# Patient Record
Sex: MALE | Race: WHITE | NOT HISPANIC OR LATINO | Employment: OTHER | URBAN - METROPOLITAN AREA
[De-identification: names, ages, dates, MRNs, and addresses within clinical notes are randomized per-mention and may not be internally consistent; named-entity substitution may affect disease eponyms.]

---

## 2017-04-25 ENCOUNTER — ALLSCRIPTS OFFICE VISIT (OUTPATIENT)
Dept: OTHER | Facility: OTHER | Age: 72
End: 2017-04-25

## 2017-04-25 ENCOUNTER — HOSPITAL ENCOUNTER (OUTPATIENT)
Dept: RADIOLOGY | Facility: CLINIC | Age: 72
Discharge: HOME | End: 2017-04-25
Payer: COMMERCIAL

## 2017-04-25 DIAGNOSIS — Z13.6 ENCOUNTER FOR SCREENING FOR CARDIOVASCULAR DISORDERS: ICD-10-CM

## 2017-04-25 DIAGNOSIS — Z87.891 PERSONAL HISTORY OF NICOTINE DEPENDENCE: ICD-10-CM

## 2017-04-25 DIAGNOSIS — Z13.820 ENCOUNTER FOR SCREENING FOR OSTEOPOROSIS: ICD-10-CM

## 2017-04-25 DIAGNOSIS — M25.519 PAIN IN SHOULDER: ICD-10-CM

## 2017-04-25 PROCEDURE — 73000 X-RAY EXAM OF COLLAR BONE: CPT

## 2017-05-04 ENCOUNTER — ALLSCRIPTS OFFICE VISIT (OUTPATIENT)
Dept: OTHER | Facility: OTHER | Age: 72
End: 2017-05-04

## 2017-05-04 ENCOUNTER — GENERIC CONVERSION - ENCOUNTER (OUTPATIENT)
Dept: OTHER | Facility: OTHER | Age: 72
End: 2017-05-04

## 2017-05-04 LAB
BASOPHILS # BLD AUTO: 0 %
BASOPHILS # BLD AUTO: 0 X10E3/UL (ref 0–0.2)
DEPRECATED RDW RBC AUTO: 14.3 % (ref 12.3–15.4)
EOSINOPHIL # BLD AUTO: 0.1 X10E3/UL (ref 0–0.4)
EOSINOPHIL # BLD AUTO: 2 %
HCT VFR BLD AUTO: 39.1 % (ref 37.5–51)
HGB BLD-MCNC: 13.4 G/DL (ref 12.6–17.7)
IMM.GRANULOCYTES (CD4/8) (HISTORICAL): 0 %
IMM.GRANULOCYTES (CD4/8) (HISTORICAL): 0 X10E3/UL (ref 0–0.1)
LYMPHOCYTES # BLD AUTO: 1.4 X10E3/UL (ref 0.7–3.1)
LYMPHOCYTES # BLD AUTO: 19 %
MCH RBC QN AUTO: 31.4 PG (ref 26.6–33)
MCHC RBC AUTO-ENTMCNC: 34.3 G/DL (ref 31.5–35.7)
MCV RBC AUTO: 92 FL (ref 79–97)
MONOCYTES # BLD AUTO: 0.7 X10E3/UL (ref 0.1–0.9)
MONOCYTES (HISTORICAL): 10 %
NEUTROPHILS # BLD AUTO: 5 X10E3/UL (ref 1.4–7)
NEUTROPHILS # BLD AUTO: 69 %
OCCULT BLD, FECAL IMMUNOLOGICAL (HISTORICAL): NEGATIVE
PLATELET # BLD AUTO: 411 X10E3/UL (ref 150–379)
RBC (HISTORICAL): 4.27 X10E6/UL (ref 4.14–5.8)
WBC # BLD AUTO: 7.2 X10E3/UL (ref 3.4–10.8)

## 2017-05-05 ENCOUNTER — GENERIC CONVERSION - ENCOUNTER (OUTPATIENT)
Dept: OTHER | Facility: OTHER | Age: 72
End: 2017-05-05

## 2017-05-05 LAB
A/G RATIO (HISTORICAL): 1.6 (ref 1.2–2.2)
ALBUMIN SERPL BCP-MCNC: 4.1 G/DL (ref 3.5–4.8)
ALP SERPL-CCNC: 60 IU/L (ref 39–117)
ALT SERPL W P-5'-P-CCNC: 19 IU/L (ref 0–44)
AST SERPL W P-5'-P-CCNC: 19 IU/L (ref 0–40)
BILIRUB SERPL-MCNC: 0.2 MG/DL (ref 0–1.2)
BUN SERPL-MCNC: 12 MG/DL (ref 8–27)
BUN/CREA RATIO (HISTORICAL): 12 (ref 10–24)
CALCIUM SERPL-MCNC: 9.6 MG/DL (ref 8.6–10.2)
CHLORIDE SERPL-SCNC: 95 MMOL/L (ref 96–106)
CHOLEST SERPL-MCNC: 173 MG/DL (ref 100–199)
CHOLEST/HDLC SERPL: 2.6 RATIO UNITS (ref 0–5)
CO2 SERPL-SCNC: 24 MMOL/L (ref 18–29)
CREAT SERPL-MCNC: 1.01 MG/DL (ref 0.76–1.27)
EGFR AFRICAN AMERICAN (HISTORICAL): 86 ML/MIN/1.73
EGFR-AMERICAN CALC (HISTORICAL): 74 ML/MIN/1.73
GLUCOSE SERPL-MCNC: 104 MG/DL (ref 65–99)
HDLC SERPL-MCNC: 67 MG/DL
INTERPRETATION (HISTORICAL): NORMAL
LDLC SERPL CALC-MCNC: 91 MG/DL (ref 0–99)
POTASSIUM SERPL-SCNC: 4.7 MMOL/L (ref 3.5–5.2)
PROSTATE SPECIFIC ANTIGEN (HISTORICAL): 3.7 NG/ML (ref 0–4)
SODIUM SERPL-SCNC: 136 MMOL/L (ref 134–144)
TOT. GLOBULIN, SERUM (HISTORICAL): 2.5 G/DL (ref 1.5–4.5)
TOTAL PROTEIN (HISTORICAL): 6.6 G/DL (ref 6–8.5)
TRIGL SERPL-MCNC: 74 MG/DL (ref 0–149)
TSH SERPL DL<=0.05 MIU/L-ACNC: 2.08 UIU/ML (ref 0.45–4.5)
VLDLC SERPL CALC-MCNC: 15 MG/DL (ref 5–40)

## 2017-05-11 ENCOUNTER — HOSPITAL ENCOUNTER (OUTPATIENT)
Dept: RADIOLOGY | Facility: HOSPITAL | Age: 72
Discharge: HOME | End: 2017-05-11
Attending: FAMILY MEDICINE
Payer: COMMERCIAL

## 2017-05-11 ENCOUNTER — GENERIC CONVERSION - ENCOUNTER (OUTPATIENT)
Dept: OTHER | Facility: OTHER | Age: 72
End: 2017-05-11

## 2017-05-11 DIAGNOSIS — Z87.891 PERSONAL HISTORY OF NICOTINE DEPENDENCE: ICD-10-CM

## 2017-05-11 DIAGNOSIS — Z13.6 ENCOUNTER FOR SCREENING FOR CARDIOVASCULAR DISORDERS: ICD-10-CM

## 2017-05-11 DIAGNOSIS — Z13.820 ENCOUNTER FOR SCREENING FOR OSTEOPOROSIS: ICD-10-CM

## 2017-05-11 PROCEDURE — 76706 US ABDL AORTA SCREEN AAA: CPT

## 2017-05-11 PROCEDURE — 77080 DXA BONE DENSITY AXIAL: CPT

## 2017-05-12 ENCOUNTER — GENERIC CONVERSION - ENCOUNTER (OUTPATIENT)
Dept: OTHER | Facility: OTHER | Age: 72
End: 2017-05-12

## 2017-07-11 ENCOUNTER — GENERIC CONVERSION - ENCOUNTER (OUTPATIENT)
Dept: OTHER | Facility: OTHER | Age: 72
End: 2017-07-11

## 2018-01-09 NOTE — RESULT NOTES
Discussion/Summary   bone density study was acceptable     Verified Results  * DXA BONE DENSITY SPINE HIP AND PELVIS [id number] 01:20PM Isidra Falk Order Number: [id number]    - Patient Instructions: To schedule this appointment, please contact Central Scheduling at [phone]  Test Name Result Flag Reference   DXA BONE DENSITY SPINE HIP AND PELVIS (Report)     CENTRAL DXA SCAN     CLINICAL HISTORY:  71year old  male with no significant past medical history  Previous smoking  Osteoporosis screening  TECHNIQUE: Bone densitometry was performed using a Pulmonx bone densitometer  Regions of interest appear properly placed  There are no obvious fractures or other confounding variables which could limit the study  Degenerative changes of the lumbar    spine and hip  This will falsely elevate the bone mineral densities in these regions  COMPARISON: None  RESULTS:    LUMBAR SPINE: L1 and L4:   BMD 1 396 gm/cm2   T-score 1 4   Z-score 2 0     LEFT TOTAL HIP:   BMD 1 162 gm/cm2   T-score 0 4   Z-score 1 2     LEFT FEMORAL NECK:   BMD 1 126 gm/cm2   T-score 0 4   Z-score 1 7     TOTAL RIGHT HIP:        BMD 1 0 93 gm/sq-cm,      T-score is -0 1      Z-score is 0 7                FEMORAL NECK RIGHT HIP :     BMD 1 0 33 gm/sq-cm,     T-score is -0 3     Z-score is 1 0             IMPRESSION:   1  Based on the [facility] classification, this study is normal and the patient is considered at low risk for fracture  2  A daily intake of calcium of at least 1200 mg and vitamin D, 800-1000 IU, as well as weight bearing and muscle strengthening exercise, fall prevention and avoidance of tobacco and excessive alcohol intake as basic preventive measures are recommended  3  Repeat DXA scan on the same equipment in 18-24 months as clinically indicated         The 10 year risk of hip fracture is 1%, with the 10 year risk of major osteoporotic fracture being 5%, as calculated by the [facility] fracture risk assessment tool (FRAX)  The current NOF guidelines recommend treating patients with FRAX 10 year risk score of    >3% for hip fracture and >20% for major osteoporotic fracture        WHO CLASSIFICATION:   Normal (a T-score of -1 0 or higher)   Low bone mineral density (a T-score of less than -1 0 but higher than -2 5)   Osteoporosis (a T-score of -2 5 or less)   Severe osteoporosis (a T-score of -2 5 or less with a fragility fracture)             Workstation performed: [id number]     Signed by:   Marta Chavez DO   5/11/17

## 2018-01-11 NOTE — RESULT NOTES
Discussion/Summary   negative study no evidence of aneurysm     Verified Results  [address] AAA [id number] 01:20PM Nils Monet Order Number: [id number]     Test Name Result Flag Reference   US ABDOMINAL AORTA SCREENING AAA (Report)     ABDOMINAL AORTIC ULTRASOUND     INDICATION: Evaluate for aortic aneurysm  COMPARISON: None  FINDINGS:      Ultrasound of the abdominal aorta was performed in longitudinal and transverse planes with a curvilinear transducer  Proximal aorta: Obscured by overlying bowel gas  Mid aorta:  2 0 by 2 4 cm   Distal aorta:  2 1 x 1 9 cm    Right common iliac origin: 1 3 cm   Left common iliac origin: 1 3 cm     No periaortic collections or adenopathy detected  IMPRESSION:     No evidence for abdominal aortic aneurysm         Workstation performed: [id number]     Signed by:   Estelita Landon MD   5/12/17

## 2018-01-14 VITALS
TEMPERATURE: 96.8 F | RESPIRATION RATE: 18 BRPM | SYSTOLIC BLOOD PRESSURE: 142 MMHG | WEIGHT: 157 LBS | HEIGHT: 67 IN | HEART RATE: 88 BPM | DIASTOLIC BLOOD PRESSURE: 62 MMHG | BODY MASS INDEX: 24.64 KG/M2

## 2018-01-14 VITALS
HEART RATE: 76 BPM | HEIGHT: 69 IN | WEIGHT: 158 LBS | DIASTOLIC BLOOD PRESSURE: 71 MMHG | SYSTOLIC BLOOD PRESSURE: 145 MMHG | BODY MASS INDEX: 23.4 KG/M2

## 2018-01-16 NOTE — MISCELLANEOUS
Provider Comments  Provider Comments:   Dear Erick Hannon,    This is a reminder you missed your scheduled appointment with Dr Gussie Gottron on Tuesday, July 11  Please call our office to reschedule this appointment      Sincerely,  Dr Ernesto Nagy   Electronically signed by : Delano Jeter, ; Jul 11 2017 11:27AM EST                       (Author)

## 2018-01-17 NOTE — RESULT NOTES
Discussion/Summary   called pt to discuss results i will speak with him when he calls back     Verified Results  (1) COMPREHENSIVE METABOLIC PANEL [id number] 64:00DL Yamila Peña     Test Name Result Flag Reference   Glucose, Serum 104 mg/dL H 65-99   BUN 12 mg/dL  8-27   Creatinine, Serum 1 01 mg/dL  0 76-1 27   BUN/Creatinine Ratio 12  10-24   Sodium, Serum 136 mmol/L  134-144   Potassium, Serum 4 7 mmol/L  3 5-5 2   Chloride, Serum 95 mmol/L L    Carbon Dioxide, Total 24 mmol/L  18-29   Calcium, Serum 9 6 mg/dL  8 6-10 2   Protein, Total, Serum 6 6 g/dL  6 0-8 5   Albumin, Serum 4 1 g/dL  3 5-4 8   Globulin, Total 2 5 g/dL  1 5-4 5   A/G Ratio 1 6  1 2-2 2   Bilirubin, Total 0 2 mg/dL  0 0-1 2   Alkaline Phosphatase, S 60 IU/L     AST (SGOT) 19 IU/L  0-40   ALT (SGPT) 19 IU/L  0-44   eGFR If NonAfricn Am 74 mL/min/1 73  >59   eGFR If Africn Am 86 mL/min/1 73  >59     (1) LIPID PANEL, FASTING [id number] 11:45AM [facility]     Test Name Result Flag Reference   Cholesterol, Total 173 mg/dL  100-199   Triglycerides 74 mg/dL  0-149   HDL Cholesterol 67 mg/dL  >39   VLDL Cholesterol Waqar 15 mg/dL  5-40   LDL Cholesterol Calc 91 mg/dL  0-99   T  Chol/HDL Ratio 2 6 ratio units  0 0-5 0   T  Chol/HDL Ratio                                                             Men  Women                                               1/2 Avg  Risk  3 4    3 3                                                   Avg Risk  5 0    4 4                                                2X Avg  Risk  9 6    7 1                                                3X Avg  Risk 23 4   11 0     (1) PSA (SCREEN) (Dx V76 44 Screen for Prostate Cancer) [id number] 11:45AM [facility] [employer]     Test Name Result Flag Reference   Prostate Specific Ag, Serum 3 7 ng/mL  0 0-4 0   Roche ECLIA methodology  According to the American Urological Association, Serum PSA should  decrease and remain at undetectable levels after radical  prostatectomy   The [facility] defines biochemical recurrence as an initial  PSA value 0 2 ng/mL or greater followed by a subsequent confirmatory  PSA value 0 2 ng/mL or greater  Values obtained with different assay methods or kits cannot be used  interchangeably  Results cannot be interpreted as absolute evidence  of the presence or absence of malignant disease  (1) TSH [id number] 11:45AM Chioma Huerta     Test Name Result Flag Reference   TSH 2 080 uIU/mL  0 450-4 500     (1) CBC/PLT/DIFF [id number] 11:45AM [facility]     Test Name Result Flag Reference   WBC 7 2 x10E3/uL  3 4-10 8   RBC 4 27 x10E6/uL  4 14-5 80   Hemoglobin 13 4 g/dL  12 6-17 7   Hematocrit 39 1 %  37 5-51 0   MCV 92 fL  79-97   MCH 31 4 pg  26 6-33 0   MCHC 34 3 g/dL  31 5-35 7   RDW 14 3 %  12 3-15 4   Platelets 439 O15A6/OX H 150-379   Neutrophils 69 %     Lymphs 19 %     Monocytes 10 %     Eos 2 %     Basos 0 %     Neutrophils (Absolute) 5 0 x10E3/uL  1 4-7 0   Lymphs (Absolute) 1 4 x10E3/uL  0 7-3 1   Monocytes(Absolute) 0 7 x10E3/uL  0 1-0 9   Eos (Absolute) 0 1 x10E3/uL  0 0-0 4   Baso (Absolute) 0 0 x10E3/uL  0 0-0 2   Immature Granulocytes 0 %     Immature Grans (Abs) 0 0 x10E3/uL  0 0-0 1     (LC) Cardiovascular Risk Assessment [id number] 11:45AM [facility] Huerta     Test Name Result Flag Reference   Interpretation Note     Supplement report is available  PDF Image

## 2019-01-15 ENCOUNTER — OFFICE VISIT (OUTPATIENT)
Dept: FAMILY MEDICINE CLINIC | Facility: CLINIC | Age: 74
End: 2019-01-15
Payer: COMMERCIAL

## 2019-01-15 VITALS
HEART RATE: 78 BPM | SYSTOLIC BLOOD PRESSURE: 120 MMHG | HEIGHT: 69 IN | DIASTOLIC BLOOD PRESSURE: 60 MMHG | BODY MASS INDEX: 21.77 KG/M2 | WEIGHT: 147 LBS | TEMPERATURE: 97.2 F | RESPIRATION RATE: 18 BRPM

## 2019-01-15 DIAGNOSIS — R59.0 LYMPHADENOPATHY, AXILLARY: Primary | ICD-10-CM

## 2019-01-15 DIAGNOSIS — Z12.5 SCREENING FOR PROSTATE CANCER: ICD-10-CM

## 2019-01-15 DIAGNOSIS — Z13.6 SCREENING FOR CARDIOVASCULAR CONDITION: ICD-10-CM

## 2019-01-15 PROBLEM — M19.041 PRIMARY OSTEOARTHRITIS OF RIGHT HAND: Status: ACTIVE | Noted: 2017-04-25

## 2019-01-15 PROCEDURE — 3008F BODY MASS INDEX DOCD: CPT | Performed by: FAMILY MEDICINE

## 2019-01-15 PROCEDURE — 99213 OFFICE O/P EST LOW 20 MIN: CPT | Performed by: FAMILY MEDICINE

## 2019-01-15 PROCEDURE — 1160F RVW MEDS BY RX/DR IN RCRD: CPT | Performed by: FAMILY MEDICINE

## 2019-01-15 PROCEDURE — 1101F PT FALLS ASSESS-DOCD LE1/YR: CPT | Performed by: FAMILY MEDICINE

## 2019-01-15 PROCEDURE — 3725F SCREEN DEPRESSION PERFORMED: CPT | Performed by: FAMILY MEDICINE

## 2019-01-15 RX ORDER — DOXYCYCLINE HYCLATE 100 MG/1
100 CAPSULE ORAL EVERY 12 HOURS SCHEDULED
Qty: 20 CAPSULE | Refills: 0 | Status: SHIPPED | OUTPATIENT
Start: 2019-01-15 | End: 2019-01-25

## 2019-01-15 NOTE — PROGRESS NOTES
Assessment/Plan:    Problem List Items Addressed This Visit     None      Visit Diagnoses     Lymphadenopathy, axillary    -  Primary    Relevant Medications    doxycycline hyclate (VIBRAMYCIN) 100 mg capsule    Other Relevant Orders    CT chest wo contrast    CBC    Comprehensive metabolic panel    Screening for cardiovascular condition        Relevant Orders    Lipid Panel with Direct LDL reflex    Screening for prostate cancer        Relevant Orders    PSA, Total Screen          There are no Patient Instructions on file for this visit  No Follow-up on file  Subjective:      Patient ID: Kelly Carranza is a 69 y o  male  Chief Complaint   Patient presents with    Swollen Glands     started 5 days ago under L arm  rmklpn       Pt states 4 days ago he noticed swollen glands in his left axcilla  States jhe felt a bit of a sting or some heat maybe rash  Pt does have scratches from cats on the left side  Pt would like to have this imaged          The following portions of the patient's history were reviewed and updated as appropriate: allergies, current medications, past family history, past medical history, past social history, past surgical history and problem list     Review of Systems   Constitutional: Negative for activity change, appetite change, chills, diaphoresis, fatigue, fever and unexpected weight change  HENT: Negative for congestion, dental problem, ear pain, mouth sores, sinus pain, sinus pressure, sore throat and trouble swallowing  Eyes: Negative for photophobia, discharge and itching  Respiratory: Negative for apnea, chest tightness and shortness of breath  Cardiovascular: Negative for chest pain, palpitations and leg swelling  Gastrointestinal: Negative for abdominal distention, abdominal pain, blood in stool, nausea and vomiting  Endocrine: Negative for cold intolerance, heat intolerance, polydipsia, polyphagia and polyuria  Genitourinary: Negative for difficulty urinating  Musculoskeletal: Negative for arthralgias  Skin: Negative for color change and wound  Neurological: Negative for dizziness, syncope, speech difficulty and headaches  Hematological: Negative for adenopathy  Psychiatric/Behavioral: Negative for agitation and behavioral problems  Current Outpatient Prescriptions   Medication Sig Dispense Refill    doxycycline hyclate (VIBRAMYCIN) 100 mg capsule Take 1 capsule (100 mg total) by mouth every 12 (twelve) hours for 10 days 20 capsule 0     No current facility-administered medications for this visit  Objective:    /60   Pulse 78   Temp (!) 97 2 °F (36 2 °C)   Resp 18   Ht 5' 9" (1 753 m)   Wt 66 7 kg (147 lb)   BMI 21 71 kg/m²        Physical Exam   Constitutional: He appears well-developed and well-nourished  No distress  HENT:   Head: Normocephalic and atraumatic  Right Ear: External ear normal    Left Ear: External ear normal    Nose: Nose normal    Mouth/Throat: Oropharynx is clear and moist  No oropharyngeal exudate  Eyes: Pupils are equal, round, and reactive to light  EOM are normal  Right eye exhibits no discharge  Left eye exhibits no discharge  No scleral icterus  Neck: No thyromegaly present  Cardiovascular: Normal rate and normal heart sounds  No murmur heard  Pulmonary/Chest: Effort normal and breath sounds normal  No respiratory distress  He has no wheezes  Abdominal: Soft  Bowel sounds are normal  He exhibits no distension and no mass  There is no tenderness  There is no rebound and no guarding  Musculoskeletal: Normal range of motion  Neurological: He is alert  He displays normal reflexes  Coordination normal    Skin: Skin is warm and dry  No rash noted  He is not diaphoretic  No erythema  Psychiatric: He has a normal mood and affect  His behavior is normal    Nursing note and vitals reviewed               Hudson Ponce DO

## 2019-01-17 LAB
ALBUMIN SERPL-MCNC: 4 G/DL (ref 3.5–4.8)
ALBUMIN/GLOB SERPL: 1.7 {RATIO} (ref 1.2–2.2)
ALP SERPL-CCNC: 62 IU/L (ref 39–117)
ALT SERPL-CCNC: 18 IU/L (ref 0–44)
AST SERPL-CCNC: 24 IU/L (ref 0–40)
BASOPHILS # BLD AUTO: 0.1 X10E3/UL (ref 0–0.2)
BASOPHILS NFR BLD AUTO: 1 %
BILIRUB SERPL-MCNC: 0.2 MG/DL (ref 0–1.2)
BUN SERPL-MCNC: 12 MG/DL (ref 8–27)
BUN/CREAT SERPL: 12 (ref 10–24)
CALCIUM SERPL-MCNC: 9.1 MG/DL (ref 8.6–10.2)
CHLORIDE SERPL-SCNC: 93 MMOL/L (ref 96–106)
CHOLEST SERPL-MCNC: 134 MG/DL (ref 100–199)
CO2 SERPL-SCNC: 25 MMOL/L (ref 20–29)
CREAT SERPL-MCNC: 1.04 MG/DL (ref 0.76–1.27)
EOSINOPHIL # BLD AUTO: 0.1 X10E3/UL (ref 0–0.4)
EOSINOPHIL NFR BLD AUTO: 2 %
ERYTHROCYTE [DISTWIDTH] IN BLOOD BY AUTOMATED COUNT: 13.1 % (ref 12.3–15.4)
GLOBULIN SER-MCNC: 2.3 G/DL (ref 1.5–4.5)
GLUCOSE SERPL-MCNC: 108 MG/DL (ref 65–99)
HCT VFR BLD AUTO: 38.1 % (ref 37.5–51)
HDLC SERPL-MCNC: 46 MG/DL
HGB BLD-MCNC: 13.2 G/DL (ref 13–17.7)
IMM GRANULOCYTES # BLD: 0 X10E3/UL (ref 0–0.1)
IMM GRANULOCYTES NFR BLD: 0 %
LABCORP COMMENT: NORMAL
LDLC SERPL CALC-MCNC: 76 MG/DL (ref 0–99)
LYMPHOCYTES # BLD AUTO: 1.7 X10E3/UL (ref 0.7–3.1)
LYMPHOCYTES NFR BLD AUTO: 30 %
MCH RBC QN AUTO: 30.7 PG (ref 26.6–33)
MCHC RBC AUTO-ENTMCNC: 34.6 G/DL (ref 31.5–35.7)
MCV RBC AUTO: 89 FL (ref 79–97)
MICRODELETION SYND BLD/T FISH: NORMAL
MONOCYTES # BLD AUTO: 0.9 X10E3/UL (ref 0.1–0.9)
MONOCYTES NFR BLD AUTO: 16 %
NEUTROPHILS # BLD AUTO: 2.9 X10E3/UL (ref 1.4–7)
NEUTROPHILS NFR BLD AUTO: 51 %
PLATELET # BLD AUTO: 260 X10E3/UL (ref 150–379)
POTASSIUM SERPL-SCNC: 4.4 MMOL/L (ref 3.5–5.2)
PROT SERPL-MCNC: 6.3 G/DL (ref 6–8.5)
PSA SERPL-MCNC: 1.1 NG/ML (ref 0–4)
RBC # BLD AUTO: 4.3 X10E6/UL (ref 4.14–5.8)
SL AMB EGFR AFRICAN AMERICAN: 82 ML/MIN/1.73
SL AMB EGFR NON AFRICAN AMERICAN: 71 ML/MIN/1.73
SODIUM SERPL-SCNC: 133 MMOL/L (ref 134–144)
TRIGL SERPL-MCNC: 59 MG/DL (ref 0–149)
WBC # BLD AUTO: 5.7 X10E3/UL (ref 3.4–10.8)

## 2019-01-21 ENCOUNTER — TELEPHONE (OUTPATIENT)
Dept: FAMILY MEDICINE CLINIC | Facility: CLINIC | Age: 74
End: 2019-01-21

## 2019-01-21 DIAGNOSIS — R59.0 AXILLARY LYMPHADENOPATHY: Primary | ICD-10-CM

## 2019-01-21 NOTE — TELEPHONE ENCOUNTER
Spoke with Pt, aware of Dr Benjamin Awad , pt also stated that he is going to call insurance to find out why CT is not covered and will call us back tomorrow   Violetta Khan MA

## 2019-01-21 NOTE — TELEPHONE ENCOUNTER
[place] call pt and let him know his ins did NT approve the ct of his chest   I ordered an US of nazanin instead    Order in chart

## 2019-01-21 NOTE — TELEPHONE ENCOUNTER
----- Message from Alena Ellis sent at 1/21/2019  3:26 PM EST -----  Regarding: Berneice Handler Dr Mark Simmons,    Andrés's CT chest is pending denial through his insurance  They are offering Peer to Peer at [phone]; Case #[id number]  If you'd like, you may view the correspondence attached below  Andrés's CT appointment is on Wed 1/23/2019  Please let me know if you complete the Peer to Peer OR contact Krystina Cabrera to review plan of care and instruct him to cancel his CT appointment  Thank you,  Angeles Barajas  ______________________________________________________    Upon review, the Medical Director is unable to approve the service requested above for your member  If we do not receive additional information to support an approval by the date specified, the following denial reason will be sent: Based on Medicare National Coverage Determinations (NCD) and [employer] [facility] Guidelines, we cannot approve this request  Your records show that you  have swollen lymph nodes in your armpit(s)  Lymph nodes are small glands located in many areas of your body  They act as filters and support your immune system  Your records do not show: 1) results of an exam done by your doctor, 2) that your swelling persisted after being observed by your doctor for 3 to 4 weeks, and 3) results of a lymph node biopsy (tissue sample taken for lab testing)  Advanced imaging, detailed picture study, is not supported prior to all of these being done

## 2019-01-21 NOTE — TELEPHONE ENCOUNTER
Please call pt let him know his insurance did not approve the ct of his chest   I ordered an US of the nazanin instead

## 2019-01-23 ENCOUNTER — TELEPHONE (OUTPATIENT)
Dept: FAMILY MEDICINE CLINIC | Facility: CLINIC | Age: 74
End: 2019-01-23

## 2019-01-23 ENCOUNTER — HOSPITAL ENCOUNTER (OUTPATIENT)
Dept: RADIOLOGY | Facility: HOSPITAL | Age: 74
Discharge: HOME | End: 2019-01-23
Attending: FAMILY MEDICINE
Payer: COMMERCIAL

## 2019-01-23 DIAGNOSIS — R59.0 AXILLARY LYMPHADENOPATHY: ICD-10-CM

## 2019-01-23 DIAGNOSIS — R59.0 LYMPHADENOPATHY, AXILLARY: Primary | ICD-10-CM

## 2019-01-23 PROCEDURE — 76642 ULTRASOUND BREAST LIMITED: CPT

## 2019-01-23 NOTE — TELEPHONE ENCOUNTER
Called pt to discuss the US of the left breast   Looks like he has reactive lymph nodes  He was treated with abx when I saw him,  Radiology suggested that we re US the area in 4-6 weeks looking for resolution  I will put order for this in chart    Left message for pt to call back

## 2019-01-23 NOTE — TELEPHONE ENCOUNTER
Called pt discussed results, advised I ordered the study for 6 week    If they are clear we are happy if not the next step would be to biopsy

## 2019-04-16 ENCOUNTER — HOSPITAL ENCOUNTER (OUTPATIENT)
Dept: RADIOLOGY | Facility: HOSPITAL | Age: 74
Discharge: HOME | End: 2019-04-16
Attending: FAMILY MEDICINE
Payer: COMMERCIAL

## 2019-04-16 DIAGNOSIS — R59.0 LYMPHADENOPATHY, AXILLARY: ICD-10-CM

## 2019-04-16 PROCEDURE — 76642 ULTRASOUND BREAST LIMITED: CPT

## 2020-02-26 ENCOUNTER — TELEPHONE (OUTPATIENT)
Dept: FAMILY MEDICINE CLINIC | Facility: CLINIC | Age: 75
End: 2020-02-26

## 2020-02-28 NOTE — TELEPHONE ENCOUNTER
2nd attempt, lmom for pt
APPT MADE    NO FURTHER ACTION NEEDED
Pt abdulaziz like he is due for an AWV has not had a follow up since 2017    Can we call to sched
lmom for pt to call back
constant

## 2020-03-11 ENCOUNTER — OFFICE VISIT (OUTPATIENT)
Dept: FAMILY MEDICINE CLINIC | Facility: CLINIC | Age: 75
End: 2020-03-11
Payer: COMMERCIAL

## 2020-03-11 VITALS
TEMPERATURE: 98 F | HEART RATE: 77 BPM | DIASTOLIC BLOOD PRESSURE: 70 MMHG | WEIGHT: 154 LBS | RESPIRATION RATE: 20 BRPM | HEIGHT: 69 IN | SYSTOLIC BLOOD PRESSURE: 130 MMHG | BODY MASS INDEX: 22.81 KG/M2 | OXYGEN SATURATION: 98 %

## 2020-03-11 DIAGNOSIS — Z23 NEED FOR VACCINATION: ICD-10-CM

## 2020-03-11 DIAGNOSIS — Z12.5 SCREENING FOR PROSTATE CANCER: ICD-10-CM

## 2020-03-11 DIAGNOSIS — Z00.00 MEDICARE ANNUAL WELLNESS VISIT, SUBSEQUENT: Primary | ICD-10-CM

## 2020-03-11 DIAGNOSIS — Z11.59 NEED FOR HEPATITIS C SCREENING TEST: ICD-10-CM

## 2020-03-11 DIAGNOSIS — Z13.6 SCREENING FOR CARDIOVASCULAR CONDITION: ICD-10-CM

## 2020-03-11 DIAGNOSIS — Z12.11 SCREEN FOR COLON CANCER: ICD-10-CM

## 2020-03-11 PROCEDURE — 1160F RVW MEDS BY RX/DR IN RCRD: CPT | Performed by: FAMILY MEDICINE

## 2020-03-11 PROCEDURE — 3008F BODY MASS INDEX DOCD: CPT | Performed by: FAMILY MEDICINE

## 2020-03-11 PROCEDURE — 1125F AMNT PAIN NOTED PAIN PRSNT: CPT | Performed by: FAMILY MEDICINE

## 2020-03-11 PROCEDURE — 4040F PNEUMOC VAC/ADMIN/RCVD: CPT | Performed by: FAMILY MEDICINE

## 2020-03-11 PROCEDURE — G0439 PPPS, SUBSEQ VISIT: HCPCS | Performed by: FAMILY MEDICINE

## 2020-03-11 PROCEDURE — G0009 ADMIN PNEUMOCOCCAL VACCINE: HCPCS | Performed by: FAMILY MEDICINE

## 2020-03-11 PROCEDURE — 1036F TOBACCO NON-USER: CPT | Performed by: FAMILY MEDICINE

## 2020-03-11 PROCEDURE — 90732 PPSV23 VACC 2 YRS+ SUBQ/IM: CPT | Performed by: FAMILY MEDICINE

## 2020-03-11 PROCEDURE — 1170F FXNL STATUS ASSESSED: CPT | Performed by: FAMILY MEDICINE

## 2020-03-11 RX ORDER — ERYTHROMYCIN 5 MG/G
OINTMENT OPHTHALMIC
COMMUNITY
Start: 2020-03-04

## 2020-03-11 NOTE — PROGRESS NOTES
Assessment and Plan:     Problem List Items Addressed This Visit     None      Visit Diagnoses     Medicare annual wellness visit, subsequent    -  Primary    Screening for cardiovascular condition        Relevant Orders    Comprehensive metabolic panel    Lipid Panel with Direct LDL reflex    Screening for prostate cancer        Relevant Orders    PSA, Total Screen    Need for hepatitis C screening test        Relevant Orders    Hepatitis C antibody    Need for vaccination        Relevant Orders    PNEUMOCOCCAL POLYSACCHARIDE VACCINE 23-VALENT =>3YO SQ IM    Screen for colon cancer        Relevant Orders    Ambulatory referral to Gastroenterology           Preventive health issues were discussed with patient, and age appropriate screening tests were ordered as noted in patient's After Visit Summary  Personalized health advice and appropriate referrals for health education or preventive services given if needed, as noted in patient's After Visit Summary  History of Present Illness:     Patient presents for Medicare Annual Wellness visit    Patient Care Team:  Kelley Hernandez DO as PCP - General (Family Medicine)  Dhara Cristobal MD as Consulting Physician (Gastroenterology)  Dl Chung MD as Consulting Physician (Ophthalmology)  Rahel Tai MD as Consulting Physician (Orthopedic Surgery)  Thai Ragsdale MD as Consulting Physician (Dermatology)     Problem List:     Patient Active Problem List   Diagnosis    Primary osteoarthritis of right hand      Past Medical and Surgical History:     History reviewed  No pertinent past medical history    Past Surgical History:   Procedure Laterality Date    SKIN BIOPSY        Family History:     Family History   Problem Relation Age of Onset    Colon cancer Mother     Lung cancer Father     Mental illness Neg Hx       Social History:        Social History     Socioeconomic History    Marital status: /Civil Union     Spouse name: None    Number of children: None    Years of education: None    Highest education level: None   Occupational History    None   Social Needs    Financial resource strain: None    Food insecurity:     Worry: None     Inability: None    Transportation needs:     Medical: None     Non-medical: None   Tobacco Use    Smoking status: Former Smoker     Last attempt to quit: 3/11/2005     Years since quitting: 15 0    Smokeless tobacco: Never Used   Substance and Sexual Activity    Alcohol use: No    Drug use: No    Sexual activity: None   Lifestyle    Physical activity:     Days per week: None     Minutes per session: None    Stress: None   Relationships    Social connections:     Talks on phone: None     Gets together: None     Attends Synagogue service: None     Active member of club or organization: None     Attends meetings of clubs or organizations: None     Relationship status: None    Intimate partner violence:     Fear of current or ex partner: None     Emotionally abused: None     Physically abused: None     Forced sexual activity: None   Other Topics Concern    None   Social History Narrative    None      Medications and Allergies:     Current Outpatient Medications   Medication Sig Dispense Refill    erythromycin (ILOTYCIN) ophthalmic ointment APPLY A SMALL AMOUNT IN BOTH EYES AT BEDTIME GENTLY RUB INTO EYELASHES       No current facility-administered medications for this visit        No Known Allergies   Immunizations:     Immunization History   Administered Date(s) Administered    Pneumococcal Conjugate 13-Valent 05/04/2017      Health Maintenance:         Topic Date Due    Hepatitis C Screening  1945    CRC Screening: Colonoscopy  02/05/2015         Topic Date Due    DTaP,Tdap,and Td Vaccines (1 - Tdap) 01/12/1956    Pneumococcal Vaccine: 65+ Years (2 of 2 - PPSV23) 05/04/2018      Medicare Health Risk Assessment:     /70   Pulse 77   Temp 98 °F (36 7 °C)   Resp 20   Ht 5' 8 5" (1 74 m) Wt 69 9 kg (154 lb)   SpO2 98%   BMI 23 08 kg/m²      Leeanne Brittle is here for his Subsequent Wellness visit  Last Medicare Wellness visit information reviewed, patient interviewed, no change since last AWV  Health Risk Assessment:   Patient rates overall health as very good  Patient feels that their physical health rating is same  Eyesight was rated as slightly worse  Hearing was rated as same  Patient feels that their emotional and mental health rating is same  Pain experienced in the last 7 days has been none  Patient states that he has experienced no weight loss or gain in last 6 months  Depression Screening:   PHQ-2 Score: 0      Fall Risk Screening: In the past year, patient has experienced: no history of falling in past year      Home Safety:  Patient does not have trouble with stairs inside or outside of their home  Patient has working smoke alarms and has no working carbon monoxide detector  Home safety hazards include: none  Nutrition:   Current diet is Regular, Low Carb and Low Cholesterol  Medications:   Patient is currently taking over-the-counter supplements  OTC medications include: see medication list  Patient is able to manage medications  Activities of Daily Living (ADLs)/Instrumental Activities of Daily Living (IADLs):   Walk and transfer into and out of bed and chair?: Yes  Dress and groom yourself?: Yes    Bathe or shower yourself?: Yes    Feed yourself?  Yes  Do your laundry/housekeeping?: Yes  Manage your money, pay your bills and track your expenses?: Yes  Make your own meals?: Yes    Do your own shopping?: Yes    Previous Hospitalizations:   Any hospitalizations or ED visits within the last 12 months?: No      Advance Care Planning:   Living will: No    Durable POA for healthcare: No    Advanced directive: No      Cognitive Screening:   Provider or family/friend/caregiver concerned regarding cognition?: No    PREVENTIVE SCREENINGS      Cardiovascular Screening:    General: Screening Current and Risks and Benefits Discussed    Due for: Lipid Panel      Diabetes Screening:     General: Risks and Benefits Discussed and Screening Current    Due for: Blood Glucose      Prostate Cancer Screening:    General: Risks and Benefits Discussed and Screening Current    Due for: PSA      Osteoporosis Screening:    General: Risks and Benefits Discussed and Patient Declines      Abdominal Aortic Aneurysm (AAA) Screening:    Risk factors include: age between 73-69 yo and tobacco use        General: Screening Current and Risks and Benefits Discussed      Lung Cancer Screening:     General: Screening Not Indicated      Hepatitis C Screening:    General: Risks and Benefits Discussed    Hep C Screening Accepted: Yes      Physical Exam   Prostate = acceptable size, not tender    Barbara Hernandez, DO

## 2020-03-11 NOTE — PATIENT INSTRUCTIONS
Medicare Preventive Visit Patient Instructions  Thank you for completing your Welcome to Medicare Visit or Medicare Annual Wellness Visit today  Your next wellness visit will be due in one year (3/11/2021)  The screening/preventive services that you may require over the next 5-10 years are detailed below  Some tests may not apply to you based off risk factors and/or age  Screening tests ordered at today's visit but not completed yet may show as past due  Also, please note that scanned in results may not display below  Preventive Screenings:  Service Recommendations Previous Testing/Comments   Colorectal Cancer Screening  · Colonoscopy    · Fecal Occult Blood Test (FOBT)/Fecal Immunochemical Test (FIT)  · Fecal DNA/Cologuard Test  · Flexible Sigmoidoscopy Age: 54-65 years old   Colonoscopy: every 10 years (May be performed more frequently if at higher risk)  OR  FOBT/FIT: every 1 year  OR  Cologuard: every 3 years  OR  Sigmoidoscopy: every 5 years  Screening may be recommended earlier than age 48 if at higher risk for colorectal cancer  Also, an individualized decision between you and your healthcare provider will decide whether screening between the ages of 74-80 would be appropriate   Colonoscopy: 02/05/2010  FOBT/FIT: Not on file  Cologuard: Not on file  Sigmoidoscopy: Not on file         Prostate Cancer Screening Individualized decision between patient and health care provider in men between ages of 53-78   Medicare will cover every 12 months beginning on the day after your 50th birthday PSA: 1 1 ng/mL          Hepatitis C Screening Once for adults born between [facility]  More frequently in patients at high risk for Hepatitis C Hep C Antibody: Not on file       Diabetes Screening 1-2 times per year if you're at risk for diabetes or have pre-diabetes Fasting glucose: No results in last 5 years   A1C: No results in last 5 years       Cholesterol Screening Once every 5 years if you don't have a lipid disorder  May order more often based on risk factors  Lipid panel: 01/16/2019    Screening Current      Other Preventive Screenings Covered by Medicare:  1  Abdominal Aortic Aneurysm (AAA) Screening: covered once if your at risk  You're considered to be at risk if you have a family history of AAA or a male between the age of 73-68 who smoking at least 100 cigarettes in your lifetime  2  Lung Cancer Screening: covers low dose CT scan once per year if you meet all of the following conditions: (1) Age 50-69; (2) No signs or symptoms of lung cancer; (3) Current smoker or have quit smoking within the last 15 years; (4) You have a tobacco smoking history of at least 30 pack years (packs per day x number of years you smoked); (5) You get a written order from a healthcare provider  3  Glaucoma Screening: covered annually if you're considered high risk: (1) You have diabetes OR (2) Family history of glaucoma OR (3)  aged 48 and older OR (3)  American aged 72 and older  3  Osteoporosis Screening: covered every 2 years if you meet one of the following conditions: (1) Have a vertebral abnormality; (2) On glucocorticoid therapy for more than 3 months; (3) Have primary hyperparathyroidism; (4) On osteoporosis medications and need to assess response to drug therapy  5  HIV Screening: covered annually if you're between the age of 12-76  Also covered annually if you are younger than 13 and older than 72 with risk factors for HIV infection  For pregnant patients, it is covered up to 3 times per pregnancy      Immunizations:  Immunization Recommendations   Influenza Vaccine Annual influenza vaccination during flu season is recommended for all persons aged >= 6 months who do not have contraindications   Pneumococcal Vaccine (Prevnar and Pneumovax)  * Prevnar = PCV13  * Pneumovax = PPSV23 Adults 25-60 years old: 1-3 doses may be recommended based on certain risk factors  Adults 72 years old: Prevnar (PCV13) vaccine recommended followed by Pneumovax (PPSV23) vaccine  If already received PPSV23 since turning 65, then PCV13 recommended at least one year after PPSV23 dose  Hepatitis B Vaccine 3 dose series if at intermediate or high risk (ex: diabetes, end stage renal disease, liver disease)   Tetanus (Td) Vaccine - COST NOT COVERED BY MEDICARE PART B Following completion of primary series, a booster dose should be given every 10 years to maintain immunity against tetanus  Td may also be given as tetanus wound prophylaxis  Tdap Vaccine - COST NOT COVERED BY MEDICARE PART B Recommended at least once for all adults  For pregnant patients, recommended with each pregnancy  Shingles Vaccine (Shingrix) - COST NOT COVERED BY MEDICARE PART B  2 shot series recommended in those aged 48 and above     Health Maintenance Due:      Topic Date Due    Hepatitis C Screening  1945    CRC Screening: Colonoscopy  02/05/2015     Immunizations Due:      Topic Date Due    DTaP,Tdap,and Td Vaccines (1 - Tdap) 01/12/1956    Pneumococcal Vaccine: 65+ Years (2 of 2 - PPSV23) 05/04/2018    Influenza Vaccine  07/01/2019     Advance Directives   What are advance directives? Advance directives are legal documents that state your wishes and plans for medical care  These plans are made ahead of time in case you lose your ability to make decisions for yourself  Advance directives can apply to any medical decision, such as the treatments you want, and if you want to donate organs  What are the types of advance directives? There are many types of advance directives, and each state has rules about how to use them  You may choose a combination of any of the following:  · Living will: This is a written record of the treatment you want  You can also choose which treatments you do not want, which to limit, and which to stop at a certain time  This includes surgery, medicine, IV fluid, and tube feedings     · Durable power of  for [facility]): This is a written record that states who you want to make healthcare choices for you when you are unable to make them for yourself  This person, called a proxy, is usually a family member or a friend  You may choose more than 1 proxy  · Do not resuscitate (DNR) order:  A DNR order is used in case your heart stops beating or you stop breathing  It is a request not to have certain forms of treatment, such as CPR  A DNR order may be included in other types of advance directives  · Medical directive: This covers the care that you want if you are in a coma, near death, or unable to make decisions for yourself  You can list the treatments you want for each condition  Treatment may include pain medicine, surgery, blood transfusions, dialysis, IV or tube feedings, and a ventilator (breathing machine)  · Values history: This document has questions about your views, beliefs, and how you feel and think about life  This information can help others choose the care that you would choose  Why are advance directives important? An advance directive helps you control your care  Although spoken wishes may be used, it is better to have your wishes written down  Spoken wishes can be misunderstood, or not followed  Treatments may be given even if you do not want them  An advance directive may make it easier for your family to make difficult choices about your care  © Copyright [employer] 2018 Information is for End User's use only and may not be sold, redistributed or otherwise used for commercial purposes   All illustrations and images included in CareNotes® are the copyrighted property of [employer]  or [address] [place] [address] [facility]

## 2020-12-01 ENCOUNTER — VBI (OUTPATIENT)
Dept: ADMINISTRATIVE | Facility: OTHER | Age: 75
End: 2020-12-01

## 2021-01-20 ENCOUNTER — IMMUNIZATIONS (OUTPATIENT)
Dept: FAMILY MEDICINE CLINIC | Facility: HOSPITAL | Age: 76
End: 2021-01-20

## 2021-01-20 DIAGNOSIS — Z23 ENCOUNTER FOR IMMUNIZATION: Primary | ICD-10-CM

## 2021-01-20 PROCEDURE — 91300 SARS-COV-2 / COVID-19 MRNA VACCINE (PFIZER-BIONTECH) 30 MCG: CPT

## 2021-01-20 PROCEDURE — 0001A SARS-COV-2 / COVID-19 MRNA VACCINE (PFIZER-BIONTECH) 30 MCG: CPT

## 2021-02-09 ENCOUNTER — IMMUNIZATIONS (OUTPATIENT)
Dept: FAMILY MEDICINE CLINIC | Facility: HOSPITAL | Age: 76
End: 2021-02-09

## 2021-02-09 DIAGNOSIS — Z23 ENCOUNTER FOR IMMUNIZATION: Primary | ICD-10-CM

## 2021-02-09 PROCEDURE — 91300 SARS-COV-2 / COVID-19 MRNA VACCINE (PFIZER-BIONTECH) 30 MCG: CPT

## 2021-02-09 PROCEDURE — 0002A SARS-COV-2 / COVID-19 MRNA VACCINE (PFIZER-BIONTECH) 30 MCG: CPT

## 2021-05-12 ENCOUNTER — TELEPHONE (OUTPATIENT)
Dept: FAMILY MEDICINE CLINIC | Facility: CLINIC | Age: 76
End: 2021-05-12

## 2021-05-19 ENCOUNTER — TELEPHONE (OUTPATIENT)
Dept: FAMILY MEDICINE CLINIC | Facility: CLINIC | Age: 76
End: 2021-05-19

## 2021-05-19 NOTE — TELEPHONE ENCOUNTER
Left message on machine for patient to call office  Patient is due for Annual Wellness Visit    Noreen Tran

## 2021-06-24 ENCOUNTER — TELEPHONE (OUTPATIENT)
Dept: FAMILY MEDICINE CLINIC | Facility: CLINIC | Age: 76
End: 2021-06-24

## 2021-07-12 ENCOUNTER — TELEPHONE (OUTPATIENT)
Dept: FAMILY MEDICINE CLINIC | Facility: CLINIC | Age: 76
End: 2021-07-12

## 2021-07-12 DIAGNOSIS — Z13.6 SCREENING FOR CARDIOVASCULAR CONDITION: Primary | ICD-10-CM

## 2021-07-12 DIAGNOSIS — Z12.5 SCREENING FOR PROSTATE CANCER: ICD-10-CM

## 2021-07-12 NOTE — TELEPHONE ENCOUNTER
Please order blood work needed for Eduardo Ahuja, he has an appt on 08/09/2021  He wants the orders mailed to him    Noreen Tran

## 2021-07-20 LAB
ALBUMIN SERPL-MCNC: 4.3 G/DL (ref 3.7–4.7)
ALBUMIN/GLOB SERPL: 2 {RATIO} (ref 1.2–2.2)
ALP SERPL-CCNC: 77 IU/L (ref 48–121)
ALT SERPL-CCNC: 15 IU/L (ref 0–44)
AST SERPL-CCNC: 20 IU/L (ref 0–40)
BILIRUB SERPL-MCNC: <0.2 MG/DL (ref 0–1.2)
BUN SERPL-MCNC: 14 MG/DL (ref 8–27)
BUN/CREAT SERPL: 14 (ref 10–24)
CALCIUM SERPL-MCNC: 9.5 MG/DL (ref 8.6–10.2)
CHLORIDE SERPL-SCNC: 96 MMOL/L (ref 96–106)
CHOLEST SERPL-MCNC: 200 MG/DL (ref 100–199)
CO2 SERPL-SCNC: 26 MMOL/L (ref 20–29)
CREAT SERPL-MCNC: 0.98 MG/DL (ref 0.76–1.27)
GLOBULIN SER-MCNC: 2.2 G/DL (ref 1.5–4.5)
GLUCOSE SERPL-MCNC: 118 MG/DL (ref 65–99)
HDLC SERPL-MCNC: 70 MG/DL
LDLC SERPL CALC-MCNC: 116 MG/DL (ref 0–99)
MICRODELETION SYND BLD/T FISH: NORMAL
POTASSIUM SERPL-SCNC: 5 MMOL/L (ref 3.5–5.2)
PROT SERPL-MCNC: 6.5 G/DL (ref 6–8.5)
PSA SERPL-MCNC: 2.6 NG/ML (ref 0–4)
SL AMB EGFR AFRICAN AMERICAN: 87 ML/MIN/1.73
SL AMB EGFR NON AFRICAN AMERICAN: 75 ML/MIN/1.73
SODIUM SERPL-SCNC: 135 MMOL/L (ref 134–144)
TRIGL SERPL-MCNC: 78 MG/DL (ref 0–149)

## 2021-08-09 ENCOUNTER — OFFICE VISIT (OUTPATIENT)
Dept: FAMILY MEDICINE CLINIC | Facility: CLINIC | Age: 76
End: 2021-08-09
Payer: COMMERCIAL

## 2021-08-09 VITALS
HEIGHT: 69 IN | HEART RATE: 67 BPM | OXYGEN SATURATION: 98 % | DIASTOLIC BLOOD PRESSURE: 70 MMHG | SYSTOLIC BLOOD PRESSURE: 126 MMHG | BODY MASS INDEX: 21.48 KG/M2 | RESPIRATION RATE: 18 BRPM | TEMPERATURE: 97.2 F | WEIGHT: 145 LBS

## 2021-08-09 DIAGNOSIS — E78.2 MIXED HYPERLIPIDEMIA: ICD-10-CM

## 2021-08-09 DIAGNOSIS — Z11.59 NEED FOR HEPATITIS C SCREENING TEST: ICD-10-CM

## 2021-08-09 DIAGNOSIS — N13.8 BPH WITH OBSTRUCTION/LOWER URINARY TRACT SYMPTOMS: ICD-10-CM

## 2021-08-09 DIAGNOSIS — Z12.11 SCREEN FOR COLON CANCER: ICD-10-CM

## 2021-08-09 DIAGNOSIS — Z12.5 SCREENING FOR PROSTATE CANCER: ICD-10-CM

## 2021-08-09 DIAGNOSIS — Z00.00 MEDICARE ANNUAL WELLNESS VISIT, SUBSEQUENT: Primary | ICD-10-CM

## 2021-08-09 DIAGNOSIS — N40.1 BPH WITH OBSTRUCTION/LOWER URINARY TRACT SYMPTOMS: ICD-10-CM

## 2021-08-09 DIAGNOSIS — R73.09 ABNORMAL GLUCOSE: ICD-10-CM

## 2021-08-09 PROCEDURE — 3725F SCREEN DEPRESSION PERFORMED: CPT | Performed by: FAMILY MEDICINE

## 2021-08-09 PROCEDURE — 3288F FALL RISK ASSESSMENT DOCD: CPT | Performed by: FAMILY MEDICINE

## 2021-08-09 PROCEDURE — 3008F BODY MASS INDEX DOCD: CPT | Performed by: FAMILY MEDICINE

## 2021-08-09 PROCEDURE — 1036F TOBACCO NON-USER: CPT | Performed by: FAMILY MEDICINE

## 2021-08-09 PROCEDURE — 1160F RVW MEDS BY RX/DR IN RCRD: CPT | Performed by: FAMILY MEDICINE

## 2021-08-09 PROCEDURE — 1170F FXNL STATUS ASSESSED: CPT | Performed by: FAMILY MEDICINE

## 2021-08-09 PROCEDURE — 1101F PT FALLS ASSESS-DOCD LE1/YR: CPT | Performed by: FAMILY MEDICINE

## 2021-08-09 PROCEDURE — 1125F AMNT PAIN NOTED PAIN PRSNT: CPT | Performed by: FAMILY MEDICINE

## 2021-08-09 PROCEDURE — G0439 PPPS, SUBSEQ VISIT: HCPCS | Performed by: FAMILY MEDICINE

## 2021-08-09 RX ORDER — TAMSULOSIN HYDROCHLORIDE 0.4 MG/1
0.4 CAPSULE ORAL
Qty: 30 CAPSULE | Refills: 5 | Status: SHIPPED | OUTPATIENT
Start: 2021-08-09

## 2021-08-09 RX ORDER — TRIAMCINOLONE ACETONIDE 1 MG/G
CREAM TOPICAL AS NEEDED
COMMUNITY
Start: 2021-07-22

## 2021-08-09 NOTE — PROGRESS NOTES
Assessment and Plan:     Problem List Items Addressed This Visit        Genitourinary    BPH with obstruction/lower urinary tract symptoms    Relevant Medications    tamsulosin (FLOMAX) 0 4 mg    Other Relevant Orders    PSA, Total Screen       Other    Mixed hyperlipidemia    Relevant Orders    Lipid Panel with Direct LDL reflex    Abnormal glucose    Relevant Orders    Comprehensive metabolic panel    Hemoglobin A1C      Other Visit Diagnoses     Medicare annual wellness visit, subsequent    -  Primary    Screen for colon cancer        Relevant Orders    Ambulatory referral to Gastroenterology    Need for hepatitis C screening test        Relevant Orders    Hepatitis C antibody    Screening for prostate cancer            BMI Counseling: Body mass index is 21 73 kg/m²  The BMI is above normal  Nutrition recommendations include decreasing portion sizes  Exercise recommendations include moderate physical activity 150 minutes/week  No pharmacotherapy was ordered  Preventive health issues were discussed with patient, and age appropriate screening tests were ordered as noted in patient's After Visit Summary  Personalized health advice and appropriate referrals for health education or preventive services given if needed, as noted in patient's After Visit Summary       History of Present Illness:     Patient presents for Medicare Annual Wellness visit    Patient Care Team:  Waqar Schreiber DO as PCP - General (Family Medicine)  González Tim MD as Consulting Physician (Gastroenterology)  Alexey Friend MD as Consulting Physician (Ophthalmology)  Mariana Solis MD as Consulting Physician (Orthopedic Surgery)  Padmini Meier MD as Consulting Physician (Dermatology)     Problem List:     Patient Active Problem List   Diagnosis    Primary osteoarthritis of right hand    Mixed hyperlipidemia    Abnormal glucose    BPH with obstruction/lower urinary tract symptoms      Past Medical and Surgical History:     History reviewed  No pertinent past medical history  Past Surgical History:   Procedure Laterality Date    SKIN BIOPSY        Family History:     Family History   Problem Relation Age of Onset    Colon cancer Mother     Lung cancer Father     Mental illness Neg Hx       Social History:     Social History     Socioeconomic History    Marital status: /Civil Union     Spouse name: None    Number of children: None    Years of education: None    Highest education level: None   Occupational History    None   Tobacco Use    Smoking status: Former Smoker     Quit date: 3/11/2005     Years since quittin 4    Smokeless tobacco: Never Used   Vaping Use    Vaping Use: Never used   Substance and Sexual Activity    Alcohol use: No    Drug use: No    Sexual activity: None   Other Topics Concern    None   Social History Narrative    None     Social Determinants of Health     Financial Resource Strain:     Difficulty of Paying Living Expenses:    Food Insecurity:     Worried About Running Out of Food in the Last Year:     Ran Out of Food in the Last Year:    Transportation Needs:     Lack of Transportation (Medical):      Lack of Transportation (Non-Medical):    Physical Activity:     Days of Exercise per Week:     Minutes of Exercise per Session:    Stress:     Feeling of Stress :    Social Connections:     Frequency of Communication with Friends and Family:     Frequency of Social Gatherings with Friends and Family:     Attends [employer] Services:     Active Member of Clubs or Organizations:     Attends Club or Organization Meetings:     Marital Status:    Intimate Partner Violence:     Fear of Current or Ex-Partner:     Emotionally Abused:     Physically Abused:     Sexually Abused:       Medications and Allergies:     Current Outpatient Medications   Medication Sig Dispense Refill    erythromycin (ILOTYCIN) ophthalmic ointment APPLY A SMALL AMOUNT IN BOTH EYES AT BEDTIME GENTLY RUB INTO EYELASHES      halobetasol (ULTRAVATE) 0 05 % cream as needed      triamcinolone (KENALOG) 0 1 % cream as needed      tamsulosin (FLOMAX) 0 4 mg Take 1 capsule (0 4 mg total) by mouth daily with dinner 30 capsule 5     No current facility-administered medications for this visit  No Known Allergies   Immunizations:     Immunization History   Administered Date(s) Administered    Pneumococcal Conjugate 13-Valent 05/04/2017    Pneumococcal Polysaccharide PPV23 03/11/2020    SARS-CoV-2 / COVID-19 mRNA IM (Pfizer-BioNTech) 01/20/2021, 02/09/2021      Health Maintenance:         Topic Date Due    Hepatitis C Screening  Never done    Colorectal Cancer Screening  02/05/2015         Topic Date Due    DTaP,Tdap,and Td Vaccines (1 - Tdap) Never done    Influenza Vaccine (1) 09/01/2021      Medicare Health Risk Assessment:     /70   Pulse 67   Temp (!) 97 2 °F (36 2 °C)   Resp 18   Ht 5' 8 5" (1 74 m)   Wt 65 8 kg (145 lb)   SpO2 98%   BMI 21 73 kg/m²      Cuca Coello is here for his Subsequent Wellness visit  Last Medicare Wellness visit information reviewed, patient interviewed, no change since last AWV  Health Risk Assessment:   Patient rates overall health as very good  Patient feels that their physical health rating is same  Patient is satisfied with their life  Eyesight was rated as same  Hearing was rated as same  Patient feels that their emotional and mental health rating is same  Patients states they are never, rarely angry  Patient states they are never, rarely unusually tired/fatigued  Pain experienced in the last 7 days has been none  Patient states that he has experienced no weight loss or gain in last 6 months  Depression Screening:   PHQ-2 Score: 0      Fall Risk Screening: In the past year, patient has experienced: no history of falling in past year      Home Safety:  Patient does not have trouble with stairs inside or outside of their home   Patient has working smoke alarms and has no working carbon monoxide detector  Home safety hazards include: none  Nutrition:   Current diet is Regular  Medications:   Patient is not currently taking any over-the-counter supplements  Patient is able to manage medications  Activities of Daily Living (ADLs)/Instrumental Activities of Daily Living (IADLs):   Walk and transfer into and out of bed and chair?: Yes  Dress and groom yourself?: Yes    Bathe or shower yourself?: Yes    Feed yourself?  Yes  Do your laundry/housekeeping?: Yes  Manage your money, pay your bills and track your expenses?: Yes  Make your own meals?: Yes    Do your own shopping?: Yes    Previous Hospitalizations:   Any hospitalizations or ED visits within the last 12 months?: No      Advance Care Planning:   Living will: No    Durable POA for healthcare: No    Advanced directive: No      Cognitive Screening:   Provider or family/friend/caregiver concerned regarding cognition?: No    PREVENTIVE SCREENINGS      Cardiovascular Screening:    General: Screening Current and Risks and Benefits Discussed    Due for: Lipid Panel      Diabetes Screening:     General: Screening Current and Risks and Benefits Discussed    Due for: Blood Glucose      Colorectal Cancer Screening:     General: Risks and Benefits Discussed    Due for: Colonoscopy - High Risk      Prostate Cancer Screening:    General: Screening Not Indicated and Risks and Benefits Discussed      Osteoporosis Screening:    General: Screening Not Indicated      Abdominal Aortic Aneurysm (AAA) Screening:    Risk factors include: age between 73-67 yo and tobacco use        General: Risks and Benefits Discussed and Screening Current      Lung Cancer Screening:     General: Screening Not Indicated      Hepatitis C Screening:    General: Risks and Benefits Discussed    Hep C Screening Accepted: Yes      Screening, Brief Intervention, and Referral to Treatment (SBIRT)    Screening  Typical number of drinks in a day: 0  Typical number of drinks in a week: 0  Interpretation: Low risk drinking behavior  Single Item Drug Screening:  How often have you used an illegal drug (including marijuana) or a prescription medication for non-medical reasons in the past year? never    Single Item Drug Screen Score: 0  Interpretation: Negative screen for possible drug use disorder    Brief Intervention  Alcohol & drug use screenings were reviewed  No concerns regarding substance use disorder identified  Recent Results (from the past 672 hour(s))   Comprehensive metabolic panel    Collection Time: 07/20/21  7:09 AM   Result Value Ref Range    Glucose, Random 118 (H) 65 - 99 mg/dL    BUN 14 8 - 27 mg/dL    Creatinine 0 98 0 76 - 1 27 mg/dL    eGFR Non African American 75 >59 mL/min/1 73    eGFR  87 >59 mL/min/1 73    SL AMB BUN/CREATININE RATIO 14 10 - 24    Sodium 135 134 - 144 mmol/L    Potassium 5 0 3 5 - 5 2 mmol/L    Chloride 96 96 - 106 mmol/L    CO2 26 20 - 29 mmol/L    CALCIUM 9 5 8 6 - 10 2 mg/dL    Protein, Total 6 5 6 0 - 8 5 g/dL    Albumin 4 3 3 7 - 4 7 g/dL    Globulin, Total 2 2 1 5 - 4 5 g/dL    Albumin/Globulin Ratio 2 0 1 2 - 2 2    TOTAL BILIRUBIN <0 2 0 0 - 1 2 mg/dL    Alk Phos Isoenzymes 77 48 - 121 IU/L    AST 20 0 - 40 IU/L    ALT 15 0 - 44 IU/L   Lipid panel    Collection Time: 07/20/21  7:09 AM   Result Value Ref Range    Cholesterol, Total 200 (H) 100 - 199 mg/dL    Triglycerides 78 0 - 149 mg/dL    HDL 70 >39 mg/dL    LDL Calculated 116 (H) 0 - 99 mg/dL   PSA Total, Diagnostic    Collection Time: 07/20/21  7:09 AM   Result Value Ref Range    Prostate Specific Antigen Total 2 6 0 0 - 4 0 ng/mL   Cardiovascular Report    Collection Time: 07/20/21  7:09 AM   Result Value Ref Range    Interpretation Note      Physical Exam  Constitutional:       Appearance: He is well-developed  HENT:      Head: Normocephalic and atraumatic        Right Ear: External ear normal       Left Ear: External ear normal       Nose: Nose normal    Eyes:      Conjunctiva/sclera: Conjunctivae normal       Pupils: Pupils are equal, round, and reactive to light  Neck:      Thyroid: No thyromegaly  Cardiovascular:      Rate and Rhythm: Normal rate and regular rhythm  Heart sounds: Normal heart sounds  Pulmonary:      Effort: Pulmonary effort is normal       Breath sounds: Normal breath sounds  No wheezing  Abdominal:      General: Bowel sounds are normal  There is no distension  Palpations: Abdomen is soft  There is no mass  Tenderness: There is no abdominal tenderness  There is no guarding  Genitourinary:     Comments: 1 + prostate  No nodules  Musculoskeletal:         General: No tenderness  Normal range of motion  Cervical back: Normal range of motion and neck supple  Skin:     General: Skin is warm and dry  Capillary Refill: Capillary refill takes less than 2 seconds  Findings: No erythema  Neurological:      Mental Status: He is alert and oriented to person, place, and time  Psychiatric:         Behavior: Behavior normal          Thought Content:  Thought content normal          Judgment: Judgment normal          Yessenia Beebe, DO

## 2021-08-09 NOTE — PATIENT INSTRUCTIONS
Medicare Preventive Visit Patient Instructions  Thank you for completing your Welcome to Medicare Visit or Medicare Annual Wellness Visit today  Your next wellness visit will be due in one year (8/10/2022)  The screening/preventive services that you may require over the next 5-10 years are detailed below  Some tests may not apply to you based off risk factors and/or age  Screening tests ordered at today's visit but not completed yet may show as past due  Also, please note that scanned in results may not display below  Preventive Screenings:  Service Recommendations Previous Testing/Comments   Colorectal Cancer Screening  · Colonoscopy    · Fecal Occult Blood Test (FOBT)/Fecal Immunochemical Test (FIT)  · Fecal DNA/Cologuard Test  · Flexible Sigmoidoscopy Age: 54-65 years old   Colonoscopy: every 10 years (May be performed more frequently if at higher risk)  OR  FOBT/FIT: every 1 year  OR  Cologuard: every 3 years  OR  Sigmoidoscopy: every 5 years  Screening may be recommended earlier than age 48 if at higher risk for colorectal cancer  Also, an individualized decision between you and your healthcare provider will decide whether screening between the ages of 74-80 would be appropriate   Colonoscopy: 02/05/2010  FOBT/FIT: Not on file  Cologuard: Not on file  Sigmoidoscopy: Not on file          Prostate Cancer Screening Individualized decision between patient and health care provider in men between ages of 53-78   Medicare will cover every 12 months beginning on the day after your 50th birthday PSA: 2 6 ng/mL     Screening Not Indicated     Hepatitis C Screening Once for adults born between 1945 and 1965  More frequently in patients at high risk for Hepatitis C Hep C Antibody: Not on file        Diabetes Screening 1-2 times per year if you're at risk for diabetes or have pre-diabetes Fasting glucose: No results in last 5 years   A1C: No results in last 5 years    Screening Current   Cholesterol Screening Once every 5 years if you don't have a lipid disorder  May order more often based on risk factors  Lipid panel: 07/20/2021    Screening Current      Other Preventive Screenings Covered by Medicare:  1  Abdominal Aortic Aneurysm (AAA) Screening: covered once if your at risk  You're considered to be at risk if you have a family history of AAA or a male between the age of 73-68 who smoking at least 100 cigarettes in your lifetime  2  Lung Cancer Screening: covers low dose CT scan once per year if you meet all of the following conditions: (1) Age 50-69; (2) No signs or symptoms of lung cancer; (3) Current smoker or have quit smoking within the last 15 years; (4) You have a tobacco smoking history of at least 30 pack years (packs per day x number of years you smoked); (5) You get a written order from a healthcare provider  3  Glaucoma Screening: covered annually if you're considered high risk: (1) You have diabetes OR (2) Family history of glaucoma OR (3)  aged 48 and older OR (3)  American aged 72 and older  3  Osteoporosis Screening: covered every 2 years if you meet one of the following conditions: (1) Have a vertebral abnormality; (2) On glucocorticoid therapy for more than 3 months; (3) Have primary hyperparathyroidism; (4) On osteoporosis medications and need to assess response to drug therapy  5  HIV Screening: covered annually if you're between the age of 12-76  Also covered annually if you are younger than 13 and older than 72 with risk factors for HIV infection  For pregnant patients, it is covered up to 3 times per pregnancy      Immunizations:  Immunization Recommendations   Influenza Vaccine Annual influenza vaccination during flu season is recommended for all persons aged >= 6 months who do not have contraindications   Pneumococcal Vaccine (Prevnar and Pneumovax)  * Prevnar = PCV13  * Pneumovax = PPSV23 Adults 25-60 years old: 1-3 doses may be recommended based on certain risk factors  Adults 72 years old: Prevnar (PCV13) vaccine recommended followed by Pneumovax (PPSV23) vaccine  If already received PPSV23 since turning 65, then PCV13 recommended at least one year after PPSV23 dose  Hepatitis B Vaccine 3 dose series if at intermediate or high risk (ex: diabetes, end stage renal disease, liver disease)   Tetanus (Td) Vaccine - COST NOT COVERED BY MEDICARE PART B Following completion of primary series, a booster dose should be given every 10 years to maintain immunity against tetanus  Td may also be given as tetanus wound prophylaxis  Tdap Vaccine - COST NOT COVERED BY MEDICARE PART B Recommended at least once for all adults  For pregnant patients, recommended with each pregnancy  Shingles Vaccine (Shingrix) - COST NOT COVERED BY MEDICARE PART B  2 shot series recommended in those aged 48 and above     Health Maintenance Due:      Topic Date Due    Hepatitis C Screening  Never done    Colorectal Cancer Screening  02/05/2015     Immunizations Due:      Topic Date Due    DTaP,Tdap,and Td Vaccines (1 - Tdap) Never done    Influenza Vaccine (1) 09/01/2021     Advance Directives   What are advance directives? Advance directives are legal documents that state your wishes and plans for medical care  These plans are made ahead of time in case you lose your ability to make decisions for yourself  Advance directives can apply to any medical decision, such as the treatments you want, and if you want to donate organs  What are the types of advance directives? There are many types of advance directives, and each state has rules about how to use them  You may choose a combination of any of the following:  · Living will: This is a written record of the treatment you want  You can also choose which treatments you do not want, which to limit, and which to stop at a certain time  This includes surgery, medicine, IV fluid, and tube feedings  · Durable power of  for healthcare [facility]):   This is a written record that states who you want to make healthcare choices for you when you are unable to make them for yourself  This person, called a proxy, is usually a family member or a friend  You may choose more than 1 proxy  · Do not resuscitate (DNR) order:  A DNR order is used in case your heart stops beating or you stop breathing  It is a request not to have certain forms of treatment, such as CPR  A DNR order may be included in other types of advance directives  · Medical directive: This covers the care that you want if you are in a coma, near death, or unable to make decisions for yourself  You can list the treatments you want for each condition  Treatment may include pain medicine, surgery, blood transfusions, dialysis, IV or tube feedings, and a ventilator (breathing machine)  · Values history: This document has questions about your views, beliefs, and how you feel and think about life  This information can help others choose the care that you would choose  Why are advance directives important? An advance directive helps you control your care  Although spoken wishes may be used, it is better to have your wishes written down  Spoken wishes can be misunderstood, or not followed  Treatments may be given even if you do not want them  An advance directive may make it easier for your family to make difficult choices about your care  © Copyright [employer] 2018 Information is for End User's use only and may not be sold, redistributed or otherwise used for commercial purposes   All illustrations and images included in CareNotes® are the copyrighted property of [employer]  or [address] [place] [address] [employer]

## 2021-09-14 ENCOUNTER — TELEPHONE (OUTPATIENT)
Dept: FAMILY MEDICINE CLINIC | Facility: CLINIC | Age: 76
End: 2021-09-14

## 2021-09-14 NOTE — TELEPHONE ENCOUNTER
Gio Mariscal was seen at Dr Horacio Alan office today and spoke with DR Arlyn Damon  Patient calling asking if he needs to make an apt with Dr Arlyn Damon  He wasn't sure if Dr Arlyn Damon was going to order tests for him or if he should come in to speak with his doctor?     Please call patient back     Thank you

## 2021-09-14 NOTE — TELEPHONE ENCOUNTER
[facility] requesting a call back   please schedule for a 30 min visit    Carie Marks MA

## 2021-09-15 ENCOUNTER — OFFICE VISIT (OUTPATIENT)
Dept: FAMILY MEDICINE CLINIC | Facility: CLINIC | Age: 76
End: 2021-09-15
Payer: COMMERCIAL

## 2021-09-15 VITALS
RESPIRATION RATE: 14 BRPM | OXYGEN SATURATION: 97 % | HEIGHT: 69 IN | SYSTOLIC BLOOD PRESSURE: 112 MMHG | HEART RATE: 77 BPM | TEMPERATURE: 98.3 F | BODY MASS INDEX: 21.33 KG/M2 | WEIGHT: 144 LBS | DIASTOLIC BLOOD PRESSURE: 58 MMHG

## 2021-09-15 DIAGNOSIS — R09.89 BILATERAL CAROTID BRUITS: ICD-10-CM

## 2021-09-15 DIAGNOSIS — E78.2 MIXED HYPERLIPIDEMIA: ICD-10-CM

## 2021-09-15 DIAGNOSIS — R01.1 HEART MURMUR, SYSTOLIC: Primary | ICD-10-CM

## 2021-09-15 PROCEDURE — 99213 OFFICE O/P EST LOW 20 MIN: CPT | Performed by: FAMILY MEDICINE

## 2021-09-15 PROCEDURE — 3008F BODY MASS INDEX DOCD: CPT | Performed by: FAMILY MEDICINE

## 2021-09-15 PROCEDURE — 1036F TOBACCO NON-USER: CPT | Performed by: FAMILY MEDICINE

## 2021-09-15 PROCEDURE — 1160F RVW MEDS BY RX/DR IN RCRD: CPT | Performed by: FAMILY MEDICINE

## 2021-09-15 NOTE — PROGRESS NOTES
Assessment/Plan:    1  Heart murmur, systolic  -     Echo complete with contrast if indicated; Future; Expected date: 09/15/2021  -     VAS carotid complete study; Future; Expected date: 09/15/2021  -     Ambulatory referral to Cardiology; Future    2  Bilateral carotid bruits  -     Echo complete with contrast if indicated; Future; Expected date: 09/15/2021  -     VAS carotid complete study; Future; Expected date: 09/15/2021  -     Ambulatory referral to Cardiology; Future    3  Mixed hyperlipidemia  -     Echo complete with contrast if indicated; Future; Expected date: 09/15/2021  -     VAS carotid complete study; Future; Expected date: 09/15/2021  -     Ambulatory referral to Cardiology; Future            There are no Patient Instructions on file for this visit  Return for Next scheduled follow up  Subjective:      Patient ID: Doug Salas is a 77 y o  male  Chief Complaint   Patient presents with    Referral US     per Dr Nanda Hassan       Pt is being seen for an evaluation, Spoke with Dr Bautista(optho) yesterday  As per her eval pt has carotid bruits and a murmer  Positive eye findings - pt states in the rt eye  Optho feels pt needs echo, Carotid studies and follow up cardio    PT denies CP, no SOB  No ocular symptoms  No HA  NO nausea no vomiting  No diaphoresis      The following portions of the patient's history were reviewed and updated as appropriate: allergies, current medications, past family history, past medical history, past social history, past surgical history and problem list     Review of Systems   Constitutional: Negative for activity change, appetite change, chills, diaphoresis, fatigue, fever and unexpected weight change  HENT: Negative for congestion, dental problem, ear pain, mouth sores, sinus pressure, sinus pain, sore throat and trouble swallowing  Eyes: Negative for photophobia, discharge and itching  Respiratory: Negative for apnea, chest tightness and shortness of breath  Cardiovascular: Negative for chest pain, palpitations and leg swelling  Gastrointestinal: Negative for abdominal distention, abdominal pain, blood in stool, nausea and vomiting  Endocrine: Negative for cold intolerance, heat intolerance, polydipsia, polyphagia and polyuria  Genitourinary: Negative for difficulty urinating  Musculoskeletal: Negative for arthralgias  Skin: Negative for color change and wound  Neurological: Negative for dizziness, syncope, speech difficulty and headaches  Hematological: Negative for adenopathy  Psychiatric/Behavioral: Negative for agitation and behavioral problems  Current Outpatient Medications   Medication Sig Dispense Refill    erythromycin (ILOTYCIN) ophthalmic ointment APPLY A SMALL AMOUNT IN BOTH EYES AT BEDTIME GENTLY RUB INTO EYELASHES      halobetasol (ULTRAVATE) 0 05 % cream as needed      tamsulosin (FLOMAX) 0 4 mg Take 1 capsule (0 4 mg total) by mouth daily with dinner 30 capsule 5    triamcinolone (KENALOG) 0 1 % cream as needed       No current facility-administered medications for this visit  Objective:    /58   Pulse 77   Temp 98 3 °F (36 8 °C)   Resp 14   Ht 5' 8 5" (1 74 m)   Wt 65 3 kg (144 lb)   SpO2 97%   BMI 21 58 kg/m²        Physical Exam  Vitals and nursing note reviewed  Constitutional:       General: He is not in acute distress  Appearance: He is well-developed  He is not diaphoretic  HENT:      Head: Normocephalic and atraumatic  Right Ear: External ear normal       Left Ear: External ear normal       Nose: Nose normal       Mouth/Throat:      Pharynx: No oropharyngeal exudate  Eyes:      General: No scleral icterus  Right eye: No discharge  Left eye: No discharge  Pupils: Pupils are equal, round, and reactive to light  Neck:      Thyroid: No thyromegaly  Cardiovascular:      Rate and Rhythm: Normal rate  Heart sounds: Murmur heard          Comments: B/L bruits carotids    Pulmonary:      Effort: Pulmonary effort is normal  No respiratory distress  Breath sounds: Normal breath sounds  No wheezing  Abdominal:      General: Bowel sounds are normal  There is no distension  Palpations: Abdomen is soft  There is no mass  Tenderness: There is no abdominal tenderness  There is no guarding or rebound  Musculoskeletal:         General: Normal range of motion  Right lower leg: No edema  Left lower leg: No edema  Skin:     General: Skin is warm and dry  Findings: No erythema or rash  Neurological:      Mental Status: He is alert        Coordination: Coordination normal       Deep Tendon Reflexes: Reflexes normal    Psychiatric:         Behavior: Behavior normal                 Tavia Browning DO

## 2021-09-27 ENCOUNTER — HOSPITAL ENCOUNTER (OUTPATIENT)
Dept: RADIOLOGY | Facility: HOSPITAL | Age: 76
Discharge: HOME | End: 2021-09-27
Attending: FAMILY MEDICINE
Payer: COMMERCIAL

## 2021-09-27 DIAGNOSIS — R09.89 BILATERAL CAROTID BRUITS: ICD-10-CM

## 2021-09-27 DIAGNOSIS — R01.1 HEART MURMUR, SYSTOLIC: ICD-10-CM

## 2021-09-27 DIAGNOSIS — E78.2 MIXED HYPERLIPIDEMIA: ICD-10-CM

## 2021-09-27 PROCEDURE — 93880 EXTRACRANIAL BILAT STUDY: CPT | Performed by: SURGERY

## 2021-09-27 PROCEDURE — 93880 EXTRACRANIAL BILAT STUDY: CPT

## 2021-09-28 ENCOUNTER — TELEPHONE (OUTPATIENT)
Dept: FAMILY MEDICINE CLINIC | Facility: CLINIC | Age: 76
End: 2021-09-28

## 2021-09-28 NOTE — TELEPHONE ENCOUNTER
Called pt to discuss us carotids - pt has less than 50% blockage    I discussed his lipids in conjunction with this we will start him on 5 mg of crestor,  Labs in three months

## 2021-10-08 ENCOUNTER — HOSPITAL ENCOUNTER (OUTPATIENT)
Dept: NON INVASIVE DIAGNOSTICS | Facility: HOSPITAL | Age: 76
Discharge: HOME | End: 2021-10-08
Payer: COMMERCIAL

## 2021-10-08 ENCOUNTER — TELEPHONE (OUTPATIENT)
Dept: FAMILY MEDICINE CLINIC | Facility: CLINIC | Age: 76
End: 2021-10-08

## 2021-10-08 DIAGNOSIS — R01.1 HEART MURMUR, SYSTOLIC: ICD-10-CM

## 2021-10-08 DIAGNOSIS — R09.89 BILATERAL CAROTID BRUITS: ICD-10-CM

## 2021-10-08 DIAGNOSIS — E78.2 MIXED HYPERLIPIDEMIA: ICD-10-CM

## 2021-10-08 PROCEDURE — 93306 TTE W/DOPPLER COMPLETE: CPT | Performed by: INTERNAL MEDICINE

## 2021-10-08 PROCEDURE — 93306 TTE W/DOPPLER COMPLETE: CPT

## 2021-10-13 ENCOUNTER — CONSULT (OUTPATIENT)
Dept: CARDIOLOGY CLINIC | Facility: CLINIC | Age: 76
End: 2021-10-13
Payer: COMMERCIAL

## 2021-10-13 VITALS
HEIGHT: 69 IN | WEIGHT: 141 LBS | DIASTOLIC BLOOD PRESSURE: 84 MMHG | OXYGEN SATURATION: 98 % | SYSTOLIC BLOOD PRESSURE: 150 MMHG | BODY MASS INDEX: 20.88 KG/M2 | HEART RATE: 64 BPM | TEMPERATURE: 98.4 F

## 2021-10-13 DIAGNOSIS — R01.1 HEART MURMUR, SYSTOLIC: Primary | ICD-10-CM

## 2021-10-13 DIAGNOSIS — I10 PRIMARY HYPERTENSION: ICD-10-CM

## 2021-10-13 DIAGNOSIS — I35.0 AORTIC STENOSIS, MODERATE: ICD-10-CM

## 2021-10-13 DIAGNOSIS — E78.2 MIXED HYPERLIPIDEMIA: ICD-10-CM

## 2021-10-13 DIAGNOSIS — R09.89 BILATERAL CAROTID BRUITS: ICD-10-CM

## 2021-10-13 PROCEDURE — 3008F BODY MASS INDEX DOCD: CPT | Performed by: INTERNAL MEDICINE

## 2021-10-13 PROCEDURE — 99203 OFFICE O/P NEW LOW 30 MIN: CPT | Performed by: INTERNAL MEDICINE

## 2021-10-13 PROCEDURE — 1036F TOBACCO NON-USER: CPT | Performed by: INTERNAL MEDICINE

## 2021-10-13 PROCEDURE — 93000 ELECTROCARDIOGRAM COMPLETE: CPT | Performed by: INTERNAL MEDICINE

## 2021-10-13 RX ORDER — AMLODIPINE BESYLATE 2.5 MG/1
2.5 TABLET ORAL DAILY
Qty: 90 TABLET | Refills: 2 | Status: SHIPPED | OUTPATIENT
Start: 2021-10-13

## 2021-12-21 ENCOUNTER — IMMUNIZATIONS (OUTPATIENT)
Dept: FAMILY MEDICINE CLINIC | Facility: HOSPITAL | Age: 76
End: 2021-12-21

## 2021-12-21 DIAGNOSIS — Z23 ENCOUNTER FOR IMMUNIZATION: Primary | ICD-10-CM

## 2021-12-21 PROCEDURE — 91300 COVID-19 PFIZER VACC 0.3 ML: CPT

## 2021-12-21 PROCEDURE — 0001A COVID-19 PFIZER VACC 0.3 ML: CPT

## 2022-09-16 ENCOUNTER — TELEPHONE (OUTPATIENT)
Dept: FAMILY MEDICINE CLINIC | Facility: CLINIC | Age: 77
End: 2022-09-16

## 2023-02-14 ENCOUNTER — TELEPHONE (OUTPATIENT)
Dept: FAMILY MEDICINE CLINIC | Facility: CLINIC | Age: 78
End: 2023-02-14

## 2023-02-14 NOTE — LETTER
March 10, 2023     Dot Conte   [address] [place] [id number]      Dear Mr Stephens: In addition to helping you feel better when you are sick, we are interested in preventing illness and injury in the first place  In the spirit of maintaining your good health, our system indicates that you are due for the following:    Health Maintenance Due   Topic Date Due   • Hepatitis C Screening  Never done   • BMI: Adult  Never done   • Colorectal Cancer Screening  02/05/2015   • COVID-19 Vaccine (4 - Booster for Pfizer series) 02/15/2022   • Fall Risk  08/09/2022   • Depression Screening  08/09/2022   • Medicare Annual Wellness Visit (AWV)  08/09/2022   • Influenza Vaccine (1) Never done       Please call us to make an appointment at your earliest convenience  I look forward to seeing you soon          Sincerely,         Jarred García MA    CC: No Recipients

## 2023-05-02 ENCOUNTER — HOSPITAL ENCOUNTER (OUTPATIENT)
Facility: HOSPITAL | Age: 78
Setting detail: OBSERVATION
Discharge: HOME | End: 2023-05-03
Attending: EMERGENCY MEDICINE | Admitting: FAMILY MEDICINE

## 2023-05-02 ENCOUNTER — APPOINTMENT (EMERGENCY)
Dept: RADIOLOGY | Facility: HOSPITAL | Age: 78
End: 2023-05-02

## 2023-05-02 DIAGNOSIS — E87.1 HYPONATREMIA: ICD-10-CM

## 2023-05-02 DIAGNOSIS — K59.09 OTHER CONSTIPATION: ICD-10-CM

## 2023-05-02 DIAGNOSIS — I48.91 ATRIAL FIBRILLATION (HCC): ICD-10-CM

## 2023-05-02 DIAGNOSIS — M51.36 DEGENERATIVE DISC DISEASE, LUMBAR: ICD-10-CM

## 2023-05-02 DIAGNOSIS — M54.50 LOW BACK PAIN: Primary | ICD-10-CM

## 2023-05-02 LAB
ALBUMIN SERPL BCP-MCNC: 3.6 G/DL (ref 3.5–5)
ALP SERPL-CCNC: 59 U/L (ref 34–104)
ALT SERPL W P-5'-P-CCNC: 10 U/L (ref 7–52)
ANION GAP SERPL CALCULATED.3IONS-SCNC: 6 MMOL/L (ref 4–13)
ANION GAP SERPL CALCULATED.3IONS-SCNC: 8 MMOL/L (ref 4–13)
AST SERPL W P-5'-P-CCNC: 10 U/L (ref 13–39)
BACTERIA UR QL AUTO: NORMAL /HPF
BASOPHILS # BLD AUTO: 0.04 THOUSANDS/ÂΜL (ref 0–0.1)
BASOPHILS NFR BLD AUTO: 0 % (ref 0–1)
BILIRUB SERPL-MCNC: 0.41 MG/DL (ref 0.2–1)
BILIRUB UR QL STRIP: NEGATIVE
BUN SERPL-MCNC: 11 MG/DL (ref 5–25)
BUN SERPL-MCNC: 12 MG/DL (ref 5–25)
CALCIUM SERPL-MCNC: 8.5 MG/DL (ref 8.4–10.2)
CALCIUM SERPL-MCNC: 8.8 MG/DL (ref 8.4–10.2)
CHLORIDE SERPL-SCNC: 97 MMOL/L (ref 96–108)
CHLORIDE SERPL-SCNC: 98 MMOL/L (ref 96–108)
CLARITY UR: CLEAR
CO2 SERPL-SCNC: 24 MMOL/L (ref 21–32)
CO2 SERPL-SCNC: 24 MMOL/L (ref 21–32)
COLOR UR: YELLOW
CREAT SERPL-MCNC: 0.71 MG/DL (ref 0.6–1.3)
CREAT SERPL-MCNC: 0.76 MG/DL (ref 0.6–1.3)
EOSINOPHIL # BLD AUTO: 0.04 THOUSAND/ÂΜL (ref 0–0.61)
EOSINOPHIL NFR BLD AUTO: 0 % (ref 0–6)
ERYTHROCYTE [DISTWIDTH] IN BLOOD BY AUTOMATED COUNT: 13.5 % (ref 11.6–15.1)
GFR SERPL CREATININE-BSD FRML MDRD: 88 ML/MIN/1.73SQ M
GFR SERPL CREATININE-BSD FRML MDRD: 90 ML/MIN/1.73SQ M
GLUCOSE SERPL-MCNC: 127 MG/DL (ref 65–140)
GLUCOSE SERPL-MCNC: 133 MG/DL (ref 65–140)
GLUCOSE UR STRIP-MCNC: NEGATIVE MG/DL
HCT VFR BLD AUTO: 34.2 % (ref 36.5–49.3)
HGB BLD-MCNC: 11.2 G/DL (ref 12–17)
HGB UR QL STRIP.AUTO: ABNORMAL
IMM GRANULOCYTES # BLD AUTO: 0.06 THOUSAND/UL (ref 0–0.2)
IMM GRANULOCYTES NFR BLD AUTO: 1 % (ref 0–2)
KETONES UR STRIP-MCNC: NEGATIVE MG/DL
LEUKOCYTE ESTERASE UR QL STRIP: NEGATIVE
LYMPHOCYTES # BLD AUTO: 0.93 THOUSANDS/ÂΜL (ref 0.6–4.47)
LYMPHOCYTES NFR BLD AUTO: 9 % (ref 14–44)
MAGNESIUM SERPL-MCNC: 2.1 MG/DL (ref 1.9–2.7)
MCH RBC QN AUTO: 29.4 PG (ref 26.8–34.3)
MCHC RBC AUTO-ENTMCNC: 32.7 G/DL (ref 31.4–37.4)
MCV RBC AUTO: 90 FL (ref 82–98)
MONOCYTES # BLD AUTO: 0.87 THOUSAND/ÂΜL (ref 0.17–1.22)
MONOCYTES NFR BLD AUTO: 9 % (ref 4–12)
NEUTROPHILS # BLD AUTO: 8.35 THOUSANDS/ÂΜL (ref 1.85–7.62)
NEUTS SEG NFR BLD AUTO: 81 % (ref 43–75)
NITRITE UR QL STRIP: NEGATIVE
NON-SQ EPI CELLS URNS QL MICRO: NORMAL /HPF
NRBC BLD AUTO-RTO: 0 /100 WBCS
PH UR STRIP.AUTO: 6 [PH]
PLATELET # BLD AUTO: 305 THOUSANDS/UL (ref 149–390)
PMV BLD AUTO: 8.6 FL (ref 8.9–12.7)
POTASSIUM SERPL-SCNC: 4 MMOL/L (ref 3.5–5.3)
POTASSIUM SERPL-SCNC: 4 MMOL/L (ref 3.5–5.3)
PROT SERPL-MCNC: 6.6 G/DL (ref 6.4–8.4)
PROT UR STRIP-MCNC: NEGATIVE MG/DL
RBC # BLD AUTO: 3.81 MILLION/UL (ref 3.88–5.62)
RBC #/AREA URNS AUTO: NORMAL /HPF
SODIUM SERPL-SCNC: 128 MMOL/L (ref 135–147)
SODIUM SERPL-SCNC: 129 MMOL/L (ref 135–147)
SP GR UR STRIP.AUTO: >=1.03 (ref 1–1.03)
UROBILINOGEN UR QL STRIP.AUTO: 0.2 E.U./DL
WBC # BLD AUTO: 10.29 THOUSAND/UL (ref 4.31–10.16)
WBC #/AREA URNS AUTO: NORMAL /HPF

## 2023-05-02 RX ORDER — NAPROXEN 500 MG/1
500 TABLET ORAL 2 TIMES DAILY WITH MEALS
Qty: 30 TABLET | Refills: 0 | Status: SHIPPED | OUTPATIENT
Start: 2023-05-02 | End: 2023-05-03

## 2023-05-02 RX ORDER — ACETAMINOPHEN 325 MG/1
650 TABLET ORAL EVERY 6 HOURS PRN
Status: DISCONTINUED | OUTPATIENT
Start: 2023-05-02 | End: 2023-05-03 | Stop reason: HOSPADM

## 2023-05-02 RX ORDER — LIDOCAINE 50 MG/G
2 PATCH TOPICAL DAILY
Qty: 30 PATCH | Refills: 0 | Status: ON HOLD | OUTPATIENT
Start: 2023-05-02

## 2023-05-02 RX ORDER — OXYCODONE HYDROCHLORIDE 5 MG/1
5 TABLET ORAL EVERY 4 HOURS PRN
Status: DISCONTINUED | OUTPATIENT
Start: 2023-05-02 | End: 2023-05-03 | Stop reason: HOSPADM

## 2023-05-02 RX ORDER — METHYLPREDNISOLONE SODIUM SUCCINATE 125 MG/2ML
60 INJECTION, POWDER, LYOPHILIZED, FOR SOLUTION INTRAMUSCULAR; INTRAVENOUS ONCE
Status: COMPLETED | OUTPATIENT
Start: 2023-05-02 | End: 2023-05-02

## 2023-05-02 RX ORDER — PREDNISONE 50 MG/1
50 TABLET ORAL DAILY
Qty: 5 TABLET | Refills: 0 | Status: SHIPPED | OUTPATIENT
Start: 2023-05-02 | End: 2023-05-07

## 2023-05-02 RX ORDER — MORPHINE SULFATE 4 MG/ML
4 INJECTION, SOLUTION INTRAMUSCULAR; INTRAVENOUS ONCE
Status: COMPLETED | OUTPATIENT
Start: 2023-05-02 | End: 2023-05-02

## 2023-05-02 RX ORDER — FAMOTIDINE 10 MG/ML
20 INJECTION, SOLUTION INTRAVENOUS ONCE
Status: COMPLETED | OUTPATIENT
Start: 2023-05-02 | End: 2023-05-02

## 2023-05-02 RX ORDER — OXYCODONE HYDROCHLORIDE 5 MG/1
2.5 TABLET ORAL EVERY 4 HOURS PRN
Status: DISCONTINUED | OUTPATIENT
Start: 2023-05-02 | End: 2023-05-03 | Stop reason: HOSPADM

## 2023-05-02 RX ORDER — ENOXAPARIN SODIUM 100 MG/ML
40 INJECTION SUBCUTANEOUS DAILY
Status: DISCONTINUED | OUTPATIENT
Start: 2023-05-03 | End: 2023-05-03 | Stop reason: HOSPADM

## 2023-05-02 RX ORDER — MAGNESIUM SULFATE HEPTAHYDRATE 40 MG/ML
2 INJECTION, SOLUTION INTRAVENOUS ONCE
Status: COMPLETED | OUTPATIENT
Start: 2023-05-02 | End: 2023-05-02

## 2023-05-02 RX ORDER — ACETAMINOPHEN 325 MG/1
650 TABLET ORAL EVERY 6 HOURS SCHEDULED
Status: DISCONTINUED | OUTPATIENT
Start: 2023-05-02 | End: 2023-05-03 | Stop reason: HOSPADM

## 2023-05-02 RX ORDER — CYCLOBENZAPRINE HCL 10 MG
10 TABLET ORAL 2 TIMES DAILY PRN
Qty: 30 TABLET | Refills: 0 | Status: SHIPPED | OUTPATIENT
Start: 2023-05-02 | End: 2023-05-03

## 2023-05-02 RX ORDER — LIDOCAINE 50 MG/G
2 PATCH TOPICAL ONCE
Status: COMPLETED | OUTPATIENT
Start: 2023-05-02 | End: 2023-05-02

## 2023-05-02 RX ORDER — LIDOCAINE 50 MG/G
1 PATCH TOPICAL DAILY
Status: DISCONTINUED | OUTPATIENT
Start: 2023-05-03 | End: 2023-05-03 | Stop reason: HOSPADM

## 2023-05-02 RX ORDER — ONDANSETRON 2 MG/ML
4 INJECTION INTRAMUSCULAR; INTRAVENOUS EVERY 4 HOURS PRN
Status: DISCONTINUED | OUTPATIENT
Start: 2023-05-02 | End: 2023-05-03 | Stop reason: HOSPADM

## 2023-05-02 RX ORDER — KETOROLAC TROMETHAMINE 30 MG/ML
15 INJECTION, SOLUTION INTRAMUSCULAR; INTRAVENOUS ONCE
Status: COMPLETED | OUTPATIENT
Start: 2023-05-02 | End: 2023-05-02

## 2023-05-02 RX ADMIN — KETOROLAC TROMETHAMINE 15 MG: 30 INJECTION, SOLUTION INTRAMUSCULAR; INTRAVENOUS at 11:23

## 2023-05-02 RX ADMIN — IOHEXOL 100 ML: 350 INJECTION, SOLUTION INTRAVENOUS at 12:23

## 2023-05-02 RX ADMIN — METHYLPREDNISOLONE SODIUM SUCCINATE 60 MG: 125 INJECTION, POWDER, FOR SOLUTION INTRAMUSCULAR; INTRAVENOUS at 11:23

## 2023-05-02 RX ADMIN — OXYCODONE HYDROCHLORIDE 2.5 MG: 5 TABLET ORAL at 22:11

## 2023-05-02 RX ADMIN — MORPHINE SULFATE 4 MG: 4 INJECTION INTRAVENOUS at 11:17

## 2023-05-02 RX ADMIN — SODIUM CHLORIDE 1000 ML: 0.9 INJECTION, SOLUTION INTRAVENOUS at 11:20

## 2023-05-02 RX ADMIN — LIDOCAINE 2 PATCH: 50 PATCH TOPICAL at 11:23

## 2023-05-02 RX ADMIN — MAGNESIUM SULFATE HEPTAHYDRATE 2 G: 40 INJECTION, SOLUTION INTRAVENOUS at 11:28

## 2023-05-02 RX ADMIN — ACETAMINOPHEN 650 MG: 325 TABLET, FILM COATED ORAL at 19:38

## 2023-05-02 RX ADMIN — FAMOTIDINE 20 MG: 10 INJECTION, SOLUTION INTRAVENOUS at 11:22

## 2023-05-02 NOTE — PLAN OF CARE
Problem: Potential for Falls  Goal: Patient will remain free of falls  Description: INTERVENTIONS:  - Educate patient/family on patient safety including physical limitations  - Instruct patient to call for assistance with activity   - Consult OT/PT to assist with strengthening/mobility   - Keep Call bell within reach  - Keep bed low and locked with side rails adjusted as appropriate  - Keep care items and personal belongings within reach  - Initiate and maintain comfort rounds  - Make Fall Risk Sign visible to staff  - Offer Toileting every 2 Hours, in advance of need  - Initiate/Maintain bedalarm  - Obtain necessary fall risk management equipment: fall risk sign  - Apply yellow socks and bracelet for high fall risk patients  - Consider moving patient to room near nurses station  Outcome: Progressing     Problem: PAIN - ADULT  Goal: Verbalizes/displays adequate comfort level or baseline comfort level  Description: Interventions:  - Encourage patient to monitor pain and request assistance  - Assess pain using appropriate pain scale  - Administer analgesics based on type and severity of pain and evaluate response  - Implement non-pharmacological measures as appropriate and evaluate response  - Consider cultural and social influences on pain and pain management  - Notify physician/advanced practitioner if interventions unsuccessful or patient reports new pain  Outcome: Progressing     Problem: SAFETY ADULT  Goal: Maintain or return to baseline ADL function  Description: INTERVENTIONS:  -  Assess patient's ability to carry out ADLs; assess patient's baseline for ADL function and identify physical deficits which impact ability to perform ADLs (bathing, care of mouth/teeth, toileting, grooming, dressing, etc )  - Assess/evaluate cause of self-care deficits   - Assess range of motion  - Assess patient's mobility; develop plan if impaired  - Assess patient's need for assistive devices and provide as appropriate  - Encourage maximum independence but intervene and supervise when necessary  - Involve family in performance of ADLs  - Assess for home care needs following discharge   - Consider OT consult to assist with ADL evaluation and planning for discharge  - Provide patient education as appropriate  Outcome: Progressing     Problem: CARDIOVASCULAR - ADULT  Goal: Maintains optimal cardiac output and hemodynamic stability  Description: INTERVENTIONS:  - Monitor I/O, vital signs and rhythm  - Monitor for S/S and trends of decreased cardiac output  - Administer and titrate ordered vasoactive medications to optimize hemodynamic stability  - Assess quality of pulses, skin color and temperature  - Assess for signs of decreased coronary artery perfusion  - Instruct patient to report change in severity of symptoms  Outcome: Progressing     Problem: METABOLIC, FLUID AND ELECTROLYTES - ADULT  Goal: Electrolytes maintained within normal limits  Description: INTERVENTIONS:  - Monitor labs and assess patient for signs and symptoms of electrolyte imbalances  - Administer electrolyte replacement as ordered  - Monitor response to electrolyte replacements, including repeat lab results as appropriate  - Instruct patient on fluid and nutrition as appropriate  Outcome: Progressing  Goal: Fluid balance maintained  Description: INTERVENTIONS:  - Monitor labs   - Monitor I/O and WT  - Instruct patient on fluid and nutrition as appropriate  - Assess for signs & symptoms of volume excess or deficit  Outcome: Progressing

## 2023-05-02 NOTE — H&P
History and Physical - [facility] Internal Medicine    Patient Information: Sancho Holden 69 y o  male MRN: [id number]  Unit/Bed#: ED 13 Encounter: [id number]  Admitting Physician: Rajinder Ornelas MD  PCP: No primary care provider on file  Date of Admission:  05/02/23        Hospital Problem List:     Principal Problem:    Hyponatremia  Active Problems:    Acute low back pain without sciatica    Elevated blood pressure reading    Mixed hyperlipidemia      Assessment/Plan:    Acute low back pain without sciatica  Assessment & Plan  Presented with acute onset of lower back pain after physical activity 3 days ago, denies any fall/trauma, injury, heavy lifting etc   Pain gradually worsened over the last 3 days with difficulty in ambulation  Denies any radiation, tingling, numbness, weakness, bladder or bowel dysfunction  Reports prior history of mild intermittent back discomfort but did not have similar symptoms in the past   CT chest abdomen lumbar spine with evidence of degenerative changes without any acute abnormality  Reports significant improvement in pain after receiving meds in ED  · Continue pain control  · PT/OT  · Activity as tolerated  · Monitor signs and symptoms  · Will refer to spine/pain management as outpatient if persistent/recurrent      * Hyponatremia  Assessment & Plan  Noted to have mild hyponatremia of 129 on presentation  No prior known history but prior blood work from 2017-/2021 revealed sodium level ranging from 1 33-1 36  Suspect secondary to uncontrolled pain, decreased p o  intake coupled with water intake  Patient received IV fluid in ED with repeat sodium level 128  · Fluid restriction  · Encourage p o  intake  · Continue pain control  · Repeat BMP in a m    · Further work-up if persistent    Elevated blood pressure reading  Assessment & Plan  Presented with elevated blood pressure in setting of uncontrolled pain  Blood pressure trend is improving with improvement in pain  Previously on Norvasc 2 5 mg daily but did not continue  · Monitor blood pressure trend    Mixed hyperlipidemia  Assessment & Plan  Reports that he was previously prescribed medication but he did not continue as he wanted to management lifestyle modification  Continue lifestyle modification  Follow-up with PCP for repeat lipid profile          VTE Prophylaxis: Enoxaparin (Lovenox)   Code Status: Level 1 - Full Code    Anticipated Length of Stay:  Patient will be admitted on an Observation basis with an anticipated length of stay of  < 2 midnights  Justification for Hospital Stay: Hyponatremia, back pain    Total Time for Visit, including Counseling / Coordination of Care: 45 minutes  Greater than 50% of this total time spent on direct patient counseling and coordination of care  Chief Complaint:     Back Pain (Lower back pain since Sunday, states he couldn't stand up to get out of bed  States no injury to the area)    History of Present Illness:    Abhay Kruger is a 69 y o  male with history of aortic stenosis , dyslipidemia who presents with lower back pain ongoing for 3 days  Patient reported that he sometimes gets mild back discomfort but last Saturday he experienced increasing lower back pain after doing some physical activity at home  He reported that he was pulling and sliding few things but denies any heavy lifting, trauma or injury  Next day he reported developing increasing lower back discomfort and stiffness which gradually progressed over the next 2 days to the extent that he was having significant difficulty in getting up and moving around due to pain  He denies any radiating pain, tingling, numbness, weakness, bladder or bowel dysfunction  Patient also denied any fever OR chills  He reported that he used to significantly and he was not eating normally but he was drinking fluids  He denies any other  or GI symptoms  In ED, patient's blood pressure was elevated on presentation in setting of pain  Patient was noted to have mild hyponatremia of 129  Patient received IV fluid and sodium level remained at 128  Lab revealed minimal leukocytosis and normocytic anemia  CT of the abdomen and lumbar spine was unremarkable except spinal degenerative changes  Patient received IV fluid, IV Toradol, IV Pepcid, lidocaine patch, IV Solu-Medrol, IV morphine and was subsequently admitted  Patient had significant improvement in pain after symptomatic treatment in the ED and currently denies any pain  Review of Systems:    Review of Systems   Constitutional: Negative for chills and fever  Respiratory: Negative for cough and shortness of breath  Cardiovascular: Negative for chest pain  Gastrointestinal: Negative for abdominal distention, abdominal pain, constipation, diarrhea and nausea  Genitourinary: Negative for difficulty urinating and dysuria  Musculoskeletal: Positive for back pain  Neurological: Negative for dizziness, weakness, numbness and headaches  Psychiatric/Behavioral: Negative for behavioral problems  Past Medical and Surgical History:     History reviewed  No pertinent past medical history  Past Surgical History:   Procedure Laterality Date    SKIN BIOPSY         Meds/Allergies:    PTA meds:   Prior to Admission Medications   Prescriptions Last Dose Informant Patient Reported? Taking?    amLODIPine (NORVASC) 2 5 mg tablet   No No   Sig: Take 1 tablet (2 5 mg total) by mouth daily   erythromycin (ILOTYCIN) ophthalmic ointment   Yes No   Sig: APPLY A SMALL AMOUNT IN BOTH EYES AT BEDTIME GENTLY RUB INTO EYELASHES   halobetasol (ULTRAVATE) 0 05 % cream   Yes No   Sig: as needed   rosuvastatin (CRESTOR) 5 mg tablet   No No   Sig: Take 1 tablet (5 mg total) by mouth daily   Patient not taking: Reported on 10/13/2021   tamsulosin (FLOMAX) 0 4 mg   No No   Sig: Take 1 capsule (0 4 mg total) by mouth daily with dinner   Patient not taking: Reported on 10/13/2021   triamcinolone (KENALOG) "0 1 % cream   Yes No   Sig: as needed      Facility-Administered Medications: None       Allergies: No Known Allergies  History:     Marital Status: /Civil Union     Substance Use History:   Social History     Substance and Sexual Activity   Alcohol Use No     Social History     Tobacco Use   Smoking Status Former    Types: Cigarettes    Quit date: 3/11/2005    Years since quittin 1   Smokeless Tobacco Never     Social History     Substance and Sexual Activity   Drug Use No       Family History:    Family History   Problem Relation Age of Onset    Colon cancer Mother     Lung cancer Father     Mental illness Neg Hx        Physical Exam:     Vitals:   Blood Pressure: 138/62 (23 1615)  Pulse: 68 (23 1615)  Temperature: 97 8 °F (36 6 °C) (23 1345)  Temp Source: Oral (23 1345)  Respirations: 16 (23 161)  Height: 5' 8 5\" (174 cm) (23 0959)  Weight - Scale: 63 5 kg (140 lb) (23 0959)  SpO2: 97 % (23 161)    Physical Exam  Constitutional:       General: He is not in acute distress  HENT:      Head: Normocephalic and atraumatic  Cardiovascular:      Rate and Rhythm: Normal rate  Heart sounds: Murmur heard  Pulmonary:      Effort: Pulmonary effort is normal  No respiratory distress  Breath sounds: No wheezing, rhonchi or rales  Chest:      Chest wall: No tenderness  Abdominal:      General: Bowel sounds are normal  There is no distension  Palpations: Abdomen is soft  Tenderness: There is no abdominal tenderness  There is no guarding or rebound  Genitourinary:     Comments: Normal perineal sensation  Musculoskeletal:      Cervical back: Neck supple  Right lower leg: No edema  Left lower leg: No edema  Comments: Nontender back   Skin:     General: Skin is warm and dry  Findings: No rash  Neurological:      General: No focal deficit present        Mental Status: He is alert and oriented to person, place, and " time  Mental status is at baseline  Cranial Nerves: No cranial nerve deficit  Sensory: No sensory deficit  Motor: No weakness  Deep Tendon Reflexes: Reflexes normal    Psychiatric:         Mood and Affect: Mood normal                  Lab Results: I have personally reviewed pertinent reports  Results from last 7 days   Lab Units 05/02/23  1104   WBC Thousand/uL 10 29*   HEMOGLOBIN g/dL 11 2*   HEMATOCRIT % 34 2*   PLATELETS Thousands/uL 305   NEUTROS PCT % 81*   LYMPHS PCT % 9*   MONOS PCT % 9   EOS PCT % 0     Results from last 7 days   Lab Units 05/02/23  1434 05/02/23  1104   POTASSIUM mmol/L 4 0 4 0   CHLORIDE mmol/L 98 97   CO2 mmol/L 24 24   BUN mg/dL 11 12   CREATININE mg/dL 0 71 0 76   CALCIUM mg/dL 8 5 8 8   ALK PHOS U/L  --  59   ALT U/L  --  10   AST U/L  --  10*           Imaging: I have personally reviewed pertinent reports  CT recon only lumbar spine    Result Date: 5/2/2023  Narrative: CT RECON ONLY LUMBAR SPINE (NO CHARGE) INDICATION:   low back pain  COMPARISON:  None TECHNIQUE: Axial CT examination of the lumbar spine was obtained utilizing reconstructed images from CT of the chest, abdomen and pelvis performed the same day  Images were reformatted in the sagittal and coronal planes  This examination, like all CT scans performed in the [facility], was performed utilizing techniques to minimize radiation dose exposure, including the use of iterative reconstruction and automated exposure control  FINDINGS: ALIGNMENT: There is retrolisthesis of L1-L2  There is mild anterolisthesis of L4-L5  Mild levoscoliosis  VERTEBRAE: Vertebral body heights are maintained  No acute fractures are identified  DEGENERATIVE CHANGES: L1-L2: There is a bulging annulus with dorsal osteophytosis  There is facet arthrosis  There is mild foraminal narrowing  L2-L3: There is disc base narrowing  There is a bulging annulus  There is dorsal marginal osteophytosis   There is facet "arthrosis  There is mild canal stenosis  There is mild to moderate right and mild left foraminal narrowing  L3-L4: There is a bulging annulus  There is facet arthrosis  There is mild canal stenosis  There is mild to moderate right and mild left foraminal narrowing  L4-L5: There is uncovering the disc space  There is mild to moderate canal stenosis  There is facet arthrosis  There is moderate left and mild to moderate right foraminal narrowing  L5-S1: There is facet arthrosis  There is no significant canal stenosis  There is mild foraminal narrowing  PREVERTEBRAL AND PARASPINAL SOFT TISSUES: Normal      Impression: No acute fractures  Degenerative changes of the lumbar spine, as described above  Correlate with separate CT of the abdomen and pelvis for additional findings  Workstation performed: [id number]     CT abdomen pelvis with contrast    Result Date: 5/2/2023  Narrative: CT ABDOMEN AND PELVIS WITH IV CONTRAST INDICATION:   back pain  \"Lower back pain since Sunday, states he couldn't stand up to get out of bed  States no injury to the area\" COMPARISON:  None  TECHNIQUE:  CT examination of the abdomen and pelvis was performed  Multiplanar 2D reformatted images were created from the source data  Radiation dose length product (DLP) for this visit:  407 mGy-cm   This examination, like all CT scans performed in the [facility], was performed utilizing techniques to minimize radiation dose exposure, including the use of iterative reconstruction and automated exposure control  IV Contrast:  100 mL of iohexol (OMNIPAQUE) Enteric Contrast:  Enteric contrast was not administered  FINDINGS: ABDOMEN LOWER CHEST:  No clinically significant abnormality identified in the visualized lower chest  LIVER/BILIARY TREE:  Unremarkable  GALLBLADDER: There are gallstone(s) within the gallbladder, without pericholecystic inflammatory changes  SPLEEN:  Unremarkable  PANCREAS:  Unremarkable   ADRENAL GLANDS:  " "Unremarkable  KIDNEYS/URETERS:  Unremarkable  No hydronephrosis  STOMACH AND BOWEL:  Unremarkable  APPENDIX:  No findings to suggest appendicitis  ABDOMINOPELVIC CAVITY:  No ascites  No pneumoperitoneum  No lymphadenopathy  VESSELS:  Unremarkable for patient's age  PELVIS REPRODUCTIVE ORGANS:  Unremarkable for patient's age  URINARY BLADDER:  Unremarkable  ABDOMINAL WALL/INGUINAL REGIONS:  Unremarkable  OSSEOUS STRUCTURES:  No acute fracture or destructive osseous lesion  Spinal degenerative changes are noted  Impression: No acute findings in the abdomen or the pelvis  Spinal degenerative changes likely account for this patient's back pain  Workstation performed: [id number]       CT abdomen pelvis with contrast   Final Result      No acute findings in the abdomen or the pelvis  Spinal degenerative changes likely account for this patient's back pain  Workstation performed: [id number]         CT recon only lumbar spine   Final Result      No acute fractures  Degenerative changes of the lumbar spine, as described above  Correlate with separate CT of the abdomen and pelvis for additional findings  Workstation performed: [id number]             EKG, Pathology, and Other Studies Reviewed on Admission:   · EKG-none    Allscripts/EPIC Records Reviewed: Yes     ** Please Note: \"This note has been constructed using a voice recognition system  Therefore there may be syntax, spelling, and/or grammatical errors  Please call if you have any questions   \"**    "

## 2023-05-02 NOTE — ED PROVIDER NOTES
History  Chief Complaint   Patient presents with    Back Pain     Lower back pain since Sunday, states he couldn't stand up to get out of bed  States no injury to the area     78-year-old male with past history of hypertension, hyperlipidemia, BPH, presents to the ED for evaluation of acute onset low back pain over the past 3 days  Patient denies any trauma, falls, injuries  Patient is having difficulty getting up from a laying position  Patient denies any lower extremity weakness  Patient denies any saddle anesthesia  Patient denies any bowel/bladder retention or incontinence  Patient states that once he sits up he is able to move but has some difficulty  No dysuria, increased urinary frequency, abdominal pain, fevers, chills, headaches, weakness, or any other dizziness noted  Patient denies any history of kidney stone  No hematuria noted  Patient states that he has frequent urination secondary to BPH that has been ongoing for many years  History provided by:  Patient  Back Pain  Associated symptoms: no abdominal pain, no chest pain, no dysuria and no fever        Prior to Admission Medications   Prescriptions Last Dose Informant Patient Reported? Taking? amLODIPine (NORVASC) 2 5 mg tablet   No No   Sig: Take 1 tablet (2 5 mg total) by mouth daily   erythromycin (ILOTYCIN) ophthalmic ointment   Yes No   Sig: APPLY A SMALL AMOUNT IN BOTH EYES AT BEDTIME GENTLY RUB INTO EYELASHES   halobetasol (ULTRAVATE) 0 05 % cream   Yes No   Sig: as needed   rosuvastatin (CRESTOR) 5 mg tablet   No No   Sig: Take 1 tablet (5 mg total) by mouth daily   Patient not taking: Reported on 10/13/2021   tamsulosin (FLOMAX) 0 4 mg   No No   Sig: Take 1 capsule (0 4 mg total) by mouth daily with dinner   Patient not taking: Reported on 10/13/2021   triamcinolone (KENALOG) 0 1 % cream   Yes No   Sig: as needed      Facility-Administered Medications: None       History reviewed  No pertinent past medical history      Past Surgical History:   Procedure Laterality Date    SKIN BIOPSY         Family History   Problem Relation Age of Onset    Colon cancer Mother     Lung cancer Father     Mental illness Neg Hx      I have reviewed and agree with the history as documented  E-Cigarette/Vaping    E-Cigarette Use Never User      E-Cigarette/Vaping Substances    Nicotine No     THC No     CBD No     Flavoring No     Other No     Unknown No      Social History     Tobacco Use    Smoking status: Former     Types: Cigarettes     Quit date: 3/11/2005     Years since quittin 1    Smokeless tobacco: Never   Vaping Use    Vaping Use: Never used   Substance Use Topics    Alcohol use: No    Drug use: No       Review of Systems   Constitutional: Negative for chills and fever  HENT: Negative for ear pain and sore throat  Eyes: Negative for pain and visual disturbance  Respiratory: Negative for cough and shortness of breath  Cardiovascular: Negative for chest pain and palpitations  Gastrointestinal: Negative for abdominal pain and vomiting  Genitourinary: Negative for dysuria and hematuria  Musculoskeletal: Positive for back pain  Negative for arthralgias  Skin: Negative for color change and rash  Neurological: Negative for seizures and syncope  All other systems reviewed and are negative  Physical Exam  Physical Exam  Vitals and nursing note reviewed  Constitutional:       General: He is not in acute distress  Appearance: He is well-developed  HENT:      Head: Normocephalic and atraumatic  Eyes:      Conjunctiva/sclera: Conjunctivae normal    Cardiovascular:      Rate and Rhythm: Normal rate and regular rhythm  Heart sounds: No murmur heard  Pulmonary:      Effort: Pulmonary effort is normal  No respiratory distress  Breath sounds: Normal breath sounds  Abdominal:      Palpations: Abdomen is soft  Tenderness: There is no abdominal tenderness        Comments: Abdomen is soft, nondistended, with bowel sound present to all 4 quadrants  No tenderness noted to palpation of abdomen  Musculoskeletal:         General: No swelling  Cervical back: Neck supple  Comments: Mild mid spinous and bilateral paraspinous tenderness to palpation noted to lower lumbar spine  Skin:     General: Skin is warm and dry  Capillary Refill: Capillary refill takes less than 2 seconds  Neurological:      Mental Status: He is alert     Psychiatric:         Mood and Affect: Mood normal          Vital Signs  ED Triage Vitals   Temperature Pulse Respirations Blood Pressure SpO2   05/02/23 0959 05/02/23 0959 05/02/23 0959 05/02/23 0959 05/02/23 0959   (!) 97 3 °F (36 3 °C) 74 18 (!) 185/78 98 %      Temp Source Heart Rate Source Patient Position - Orthostatic VS BP Location FiO2 (%)   05/02/23 1345 05/02/23 1327 05/02/23 1327 05/02/23 1327 --   Oral Monitor Lying Right arm       Pain Score       05/02/23 0959       5           Vitals:    05/02/23 0959 05/02/23 1327 05/02/23 1345   BP: (!) 185/78 (!) 163/109 140/65   Pulse: 74 76 78   Patient Position - Orthostatic VS:  Lying          Visual Acuity      ED Medications  Medications   lidocaine (LIDODERM) 5 % patch 2 patch (2 patches Topical Medication Applied 5/2/23 1123)   sodium chloride 0 9 % bolus 1,000 mL (0 mL Intravenous Stopped 5/2/23 1220)   magnesium sulfate 2 g/50 mL IVPB (premix) 2 g (0 g Intravenous Stopped 5/2/23 1158)   methylPREDNISolone sodium succinate (Solu-MEDROL) injection 60 mg (60 mg Intravenous Given 5/2/23 1123)   ketorolac (TORADOL) injection 15 mg (15 mg Intravenous Given 5/2/23 1123)   Famotidine (PF) (PEPCID) injection 20 mg (20 mg Intravenous Given 5/2/23 1122)   morphine injection 4 mg (4 mg Intravenous Given 5/2/23 1117)   iohexol (OMNIPAQUE) 350 MG/ML injection (SINGLE-DOSE) 100 mL (100 mL Intravenous Given 5/2/23 1223)       Diagnostic Studies  Results Reviewed     Procedure Component Value Units Date/Time    Basic metabolic panel [[id number]]  (Abnormal) Collected: 05/02/23 1434    Lab Status: Final result Specimen: Blood from Arm, Right Updated: 05/02/23 1502     Sodium 128 mmol/L      Potassium 4 0 mmol/L      Chloride 98 mmol/L      CO2 24 mmol/L      ANION GAP 6 mmol/L      BUN 11 mg/dL      Creatinine 0 71 mg/dL      Glucose 133 mg/dL      Calcium 8 5 mg/dL      eGFR 90 ml/min/1 73sq m     Narrative:      [place] guidelines for Chronic Kidney Disease (CKD):     Stage 1 with normal or high GFR (GFR > 90 mL/min/1 73 square meters)    Stage 2 Mild CKD (GFR = 60-89 mL/min/1 73 square meters)    Stage 3A Moderate CKD (GFR = 45-59 mL/min/1 73 square meters)    Stage 3B Moderate CKD (GFR = 30-44 mL/min/1 73 square meters)    Stage 4 Severe CKD (GFR = 15-29 mL/min/1 73 square meters)    Stage 5 End Stage CKD (GFR <15 mL/min/1 73 square meters)  Note: GFR calculation is accurate only with a steady state creatinine    Comprehensive metabolic panel [[id number]]  (Abnormal) Collected: 05/02/23 1104    Lab Status: Final result Specimen: Blood from Arm, Right Updated: 05/02/23 1145     Sodium 129 mmol/L      Potassium 4 0 mmol/L      Chloride 97 mmol/L      CO2 24 mmol/L      ANION GAP 8 mmol/L      BUN 12 mg/dL      Creatinine 0 76 mg/dL      Glucose 127 mg/dL      Calcium 8 8 mg/dL      AST 10 U/L      ALT 10 U/L      Alkaline Phosphatase 59 U/L      Total Protein 6 6 g/dL      Albumin 3 6 g/dL      Total Bilirubin 0 41 mg/dL      eGFR 88 ml/min/1 73sq m     Narrative:      [place] guidelines for Chronic Kidney Disease (CKD):     Stage 1 with normal or high GFR (GFR > 90 mL/min/1 73 square meters)    Stage 2 Mild CKD (GFR = 60-89 mL/min/1 73 square meters)    Stage 3A Moderate CKD (GFR = 45-59 mL/min/1 73 square meters)    Stage 3B Moderate CKD (GFR = 30-44 mL/min/1 73 square meters)    Stage 4 Severe CKD (GFR = 15-29 mL/min/1 73 square meters)    Stage 5 End Stage CKD (GFR <15 mL/min/1 73 square meters)  Note: GFR calculation is accurate only with a steady state creatinine    Magnesium [[id number]]  (Normal) Collected: 05/02/23 1104    Lab Status: Final result Specimen: Blood from Arm, Right Updated: 05/02/23 1145     Magnesium 2 1 mg/dL     Urine Microscopic [[id number]]  (Normal) Collected: 05/02/23 1107    Lab Status: Final result Specimen: Urine, Clean Catch Updated: 05/02/23 1122     RBC, UA None Seen /hpf      WBC, UA 0-1 /hpf      Epithelial Cells None Seen /hpf      Bacteria, UA Occasional /hpf     UA w Reflex to Microscopic w Reflex to Culture [[id number]]  (Abnormal) Collected: 05/02/23 1107    Lab Status: Final result Specimen: Urine, Clean Catch Updated: 05/02/23 1115     Color, UA Yellow     Clarity, UA Clear     Specific Gravity, UA >=1 030     pH, UA 6 0     Leukocytes, UA Negative     Nitrite, UA Negative     Protein, UA Negative mg/dl      Glucose, UA Negative mg/dl      Ketones, UA Negative mg/dl      Urobilinogen, UA 0 2 E U /dl      Bilirubin, UA Negative     Occult Blood, UA Trace-Intact    CBC and differential [[id number]]  (Abnormal) Collected: 05/02/23 1104    Lab Status: Final result Specimen: Blood from Arm, Right Updated: 05/02/23 1110     WBC 10 29 Thousand/uL      RBC 3 81 Million/uL      Hemoglobin 11 2 g/dL      Hematocrit 34 2 %      MCV 90 fL      MCH 29 4 pg      MCHC 32 7 g/dL      RDW 13 5 %      MPV 8 6 fL      Platelets 877 Thousands/uL      nRBC 0 /100 WBCs      Neutrophils Relative 81 %      Immat GRANS % 1 %      Lymphocytes Relative 9 %      Monocytes Relative 9 %      Eosinophils Relative 0 %      Basophils Relative 0 %      Neutrophils Absolute 8 35 Thousands/µL      Immature Grans Absolute 0 06 Thousand/uL      Lymphocytes Absolute 0 93 Thousands/µL      Monocytes Absolute 0 87 Thousand/µL      Eosinophils Absolute 0 04 Thousand/µL      Basophils Absolute 0 04 Thousands/µL                  CT abdomen pelvis with contrast   Final Result by Crystal Burciaga MD (05/02 4837)      No acute findings in the abdomen or the pelvis  Spinal degenerative changes likely account for this patient's back pain  Workstation performed: [id number]         CT recon only lumbar spine   Final Result by Vanda Anderson MD (05/02 1341)      No acute fractures  Degenerative changes of the lumbar spine, as described above  Correlate with separate CT of the abdomen and pelvis for additional findings  Workstation performed: [id number]                    Procedures  Procedures         ED Course  ED Course as of 05/02/23 1608   Tue May 02, 2023   1319 Patient reexamined at bedside  Pain is improving  Patient ambulated to the bathroom with much less difficulty  SBIRT 20yo+    Flowsheet Row Most Recent Value   Initial Alcohol Screen: US AUDIT-C     1  How often do you have a drink containing alcohol? 0 Filed at: 05/02/2023 1003   2  How many drinks containing alcohol do you have on a typical day you are drinking? 0 Filed at: 05/02/2023 1003   3a  Male UNDER 65: How often do you have five or more drinks on one occasion? 0 Filed at: 05/02/2023 1003   3b  FEMALE Any Age, or MALE 65+: How often do you have 4 or more drinks on one occassion? 0 Filed at: 05/02/2023 1003   Audit-C Score 0 Filed at: 05/02/2023 1003   WADE: How many times in the past year have you    Used an illegal drug or used a prescription medication for non-medical reasons? Never Filed at: 05/02/2023 1003                    Medical Decision Making  Obtain blood work, EKG, UA, CT abdomen/pelvis  Give IV fluids, steroids, pain medication, Lidoderm patch, and continue to monitor patient for any worsening symptoms  Patient's lab work shows low sodium of 129   1 L IV fluid bolus given in ED  Patient felt better with IV fluids and pain medication  Patient was then able to ambulate with less difficulty  Repeat BMP showed sodium of 128    At this time patient will be admitted for further evaluation of his hyponatremia  Patient agrees with admission plans  Amount and/or Complexity of Data Reviewed  Labs: ordered  Decision-making details documented in ED Course  Radiology: ordered  Decision-making details documented in ED Course  Risk  Prescription drug management  Decision regarding hospitalization            Disposition  Final diagnoses:   Low back pain   Degenerative disc disease, lumbar   Hyponatremia     Time reflects when diagnosis was documented in both MDM as applicable and the Disposition within this note     Time User Action Codes Description Comment    5/2/2023  2:26 PM Nisa Radish Add [M54 50] Low back pain     5/2/2023  2:26 PM Nisa Radish Add [M51 36] Degenerative disc disease, lumbar     5/2/2023  3:44 PM Nisa Radish Add [E87 1] Hyponatremia       ED Disposition     ED Disposition   Admit    Condition   Stable    Date/Time   Tue May 2, 2023  3:44 PM    Comment   Case was discussed with Dr Deirdre Fernandez and the patient's admission status was agreed to be Admission Status: observation status to the service of Dr Deya Love    None         Patient's Medications   Discharge Prescriptions    CYCLOBENZAPRINE (FLEXERIL) 10 MG TABLET    Take 1 tablet (10 mg total) by mouth 2 (two) times a day as needed for muscle spasms       Start Date: 5/2/2023  End Date: --       Order Dose: 10 mg       Quantity: 30 tablet    Refills: 0    LIDOCAINE (LIDODERM) 5 %    Apply 2 patches topically over 12 hours daily Remove & Discard patch within 12 hours or as directed by MD       Start Date: 5/2/2023  End Date: --       Order Dose: 2 patches       Quantity: 30 patch    Refills: 0    NAPROXEN (NAPROSYN) 500 MG TABLET    Take 1 tablet (500 mg total) by mouth 2 (two) times a day with meals       Start Date: 5/2/2023  End Date: --       Order Dose: 500 mg       Quantity: 30 tablet    Refills: 0    PREDNISONE 50 MG TABLET Take 1 tablet (50 mg total) by mouth daily for 5 days       Start Date: 5/2/2023  End Date: 5/7/2023       Order Dose: 50 mg       Quantity: 5 tablet    Refills: 0       No discharge procedures on file      PDMP Review     None          ED Provider  Electronically Signed by           Nicole Chavez DO  05/02/23 1603

## 2023-05-03 ENCOUNTER — APPOINTMENT (OUTPATIENT)
Dept: NON INVASIVE DIAGNOSTICS | Facility: HOSPITAL | Age: 78
End: 2023-05-03

## 2023-05-03 VITALS
HEIGHT: 68 IN | HEART RATE: 76 BPM | RESPIRATION RATE: 18 BRPM | DIASTOLIC BLOOD PRESSURE: 57 MMHG | WEIGHT: 141 LBS | TEMPERATURE: 98.1 F | OXYGEN SATURATION: 97 % | BODY MASS INDEX: 21.37 KG/M2 | SYSTOLIC BLOOD PRESSURE: 158 MMHG

## 2023-05-03 PROBLEM — R79.89 POSITIVE D DIMER: Status: ACTIVE | Noted: 2023-05-03

## 2023-05-03 PROBLEM — I48.91 ATRIAL FIBRILLATION (HCC): Status: ACTIVE | Noted: 2023-05-03

## 2023-05-03 PROBLEM — R03.0 ELEVATED BLOOD PRESSURE READING: Status: ACTIVE | Noted: 2023-05-03

## 2023-05-03 PROBLEM — I35.0 AORTIC STENOSIS: Status: ACTIVE | Noted: 2023-05-03

## 2023-05-03 PROBLEM — D72.829 LEUKOCYTOSIS: Status: ACTIVE | Noted: 2023-05-03

## 2023-05-03 PROBLEM — M54.50 ACUTE LOW BACK PAIN WITHOUT SCIATICA: Status: ACTIVE | Noted: 2023-05-03

## 2023-05-03 LAB
ALBUMIN SERPL BCP-MCNC: 3.4 G/DL (ref 3.5–5)
ALBUMIN SERPL BCP-MCNC: 3.6 G/DL (ref 3.5–5)
ALP SERPL-CCNC: 57 U/L (ref 34–104)
ALP SERPL-CCNC: 58 U/L (ref 34–104)
ALT SERPL W P-5'-P-CCNC: 10 U/L (ref 7–52)
ALT SERPL W P-5'-P-CCNC: 9 U/L (ref 7–52)
ANION GAP SERPL CALCULATED.3IONS-SCNC: 6 MMOL/L (ref 4–13)
ANION GAP SERPL CALCULATED.3IONS-SCNC: 8 MMOL/L (ref 4–13)
AORTIC ROOT: 3.2 CM
AORTIC VALVE MEAN VELOCITY: 27.1 M/S
APICAL FOUR CHAMBER EJECTION FRACTION: 73 %
AST SERPL W P-5'-P-CCNC: 10 U/L (ref 13–39)
AST SERPL W P-5'-P-CCNC: 8 U/L (ref 13–39)
ATRIAL RATE: 111 BPM
ATRIAL RATE: 72 BPM
ATRIAL RATE: 79 BPM
AV AREA BY CONTINUOUS VTI: 1.1 CM2
AV AREA PEAK VELOCITY: 0.9 CM2
AV LVOT MEAN GRADIENT: 4 MMHG
AV LVOT PEAK GRADIENT: 7 MMHG
AV MEAN GRADIENT: 35 MMHG
AV PEAK GRADIENT: 63 MMHG
AV REGURGITATION PRESSURE HALF TIME: 231 MS
AV VALVE AREA: 1.06 CM2
AV VELOCITY RATIO: 0.32
BILIRUB SERPL-MCNC: 0.3 MG/DL (ref 0.2–1)
BILIRUB SERPL-MCNC: 0.38 MG/DL (ref 0.2–1)
BUN SERPL-MCNC: 14 MG/DL (ref 5–25)
BUN SERPL-MCNC: 16 MG/DL (ref 5–25)
CALCIUM ALBUM COR SERPL-MCNC: 9.3 MG/DL (ref 8.3–10.1)
CALCIUM SERPL-MCNC: 8.8 MG/DL (ref 8.4–10.2)
CALCIUM SERPL-MCNC: 8.9 MG/DL (ref 8.4–10.2)
CARDIAC TROPONIN I PNL SERPL HS: 42 NG/L
CHLORIDE SERPL-SCNC: 97 MMOL/L (ref 96–108)
CHLORIDE SERPL-SCNC: 98 MMOL/L (ref 96–108)
CO2 SERPL-SCNC: 23 MMOL/L (ref 21–32)
CO2 SERPL-SCNC: 28 MMOL/L (ref 21–32)
CREAT SERPL-MCNC: 0.67 MG/DL (ref 0.6–1.3)
CREAT SERPL-MCNC: 0.82 MG/DL (ref 0.6–1.3)
D DIMER PPP FEU-MCNC: 0.71 UG/ML FEU
DOP CALC AO PEAK VEL: 4.1 M/S
DOP CALC AO VTI: 84.82 CM
DOP CALC LVOT AREA: 2.83 CM2
DOP CALC LVOT DIAMETER: 1.9 CM
DOP CALC LVOT PEAK VEL VTI: 31.72 CM
DOP CALC LVOT PEAK VEL: 1.31 M/S
DOP CALC LVOT STROKE INDEX: 48 ML/M2
DOP CALC LVOT STROKE VOLUME: 89.89
E WAVE DECELERATION TIME: 210 MS
ERYTHROCYTE [DISTWIDTH] IN BLOOD BY AUTOMATED COUNT: 13.4 % (ref 11.6–15.1)
ERYTHROCYTE [DISTWIDTH] IN BLOOD BY AUTOMATED COUNT: 13.6 % (ref 11.6–15.1)
FRACTIONAL SHORTENING: 33 (ref 28–44)
GFR SERPL CREATININE-BSD FRML MDRD: 85 ML/MIN/1.73SQ M
GFR SERPL CREATININE-BSD FRML MDRD: 92 ML/MIN/1.73SQ M
GLUCOSE P FAST SERPL-MCNC: 116 MG/DL (ref 65–99)
GLUCOSE SERPL-MCNC: 116 MG/DL (ref 65–140)
GLUCOSE SERPL-MCNC: 136 MG/DL (ref 65–140)
HCT VFR BLD AUTO: 33.2 % (ref 36.5–49.3)
HCT VFR BLD AUTO: 33.4 % (ref 36.5–49.3)
HGB BLD-MCNC: 11 G/DL (ref 12–17)
HGB BLD-MCNC: 11.4 G/DL (ref 12–17)
INTERVENTRICULAR SEPTUM IN DIASTOLE (PARASTERNAL SHORT AXIS VIEW): 1.4 CM
INTERVENTRICULAR SEPTUM: 1.4 CM (ref 0.6–1.1)
LAAS-AP2: 33.1 CM2
LAAS-AP4: 28.7 CM2
LEFT ATRIUM AREA SYSTOLE SINGLE PLANE A4C: 26.8 CM2
LEFT ATRIUM SIZE: 4.7 CM
LEFT INTERNAL DIMENSION IN SYSTOLE: 2.9 CM (ref 2.1–4)
LEFT VENTRICULAR INTERNAL DIMENSION IN DIASTOLE: 4.3 CM (ref 3.5–6)
LEFT VENTRICULAR POSTERIOR WALL IN END DIASTOLE: 1.4 CM
LEFT VENTRICULAR STROKE VOLUME: 49 ML
LVSV (TEICH): 49 ML
MAGNESIUM SERPL-MCNC: 2.3 MG/DL (ref 1.9–2.7)
MCH RBC QN AUTO: 29.6 PG (ref 26.8–34.3)
MCH RBC QN AUTO: 30.5 PG (ref 26.8–34.3)
MCHC RBC AUTO-ENTMCNC: 32.9 G/DL (ref 31.4–37.4)
MCHC RBC AUTO-ENTMCNC: 34.3 G/DL (ref 31.4–37.4)
MCV RBC AUTO: 89 FL (ref 82–98)
MCV RBC AUTO: 90 FL (ref 82–98)
MITRAL REGURGITATION PEAK VELOCITY: 5.16 M/S
MITRAL VALVE MEAN INFLOW VELOCITY: 4.28 M/S
MITRAL VALVE REGURGITANT PEAK GRADIENT: 106 MMHG
MV E'TISSUE VEL-SEP: 10 CM/S
MV PEAK A VEL: 0.88 M/S
MV PEAK E VEL: 130 CM/S
MV STENOSIS PRESSURE HALF TIME: 61 MS
MV VALVE AREA P 1/2 METHOD: 3.61
OSMOLALITY UR/SERPL-RTO: 283 MMOL/KG (ref 282–298)
P AXIS: 70 DEGREES
PHOSPHATE SERPL-MCNC: 2.9 MG/DL (ref 2.3–4.1)
PLATELET # BLD AUTO: 329 THOUSANDS/UL (ref 149–390)
PLATELET # BLD AUTO: 331 THOUSANDS/UL (ref 149–390)
PMV BLD AUTO: 8.6 FL (ref 8.9–12.7)
PMV BLD AUTO: 9 FL (ref 8.9–12.7)
POTASSIUM SERPL-SCNC: 4 MMOL/L (ref 3.5–5.3)
POTASSIUM SERPL-SCNC: 4 MMOL/L (ref 3.5–5.3)
PR INTERVAL: 162 MS
PROT SERPL-MCNC: 6.3 G/DL (ref 6.4–8.4)
PROT SERPL-MCNC: 6.3 G/DL (ref 6.4–8.4)
QRS AXIS: 39 DEGREES
QRS AXIS: 61 DEGREES
QRS AXIS: 62 DEGREES
QRSD INTERVAL: 94 MS
QRSD INTERVAL: 94 MS
QRSD INTERVAL: 98 MS
QT INTERVAL: 286 MS
QT INTERVAL: 328 MS
QT INTERVAL: 392 MS
QTC INTERVAL: 406 MS
QTC INTERVAL: 449 MS
QTC INTERVAL: 484 MS
RBC # BLD AUTO: 3.71 MILLION/UL (ref 3.88–5.62)
RBC # BLD AUTO: 3.74 MILLION/UL (ref 3.88–5.62)
RIGHT ATRIUM AREA SYSTOLE A4C: 16.9 CM2
RIGHT VENTRICLE ID DIMENSION: 3.4 CM
SL CV AV DECELERATION TIME RETROGRADE: 798 MS
SL CV AV PEAK GRADIENT RETROGRADE: 82 MMHG
SL CV DOP CALC MV VTI RETROGRADE: 164.4 CM
SL CV LEFT ATRIUM LENGTH A2C: 6.4 CM
SL CV LV EF: 59
SL CV MV MEAN GRADIENT RETROGRADE: 78 MMHG
SL CV PED ECHO LEFT VENTRICLE DIASTOLIC VOLUME (MOD BIPLANE) 2D: 82 ML
SL CV PED ECHO LEFT VENTRICLE SYSTOLIC VOLUME (MOD BIPLANE) 2D: 33 ML
SODIUM SERPL-SCNC: 129 MMOL/L (ref 135–147)
SODIUM SERPL-SCNC: 131 MMOL/L (ref 135–147)
T WAVE AXIS: 46 DEGREES
T WAVE AXIS: 61 DEGREES
T WAVE AXIS: 64 DEGREES
TR MAX PG: 52 MMHG
TR PEAK VELOCITY: 3.6 M/S
TRICUSPID ANNULAR PLANE SYSTOLIC EXCURSION: 3.5 CM
TRICUSPID VALVE PEAK REGURGITATION VELOCITY: 3.61 M/S
TSH SERPL DL<=0.05 MIU/L-ACNC: 1.23 UIU/ML (ref 0.45–4.5)
VENTRICULAR RATE: 121 BPM
VENTRICULAR RATE: 131 BPM
VENTRICULAR RATE: 79 BPM
WBC # BLD AUTO: 11.47 THOUSAND/UL (ref 4.31–10.16)
WBC # BLD AUTO: 16.38 THOUSAND/UL (ref 4.31–10.16)

## 2023-05-03 RX ORDER — POLYETHYLENE GLYCOL 3350 17 G/17G
17 POWDER, FOR SOLUTION ORAL DAILY PRN
Refills: 0 | Status: ON HOLD
Start: 2023-05-03

## 2023-05-03 RX ORDER — POLYETHYLENE GLYCOL 3350 17 G/17G
17 POWDER, FOR SOLUTION ORAL DAILY PRN
Status: DISCONTINUED | OUTPATIENT
Start: 2023-05-03 | End: 2023-05-03 | Stop reason: HOSPADM

## 2023-05-03 RX ORDER — METOPROLOL TARTRATE 5 MG/5ML
5 INJECTION INTRAVENOUS ONCE
Status: DISCONTINUED | OUTPATIENT
Start: 2023-05-03 | End: 2023-05-03 | Stop reason: HOSPADM

## 2023-05-03 RX ORDER — DOCUSATE SODIUM 100 MG/1
100 CAPSULE, LIQUID FILLED ORAL 2 TIMES DAILY
Status: DISCONTINUED | OUTPATIENT
Start: 2023-05-03 | End: 2023-05-03 | Stop reason: HOSPADM

## 2023-05-03 RX ORDER — ACETAMINOPHEN 325 MG/1
650 TABLET ORAL EVERY 6 HOURS PRN
Refills: 0 | Status: ON HOLD
Start: 2023-05-03

## 2023-05-03 RX ORDER — OXYCODONE HYDROCHLORIDE 5 MG/1
2.5 TABLET ORAL EVERY 6 HOURS PRN
Qty: 3 TABLET | Refills: 0 | Status: SHIPPED | OUTPATIENT
Start: 2023-05-03 | End: 2023-05-11 | Stop reason: ALTCHOICE

## 2023-05-03 RX ADMIN — ACETAMINOPHEN 650 MG: 325 TABLET, FILM COATED ORAL at 17:48

## 2023-05-03 RX ADMIN — DOCUSATE SODIUM 100 MG: 100 CAPSULE, LIQUID FILLED ORAL at 17:48

## 2023-05-03 RX ADMIN — ENOXAPARIN SODIUM 40 MG: 40 INJECTION SUBCUTANEOUS at 08:09

## 2023-05-03 RX ADMIN — LIDOCAINE 1 PATCH: 50 PATCH TOPICAL at 08:09

## 2023-05-03 RX ADMIN — DOCUSATE SODIUM 100 MG: 100 CAPSULE, LIQUID FILLED ORAL at 10:43

## 2023-05-03 NOTE — DISCHARGE SUMMARY
Discharge Summary - [facility]    Patient Information: Bishop Ortega 69 y o  male MRN: [id number]  Unit/Bed#: [address] [address] Encounter: [id number]    Discharging Physician / Practitioner: Lan Veliz MD  PCP: No primary care provider on file  Admission Date: 5/2/2023  Discharge Date: 05/03/23    Reason for Admission: Back Pain (Lower back pain since Sunday, states he couldn't stand up to get out of bed  States no injury to the area)      Discharge Diagnoses:     Principal Problem:    Hyponatremia  Active Problems:    Acute low back pain without sciatica    Atrial fibrillation (HCC)    Elevated blood pressure reading    Aortic stenosis    Mixed hyperlipidemia    Elevated D-dimer    Leukocytosis  Resolved Problems:    * No resolved hospital problems  *        Acute low back pain without sciatica  Assessment & Plan  Presented with acute onset of lower back pain after physical activity 3 days ago, denies any fall/trauma, injury, heavy lifting etc   Pain gradually worsened over the last 3 days with difficulty in ambulation  Denies any radiation, tingling, numbness, weakness, bladder or bowel dysfunction  Reports prior history of mild intermittent back discomfort but did not have similar symptoms in the past   CT chest abdomen lumbar spine with evidence of degenerative changes without any acute abnormality    Reports significant improvement in pain after receiving meds in ED  · Continue pain control  · PT/OT  · Activity as tolerated  · Monitor signs and symptoms  · Will refer to spine/pain management as outpatient if persistent/recurrent      * Hyponatremia  Assessment & Plan  Noted to have mild hyponatremia of 129 on presentation  No prior known history but prior blood work from 2017-/2021 revealed sodium level ranging from 1 33-1 36  Suspect secondary to uncontrolled pain, decreased p o  intake coupled with water intake  Patient received IV fluid in ED with repeat sodium level 128  · Fluid restriction  · Encourage p o  intake  · Continue pain control  · Repeat BMP in a m  · Further work-up if persistent    Elevated blood pressure reading  Assessment & Plan  Presented with elevated blood pressure in setting of uncontrolled pain  Blood pressure trend is improving with improvement in pain  Previously on Norvasc 2 5 mg daily but did not continue  · Monitor blood pressure trend    Mixed hyperlipidemia  Assessment & Plan  Reports that he was previously prescribed medication but he did not continue as he wanted to management lifestyle modification  Continue lifestyle modification  Follow-up with PCP for repeat lipid profile      Consultations During Hospital Stay:  IP CONSULT TO CARDIOLOGY    Procedures Performed:     · Echocardiogram    Significant Findings:     · Refer to hospital course and above listed diagnosis related plan for details    Imaging while in hospital:    CT recon only lumbar spine    Result Date: 5/2/2023  Narrative: CT RECON ONLY LUMBAR SPINE (NO CHARGE) INDICATION:   low back pain  COMPARISON:  None TECHNIQUE: Axial CT examination of the lumbar spine was obtained utilizing reconstructed images from CT of the chest, abdomen and pelvis performed the same day  Images were reformatted in the sagittal and coronal planes  This examination, like all CT scans performed in the [facility], was performed utilizing techniques to minimize radiation dose exposure, including the use of iterative reconstruction and automated exposure control  FINDINGS: ALIGNMENT: There is retrolisthesis of L1-L2  There is mild anterolisthesis of L4-L5  Mild levoscoliosis  VERTEBRAE: Vertebral body heights are maintained  No acute fractures are identified  DEGENERATIVE CHANGES: L1-L2: There is a bulging annulus with dorsal osteophytosis  There is facet arthrosis  There is mild foraminal narrowing  L2-L3: There is disc base narrowing  There is a bulging annulus  There is dorsal marginal osteophytosis  "There is facet arthrosis  There is mild canal stenosis  There is mild to moderate right and mild left foraminal narrowing  L3-L4: There is a bulging annulus  There is facet arthrosis  There is mild canal stenosis  There is mild to moderate right and mild left foraminal narrowing  L4-L5: There is uncovering the disc space  There is mild to moderate canal stenosis  There is facet arthrosis  There is moderate left and mild to moderate right foraminal narrowing  L5-S1: There is facet arthrosis  There is no significant canal stenosis  There is mild foraminal narrowing  PREVERTEBRAL AND PARASPINAL SOFT TISSUES: Normal      Impression: No acute fractures  Degenerative changes of the lumbar spine, as described above  Correlate with separate CT of the abdomen and pelvis for additional findings  Workstation performed: [id number]     CT abdomen pelvis with contrast    Result Date: 5/2/2023  Narrative: CT ABDOMEN AND PELVIS WITH IV CONTRAST INDICATION:   back pain  \"Lower back pain since Sunday, states he couldn't stand up to get out of bed  States no injury to the area\" COMPARISON:  None  TECHNIQUE:  CT examination of the abdomen and pelvis was performed  Multiplanar 2D reformatted images were created from the source data  Radiation dose length product (DLP) for this visit:  407 mGy-cm   This examination, like all CT scans performed in the [facility], was performed utilizing techniques to minimize radiation dose exposure, including the use of iterative reconstruction and automated exposure control  IV Contrast:  100 mL of iohexol (OMNIPAQUE) Enteric Contrast:  Enteric contrast was not administered  FINDINGS: ABDOMEN LOWER CHEST:  No clinically significant abnormality identified in the visualized lower chest  LIVER/BILIARY TREE:  Unremarkable  GALLBLADDER: There are gallstone(s) within the gallbladder, without pericholecystic inflammatory changes  SPLEEN:  Unremarkable  PANCREAS:  Unremarkable   ADRENAL " GLANDS:  Unremarkable  KIDNEYS/URETERS:  Unremarkable  No hydronephrosis  STOMACH AND BOWEL:  Unremarkable  APPENDIX:  No findings to suggest appendicitis  ABDOMINOPELVIC CAVITY:  No ascites  No pneumoperitoneum  No lymphadenopathy  VESSELS:  Unremarkable for patient's age  PELVIS REPRODUCTIVE ORGANS:  Unremarkable for patient's age  URINARY BLADDER:  Unremarkable  ABDOMINAL WALL/INGUINAL REGIONS:  Unremarkable  OSSEOUS STRUCTURES:  No acute fracture or destructive osseous lesion  Spinal degenerative changes are noted  Impression: No acute findings in the abdomen or the pelvis  Spinal degenerative changes likely account for this patient's back pain  Workstation performed: [id number]     Echo complete w/ contrast if indicated    Result Date: 5/3/2023  Narrative: [facility]  Left Ventricle: Left ventricular cavity size is normal  Wall thickness is moderately increased  There is mild to moderate concentric hypertrophy  The left ventricular ejection fraction is 59% by visual estimation  Systolic function is normal  Wall motion is normal  Diastolic function is moderately abnormal, consistent with grade II (pseudonormal) relaxation    Right Ventricle: Systolic function is normal  Normal tricuspid annular plane systolic excursion (TAPSE) > 1 7 cm  Wall thickness is increased    Left Atrium: The atrium is moderately dilated (>48 mL/m2)    Right Atrium: The atrium is mildly dilated    Aortic Valve: The leaflets are moderately thickened  The leaflets are severely calcified  There is severely reduced mobility  There is mild regurgitation  There is moderate to severe stenosis  The aortic valve peak velocity is 4 1 m/s  The aortic valve peak gradient is 63 mmHg  The aortic valve mean gradient is 35 0 mmHg    Mitral Valve: There is mild annular calcification    Tricuspid Valve: There is mild regurgitation  The  Denies any chest pain shortness of breath or dizziness  Ventricular systolic pressure is moderately elevated  "Compared to prior echo, aortic valve stenosis is now borderline severe  Wall thickness has increased  Increased left atrial dilation  Incidental Findings:   · ***     Test Results Pending at Discharge (will require follow up):   · As per After Visit Summary     Outpatient Tests Requested:  · Recommended to follow-up with PCP for repeat BMP to monitor sodium level in 1 week  · Follow-up with cardiology for echocardiogram    Complications:  Refer to hospital course and above listed diagnosis related plan, if any    Hospital Course:     Mateo Sigala is a 69 y o  male patient who originally presented to the hospital on 5/2/2023 due to ***        Please see above list of diagnoses and related plan for additional information  Condition at Discharge: stable     Discharge Day Visit / Exam:     Subjective:    Back pain has improved, able to ambulate  Reports mild pressure  Denies any tingling numbness or weakness  Noted to have A-fib  Has any chest pain shortness of breath dizziness or palpitation        Vitals: Blood Pressure: 158/57 (05/03/23 1509)  Pulse: 76 (05/03/23 1509)  Temperature: 98 1 °F (36 7 °C) (05/03/23 1509)  Temp Source: Temporal (05/03/23 0749)  Respirations: 18 (05/03/23 1509)  Height: 5' 8\" (172 7 cm) (05/03/23 1341)  Weight - Scale: 64 kg (141 lb) (05/03/23 1341)  SpO2: 97 % (05/03/23 1509)  Exam:   Physical Exam  Constitutional:       General: He is not in acute distress  HENT:      Head: Normocephalic and atraumatic  Cardiovascular:      Rate and Rhythm: Normal rate  Heart sounds: Murmur heard  Pulmonary:      Effort: Pulmonary effort is normal  No respiratory distress  Breath sounds: No wheezing, rhonchi or rales  Chest:      Chest wall: No tenderness  Abdominal:      General: Bowel sounds are normal  There is no distension  Palpations: Abdomen is soft  Tenderness: There is no abdominal tenderness  There is no guarding or rebound     Musculoskeletal:      " "Cervical back: Neck supple  Right lower leg: No edema  Left lower leg: No edema  Skin:     General: Skin is warm and dry  Findings: No rash  Neurological:      Mental Status: He is alert  Mental status is at baseline  Cranial Nerves: No cranial nerve deficit  Discharge instructions/Information to patient and family:(Discharge Medications and Follow up):   See after visit summary for information provided to patient and family  Provisions for Follow-Up Care:  See after visit summary for information related to follow-up care and any pertinent home health orders  Disposition: Home    Planned Readmission:  No     Discharge Statement:  I spent 50 minutes discharging the patient  This time was spent on the day of discharge  I had direct contact with the patient on the day of discharge  Greater than 50% of the total time was spent examining patient, answering all patient questions, arranging and discussing plan of care with patient as well as directly providing post-discharge instructions  Additional time then spent on discharge activities  Coordinated with cardiology  Discharge Medications:  See after visit summary for reconciled discharge medications provided to patient and family  ** Please Note: \"This note has been constructed using a voice recognition system  Therefore there may be syntax, spelling, and/or grammatical errors  Please call if you have any questions   \"**      " "numbness or weakness  Noted to have A-fib  Has any chest pain shortness of breath dizziness or palpitation        Vitals: Blood Pressure: 158/57 (05/03/23 1509)  Pulse: 76 (05/03/23 1509)  Temperature: 98 1 °F (36 7 °C) (05/03/23 1509)  Temp Source: Temporal (05/03/23 0749)  Respirations: 18 (05/03/23 1509)  Height: 5' 8\" (172 7 cm) (05/03/23 1341)  Weight - Scale: 64 kg (141 lb) (05/03/23 1341)  SpO2: 97 % (05/03/23 1509)  Exam:   Physical Exam  Constitutional:       General: He is not in acute distress  HENT:      Head: Normocephalic and atraumatic  Cardiovascular:      Rate and Rhythm: Normal rate  Heart sounds: Murmur heard  Pulmonary:      Effort: Pulmonary effort is normal  No respiratory distress  Breath sounds: No wheezing, rhonchi or rales  Chest:      Chest wall: No tenderness  Abdominal:      General: Bowel sounds are normal  There is no distension  Palpations: Abdomen is soft  Tenderness: There is no abdominal tenderness  There is no guarding or rebound  Musculoskeletal:      Cervical back: Neck supple  Right lower leg: No edema  Left lower leg: No edema  Skin:     General: Skin is warm and dry  Findings: No rash  Neurological:      Mental Status: He is alert  Mental status is at baseline  Cranial Nerves: No cranial nerve deficit  Discharge instructions/Information to patient and family:(Discharge Medications and Follow up):   See after visit summary for information provided to patient and family  Provisions for Follow-Up Care:  See after visit summary for information related to follow-up care and any pertinent home health orders  Disposition: Home    Planned Readmission:  No     Discharge Statement:  I spent 50 minutes discharging the patient  This time was spent on the day of discharge  I had direct contact with the patient on the day of discharge   Greater than 50% of the total time was spent examining patient, answering all " "patient questions, arranging and discussing plan of care with patient as well as directly providing post-discharge instructions  Additional time then spent on discharge activities  Coordinated with cardiology  Discharge Medications:  See after visit summary for reconciled discharge medications provided to patient and family  ** Please Note: \"This note has been constructed using a voice recognition system  Therefore there may be syntax, spelling, and/or grammatical errors  Please call if you have any questions   \"**      "

## 2023-05-03 NOTE — ASSESSMENT & PLAN NOTE
Reports that he was previously prescribed medication but he did not continue as he wanted to management lifestyle modification  Continue lifestyle modification  Follow-up with PCP for repeat lipid profile

## 2023-05-03 NOTE — ASSESSMENT & PLAN NOTE
Presented with elevated blood pressure in setting of uncontrolled pain  Blood pressure trend is improving with improvement in pain  Previously on Norvasc 2 5 mg daily but did not continue  · Monitor blood pressure trend

## 2023-05-03 NOTE — PHYSICAL THERAPY NOTE
"       PHYSICAL THERAPY EVALUATION/TREATMENT     05/03/23 0940   Note Type   Note type Evaluation   Pain Assessment   Pain Assessment Tool 0-10   Pain Score No Pain   Restrictions/Precautions   Other Precautions Pain   Home Living   Type of [address] One level; Two level   Prior Function   Level of [place] Independent with ADLs; Independent with functional mobility   Lives With Spouse; Son   Sudarshan [facility] in the last 6 months 0   Vocational Retired   General   Additional Pertinent History Pt admitted with acute LBP after moving some heavy boxes  Pt reports his back pain has now fully resolved  Pt also reporsts afib and hyponatremia since hospital admission but reports he feels fine  Cognition   Overall Cognitive Status WFL   Subjective   Subjective \"I feel great today, no pain\"   RUE Assessment   RUE Assessment WNL   LUE Assessment   LUE Assessment WNL   RLE Assessment   RLE Assessment WNL   LLE Assessment   LLE Assessment WNL   Transfers   Sit to Stand 7  Independent   Stand to Sit 7  Independent   Stand pivot 7  Independent   Ambulation/Elevation   Gait pattern   (significant kyphotic posture)   Gait Assistance 5  Supervision   Assistive Device   (none)   Distance 250 feet   Balance   Static Sitting Good   Dynamic Sitting Good   Static Standing Good   Dynamic Standing Fair +   Ambulatory Fair +   Activity Tolerance   Activity Tolerance Patient tolerated treatment well   Assessment   Prognosis Good   Problem List Decreased range of motion  (impaired posture)   Assessment Patient is an 69y o  year old male seen for Physical Therapy evaluation  Patient admitted with Hyponatremia  Comorbidities affecting patient's physical performance include: acute LBP  Personal factors affecting patient at time of initial evaluation include: lives in 2 story house  Prior to admission, patient was independent with functional mobility without assistive device and independent with ADLS    " "Please find objective findings from Physical Therapy assessment regarding body systems outlined above with impairments and limitations including pain  The Barthel Index was used as a functional outcome tool presenting with a score of 90 today indicating minimal limitations of functional mobility and ADLS  Patient's clinical presentation is currently stable  as seen in patient's presentation of changing level of pain  Pt would benefit from continued Physical Therapy treatment to address deficits as defined above and maximize level of functional mobility and to prevent future back pain  As demonstrated by objective findings, the assigned level of complexity for this evaluation is moderate  The patient's AM-PAC Basic Mobility Inpatient Short Form Raw Score is 24  A Raw score of greater than 16 suggests the patient may benefit from discharge to home  Goals   Patient Goals \"go home today\"   STG Expiration Date 05/10/23   Short Term Goal #1 Pt will demonstrate good  when doing work around the house     LTG Expiration Date 05/17/23   Long Term Goal #1 no further episodes of LBP   Plan   Treatment/Interventions Patient/family training   PT Frequency Other (Comment)  (1x/visit only)   Recommendation   PT Discharge Recommendation No rehabilitation needs  (recommend OP PT if back pain returns)   [address] [place] [address] Mobility Inpatient   Turning in Flat Bed Without Bedrails 4   Lying on Back to Sitting on Edge of Flat Bed Without Bedrails 4   Moving Bed to Chair 4   Standing Up From Chair Using Arms 4   Walk in Room 4   Climb 3-5 Stairs With Railing 4   Basic Mobility Inpatient Raw Score 24   Basic Mobility Standardized Score 57 68   Highest Level Of Mobility   JH-HLM Goal 8: Walk 250 feet or more   JH-HLM Achieved 8: Walk 250 feet ot more   Barthel Index   Feeding 10   Bathing 0   Grooming Score 0   Dressing Score 0   Bladder Score 0   Bowels Score 0   Toilet Use Score 0   Transfers (Bed/Chair) Score 0   Mobility (Level " Surface) Score 10   Stairs Score 5   Barthel Index Score 25   Additional Treatment Session   Start Time 0930   End Time 0940   Treatment Assessment S:  I'm ready to go home O:  Pt educated in safe body mechanics and proper posture when lifting  Pt reports he is aware of the mistakes he made a few days a go lifitng boxes  A:  Pt is back to his baseline, no additional skilled PT needs     Licensure   [place] License Number  [place]  [id number]

## 2023-05-03 NOTE — ASSESSMENT & PLAN NOTE
Noted to have mild hyponatremia of 129 on presentation  No prior known history but prior blood work from 2017-/2021 revealed sodium level ranging from 1 33-1 36  Suspect secondary to uncontrolled pain, decreased p o  intake coupled with water intake  Patient received IV fluid in ED with repeat sodium level 128  · Fluid restriction  · Encourage p o  intake  · Continue pain control  · Repeat BMP in a m    · Further work-up if persistent

## 2023-05-03 NOTE — PLAN OF CARE
Problem: Potential for Falls  Goal: Patient will remain free of falls  Description: INTERVENTIONS:  - Educate patient/family on patient safety including physical limitations  - Instruct patient to call for assistance with activity   - Consult OT/PT to assist with strengthening/mobility   - Keep Call bell within reach  - Keep bed low and locked with side rails adjusted as appropriate  - Keep care items and personal belongings within reach  - Initiate and maintain comfort rounds  - Make Fall Risk Sign visible to staff  - Offer Toileting every 2 Hours, in advance of need  - Initiate/Maintain bed alarm  - Obtain necessary fall risk management equipment: yellow socks  - Apply yellow socks and bracelet for high fall risk patients  - Consider moving patient to room near nurses station  Outcome: Progressing     Problem: PAIN - ADULT  Goal: Verbalizes/displays adequate comfort level or baseline comfort level  Description: Interventions:  - Encourage patient to monitor pain and request assistance  - Assess pain using appropriate pain scale  - Administer analgesics based on type and severity of pain and evaluate response  - Implement non-pharmacological measures as appropriate and evaluate response  - Consider cultural and social influences on pain and pain management  - Notify physician/advanced practitioner if interventions unsuccessful or patient reports new pain  Outcome: Progressing     Problem: SAFETY ADULT  Goal: Maintain or return to baseline ADL function  Description: INTERVENTIONS:  -  Assess patient's ability to carry out ADLs; assess patient's baseline for ADL function and identify physical deficits which impact ability to perform ADLs (bathing, care of mouth/teeth, toileting, grooming, dressing, etc )  - Assess/evaluate cause of self-care deficits   - Assess range of motion  - Assess patient's mobility; develop plan if impaired  - Assess patient's need for assistive devices and provide as appropriate  - Encourage maximum independence but intervene and supervise when necessary  - Involve family in performance of ADLs  - Assess for home care needs following discharge   - Consider OT consult to assist with ADL evaluation and planning for discharge  - Provide patient education as appropriate  Outcome: Progressing     Problem: CARDIOVASCULAR - ADULT  Goal: Maintains optimal cardiac output and hemodynamic stability  Description: INTERVENTIONS:  - Monitor I/O, vital signs and rhythm  - Monitor for S/S and trends of decreased cardiac output  - Administer and titrate ordered vasoactive medications to optimize hemodynamic stability  - Assess quality of pulses, skin color and temperature  - Assess for signs of decreased coronary artery perfusion  - Instruct patient to report change in severity of symptoms  Outcome: Progressing     Problem: METABOLIC, FLUID AND ELECTROLYTES - ADULT  Goal: Electrolytes maintained within normal limits  Description: INTERVENTIONS:  - Monitor labs and assess patient for signs and symptoms of electrolyte imbalances  - Administer electrolyte replacement as ordered  - Monitor response to electrolyte replacements, including repeat lab results as appropriate  - Instruct patient on fluid and nutrition as appropriate  Outcome: Progressing  Goal: Fluid balance maintained  Description: INTERVENTIONS:  - Monitor labs   - Monitor I/O and WT  - Instruct patient on fluid and nutrition as appropriate  - Assess for signs & symptoms of volume excess or deficit  Outcome: Progressing     Problem: MOBILITY - ADULT  Goal: Maintain or return to baseline ADL function  Description: INTERVENTIONS:  -  Assess patient's ability to carry out ADLs; assess patient's baseline for ADL function and identify physical deficits which impact ability to perform ADLs (bathing, care of mouth/teeth, toileting, grooming, dressing, etc )  - Assess/evaluate cause of self-care deficits   - Assess range of motion  - Assess patient's mobility; develop plan if impaired  - Assess patient's need for assistive devices and provide as appropriate  - Encourage maximum independence but intervene and supervise when necessary  - Involve family in performance of ADLs  - Assess for home care needs following discharge   - Consider OT consult to assist with ADL evaluation and planning for discharge  - Provide patient education as appropriate  Outcome: Progressing  Goal: Maintains/Returns to pre admission functional level  Description: INTERVENTIONS:  - Perform BMAT or MOVE assessment daily    - Set and communicate daily mobility goal to care team and patient/family/caregiver  - Collaborate with rehabilitation services on mobility goals if consulted  - Perform Range of Motion 4 times a day  - Reposition patient every 2 hours    - Dangle patient 3 times a day  - Stand patient 3 times a day  - Ambulate patient 3 times a day  - Out of bed to chair 3 times a day   - Out of bed for meals 3 times a day  - Out of bed for toileting  - Record patient progress and toleration of activity level   Outcome: Progressing

## 2023-05-03 NOTE — CONSULTS
Consultation - Cardiology   [facility]     Abhay Kruger 69 y o  male MRN: [id number]  : 1945  Unit/Bed#: [address] [place] [address] Encounter: [id number]      Assessment & Plan   1  Paroxysmal atrial fibrillation      - Telemetry reviewed: currently sinus rhythm, rate 86 bpm  Converted from sinus rhythm to atrial fibrillation at 0124hrs on 23, converted to sinus rhythm from atrial fibrillation at 0234 hrs on 23    - 23 0209 hrs EKG noted atrial fibrillation with RVR, ventricular rate 120s  - 23 0259 hrs EKG notes sinus rhythm with PACs  - Patient converted from atrial fibrillation to sinus rhythm without medication administration    - [id number] stroke risk score: 2 points, moderate-high risk    - Recommend start Eliquis 5 mg twice daily   - Patient would benefit from outpatient extended heart monitoring    -  23 TTE: LVEF 59%  Systolic function is normal  Wall motion is normal  Diastolic function is moderately abnormal, consistent with grade II (pseudonormal) relaxation  There is moderate to severe aortic stenosis  - Recommend start lopressor 12 5 mg twice daily   - Recommend patient follow up within one month of discharge with Cardiology outpatient  2  Hyponatremia      - 23 sodium 128  - Care per primary team      3  Acute lower back pain  - Presented with three days of lower back pain  - 23 CT abd/pelvis w/o contrast: No acute findings in the abdomen or the pelvis  Spinal degenerative changes likely account for this patient's back pain  - 23 CT recon only lumbar spine: No acute fractures  Degenerative changes of the lumbar spine  4  Moderate to severe aortic stenosis  - 23 TTE: Aortic Valve- The leaflets are moderately thickened  The leaflets are severely calcified  There is severely reduced mobility  There is mild regurgitation  There is moderate to severe stenosis  The aortic valve peak velocity is 4 1 m/s   The aortic valve peak gradient is 63 mmHg  The aortic valve mean gradient is 35 0 mmHg  - Compared to prior echo from 10/08/21 , aortic valve stenosis is now borderline severe  - 10/08/21 TTE: Aortic valve- The valve was trileaflet  Leaflets exhibited markedly increased thickness and marked calcification  Transaortic velocity was increased due to valvular stenosis  There was moderate stenosis  Valve mean gradient was 30 mmHg  Aortic valve area was 1 2 cmï¾² by the continuity equation   - Patient will require follow up TTE within 6 months for aortic stenosis monitoring  5  Hypertension      - Noted hypertensive urgency on admission  BP has since approved  - Previously on amlodipine 2 5 daily, reportedly self stopped  - Recommend start lopressor 12 5 mg twice daily  6  Hyperlipidemia  - 7/20/21 lipid panel: cholesterol 200, triglycerides 78, HDL 70,     - Not currently on statin therapy  Had previously been prescribed Crestor 5 mg daily    - Patient reportedly wants to pursue lifestyle modifications instead of statin therapy  Summary of Recommendations: Thank you for your consultation  Physician Requesting Consult: Vlad Byers MD    Reason for Consult / Principal Problem: paroxysmal atrial fibrillation  Consults    HPI: Mariely Mitchell is a 69y o  year old male with PMHx of HTN, moderate aortic stenosis, HLD, who presented on 5/02/23 for lower back pain for three days  Admitted for incidental finding of hyponatremia  Cardiology consulted for evaluation for paroxysmal atrial fibrillation  Telemetry reviewed: currently sinus rhythm, rate 86 bpm  Converted from sinus rhythm to atrial fibrillation at 0124hrs on 5/03/23, converted to sinus rhythm from atrial fibrillation at 0234 hrs on 5/03/23 05/03/23 0209 hrs EKG noted atrial fibrillation with RVR, ventricular rate 120s  05/03/23 0259 hrs EKG notes sinus rhythm with PACs   Patient converted from atrial fibrillation to sinus rhythm without medication administration  Patient reports he was awake most of the night but denies feeling palpitations, chest pain, shortness of breath, lightheaded, dizziness, nausea, or vomiting  He is concerned that he hasn't been sleeping  He states he has been awake for 72 hours  Review of Systems   Constitutional: Positive for fatigue  Negative for activity change, chills, diaphoresis and unexpected weight change  Respiratory: Negative for cough, chest tightness, shortness of breath and wheezing  Cardiovascular: Negative for chest pain, palpitations and leg swelling  Gastrointestinal: Negative for abdominal distention, abdominal pain, constipation, diarrhea, nausea and vomiting  Musculoskeletal: Positive for back pain  Neurological: Negative for dizziness, syncope, weakness, light-headedness, numbness and headaches  Psychiatric/Behavioral: Positive for sleep disturbance  Historical Information   History reviewed  No pertinent past medical history    Past Surgical History:   Procedure Laterality Date    SKIN BIOPSY       Social History     Substance and Sexual Activity   Alcohol Use Not Currently     Social History     Substance and Sexual Activity   Drug Use Not Currently     Social History     Tobacco Use   Smoking Status Former    Types: Cigarettes    Quit date: 3/11/2005    Years since quittin 1   Smokeless Tobacco Never     Family History:   Family History   Problem Relation Age of Onset    Colon cancer Mother     Lung cancer Father     Mental illness Neg Hx        Meds/Allergies    PTA meds:    Medications Prior to Admission   Medication    erythromycin (ILOTYCIN) ophthalmic ointment    halobetasol (ULTRAVATE) 0 05 % cream    triamcinolone (KENALOG) 0 1 % cream    amLODIPine (NORVASC) 2 5 mg tablet    rosuvastatin (CRESTOR) 5 mg tablet    tamsulosin (FLOMAX) 0 4 mg      No Known Allergies    Current Facility-Administered Medications:     acetaminophen (TYLENOL) "tablet 650 mg, 650 mg, Oral, Q6H PRN, Rajinder Ornelas MD    acetaminophen (TYLENOL) tablet 650 mg, 650 mg, Oral, Q6H [address], Rajinder Ornelas MD, 650 mg at 05/02/23 1938    docusate sodium (COLACE) capsule 100 mg, 100 mg, Oral, BID, Rajinder Ornelas MD, 100 mg at 05/03/23 1043    enoxaparin (LOVENOX) subcutaneous injection 40 mg, 40 mg, Subcutaneous, Daily, Rajinder Ornelas MD, 40 mg at 05/03/23 0809    lidocaine (LIDODERM) 5 % patch 1 patch, 1 patch, Topical, Daily, Rajinder Ornelas MD, 1 patch at 05/03/23 0809    metoprolol (LOPRESSOR) injection 5 mg, 5 mg, Intravenous, Once, Elda Knott MD    naloxone (NARCAN) 0 04 mg/mL syringe 0 04 mg, 0 04 mg, Intravenous, Q1MIN PRN, Rajinder Ornelas MD    ondansetron (ZOFRAN) injection 4 mg, 4 mg, Intravenous, Q4H PRN, Rajinder Ornelas MD    oxyCODONE (ROXICODONE) IR tablet 2 5 mg, 2 5 mg, Oral, Q4H PRN, Rajinder Ornelas MD, 2 5 mg at 05/02/23 2211    oxyCODONE (ROXICODONE) IR tablet 5 mg, 5 mg, Oral, Q4H PRN, Rajinder Ornelas MD    polyethylene glycol (MIRALAX) packet 17 g, 17 g, Oral, Daily PRN, Rajinder Ornelas MD    VTE Pharmacologic Prophylaxis:   Enoxaparin (Lovenox)    Objective:   Vitals: Blood pressure 145/60, pulse 64, temperature 98 1 °F (36 7 °C), temperature source Temporal, resp  rate 19, height 5' 8\" (1 727 m), weight 64 kg (141 lb), SpO2 100 %  Body mass index is 21 44 kg/m²    Wt Readings from Last 3 Encounters:   05/03/23 64 kg (141 lb)   10/13/21 64 kg (141 lb)   09/15/21 65 3 kg (144 lb)     BP Readings from Last 3 Encounters:   05/03/23 145/60   10/13/21 150/84   09/15/21 112/58     Orthostatic Blood Pressures    Flowsheet Row Most Recent Value   Blood Pressure 145/60 filed at 05/03/2023 1341   Patient Position - Orthostatic VS Sitting filed at 05/03/2023 0749          Intake/Output Summary (Last 24 hours) at 5/3/2023 1346  Last data filed at 5/3/2023 0342  Gross per 24 hour   Intake 240 ml   Output 700 ml   Net -460 ml       Invasive Devices     " Peripheral Intravenous Line  Duration           Peripheral IV 05/02/23 Distal;Right;Upper;Ventral (anterior) Arm 1 day                  Physical Exam:   Physical Exam  Vitals reviewed  Constitutional:       General: He is not in acute distress  Cardiovascular:      Rate and Rhythm: Normal rate and regular rhythm  Pulses: Normal pulses  Heart sounds: Murmur heard  Pulmonary:      Effort: Pulmonary effort is normal  No respiratory distress  Breath sounds: Decreased breath sounds present  Abdominal:      General: Abdomen is flat  There is no distension  Palpations: Abdomen is soft  Tenderness: There is no abdominal tenderness  Musculoskeletal:      Right lower leg: No edema  Left lower leg: No edema  Skin:     General: Skin is warm and dry  Neurological:      Mental Status: He is alert and oriented to person, place, and time         Labs:   Troponins:      CBC with diff:   Results from last 7 days   Lab Units 05/03/23  1040 05/03/23  0306 05/02/23  1104   WBC Thousand/uL 16 38* 11 47* 10 29*   HEMOGLOBIN g/dL 11 4* 11 0* 11 2*   HEMATOCRIT % 33 2* 33 4* 34 2*   MCV fL 89 90 90   PLATELETS Thousands/uL 331 329 305   MCH pg 30 5 29 6 29 4   MCHC g/dL 34 3 32 9 32 7   RDW % 13 6 13 4 13 5   MPV fL 8 6* 9 0 8 6*   NRBC AUTO /100 WBCs  --   --  0       CMP:   Results from last 7 days   Lab Units 05/03/23  1040 05/03/23  0259 05/02/23  1434 05/02/23  1104   SODIUM mmol/L 131* 129* 128* 129*   POTASSIUM mmol/L 4 0 4 0 4 0 4 0   CHLORIDE mmol/L 97 98 98 97   CO2 mmol/L 28 23 24 24   ANION GAP mmol/L 6 8 6 8   BUN mg/dL 16 14 11 12   CREATININE mg/dL 0 82 0 67 0 71 0 76   GLUCOSE FASTING mg/dL  --  116*  --   --    CALCIUM mg/dL 8 9 8 8 8 5 8 8   AST U/L 10* 8*  --  10*   ALT U/L 10 9  --  10   ALK PHOS U/L 58 57  --  59   TOTAL PROTEIN g/dL 6 3* 6 3*  --  6 6   ALBUMIN g/dL 3 6 3 4*  --  3 6   TOTAL BILIRUBIN mg/dL 0 30 0 38  --  0 41   EGFR ml/min/1 73sq m 85 92 90 88   GLUCOSE RANDOM mg/dL 136 116 133 127       Magnesium:   Results from last 7 days   Lab Units 23  0259 23  1104   MAGNESIUM mg/dL 2 3 2 1     Coags:     TSH:    Results from last 7 days   Lab Units 23  025   TSH 3RD GENERATON uIU/mL 1 233     No components found for: TSH3  Lipid Profile:     Lipid Profile:   Lab Results   Component Value Date    CHOLESTEROL 200 (H) 2021    HDL 70 2021    LDLCALC 116 (H) 2021    TRIG 78 2021     Hgb A1c:     NT-proBNP: No results for input(s): NTBNP in the last 72 hours  Imaging & Testing     Cardiac testing:   Results for orders placed during the hospital encounter of 10/08/21    Echo complete with contrast if indicated    Narrative  [facility] [address]    Transthoracic Echocardiogram  2D, M-mode, Doppler, and Color Doppler    Study date:  08-Oct-2021    Patient: Jeri Flores  MR number: [id number]  Account number: [de-identified]  : 1945  Age: 77 years  Gender: Male  Status: Outpatient  Location: [facility]  Height: 68 in  Weight: 144 lb  BP: 112/ 58 mmHg    Indications: Murmur    Diagnoses: R01 1 - Cardiac murmur, unspecified    Sonographer:  AUBREE Mulligan  Primary Physician:  Benoit Cole [place]  Referring Physician:  Benoit Cole, DO  Group:  [facility]  Interpreting Physician:  Sae Victoria MD    IMPRESSIONS:  The presence of any assoicated symptoms should prompt further w/u and/or referral to cardiology  SUMMARY    LEFT VENTRICLE:  Systolic function was normal  Ejection fraction was estimated to be 60 %  There were no regional wall motion abnormalities  Doppler parameters were consistent with abnormal left ventricular relaxation (grade 1 diastolic dysfunction)  LEFT ATRIUM:  The atrium was mildly dilated  MITRAL VALVE:  There was trace regurgitation  AORTIC VALVE:  The valve was trileaflet   Leaflets exhibited markedly increased thickness and marked calcification  Transaortic velocity was increased due to valvular stenosis  There was moderate stenosis  Valve mean gradient was 30 mmHg  Aortic valve area was 1 2 cmï¾² by the continuity equation  HISTORY: PRIOR HISTORY: HLD, Former smoker    PROCEDURE: The study was performed in the [facility]  This was a routine study  The transthoracic approach was used  The study included complete 2D imaging, M-mode, complete spectral Doppler, and color Doppler  The heart rate was 66  bpm, at the start of the study  Images were obtained from the parasternal, apical, subcostal, and suprasternal notch acoustic windows  Image quality was adequate  LEFT VENTRICLE: Size was normal  Systolic function was normal  Ejection fraction was estimated to be 60 %  There were no regional wall motion abnormalities  Wall thickness was normal  DOPPLER: Doppler parameters were consistent with  abnormal left ventricular relaxation (grade 1 diastolic dysfunction)  RIGHT VENTRICLE: The size was normal  Systolic function was normal  Wall thickness was normal     LEFT ATRIUM: The atrium was mildly dilated  RIGHT ATRIUM: Size was normal     MITRAL VALVE: Valve structure was normal  There was normal leaflet separation  DOPPLER: The transmitral velocity was within the normal range  There was no evidence for stenosis  There was trace regurgitation  AORTIC VALVE: The valve was trileaflet  Leaflets exhibited markedly increased thickness and marked calcification  DOPPLER: Transaortic velocity was increased due to valvular stenosis  There was moderate stenosis  There was no significant  regurgitation  TRICUSPID VALVE: The valve structure was normal  There was normal leaflet separation  DOPPLER: The transtricuspid velocity was within the normal range  There was no evidence for stenosis  There was no significant regurgitation      PULMONIC VALVE: Leaflets exhibited normal thickness, no calcification, and normal cuspal separation  DOPPLER: The transpulmonic velocity was within the normal range  There was no significant regurgitation  PERICARDIUM: There was no pericardial effusion  The pericardium was normal in appearance  AORTA: The root exhibited normal size  SYSTEMIC VEINS: IVC: The inferior vena cava was normal in size  MEASUREMENT TABLES    DOPPLER MEASUREMENTS  Aortic valve   (Reference normals)  Peak gradient   52 mmHg   (--)  Mean gradient   30 mmHg   (--)  Valve area, cont   1 2 cmï¾²   (--)    SYSTEM MEASUREMENT TABLES    2D  %FS: 44 35 %  Ao Diam: 3 45 cm  EDV(Teich): 109 24 ml  EF(Teich): 75 51 %  ESV(Teich): 26 75 ml  IVSd: 0 95 cm  LA Diam: 3 29 cm  LAAs A4C: 24 62 cm2  LAESV A-L A4C: 87 65 ml  LAESV MOD A4C: 82 2 ml  LALs A4C: 5 87 cm  LVEDV MOD A4C: 90 82 ml  LVEF MOD A4C: 61 3 %  LVESV MOD A4C: 35 15 ml  LVIDd: 4 83 cm  LVIDs: 2 69 cm  LVLd A4C: 8 05 cm  LVLs A4C: 7 15 cm  LVOT Diam: 2 24 cm  LVPWd: 0 98 cm  RAAs A4C: 19 81 cm2  RAESV A-L: 54 93 ml  RAESV MOD: 55 35 ml  RALs: 6 07 cm  RVIDd: 3 96 cm  RWT: 0 41  SV MOD A4C: 55 67 ml  SV(Teich): 82 48 ml    CW  AV Env  Ti: 351 41 ms  AV VTI: 89 64 cm  AV Vmax: 3 46 m/s  AV Vmean: 2 49 m/s  AV maxP mmHg  AV meanP 9 mmHg    MM  TAPSE: 2 14 cm    PW  MARVIN (VTI): 1 1 cm2  MARVIN Vmax: 1 15 cm2  E': 0 06 m/s  E/E': 10 15  LVOT Env  Ti: 367 06 ms  LVOT VTI: 25 04 cm  LVOT Vmax: 1 01 m/s  LVOT Vmean: 0 69 m/s  LVOT maxP 15 mmHg  LVOT meanP 21 mmHg  LVSV Dopp: 99 ml  MV A Denis: 0 91 m/s  MV Dec Burnett: 1 93 m/s2  MV DecT: 343 04 ms  MV E Denis: 0 65 m/s  MV E/A Ratio: 0 72    [employer] Accredited Echocardiography Laboratory    Prepared and electronically signed by    Humza Bryson MD  Signed 08-Oct-2021 09:24:29    Imaging: I have personally reviewed pertinent reports  CT recon only lumbar spine    Result Date: 2023  Narrative: CT RECON ONLY LUMBAR SPINE (NO CHARGE) INDICATION:   low back pain   COMPARISON:  None TECHNIQUE: "Axial CT examination of the lumbar spine was obtained utilizing reconstructed images from CT of the chest, abdomen and pelvis performed the same day  Images were reformatted in the sagittal and coronal planes  This examination, like all CT scans performed in the [facility], was performed utilizing techniques to minimize radiation dose exposure, including the use of iterative reconstruction and automated exposure control  FINDINGS: ALIGNMENT: There is retrolisthesis of L1-L2  There is mild anterolisthesis of L4-L5  Mild levoscoliosis  VERTEBRAE: Vertebral body heights are maintained  No acute fractures are identified  DEGENERATIVE CHANGES: L1-L2: There is a bulging annulus with dorsal osteophytosis  There is facet arthrosis  There is mild foraminal narrowing  L2-L3: There is disc base narrowing  There is a bulging annulus  There is dorsal marginal osteophytosis  There is facet arthrosis  There is mild canal stenosis  There is mild to moderate right and mild left foraminal narrowing  L3-L4: There is a bulging annulus  There is facet arthrosis  There is mild canal stenosis  There is mild to moderate right and mild left foraminal narrowing  L4-L5: There is uncovering the disc space  There is mild to moderate canal stenosis  There is facet arthrosis  There is moderate left and mild to moderate right foraminal narrowing  L5-S1: There is facet arthrosis  There is no significant canal stenosis  There is mild foraminal narrowing  PREVERTEBRAL AND PARASPINAL SOFT TISSUES: Normal      Impression: No acute fractures  Degenerative changes of the lumbar spine, as described above  Correlate with separate CT of the abdomen and pelvis for additional findings  Workstation performed: [id number]     CT abdomen pelvis with contrast    Result Date: 5/2/2023  Narrative: CT ABDOMEN AND PELVIS WITH IV CONTRAST INDICATION:   back pain  \"Lower back pain since Sunday, states he couldn't stand up to get out of bed   States " "no injury to the area\" COMPARISON:  None  TECHNIQUE:  CT examination of the abdomen and pelvis was performed  Multiplanar 2D reformatted images were created from the source data  Radiation dose length product (DLP) for this visit:  407 mGy-cm   This examination, like all CT scans performed in the [facility], was performed utilizing techniques to minimize radiation dose exposure, including the use of iterative reconstruction and automated exposure control  IV Contrast:  100 mL of iohexol (OMNIPAQUE) Enteric Contrast:  Enteric contrast was not administered  FINDINGS: ABDOMEN LOWER CHEST:  No clinically significant abnormality identified in the visualized lower chest  LIVER/BILIARY TREE:  Unremarkable  GALLBLADDER: There are gallstone(s) within the gallbladder, without pericholecystic inflammatory changes  SPLEEN:  Unremarkable  PANCREAS:  Unremarkable  ADRENAL GLANDS:  Unremarkable  KIDNEYS/URETERS:  Unremarkable  No hydronephrosis  STOMACH AND BOWEL:  Unremarkable  APPENDIX:  No findings to suggest appendicitis  ABDOMINOPELVIC CAVITY:  No ascites  No pneumoperitoneum  No lymphadenopathy  VESSELS:  Unremarkable for patient's age  PELVIS REPRODUCTIVE ORGANS:  Unremarkable for patient's age  URINARY BLADDER:  Unremarkable  ABDOMINAL WALL/INGUINAL REGIONS:  Unremarkable  OSSEOUS STRUCTURES:  No acute fracture or destructive osseous lesion  Spinal degenerative changes are noted  Impression: No acute findings in the abdomen or the pelvis  Spinal degenerative changes likely account for this patient's back pain  Workstation performed: [id number]       EKG/ Monitor: Personally reviewed  Telemetry reviewed: currently sinus rhythm, rate 86 bpm  Converted from sinus rhythm to atrial fibrillation at 0124hrs on 5/03/23, converted to sinus rhythm from atrial fibrillation at 0234 hrs on 5/03/23        Code Status: Level 1 - Full Code  Advance Directive and Living Will:      POLST:        Zach Bunch PA-C "

## 2023-05-03 NOTE — NURSING NOTE
Patient discharged today  RN called [facility] for [employer] p/u  Discharge instructions given  Masimo and IV removed  Pt left the unit in good health walking and left via [employer]

## 2023-05-03 NOTE — UTILIZATION REVIEW
Initial Clinical Review    Admission: Date/Time/Statement:   Admission Orders (From admission, onward)     Ordered        05/02/23 1545  Place in Observation  Once                      Orders Placed This Encounter   Procedures    Place in Observation     Standing Status:   Standing     Number of Occurrences:   1     Order Specific Question:   Level of Care     Answer:   Med Surg [16]     ED Arrival Information     Expected   -    Arrival   5/2/2023 09:29    Acuity   Urgent            Means of arrival   Walk-In    Escorted by   Family Member    Service   Hospitalist    Admission type   Emergency            Arrival complaint   back pain,           Chief Complaint   Patient presents with    Back Pain     Lower back pain since Sunday, states he couldn't stand up to get out of bed  States no injury to the area       Initial Presentation: 69 y o  male to the ED from home with complaints of back pain for 3 days  Admitted under observation for hyponatremia  H/O aortic stenosis , dyslipidemia   Denies heavy lifting or trauma, had been pulling and sliding things  On arrival, bp elevated  Sodium 129  Given IV fluids in the ED, repeat sodium 128  CT c/a/l spine shows degenerative changes  Given IV morphine, IV solumedrol, toradol, pepcid in the ED with improvement  Fluid restriction  REpeat BMP  Abdomen soft, nondistended    BS + x 4    Date: 5/3   Day 2:       ED Triage Vitals   Temperature Pulse Respirations Blood Pressure SpO2   05/02/23 0959 05/02/23 0959 05/02/23 0959 05/02/23 0959 05/02/23 0959   (!) 97 3 °F (36 3 °C) 74 18 (!) 185/78 98 %      Temp Source Heart Rate Source Patient Position - Orthostatic VS BP Location FiO2 (%)   05/02/23 1345 05/02/23 1327 05/02/23 1327 05/02/23 1327 --   Oral Monitor Lying Right arm       Pain Score       05/02/23 0959       5          Wt Readings from Last 1 Encounters:   05/03/23 64 4 kg (141 lb 14 4 oz)     Additional Vital Signs:   /Time Temp Pulse Resp BP MAP (mmHg) SpO2 O2 Device Patient Position - Orthostatic VS   05/03/23 07:49:08 98 1 °F (36 7 °C) 67 19 145/64 91 100 % None (Room air) Sitting   05/03/23 03:09:32 -- 74  -- 97/59 72 96 % -- --   Pulse: pt converted - sinus rhythm w/ pacs at 05/03/23 0309   05/03/23 02:01:58 97 6 °F (36 4 °C) 115 Abnormal  -- 157/84 108 97 % -- --   05/03/23 0200 97 6 °F (36 4 °C) 129 Abnormal   -- 157/84 108 99 % -- --   Pulse: pt in new onset afib rvr - hospitalist made aware at 05/03/23 0200   05/02/23 22:09:20 98 1 °F (36 7 °C) 74 20 145/56 86 98 % -- --   05/02/23 22:08:37 98 1 °F (36 7 °C) 73 20 145/56 86 98 % -- --   05/02/23 19:19:10 -- 76 18 160/58 92 97 % -- --   05/02/23 1812 -- -- -- -- -- -- None (Room air) --   05/02/23 17:53:35 97 6 °F (36 4 °C) 77 19 166/70 102 98 % None (Room air) Lying   05/02/23 1630 -- 70 18 168/70 104 97 % -- --   05/02/23 1615 -- 68 16 138/62 -- 97 % -- --   05/02/23 1345 97 8 °F (36 6 °C) 78 18 140/65 93 96 % None (Room air) --   05/02/23 1329 -- -- -- -- -- -- None (Room air) --   05/02/23 1327 -- 76 19 163/109 Abnormal  132 98 % None (Room air) Lying   05/02/23 0959 97 3 °F (36 3 °C) Abnormal  74 18 185/78 Abnormal  -- 98 % -- --       Pertinent Labs/Diagnostic Test Results:   CT abdomen pelvis with contrast   Final Result by Jillian Alvarado MD (05/02 1405)      No acute findings in the abdomen or the pelvis  Spinal degenerative changes likely account for this patient's back pain  Workstation performed: [id number]         CT recon only lumbar spine   Final Result by Cici Cooley MD (05/02 1341)      No acute fractures  Degenerative changes of the lumbar spine, as described above  Correlate with separate CT of the abdomen and pelvis for additional findings              Workstation performed: [id number]               Results from last 7 days   Lab Units 05/03/23  0306 05/02/23  1104   WBC Thousand/uL 11 47* 10 29*   HEMOGLOBIN g/dL 11 0* 11 2*   HEMATOCRIT % 33 4* 34 2*   PLATELETS Thousands/uL 329 305   NEUTROS ABS Thousands/µL  --  8 35*         Results from last 7 days   Lab Units 05/03/23  0259 05/02/23  1434 05/02/23  1104   SODIUM mmol/L 129* 128* 129*   POTASSIUM mmol/L 4 0 4 0 4 0   CHLORIDE mmol/L 98 98 97   CO2 mmol/L 23 24 24   ANION GAP mmol/L 8 6 8   BUN mg/dL 14 11 12   CREATININE mg/dL 0 67 0 71 0 76   EGFR ml/min/1 73sq m 92 90 88   CALCIUM mg/dL 8 8 8 5 8 8   MAGNESIUM mg/dL 2 3  --  2 1   PHOSPHORUS mg/dL 2 9  --   --      Results from last 7 days   Lab Units 05/03/23  0259 05/02/23  1104   AST U/L 8* 10*   ALT U/L 9 10   ALK PHOS U/L 57 59   TOTAL PROTEIN g/dL 6 3* 6 6   ALBUMIN g/dL 3 4* 3 6   TOTAL BILIRUBIN mg/dL 0 38 0 41         Results from last 7 days   Lab Units 05/03/23  0259 05/02/23  1434 05/02/23  1104   GLUCOSE RANDOM mg/dL 116 133 127         Results from last 7 days   Lab Units 05/03/23  0306   D-DIMER QUANTITATIVE ug/ml FEU 0 71*         Results from last 7 days   Lab Units 05/03/23  0259   TSH 3RD GENERATON uIU/mL 1 233       Results from last 7 days   Lab Units 05/02/23  1107   CLARITY UA  Clear   COLOR UA  Yellow   SPEC GRAV UA  >=1 030   PH UA  6 0   GLUCOSE UA mg/dl Negative   KETONES UA mg/dl Negative   BLOOD UA  Trace-Intact*   PROTEIN UA mg/dl Negative   NITRITE UA  Negative   BILIRUBIN UA  Negative   UROBILINOGEN UA E U /dl 0 2   LEUKOCYTES UA  Negative   WBC UA /hpf 0-1   RBC UA /hpf None Seen   BACTERIA UA /hpf Occasional   EPITHELIAL CELLS WET PREP /hpf None Seen       ED Treatment:   Medication Administration from 05/02/2023 0929 to 05/02/2023 1744       Date/Time Order Dose Route Action     05/02/2023 1120 EDT sodium chloride 0 9 % bolus 1,000 mL 1,000 mL Intravenous New Bag     05/02/2023 1128 EDT magnesium sulfate 2 g/50 mL IVPB (premix) 2 g 2 g Intravenous New Bag     05/02/2023 1123 EDT methylPREDNISolone sodium succinate (Solu-MEDROL) injection 60 mg 60 mg Intravenous Given     05/02/2023 1123 EDT ketorolac (TORADOL) injection 15 mg 15 mg Intravenous Given     05/02/2023 1122 EDT Famotidine (PF) (PEPCID) injection 20 mg 20 mg Intravenous Given     05/02/2023 1123 EDT lidocaine (LIDODERM) 5 % patch 2 patch 2 patch Topical Medication Applied     05/02/2023 1117 EDT morphine injection 4 mg 4 mg Intravenous Given          Admitting Diagnosis: Low back pain [M54 50]  Back pain [M54 9]  Hyponatremia [E87 1]  Degenerative disc disease, lumbar [M51 36]  Age/Sex: 69 y o  male  Admission Orders:  Scheduled Medications:  acetaminophen, 650 mg, Oral, Q6H JARRETT  enoxaparin, 40 mg, Subcutaneous, Daily  lidocaine, 1 patch, Topical, Daily  metoprolol, 5 mg, Intravenous, Once      Continuous IV Infusions:     PRN Meds:  acetaminophen, 650 mg, Oral, Q6H PRN  naloxone, 0 04 mg, Intravenous, Q1MIN PRN  ondansetron, 4 mg, Intravenous, Q4H PRN  oxyCODONE, 2 5 mg, Oral, Q4H PRN  oxyCODONE, 5 mg, Oral, Q4H PRN        None    Network Utilization Review Department  ATTENTION: Please call with any questions or concerns to [phone] and carefully listen to the prompts so that you are directed to the right person  All voicemails are confidential   Rogelio  all requests for admission clinical reviews, approved or denied determinations and any other requests to dedicated fax number below belonging to the campus where the patient is receiving treatment   List of dedicated fax numbers for the Facilities:  [address] DENIALS (Administrative/Medical Necessity) [phone]   [address] (Maternity/NICU/Pediatrics) [phone]   [address]  [address] Medical [place] [address] [place] [address] [phone] Jill Haywood 37 [address] [place] [address] [phone]

## 2023-05-03 NOTE — CASE MANAGEMENT
Case Management Assessment & Discharge Planning Note    Patient name Brittany Gillespie  Location [address] [place] 420-* MRN [id number]  : 1945 Date 5/3/2023       Current Admission Date: 2023  Current Admission Diagnosis:Hyponatremia   Patient Active Problem List    Diagnosis Date Noted    Acute low back pain without sciatica 2023    Elevated blood pressure reading 2023    Hyponatremia 2023    Mixed hyperlipidemia 2021    Abnormal glucose 2021    BPH with obstruction/lower urinary tract symptoms 2021    Primary osteoarthritis of right hand 2017      LOS (days): 0  Geometric Mean LOS (GMLOS) (days):   Days to GMLOS:     OBJECTIVE:        Current admission status: Observation       [facility] Pharmacy[facility]   [facility] [address] [facility] [facility] [address]  Phone: [phone] Fax: [phone]    Primary Care Provider: No primary care provider on file  Primary Insurance: [address] [place] [address],[address] [facility]  Secondary Insurance:     ASSESSMENT:  [address] [facility], [address] [place] - Spouse   Primary Phone: [phone] (Mobile)               Advance Directives  Does patient have a [address]?: Yes  Does patient have Advance Directives?: Yes  Advance Directives: Power of  for health care  Primary Contact: Patient's spouse Iesha Rae Notice Signed: 23    Readmission Root Cause  30 Day Readmission: No    Patient Information  Admitted from[de-identified] Home  Mental Status: Alert  During Assessment patient was accompanied by: Not accompanied during assessment  Assessment information provided by[de-identified] Patient  Primary Caregiver: Self  Support Systems: Self, Spouse/significant other, Family members  [place] of Residence: [address] [place] [address] do you live in?: [address] entry access options   Select all that apply : Stairs  Number of steps to enter home : 2  Do the steps have railings?: No  Type of Current Residence: 2 story home  In the last 12 months, was there a time when you were not able to pay the mortgage or rent on time?: No  In the last 12 months, was there a time when you did not have a steady place to sleep or slept in a shelter (including now)?: No  Homeless/housing insecurity resource given?: No  Living Arrangements: Lives w/ Spouse/significant other    Activities of Daily Living Prior to Admission  Functional Status: Independent  Completes ADLs independently?: Yes  Ambulates independently?: Yes  Does patient use assisted devices?: No  Does patient currently own DME?: No  Does patient have a history of Outpatient Therapy (PT/OT)?: No  Does the patient have a history of Short-Term Rehab?: No  Does patient have a history of HHC?: No  Does patient currently have [employer] ?: No    Patient Information Continued  Income Source: Pension/alf  Does patient have prescription coverage?: Yes  Food insecurity resource given?: No  Does patient receive dialysis treatments?: No  Does patient have a history of substance abuse?: No  Does patient have a history of Mental Health Diagnosis?: No    PHQ 2/9 Screening   Reviewed PHQ 2/9 Depression Screening Score?: No    Means of Transportation  Means of Transport to Appts[de-identified] Drives Self  In the past 12 months, has lack of transportation kept you from medical appointments or from getting medications?: No  In the past 12 months, has lack of transportation kept you from meetings, work, or from getting things needed for daily living?: No  Was application for public transport provided?: No    DISCHARGE DETAILS:    Discharge planning discussed with[de-identified] patient  Freedom of Choice: Yes        Were Treatment Team discharge recommendations reviewed with patient/caregiver?: Yes  Did patient/caregiver verbalize understanding of patient care needs?: Yes  Were patient/caregiver advised of the risks associated with not following Treatment Team discharge recommendations?: Yes    Requested [address]         Is the patient interested in [facility] at discharge?: No    DME Referral Provided  Referral made for DME?: No    Other Referral/Resources/Interventions Provided:  Referral Comments: No referrals at this time, PT/OT evaluation pending    Treatment Team Recommendation: Home  Discharge Destination Plan[de-identified] Home  Transport at Discharge : Family     CM spoke with patient at the bedside  CM introduced self and role  Patient lives with spouse in a two level home in [place]  Patient independent at baseline with ADLS  No DME needs noted  Patient retired and currently drives  No fall history  Patient's spouse is his Merly Aparicio  Patient uses [facility] for prescriptions and has prescription insurance  PT/OT evaluation pending  CM will f/u with PT/OT recommendations     CM reviewed discharge planning process including the following: identifying caregivers at home, preference for d/c planning needs,   availability of [facility] to [facility] program, availability of treatment team to discuss questions or concerns patient and/or family may have regarding diagnosis, plan of care, old or new medications and discharge planning   CM will continue to follow for care coordination and update assessment as appropriate

## 2023-05-03 NOTE — CASE MANAGEMENT
Case Management Progress Note    Patient name Jyotsna Grider  Location [address] [place] 300-* MRN [id number]  : 1945 Date 5/3/2023       LOS (days): 0  Geometric Mean LOS (GMLOS) (days):   Days to GMLOS:        OBJECTIVE:      Current admission status: Observation  Preferred Pharmacy:   [address]  [address] [place] [address]  Phone: [phone] Fax: [phone]    Primary Care Provider: No primary care provider on file  Primary Insurance: [address] [place] [address],[address] [facility]  Secondary Insurance:     PROGRESS NOTE:    SW was asked by attending to check on cost of Eliquis prescription for patient  TYREL spoke with the pharmacist at [facility] pharmacy and was told that the cost for patient will be $25 00   30-day free trial coupon was provided to patient and attending notified

## 2023-05-09 ENCOUNTER — RA CDI HCC (OUTPATIENT)
Dept: OTHER | Facility: HOSPITAL | Age: 78
End: 2023-05-09

## 2023-05-09 NOTE — PROGRESS NOTES
[facility]  coding opportunities       Chart reviewed, no opportunity found:   [address]        Patients Insurance     Medicare Insurance: [employer]

## 2023-05-11 ENCOUNTER — OFFICE VISIT (OUTPATIENT)
Dept: FAMILY MEDICINE CLINIC | Facility: CLINIC | Age: 78
End: 2023-05-11

## 2023-05-11 VITALS
BODY MASS INDEX: 21.52 KG/M2 | SYSTOLIC BLOOD PRESSURE: 150 MMHG | OXYGEN SATURATION: 98 % | HEIGHT: 68 IN | DIASTOLIC BLOOD PRESSURE: 48 MMHG | TEMPERATURE: 97.6 F | RESPIRATION RATE: 16 BRPM | WEIGHT: 142 LBS | HEART RATE: 80 BPM

## 2023-05-11 DIAGNOSIS — M54.50 ACUTE MIDLINE LOW BACK PAIN WITHOUT SCIATICA: ICD-10-CM

## 2023-05-11 DIAGNOSIS — Z76.89 ESTABLISHING CARE WITH NEW DOCTOR, ENCOUNTER FOR: Primary | ICD-10-CM

## 2023-05-11 DIAGNOSIS — R31.9 HEMATURIA, UNSPECIFIED TYPE: ICD-10-CM

## 2023-05-11 DIAGNOSIS — D72.829 LEUKOCYTOSIS, UNSPECIFIED TYPE: ICD-10-CM

## 2023-05-11 DIAGNOSIS — R73.09 ABNORMAL GLUCOSE: ICD-10-CM

## 2023-05-11 DIAGNOSIS — E78.2 MIXED HYPERLIPIDEMIA: ICD-10-CM

## 2023-05-11 DIAGNOSIS — Z12.5 ENCOUNTER FOR PROSTATE CANCER SCREENING: ICD-10-CM

## 2023-05-11 DIAGNOSIS — I10 PRIMARY HYPERTENSION: ICD-10-CM

## 2023-05-11 DIAGNOSIS — I48.91 ATRIAL FIBRILLATION, UNSPECIFIED TYPE (HCC): ICD-10-CM

## 2023-05-11 DIAGNOSIS — E87.1 HYPONATREMIA: ICD-10-CM

## 2023-05-11 LAB
SL AMB  POCT GLUCOSE, UA: NORMAL
SL AMB LEUKOCYTE ESTERASE,UA: 25
SL AMB POCT BILIRUBIN,UA: ABNORMAL
SL AMB POCT BLOOD,UA: 50
SL AMB POCT CLARITY,UA: CLEAR
SL AMB POCT COLOR,UA: YELLOW
SL AMB POCT KETONES,UA: ABNORMAL
SL AMB POCT NITRITE,UA: ABNORMAL
SL AMB POCT PH,UA: 7
SL AMB POCT SPECIFIC GRAVITY,UA: 1.01
SL AMB POCT URINE PROTEIN: ABNORMAL
SL AMB POCT UROBILINOGEN: NORMAL

## 2023-05-11 RX ORDER — KETOROLAC TROMETHAMINE 30 MG/ML
30 INJECTION, SOLUTION INTRAMUSCULAR; INTRAVENOUS ONCE
Status: COMPLETED | OUTPATIENT
Start: 2023-05-11 | End: 2023-05-11

## 2023-05-11 RX ORDER — DEXAMETHASONE SODIUM PHOSPHATE 4 MG/ML
4 INJECTION, SOLUTION INTRA-ARTICULAR; INTRALESIONAL; INTRAMUSCULAR; INTRAVENOUS; SOFT TISSUE ONCE
Status: COMPLETED | OUTPATIENT
Start: 2023-05-11 | End: 2023-05-11

## 2023-05-11 RX ORDER — PREDNISONE 20 MG/1
TABLET ORAL
Qty: 14 TABLET | Refills: 0 | Status: SHIPPED | OUTPATIENT
Start: 2023-05-11 | End: 2023-05-23

## 2023-05-11 RX ADMIN — KETOROLAC TROMETHAMINE 30 MG: 30 INJECTION, SOLUTION INTRAMUSCULAR; INTRAVENOUS at 12:47

## 2023-05-11 RX ADMIN — DEXAMETHASONE SODIUM PHOSPHATE 4 MG: 4 INJECTION, SOLUTION INTRA-ARTICULAR; INTRALESIONAL; INTRAMUSCULAR; INTRAVENOUS; SOFT TISSUE at 12:46

## 2023-05-11 NOTE — PROGRESS NOTES
Name: Adan Radford      : 1945      MRN: [id number]  Encounter Provider: Joceline Tran MD  Encounter Date: 2023   Encounter department: [address]     1  Establishing care with new doctor, encounter for    2  Atrial fibrillation, unspecified type ([facility] )  Assessment & Plan:  STABLE  ON ELIQUIS          Orders:  -     CBC and differential  -     Comprehensive metabolic panel    3  Mixed hyperlipidemia  Assessment & Plan:  WATCHING CHOLESTEROL INTAKE  COMPLIANT WITH MEDICATION  NO CONCERNS    - CONTINUE TO MONITOR DIETARY CHOL INTAKE  - CONTINUE CURRENT MEDICATION  - ENCOURAGED PHYSICAL ACTIVITY        Orders:  -     Lipid panel    4  Primary hypertension  Assessment & Plan:  STABLE  DENIES ANY CP, SOB, PALPITATIONS, OR HEADACHE  NOTES NO WATER RETENTION  COMPLIANT WITH MEDICATION  NO CONCERNS    - CONTINUE CURRENT TREATMENT PLAN  - MONITOR DIETARY SODIUM INTAKE  - ENCOURAGE PHYSICAL ACTIVITY  - RV 3 MONTHS        5  Hyponatremia    6  Leukocytosis, unspecified type  -     CBC and differential  -     Sedimentation rate, automated  -     C-reactive protein    7  Acute midline low back pain without sciatica  -     dexamethasone (DECADRON) injection 4 mg  -     ketorolac (TORADOL) injection 30 mg  -     predniSONE 20 mg tablet; 2 PO QD X 4 DAYS, THEN 1 PO QD X 4 DAYS, THEN 1/2 PO QD X 4 DAYS  -     POCT urine dip auto non-scope    8  Abnormal glucose  -     Hemoglobin A1c (w/out EAG)    9  Encounter for prostate cancer screening  -     PSA, Total Screen    10  Hematuria, unspecified type  -     Urine culture        Depression Screening and Follow-up Plan: Patient was screened for depression during today's encounter  They screened negative with a PHQ-2 score of 0          Subjective      PATIENT IS S/P [facility] ADMISSION    REVIEWED HOSPITAL COURSE    REVIEWED CURRENT ACTIVE MEDICAL ISSUES      CONTINUES TO COMPLAIN OF LOW BACK PAIN  NO RADIATION  SEEING PAIN MANAGEMENT ON MONDAY    Review of Systems   Constitutional: Negative for chills, fatigue and fever  HENT: Negative for congestion, ear discharge, ear pain, mouth sores, postnasal drip, sore throat and trouble swallowing  Eyes: Negative for pain, discharge and visual disturbance  Respiratory: Negative for cough, shortness of breath and wheezing  Cardiovascular: Negative for chest pain, palpitations and leg swelling  Gastrointestinal: Negative for abdominal distention, abdominal pain, blood in stool, diarrhea and nausea  Endocrine: Negative for polydipsia, polyphagia and polyuria  Genitourinary: Negative for dysuria, frequency, hematuria and urgency  Musculoskeletal: Positive for arthralgias and back pain  Negative for gait problem and joint swelling  Skin: Negative for pallor and rash  Neurological: Negative for dizziness, syncope, speech difficulty, weakness, light-headedness, numbness and headaches  Hematological: Negative for adenopathy  Psychiatric/Behavioral: Negative for behavioral problems, confusion and sleep disturbance  The patient is not nervous/anxious          Current Outpatient Medications on File Prior to Visit   Medication Sig   • acetaminophen (TYLENOL) 325 mg tablet Take 2 tablets (650 mg total) by mouth every 6 (six) hours as needed for mild pain   • lidocaine (Lidoderm) 5 % Apply 2 patches topically over 12 hours daily Remove & Discard patch within 12 hours or as directed by MD (Patient taking differently: Apply 2 patches topically daily as needed Remove & Discard patch within 12 hours or as directed by MD)   • metoprolol tartrate (LOPRESSOR) 25 mg tablet Take 0 5 tablets (12 5 mg total) by mouth every 12 (twelve) hours   • Multiple Vitamins-Minerals (PRESERVISION AREDS PO) Take by mouth in the morning   • polyethylene glycol (MIRALAX) 17 g packet Take 17 g by mouth daily as needed (Constipation)   • apixaban (Eliquis) 5 mg Take 1 tablet (5 mg total) "by mouth 2 (two) times a day (Patient not taking: Reported on 5/11/2023)       Objective     BP (!) 150/48 (BP Location: Left arm, Patient Position: Sitting, Cuff Size: Large)   Pulse 80   Temp 97 6 °F (36 4 °C) (Temporal)   Resp 16   Ht 5' 8\" (1 727 m)   Wt 64 4 kg (142 lb)   SpO2 98%   BMI 21 59 kg/m²     Physical Exam  Constitutional:       General: He is not in acute distress  Appearance: Normal appearance  He is well-developed and normal weight  He is not ill-appearing  HENT:      Head: Normocephalic and atraumatic  Nose: Nose normal    Eyes:      General:         Right eye: No discharge  Left eye: No discharge  Conjunctiva/sclera: Conjunctivae normal       Pupils: Pupils are equal, round, and reactive to light  Neck:      Thyroid: No thyromegaly  Vascular: No JVD  Cardiovascular:      Rate and Rhythm: Normal rate and regular rhythm  Heart sounds: Murmur heard  Pulmonary:      Effort: Pulmonary effort is normal       Breath sounds: Normal breath sounds  No wheezing or rales  Comments: MINIMAL BIBASILAR CRACKLES  Abdominal:      General: Bowel sounds are normal       Palpations: Abdomen is soft  There is no mass  Tenderness: There is no abdominal tenderness  There is no guarding or rebound  Musculoskeletal:         General: Tenderness present  No deformity  Cervical back: Neck supple  Comments: MODERATE DJD CHANGES  MILD LOW BACK DISCOMFORT ON PALPATION   Lymphadenopathy:      Cervical: No cervical adenopathy  Skin:     General: Skin is warm and dry  Findings: No erythema or rash  Neurological:      General: No focal deficit present  Mental Status: He is alert and oriented to person, place, and time  Psychiatric:         Mood and Affect: Mood normal          Behavior: Behavior normal          Thought Content:  Thought content normal          Judgment: Judgment normal        Erika Godwin MD  "

## 2023-05-11 NOTE — PATIENT INSTRUCTIONS
CONTINUE CURRENT TREATMENT PLAN  MONITOR DIETARY SODIUM, CHOL INTAKE  ENCOURAGE PHYSICAL ACTIVITY    RV 2 WEEKS FOR RE CHECK    BW TODAY  Pt is due for an AWV

## 2023-05-13 PROBLEM — M54.50 ACUTE LOW BACK PAIN WITHOUT SCIATICA: Status: RESOLVED | Noted: 2023-05-03 | Resolved: 2023-05-13

## 2023-05-13 LAB
ALBUMIN SERPL-MCNC: 3.7 G/DL (ref 3.6–5.1)
ALBUMIN/GLOB SERPL: 1.5 (CALC) (ref 1–2.5)
ALP SERPL-CCNC: 58 U/L (ref 35–144)
ALT SERPL-CCNC: 11 U/L (ref 9–46)
AST SERPL-CCNC: 12 U/L (ref 10–35)
BASOPHILS # BLD AUTO: 47 CELLS/UL (ref 0–200)
BASOPHILS NFR BLD AUTO: 0.3 %
BILIRUB SERPL-MCNC: 0.4 MG/DL (ref 0.2–1.2)
BUN SERPL-MCNC: 13 MG/DL (ref 7–25)
BUN/CREAT SERPL: ABNORMAL (CALC) (ref 6–22)
CALCIUM SERPL-MCNC: 9.2 MG/DL (ref 8.6–10.3)
CHLORIDE SERPL-SCNC: 96 MMOL/L (ref 98–110)
CHOLEST SERPL-MCNC: 149 MG/DL
CHOLEST/HDLC SERPL: 2.1 (CALC)
CO2 SERPL-SCNC: 28 MMOL/L (ref 20–32)
CREAT SERPL-MCNC: 0.71 MG/DL (ref 0.7–1.28)
CRP SERPL-MCNC: 58.4 MG/L
EOSINOPHIL # BLD AUTO: 31 CELLS/UL (ref 15–500)
EOSINOPHIL NFR BLD AUTO: 0.2 %
ERYTHROCYTE [DISTWIDTH] IN BLOOD BY AUTOMATED COUNT: 12.9 % (ref 11–15)
ERYTHROCYTE [SEDIMENTATION RATE] IN BLOOD BY WESTERGREN METHOD: 6 MM/H
GFR/BSA.PRED SERPLBLD CYS-BASED-ARV: 94 ML/MIN/1.73M2
GLOBULIN SER CALC-MCNC: 2.4 G/DL (CALC) (ref 1.9–3.7)
GLUCOSE SERPL-MCNC: 108 MG/DL (ref 65–99)
HBA1C MFR BLD: 5.8 % OF TOTAL HGB
HCT VFR BLD AUTO: 33.5 % (ref 38.5–50)
HDLC SERPL-MCNC: 72 MG/DL
HGB BLD-MCNC: 11.2 G/DL (ref 13.2–17.1)
LDLC SERPL CALC-MCNC: 63 MG/DL (CALC)
LYMPHOCYTES # BLD AUTO: 577 CELLS/UL (ref 850–3900)
LYMPHOCYTES NFR BLD AUTO: 3.7 %
MCH RBC QN AUTO: 29.6 PG (ref 27–33)
MCHC RBC AUTO-ENTMCNC: 33.4 G/DL (ref 32–36)
MCV RBC AUTO: 88.6 FL (ref 80–100)
MONOCYTES # BLD AUTO: 733 CELLS/UL (ref 200–950)
MONOCYTES NFR BLD AUTO: 4.7 %
NEUTROPHILS # BLD AUTO: ABNORMAL CELLS/UL (ref 1500–7800)
NEUTROPHILS NFR BLD AUTO: 91.1 %
NONHDLC SERPL-MCNC: 77 MG/DL (CALC)
PLATELET # BLD AUTO: 383 THOUSAND/UL (ref 140–400)
PMV BLD REES-ECKER: 9.4 FL (ref 7.5–12.5)
POTASSIUM SERPL-SCNC: 4.1 MMOL/L (ref 3.5–5.3)
PROT SERPL-MCNC: 6.1 G/DL (ref 6.1–8.1)
RBC # BLD AUTO: 3.78 MILLION/UL (ref 4.2–5.8)
SODIUM SERPL-SCNC: 132 MMOL/L (ref 135–146)
TRIGL SERPL-MCNC: 68 MG/DL
WBC # BLD AUTO: 15.6 THOUSAND/UL (ref 3.8–10.8)

## 2023-05-13 NOTE — ASSESSMENT & PLAN NOTE
Noted episode of A-fib during hospitalization, self converted  Seen by cardiology  Started on metoprolol and Eliquis  Follow-up with cardiology after discharge

## 2023-05-13 NOTE — ASSESSMENT & PLAN NOTE
Echocardiogram revealed evidence of moderate to severe aortic stenosis  Seen by cardiology, input appreciated  Outpatient follow-up

## 2023-05-15 ENCOUNTER — CONSULT (OUTPATIENT)
Dept: PAIN MEDICINE | Facility: CLINIC | Age: 78
End: 2023-05-15

## 2023-05-15 ENCOUNTER — TELEPHONE (OUTPATIENT)
Dept: PAIN MEDICINE | Facility: CLINIC | Age: 78
End: 2023-05-15

## 2023-05-15 VITALS
HEART RATE: 70 BPM | HEIGHT: 68 IN | DIASTOLIC BLOOD PRESSURE: 48 MMHG | SYSTOLIC BLOOD PRESSURE: 161 MMHG | BODY MASS INDEX: 22.91 KG/M2 | WEIGHT: 151.2 LBS

## 2023-05-15 DIAGNOSIS — G89.29 CHRONIC BILATERAL LOW BACK PAIN WITHOUT SCIATICA: ICD-10-CM

## 2023-05-15 DIAGNOSIS — M54.50 CHRONIC BILATERAL LOW BACK PAIN WITHOUT SCIATICA: ICD-10-CM

## 2023-05-15 DIAGNOSIS — G89.4 CHRONIC PAIN SYNDROME: Primary | ICD-10-CM

## 2023-05-15 DIAGNOSIS — M47.816 LUMBAR SPONDYLOSIS: ICD-10-CM

## 2023-05-15 NOTE — TELEPHONE ENCOUNTER
Schedule patient for MBB on 6/2/23 & 6/16/23  No as needed pain meds for 6 hrs before and 6/8 hrs after procedure  Pain level must be 5 or greater  Need to arrange transportation  Proper clothing for procedure  If ill or placed on antibiotics please call to reschedule

## 2023-05-15 NOTE — PATIENT INSTRUCTIONS
"Medial Branch Blocks     What are the facet joints? The facet joints are the articulations or connections between the vertebrae in the spine  They are like any other joint in the body like the knee or elbow that enable the bending or twisting movements of the spine  The facet joints help support the weight and control movement between individual vertebrae  The facet joints can get inflamed secondary to injury or arthritis and cause pain and stiffness  To help determine whether the facet joints are a source of pain, it is necessary to perform a nerve block along the nerves that supply sensation to your joints  Facet joints are innervated or \"supplied\" by nerves called medial branches  These nerves carried the pain signals to the spinal cord and the signals eventually reach the brain, where the pain is noticed  If the nerves were \"blocked\" or \"numbed\", they will not be able to carry pain sensation to the spinal cord for the short term  Therefore, if the pain is due to facet joint arthritis, you should have relief from pain and stiffness  This is a diagnostic procedure  You will be given a pain diary to take home for the next 24 hours  If this does provide short-term relief, this procedure may be repeated to confirm diagnosis  Once it is determined that the pain is indeed due to the facet joint disease, we can use a procedure called radiofrequency ablation and prevent the conduction of pain information for an extended period of time as discussed by your physician  What happens during the procedure? Lying on your stomach or side, the skin over year neck, midback or low back will be well cleaned  The physician will numb a small area of skin with numbing medicine which may sting for a few seconds  The physician will use x-ray guidance to direct a special needle along side the targeted medial branch nerves  Then a small amount of contrast dye may be injected confirming proper placement    Then a " nerve small amount of local anesthetic will be injected over the nerve  What happens after the procedure? Your arm or chest wall or leg may temporarily feel numb or weak from the anesthetic  A dressing may be applied to the injection site  Your will remain for about 15 to 20 minutes and the nurse will monitor blood pressure and pulse  The nurse will review your discharge instructions and will be able to go home  A pain diary will be provided for you to keep a record over the next 24 hours  Over that time, you should perform activities that normally cause you pain (within reason)  This will help us determine the success of the injection  After you have finished with the diary, please mail, fax or drop off the completed diary and we will be in contact with you

## 2023-05-15 NOTE — PROGRESS NOTES
Assessment  1  Chronic pain syndrome    2  Chronic bilateral low back pain without sciatica    3  Lumbar spondylosis        Plan  Given that the patient has signs and symptoms of low back pain secondary to lumbar spondylosis, I discussed the rationale of undergoing bilateral L4, L5, and S1 diagnostic and confirmatory medial branch blocks  The procedures, its risks, and benefits were explained in detail to the patient  Risks include but are not limited to bleeding, infection, hematoma formation, abscess formation, weakness, headache, failure the pain to improve, nerve irritation or damage, and potential worsening of the pain  The patient verbalized understanding and wished to proceed with the procedure  If the patient responds favorably to the medial branch blocks, I will then have him proceed with lumbar facet radiofrequency ablations  My impressions and treatment recommendations were discussed in detail with the patient who verbalized understanding and had no further questions  Discharge instructions were provided  I personally saw and examined the patient and I agree with the above discussed plan of care  History of Present Illness    Binu Garcia is a 69 y o  male who presents to [place] [address] [facility] for evaluation of his current pain complaints  He has a past medical and chronic pain history as outlined in the impression section  He was referred by Dr Romulo Garcia  At today's office visit, the patient's pain score is severe and 5-10 out of 10 on the verbal numerical pain rating scale  His pain is nearly constant in nature and worse in the evening  He describes his pain as cramping and sharp  He reports weakness in his lower extremities  He does not ambulate with any assistive devices  He states that his symptoms are primarily in his low back and nonradiating in nature  Lying down increases pain  Coughing increases pain  There are no pain relieving factors    Heat treatment provides moderate pain relief  Smokes marijuana once in a while  I have personally reviewed and/or updated the patient's past medical history, past surgical history, family history, social history, current medications, allergies, and vital signs today  Review of Systems   Constitutional: Positive for fever  Negative for unexpected weight change  HENT: Negative for trouble swallowing  Eyes: Negative for visual disturbance  Respiratory: Positive for cough  Negative for shortness of breath and wheezing  Cardiovascular: Negative for chest pain and palpitations  Gastrointestinal: Negative for constipation, diarrhea, nausea and vomiting  Endocrine: Negative for cold intolerance, heat intolerance and polydipsia  Genitourinary: Negative for difficulty urinating and frequency  Musculoskeletal: Negative for arthralgias, gait problem, joint swelling and myalgias  Skin: Negative for rash  Neurological: Negative for dizziness, seizures, syncope, weakness and headaches  Hematological: Does not bruise/bleed easily  Psychiatric/Behavioral: Negative for dysphoric mood  All other systems reviewed and are negative  Patient Active Problem List   Diagnosis   • Primary osteoarthritis of right hand   • Mixed hyperlipidemia   • Abnormal glucose   • BPH with obstruction/lower urinary tract symptoms   • Hyponatremia   • Chronic bilateral low back pain without sciatica   • Primary hypertension   • Atrial fibrillation (HCC)   • Aortic stenosis   • Leukocytosis   • Elevated D-dimer   • Chronic pain syndrome   • Lumbar spondylosis       History reviewed  No pertinent past medical history      Past Surgical History:   Procedure Laterality Date   • SKIN BIOPSY         Family History   Problem Relation Age of Onset   • Colon cancer Mother    • Lung cancer Father    • Mental illness Neg Hx        Social History     Occupational History   • Not on file   Tobacco Use   • Smoking status: Former "Types: Cigarettes     Quit date: 3/11/2005     Years since quittin 1   • Smokeless tobacco: Never   Vaping Use   • Vaping Use: Never used   Substance and Sexual Activity   • Alcohol use: Not Currently   • Drug use: Not Currently   • Sexual activity: Not on file       Current Outpatient Medications on File Prior to Visit   Medication Sig   • acetaminophen (TYLENOL) 325 mg tablet Take 2 tablets (650 mg total) by mouth every 6 (six) hours as needed for mild pain   • lidocaine (Lidoderm) 5 % Apply 2 patches topically over 12 hours daily Remove & Discard patch within 12 hours or as directed by MD (Patient taking differently: Apply 2 patches topically daily as needed Remove & Discard patch within 12 hours or as directed by MD)   • metoprolol tartrate (LOPRESSOR) 25 mg tablet Take 0 5 tablets (12 5 mg total) by mouth every 12 (twelve) hours   • Multiple Vitamins-Minerals (PRESERVISION AREDS PO) Take by mouth in the morning   • polyethylene glycol (MIRALAX) 17 g packet Take 17 g by mouth daily as needed (Constipation)   • predniSONE 20 mg tablet 2 PO QD X 4 DAYS, THEN 1 PO QD X 4 DAYS, THEN 1/2 PO QD X 4 DAYS   • apixaban (Eliquis) 5 mg Take 1 tablet (5 mg total) by mouth 2 (two) times a day (Patient not taking: Reported on 2023)     No current facility-administered medications on file prior to visit  No Known Allergies    Physical Exam    BP (!) 161/48   Pulse 70   Ht 5' 8\" (1 727 m)   Wt 68 6 kg (151 lb 3 2 oz)   BMI 22 99 kg/m²     Constitutional: normal, well developed, well nourished, alert, in no distress and non-toxic and no overt pain behavior    Eyes: anicteric  HEENT: grossly intact  Neck: supple, symmetric, trachea midline and no masses   Pulmonary:even and unlabored  Cardiovascular:No edema or pitting edema present  Skin:Normal without rashes or lesions and well hydrated  Psychiatric:Mood and affect appropriate  Neurologic:Cranial Nerves II-XII grossly intact  Musculoskeletal:normal     Lumbar " Spine Exam    Appearance:  Normal lordosis  Palpation/Tenderness:  no tenderness or spasm  Sensory:  no sensory deficits noted  Range of Motion:  Flexion:  No limitation  without pain  Extension:  Moderately limited  with pain  Lateral Flexion - Left:  Moderately limited  with pain  Lateral Flexion - Right:  Moderately limited  with pain  Rotation - Left:  Moderately limited  with pain  Rotation - Right:  Moderately limited  with pain   Lumbar facet loading is positive bilaterally  Motor Strength:  Left hip flexion:  5/5  Left hip extension:  5/5  Right hip flexion:  5/5  Right hip extension:  5/5  Left knee flexion:  5/5  Left knee extension:  5/5  Right knee flexion:  5/5  Right knee extension:  5/5  Left foot dorsiflexion:  5/5  Left foot plantar flexion:  5/5  Right foot dorsiflexion:  5/5  Right foot plantar flexion:  5/5  Reflexes:  Left Patellar:  2+   Right Patellar:  2+   Left Achilles:  2+   Right Achilles:  2+   Special Tests:  Left Straight Leg Test:  negative  Right Straight Leg Test:  negative  Left Gurmeet's Maneuver:  negative  Right Gurmeet's Maneuver:  negative    Imaging      PACS Images     Show images for CT recon only lumbar spine  Study Result    Narrative & Impression   CT RECON ONLY LUMBAR SPINE (NO CHARGE)     INDICATION:   low back pain      COMPARISON:  None     TECHNIQUE: Axial CT examination of the lumbar spine was obtained utilizing reconstructed images from CT of the chest, abdomen and pelvis performed the same day  Images were reformatted in the sagittal and coronal planes      This examination, like all CT scans performed in the [facility], was performed utilizing techniques to minimize radiation dose exposure, including the use of iterative reconstruction and automated exposure control      FINDINGS:     ALIGNMENT: There is retrolisthesis of L1-L2  There is mild anterolisthesis of L4-L5  Mild levoscoliosis      VERTEBRAE: Vertebral body heights are maintained   No "acute fractures are identified      DEGENERATIVE CHANGES:     L1-L2: There is a bulging annulus with dorsal osteophytosis  There is facet arthrosis  There is mild foraminal narrowing      L2-L3: There is disc base narrowing  There is a bulging annulus  There is dorsal marginal osteophytosis  There is facet arthrosis  There is mild canal stenosis  There is mild to moderate right and mild left foraminal narrowing      L3-L4: There is a bulging annulus  There is facet arthrosis  There is mild canal stenosis  There is mild to moderate right and mild left foraminal narrowing      L4-L5: There is uncovering the disc space  There is mild to moderate canal stenosis  There is facet arthrosis  There is moderate left and mild to moderate right foraminal narrowing      L5-S1: There is facet arthrosis  There is no significant canal stenosis  There is mild foraminal narrowing      PREVERTEBRAL AND PARASPINAL SOFT TISSUES: Normal      IMPRESSION:     No acute fractures  Degenerative changes of the lumbar spine, as described above      Correlate with separate CT of the abdomen and pelvis for additional findings            Workstation performed: [id number]        PACS Images     Show images for CT abdomen pelvis with contrast  Study Result    Narrative & Impression   CT ABDOMEN AND PELVIS WITH IV CONTRAST     INDICATION:   back pain  \"Lower back pain since Sunday, states he couldn't stand up to get out of bed  States no injury to the area\"     COMPARISON:  None      TECHNIQUE:  CT examination of the abdomen and pelvis was performed  Multiplanar 2D reformatted images were created from the source data      Radiation dose length product (DLP) for this visit:  407 mGy-cm     This examination, like all CT scans performed in the [facility], was performed utilizing techniques to minimize radiation dose exposure, including the use of iterative   reconstruction and automated exposure control      IV Contrast:  100 mL of " iohexol (OMNIPAQUE)  Enteric Contrast:  Enteric contrast was not administered      FINDINGS:     ABDOMEN     LOWER CHEST:  No clinically significant abnormality identified in the visualized lower chest      LIVER/BILIARY TREE:  Unremarkable      GALLBLADDER: There are gallstone(s) within the gallbladder, without pericholecystic inflammatory changes      SPLEEN:  Unremarkable      PANCREAS:  Unremarkable      ADRENAL GLANDS:  Unremarkable      KIDNEYS/URETERS:  Unremarkable  No hydronephrosis      STOMACH AND BOWEL:  Unremarkable      APPENDIX:  No findings to suggest appendicitis      ABDOMINOPELVIC CAVITY:  No ascites  No pneumoperitoneum  No lymphadenopathy      VESSELS:  Unremarkable for patient's age      PELVIS     REPRODUCTIVE ORGANS:  Unremarkable for patient's age      URINARY BLADDER:  Unremarkable      ABDOMINAL WALL/INGUINAL REGIONS:  Unremarkable      OSSEOUS STRUCTURES:  No acute fracture or destructive osseous lesion  Spinal degenerative changes are noted      IMPRESSION:     No acute findings in the abdomen or the pelvis      Spinal degenerative changes likely account for this patient's back pain         Workstation performed: [id number]     PACS Images     Show images for DXA bone density spine hip and pelvis  Study Result    Narrative & Impression   CENTRAL  DXA SCAN     CLINICAL HISTORY:   71year old  male with no significant past medical history  Previous smoking  Osteoporosis screening      TECHNIQUE: Bone densitometry was performed using a C-sam bone densitometer  Regions of interest appear properly placed  There are no obvious fractures or other confounding variables which could limit the study  Degenerative changes of the lumbar   spine and hip   This will falsely elevate the bone mineral densities in these regions       COMPARISON:  None      RESULTS:   LUMBAR SPINE:  L1 and L4:  BMD 1 396 gm/cm2  T-score 1 4  Z-score 2 0     LEFT TOTAL HIP:  BMD 1 162 gm/cm2  T-score 0 4  Z-score 1 2     LEFT FEMORAL NECK:  BMD 1 126 gm/cm2  T-score 0 4  Z-score 1 7     TOTAL RIGHT HIP:             BMD 1 0 93 gm/sq-cm,        T-score is -0 1        Z-score is 0 7                        FEMORAL NECK RIGHT HIP :       BMD 1 0 33 gm/sq-cm,      T-score is -0 3      Z-score is 1 0              IMPRESSION:  1  Based on the [facility] classification, this study is normal and the patient is considered at low risk for fracture  2  A daily intake of calcium of at least 1200 mg and vitamin D, 800-1000 IU, as well as weight bearing and muscle strengthening exercise, fall prevention and avoidance of tobacco and excessive alcohol intake as basic preventive measures are recommended  3  Repeat DXA scan on the same equipment in 18-24 months as clinically indicated         The 10 year risk of hip fracture is 1%, with the 10 year risk of major osteoporotic fracture being 5%, as calculated by the WHO fracture risk assessment tool (FRAX)    The current NOF guidelines recommend treating patients with FRAX 10 year risk score of   >3% for hip fracture and >20% for major osteoporotic fracture       WHO CLASSIFICATION:  Normal (a T-score of -1 0 or higher)  Low bone mineral density (a T-score of less than -1 0 but higher than -2 5)  Osteoporosis (a T-score of -2 5 or less)  Severe osteoporosis (a T-score of -2 5 or less with a fragility fracture)               Workstation performed: [id number]

## 2023-05-16 ENCOUNTER — APPOINTMENT (INPATIENT)
Dept: VASCULAR ULTRASOUND | Facility: HOSPITAL | Age: 78
End: 2023-05-16

## 2023-05-16 ENCOUNTER — APPOINTMENT (INPATIENT)
Dept: CT IMAGING | Facility: HOSPITAL | Age: 78
End: 2023-05-16

## 2023-05-16 ENCOUNTER — HOSPITAL ENCOUNTER (INPATIENT)
Facility: HOSPITAL | Age: 78
LOS: 7 days | Discharge: HOME | End: 2023-05-23
Attending: EMERGENCY MEDICINE | Admitting: INTERNAL MEDICINE

## 2023-05-16 ENCOUNTER — APPOINTMENT (EMERGENCY)
Dept: RADIOLOGY | Facility: HOSPITAL | Age: 78
End: 2023-05-16

## 2023-05-16 DIAGNOSIS — B95.5 BACTEREMIA DUE TO STREPTOCOCCUS: Primary | ICD-10-CM

## 2023-05-16 DIAGNOSIS — M54.50 CHRONIC BILATERAL LOW BACK PAIN WITHOUT SCIATICA: ICD-10-CM

## 2023-05-16 DIAGNOSIS — I33.0 ACUTE BACTERIAL ENDOCARDITIS: ICD-10-CM

## 2023-05-16 DIAGNOSIS — R78.81 BACTEREMIA: ICD-10-CM

## 2023-05-16 DIAGNOSIS — I50.9 CONGESTIVE HEART FAILURE, UNSPECIFIED HF CHRONICITY, UNSPECIFIED HEART FAILURE TYPE (HCC): ICD-10-CM

## 2023-05-16 DIAGNOSIS — K31.89 GASTRIC DISTENTION: ICD-10-CM

## 2023-05-16 DIAGNOSIS — M54.9 BACK PAIN: ICD-10-CM

## 2023-05-16 DIAGNOSIS — G89.29 CHRONIC BILATERAL LOW BACK PAIN WITHOUT SCIATICA: ICD-10-CM

## 2023-05-16 DIAGNOSIS — R78.81 BACTEREMIA DUE TO STREPTOCOCCUS: Primary | ICD-10-CM

## 2023-05-16 DIAGNOSIS — M46.46 DISCITIS OF LUMBAR REGION: ICD-10-CM

## 2023-05-16 DIAGNOSIS — M46.40 DISCITIS: ICD-10-CM

## 2023-05-16 DIAGNOSIS — I35.0 AORTIC STENOSIS: ICD-10-CM

## 2023-05-16 PROBLEM — R10.9 ABDOMINAL PAIN: Status: ACTIVE | Noted: 2023-05-16

## 2023-05-16 PROBLEM — R06.02 SHORTNESS OF BREATH: Status: ACTIVE | Noted: 2023-05-16

## 2023-05-16 PROBLEM — M79.89 LEG SWELLING: Status: ACTIVE | Noted: 2023-05-16

## 2023-05-16 PROBLEM — R65.10 SIRS (SYSTEMIC INFLAMMATORY RESPONSE SYNDROME) (HCC): Status: ACTIVE | Noted: 2023-05-16

## 2023-05-16 LAB
2HR DELTA HS TROPONIN: 17 NG/L
4HR DELTA HS TROPONIN: -31 NG/L
ALBUMIN SERPL BCP-MCNC: 3.4 G/DL (ref 3.5–5)
ALP SERPL-CCNC: 49 U/L (ref 34–104)
ALT SERPL W P-5'-P-CCNC: 13 U/L (ref 7–52)
ANION GAP SERPL CALCULATED.3IONS-SCNC: 7 MMOL/L (ref 4–13)
AST SERPL W P-5'-P-CCNC: 13 U/L (ref 13–39)
BASOPHILS # BLD AUTO: 0.03 THOUSANDS/ÂΜL (ref 0–0.1)
BASOPHILS NFR BLD AUTO: 0 % (ref 0–1)
BILIRUB SERPL-MCNC: 0.39 MG/DL (ref 0.2–1)
BILIRUB UR QL STRIP: NEGATIVE
BNP SERPL-MCNC: 838 PG/ML (ref 0–100)
BUN SERPL-MCNC: 19 MG/DL (ref 5–25)
CALCIUM ALBUM COR SERPL-MCNC: 9.3 MG/DL (ref 8.3–10.1)
CALCIUM SERPL-MCNC: 8.8 MG/DL (ref 8.4–10.2)
CARDIAC TROPONIN I PNL SERPL HS: 251 NG/L
CARDIAC TROPONIN I PNL SERPL HS: 299 NG/L
CARDIAC TROPONIN I PNL SERPL HS: 330 NG/L
CARDIAC TROPONIN I PNL SERPL HS: 347 NG/L
CHLORIDE SERPL-SCNC: 95 MMOL/L (ref 96–108)
CLARITY UR: CLEAR
CO2 SERPL-SCNC: 26 MMOL/L (ref 21–32)
COLOR UR: COLORLESS
CREAT SERPL-MCNC: 0.72 MG/DL (ref 0.6–1.3)
EOSINOPHIL # BLD AUTO: 0.1 THOUSAND/ÂΜL (ref 0–0.61)
EOSINOPHIL NFR BLD AUTO: 1 % (ref 0–6)
ERYTHROCYTE [DISTWIDTH] IN BLOOD BY AUTOMATED COUNT: 13.8 % (ref 11.6–15.1)
GFR SERPL CREATININE-BSD FRML MDRD: 89 ML/MIN/1.73SQ M
GLUCOSE SERPL-MCNC: 112 MG/DL (ref 65–140)
GLUCOSE UR STRIP-MCNC: NEGATIVE MG/DL
HCT VFR BLD AUTO: 32.1 % (ref 36.5–49.3)
HGB BLD-MCNC: 10.7 G/DL (ref 12–17)
HGB UR QL STRIP.AUTO: NEGATIVE
IMM GRANULOCYTES # BLD AUTO: 0.1 THOUSAND/UL (ref 0–0.2)
IMM GRANULOCYTES NFR BLD AUTO: 1 % (ref 0–2)
KETONES UR STRIP-MCNC: NEGATIVE MG/DL
LEUKOCYTE ESTERASE UR QL STRIP: NEGATIVE
LYMPHOCYTES # BLD AUTO: 1.08 THOUSANDS/ÂΜL (ref 0.6–4.47)
LYMPHOCYTES NFR BLD AUTO: 7 % (ref 14–44)
MCH RBC QN AUTO: 29.6 PG (ref 26.8–34.3)
MCHC RBC AUTO-ENTMCNC: 33.3 G/DL (ref 31.4–37.4)
MCV RBC AUTO: 89 FL (ref 82–98)
MONOCYTES # BLD AUTO: 0.98 THOUSAND/ÂΜL (ref 0.17–1.22)
MONOCYTES NFR BLD AUTO: 6 % (ref 4–12)
NEUTROPHILS # BLD AUTO: 14.34 THOUSANDS/ÂΜL (ref 1.85–7.62)
NEUTS SEG NFR BLD AUTO: 85 % (ref 43–75)
NITRITE UR QL STRIP: NEGATIVE
NRBC BLD AUTO-RTO: 0 /100 WBCS
PH UR STRIP.AUTO: 7 [PH]
PLATELET # BLD AUTO: 361 THOUSANDS/UL (ref 149–390)
PMV BLD AUTO: 8.7 FL (ref 8.9–12.7)
POTASSIUM SERPL-SCNC: 3.9 MMOL/L (ref 3.5–5.3)
PROT SERPL-MCNC: 6 G/DL (ref 6.4–8.4)
PROT UR STRIP-MCNC: NEGATIVE MG/DL
RBC # BLD AUTO: 3.61 MILLION/UL (ref 3.88–5.62)
SODIUM SERPL-SCNC: 128 MMOL/L (ref 135–147)
SP GR UR STRIP.AUTO: 1 (ref 1–1.03)
UROBILINOGEN UR STRIP-ACNC: <2 MG/DL
WBC # BLD AUTO: 16.63 THOUSAND/UL (ref 4.31–10.16)

## 2023-05-16 RX ORDER — ACETAMINOPHEN 325 MG/1
650 TABLET ORAL EVERY 6 HOURS PRN
Status: DISCONTINUED | OUTPATIENT
Start: 2023-05-16 | End: 2023-05-23 | Stop reason: HOSPADM

## 2023-05-16 RX ORDER — FUROSEMIDE 10 MG/ML
20 INJECTION INTRAMUSCULAR; INTRAVENOUS
Status: DISCONTINUED | OUTPATIENT
Start: 2023-05-16 | End: 2023-05-17

## 2023-05-16 RX ORDER — PREDNISONE 20 MG/1
20 TABLET ORAL DAILY
Status: DISCONTINUED | OUTPATIENT
Start: 2023-05-17 | End: 2023-05-19

## 2023-05-16 RX ORDER — FUROSEMIDE 10 MG/ML
20 INJECTION INTRAMUSCULAR; INTRAVENOUS ONCE
Status: COMPLETED | OUTPATIENT
Start: 2023-05-16 | End: 2023-05-16

## 2023-05-16 RX ORDER — HEPARIN SODIUM 5000 [USP'U]/ML
5000 INJECTION, SOLUTION INTRAVENOUS; SUBCUTANEOUS EVERY 8 HOURS SCHEDULED
Status: DISCONTINUED | OUTPATIENT
Start: 2023-05-16 | End: 2023-05-23 | Stop reason: HOSPADM

## 2023-05-16 RX ORDER — POLYETHYLENE GLYCOL 3350 17 G/17G
17 POWDER, FOR SOLUTION ORAL DAILY PRN
Status: DISCONTINUED | OUTPATIENT
Start: 2023-05-16 | End: 2023-05-23 | Stop reason: HOSPADM

## 2023-05-16 RX ADMIN — Medication 12.5 MG: at 21:20

## 2023-05-16 RX ADMIN — HEPARIN SODIUM 5000 UNITS: 5000 INJECTION INTRAVENOUS; SUBCUTANEOUS at 21:23

## 2023-05-16 RX ADMIN — IOHEXOL 100 ML: 350 INJECTION, SOLUTION INTRAVENOUS at 20:33

## 2023-05-16 RX ADMIN — FUROSEMIDE 20 MG: 10 INJECTION, SOLUTION INTRAMUSCULAR; INTRAVENOUS at 12:32

## 2023-05-16 RX ADMIN — FUROSEMIDE 20 MG: 10 INJECTION, SOLUTION INTRAMUSCULAR; INTRAVENOUS at 16:27

## 2023-05-16 RX ADMIN — ACETAMINOPHEN 650 MG: 325 TABLET ORAL at 23:59

## 2023-05-16 NOTE — ASSESSMENT & PLAN NOTE
· Sodium 128 upon admission, repeat this morning 127  · Appears to have chronic hyponatremia  · Continue to trend BMP with diuresis

## 2023-05-16 NOTE — ASSESSMENT & PLAN NOTE
· Present on admission with leukocytosis and tachypnea  Leukocytosis likely from recent steroid use  · No clear evidence of active infection although discitis is noted on CT scan  He has had no fevers  · Initially complaint of abdominal pain, chronic back pain as well as shortness of breath  Abdominal pain and shortness of breath now resolved  · In the setting of possible discitis hold on antibiotics  · Likely atelectasis noted on CT chest   No infectious abnormalities noted on abdomen and pelvis scan    · UA negative

## 2023-05-16 NOTE — ASSESSMENT & PLAN NOTE
· He is on a steroid taper and due to start prednisone 20 mg once a day today  · Some new lucencies are noted along the endplates at A5-A6 and suggest the possibility of inflammatory discitis superimposed on degenerative change  No obvious paraspinal soft tissue mass can be appreciated  · We will have neurosurgery evaluate  Hold on antibiotics for now  · Pain control ordered  · MRI L-spine ordered, unclear if he will be able to tolerate lying flat for extended amount of time    · Analgesia ordered

## 2023-05-16 NOTE — PROGRESS NOTES
"[facility]  Progress Note  Name: Lavern Query  MRN: [id number]  Unit/Bed#: ED-35 I Date of Admission: 5/16/2023   Date of Service: 5/16/2023 I Hospital Day: 0    Assessment/Plan   * Shortness of breath  Assessment & Plan  Likely 2/2 fluid retention/volume overload   He is on steroids which could be contributing   I/O Daily weights   Lasix 20 mg BID      Back pain  Assessment & Plan  Patient has CT scan showing -   \"   L1-L2: There is a bulging annulus with dorsal osteophytosis  There is facet arthrosis  There is mild foraminal narrowing      L2-L3: There is disc base narrowing  There is a bulging annulus  There is dorsal marginal osteophytosis  There is facet arthrosis  There is mild canal stenosis  There is mild to moderate right and mild left foraminal narrowing      L3-L4: There is a bulging annulus  There is facet arthrosis  There is mild canal stenosis  There is mild to moderate right and mild left foraminal narrowing      L4-L5: There is uncovering the disc space  There is mild to moderate canal stenosis  There is facet arthrosis  There is moderate left and mild to moderate right foraminal narrowing      L5-S1: There is facet arthrosis  There is no significant canal stenosis  There is mild foraminal narrowing      PREVERTEBRAL AND PARASPINAL SOFT TISSUES: Normal \"    He is on a steroid taper and due to start prednisone 20 mg once a day tomorrow  Larry Barnett taper down to zero       Leg swelling  Assessment & Plan  L>R   Will get venous doppler   Give lasix      Abdominal pain  Assessment & Plan  ? 2/2 congestion   Patient reports regular BM   Will get bladder scan    SIRS (systemic inflammatory response syndrome) (HCC)  Assessment & Plan  Unclear if sepsis  Patient has no clear complaints  Complaining of abdominal pain and back pain as well as shortness of breath   CXR - ?congestion   No PNA   UA negative   Will get CT chest abdomen pelvis         Aortic stenosis  Assessment & Plan  Last " Echo beginning of this month shows severe stenosis  May need cardiology if persistent sob     Primary hypertension  Assessment & Plan  BP is 148/70   Continue with lasix    Hyponatremia  Assessment & Plan  Na is 128   Will recheck now and in 8 hours  VTE Pharmacologic Prophylaxis: VTE Score: 4 Moderate Risk (Score 3-4) - Pharmacological DVT Prophylaxis Ordered: heparin  Code Status: Prior   Discussion with family: called Vandana Ordoñez-   updated her   Anticipated Length of Stay: Patient will be admitted on an inpatient basis with an anticipated length of stay of greater than 2 midnights secondary to volume overload   Total Time Spent on Date of Encounter in care of patient: 45 min This time was spent on one or more of the following: performing physical exam; counseling and coordination of care; obtaining or reviewing history; documenting in the medical record; reviewing/ordering tests, medications or procedures; communicating with other healthcare professionals and discussing with patient's family/caregivers  Chief Complaint: abdominal pain  History of Present Illness:  Anita Calderon is a 69 y o  male with a PMH of aortic stenosis, CAD, chronic back pain who presents with worsening back pain  He had [facility] ED with acute on chronic back pain earlier this month  Since then his back pain has been getting worse  He has been on a steroid taper, and over the last 3 days complaining of cough and abdominal pain and nausea as well as one episode of nausea and vomiting  As per his wife he has been constipated as well  The main reason he is in hospital now is worsening abdominal distension/pain and shortness of breath and cough  Patient received one dose of lasix in the ED after which he feels better         Review of Systems:  Review of Systems   Constitutional: Positive for activity change  HENT: Negative  Eyes: Negative      Respiratory: Positive for cough, chest tightness and shortness of breath  Cardiovascular: Positive for chest pain  Gastrointestinal: Positive for abdominal distention, abdominal pain, constipation, nausea and vomiting  Endocrine: Negative  Genitourinary: Negative  Skin: Negative  Allergic/Immunologic: Negative  Neurological: Negative  Psychiatric/Behavioral: Negative  Past Medical and Surgical History:   History reviewed  No pertinent past medical history  Past Surgical History:   Procedure Laterality Date   • SKIN BIOPSY         Meds/Allergies:  Prior to Admission medications    Medication Sig Start Date End Date Taking? Authorizing Provider   acetaminophen (TYLENOL) 325 mg tablet Take 2 tablets (650 mg total) by mouth every 6 (six) hours as needed for mild pain 5/3/23   Lila Morales MD   apixaban (Eliquis) 5 mg Take 1 tablet (5 mg total) by mouth 2 (two) times a day  Patient not taking: Reported on 5/11/2023 5/3/23   Lila Morales MD   lidocaine (Lidoderm) 5 % Apply 2 patches topically over 12 hours daily Remove & Discard patch within 12 hours or as directed by MD  Patient taking differently: Apply 2 patches topically daily as needed Remove & Discard patch within 12 hours or as directed by MD 5/2/23   Meg Ramos DO   metoprolol tartrate (LOPRESSOR) 25 mg tablet Take 0 5 tablets (12 5 mg total) by mouth every 12 (twelve) hours 5/3/23   Lila Morales MD   Multiple Vitamins-Minerals (PRESERVISION AREDS PO) Take by mouth in the morning    Historical Provider, MD   polyethylene glycol (MIRALAX) 17 g packet Take 17 g by mouth daily as needed (Constipation) 5/3/23   Lila Morales MD   predniSONE 20 mg tablet 2 PO QD X 4 DAYS, THEN 1 PO QD X 4 DAYS, THEN 1/2 PO QD X 4 DAYS 5/11/23   Bailey Boyle MD     I have reviewed home medications with patient personally  Allergies: No Known Allergies    Social History:  Marital Status: /Civil Union   Occupation: retired     Patient Pre-hospital Living Situation: "Home  Patient Pre-hospital Level of Mobility: walks  Patient Pre-hospital Diet Restrictions: none  Substance Use History:   Social History     Substance and Sexual Activity   Alcohol Use Not Currently     Social History     Tobacco Use   Smoking Status Former   • Types: Cigarettes   • Quit date: 3/11/2005   • Years since quittin 1   Smokeless Tobacco Never     Social History     Substance and Sexual Activity   Drug Use Not Currently       Family History:  Family History   Problem Relation Age of Onset   • Colon cancer Mother    • Lung cancer Father    • Mental illness Neg Hx        Physical Exam:     Vitals:   Blood Pressure: 148/63 (23 1430)  Pulse: 69 (23 1430)  Temperature: 97 5 °F (36 4 °C) (23 1046)  Temp Source: Oral (23 1046)  Respirations: 22 (23 1430)  Height: 5' 8\" (172 7 cm) (23 1046)  SpO2: 97 % (23 1430)    Physical Exam  Constitutional:       General: He is not in acute distress  Appearance: He is not ill-appearing, toxic-appearing or diaphoretic  HENT:      Head: Normocephalic  Mouth/Throat:      Mouth: Mucous membranes are moist    Eyes:      General:         Right eye: No discharge  Left eye: No discharge  Pupils: Pupils are equal, round, and reactive to light  Cardiovascular:      Rate and Rhythm: Normal rate  Heart sounds: Murmur heard  No friction rub  No gallop  Pulmonary:      Effort: Pulmonary effort is normal  No respiratory distress  Breath sounds: No wheezing, rhonchi or rales  Chest:      Chest wall: No tenderness  Abdominal:      General: There is distension  Palpations: There is no mass  Tenderness: There is no abdominal tenderness  There is no right CVA tenderness, left CVA tenderness, guarding or rebound  Hernia: No hernia is present  Musculoskeletal:         General: No swelling, tenderness, deformity or signs of injury  Right lower leg: Edema present        Left lower " leg: Edema present  Skin:     Capillary Refill: Capillary refill takes less than 2 seconds  Coloration: Skin is not jaundiced  Findings: No bruising, erythema, lesion or rash  Neurological:      General: No focal deficit present  Mental Status: He is alert  Cranial Nerves: No cranial nerve deficit  Sensory: No sensory deficit  Motor: No weakness  Coordination: Coordination normal       Gait: Gait normal       Deep Tendon Reflexes: Reflexes normal    Psychiatric:         Mood and Affect: Mood normal           Additional Data:     Lab Results:  Results from last 7 days   Lab Units 05/16/23  1101   WBC Thousand/uL 16 63*   HEMOGLOBIN g/dL 10 7*   HEMATOCRIT % 32 1*   PLATELETS Thousands/uL 361   NEUTROS PCT % 85*   LYMPHS PCT % 7*   MONOS PCT % 6   EOS PCT % 1     Results from last 7 days   Lab Units 05/16/23  1101   SODIUM mmol/L 128*   POTASSIUM mmol/L 3 9   CHLORIDE mmol/L 95*   CO2 mmol/L 26   BUN mg/dL 19   CREATININE mg/dL 0 72   ANION GAP mmol/L 7   CALCIUM mg/dL 8 8   ALBUMIN g/dL 3 4*   TOTAL BILIRUBIN mg/dL 0 39   ALK PHOS U/L 49   ALT U/L 13   AST U/L 13   GLUCOSE RANDOM mg/dL 112             Results from last 7 days   Lab Units 05/11/23  1245   HEMOGLOBIN A1C % of total Hgb 5 8*           Lines/Drains:  Invasive Devices     Peripheral Intravenous Line  Duration           Peripheral IV 05/16/23 Right Antecubital <1 day                    Imaging: No pertinent imaging reviewed  XR chest 1 view portable    (Results Pending)   VAS lower limb venous duplex study, complete bilateral    (Results Pending)       EKG and Other Studies Reviewed on Admission:   · EKG: NSR  HR HE in 80s   ** Please Note: This note has been constructed using a voice recognition system   **

## 2023-05-16 NOTE — ED PROCEDURE NOTE
Procedure  POC Cardiac US    Date/Time: 5/16/2023 1:15 PM  Performed by: Abner Peoples MD  Authorized by: Abner Peoples MD     Patient location:  ED  Other Assisting Provider: Yes (comment) (Dr Loc Vigil)    Procedure details:     Exam Type:  Educational and diagnostic    Indications: suspected volume depletion, chest pain and dyspnea      Assessment / Evaluation for: cardiac function and intravascular volume status      Exam Type: initial exam      Image quality: diagnostic      Image availability:  Images available in PACS, video obtained and still images obtained  Patient Details:     Cardiac Rhythm:  Regular    Mechanical ventilation: No    Cardiac findings:     Echo technique: limited 2D and M-mode      Views obtained: parasternal long axis, parasternal short axis and apical      Pericardial effusion: absent      Tamponade physiology: absent      Wall motion: hyperdynamic      LV systolic function: normal      RV dilation: none    Pulmonary findings:     Left Lung Findings: left lung sliding      Right lung findings: right lung sliding      B-lines: more than 3    IVC findings:     IVC Size: indeterminate    POC Lung US    Date/Time: 5/16/2023 1:16 PM  Performed by: Abner Peoples MD  Authorized by: Abner Peoples MD     Patient location:  ED  Other Assisting Provider: Yes (comment) (Dr Loc Vigil)    Procedure details:     Exam Type:  Diagnostic    Indications: chest pain, dyspnea and respiratory abnormality      Assessment / Evaluation for:  Pneumothorax and pleural effusion    Structures Visualized: pleural line, rib, diaphragm, left hemithorax and right hemithorax      Exam Type: initial exam      Image quality: diagnostic      Image availability:  Images available in PACS and video obtained  Left Hemithorax Findings:     Left pleura visualized:  Visualized    Left Hemithorax Findings: B-line pattern      Left lung findings: normal interstitium    Right Lung Findings:     Right pleural visualized:  Visualized    Right hemithorax findings: B-line pattern    Interpretation:     Findings: abnormal thoracic ultrasound                       Rena Rudd MD  05/16/23 1884

## 2023-05-16 NOTE — H&P
"                                                                                                                                                                                                                                                                                                                                                                                                                                                                                                                                                                                                                    [facility]  Progress Note  Name: Oriana Ballard  MRN: [id number]  Unit/Bed#: ED-35 I Date of Admission: 5/16/2023   Date of Service: 5/16/2023 I Hospital Day: 0     Assessment/Plan   * Shortness of breath  Assessment & Plan  Likely 2/2 fluid retention/volume overload   He is on steroids which could be contributing   I/O Daily weights   Lasix 20 mg BID        Back pain  Assessment & Plan  Patient has CT scan showing -   \"   L1-L2: There is a bulging annulus with dorsal osteophytosis  There is facet arthrosis  There is mild foraminal narrowing      L2-L3: There is disc base narrowing  There is a bulging annulus  There is dorsal marginal osteophytosis  There is facet arthrosis  There is mild canal stenosis  There is mild to moderate right and mild left foraminal narrowing      L3-L4: There is a bulging annulus  There is facet arthrosis  There is mild canal stenosis  There is mild to moderate right and mild left foraminal narrowing      L4-L5: There is uncovering the disc space  There is mild to moderate canal stenosis  There is facet arthrosis  There is moderate left and mild to moderate right foraminal narrowing      L5-S1: There is facet arthrosis  There is no significant canal stenosis   There is mild foraminal narrowing      PREVERTEBRAL AND PARASPINAL SOFT TISSUES: Normal \"     He is on a steroid taper " and due to start prednisone 20 mg once a day tomorrow  Henrine Neema taper down to zero       Leg swelling  Assessment & Plan  L>R   Will get venous doppler   Give lasix        Abdominal pain  Assessment & Plan  ? 2/2 congestion   Patient reports regular BM   Will get bladder scan     SIRS (systemic inflammatory response syndrome) (HCC)  Assessment & Plan  Unclear if sepsis  Patient has no clear complaints  Complaining of abdominal pain and back pain as well as shortness of breath   CXR - ?congestion   No PNA   UA negative   Will get CT chest abdomen pelvis            Aortic stenosis  Assessment & Plan  Last Echo beginning of this month shows severe stenosis  May need cardiology if persistent sob      Primary hypertension  Assessment & Plan  BP is 148/70   Continue with lasix     Hyponatremia  Assessment & Plan  Na is 128   Will recheck now and in 8 hours               VTE Pharmacologic Prophylaxis: VTE Score: 4 Moderate Risk (Score 3-4) - Pharmacological DVT Prophylaxis Ordered: heparin  Code Status: Prior   Discussion with family: called Elio Levi-   updated her       Anticipated Length of Stay: Patient will be admitted on an inpatient basis with an anticipated length of stay of greater than 2 midnights secondary to volume overload       Total Time Spent on Date of Encounter in care of patient: 45 min This time was spent on one or more of the following: performing physical exam; counseling and coordination of care; obtaining or reviewing history; documenting in the medical record; reviewing/ordering tests, medications or procedures; communicating with other healthcare professionals and discussing with patient's family/caregivers      Chief Complaint: abdominal pain       History of Present Illness:  Rosalinda Morales is a 69 y o  male with a PMH of aortic stenosis, CAD, chronic back pain who presents with worsening back pain  He had presented to [facility] ED with acute on chronic back pain earlier this month   Since then his back pain has been getting worse  He has been on a steroid taper, and over the last 3 days complaining of cough and abdominal pain and nausea as well as one episode of nausea and vomiting       As per his wife he has been constipated as well  The main reason he is in hospital now is worsening abdominal distension/pain and shortness of breath and cough       Patient received one dose of lasix in the ED after which he feels better            Review of Systems:  Review of Systems   Constitutional: Positive for activity change  HENT: Negative  Eyes: Negative  Respiratory: Positive for cough, chest tightness and shortness of breath  Cardiovascular: Positive for chest pain  Gastrointestinal: Positive for abdominal distention, abdominal pain, constipation, nausea and vomiting  Endocrine: Negative  Genitourinary: Negative  Skin: Negative  Allergic/Immunologic: Negative  Neurological: Negative  Psychiatric/Behavioral: Negative           Past Medical and Surgical History:   Medical History   History reviewed  No pertinent past medical history         Surgical History         Past Surgical History:   Procedure Laterality Date   • SKIN BIOPSY                Meds/Allergies:          Prior to Admission medications    Medication Sig Start Date End Date Taking?  Authorizing Provider   acetaminophen (TYLENOL) 325 mg tablet Take 2 tablets (650 mg total) by mouth every 6 (six) hours as needed for mild pain 5/3/23     Tiki Meade MD   apixaban (Eliquis) 5 mg Take 1 tablet (5 mg total) by mouth 2 (two) times a day  Patient not taking: Reported on 5/11/2023 5/3/23     Tiki Meade MD   lidocaine (Lidoderm) 5 % Apply 2 patches topically over 12 hours daily Remove & Discard patch within 12 hours or as directed by MD  Patient taking differently: Apply 2 patches topically daily as needed Remove & Discard patch within 12 hours or as directed by MD 5/2/23     Alireza Ramos,    metoprolol tartrate "(LOPRESSOR) 25 mg tablet Take 0 5 tablets (12 5 mg total) by mouth every 12 (twelve) hours 5/3/23     Zeus Herron MD   Multiple Vitamins-Minerals (PRESERVISION AREDS PO) Take by mouth in the morning       Historical Provider, MD   polyethylene glycol (MIRALAX) 17 g packet Take 17 g by mouth daily as needed (Constipation) 5/3/23     Zeus Herorn MD   predniSONE 20 mg tablet 2 PO QD X 4 DAYS, THEN 1 PO QD X 4 DAYS, THEN 1/2 PO QD X 4 DAYS 23     Joshua Us MD      I have reviewed home medications with patient personally      Allergies: No Known Allergies     Social History:  Marital Status: /Civil Union   Occupation: retired  Patient Pre-hospital Living Situation: Home  Patient Pre-hospital Level of Mobility: walks  Patient Pre-hospital Diet Restrictions: none  Substance Use History:   Social History          Substance and Sexual Activity   Alcohol Use Not Currently      Social History           Tobacco Use   Smoking Status Former   • Types: Cigarettes   • Quit date: 3/11/2005   • Years since quittin 1   Smokeless Tobacco Never      Social History          Substance and Sexual Activity   Drug Use Not Currently         Family History:  Family History         Family History   Problem Relation Age of Onset   • Colon cancer Mother     • Lung cancer Father     • Mental illness Neg Hx              Physical Exam:      Vitals:   Blood Pressure: 148/63 (23 1430)  Pulse: 69 (23 1430)  Temperature: 97 5 °F (36 4 °C) (23 1046)  Temp Source: Oral (23 1046)  Respirations: 22 (23 1430)  Height: 5' 8\" (172 7 cm) (23 1046)  SpO2: 97 % (23 1430)     Physical Exam  Constitutional:       General: He is not in acute distress  Appearance: He is not ill-appearing, toxic-appearing or diaphoretic  HENT:      Head: Normocephalic  Mouth/Throat:      Mouth: Mucous membranes are moist    Eyes:      General:         Right eye: No discharge           Left " eye: No discharge  Pupils: Pupils are equal, round, and reactive to light  Cardiovascular:      Rate and Rhythm: Normal rate  Heart sounds: Murmur heard  No friction rub  No gallop  Pulmonary:      Effort: Pulmonary effort is normal  No respiratory distress  Breath sounds: No wheezing, rhonchi or rales  Chest:      Chest wall: No tenderness  Abdominal:      General: There is distension  Palpations: There is no mass  Tenderness: There is no abdominal tenderness  There is no right CVA tenderness, left CVA tenderness, guarding or rebound  Hernia: No hernia is present  Musculoskeletal:         General: No swelling, tenderness, deformity or signs of injury  Right lower leg: Edema present  Left lower leg: Edema present  Skin:     Capillary Refill: Capillary refill takes less than 2 seconds  Coloration: Skin is not jaundiced  Findings: No bruising, erythema, lesion or rash  Neurological:      General: No focal deficit present  Mental Status: He is alert  Cranial Nerves: No cranial nerve deficit  Sensory: No sensory deficit  Motor: No weakness        Coordination: Coordination normal       Gait: Gait normal       Deep Tendon Reflexes: Reflexes normal    Psychiatric:         Mood and Affect: Mood normal             Additional Data:      Lab Results:       Results from last 7 days   Lab Units 05/16/23  1101   WBC Thousand/uL 16 63*   HEMOGLOBIN g/dL 10 7*   HEMATOCRIT % 32 1*   PLATELETS Thousands/uL 361   NEUTROS PCT % 85*   LYMPHS PCT % 7*   MONOS PCT % 6   EOS PCT % 1           Results from last 7 days   Lab Units 05/16/23  1101   SODIUM mmol/L 128*   POTASSIUM mmol/L 3 9   CHLORIDE mmol/L 95*   CO2 mmol/L 26   BUN mg/dL 19   CREATININE mg/dL 0 72   ANION GAP mmol/L 7   CALCIUM mg/dL 8 8   ALBUMIN g/dL 3 4*   TOTAL BILIRUBIN mg/dL 0 39   ALK PHOS U/L 49   ALT U/L 13   AST U/L 13   GLUCOSE RANDOM mg/dL 112                   Results from last 7 days   Lab Units 05/11/23  1245   HEMOGLOBIN A1C % of total Hgb 5 8*             Lines/Drains:      Invasive Devices            Peripheral Intravenous Line  Duration             Peripheral IV 05/16/23 Right Antecubital <1 day                          Imaging: No pertinent imaging reviewed  XR chest 1 view portable    (Results Pending)   VAS lower limb venous duplex study, complete bilateral    (Results Pending)         EKG and Other Studies Reviewed on Admission:   • EKG: NSR  HR HE in 80s       ** Please Note: This note has been constructed using a voice recognition system   **

## 2023-05-16 NOTE — ED NOTES
RN contacted admitting provider regarding Tele order as no order was placed prior to admit     Danielle Yan RN  05/16/23 [id number]

## 2023-05-16 NOTE — ASSESSMENT & PLAN NOTE
· Likely 2/2 fluid retention/volume overload   · CT chest with pleural effusions and atelectasis  · Unfortunately no output has been documented but patient does report that he has been urinating with the Lasix  · Continue I/O Daily weights   · Continue IV Lasix 20 mg twice daily

## 2023-05-16 NOTE — ED PROVIDER NOTES
History  Chief Complaint   Patient presents with   • Shortness of Breath     Pt reports SOB over the last few days, slight chest pressure when coughing, constant lower back pain since last week, seen at [facility] for the same  Denies fevers      Sharon Sparks is a 69year old male with a PMHx aortic stenosis and atrial fibrillation presenting to the ED for shortness of breath, chest pressure x 3 nights  The symptoms are worse at night, especially when laying down, and is ok during the day  There is associated vomiting during this time and describes the abdomen as really tight and firm at night  Feels he was choking due to the pressure in his abdomen  Is on the fourth day of his steroid taper for back pain  No hx CHF  No fevers, lightheadedness, syncope  Is complaining of bilateral leg swelling that has recently begun  No pain in his calf  Denies chest pain  Prior to Admission Medications   Prescriptions Last Dose Informant Patient Reported? Taking?    Multiple Vitamins-Minerals (PRESERVISION AREDS PO) Not Taking  Yes No   Sig: Take by mouth in the morning   Patient not taking: Reported on 5/16/2023   acetaminophen (TYLENOL) 325 mg tablet Past Week  No Yes   Sig: Take 2 tablets (650 mg total) by mouth every 6 (six) hours as needed for mild pain   apixaban (Eliquis) 5 mg Not Taking  No No   Sig: Take 1 tablet (5 mg total) by mouth 2 (two) times a day   Patient not taking: Reported on 5/11/2023   lidocaine (Lidoderm) 5 % Not Taking  No No   Sig: Apply 2 patches topically over 12 hours daily Remove & Discard patch within 12 hours or as directed by MD   Patient not taking: Reported on 5/16/2023   metoprolol tartrate (LOPRESSOR) 25 mg tablet Past Week  No Yes   Sig: Take 0 5 tablets (12 5 mg total) by mouth every 12 (twelve) hours   polyethylene glycol (MIRALAX) 17 g packet Not Taking  No No   Sig: Take 17 g by mouth daily as needed (Constipation)   Patient not taking: Reported on 5/16/2023   predniSONE 20 mg tablet 5/16/2023 No Yes   Si PO QD X 4 DAYS, THEN 1 PO QD X 4 DAYS, THEN 1/2 PO QD X 4 DAYS      Facility-Administered Medications: None       History reviewed  No pertinent past medical history  Past Surgical History:   Procedure Laterality Date   • SKIN BIOPSY         Family History   Problem Relation Age of Onset   • Colon cancer Mother    • Lung cancer Father    • Mental illness Neg Hx      I have reviewed and agree with the history as documented  E-Cigarette/Vaping   • E-Cigarette Use Never User      E-Cigarette/Vaping Substances   • Nicotine No    • THC No    • CBD No    • Flavoring No    • Other No    • Unknown No      Social History     Tobacco Use   • Smoking status: Former     Types: Cigarettes     Quit date: 3/11/2005     Years since quittin 1   • Smokeless tobacco: Never   Vaping Use   • Vaping Use: Never used   Substance Use Topics   • Alcohol use: Not Currently   • Drug use: Not Currently       Review of Systems   Constitutional: Positive for chills  Negative for diaphoresis and fever  HENT: Positive for rhinorrhea  Negative for congestion  Eyes: Negative for visual disturbance  Respiratory: Positive for cough, chest tightness (pressure), shortness of breath and wheezing  Cardiovascular: Positive for leg swelling  Negative for chest pain and palpitations  Gastrointestinal: Positive for abdominal distention, abdominal pain, constipation (last BM was today but very little), nausea and vomiting  Negative for blood in stool and diarrhea  Genitourinary: Positive for difficulty urinating and dysuria  Musculoskeletal: Positive for back pain and gait problem  Skin: Negative for rash  Neurological: Positive for numbness (toes)  Negative for light-headedness and headaches  Psychiatric/Behavioral: Positive for sleep disturbance (cannot lay flat since May 1st due to back pain)  Physical Exam  Physical Exam  Vitals reviewed  Constitutional:       General: He is not in acute distress  Appearance: Normal appearance  He is ill-appearing  He is not toxic-appearing or diaphoretic  HENT:      Head: Normocephalic and atraumatic  Right Ear: External ear normal       Left Ear: External ear normal       Nose: Nose normal    Eyes:      General: No scleral icterus  Right eye: No discharge  Left eye: No discharge  Extraocular Movements: Extraocular movements intact  Conjunctiva/sclera: Conjunctivae normal    Neck:      Vascular: No hepatojugular reflux or JVD  Trachea: No tracheal deviation  Cardiovascular:      Rate and Rhythm: Normal rate and regular rhythm  Pulses: Normal pulses  Dorsalis pedis pulses are 2+ on the right side and 2+ on the left side  Posterior tibial pulses are 2+ on the right side and 2+ on the left side  Comments: Non pitting bilateral leg edema  Pulmonary:      Effort: Pulmonary effort is normal  No tachypnea, accessory muscle usage, respiratory distress or retractions  Breath sounds: Normal breath sounds  No decreased breath sounds, wheezing, rhonchi or rales  Comments: No respiratory distress  Chest:      Chest wall: No tenderness  Abdominal:      General: There is distension  Palpations: Abdomen is soft  Tenderness: There is no abdominal tenderness  There is no right CVA tenderness, left CVA tenderness or guarding  Musculoskeletal:         General: Normal range of motion  Cervical back: Normal range of motion and neck supple  No rigidity or tenderness  Right lower leg: Swelling present  No tenderness  Edema present  Left lower leg: Swelling present  No tenderness  Edema present  Lymphadenopathy:      Cervical: No cervical adenopathy  Skin:     General: Skin is warm and dry  Capillary Refill: Capillary refill takes less than 2 seconds  Neurological:      General: No focal deficit present  Mental Status: He is alert     Psychiatric:         Mood and Affect: Mood normal          Behavior: Behavior normal          Vital Signs  ED Triage Vitals [05/16/23 1046]   Temperature Pulse Respirations Blood Pressure SpO2   97 5 °F (36 4 °C) 84 22 168/74 96 %      Temp Source Heart Rate Source Patient Position - Orthostatic VS BP Location FiO2 (%)   Oral Monitor Sitting Right arm --      Pain Score       3           Vitals:    05/16/23 1430 05/16/23 1630 05/16/23 1719 05/16/23 2008   BP: 148/63 168/67 (!) 130/43 (!) 128/44   Pulse: 69 70 73 78   Patient Position - Orthostatic VS: Sitting Sitting  Lying         Visual Acuity      ED Medications  Medications   acetaminophen (TYLENOL) tablet 650 mg (has no administration in time range)   metoprolol tartrate (LOPRESSOR) partial tablet 12 5 mg (12 5 mg Oral Given 5/16/23 2120)   polyethylene glycol (MIRALAX) packet 17 g (has no administration in time range)   predniSONE tablet 20 mg (has no administration in time range)   heparin (porcine) subcutaneous injection 5,000 Units (5,000 Units Subcutaneous Given 5/16/23 2123)   furosemide (LASIX) injection 20 mg (20 mg Intravenous Given 5/16/23 1627)   furosemide (LASIX) injection 20 mg (20 mg Intravenous Given 5/16/23 1232)   iohexol (OMNIPAQUE) 350 MG/ML injection (SINGLE-DOSE) 100 mL (100 mL Intravenous Given 5/16/23 2033)       Diagnostic Studies  Results Reviewed     Procedure Component Value Units Date/Time    HS Troponin I 4hr [[id number]]  (Abnormal) Collected: 05/16/23 1626    Lab Status: Final result Specimen: Blood from Arm, Right Updated: 05/16/23 1700     hs TnI 4hr 299 ng/L      Delta 4hr hsTnI -31 ng/L     HS Troponin 0hr (reflex protocol) [[id number]]     Lab Status: No result Specimen: Blood     UA w Reflex to Microscopic w Reflex to Culture [[id number]] Collected: 05/16/23 1323    Lab Status: Final result Specimen: Urine, Clean Catch Updated: 05/16/23 1332     Color, UA Colorless     Clarity, UA Clear     Specific [place], UA 1 005     pH, UA 7 0     Leukocytes, UA Negative Nitrite, UA Negative     Protein, UA Negative mg/dl      Glucose, UA Negative mg/dl      Ketones, UA Negative mg/dl      Urobilinogen, UA <2 0 mg/dl      Bilirubin, UA Negative     Occult Blood, UA Negative    HS Troponin I 2hr [[id number]]  (Abnormal) Collected: 05/16/23 1259    Lab Status: Final result Specimen: Blood from Arm, Right Updated: 05/16/23 1332     hs TnI 2hr 347 ng/L      Delta 2hr hsTnI 17 ng/L     B-Type Natriuretic Peptide(BNP) [[id number]]  (Abnormal) Collected: 05/16/23 1101    Lab Status: Final result Specimen: Blood from Arm, Right Updated: 05/16/23 1248      pg/mL     HS Troponin 0hr (reflex protocol) [[id number]]  (Abnormal) Collected: 05/16/23 1101    Lab Status: Final result Specimen: Blood from Arm, Right Updated: 05/16/23 1136     hs TnI 0hr 330 ng/L     Comprehensive metabolic panel [[id number]]  (Abnormal) Collected: 05/16/23 1101    Lab Status: Final result Specimen: Blood from Arm, Right Updated: 05/16/23 1129     Sodium 128 mmol/L      Potassium 3 9 mmol/L      Chloride 95 mmol/L      CO2 26 mmol/L      ANION GAP 7 mmol/L      BUN 19 mg/dL      Creatinine 0 72 mg/dL      Glucose 112 mg/dL      Calcium 8 8 mg/dL      Corrected Calcium 9 3 mg/dL      AST 13 U/L      ALT 13 U/L      Alkaline Phosphatase 49 U/L      Total Protein 6 0 g/dL      Albumin 3 4 g/dL      Total Bilirubin 0 39 mg/dL      eGFR 89 ml/min/1 73sq m     Narrative:      [place] guidelines for Chronic Kidney Disease (CKD):   •  Stage 1 with normal or high GFR (GFR > 90 mL/min/1 73 square meters)  •  Stage 2 Mild CKD (GFR = 60-89 mL/min/1 73 square meters)  •  Stage 3A Moderate CKD (GFR = 45-59 mL/min/1 73 square meters)  •  Stage 3B Moderate CKD (GFR = 30-44 mL/min/1 73 square meters)  •  Stage 4 Severe CKD (GFR = 15-29 mL/min/1 73 square meters)  •  Stage 5 End Stage CKD (GFR <15 mL/min/1 73 square meters)  Note: GFR calculation is accurate only with a steady state creatinine    CBC and differential [[id number]]  (Abnormal) Collected: 05/16/23 1101    Lab Status: Final result Specimen: Blood from Arm, Right Updated: 05/16/23 1112     WBC 16 63 Thousand/uL      RBC 3 61 Million/uL      Hemoglobin 10 7 g/dL      Hematocrit 32 1 %      MCV 89 fL      MCH 29 6 pg      MCHC 33 3 g/dL      RDW 13 8 %      MPV 8 7 fL      Platelets 343 Thousands/uL      nRBC 0 /100 WBCs      Neutrophils Relative 85 %      Immat GRANS % 1 %      Lymphocytes Relative 7 %      Monocytes Relative 6 %      Eosinophils Relative 1 %      Basophils Relative 0 %      Neutrophils Absolute 14 34 Thousands/µL      Immature Grans Absolute 0 10 Thousand/uL      Lymphocytes Absolute 1 08 Thousands/µL      Monocytes Absolute 0 98 Thousand/µL      Eosinophils Absolute 0 10 Thousand/µL      Basophils Absolute 0 03 Thousands/µL                  VAS lower limb venous duplex study, complete bilateral   Final Result by Johnathan Hammond MD (05/16 2102)      XR chest 1 view portable    (Results Pending)   CT chest abdomen pelvis w contrast    (Results Pending)              Procedures  ECG 12 Lead Documentation Only    Date/Time: 5/16/2023 11:20 AM  Performed by: Lucrecia Galloway PA-C  Authorized by: Lucrecia Galloway PA-C     Indications / Diagnosis:  Sob  ECG reviewed by me, the ED Provider: yes    Patient location:  ED  Previous ECG:     Previous ECG:  Compared to current    Comparison ECG info:  PACs gone    Similarity:  Changes noted    Comparison to cardiac monitor: Yes    Rate:     ECG rate:  69    ECG rate assessment: normal    Rhythm:     Rhythm: sinus rhythm    Ectopy:     Ectopy: none    QRS:     QRS axis:  Normal  Conduction:     Conduction: normal    ST segments:     ST segments:  Normal  T waves:     T waves: normal    Other findings:     Other findings: LAE    Comments:      No STEMI  ECG 12 Lead Documentation Only    Date/Time: 5/16/2023 2:19 PM  Performed by: Lucrecia Galloway PA-C  Authorized by:  Samm Santiago Reena Rodriguez PA-C     Indications / Diagnosis:  SOB  ECG reviewed by me, the ED Provider: yes    Patient location:  ED  Previous ECG:     Previous ECG:  Compared to current    Similarity:  No change    Comparison to cardiac monitor: Yes    Rate:     ECG rate:  77    ECG rate assessment: normal    Rhythm:     Rhythm: sinus rhythm    Ectopy:     Ectopy: none    QRS:     QRS axis:  Normal  Conduction:     Conduction: normal    ST segments:     ST segments:  Normal  T waves:     T waves: normal    Other findings:     Other findings: LAE    Comments:      No STEMI             ED Course  ED Course as of 05/16/23 2124   Tue May 16, 2023   1130 ECG NSR no STEMI   1240 ECG without change from earlier  Pt still without chest pain   1248 BNP(!): 838   1248 hs TnI 0hr(!): 330   1248 WBC(!): 16 63   1349 hs TnI 2hr(!): 347   1349 Delta 2hr hsTnI: 17             HEART Risk Score    Flowsheet Row Most Recent Value   Heart Score Risk Calculator    History 1 Filed at: 05/16/2023 1520   ECG 0 Filed at: 05/16/2023 1520   Age 2 Filed at: 05/16/2023 1520   Risk Factors 2 Filed at: 05/16/2023 1520   Troponin 2 Filed at: 05/16/2023 1520   HEART Score 7 Filed at: 05/16/2023 1520                     Initial Sepsis Screening     Row Name 05/16/23 1352                Is the patient's history suggestive of a new or worsening infection? No  -EP              User Key  (r) = Recorded By, (t) = Taken By, (c) = Cosigned By    [address] Name Provider Thao Lara MD Physician                              Medical Decision Making  69year old male PMHx atrial fibrillation, aortic stenosis presenting to the ED for shortness of breath and bilateral leg swelling x 3 days with associated vomiting with food  Physical exam is with bilateral leg edema non pitting, otherwise is benign  Suspect possible heart failure due to PMHx and history  Patient is resting comfortably on the bed, no concerns for current respiratory distress      CBC: elevated WBX 16 63 (pt has been vomiting), decreased hemoglobin 10 7, decreased hematocrit 32 1  CMP: hyponatremia at 128, suspect due to fluid overload dilution  Troponin 0hr: 330  Troponin 2hr: 347  Delta: 17  BNP: 838  UA: unremarkable  Bedside echo preformed, stated he appeared fluid overloaded  CXR completed, appears fluid over loaded per my interpretation  UA: unremarkable  Heart score is 7  Given lasix 20 mg IV  Patient was never with respiratory distress, discomfort, or chest pain throughout the entire ED visit  Serial ECGs were not concerning  Patient stable at time of admission, vital signs reviewed  Updated patient, discussed the diagnosis, and questions were answered  Amount and/or Complexity of Data Reviewed  External Data Reviewed: labs, radiology, ECG and notes  Details: Recent ED visit with ECG, labs (normal troponin prior), echo  Labs: ordered  Decision-making details documented in ED Course  Radiology: ordered and independent interpretation performed  Details: Per my interpretation, fluid congestion  ECG/medicine tests: ordered and independent interpretation performed  Decision-making details documented in ED Course  Details: ECG #1: Per my interpretation, NSR with no STEMI  No a fib noted  Some left atrial enlargement  ECG #2: Per my interpretation, NSR with no STEMI  No a fib noted  Some left atrial enlargement  No change from the earlier ECG  Discussion of management or test interpretation with external provider(s): Discussed case with internal medicine admitting physician, Dr Modesta Rosado who agrees to admit patient on inpatient basis  Risk  Prescription drug management  Decision regarding hospitalization            Disposition  Final diagnoses:   Congestive heart failure, unspecified HF chronicity, unspecified heart failure type [facility])   Aortic stenosis     Time reflects when diagnosis was documented in both MDM as applicable and the Disposition within this note     Time User Action Codes Description Comment    5/16/2023  2:09 PM Lawyer Tovar Add [I50 9] Congestive heart failure, unspecified HF chronicity, unspecified heart failure type ([facility] [address] )     5/16/2023  2:09 PM Lawyer Tovar Add [I35 0] Aortic stenosis       ED Disposition     ED Disposition   Admit    Condition   Stable    Date/Time   Tue May 16, 2023  2:09 PM    Comment   Case was discussed with Amanda Christian and the patient's admission status was agreed to be Admission Status: inpatient status to the service of Dr Amanda Christian   Follow-up Information    None         Current Discharge Medication List      CONTINUE these medications which have NOT CHANGED    Details   acetaminophen (TYLENOL) 325 mg tablet Take 2 tablets (650 mg total) by mouth every 6 (six) hours as needed for mild pain  Refills: 0    Associated Diagnoses: Low back pain      metoprolol tartrate (LOPRESSOR) 25 mg tablet Take 0 5 tablets (12 5 mg total) by mouth every 12 (twelve) hours  Qty: 60 tablet, Refills: 0    Associated Diagnoses: Atrial fibrillation (HCC)      predniSONE 20 mg tablet 2 PO QD X 4 DAYS, THEN 1 PO QD X 4 DAYS, THEN 1/2 PO QD X 4 DAYS  Qty: 14 tablet, Refills: 0    Associated Diagnoses: Acute midline low back pain without sciatica      apixaban (Eliquis) 5 mg Take 1 tablet (5 mg total) by mouth 2 (two) times a day  Qty: 60 tablet, Refills: 0    Associated Diagnoses: Atrial fibrillation (HCC)      lidocaine (Lidoderm) 5 % Apply 2 patches topically over 12 hours daily Remove & Discard patch within 12 hours or as directed by MD  Qty: 30 patch, Refills: 0    Associated Diagnoses: Low back pain      Multiple Vitamins-Minerals (PRESERVISION AREDS PO) Take by mouth in the morning      polyethylene glycol (MIRALAX) 17 g packet Take 17 g by mouth daily as needed (Constipation)  Refills: 0    Associated Diagnoses: Other constipation             No discharge procedures on file      PDMP Review     None          ED Provider  Electronically Signed by           Kathryn Wisdom PA-C  05/16/23 2124       Kathryn Wisdom PA-C  05/16/23 2124

## 2023-05-17 ENCOUNTER — APPOINTMENT (INPATIENT)
Dept: MRI IMAGING | Facility: HOSPITAL | Age: 78
End: 2023-05-17

## 2023-05-17 PROBLEM — M79.89 LEG SWELLING: Status: RESOLVED | Noted: 2023-05-16 | Resolved: 2023-05-17

## 2023-05-17 PROBLEM — R10.9 ABDOMINAL PAIN: Status: RESOLVED | Noted: 2023-05-16 | Resolved: 2023-05-17

## 2023-05-17 PROBLEM — R77.8 ELEVATED TROPONIN: Status: ACTIVE | Noted: 2023-05-17

## 2023-05-17 PROBLEM — R79.89 ELEVATED TROPONIN: Status: ACTIVE | Noted: 2023-05-17

## 2023-05-17 LAB
2HR DELTA HS TROPONIN: -11 NG/L
4HR DELTA HS TROPONIN: -22 NG/L
ANION GAP SERPL CALCULATED.3IONS-SCNC: 8 MMOL/L (ref 4–13)
ANION GAP SERPL CALCULATED.3IONS-SCNC: 8 MMOL/L (ref 4–13)
ATRIAL RATE: 69 BPM
ATRIAL RATE: 77 BPM
BUN SERPL-MCNC: 21 MG/DL (ref 5–25)
BUN SERPL-MCNC: 21 MG/DL (ref 5–25)
CALCIUM SERPL-MCNC: 8.5 MG/DL (ref 8.4–10.2)
CALCIUM SERPL-MCNC: 9.1 MG/DL (ref 8.4–10.2)
CARDIAC TROPONIN I PNL SERPL HS: 229 NG/L
CARDIAC TROPONIN I PNL SERPL HS: 240 NG/L
CHLORIDE SERPL-SCNC: 91 MMOL/L (ref 96–108)
CHLORIDE SERPL-SCNC: 94 MMOL/L (ref 96–108)
CO2 SERPL-SCNC: 25 MMOL/L (ref 21–32)
CO2 SERPL-SCNC: 30 MMOL/L (ref 21–32)
CREAT SERPL-MCNC: 0.72 MG/DL (ref 0.6–1.3)
CREAT SERPL-MCNC: 0.78 MG/DL (ref 0.6–1.3)
CRP SERPL QL: 31.7 MG/L
ERYTHROCYTE [SEDIMENTATION RATE] IN BLOOD: 44 MM/HOUR (ref 0–19)
GFR SERPL CREATININE-BSD FRML MDRD: 87 ML/MIN/1.73SQ M
GFR SERPL CREATININE-BSD FRML MDRD: 89 ML/MIN/1.73SQ M
GLUCOSE SERPL-MCNC: 103 MG/DL (ref 65–140)
GLUCOSE SERPL-MCNC: 147 MG/DL (ref 65–140)
P AXIS: 74 DEGREES
P AXIS: 79 DEGREES
POTASSIUM SERPL-SCNC: 4 MMOL/L (ref 3.5–5.3)
POTASSIUM SERPL-SCNC: 4 MMOL/L (ref 3.5–5.3)
PR INTERVAL: 154 MS
PR INTERVAL: 158 MS
QRS AXIS: 76 DEGREES
QRS AXIS: 77 DEGREES
QRSD INTERVAL: 90 MS
QRSD INTERVAL: 90 MS
QT INTERVAL: 388 MS
QT INTERVAL: 400 MS
QTC INTERVAL: 428 MS
QTC INTERVAL: 439 MS
SODIUM SERPL-SCNC: 127 MMOL/L (ref 135–147)
SODIUM SERPL-SCNC: 129 MMOL/L (ref 135–147)
T WAVE AXIS: 74 DEGREES
T WAVE AXIS: 82 DEGREES
VENTRICULAR RATE: 69 BPM
VENTRICULAR RATE: 77 BPM

## 2023-05-17 RX ORDER — HYDROMORPHONE HCL/PF 1 MG/ML
0.5 SYRINGE (ML) INJECTION EVERY 4 HOURS PRN
Status: DISCONTINUED | OUTPATIENT
Start: 2023-05-17 | End: 2023-05-23 | Stop reason: HOSPADM

## 2023-05-17 RX ORDER — OXYCODONE HYDROCHLORIDE 10 MG/1
10 TABLET ORAL EVERY 4 HOURS PRN
Status: DISCONTINUED | OUTPATIENT
Start: 2023-05-17 | End: 2023-05-23 | Stop reason: HOSPADM

## 2023-05-17 RX ORDER — OXYCODONE HYDROCHLORIDE 5 MG/1
5 TABLET ORAL EVERY 4 HOURS PRN
Status: DISCONTINUED | OUTPATIENT
Start: 2023-05-17 | End: 2023-05-23 | Stop reason: HOSPADM

## 2023-05-17 RX ORDER — FUROSEMIDE 40 MG/1
40 TABLET ORAL DAILY
Status: DISCONTINUED | OUTPATIENT
Start: 2023-05-18 | End: 2023-05-22

## 2023-05-17 RX ORDER — FUROSEMIDE 10 MG/ML
40 INJECTION INTRAMUSCULAR; INTRAVENOUS ONCE
Status: DISCONTINUED | OUTPATIENT
Start: 2023-05-17 | End: 2023-05-17

## 2023-05-17 RX ORDER — LIDOCAINE 50 MG/G
1 PATCH TOPICAL DAILY
Status: DISCONTINUED | OUTPATIENT
Start: 2023-05-17 | End: 2023-05-23 | Stop reason: HOSPADM

## 2023-05-17 RX ORDER — FUROSEMIDE 10 MG/ML
20 INJECTION INTRAMUSCULAR; INTRAVENOUS ONCE
Status: COMPLETED | OUTPATIENT
Start: 2023-05-17 | End: 2023-05-17

## 2023-05-17 RX ADMIN — FUROSEMIDE 20 MG: 10 INJECTION, SOLUTION INTRAMUSCULAR; INTRAVENOUS at 08:09

## 2023-05-17 RX ADMIN — HYDROMORPHONE HYDROCHLORIDE 0.5 MG: 1 INJECTION, SOLUTION INTRAMUSCULAR; INTRAVENOUS; SUBCUTANEOUS at 14:46

## 2023-05-17 RX ADMIN — FUROSEMIDE 20 MG: 10 INJECTION, SOLUTION INTRAMUSCULAR; INTRAVENOUS at 16:16

## 2023-05-17 RX ADMIN — HEPARIN SODIUM 5000 UNITS: 5000 INJECTION INTRAVENOUS; SUBCUTANEOUS at 05:29

## 2023-05-17 RX ADMIN — LIDOCAINE 5% 1 PATCH: 700 PATCH TOPICAL at 03:17

## 2023-05-17 RX ADMIN — METOPROLOL TARTRATE 25 MG: 25 TABLET, FILM COATED ORAL at 21:06

## 2023-05-17 RX ADMIN — GADOBUTROL 7 ML: 604.72 INJECTION INTRAVENOUS at 15:52

## 2023-05-17 RX ADMIN — PREDNISONE 20 MG: 20 TABLET ORAL at 08:09

## 2023-05-17 RX ADMIN — HEPARIN SODIUM 5000 UNITS: 5000 INJECTION INTRAVENOUS; SUBCUTANEOUS at 16:16

## 2023-05-17 RX ADMIN — Medication 12.5 MG: at 08:09

## 2023-05-17 RX ADMIN — OXYCODONE HYDROCHLORIDE 5 MG: 5 TABLET ORAL at 11:29

## 2023-05-17 RX ADMIN — HEPARIN SODIUM 5000 UNITS: 5000 INJECTION INTRAVENOUS; SUBCUTANEOUS at 21:06

## 2023-05-17 RX ADMIN — OXYCODONE HYDROCHLORIDE 10 MG: 10 TABLET ORAL at 16:14

## 2023-05-17 NOTE — ASSESSMENT & PLAN NOTE
· He is on a steroid taper and due to start prednisone 20 mg once a day today  · Some new lucencies are noted along the endplates at U7-I2 and suggest the possibility of inflammatory discitis superimposed on degenerative change  No obvious paraspinal soft tissue mass can be appreciated  · We will have neurosurgery evaluate  Hold on antibiotics for now  Obtain BC x2 now  · Pain control ordered  · MRI L-spine ordered, unclear if he will be able to tolerate lying flat for extended amount of time    · Analgesia ordered

## 2023-05-17 NOTE — ASSESSMENT & PLAN NOTE
Cardiology following, appreciate recommendations   · Patient was to start Eliquis 5mg BID in the outpatient setting but has not started yet due to concern for bleeding/bruising  · Recommeding continuing to hold at this time until MRI is obtained as patient may require IR biopsy

## 2023-05-17 NOTE — PLAN OF CARE
Problem: PAIN - ADULT  Goal: Verbalizes/displays adequate comfort level or baseline comfort level  Description: Interventions:  - Encourage patient to monitor pain and request assistance  - Assess pain using appropriate pain scale  - Administer analgesics based on type and severity of pain and evaluate response  - Implement non-pharmacological measures as appropriate and evaluate response  - Consider cultural and social influences on pain and pain management  - Notify physician/advanced practitioner if interventions unsuccessful or patient reports new pain  Outcome: Progressing     Problem: SAFETY ADULT  Goal: Patient will remain free of falls  Description: INTERVENTIONS:  - Educate patient/family on patient safety including physical limitations  - Instruct patient to call for assistance with activity   - Consult OT/PT to assist with strengthening/mobility   - Keep Call bell within reach  - Keep bed low and locked with side rails adjusted as appropriate  - Keep care items and personal belongings within reach  - Initiate and maintain comfort rounds  - Make Fall Risk Sign visible to staff  - Offer Toileting every 2 Hours, in advance of need  - Initiate/Maintain bed alarm  - Apply yellow socks and bracelet for high fall risk patients  - Consider moving patient to room near nurses station  Outcome: Progressing  Goal: Maintain or return to baseline ADL function  Description: INTERVENTIONS:  -  Assess patient's ability to carry out ADLs; assess patient's baseline for ADL function and identify physical deficits which impact ability to perform ADLs (bathing, care of mouth/teeth, toileting, grooming, dressing, etc )  - Assess/evaluate cause of self-care deficits   - Assess range of motion  - Assess patient's mobility; develop plan if impaired  - Assess patient's need for assistive devices and provide as appropriate  - Encourage maximum independence but intervene and supervise when necessary  - Involve family in performance of ADLs  - Assess for home care needs following discharge   - Consider OT consult to assist with ADL evaluation and planning for discharge  - Provide patient education as appropriate  Outcome: Progressing  Goal: Maintains/Returns to pre admission functional level  Description: INTERVENTIONS:  - Perform BMAT or MOVE assessment daily    - Set and communicate daily mobility goal to care team and patient/family/caregiver  - Collaborate with rehabilitation services on mobility goals if consulted  - Perform Range of Motion 3 times a day  - Reposition patient every 2 hours    - Dangle patient 3 times a day  - Stand patient 3 times a day  - Ambulate patient 3 times a day  - Out of bed to chair 3 times a day   - Out of bed for meals 3 times a day  - Out of bed for toileting  - Record patient progress and toleration of activity level   Outcome: Progressing     Problem: NEUROSENSORY - ADULT  Goal: Achieves stable or improved neurological status  Description: INTERVENTIONS  - Monitor and report changes in neurological status  - Monitor vital signs such as temperature, blood pressure, glucose, and any other labs ordered   - Initiate measures to prevent increased intracranial pressure  - Monitor for seizure activity and implement precautions if appropriate      Outcome: Progressing  Goal: Remains free of injury related to seizures activity  Description: INTERVENTIONS  - Maintain airway, patient safety  and administer oxygen as ordered  - Monitor patient for seizure activity, document and report duration and description of seizure to physician/advanced practitioner  - If seizure occurs,  ensure patient safety during seizure  - Reorient patient post seizure  - Seizure pads on all 4 side rails  - Instruct patient/family to notify RN of any seizure activity including if an aura is experienced  - Instruct patient/family to call for assistance with activity based on nursing assessment  - Administer anti-seizure medications if ordered    Outcome: Progressing  Goal: Achieves maximal functionality and self care  Description: INTERVENTIONS  - Monitor swallowing and airway patency with patient fatigue and changes in neurological status  - Encourage and assist patient to increase activity and self care     - Encourage visually impaired, hearing impaired and aphasic patients to use assistive/communication devices  Outcome: Progressing     Problem: CARDIOVASCULAR - ADULT  Goal: Maintains optimal cardiac output and hemodynamic stability  Description: INTERVENTIONS:  - Monitor I/O, vital signs and rhythm  - Monitor for S/S and trends of decreased cardiac output  - Administer and titrate ordered vasoactive medications to optimize hemodynamic stability  - Assess quality of pulses, skin color and temperature  - Assess for signs of decreased coronary artery perfusion  - Instruct patient to report change in severity of symptoms  Outcome: Progressing  Goal: Absence of cardiac dysrhythmias or at baseline rhythm  Description: INTERVENTIONS:  - Continuous cardiac monitoring, vital signs, obtain 12 lead EKG if ordered  - Administer antiarrhythmic and heart rate control medications as ordered  - Monitor electrolytes and administer replacement therapy as ordered  Outcome: Progressing

## 2023-05-17 NOTE — CONSULTS
Consultation - Cardiology Team 1  Rayo Hindu 69 y o  male MRN: [id number]  Unit/Bed#: S -01 Encounter: [id number]    Assessment/Plan     Principal Problem:    Shortness of breath  Active Problems:    Hyponatremia    Primary hypertension    Aortic stenosis    SIRS (systemic inflammatory response syndrome) (HCC)    Abdominal pain    Leg swelling    Back pain      Assessment    1  Shortness of breath  Chest x-ray-mild interstitial edema with small effusions  CT chest imaging-no effusions with adjacent atelectasis versus small infiltrates    Na 128  ECG- NSR no ischemic changes  Recent TTE-grade 2 diastolic dysfunction  Moderate to severe AS  Started on IV lasix 20mg BID with decent diuresis and improved symptoms  No diuretic as an outpatient    2  Aortic stenosis- moderate to severe  Preserved LV function  Diastolic dysfunction    3  PAF-maintaining sinus rhythm  AC-Eliquis 5 mg twice daily ( has not started, concerned of potential bleeding /bruising)  BB-Toprol reportedly takes 12 5 mg once a day, currently prescribed as twice a day as was on last d/c 5/3 as wel    4  HTN-not well controlled  Currently on Toprol 12 5 mg daily-prescribed as twice daily  Previously on amlodipine  Improved with addition of loop diuretc  Last 12 hrs -130    5  Elevated troponin-minimal flat troponin as detailed in HPI  Non-MI troponin elevation in the setting of mild volume overload  ECG no ischemic changes  No chest pain or pressure    6  Leukocytosis  Afebrile   Suspected steroid induced     7  Low back pain  Imaging- New lucencies noted along the endplates D6-K6 suggestive of possible inflammatory discitis    7  Hyponatremia-out of proportion of his volume overload  Last BMP in Epic 2021- Na 135  Consider nephrology consult      Plan    1  Pt wants to go home  His edema, abdominal distention and dyspnea has improved  Although his I/ O appears incomplete he reports good diuresis    Seems acceptable for "him to be discharged on low-dose loop diuretic  Would await cardiology attending evaluation  Will transition to 40 mg oral Lasix daily after 2nd dose of IV lasix today  2   Would continue beta-blocker at 12 5 mg every 12 hours  3  Recommend Eliquis 5 mg twice daily  4  Recommend outpatient cardiology follow-up for PAF and aortic stenosis  Pt requests [facility]  At that time can consider further rhythm monitoring to observe for recurrent atrial fibrillation  Patient really does not want to continue on Eliquis if he has no recurrent atrial fibrillation  5   Outpatient ischemia evaluation  If we proceed with TAVR w/u would be [facility]  6   Defer w/u of LBP and possible discitis to primary team    History of Present Illness   Physician Requesting Consult: Maninder Caro MD  Reason for Consult / Principal Problem: Shortness of breath    HPI: Ivette Irwin is a 69y o  year old male who presents ongoing LBP  He mentioned  shortness of breath, cough with chest pressure for few weeks  Worse lying down  Constant LBP  LE edema for few weeks , recent abdominal bloating  Presented mildly hypertensive  No hypoxia  He was started on 20 mg IV Lasix twice daily and is diuresing with improved symptoms  Recently started on steroids for LBP  Typically is active without CP, SOB  Prior to onset of severe LBP he was \"extremely active\", no cardiac symptoms  Pt is very cognizant about a sodium restriction  Na 128  0 hr troponin 330  2 hr trop 347  4 hr trop 299  Random 251 , 240 , 229    WBC 16    Chest x-ray-mild interstitial edema with small effusions  LE venous  duplex negative DVT  CT chest abdomen pelvis-new bilateral pleural effusions with adjacent atelectasis versus small infiltrates  Mild mediastinal adenopathy  Gastric distention could be related to gastroparesis or outlet obstruction  No evidence of small bowel obstruction  Trace ascites    New lucencies noted along the endplates at L1 and L2 suggest " possible inflammatory discitis superimposed on degenerative change  Evaluated by Dr Mitchell Mahoney at [facility] 5/3 for PAF with RVR , hypertensive urgency, and moderate to severe AS  He spontaneously converted to sinus rhythm  Started on Eliquis 5 mg twice daily and Lopressor 12 5 mg twice daily  Patient had self discontinued amlodipine  TTE at that time- moderate concentric LVH  LVEF 59%  Grade 2 diastolic dysfunction  RV size and function normal   Moderate left atrial dilatation  Mild right atrial dilatation  Moderate to severe aortic stenosis  Peak velocity 4 1 m/s  Peak gradient 63 mmHg  Mean gradient 35 mmHg  Mild TR  Had 1 troponin- 42  Na - 129  Patient had been seen 2021 by Dr Beau Epley for moderate aortic stenosis  He was started on amlodipine, Crestor and ordered an exercise stress test with periodic follow-up echocardiogram  No further f/u until above hospitalization 5/2023  TTE 10/2021-normal LV function  Grade 1 diastolic dysfunction  Moderate AS  PG 48 with MG 28mmHg  Remote tobacco abuse  No significant CV family history    Inpatient consult to Cardiology  Consult performed by: JESSICA Ireland  Consult ordered by: Morgan Henderson MD          Review of Systems   Constitutional: Positive for activity change  HENT: Negative  Eyes: Negative  Respiratory: Positive for cough, chest tightness and shortness of breath  Cardiovascular: Positive for leg swelling  Negative for palpitations  Gastrointestinal: Positive for abdominal distention  Endocrine: Negative  Genitourinary: Negative  Musculoskeletal: Positive for back pain  Skin: Negative  Allergic/Immunologic: Negative  Neurological: Negative  Hematological: Negative  Psychiatric/Behavioral: Negative  All other systems reviewed and are negative  Historical Information   History reviewed  No pertinent past medical history    Past Surgical History:   Procedure Laterality Date   • "SKIN BIOPSY       Social History     Substance and Sexual Activity   Alcohol Use Not Currently     Social History     Substance and Sexual Activity   Drug Use Not Currently     Social History     Tobacco Use   Smoking Status Former   • Types: Cigarettes   • Quit date: 3/11/2005   • Years since quittin 1   Smokeless Tobacco Never     Family History:   Family History   Problem Relation Age of Onset   • Colon cancer Mother    • Lung cancer Father    • Mental illness Neg Hx        Meds/Allergies   current meds:   Current Facility-Administered Medications   Medication Dose Route Frequency   • acetaminophen (TYLENOL) tablet 650 mg  650 mg Oral Q6H PRN   • furosemide (LASIX) injection 20 mg  20 mg Intravenous BID (diuretic)   • heparin (porcine) subcutaneous injection 5,000 Units  5,000 Units Subcutaneous Q8H [address]   • HYDROmorphone (DILAUDID) injection 0 5 mg  0 5 mg Intravenous Q4H PRN   • lidocaine (LIDODERM) 5 % patch 1 patch  1 patch Topical Daily   • metoprolol tartrate (LOPRESSOR) partial tablet 12 5 mg  12 5 mg Oral Q12H JARRETT   • oxyCODONE (ROXICODONE) immediate release tablet 10 mg  10 mg Oral Q4H PRN   • oxyCODONE (ROXICODONE) IR tablet 5 mg  5 mg Oral Q4H PRN   • polyethylene glycol (MIRALAX) packet 17 g  17 g Oral Daily PRN   • predniSONE tablet 20 mg  20 mg Oral Daily    and PTA meds:    Medications Prior to Admission   Medication   • acetaminophen (TYLENOL) 325 mg tablet   • metoprolol tartrate (LOPRESSOR) 25 mg tablet   • predniSONE 20 mg tablet   • apixaban (Eliquis) 5 mg   • lidocaine (Lidoderm) 5 %   • Multiple Vitamins-Minerals (PRESERVISION AREDS PO)   • polyethylene glycol (MIRALAX) 17 g packet     No Known Allergies    Objective   Vitals: Blood pressure 137/54, pulse 79, temperature 97 8 °F (36 6 °C), temperature source Oral, resp  rate 18, height 5' 8\" (1 727 m), weight 66 kg (145 lb 8 1 oz), SpO2 97 %    Orthostatic Blood Pressures    Flowsheet Row Most Recent Value   Blood Pressure 137/54 filed at " "05/17/2023 0728   Patient Position - Orthostatic VS Lying filed at 05/17/2023 0651            Intake/Output Summary (Last 24 hours) at 5/17/2023 1021  Last data filed at 5/17/2023 1004  Gross per 24 hour   Intake 180 ml   Output 700 ml   Net -520 ml       Invasive Devices     Peripheral Intravenous Line  Duration           Peripheral IV 05/16/23 Right Antecubital 1 day                Physical Exam: /54 (BP Location: Left arm)   Pulse 79   Temp 97 8 °F (36 6 °C) (Oral)   Resp 18   Ht 5' 8\" (1 727 m)   Wt 66 kg (145 lb 8 1 oz)   SpO2 97%   BMI 22 12 kg/m²   General Appearance:    Alert, cooperative, no distress, appears stated age   Head:    Normocephalic, no scleral icterus   Eyes:    PERRL   Nose:   Nares normal, septum midline, mucosa normal, no drainage    Throat:   Lips, mucosa, and tongue normal   Neck:   Supple, symmetrical, trachea midline     no JVD       Lungs:     Clear to auscultation bilaterally, respirations unlabored   Chest Wall:    No tenderness or deformity    Heart:    Regular rate and rhythm, S1 and S2 normal, no murmur, rub   or gallop   Abdomen:     Soft, non-tender, bowel sounds active all four quadrants,     no masses, no organomegaly   Extremities:   Extremities normal, atraumatic, trace edema   Pulses:   2+ and symmetric all extremities   Skin:   Skin color, texture, turgor normal, no rashes or lesions   Neurologic:   Alert and oriented to person place and time   No focal deficits       Lab Results:   Recent Results (from the past 72 hour(s))   CBC and differential    Collection Time: 05/16/23 11:01 AM   Result Value Ref Range    WBC 16 63 (H) 4 31 - 10 16 Thousand/uL    RBC 3 61 (L) 3 88 - 5 62 Million/uL    Hemoglobin 10 7 (L) 12 0 - 17 0 g/dL    Hematocrit 32 1 (L) 36 5 - 49 3 %    MCV 89 82 - 98 fL    MCH 29 6 26 8 - 34 3 pg    MCHC 33 3 31 4 - 37 4 g/dL    RDW 13 8 11 6 - 15 1 %    MPV 8 7 (L) 8 9 - 12 7 fL    Platelets 392 713 - 326 Thousands/uL    nRBC 0 /100 WBCs    " "Neutrophils Relative 85 (H) 43 - 75 %    Immat GRANS % 1 0 - 2 %    Lymphocytes Relative 7 (L) 14 - 44 %    Monocytes Relative 6 4 - 12 %    Eosinophils Relative 1 0 - 6 %    Basophils Relative 0 0 - 1 %    Neutrophils Absolute 14 34 (H) 1 85 - 7 62 Thousands/µL    Immature Grans Absolute 0 10 0 00 - 0 20 Thousand/uL    Lymphocytes Absolute 1 08 0 60 - 4 47 Thousands/µL    Monocytes Absolute 0 98 0 17 - 1 22 Thousand/µL    Eosinophils Absolute 0 10 0 00 - 0 61 Thousand/µL    Basophils Absolute 0 03 0 00 - 0 10 Thousands/µL   Comprehensive metabolic panel    Collection Time: 05/16/23 11:01 AM   Result Value Ref Range    Sodium 128 (L) 135 - 147 mmol/L    Potassium 3 9 3 5 - 5 3 mmol/L    Chloride 95 (L) 96 - 108 mmol/L    CO2 26 21 - 32 mmol/L    ANION GAP 7 4 - 13 mmol/L    BUN 19 5 - 25 mg/dL    Creatinine 0 72 0 60 - 1 30 mg/dL    Glucose 112 65 - 140 mg/dL    Calcium 8 8 8 4 - 10 2 mg/dL    Corrected Calcium 9 3 8 3 - 10 1 mg/dL    AST 13 13 - 39 U/L    ALT 13 7 - 52 U/L    Alkaline Phosphatase 49 34 - 104 U/L    Total Protein 6 0 (L) 6 4 - 8 4 g/dL    Albumin 3 4 (L) 3 5 - 5 0 g/dL    Total Bilirubin 0 39 0 20 - 1 00 mg/dL    eGFR 89 ml/min/1 73sq m   HS Troponin 0hr (reflex protocol)    Collection Time: 05/16/23 11:01 AM   Result Value Ref Range    hs TnI 0hr 330 (H) \"Refer to ACS Flowchart\"- see link ng/L   B-Type Natriuretic Peptide(BNP)    Collection Time: 05/16/23 11:01 AM   Result Value Ref Range     (H) 0 - 100 pg/mL   ECG 12 lead    Collection Time: 05/16/23 11:16 AM   Result Value Ref Range    Ventricular Rate 69 BPM    Atrial Rate 69 BPM    VT Interval 158 ms    QRSD Interval 90 ms    QT Interval 400 ms    QTC Interval 428 ms    P [place] 79 degrees    QRS Axis 77 degrees    T Wave Axis 82 degrees   ECG 12 lead    Collection Time: 05/16/23 12:38 PM   Result Value Ref Range    Ventricular Rate 77 BPM    Atrial Rate 77 BPM    VT Interval 154 ms    QRSD Interval 90 ms    QT Interval 388 ms    QTC " "Interval 439 ms    P Axis 74 degrees    QRS Axis 76 degrees    T Wave [place] 74 degrees   HS Troponin I 2hr    Collection Time: 05/16/23 12:59 PM   Result Value Ref Range    hs TnI 2hr 347 (H) \"Refer to ACS Flowchart\"- see link ng/L    Delta 2hr hsTnI 17 <20 ng/L   UA w Reflex to Microscopic w Reflex to Culture    Collection Time: 05/16/23  1:23 PM    Specimen: Urine, Clean Catch   Result Value Ref Range    Color, UA Colorless     Clarity, UA Clear     Specific [place], UA 1 005 1 003 - 1 030    pH, UA 7 0 4 5, 5 0, 5 5, 6 0, 6 5, 7 0, 7 5, 8 0    Leukocytes, UA Negative Negative    Nitrite, UA Negative Negative    Protein, UA Negative Negative mg/dl    Glucose, UA Negative Negative mg/dl    Ketones, UA Negative Negative mg/dl    Urobilinogen, UA <2 0 <2 0 mg/dl mg/dl    Bilirubin, UA Negative Negative    Occult Blood, UA Negative Negative   HS Troponin I 4hr    Collection Time: 05/16/23  4:26 PM   Result Value Ref Range    hs TnI 4hr 299 (H) \"Refer to ACS Flowchart\"- see link ng/L    Delta 4hr hsTnI -31 <20 ng/L   HS Troponin 0hr (reflex protocol)    Collection Time: 05/16/23 10:04 PM   Result Value Ref Range    hs TnI 0hr 251 (H) \"Refer to ACS Flowchart\"- see link ng/L   HS Troponin I 2hr    Collection Time: 05/17/23  1:21 AM   Result Value Ref Range    hs TnI 2hr 240 (H) \"Refer to ACS Flowchart\"- see link ng/L    Delta 2hr hsTnI -11 <20 ng/L   Basic metabolic panel    Collection Time: 05/17/23  1:21 AM   Result Value Ref Range    Sodium 127 (L) 135 - 147 mmol/L    Potassium 4 0 3 5 - 5 3 mmol/L    Chloride 94 (L) 96 - 108 mmol/L    CO2 25 21 - 32 mmol/L    ANION GAP 8 4 - 13 mmol/L    BUN 21 5 - 25 mg/dL    Creatinine 0 78 0 60 - 1 30 mg/dL    Glucose 103 65 - 140 mg/dL    Calcium 8 5 8 4 - 10 2 mg/dL    eGFR 87 ml/min/1 73sq m   HS Troponin I 4hr    Collection Time: 05/17/23  3:37 AM   Result Value Ref Range    hs TnI 4hr 229 (H) \"Refer to ACS Flowchart\"- see link ng/L    Delta 4hr hsTnI -22 <20 ng/L     Imaging: " I have personally reviewed pertinent reports  EKG: NSR no ischemic changes  Left Ventricle Left ventricular cavity size is normal  Wall thickness is moderately increased  There is mild to moderate concentric hypertrophy  The left ventricular ejection fraction is 59% by visual estimation  Systolic function is normal   Wall motion is normal  Diastolic function is moderately abnormal, consistent with grade II (pseudonormal) relaxation  Right Ventricle Systolic function is normal  Normal tricuspid annular plane systolic excursion (TAPSE) > 1 7 cm  Wall thickness is increased  Left Atrium The atrium is moderately dilated (>48 mL/m2)  Right Atrium The atrium is mildly dilated  Aortic Valve The leaflets are moderately thickened  The leaflets are severely calcified  There is severely reduced mobility  There is mild regurgitation  There is moderate to severe stenosis  The aortic valve peak velocity is 4 1 m/s  The aortic valve peak gradient is 63 mmHg  The aortic valve mean gradient is 35 0 mmHg  Mitral Valve There is mild thickening  There is mild annular calcification  There is trace regurgitation  There is no evidence of stenosis  Tricuspid Valve There is mild thickening  There is mild regurgitation  There is no evidence of stenosis  The right ventricular systolic pressure is moderately elevated  Pulmonic Valve The pulmonic valve was not well visualized  Code Status: Level 1 - Full Code  Advance Directive and Living Will:      Power of :    POLST:      Counseling / Coordination of Care  Total floor / unit time spent today 45 minutes  Greater than 50% of total time was spent with the patient and / or family counseling and / or coordination of care

## 2023-05-17 NOTE — QUICK NOTE
Notified by radiology regarding L1-L2 discitis/OM  Consulted ID and updated neurosurgery regarding this- no further recs from them  Neurochecks ordered as well as blood cultures

## 2023-05-17 NOTE — CONSULTS
[facility]  Consult  Name: Cayetano Monzon 69 y o  male I MRN: [id number]  Unit/Bed#: S -01 I Date of Admission: 5/16/2023   Date of Service: 5/17/2023 I Hospital Day: 1    Inpatient consult to Neurosurgery  Consult performed by: Eyad Caceres PA-C  Consult ordered by: Camryn Antoine PA-C      Imaging personally reviewed 5/17/2023 at 10:30 AM  Patient examined at bedside 5/17/2023 at 10:35 AM    Assessment/Plan   Back pain  Assessment & Plan  Patient evaluated today for acute on chronic back pain, worse over the past 3 weeks after lifting a heavy object  · Patient currently admitted with chest pain, shortness of breath, bilateral lower extremity edema possibly secondary to steroid use for back pain  This is improved with Lasix  · Imaging this admission concerning for L1-2 discitis  · Denies bilateral lower extremity radicular symptoms, gait instability, falls, bowel or bladder incontinence  · On exam, GCS 15  Strength and sensation intact  Nontender to palpation midline lumbar spine  Imaging:  · CTCAP w/contrast, 5/16/23: There is endplate sclerosis and some small erosions involving both the L1 and L2 endplates  Some of these erosions appear to be new since the study of May 2 suggesting some possible inflammation    Plan:  · Continue to monitor neurological exam  · MRI lumbar spine with and without contrast is recommended to evaluate for discitis  Patient will require pain medication prior to MRI    · CRP 31 7, ESR pending, WBC 16 63, afebrile  · Recommend blood cultures x2  · PT OT evaluation, mobilize as tolerated  · We will defer TLSO brace and additional imaging until MRI can be reviewed  · Patient may require ID consult as well as IR biopsy based off the results of the MRI  · Medical management per primary team  · DVT ppx: SCDs, SQH  · No neurosurgical intervention is anticipated at this time given stable exam    Neurosurgery will continue to follow pending further imaging  Please call with questions or concerns  Atrial fibrillation [facility])  Assessment & Plan  Cardiology following, appreciate recommendations   · Patient was to start Eliquis 5mg BID in the outpatient setting but has not started yet due to concern for bleeding/bruising  · Recommeding continuing to hold at this time until MRI is obtained as patient may require IR biopsy       History of Present Illness     HPI: Landon Rodriguez is a 69y o  year old male with PMH including A-fib not on AC, aortic stenosis, hypertension who presents with complaint of acute on chronic back pain, shortness of breath, chest pain, bilateral lower extremity edema  He states that approximately 3 weeks ago he lifted something heavy while twisting and hurt his back  It became severe in nature 2 days later and he came to the ED  Imaging was negative for acute injury and he has been treating this with heat  He has been on steroids for his back pain  Unfortunately, he developed chest pain, shortness of breath, bilateral lower extremity edema which brought him to the ED once again  He has been given IV Lasix which has helped with his symptoms  Currently, he feels fine  CT chest abdomen pelvis was obtained for further information given leukocytosis and showed new lucencies along the endplates of B5-1 suggesting possible discitis  These changes are worsened compared to CT lumbar spine on 5/2/2023  Currently the patient has right-sided back pain which is worse while laying flat  It is improved with lidocaine patch  He denies bilateral lower extremity pain, numbness, tingling, or weakness  He denies bowel or bladder dysfunction  He has no difficulty ambulating and does not use ambulation assistive devices  He denies fever or chills  He has no deficits on exam       Review of Systems   Constitutional: Negative for chills and fever  HENT: Negative for ear pain and sore throat  Eyes: Negative for pain and visual disturbance  Respiratory: Negative for cough and shortness of breath  Cardiovascular: Negative for chest pain and palpitations  Gastrointestinal: Negative for abdominal pain and vomiting  Genitourinary: Negative for dysuria and hematuria  Musculoskeletal: Positive for back pain  Negative for arthralgias  Skin: Negative for color change and rash  Neurological: Negative for seizures, syncope, weakness and numbness  All other systems reviewed and are negative  Historical Information   History reviewed  No pertinent past medical history    Past Surgical History:   Procedure Laterality Date   • SKIN BIOPSY       Social History     Substance and Sexual Activity   Alcohol Use Not Currently     Social History     Substance and Sexual Activity   Drug Use Not Currently     Social History     Tobacco Use   Smoking Status Former   • Types: Cigarettes   • Quit date: 3/11/2005   • Years since quittin 1   Smokeless Tobacco Never     Family History   Problem Relation Age of Onset   • Colon cancer Mother    • Lung cancer Father    • Mental illness Neg Hx        Meds/Allergies   all current active meds have been reviewed, current meds:   Current Facility-Administered Medications   Medication Dose Route Frequency   • acetaminophen (TYLENOL) tablet 650 mg  650 mg Oral Q6H PRN   • furosemide (LASIX) injection 40 mg  40 mg Intravenous Once   • [START ON 2023] furosemide (LASIX) tablet 40 mg  40 mg Oral Daily   • heparin (porcine) subcutaneous injection 5,000 Units  5,000 Units Subcutaneous Q8H [address]   • HYDROmorphone (DILAUDID) injection 0 5 mg  0 5 mg Intravenous Q4H PRN   • lidocaine (LIDODERM) 5 % patch 1 patch  1 patch Topical Daily   • metoprolol tartrate (LOPRESSOR) partial tablet 12 5 mg  12 5 mg Oral Q12H JARRETT   • oxyCODONE (ROXICODONE) immediate release tablet 10 mg  10 mg Oral Q4H PRN   • oxyCODONE (ROXICODONE) IR tablet 5 mg  5 mg Oral Q4H PRN   • polyethylene glycol (MIRALAX) packet 17 g  17 g Oral Daily PRN • predniSONE tablet 20 mg  20 mg Oral Daily    and PTA meds:   Prior to Admission Medications   Prescriptions Last Dose Informant Patient Reported? Taking? Multiple Vitamins-Minerals (PRESERVISION AREDS PO) Not Taking  Yes No   Sig: Take by mouth in the morning   Patient not taking: Reported on 2023   acetaminophen (TYLENOL) 325 mg tablet Past Week  No Yes   Sig: Take 2 tablets (650 mg total) by mouth every 6 (six) hours as needed for mild pain   apixaban (Eliquis) 5 mg Not Taking  No No   Sig: Take 1 tablet (5 mg total) by mouth 2 (two) times a day   Patient not taking: Reported on 2023   lidocaine (Lidoderm) 5 % Not Taking  No No   Sig: Apply 2 patches topically over 12 hours daily Remove & Discard patch within 12 hours or as directed by MD   Patient not taking: Reported on 2023   metoprolol tartrate (LOPRESSOR) 25 mg tablet Past Week  No Yes   Sig: Take 0 5 tablets (12 5 mg total) by mouth every 12 (twelve) hours   polyethylene glycol (MIRALAX) 17 g packet Not Taking  No No   Sig: Take 17 g by mouth daily as needed (Constipation)   Patient not taking: Reported on 2023   predniSONE 20 mg tablet 2023  No Yes   Si PO QD X 4 DAYS, THEN 1 PO QD X 4 DAYS, THEN 1/2 PO QD X 4 DAYS      Facility-Administered Medications: None     No Known Allergies    Objective   I/O       05/15 0701   0700  0701   0700  0701   0700    P  O    180    Total Intake(mL/kg)   180 (2 7)    Urine (mL/kg/hr)   950 (2 9)    Total Output   950    Net   -770                 Physical Exam  Vitals and nursing note reviewed  Constitutional:       Appearance: Normal appearance  He is well-developed and normal weight  HENT:      Head: Normocephalic and atraumatic  Eyes:      Extraocular Movements: Extraocular movements intact  Pupils: Pupils are equal, round, and reactive to light  Cardiovascular:      Rate and Rhythm: Normal rate     Pulmonary:      Effort: Pulmonary effort is normal  "No respiratory distress  Abdominal:      Palpations: Abdomen is soft  Musculoskeletal:         General: Normal range of motion  Cervical back: Normal range of motion  Skin:     General: Skin is warm and dry  Neurological:      General: No focal deficit present  Mental Status: He is alert and oriented to person, place, and time  Cranial Nerves: Cranial nerves 2-12 are intact  Motor: Motor strength is normal       Deep Tendon Reflexes:      Reflex Scores:       Patellar reflexes are 2+ on the right side and 2+ on the left side  Psychiatric:         Mood and Affect: Mood normal          Speech: Speech normal          Behavior: Behavior normal          Thought Content: Thought content normal          Judgment: Judgment normal        Neurologic Exam     Mental Status   Oriented to person, place, and time  Follows 2 step commands  Attention: normal  Concentration: normal    Speech: speech is normal   Level of consciousness: alert  Knowledge: good  Able to repeat  Normal comprehension  Cranial Nerves   Cranial nerves II through XII intact  CN III, IV, VI   Pupils are equal, round, and reactive to light  Motor Exam   Muscle bulk: normal  Overall muscle tone: normal    Strength   Strength 5/5 throughout  Sensory Exam   Light touch normal      Gait, Coordination, and Reflexes     Tremor   Resting tremor: absent    Reflexes   Right patellar: 2+  Left patellar: 2+  Right ankle clonus: absent  Left ankle clonus: absent        Vitals:Blood pressure 137/54, pulse 79, temperature 97 8 °F (36 6 °C), temperature source Oral, resp  rate 18, height 5' 8\" (1 727 m), weight 66 kg (145 lb 8 1 oz), SpO2 97 %  ,Body mass index is 22 12 kg/m²  Lab Results:   Results from last 7 days   Lab Units 05/16/23  1101 05/11/23  1245   WBC Thousand/uL 16 63*  --    WHITE BLOOD CELL COUNT   Thousand/uL  --  15 6*   HEMOGLOBIN g/dL 10 7*  --    HEMOGLOBIN  g/dL  --  11 2*   HEMATOCRIT % 32 1*  --  " HEMATOCRIT  %  --  33 5*   PLATELETS Thousands/uL 361  --    PLATELETS  Thousand/uL  --  383   NEUTROS PCT % 85*  --    NEUTROS PCT  %  --  91 1   MONOS PCT % 6  --    MONOS PCT  %  --  4 7     Results from last 7 days   Lab Units 05/17/23  0121 05/16/23  1101 05/11/23  1245   POTASSIUM mmol/L 4 0 3 9 4 1   CHLORIDE mmol/L 94* 95* 96*   CO2 mmol/L 25 26 28   BUN mg/dL 21 19 13   CREATININE mg/dL 0 78 0 72 0 71   CALCIUM mg/dL 8 5 8 8 9 2   ALK PHOS U/L  --  49 58   ALT U/L  --  13 11   AST U/L  --  13 12                 No results found for: TROPONINT  ABG:No results found for: PHART, FKA6RLJ, PO2ART, VEN3XYD, F8QXWYHS, BEART, SOURCE    Imaging Studies: I have personally reviewed pertinent reports  and I have personally reviewed pertinent films in PACS     XR chest 1 view portable    Result Date: 5/17/2023  Narrative: CHEST INDICATION:   SOB  COMPARISON: Chest CT 5/16/2023  EXAM PERFORMED/VIEWS:  XR CHEST PORTABLE  FINDINGS: Cardiomediastinal silhouette normal  Mild interstitial edema with small effusions and bibasilar atelectasis  Upper abdomen normal  Bones normal for age  Impression: Mild interstitial edema with small effusions and mild bibasilar atelectasis  Workstation performed: [id number]     CT chest abdomen pelvis w contrast    Result Date: 5/17/2023  Narrative: CT CHEST, ABDOMEN AND PELVIS WITH IV CONTRAST INDICATION:   distended abdomen and abdominal pain  History of vomiting; back pain COMPARISON: CT abdomen from 5/2/2023 and lumbar spine TECHNIQUE: CT examination of the chest, abdomen and pelvis was performed  Multiplanar 2D reformatted images were created from the source data  This examination, like all CT scans performed in the [place], was performed utilizing techniques to minimize radiation dose exposure, including the use of iterative reconstruction and automated exposure control   Radiation dose length product (DLP) for this visit:  358 mGy-cm IV Contrast:  100 mL of iohexol (OMNIPAQUE) Enteric Contrast: Enteric contrast was administered  FINDINGS: CHEST LUNGS: Atelectatic changes are adjacent to small effusions bilaterally in the lower lobes versus small infiltrates  Minimal groundglass opacity seen in the right upper lobe on image 123  Small blebs are seen at the apices right greater than left  Right lower lobe 3 mm nodule, image 166, series 302  Small dense granulomas in the right middle lobe superiorly  2 mm pleural-based nodule right lower lobe, image 197  Mild cylindrical bronchiectasis is noted at the bases  PLEURA: Small effusions appear new since the prior study  HEART/GREAT VESSELS: Heavy aortic valve calcification is seen as well as coronary calcification  No thoracic aortic aneurysm  MEDIASTINUM AND TOBY: 1 1 cm precarinal node appears reactive  Prominent right hilar and subcarinal node is also seen    CHEST WALL AND LOWER NECK:  Unremarkable  ABDOMEN LIVER/BILIARY TREE:  Unremarkable  GALLBLADDER: There are gallstone(s) within the gallbladder, without pericholecystic inflammatory changes  SPLEEN:  Unremarkable  PANCREAS:  Unremarkable  ADRENAL GLANDS:  Unremarkable  KIDNEYS/URETERS: Nonobstructing right renal calculi  No hydronephrosis  Small circumscribed hypodensities are seen statistically most likely tiny cysts but too small to characterize  STOMACH AND BOWEL: The stomach is significantly distended with fluid  No small bowel dilatation  A few fluid filled loops of small bowel are seen  The colon is mostly decompressed  APPENDIX:  No findings to suggest appendicitis  ABDOMINOPELVIC CAVITY: Trace ascites in the pelvis  No pneumoperitoneum  No lymphadenopathy  VESSELS: 2 7 cm abdominal aortic aneurysm  PELVIS REPRODUCTIVE ORGANS:  Unremarkable for patient's age  URINARY BLADDER: The bladder is not well distended  Mild diffuse bladder wall thickening is identified  ABDOMINAL WALL/INGUINAL REGIONS:  Unremarkable   OSSEOUS STRUCTURES:  No acute fracture or destructive osseous lesion  Multilevel degenerative spondylosis is again demonstrated mostly in the lumbar spine  There is endplate sclerosis and some small erosions involving both the L1 and L2 endplates  Some of these erosions appear to be new since the study of May 2 suggesting some possible inflammation  Some erosions are noted on sagittal image 108 as well as 105  Impression: New effusions with adjacent atelectasis versus small infiltrates  Small lung nodules  Based on current Fleischner Society 2017 Guidelines on incidental pulmonary nodule, no routine follow-up is needed if the patient is low risk  If the patient is high risk, optional follow-up chest CT at 12 months can be considered  Mild mediastinal adenopathy may be reactive, Gastric distention with fluid this could be related to gastroparesis or outlet obstruction  Nasogastric tube could be considered  No evidence of small bowel obstruction  Some trace ascites  Some new lucencies are noted along the endplates at I5-H3 and suggest the possibility of inflammatory discitis superimposed on degenerative change  No obvious paraspinal soft tissue mass can be appreciated  Correlation with erythrocyte sedimentation rate  and MRI is suggested  This was discussed with SHIREEN Rios on 5/17/23 Workstation performed: [id number]     CT recon only lumbar spine    Result Date: 5/2/2023  Narrative: CT RECON ONLY LUMBAR SPINE (NO CHARGE) INDICATION:   low back pain  COMPARISON:  None TECHNIQUE: Axial CT examination of the lumbar spine was obtained utilizing reconstructed images from CT of the chest, abdomen and pelvis performed the same day  Images were reformatted in the sagittal and coronal planes  This examination, like all CT scans performed in the [facility], was performed utilizing techniques to minimize radiation dose exposure, including the use of iterative reconstruction and automated exposure control   FINDINGS: ALIGNMENT: There is retrolisthesis of L1-L2  There is mild anterolisthesis of L4-L5  Mild levoscoliosis  VERTEBRAE: Vertebral body heights are maintained  No acute fractures are identified  DEGENERATIVE CHANGES: L1-L2: There is a bulging annulus with dorsal osteophytosis  There is facet arthrosis  There is mild foraminal narrowing  L2-L3: There is disc base narrowing  There is a bulging annulus  There is dorsal marginal osteophytosis  There is facet arthrosis  There is mild canal stenosis  There is mild to moderate right and mild left foraminal narrowing  L3-L4: There is a bulging annulus  There is facet arthrosis  There is mild canal stenosis  There is mild to moderate right and mild left foraminal narrowing  L4-L5: There is uncovering the disc space  There is mild to moderate canal stenosis  There is facet arthrosis  There is moderate left and mild to moderate right foraminal narrowing  L5-S1: There is facet arthrosis  There is no significant canal stenosis  There is mild foraminal narrowing  PREVERTEBRAL AND PARASPINAL SOFT TISSUES: Normal      Impression: No acute fractures  Degenerative changes of the lumbar spine, as described above  Correlate with separate CT of the abdomen and pelvis for additional findings  Workstation performed: [id number]     VAS lower limb venous duplex study, complete bilateral    Result Date: 5/16/2023  Narrative:  [facility] REPORT CLINICAL: Indications: Physician wants to determine patency of the venous system secondary to lower extremity swelling, LT> RT  Risk Factors The patient has history of HTN, Hyperlipidemia and previous smoking (quit >10yrs ago)  FINDINGS:  Right         Reflux  Valve Closure Time  GSV Inguinal  Reflux                0 90     CONCLUSION: Impression:  RIGHT LOWER LIMB: No evidence of acute or chronic deep vein thrombosis  No evidence of superficial thrombophlebitis noted   Doppler evaluation shows evidence of valvular incompetence in the great saphenous vein at the sapheno-femoral "junction  Popliteal, posterior tibial and anterior tibial arterial Doppler waveforms are biphasic  LEFT LOWER LIMB: No evidence of acute or chronic deep vein thrombosis  No evidence of superficial thrombophlebitis noted  Doppler evaluation shows a normal response to augmentation maneuvers  Popliteal posterior and anterior tibial arterial Doppler waveforms are biphasic/monophasic  Technical findings were given to Dr Rosalba Restrepo via [facility] Text  SIGNATURE: Electronically Signed by: Jessica Galdamez MD, [address] on 2023-05-16 09:02:14 PM    CT abdomen pelvis with contrast    Result Date: 5/2/2023  Narrative: CT ABDOMEN AND PELVIS WITH IV CONTRAST INDICATION:   back pain  \"Lower back pain since Sunday, states he couldn't stand up to get out of bed  States no injury to the area\" COMPARISON:  None  TECHNIQUE:  CT examination of the abdomen and pelvis was performed  Multiplanar 2D reformatted images were created from the source data  Radiation dose length product (DLP) for this visit:  407 mGy-cm   This examination, like all CT scans performed in the [facility], was performed utilizing techniques to minimize radiation dose exposure, including the use of iterative reconstruction and automated exposure control  IV Contrast:  100 mL of iohexol (OMNIPAQUE) Enteric Contrast:  Enteric contrast was not administered  FINDINGS: ABDOMEN LOWER CHEST:  No clinically significant abnormality identified in the visualized lower chest  LIVER/BILIARY TREE:  Unremarkable  GALLBLADDER: There are gallstone(s) within the gallbladder, without pericholecystic inflammatory changes  SPLEEN:  Unremarkable  PANCREAS:  Unremarkable  ADRENAL GLANDS:  Unremarkable  KIDNEYS/URETERS:  Unremarkable  No hydronephrosis  STOMACH AND BOWEL:  Unremarkable  APPENDIX:  No findings to suggest appendicitis  ABDOMINOPELVIC CAVITY:  No ascites  No pneumoperitoneum  No lymphadenopathy  VESSELS:  Unremarkable for patient's age   PELVIS REPRODUCTIVE ORGANS:  " Unremarkable for patient's age  URINARY BLADDER:  Unremarkable  ABDOMINAL WALL/INGUINAL REGIONS:  Unremarkable  OSSEOUS STRUCTURES:  No acute fracture or destructive osseous lesion  Spinal degenerative changes are noted  Impression: No acute findings in the abdomen or the pelvis  Spinal degenerative changes likely account for this patient's back pain  Workstation performed: [id number]     US bedside procedure    Result Date: 5/16/2023  Narrative: 1 2 840 [id number]  2 446 161 [id number] 24 1    Echo complete w/ contrast if indicated    Result Date: 5/3/2023  Narrative: •  Left Ventricle: Left ventricular cavity size is normal  Wall thickness is moderately increased  There is mild to moderate concentric hypertrophy  The left ventricular ejection fraction is 59% by visual estimation  Systolic function is normal  Wall motion is normal  Diastolic function is moderately abnormal, consistent with grade II (pseudonormal) relaxation  •  Right Ventricle: Systolic function is normal  Normal tricuspid annular plane systolic excursion (TAPSE) > 1 7 cm  Wall thickness is increased  •  Left Atrium: The atrium is moderately dilated (>48 mL/m2)  •  Right Atrium: The atrium is mildly dilated  •  Aortic Valve: The leaflets are moderately thickened  The leaflets are severely calcified  There is severely reduced mobility  There is mild regurgitation  There is moderate to severe stenosis  The aortic valve peak velocity is 4 1 m/s  The aortic valve peak gradient is 63 mmHg  The aortic valve mean gradient is 35 0 mmHg  •  Mitral Valve: There is mild annular calcification  •  Tricuspid Valve: There is mild regurgitation  The right ventricular systolic pressure is moderately elevated  Compared to prior echo, aortic valve stenosis is now borderline severe  Wall thickness has increased  Increased left atrial dilation  EKG, Pathology, and Other Studies: I have personally reviewed pertinent reports        VTE Prophylaxis: Sequential compression device (Venodyne)  and Heparin    Code Status: Level 1 - Full Code  Advance Directive and Living Will:      Power of :    POLST:      Counseling / Coordination of Care  I spent 30 minutes with the patient

## 2023-05-17 NOTE — ASSESSMENT & PLAN NOTE
Patient evaluated today for acute on chronic back pain, worse over the past 3 weeks after lifting a heavy object  · Patient currently admitted with chest pain, shortness of breath, bilateral lower extremity edema possibly secondary to steroid use for back pain  This is improved with Lasix  · Imaging this admission concerning for L1-2 discitis  · Denies bilateral lower extremity radicular symptoms, gait instability, falls, bowel or bladder incontinence  · On exam, GCS 15  Strength and sensation intact  Nontender to palpation midline lumbar spine  Imaging:  · CTCAP w/contrast, 5/16/23: There is endplate sclerosis and some small erosions involving both the L1 and L2 endplates  Some of these erosions appear to be new since the study of May 2 suggesting some possible inflammation    Plan:  · Continue to monitor neurological exam  · MRI lumbar spine with and without contrast is recommended to evaluate for discitis  Patient will require pain medication prior to MRI  · CRP 31 7, ESR pending, WBC 16 63, afebrile  · Recommend blood cultures x2  · PT OT evaluation, mobilize as tolerated  · We will defer TLSO brace and additional imaging until MRI can be reviewed  · Patient may require ID consult as well as IR biopsy based off the results of the MRI  · Medical management per primary team  · DVT ppx: SCDs, SQH  · No neurosurgical intervention is anticipated at this time given stable exam    Neurosurgery will continue to follow pending further imaging  Please call with questions or concerns

## 2023-05-17 NOTE — PROGRESS NOTES
[facility]  Progress Note  Name: Talha Bray  MRN: [id number]  Unit/Bed#: S -01 I Date of Admission: 5/16/2023   Date of Service: 5/17/2023 I Hospital Day: 1    Assessment/Plan   * Shortness of breath  Assessment & Plan  · Likely 2/2 fluid retention/volume overload   · CT chest with pleural effusions and atelectasis  · Unfortunately no output has been documented but patient does report that he has been urinating with the Lasix  · Continue I/O Daily weights   · Continue IV Lasix 20 mg twice daily    Elevated troponin  Assessment & Plan  · Elevated troponins in the 200-300 range upon admission and overnight, flat  · Denies chest pain  · Likely secondary to volume overload, see related treatment plan  · Cardiology following  Back pain  Assessment & Plan  · He is on a steroid taper and due to start prednisone 20 mg once a day today  · Some new lucencies are noted along the endplates at K1-F4 and suggest the possibility of inflammatory discitis superimposed on degenerative change  No obvious paraspinal soft tissue mass can be appreciated  · We will have neurosurgery evaluate  Hold on antibiotics for now  · Pain control ordered  · MRI L-spine ordered, unclear if he will be able to tolerate lying flat for extended amount of time  · Analgesia ordered    SIRS (systemic inflammatory response syndrome) (HCC)  Assessment & Plan  · Present on admission with leukocytosis and tachypnea  Leukocytosis likely from recent steroid use  · No clear evidence of active infection although discitis is noted on CT scan  He has had no fevers  · Initially complaint of abdominal pain, chronic back pain as well as shortness of breath  Abdominal pain and shortness of breath now resolved  · In the setting of possible discitis hold on antibiotics  · Likely atelectasis noted on CT chest   No infectious abnormalities noted on abdomen and pelvis scan    · UA negative        Aortic stenosis  Assessment & Plan  · Last Echo beginning of this month shows severe stenosis  · Cardiac eval is pending    Atrial fibrillation ([address] )  Assessment & Plan  · Presently rate controlled with Lopressor  · Not on anticoagulation per patient    Primary hypertension  Assessment & Plan  · BP is 137/54  · Continue Lopressor  · Continue with Lasix    Hyponatremia  Assessment & Plan  · Sodium 128 upon admission, repeat this morning 127  · Appears to have chronic hyponatremia  · Continue to trend BMP with diuresis  Leg swelling-resolved as of 2023  Assessment & Plan  L>R   Will get venous doppler   Give lasix           VTE Pharmacologic Prophylaxis: VTE Score: 4 Moderate Risk (Score 3-4) - Pharmacological DVT Prophylaxis Ordered: heparin  Patient Centered Rounds: I performed bedside rounds with nursing staff today  Discussions with Specialists or Other Care Team Provider: doreen rn, [address] [place] [address]    Education and Discussions with Family / Patient: Patient declined call to   Time Spent for Care: 45 minutes  More than 50% of total time spent on counseling and coordination of care as described above  Current Length of Stay: 1 day(s)  Current Patient Status: Inpatient   Certification Statement: The patient will continue to require additional inpatient hospital stay due to Shortness of breath secondary to volume overload, possible discitis on imaging requiring neurosurgery work-up  Discharge Plan: Anticipate discharge in 24-48 hrs to home  Code Status: Level 1 - Full Code    Subjective:   Patient still complains of severe back pain which he was unable to sleep with  Unable to lie flat at all without severe pain  No fevers  Reports shortness of breath has improved      Objective:     Vitals:   Temp (24hrs), Av 8 °F (36 6 °C), Min:97 5 °F (36 4 °C), Max:98 2 °F (36 8 °C)    Temp:  [97 5 °F (36 4 °C)-98 2 °F (36 8 °C)] 97 8 °F (36 6 °C)  HR:  [69-79] 79  Resp:  [18-22] 18  BP: (128-178)/(43-70) 137/54  SpO2:  [93 %-99 %] 97 %  Body mass index is 22 12 kg/m²  Input and Output Summary (last 24 hours): Intake/Output Summary (Last 24 hours) at 5/17/2023 1052  Last data filed at 5/17/2023 1004  Gross per 24 hour   Intake 180 ml   Output 700 ml   Net -520 ml       Physical Exam:   Physical Exam  Vitals and nursing note reviewed  Constitutional:       General: He is not in acute distress  Appearance: Normal appearance  HENT:      Head: Normocephalic  Mouth/Throat:      Mouth: Mucous membranes are moist    Eyes:      Pupils: Pupils are equal, round, and reactive to light  Cardiovascular:      Rate and Rhythm: Normal rate and regular rhythm  Heart sounds: No murmur heard  Pulmonary:      Effort: Pulmonary effort is normal  No respiratory distress  Breath sounds: Normal breath sounds  No wheezing, rhonchi or rales  Abdominal:      General: Bowel sounds are normal  There is no distension  Palpations: Abdomen is soft  Tenderness: There is no abdominal tenderness  There is no guarding  Musculoskeletal:         General: No deformity  Cervical back: Normal range of motion  Right lower leg: No edema  Left lower leg: Edema (1+) present  Skin:     Capillary Refill: Capillary refill takes less than 2 seconds  Neurological:      General: No focal deficit present  Mental Status: He is alert and oriented to person, place, and time  Mental status is at baseline            Additional Data:     Labs:  Results from last 7 days   Lab Units 05/16/23  1101   WBC Thousand/uL 16 63*   HEMOGLOBIN g/dL 10 7*   HEMATOCRIT % 32 1*   PLATELETS Thousands/uL 361   NEUTROS PCT % 85*   LYMPHS PCT % 7*   MONOS PCT % 6   EOS PCT % 1     Results from last 7 days   Lab Units 05/17/23  0121 05/16/23  1101   SODIUM mmol/L 127* 128*   POTASSIUM mmol/L 4 0 3 9   CHLORIDE mmol/L 94* 95*   CO2 mmol/L 25 26   BUN mg/dL 21 19   CREATININE mg/dL 0 78 0 72   ANION GAP mmol/L 8 7   CALCIUM mg/dL 8 5 8 8   ALBUMIN g/dL  --  3 4*   TOTAL BILIRUBIN mg/dL  --  0 39   ALK PHOS U/L  --  49   ALT U/L  --  13   AST U/L  --  13   GLUCOSE RANDOM mg/dL 103 112             Results from last 7 days   Lab Units 05/11/23  1245   HEMOGLOBIN A1C % of total Hgb 5 8*           Lines/Drains:  Invasive Devices     Peripheral Intravenous Line  Duration           Peripheral IV 05/16/23 Right Antecubital 1 day                  Telemetry:  Telemetry Orders (From admission, onward)             24 Hour Telemetry Monitoring  Continuous x 24 Hours (Telem)        Question:  Reason for 24 Hour Telemetry  Answer:  PCI/EP study (including pacer and ICD implementation), Cardiac surgery, MI, abnormal cardiac cath, and chest pain- rule out MI                 Telemetry Reviewed: Normal Sinus Rhythm  Indication for Continued Telemetry Use: Acute CHF on >200 mg lasix/day or equivalent dose or with new reduced EF  Imaging: No pertinent imaging reviewed  Recent Cultures (last 7 days):         Last 24 Hours Medication List:   Current Facility-Administered Medications   Medication Dose Route Frequency Provider Last Rate   • acetaminophen  650 mg Oral Q6H PRN Benson Woodard MD     • furosemide  20 mg Intravenous BID (diuretic) Benson Woodard MD     • heparin (porcine)  5,000 Units Subcutaneous [facility] Benson Woodard MD     • HYDROmorphone  0 5 mg Intravenous Q4H PRN Marcello Shaw PA-C     • lidocaine  1 patch Topical Daily Ofelia George PA-C     • metoprolol tartrate  12 5 mg Oral Q12H [address] Benson Woodard MD     • oxyCODONE  10 mg Oral Q4H PRN Marcello Shaw PA-C     • oxyCODONE  5 mg Oral Q4H PRN Marcello Shaw PA-C     • polyethylene glycol  17 g Oral Daily PRN Benson Woodard MD     • predniSONE  20 mg Oral Daily Benson Woodard MD          Today, Patient Was Seen By: Alexis Cheung PA-C    **Please Note: This note may have been constructed using a voice recognition system  **

## 2023-05-17 NOTE — UTILIZATION REVIEW
Initial Clinical Review    Admission: Date/Time/Statement:   Admission Orders (From admission, onward)     Ordered        05/16/23 1355  Inpatient Admission  Once                      Orders Placed This Encounter   Procedures   • Inpatient Admission     Standing Status:   Standing     Number of Occurrences:   1     Order Specific Question:   Level of Care     Answer:   Med Surg [16]     Order Specific Question:   Estimated length of stay     Answer:   More than 2 Midnights     Order Specific Question:   Certification     Answer:   I certify that inpatient services are medically necessary for this patient for a duration of greater than two midnights  See H&P and MD Progress Notes for additional information about the patient's course of treatment  ED Arrival Information     Expected   -    Arrival   5/16/2023 10:40    Acuity   Urgent            Means of arrival   Walk-In    Escorted by   Self    Service   Hospitalist    Admission type   Emergency            Arrival complaint   SOB           Chief Complaint   Patient presents with   • Shortness of Breath     Pt reports SOB over the last few days, slight chest pressure when coughing, constant lower back pain since last week, seen at [facility] for the same  Denies fevers        Initial Presentation: 69 y o  male with a PMH of aortic stenosis, CAD, chronic back pain who presents with worsening back pain  He had presented to [facility] ED with acute on chronic back pain earlier this month  Since then his back pain has been getting worse  He has been on a steroid taper, and over the last 3 days complaining of cough and abdominal pain and nausea as well as one episode of nausea and vomiting  As per his wife he has been constipated as well  The main reason he is in hospital now is worsening abdominal distension/pain and shortness of breath and cough   Plan: Inpatient admission for evaluation and treatment of shortness of breath, back pain, leg swelling, abdominal pain, SIRS, aortic stenosis, HTN, hyponatremia: lasix 20 mg bid, steroid taper, venous doppler, CT chest/abd/pelvis, monitor Na level  Date: 5/17   Day 2:     Internal medicine: CT chest with pleural effusions and atelectasis  Continue IV Lasix 20 mg twice daily  Elevated troponins in the 200-300 range upon admission and overnight, flat  Cardiology consult  Some new lucencies are noted along the endplates at Q5-U7 and suggest the possibility of inflammatory discitis superimposed on degenerative change  No obvious paraspinal soft tissue mass can be appreciated  Neurosurgery consult  Continue steroid taper  MRI L spine  Continue home Lopressor  Venous duplex  Trend BMP  Cardiology consult: Will transition to 40 mg oral Lasix daily after 2nd dose of IV lasix today  Would continue beta-blocker at 12 5 mg every 12 hours  Recommend Eliquis 5 mg twice daily  Neurosurgery consult: MRI lumbar spine with and without contrast is recommended to evaluate for discitis  Patient will require pain medication prior to MRI  Blood cultures x2  PT/OT eval  We will defer TLSO brace and additional imaging until MRI can be reviewed  No neurosurgical intervention is anticipated at this time given stable exam     Date: 5/18    Day 3: Has surpassed a 2nd midnight with active treatments and services  Pt requiring continued stay for new diagnosis of discitis/OM and ID consult         ED Triage Vitals [05/16/23 1046]   Temperature Pulse Respirations Blood Pressure SpO2   97 5 °F (36 4 °C) 84 22 168/74 96 %      Temp Source Heart Rate Source Patient Position - Orthostatic VS BP Location FiO2 (%)   Oral Monitor Sitting Right arm --      Pain Score       3          Wt Readings from Last 1 Encounters:   05/17/23 66 kg (145 lb 8 1 oz)     Additional Vital Signs:     Date/Time Temp Pulse Resp BP MAP (mmHg) SpO2 O2 Device   05/17/23 07:28:44 97 8 °F (36 6 °C) 79 18 137/54 82 97 % None (Room air)   05/16/23 20:08:18 98 2 °F (36 8 °C) 78 18 128/44 Abnormal  72 96 % --   05/16/23 17:19:14 97 5 °F (36 4 °C) 73 18 130/43 Abnormal  72 99 % --   05/16/23 1630 -- 70 22 168/67 97 97 % None (Room air)   05/16/23 1430 -- 69 22 148/63 91 97 % None (Room air)   05/16/23 1400 -- 72 22 178/70 Abnormal   101 97 % None (Room air)   05/16/23 1200 -- 75 22 160/68 98 96 % None (Room air)   05/16/23 1130 -- 70 22 138/65 93 93 % None (Room air)   05/16/23 1124 -- -- -- -- -- -- None (Room air)     Pertinent Labs/Diagnostic Test Results:   CT chest abdomen pelvis w contrast   Final Result by Morgan Henderson MD (05/17 1005)      New effusions with adjacent atelectasis versus small infiltrates  Small lung nodules  Based on current Fleischner Society 2017 Guidelines on incidental pulmonary nodule, no routine follow-up is needed if the patient is low risk  If the patient is high risk, optional follow-up chest CT at 12 months can be considered  Mild mediastinal adenopathy may be reactive,      Gastric distention with fluid this could be related to gastroparesis or outlet obstruction  Nasogastric tube could be considered  No evidence of small bowel obstruction  Some trace ascites  Some new lucencies are noted along the endplates at E6-E7 and suggest the possibility of inflammatory discitis superimposed on degenerative change  No obvious paraspinal soft tissue mass can be appreciated  Correlation with erythrocyte sedimentation rate    and MRI is suggested  This was discussed with SHIREEN Coreas on 5/17/23                     Workstation performed: [id number]            XR chest 1 view portable   Final Result by Jerome Sanchez MD (05/17 0800)      Mild interstitial edema with small effusions and mild bibasilar atelectasis  Workstation performed: [id number]           5/17 MRI L spine:  IMPRESSION:     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, as described above  Minimal ventral epidural enhancement is noted without discrete collection  Paraspinal edema and enhancement within the psoas musculature without abscess formation      Degenerative changes of the lumbar spine, described above  5/16 VAS lower limb venous duplex:  Impression:     RIGHT LOWER LIMB:  No evidence of acute or chronic deep vein thrombosis  No evidence of superficial thrombophlebitis noted  Doppler evaluation shows evidence of valvular incompetence in the great  saphenous vein at the sapheno-femoral junction  Popliteal, posterior tibial and anterior tibial arterial Doppler waveforms are  biphasic  LEFT LOWER LIMB:  No evidence of acute or chronic deep vein thrombosis  No evidence of superficial thrombophlebitis noted  Doppler evaluation shows a normal response to augmentation maneuvers  Popliteal posterior and anterior tibial arterial Doppler waveforms are  Biphasic/monophasic  5/16 Repeat EKG:  Normal sinus rhythm  Possible Left atrial enlargement  Nonspecific ST abnormality  Abnormal ECG  When compared with ECG of 16-MAY-2023 11:16, (unconfirmed)  No significant change was found    5/16 EKG:  Normal sinus rhythm  Possible Left atrial enlargement  Nonspecific ST abnormality  Abnormal ECG  When compared with ECG of 03-MAY-2023 02:59,  Premature atrial complexes are no longer Present      Results from last 7 days   Lab Units 05/16/23  1101 05/11/23  1245   WBC Thousand/uL 16 63*  --    WHITE BLOOD CELL COUNT  Thousand/uL  --  15 6*   HEMOGLOBIN g/dL 10 7*  --    HEMOGLOBIN  g/dL  --  11 2*   HEMATOCRIT % 32 1*  --    HEMATOCRIT  %  --  33 5*   PLATELETS Thousands/uL 361  --    PLATELETS  Thousand/uL  --  383   NEUTROS ABS Thousands/µL 14 34*  --    NEUTROS ABS   cells/uL  --  06,457*         Results from last 7 days   Lab Units 05/17/23  0121 05/16/23  1101 05/11/23  1245   SODIUM mmol/L 127* 128* 132*   POTASSIUM mmol/L 4 0 3 9 4 1   CHLORIDE mmol/L 94* 95* 96*   CO2 mmol/L 25 26 28   ANION GAP mmol/L 8 7  --    BUN mg/dL 21 19 13   CREATININE mg/dL 0 78 0 72 0 71 EGFR ml/min/1 73sq m 87 89 94   CALCIUM mg/dL 8 5 8 8 9 2     Results from last 7 days   Lab Units 05/16/23  1101 05/11/23  1245   AST U/L 13 12   ALT U/L 13 11   ALK PHOS U/L 49 58   TOTAL PROTEIN g/dL 6 0* 6 1   ALBUMIN g/dL 3 4* 3 7   TOTAL BILIRUBIN mg/dL 0 39 0 4         Results from last 7 days   Lab Units 05/17/23  0121 05/16/23  1101 05/11/23  1245   GLUCOSE RANDOM mg/dL 103 112 108*         Results from last 7 days   Lab Units 05/11/23  1245   HEMOGLOBIN A1C % of total Hgb 5 8*         Results from last 7 days   Lab Units 05/17/23  0337 05/17/23  0121 05/16/23  2204 05/16/23  1626 05/16/23  1259 05/16/23  1101   HS TNI 0HR ng/L  --   --  251*  --   --  330*   HS TNI 2HR ng/L  --  240*  --   --  347*  --    HSTNI D2 ng/L  --  -11  --   --  17  --    HS TNI 4HR ng/L 229*  --   --  299*  --   --    HSTNI D4 ng/L -22  --   --  -31  --   --            Results from last 7 days   Lab Units 05/16/23  1101   BNP pg/mL 838*           Results from last 7 days   Lab Units 05/17/23  0121 05/11/23  1245   CRP mg/L 31 7*  --    C-REACTIVE PROTEIN mg/L  --  58 4*   SED RATE mm/h  --  6             Results from last 7 days   Lab Units 05/16/23  1323 05/11/23  1429   CLARITY UA  Clear clear   COLOR UA  Colorless yellow   SPEC GRAV UA  1 005  --    PH UA  7 0  --    GLUCOSE UA mg/dl Negative normal   KETONES UA mg/dl Negative neg   BLOOD UA  Negative 50   PROTEIN UA mg/dl Negative neg   NITRITE UA  Negative neg   BILIRUBIN UA  Negative  --    BILIRUBIN UA POC   --  neg   UROBILINOGEN UA   --  normal   UROBILINOGEN UA (BE) mg/dl <2 0  --    LEUKOCYTES UA  Negative 25         ED Treatment:   Medication Administration from 05/16/2023 1039 to 05/16/2023 1704       Date/Time Order Dose Route Action     05/16/2023 1232 EDT furosemide (LASIX) injection 20 mg 20 mg Intravenous Given     05/16/2023 1627 EDT furosemide (LASIX) injection 20 mg 20 mg Intravenous Given        History reviewed  No pertinent past medical history    Present on Admission:  • Hyponatremia  • Primary hypertension  • Aortic stenosis  • Atrial fibrillation [facility])      Admitting Diagnosis: Aortic stenosis [I35 0]  SOB (shortness of breath) [R06 02]  Congestive heart failure, unspecified HF chronicity, unspecified heart failure type ([facility] [address] ) [I50 9]  Age/Sex: 69 y o  male  Admission Orders:  Scheduled Medications:  furosemide, 40 mg, Intravenous, Once  [START ON 5/18/2023] furosemide, 40 mg, Oral, Daily  heparin (porcine), 5,000 Units, Subcutaneous, Q8H [facility]  lidocaine, 1 patch, Topical, Daily  metoprolol tartrate, 12 5 mg, Oral, Q12H [facility]  predniSONE, 20 mg, Oral, Daily      Continuous IV Infusions:     PRN Meds:  acetaminophen, 650 mg, Oral, Q6H PRN  HYDROmorphone, 0 5 mg, Intravenous, Q4H PRN  oxyCODONE, 10 mg, Oral, Q4H PRN  oxyCODONE, 5 mg, Oral, Q4H PRN  polyethylene glycol, 17 g, Oral, Daily PRN        IP CONSULT TO CARDIOLOGY  IP CONSULT TO NEUROSURGERY    Network Utilization Review Department  ATTENTION: Please call with any questions or concerns to [phone] and carefully listen to the prompts so that you are directed to the right person  All voicemails are confidential   Kristen Huynh all requests for admission clinical reviews, approved or denied determinations and any other requests to dedicated fax number below belonging to the campus where the patient is receiving treatment   List of dedicated fax numbers for the Facilities:  [address] [place] [address] DENIALS (Administrative/Medical Necessity) [phone]   [address] (Maternity/NICU/Pediatrics) [phone]   [address] [phone]   [facility] [phone]   [facility] [phone]   [facility] [address] [place] [id number] [place] [address] [address] [facility] 093 [facility] [phone] [address] [place] [address] [facility] [phone]

## 2023-05-17 NOTE — ASSESSMENT & PLAN NOTE
· Elevated troponins in the 200-300 range upon admission and overnight, flat  · Denies chest pain  · Likely secondary to volume overload, see related treatment plan  · Cardiology following

## 2023-05-18 ENCOUNTER — TELEPHONE (OUTPATIENT)
Dept: NEUROSURGERY | Facility: CLINIC | Age: 78
End: 2023-05-18

## 2023-05-18 ENCOUNTER — APPOINTMENT (INPATIENT)
Dept: RADIOLOGY | Facility: HOSPITAL | Age: 78
End: 2023-05-18

## 2023-05-18 PROBLEM — M46.46 DISCITIS OF LUMBAR REGION: Status: ACTIVE | Noted: 2023-05-18

## 2023-05-18 PROBLEM — I5A NON-ISCHEMIC MYOCARDIAL INJURY (NON-TRAUMATIC): Status: ACTIVE | Noted: 2023-05-17

## 2023-05-18 PROBLEM — R78.81 BACTEREMIA: Status: ACTIVE | Noted: 2023-05-18

## 2023-05-18 PROBLEM — A41.9 SEPSIS (HCC): Status: ACTIVE | Noted: 2023-05-16

## 2023-05-18 LAB
ANION GAP SERPL CALCULATED.3IONS-SCNC: 6 MMOL/L (ref 4–13)
ANION GAP SERPL CALCULATED.3IONS-SCNC: 7 MMOL/L (ref 4–13)
ANION GAP SERPL CALCULATED.3IONS-SCNC: 8 MMOL/L (ref 4–13)
BASOPHILS # BLD AUTO: 0.04 THOUSANDS/ÂΜL (ref 0–0.1)
BASOPHILS NFR BLD AUTO: 0 % (ref 0–1)
BUN SERPL-MCNC: 17 MG/DL (ref 5–25)
BUN SERPL-MCNC: 18 MG/DL (ref 5–25)
BUN SERPL-MCNC: 22 MG/DL (ref 5–25)
CALCIUM SERPL-MCNC: 8.4 MG/DL (ref 8.4–10.2)
CALCIUM SERPL-MCNC: 8.5 MG/DL (ref 8.4–10.2)
CALCIUM SERPL-MCNC: 8.7 MG/DL (ref 8.4–10.2)
CHLORIDE SERPL-SCNC: 91 MMOL/L (ref 96–108)
CHLORIDE SERPL-SCNC: 91 MMOL/L (ref 96–108)
CHLORIDE SERPL-SCNC: 93 MMOL/L (ref 96–108)
CO2 SERPL-SCNC: 28 MMOL/L (ref 21–32)
CO2 SERPL-SCNC: 29 MMOL/L (ref 21–32)
CO2 SERPL-SCNC: 29 MMOL/L (ref 21–32)
CREAT SERPL-MCNC: 0.75 MG/DL (ref 0.6–1.3)
CREAT SERPL-MCNC: 0.79 MG/DL (ref 0.6–1.3)
CREAT SERPL-MCNC: 0.79 MG/DL (ref 0.6–1.3)
EOSINOPHIL # BLD AUTO: 0.09 THOUSAND/ÂΜL (ref 0–0.61)
EOSINOPHIL NFR BLD AUTO: 1 % (ref 0–6)
ERYTHROCYTE [DISTWIDTH] IN BLOOD BY AUTOMATED COUNT: 14.3 % (ref 11.6–15.1)
GFR SERPL CREATININE-BSD FRML MDRD: 86 ML/MIN/1.73SQ M
GFR SERPL CREATININE-BSD FRML MDRD: 86 ML/MIN/1.73SQ M
GFR SERPL CREATININE-BSD FRML MDRD: 88 ML/MIN/1.73SQ M
GLUCOSE SERPL-MCNC: 115 MG/DL (ref 65–140)
GLUCOSE SERPL-MCNC: 132 MG/DL (ref 65–140)
GLUCOSE SERPL-MCNC: 93 MG/DL (ref 65–140)
HCT VFR BLD AUTO: 33.8 % (ref 36.5–49.3)
HGB BLD-MCNC: 11.1 G/DL (ref 12–17)
IMM GRANULOCYTES # BLD AUTO: 0.1 THOUSAND/UL (ref 0–0.2)
IMM GRANULOCYTES NFR BLD AUTO: 1 % (ref 0–2)
LYMPHOCYTES # BLD AUTO: 1.16 THOUSANDS/ÂΜL (ref 0.6–4.47)
LYMPHOCYTES NFR BLD AUTO: 7 % (ref 14–44)
MCH RBC QN AUTO: 29.5 PG (ref 26.8–34.3)
MCHC RBC AUTO-ENTMCNC: 32.8 G/DL (ref 31.4–37.4)
MCV RBC AUTO: 90 FL (ref 82–98)
MONOCYTES # BLD AUTO: 1.01 THOUSAND/ÂΜL (ref 0.17–1.22)
MONOCYTES NFR BLD AUTO: 6 % (ref 4–12)
NEUTROPHILS # BLD AUTO: 14.98 THOUSANDS/ÂΜL (ref 1.85–7.62)
NEUTS SEG NFR BLD AUTO: 85 % (ref 43–75)
NRBC BLD AUTO-RTO: 0 /100 WBCS
PLATELET # BLD AUTO: 372 THOUSANDS/UL (ref 149–390)
PMV BLD AUTO: 9.1 FL (ref 8.9–12.7)
POTASSIUM SERPL-SCNC: 3.8 MMOL/L (ref 3.5–5.3)
POTASSIUM SERPL-SCNC: 3.8 MMOL/L (ref 3.5–5.3)
POTASSIUM SERPL-SCNC: 4 MMOL/L (ref 3.5–5.3)
RBC # BLD AUTO: 3.76 MILLION/UL (ref 3.88–5.62)
SODIUM SERPL-SCNC: 127 MMOL/L (ref 135–147)
SODIUM SERPL-SCNC: 127 MMOL/L (ref 135–147)
SODIUM SERPL-SCNC: 128 MMOL/L (ref 135–147)
WBC # BLD AUTO: 17.38 THOUSAND/UL (ref 4.31–10.16)

## 2023-05-18 RX ORDER — LANOLIN ALCOHOL/MO/W.PET/CERES
6 CREAM (GRAM) TOPICAL
Status: DISCONTINUED | OUTPATIENT
Start: 2023-05-18 | End: 2023-05-23 | Stop reason: HOSPADM

## 2023-05-18 RX ORDER — AMOXICILLIN 250 MG
1 CAPSULE ORAL 2 TIMES DAILY
Status: DISCONTINUED | OUTPATIENT
Start: 2023-05-18 | End: 2023-05-23 | Stop reason: HOSPADM

## 2023-05-18 RX ORDER — METOPROLOL TARTRATE 50 MG/1
50 TABLET, FILM COATED ORAL EVERY 12 HOURS SCHEDULED
Status: DISCONTINUED | OUTPATIENT
Start: 2023-05-18 | End: 2023-05-18

## 2023-05-18 RX ADMIN — METOPROLOL TARTRATE 25 MG: 25 TABLET, FILM COATED ORAL at 21:23

## 2023-05-18 RX ADMIN — SENNOSIDES AND DOCUSATE SODIUM 1 TABLET: 8.6; 5 TABLET ORAL at 19:13

## 2023-05-18 RX ADMIN — HEPARIN SODIUM 5000 UNITS: 5000 INJECTION INTRAVENOUS; SUBCUTANEOUS at 21:23

## 2023-05-18 RX ADMIN — HEPARIN SODIUM 5000 UNITS: 5000 INJECTION INTRAVENOUS; SUBCUTANEOUS at 05:27

## 2023-05-18 RX ADMIN — OXYCODONE HYDROCHLORIDE 10 MG: 10 TABLET ORAL at 19:14

## 2023-05-18 RX ADMIN — FUROSEMIDE 40 MG: 40 TABLET ORAL at 08:53

## 2023-05-18 RX ADMIN — CEFTRIAXONE SODIUM 2000 MG: 10 INJECTION, POWDER, FOR SOLUTION INTRAVENOUS at 17:16

## 2023-05-18 RX ADMIN — LIDOCAINE 5% 1 PATCH: 700 PATCH TOPICAL at 05:27

## 2023-05-18 RX ADMIN — PREDNISONE 20 MG: 20 TABLET ORAL at 08:53

## 2023-05-18 RX ADMIN — POLYETHYLENE GLYCOL 3350 17 G: 17 POWDER, FOR SOLUTION ORAL at 07:30

## 2023-05-18 RX ADMIN — MELATONIN 6 MG: 3 TAB ORAL at 21:23

## 2023-05-18 RX ADMIN — HEPARIN SODIUM 5000 UNITS: 5000 INJECTION INTRAVENOUS; SUBCUTANEOUS at 17:17

## 2023-05-18 RX ADMIN — METOPROLOL TARTRATE 25 MG: 25 TABLET, FILM COATED ORAL at 08:53

## 2023-05-18 NOTE — ASSESSMENT & PLAN NOTE
· Presently rate controlled with Lopressor    · Not on anticoagulation per patient - he is concerned about bruising or bleeding   · Given patient will be undergoing biopsy will continue to hold off on starting this at this time

## 2023-05-18 NOTE — ASSESSMENT & PLAN NOTE
· Elevated troponins in the 200-300 range upon admission and overnight, flat  · Denies chest pain    · Likely secondary to volume overload/aortic stenosis   · Cardiology following  · No further work up inpatient needed  · Outpatient ischemic testing recommended

## 2023-05-18 NOTE — ASSESSMENT & PLAN NOTE
· POA, likely secondary fluid retention/volume overload   · CT chest with pleural effusions and atelectasis  · Unfortunately no output has been documented but patient does report that he has been urinating with the Lasix  · Continue I/O Daily weights   · Continue Lasix 40 mg QD

## 2023-05-18 NOTE — TELEPHONE ENCOUNTER
5/19/23: INPATIENT    5/18/23: INPATIENT    1MO HFU W/ AP  6/20/23 / 9:00 / Fina Quezada    IMAGING: XRAY LSPINE     PER:   HFU  Received:  Today  CRUZ Engle MA  Please schedule I month follow-up with AP, x-ray lumbar spine prior

## 2023-05-18 NOTE — PROGRESS NOTES
[facility]  Progress Note  Name: Mary Jane Sibley  MRN: [id number]  Unit/Bed#: S -01 I Date of Admission: 5/16/2023   Date of Service: 5/18/2023 I Hospital Day: 2    Assessment/Plan   * Shortness of breath  Assessment & Plan  · POA, likely secondary fluid retention/volume overload   · CT chest with pleural effusions and atelectasis  · Unfortunately no output has been documented but patient does report that he has been urinating with the Lasix  · Continue I/O Daily weights   · Continue Lasix 40 mg QD     Discitis of lumbar region  Assessment & Plan  · Has had back pain for the last few weeks   · He is on a steroid taper and due to start prednisone 20 mg once a day today  · 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   · 1/2 blood cultures with GPC chains thus far   · Neurosurgery without further recommendations for surgery   · Hold on antibiotics for now as he is hemodynamically stable  · Pain control ordered  · IR consult for tissue sampling  · ID consult for eventual antibiotic recs   · Discussed with ID - they agree with biopsy of area at this time     Sepsis [facility])  Assessment & Plan  · Present on admission with leukocytosis and tachypnea  Leukocytosis likely from recent steroid use  · Patient has discitis/OM on MRI and 1/2 blood cultures are positive - suspect this was source of infection  · Remains afebrile and hemodynamically stable   · In the setting of discitis hold on antibiotics  · ID and IR consults pending   · Monitor hemodynamics  · Follow up final blood cultures         Bacteremia  Assessment & Plan  · 1/2 blood cultures positive for GPC in chains  Possible from discitis/OM  · Follow up final cultures  · Hold on antibiotics until spinal tissue sampling obtained   · May need to update ECHO to evaluate for vegetation     Non-ischemic myocardial injury (non-traumatic)  Assessment & Plan  · Elevated troponins in the 200-300 range upon admission and overnight, flat    · Denies chest pain  · Likely secondary to volume overload/aortic stenosis   · Cardiology following  · No further work up inpatient needed  · Outpatient ischemic testing recommended     Aortic stenosis  Assessment & Plan  · Last Echo beginning of this month shows severe stenosis  · No further work up needed    Atrial fibrillation ([address] )  Assessment & Plan  · Presently rate controlled with Lopressor  · Not on anticoagulation per patient - he is concerned about bruising or bleeding   · Given patient will be undergoing biopsy will continue to hold off on starting this at this time     Primary hypertension  Assessment & Plan  · BP acceptable   · Continue Lopressor  · Continue with Lasix    Hyponatremia  Assessment & Plan  · Sodium 128 upon admission appears chronic and stable   · Monitor BMP             VTE Pharmacologic Prophylaxis: VTE Score: 4 Moderate Risk (Score 3-4) - Pharmacological DVT Prophylaxis Ordered: heparin  Patient Centered Rounds: I performed bedside rounds with nursing staff today  Discussions with Specialists or Other Care Team Provider: Discussed with ID, RN, CM    Education and Discussions with Family / Patient: Patient declined call to   Total Time Spent on Date of Encounter in care of patient: 35 minutes This time was spent on one or more of the following: performing physical exam; counseling and coordination of care; obtaining or reviewing history; documenting in the medical record; reviewing/ordering tests, medications or procedures; communicating with other healthcare professionals and discussing with patient's family/caregivers  Current Length of Stay: 2 day(s)  Current Patient Status: Inpatient   Certification Statement: The patient will continue to require additional inpatient hospital stay due to further work up of OM/discitis  Discharge Plan: Anticipate discharge in 48-72 hrs to home      Code Status: Level 1 - Full Code    Subjective:   Patient reports feeling well today  Unsure how his back feels as he has not laid flat  Denies chest pain or shortness of breath  Denies numbness/tingling in legs or groin  Denies fevers or chills  Objective:     Vitals:   Temp (24hrs), Av 6 °F (37 °C), Min:98 5 °F (36 9 °C), Max:98 6 °F (37 °C)    Temp:  [98 5 °F (36 9 °C)-98 6 °F (37 °C)] 98 6 °F (37 °C)  HR:  [79] 79  Resp:  [17-18] 17  BP: (143-165)/(44-51) 165/44  SpO2:  [94 %] 94 %  Body mass index is 21 96 kg/m²  Input and Output Summary (last 24 hours): Intake/Output Summary (Last 24 hours) at 2023 0911  Last data filed at 2023 9005  Gross per 24 hour   Intake 240 ml   Output 2000 ml   Net -1760 ml       Physical Exam:   Physical Exam  Constitutional:       General: He is not in acute distress  Appearance: Normal appearance  He is normal weight  He is not ill-appearing or diaphoretic  HENT:      Head: Normocephalic and atraumatic  Mouth/Throat:      Mouth: Mucous membranes are moist    Eyes:      General: No scleral icterus  Pupils: Pupils are equal, round, and reactive to light  Cardiovascular:      Rate and Rhythm: Normal rate and regular rhythm  Pulses: Normal pulses  Heart sounds: Normal heart sounds, S1 normal and S2 normal  No murmur heard  No systolic murmur is present  No diastolic murmur is present  No gallop  No S3 or S4 sounds  Pulmonary:      Effort: Pulmonary effort is normal  No accessory muscle usage or respiratory distress  Breath sounds: Normal breath sounds  No stridor  No decreased breath sounds, wheezing, rhonchi or rales  Chest:      Chest wall: No tenderness  Abdominal:      General: Bowel sounds are normal  There is no distension  Palpations: Abdomen is soft  Tenderness: There is no abdominal tenderness  There is no guarding  Musculoskeletal:      Right lower leg: No edema  Left lower leg: No edema  Skin:     General: Skin is warm and dry        Coloration: Skin is not jaundiced  Neurological:      General: No focal deficit present  Mental Status: He is alert  Mental status is at baseline  Motor: No tremor or seizure activity  Psychiatric:         Behavior: Behavior is cooperative  Additional Data:     Labs:  Results from last 7 days   Lab Units 05/18/23  0539   WBC Thousand/uL 17 38*   HEMOGLOBIN g/dL 11 1*   HEMATOCRIT % 33 8*   PLATELETS Thousands/uL 372   NEUTROS PCT % 85*   LYMPHS PCT % 7*   MONOS PCT % 6   EOS PCT % 1     Results from last 7 days   Lab Units 05/18/23  0539 05/17/23  0121 05/16/23  1101   SODIUM mmol/L 128*   < > 128*   POTASSIUM mmol/L 4 0   < > 3 9   CHLORIDE mmol/L 93*   < > 95*   CO2 mmol/L 29   < > 26   BUN mg/dL 17   < > 19   CREATININE mg/dL 0 79   < > 0 72   ANION GAP mmol/L 6   < > 7   CALCIUM mg/dL 8 7   < > 8 8   ALBUMIN g/dL  --   --  3 4*   TOTAL BILIRUBIN mg/dL  --   --  0 39   ALK PHOS U/L  --   --  49   ALT U/L  --   --  13   AST U/L  --   --  13   GLUCOSE RANDOM mg/dL 115   < > 112    < > = values in this interval not displayed  Results from last 7 days   Lab Units 05/11/23  1245   HEMOGLOBIN A1C % of total Hgb 5 8*           Lines/Drains:  Invasive Devices     Peripheral Intravenous Line  Duration           Peripheral IV 05/16/23 Right Antecubital 2 days                      Imaging: Reviewed radiology reports from this admission including: MRI spine    Recent Cultures (last 7 days):   Results from last 7 days   Lab Units 05/17/23  1216   BLOOD CULTURE  Received in Microbiology Lab  Culture in Progress     GRAM STAIN RESULT  Gram positive cocci in chains*       Last 24 Hours Medication List:   Current Facility-Administered Medications   Medication Dose Route Frequency Provider Last Rate   • acetaminophen  650 mg Oral Q6H PRN Claribel Schmidt MD     • furosemide  40 mg Oral Daily JESSICA Shay     • heparin (porcine)  5,000 Units Subcutaneous [facility] Claribel Schmidt MD     • HYDROmorphone  0 5 mg Intravenous Q4H PRN Ubaldo Harley PA-C     • lidocaine  1 patch Topical Daily Simin Walsh PA-C     • metoprolol tartrate  25 mg Oral Q12H [address] María Massey MD     • oxyCODONE  10 mg Oral Q4H PRN Ubaldo Harley PA-C     • oxyCODONE  5 mg Oral Q4H PRN Ubaldo Harley PA-C     • polyethylene glycol  17 g Oral Daily PRN Asif Amor MD     • predniSONE  20 mg Oral Daily Asif Amor MD          Today, Patient Was Seen By: Indy Lott PA-C    **Please Note: This note may have been constructed using a voice recognition system  **

## 2023-05-18 NOTE — QUICK NOTE
S/P IV lasix few doses  Pt has been transitioned to lasix 40mg oral daily  SOB resolved, edema improved  BC gram + cocci x2    ID has requested JESSICA  Will schedule for tomorrow  NPO after midnight today  No contraindications  Pt agreeable to proceed

## 2023-05-18 NOTE — CASE MANAGEMENT
"   Case Management Progress Note    Patient name Addi Rodriguez  Location S /S -42 MRN [id number]  : 1945 Date 2023       LOS (days): 2  Geometric Mean LOS (GMLOS) (days): 3 50  Days to GMLOS:1 5        OBJECTIVE:        Current admission status: Inpatient  [facility] Pharmacy:   [facility] [address] [place] [address]  Phone: [phone] Fax: [phone]    Primary Care Provider: Lynn Owens MD    Primary Insurance: [facility]  Secondary Insurance:     PROGRESS NOTE:    Per rounding with ID, patient needing IV ABX for 6 week course and appropriate for home infusion  Referral opened to [facility] due to need for nursing visits, however no other qualifier for VNA (patient is a resident of [place])  Per Ki @ [facility]:     \"EST Cost Per Week (Drugs, Per Makenzie & Nursing): $8 00   Drug copay - $8 00/week Supplies and Nursing covered at 100%\"    CM to follow-up to discuss with patient/family on preference for home infusion vs OP infusion center      Paulina Adams, SHU, ACSW, ACM, [facility]  23 2:58 PM            "

## 2023-05-18 NOTE — PLAN OF CARE
Problem: PAIN - ADULT  Goal: Verbalizes/displays adequate comfort level or baseline comfort level  Description: Interventions:  - Encourage patient to monitor pain and request assistance  - Assess pain using appropriate pain scale  - Administer analgesics based on type and severity of pain and evaluate response  - Implement non-pharmacological measures as appropriate and evaluate response  - Consider cultural and social influences on pain and pain management  - Notify physician/advanced practitioner if interventions unsuccessful or patient reports new pain  Outcome: Progressing     Problem: SAFETY ADULT  Goal: Patient will remain free of falls  Description: INTERVENTIONS:  - Educate patient/family on patient safety including physical limitations  - Instruct patient to call for assistance with activity   - Consult OT/PT to assist with strengthening/mobility   - Keep Call bell within reach  - Keep bed low and locked with side rails adjusted as appropriate  - Keep care items and personal belongings within reach  - Initiate and maintain comfort rounds  - Make Fall Risk Sign visible to staff  - Offer Toileting every 3 Hours, in advance of need  - Initiate/Maintain BED alarm  - Apply yellow socks and bracelet for high fall risk patients  - Consider moving patient to room near nurses station  Outcome: Progressing  Goal: Maintain or return to baseline ADL function  Description: INTERVENTIONS:  -  Assess patient's ability to carry out ADLs; assess patient's baseline for ADL function and identify physical deficits which impact ability to perform ADLs (bathing, care of mouth/teeth, toileting, grooming, dressing, etc )  - Assess/evaluate cause of self-care deficits   - Assess range of motion  - Assess patient's mobility; develop plan if impaired  - Assess patient's need for assistive devices and provide as appropriate  - Encourage maximum independence but intervene and supervise when necessary  - Involve family in performance of ADLs  - Assess for home care needs following discharge   - Consider OT consult to assist with ADL evaluation and planning for discharge  - Provide patient education as appropriate  Outcome: Progressing  Goal: Maintains/Returns to pre admission functional level  Description: INTERVENTIONS:  - Perform BMAT or MOVE assessment daily    - Set and communicate daily mobility goal to care team and patient/family/caregiver  - Collaborate with rehabilitation services on mobility goals if consulted  - Perform Range of Motion 3 times a day  - Reposition patient every 2 hours    - Dangle patient 3 times a day  - Stand patient 3 times a day  - Ambulate patient 3 times a day  - Out of bed to chair 3 times a day   - Out of bed for meals 3 times a day  - Out of bed for toileting  - Record patient progress and toleration of activity level   Outcome: Progressing     Problem: NEUROSENSORY - ADULT  Goal: Achieves stable or improved neurological status  Description: INTERVENTIONS  - Monitor and report changes in neurological status  - Monitor vital signs such as temperature, blood pressure, glucose, and any other labs ordered   - Initiate measures to prevent increased intracranial pressure  - Monitor for seizure activity and implement precautions if appropriate      Outcome: Progressing  Goal: Remains free of injury related to seizures activity  Description: INTERVENTIONS  - Maintain airway, patient safety  and administer oxygen as ordered  - Monitor patient for seizure activity, document and report duration and description of seizure to physician/advanced practitioner  - If seizure occurs,  ensure patient safety during seizure  - Reorient patient post seizure  - Seizure pads on all 4 side rails  - Instruct patient/family to notify RN of any seizure activity including if an aura is experienced  - Instruct patient/family to call for assistance with activity based on nursing assessment  - Administer anti-seizure medications if ordered    Outcome: Progressing  Goal: Achieves maximal functionality and self care  Description: INTERVENTIONS  - Monitor swallowing and airway patency with patient fatigue and changes in neurological status  - Encourage and assist patient to increase activity and self care     - Encourage visually impaired, hearing impaired and aphasic patients to use assistive/communication devices  Outcome: Progressing     Problem: CARDIOVASCULAR - ADULT  Goal: Maintains optimal cardiac output and hemodynamic stability  Description: INTERVENTIONS:  - Monitor I/O, vital signs and rhythm  - Monitor for S/S and trends of decreased cardiac output  - Administer and titrate ordered vasoactive medications to optimize hemodynamic stability  - Assess quality of pulses, skin color and temperature  - Assess for signs of decreased coronary artery perfusion  - Instruct patient to report change in severity of symptoms  Outcome: Progressing  Goal: Absence of cardiac dysrhythmias or at baseline rhythm  Description: INTERVENTIONS:  - Continuous cardiac monitoring, vital signs, obtain 12 lead EKG if ordered  - Administer antiarrhythmic and heart rate control medications as ordered  - Monitor electrolytes and administer replacement therapy as ordered  Outcome: Progressing

## 2023-05-18 NOTE — QUICK NOTE
Discussed with ID - now 2/2 blood cultures positive  Will cancel IR consult as joint space will likely be difficult to access and we will have adequate pathogen data from blood cultures  Will repeat blood cultures off antibiotics now per ID  Antibiotics to start per their recommendations  Will get MRI T spine to assess for any involvement there, continue with current analgesia as ordered  Will re-consult Cardiology as JESSICA will be needed  Osmany Rausch PA-C

## 2023-05-18 NOTE — ASSESSMENT & PLAN NOTE
· Present on admission with leukocytosis and tachypnea  Leukocytosis likely from recent steroid use    · Patient has discitis/OM on MRI and 1/2 blood cultures are positive - suspect this was source of infection  · Remains afebrile and hemodynamically stable   · In the setting of discitis hold on antibiotics  · ID and IR consults pending   · Monitor hemodynamics  · Follow up final blood cultures

## 2023-05-18 NOTE — PROGRESS NOTES
MRI lumbar spine with without contrast personally reviewed, showing L1-2 discitis  Plan:  · Continue to monitor neurological exam  · Monitor labs and temperature profile, currently afebrile  · ESR 44, CRP 31 7, WBC 15 6  · BC 1/2 gram-positive cocci in chains, continue to monitor  · Recommend IR consult if blood cultures are negative and deemed contaminant versus repeat blood cultures  · ID consult for antibiotic recommendations  · No brace indicated  · We will order upright lumbar x-ray as baseline imaging  · No surgery is indicated from a neurosurgical standpoint  · Neurosurgery will sign off  Patient will follow-up in 4 weeks with repeat lumbar x-rays  Please call with questions or concerns

## 2023-05-18 NOTE — CONSULTS
Consultation - Infectious Disease   Anthony Pierre 69 y o  male MRN: [id number]  Unit/Bed#: S -01 Encounter: [id number]      IMPRESSION & RECOMMENDATIONS:   1   Occult streptococcal bacteremia  Patient initially presented for recurrent back pain and also mentioned abdominal distention  Further work-up with MRI notable for #2  Surveillance blood cultures collected in both sets have isolated Streptococcus  Further identification pending  The patient has no other localizing complaints on exam, is hemodynamically stable and has no devices in place  This may be a potential odontogenic source or possibly an occult intra-abdominal source  Concern for endovascular complication given #5  Will start on ceftriaxone 2 g every 24 hours  Repeat CBC/chemistry tomorrow  Follow-up pending blood cultures  We will order additional sets of cultures as needed  Continue to trend fever curve/vitals  Monitor neuro exam  Neurosurgical evaluation appreciated  Recommend formal transesophageal echo  Recommend MRI of the T-spine given #3  Potential GI evaluation given #3  Additional supportive care as per primary  Tentative prescription provided to case management  Anticipate prolonged IV antibiotic course  We will arrange final antibiotics/ID follow-up closer to discharge    2  L1-L2 vertebral osteomyelitis/discitis  Patient presented for recurrent back pain and abdominal distention  No MR imaging notable for infection  Reviewed with IR and difficult area for aspiration  Patient's blood cultures however are positive and are likely the pathogen  Antibiotics as above  No need for biopsy  Ongoing follow-up by neurosurgery  Recommend MRI of the T-spine given #3  Monitor neuro exam  Anticipate prolonged duration of antibiotic    3  Abnormal CT abdomen  Patient CT imaging of the abdomen now showing some gastric distention which is unexpected  Previous colonoscopy noted to be unremarkable    Uncertain if this may be contributing to the above  Abdominal exam otherwise benign  Antibiotics as above  Continue to trend fever curve/WBC  Monitor abdominal exam  Follow-up imaging of the T-spine  May need to consult GI  Additional interventions pending clinical course    4  Persistent leukocytosis  Patient has had persistent leukocytosis since last admission which could be related to steroids  Suspect the above is also contributing  He is fortunately hemodynamically stable  Recommend rapid taper of steroids if possible  Antibiotics as above  Repeat CBC tomorrow    5  Recently diagnosed aortic stenosis  Patient's 2D echo reviewed and has heavily calcified disease on the aortic valve  Given the above concern for endovascular complication  Antibiotics as above  Recommend transesophageal echo  Continue to trend fever curve/WBC    Above plan discussed in detail with the patient, his wife, IR attending and primary service  ID consult service will continue to follow  I have spent a total time of 80 minutes on 05/18/23 in caring for this patient including Diagnostic results, Risks and benefits of tx options, Patient and family education, Impressions, Documenting in the medical record, Reviewing / ordering tests, medicine, procedures  , Obtaining or reviewing history   and Communicating with other healthcare professionals   HISTORY OF PRESENT ILLNESS:  Reason for Consult: Vertebral osteomyelitis and bacteremia    HPI: Vi Roa is a 69y o  year old male who was previously without significant past medical history  He does not have previous hospitalizations in our system and denies being seen at other hospital systems  Has no records in Care Everywhere  No other significant prior culture data  Denies having any prosthetic devices in place  The patient initially presented to the [address] [place] [address] this month with progressing back pain    It was on that admission that he was found to have hyponatremia along with aortic stenosis, coronary artery disease  At baseline the patient and his wife are both independent and he denies significant back pain  He reported that about 4 days before him presenting he was moving boxes which may have caused a flareup and then progressed prompting him to come into the hospital at that time  He had imaging done which was nonconcerning but did show bulging disks and so was ultimately treated symptomatically with steroids and other pain medications to relieve his symptoms  He unfortunately reports continuing to have intermittent symptoms particularly when he tries to get up from a supine position and also difficulty with sleep  Denies any chronic episodes of urinary tract infections reports that he was previously up-to-date on his colonoscopy and there was no acute findings  He denies any intravenous drug use, HIV or hepatitis  He denies any major injuries or breaks in the skin  Had his wife present on the phone during my visit and she does not recall any recent events at least in about the month leading up to the development of his back pain  They both noted that the patient was feeling abdominal bloating which prompted them to come into the hospital this time again  With further work-up and imaging reviewed below patient was diagnosed and found to have lumbar spine vertebral osteomyelitis  He had leukocytosis which was attributed initially to steroids but has otherwise been hemodynamically stable  Surveillance blood cultures were obtained yesterday and patient was also evaluated by neurosurgery  Blood cultures now have also returned positive  Case discussed with IR attending earlier today in terms of biopsy possibilities and we discussed the technical difficulty associated based on his imaging  Fortunately given positive cultures biopsy has now been deferred  Was able to review at bedside patient's recent echo with his wife and he is got severely calcified disease at the aortic valve    We discussed the possibility of endocarditis as well  Patient has no other localizing complaints on exam other than his back  He is otherwise ambulatory  He denies any recent urinary infections or urinary complaints  Wife reports baseline nocturia  He denies any recent dental work or dental procedures although he does have problems with his teeth at baseline  We are consulted at this time for further assistance with management given this patient's complicated picture with bacteremia and osteomyelitis  REVIEW OF SYSTEMS:  A complete 12 point system-based review of systems is negative other than that noted in the HPI  PAST MEDICAL HISTORY:  History reviewed  No pertinent past medical history  Past Surgical History:   Procedure Laterality Date   • SKIN BIOPSY         FAMILY HISTORY:  Non-contributory    SOCIAL HISTORY:  Social History   Social History     Substance and Sexual Activity   Alcohol Use Not Currently     Social History     Substance and Sexual Activity   Drug Use Not Currently     Social History     Tobacco Use   Smoking Status Former   • Types: Cigarettes   • Quit date: 3/11/2005   • Years since quittin 1   Smokeless Tobacco Never       ALLERGIES:  No Known Allergies    MEDICATIONS:  All current active medications have been reviewed  PHYSICAL EXAM:  Temp:  [98 5 °F (36 9 °C)-98 6 °F (37 °C)] 98 6 °F (37 °C)  HR:  [79] 79  Resp:  [17-18] 17  BP: (143-165)/(44-51) 165/44  SpO2:  [94 %] 94 %  Temp (24hrs), Av 6 °F (37 °C), Min:98 5 °F (36 9 °C), Max:98 6 °F (37 °C)  Current: Temperature: 98 6 °F (37 °C)    Intake/Output Summary (Last 24 hours) at 2023 1525  Last data filed at 2023 1230  Gross per 24 hour   Intake 410 ml   Output 1050 ml   Net -640 ml       General Appearance:  Appearing well, nontoxic, and in no distress; patient is independently ambulatory and able to provide detailed history     Head:  Normocephalic, without obvious abnormality, atraumatic   Eyes:  Conjunctiva pink and sclera anicteric, both eyes   Nose: Nares normal, mucosa normal, no drainage   Throat: Oropharynx moist without lesions; poor dentition noted  Neck: Supple, symmetrical, no adenopathy, no tenderness/mass/nodules   Back:   Symmetric, no curvature, ROM normal, no CVA tenderness; mild discomfort when I palpate down to the lumbar spine  Lungs:   Clear to auscultation bilaterally, respirations unlabored on room air   Chest Wall:  No tenderness or deformity   Heart:  RRR; systolic murmur present, no rubs or gallops   Abdomen:   Soft, non-tender, non-distended, positive bowel sounds    Extremities: No cyanosis, clubbing or edema   Skin: No rashes or lesions  No draining wounds noted  Lymph nodes: Cervical, supraclavicular nodes normal   Neurologic: Alert and oriented times 3, extremity strength 5/5 and symmetric       LABS, IMAGING, & OTHER STUDIES:  In completing this consult I have performed an extensive review of the medical records in epic including review of the notes, radiographs, and laboratory results as detailed below  Lab Results:  I have personally reviewed pertinent labs  Comments/Interpretations: Persistent leukocytosis noted which may be due to steroids  Creatinine unremarkable  Results from last 7 days   Lab Units 05/18/23  0539 05/16/23  1101   WBC Thousand/uL 17 38* 16 63*   HEMOGLOBIN g/dL 11 1* 10 7*   PLATELETS Thousands/uL 372 361     Results from last 7 days   Lab Units 05/18/23  1117 05/17/23  0121 05/16/23  1101   POTASSIUM mmol/L 3 8   < > 3 9   CHLORIDE mmol/L 91*   < > 95*   CO2 mmol/L 29   < > 26   BUN mg/dL 18   < > 19   CREATININE mg/dL 0 79   < > 0 72   EGFR ml/min/1 73sq m 86   < > 89   CALCIUM mg/dL 8 5   < > 8 8   AST U/L  --   --  13   ALT U/L  --   --  13   ALK PHOS U/L  --   --  49    < > = values in this interval not displayed       Results from last 7 days   Lab Units 05/17/23  1216   GRAM STAIN RESULT  Gram positive cocci in chains*  Gram positive cocci in chains*     Imaging Studies:   I have personally reviewed pertinent imaging study reports and images in PACS  Comments/Interpretations:  MRI of the L-spine with discitis/osteomyelitis at L1-L2 and paraspinal edema noted without collection  CT chest/abdomen/pelvis with some gastric distention and question of gastroparesis or outlet obstruction  The changes to the lumbar spine are also seen on the images  Recent CT on 5/2 without acute findings  Particularly at the stomach  Other Studies:   I have personally reviewed other pertinent reports as below  Records in [facility]: No data in Care Everywhere  Patient does not go to other hospital institutions  Was able to review his colonoscopy from 2010 and there were no acute findings other than hemorrhoids  Nursing home/EMS Records: None  Current/Prior Cultures: Current cultures 2 out of 2 have isolated Streptococcus  Repeat cultures collected today prior to antibiotic

## 2023-05-18 NOTE — ASSESSMENT & PLAN NOTE
· 1/2 blood cultures positive for GPC in chains   Possible from discitis/OM  · Follow up final cultures  · Hold on antibiotics until spinal tissue sampling obtained   · May need to update ECHO to evaluate for vegetation

## 2023-05-18 NOTE — ASSESSMENT & PLAN NOTE
· Has had back pain for the last few weeks   · He is on a steroid taper and due to start prednisone 20 mg once a day today  · 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   · 1/2 blood cultures with GPC chains thus far   · Neurosurgery without further recommendations for surgery   · Hold on antibiotics for now as he is hemodynamically stable  · Pain control ordered    · IR consult for tissue sampling  · ID consult for eventual antibiotic recs   · Discussed with ID - they agree with biopsy of area at this time

## 2023-05-19 ENCOUNTER — APPOINTMENT (INPATIENT)
Dept: MRI IMAGING | Facility: HOSPITAL | Age: 78
End: 2023-05-19

## 2023-05-19 ENCOUNTER — ANESTHESIA EVENT (INPATIENT)
Dept: NON INVASIVE DIAGNOSTICS | Facility: HOSPITAL | Age: 78
End: 2023-05-19

## 2023-05-19 ENCOUNTER — ANESTHESIA (INPATIENT)
Dept: NON INVASIVE DIAGNOSTICS | Facility: HOSPITAL | Age: 78
End: 2023-05-19

## 2023-05-19 ENCOUNTER — APPOINTMENT (OUTPATIENT)
Dept: NON INVASIVE DIAGNOSTICS | Facility: HOSPITAL | Age: 78
End: 2023-05-19

## 2023-05-19 ENCOUNTER — APPOINTMENT (INPATIENT)
Dept: CT IMAGING | Facility: HOSPITAL | Age: 78
End: 2023-05-19

## 2023-05-19 LAB
ANION GAP SERPL CALCULATED.3IONS-SCNC: 6 MMOL/L (ref 4–13)
ANION GAP SERPL CALCULATED.3IONS-SCNC: 8 MMOL/L (ref 4–13)
ANION GAP SERPL CALCULATED.3IONS-SCNC: 8 MMOL/L (ref 4–13)
AV MEAN GRADIENT: 29 MMHG
AV PEAK GRADIENT: 57 MMHG
AV REGURGITATION PRESSURE HALF TIME: 227 MS
AV VALVE AREA: 0.98 CM2
AV VELOCITY RATIO: 0.34
BUN SERPL-MCNC: 16 MG/DL (ref 5–25)
BUN SERPL-MCNC: 17 MG/DL (ref 5–25)
BUN SERPL-MCNC: 18 MG/DL (ref 5–25)
CALCIUM SERPL-MCNC: 8.5 MG/DL (ref 8.4–10.2)
CALCIUM SERPL-MCNC: 8.7 MG/DL (ref 8.4–10.2)
CALCIUM SERPL-MCNC: 8.8 MG/DL (ref 8.4–10.2)
CHLORIDE SERPL-SCNC: 90 MMOL/L (ref 96–108)
CHLORIDE SERPL-SCNC: 92 MMOL/L (ref 96–108)
CHLORIDE SERPL-SCNC: 93 MMOL/L (ref 96–108)
CO2 SERPL-SCNC: 26 MMOL/L (ref 21–32)
CO2 SERPL-SCNC: 29 MMOL/L (ref 21–32)
CO2 SERPL-SCNC: 30 MMOL/L (ref 21–32)
CREAT SERPL-MCNC: 0.7 MG/DL (ref 0.6–1.3)
CREAT SERPL-MCNC: 0.75 MG/DL (ref 0.6–1.3)
CREAT SERPL-MCNC: 0.8 MG/DL (ref 0.6–1.3)
DOP CALC AO PEAK VEL: 3.8 M/S
DOP CALC AO VTI: 93 CM
DOP CALC LVOT AREA: 2.83 CM2
DOP CALC LVOT DIAMETER: 1.9 CM
DOP CALC LVOT PEAK VEL VTI: 32 CM
DOP CALC LVOT PEAK VEL: 1.3 M/S
DOP CALC LVOT STROKE VOLUME: 90.68 CM3
ERYTHROCYTE [DISTWIDTH] IN BLOOD BY AUTOMATED COUNT: 14.6 % (ref 11.6–15.1)
GFR SERPL CREATININE-BSD FRML MDRD: 86 ML/MIN/1.73SQ M
GFR SERPL CREATININE-BSD FRML MDRD: 88 ML/MIN/1.73SQ M
GFR SERPL CREATININE-BSD FRML MDRD: 91 ML/MIN/1.73SQ M
GLUCOSE SERPL-MCNC: 104 MG/DL (ref 65–140)
GLUCOSE SERPL-MCNC: 136 MG/DL (ref 65–140)
GLUCOSE SERPL-MCNC: 161 MG/DL (ref 65–140)
HCT VFR BLD AUTO: 31.7 % (ref 36.5–49.3)
HGB BLD-MCNC: 10.2 G/DL (ref 12–17)
MCH RBC QN AUTO: 29.2 PG (ref 26.8–34.3)
MCHC RBC AUTO-ENTMCNC: 32.2 G/DL (ref 31.4–37.4)
MCV RBC AUTO: 91 FL (ref 82–98)
PLATELET # BLD AUTO: 344 THOUSANDS/UL (ref 149–390)
PMV BLD AUTO: 9.5 FL (ref 8.9–12.7)
POTASSIUM SERPL-SCNC: 3.4 MMOL/L (ref 3.5–5.3)
POTASSIUM SERPL-SCNC: 3.9 MMOL/L (ref 3.5–5.3)
POTASSIUM SERPL-SCNC: 4.2 MMOL/L (ref 3.5–5.3)
RBC # BLD AUTO: 3.49 MILLION/UL (ref 3.88–5.62)
SL CV AV DECELERATION TIME RETROGRADE: 783 MS
SL CV AV PEAK GRADIENT RETROGRADE: 66 MMHG
SL CV LV EF: 65
SODIUM SERPL-SCNC: 126 MMOL/L (ref 135–147)
SODIUM SERPL-SCNC: 127 MMOL/L (ref 135–147)
SODIUM SERPL-SCNC: 129 MMOL/L (ref 135–147)
WBC # BLD AUTO: 16.7 THOUSAND/UL (ref 4.31–10.16)

## 2023-05-19 PROCEDURE — B245ZZ4 ULTRASONOGRAPHY OF LEFT HEART, TRANSESOPHAGEAL: ICD-10-PCS | Performed by: INTERNAL MEDICINE

## 2023-05-19 RX ORDER — EPHEDRINE SULFATE 50 MG/ML
INJECTION INTRAVENOUS AS NEEDED
Status: DISCONTINUED | OUTPATIENT
Start: 2023-05-19 | End: 2023-05-19

## 2023-05-19 RX ORDER — SODIUM CHLORIDE, SODIUM LACTATE, POTASSIUM CHLORIDE, CALCIUM CHLORIDE 600; 310; 30; 20 MG/100ML; MG/100ML; MG/100ML; MG/100ML
INJECTION, SOLUTION INTRAVENOUS CONTINUOUS PRN
Status: DISCONTINUED | OUTPATIENT
Start: 2023-05-19 | End: 2023-05-19

## 2023-05-19 RX ORDER — PROPOFOL 10 MG/ML
INJECTION, EMULSION INTRAVENOUS CONTINUOUS PRN
Status: DISCONTINUED | OUTPATIENT
Start: 2023-05-19 | End: 2023-05-19

## 2023-05-19 RX ORDER — KETAMINE HCL IN NACL, ISO-OSM 100MG/10ML
SYRINGE (ML) INJECTION AS NEEDED
Status: DISCONTINUED | OUTPATIENT
Start: 2023-05-19 | End: 2023-05-19

## 2023-05-19 RX ORDER — LIDOCAINE HYDROCHLORIDE 10 MG/ML
INJECTION, SOLUTION EPIDURAL; INFILTRATION; INTRACAUDAL; PERINEURAL AS NEEDED
Status: DISCONTINUED | OUTPATIENT
Start: 2023-05-19 | End: 2023-05-19

## 2023-05-19 RX ORDER — ONDANSETRON 2 MG/ML
4 INJECTION INTRAMUSCULAR; INTRAVENOUS ONCE AS NEEDED
Status: CANCELLED | OUTPATIENT
Start: 2023-05-19

## 2023-05-19 RX ORDER — SODIUM CHLORIDE, SODIUM LACTATE, POTASSIUM CHLORIDE, CALCIUM CHLORIDE 600; 310; 30; 20 MG/100ML; MG/100ML; MG/100ML; MG/100ML
125 INJECTION, SOLUTION INTRAVENOUS CONTINUOUS
Status: CANCELLED | OUTPATIENT
Start: 2023-05-19 | End: 2023-05-19

## 2023-05-19 RX ADMIN — SODIUM CHLORIDE, SODIUM LACTATE, POTASSIUM CHLORIDE, AND CALCIUM CHLORIDE: .6; .31; .03; .02 INJECTION, SOLUTION INTRAVENOUS at 09:24

## 2023-05-19 RX ADMIN — Medication 20 MG: at 09:37

## 2023-05-19 RX ADMIN — OXYCODONE HYDROCHLORIDE 10 MG: 10 TABLET ORAL at 16:12

## 2023-05-19 RX ADMIN — PREDNISONE 20 MG: 20 TABLET ORAL at 08:18

## 2023-05-19 RX ADMIN — GADOBUTROL 6 ML: 604.72 INJECTION INTRAVENOUS at 18:13

## 2023-05-19 RX ADMIN — METOPROLOL TARTRATE 25 MG: 25 TABLET, FILM COATED ORAL at 08:17

## 2023-05-19 RX ADMIN — METOPROLOL TARTRATE 25 MG: 25 TABLET, FILM COATED ORAL at 21:04

## 2023-05-19 RX ADMIN — HEPARIN SODIUM 5000 UNITS: 5000 INJECTION INTRAVENOUS; SUBCUTANEOUS at 21:04

## 2023-05-19 RX ADMIN — HEPARIN SODIUM 5000 UNITS: 5000 INJECTION INTRAVENOUS; SUBCUTANEOUS at 15:34

## 2023-05-19 RX ADMIN — CEFTRIAXONE SODIUM 2000 MG: 10 INJECTION, POWDER, FOR SOLUTION INTRAVENOUS at 15:35

## 2023-05-19 RX ADMIN — HEPARIN SODIUM 5000 UNITS: 5000 INJECTION INTRAVENOUS; SUBCUTANEOUS at 06:02

## 2023-05-19 RX ADMIN — SENNOSIDES AND DOCUSATE SODIUM 1 TABLET: 8.6; 5 TABLET ORAL at 08:18

## 2023-05-19 RX ADMIN — FUROSEMIDE 40 MG: 40 TABLET ORAL at 08:18

## 2023-05-19 RX ADMIN — PROPOFOL 100 MCG/KG/MIN: 10 INJECTION, EMULSION INTRAVENOUS at 09:30

## 2023-05-19 RX ADMIN — IOHEXOL 85 ML: 350 INJECTION, SOLUTION INTRAVENOUS at 18:23

## 2023-05-19 RX ADMIN — EPHEDRINE SULFATE 5 MG: 50 INJECTION, SOLUTION INTRAVENOUS at 09:52

## 2023-05-19 RX ADMIN — PHENYLEPHRINE HYDROCHLORIDE 70 MCG/MIN: 10 INJECTION INTRAVENOUS at 09:30

## 2023-05-19 RX ADMIN — LIDOCAINE HYDROCHLORIDE 100 MG: 10 INJECTION, SOLUTION EPIDURAL; INFILTRATION; INTRACAUDAL; PERINEURAL at 09:30

## 2023-05-19 RX ADMIN — EPHEDRINE SULFATE 5 MG: 50 INJECTION, SOLUTION INTRAVENOUS at 09:46

## 2023-05-19 RX ADMIN — MELATONIN 6 MG: 3 TAB ORAL at 21:04

## 2023-05-19 RX ADMIN — Medication 30 MG: at 09:30

## 2023-05-19 RX ADMIN — SENNOSIDES AND DOCUSATE SODIUM 1 TABLET: 8.6; 5 TABLET ORAL at 18:48

## 2023-05-19 RX ADMIN — LIDOCAINE 5% 1 PATCH: 700 PATCH TOPICAL at 06:02

## 2023-05-19 NOTE — ASSESSMENT & PLAN NOTE
· Has had back pain for the last few weeks   · He is on a steroid taper and due to start prednisone 20 mg once a day today  · 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   · 2/2 blood cultures with GPC chains, BCIP showing Strep  · Neurosurgery without further recommendations for surgery   · Continue Rocephin per ID  · Pain control ordered    · No need for tissue sampling given 2/2 positive cultures per ID, per IR would likely be technically difficult   · ID consult appreciated    · Follow up JESSICA

## 2023-05-19 NOTE — CASE MANAGEMENT
Case Management Assessment & Discharge Planning Note    Patient name Anita Calderon  Location S /S -43 MRN [id number]  : 1945 Date 2023       Current Admission Date: 2023  Current Admission Diagnosis:Shortness of breath   Patient Active Problem List    Diagnosis Date Noted   • Discitis of lumbar region 2023   • Bacteremia 2023   • Non-ischemic myocardial injury (non-traumatic) 2023   • Sepsis ([facility] [address] ) 2023   • Shortness of breath 2023   • Chronic pain syndrome 05/15/2023   • Lumbar spondylosis 05/15/2023   • Chronic bilateral low back pain without sciatica 2023   • Primary hypertension 2023   • Atrial fibrillation ([facility] [address] ) 2023   • Aortic stenosis 2023   • Leukocytosis 2023   • Elevated D-dimer 2023   • Hyponatremia 2023   • Mixed hyperlipidemia 2021   • Abnormal glucose 2021   • BPH with obstruction/lower urinary tract symptoms 2021   • Primary osteoarthritis of right hand 2017      LOS (days): 3  Geometric Mean LOS (GMLOS) (days): 3 50  Days to GMLOS:0 6     OBJECTIVE:    Risk of Unplanned Readmission Score: 13 03         Current admission status: Inpatient       [facility] Pharm[facility]   [facility] [address] [place] [address]  Phone: [phone] Fax: [phone]    Primary Care Provider: Milan Pierce MD    Primary Insurance: Gage Cortés [facility]  Secondary Insurance:     ASSESSMENT:  [address], [address] Representative - Spouse   Primary Phone: [phone] (Mobile)            Readmission Root Cause  30 Day Readmission: No    Patient Information  Admitted from[de-identified] Home  Mental Status: Alert  During Assessment patient was accompanied by: Not accompanied during assessment, Spouse  Assessment information provided by[de-identified] Patient, Spouse  Primary Caregiver: Self  Support Systems: Self, Spouse/significant other, Family members  [place] of Residence: [address] do you live in?: [address] entry access options   Select all that apply : Stairs  Number of steps to enter home : 2  Do the steps have railings?: Yes  Type of Current Residence: 2 story home  Upon entering residence, is there a bedroom on the main floor (no further steps)?: Yes  Upon entering residence, is there a bathroom on the main floor (no further steps)?: Yes  In the last 12 months, was there a time when you were not able to pay the mortgage or rent on time?: No  In the last 12 months, how many places have you lived?: 1  Homeless/housing insecurity resource given?: No  Living Arrangements: Lives w/ Spouse/significant other  Is patient a ?: No    Activities of Daily Living Prior to Admission  Functional Status: Independent  Completes ADLs independently?: Yes  Ambulates independently?: Yes  Does patient use assisted devices?: No  Does patient currently own DME?: No  Does patient have a history of Outpatient Therapy (PT/OT)?: No  Does the patient have a history of Short-Term Rehab?: No  Does patient have a history of HHC?: No  Does patient currently have [facility]?: No    Patient Information Continued  Income Source: Pension/halfway  Does patient have prescription coverage?: Yes  Within the past 12 months, you worried that your food would run out before you got the money to buy more : Never true  Within the past 12 months, the food you bought just didn't last and you didn't have money to get more : Never true  Food insecurity resource given?: No  Does patient receive dialysis treatments?: No  Does patient have a history of substance abuse?: No  Does patient have a history of Mental Health Diagnosis?: No    PHQ 2/9 Screening   Reviewed PHQ 2/9 Depression Screening Score?: No    Means of Transportation  Means of Transport to Appts[de-identified] Drives Self  In the past 12 months, has lack of transportation kept you from medical appointments or from getting "medications?: No  In the past 12 months, has lack of transportation kept you from meetings, work, or from getting things needed for daily living?: No  Was application for public transport provided?: No    DISCHARGE DETAILS:    Discharge planning discussed with[de-identified] Patient and spouse  Freedom of Choice: Yes  Comments - Freedom of Choice: Choice is for [facility] for home infusion provider  CM contacted family/caregiver?: Yes  Were Treatment Team discharge recommendations reviewed with patient/caregiver?: Yes  Did patient/caregiver verbalize understanding of patient care needs?: Yes  Were patient/caregiver advised of the risks associated with not following Treatment Team discharge recommendations?: Yes    Contacts  Patient Contacts: Michelle Carroll  Relationship to Patient[de-identified] Family  Contact Method: In Person  Reason/Outcome: Emergency Contact, Discharge [address]         Is the patient interested in [place] [address] at discharge?: No    DME Referral Provided  Referral made for DME?: No    Other Referral/Resources/Interventions Provided:  Interventions: Home Infusion (Per Aidin message, \"EST Cost Per Week (Drugs, Per Makenzie & Nursing): $8 00  Drug copay - $8 00/week   Supplies and Nursing covered at 100%\")    Would you like to participate in our [address] service program?  : No - Declined    Treatment Team Recommendation: Home  Discharge Destination Plan[de-identified] Home (Home with home infusion)  Transport at Discharge : Family     Additional Comments:  CM s/w patient and family bedside regarding cost of ABX and supplies  Patient and family agreeable to cost  [facility] made aware via [employer] of tentative D/C on Monday due to patient requiring further work-up and will need PICC line placement       Hortencia Diaz, SHU, MIKEW, ACROBBIE, CCM  05/19/23 10:51 AM    "

## 2023-05-19 NOTE — ANESTHESIA PREPROCEDURE EVALUATION
Procedure:  JESSICA    Relevant Problems   ANESTHESIA (within normal limits)      CARDIO   (+) Aortic stenosis   (+) Atrial fibrillation (HCC)   (+) Mixed hyperlipidemia   (+) Primary hypertension      ENDO   (-) Diabetes mellitus, type 2 (HCC)   (-) Hyperthyroidism   (-) Hypothyroidism      GI/HEPATIC (within normal limits)      /RENAL   (+) BPH with obstruction/lower urinary tract symptoms      HEMATOLOGY (within normal limits)      MUSCULOSKELETAL   (+) Chronic bilateral low back pain without sciatica   (+) Discitis of lumbar region   (+) Lumbar spondylosis   (+) Primary osteoarthritis of right hand      NEURO/PSYCH   (+) Chronic bilateral low back pain without sciatica   (+) Chronic pain syndrome      PULMONARY   (+) Shortness of breath   (-) Asthma   (-) Sleep apnea        TTE 5/3/2023  •  Left Ventricle: Left ventricular cavity size is normal  Wall thickness is moderately increased  There is mild to moderate concentric hypertrophy  The left ventricular ejection fraction is 59% by visual estimation  Systolic function is normal  Wall motion is normal  Diastolic function is moderately abnormal, consistent with grade II (pseudonormal) relaxation  •  Right Ventricle: Systolic function is normal  Normal tricuspid annular plane systolic excursion (TAPSE) > 1 7 cm  Wall thickness is increased  •  Left Atrium: The atrium is moderately dilated (>48 mL/m2)  •  Right Atrium: The atrium is mildly dilated  •  Aortic Valve: The leaflets are moderately thickened  The leaflets are severely calcified  There is severely reduced mobility  There is mild regurgitation  There is moderate to severe stenosis  The aortic valve peak velocity is 4 1 m/s  The aortic valve peak gradient is 63 mmHg  The aortic valve mean gradient is 35 0 mmHg  •  Mitral Valve: There is mild annular calcification  •  Tricuspid Valve: There is mild regurgitation   The right ventricular systolic pressure is moderately elevated      Compared to prior echo, aortic valve stenosis is now borderline severe  Wall thickness has increased  Increased left atrial dilation        Physical Exam    Airway    Mallampati score: II  TM Distance: >3 FB  Neck ROM: full     Dental   No notable dental hx     Cardiovascular      Pulmonary      Other Findings        Anesthesia Plan  ASA Score- 3     Anesthesia Type- IV sedation with anesthesia with ASA Monitors  Additional Monitors:   Airway Plan:           Plan Factors-Exercise tolerance (METS): >4 METS  Chart reviewed  EKG reviewed  Existing labs reviewed  Patient summary reviewed  Patient is not a current smoker  Induction- intravenous  Postoperative Plan-     Informed Consent- Anesthetic plan and risks discussed with patient  I personally reviewed this patient with the CRNA  Discussed and agreed on the Anesthesia Plan with the CRNA  Fadia Carvalho

## 2023-05-19 NOTE — PLAN OF CARE
Problem: PAIN - ADULT  Goal: Verbalizes/displays adequate comfort level or baseline comfort level  Description: Interventions:  - Encourage patient to monitor pain and request assistance  - Assess pain using appropriate pain scale  - Administer analgesics based on type and severity of pain and evaluate response  - Implement non-pharmacological measures as appropriate and evaluate response  - Consider cultural and social influences on pain and pain management  - Notify physician/advanced practitioner if interventions unsuccessful or patient reports new pain  5/18/2023 2306 by Goldie No  Outcome: Progressing  5/18/2023 2306 by Goldie No  Outcome: Progressing  5/18/2023 2303 by Goldie No  Outcome: Progressing     Problem: SAFETY ADULT  Goal: Patient will remain free of falls  Description: INTERVENTIONS:  - Educate patient/family on patient safety including physical limitations  - Instruct patient to call for assistance with activity   - Consult OT/PT to assist with strengthening/mobility   - Keep Call bell within reach  - Keep bed low and locked with side rails adjusted as appropriate  - Keep care items and personal belongings within reach  - Initiate and maintain comfort rounds  - Make Fall Risk Sign visible to staff  - Offer Toileting every 2 Hours, in advance of need  - Initiate/Maintain 2alarm  - Obtain necessary fall risk management equipment: bed alarm  - Apply yellow socks and bracelet for high fall risk patients  - Consider moving patient to room near nurses station  5/18/2023 2306 by Goldie No  Outcome: Progressing  5/18/2023 2306 by Goldie No  Outcome: Progressing  5/18/2023 2303 by Goldie No  Outcome: Progressing  Goal: Maintain or return to baseline ADL function  Description: INTERVENTIONS:  -  Assess patient's ability to carry out ADLs; assess patient's baseline for ADL function and identify physical deficits which impact ability to perform ADLs (bathing, care of mouth/teeth, toileting, grooming, dressing, etc )  - Assess/evaluate cause of self-care deficits   - Assess range of motion  - Assess patient's mobility; develop plan if impaired  - Assess patient's need for assistive devices and provide as appropriate  - Encourage maximum independence but intervene and supervise when necessary  - Involve family in performance of ADLs  - Assess for home care needs following discharge   - Consider OT consult to assist with ADL evaluation and planning for discharge  - Provide patient education as appropriate  5/18/2023 2306 by Luan Wagoner  Outcome: Progressing  5/18/2023 2306 by Luan Wagoner  Outcome: Progressing  5/18/2023 2303 by Luan Wagoner  Outcome: Progressing  Goal: Maintains/Returns to pre admission functional level  Description: INTERVENTIONS:  - Perform BMAT or MOVE assessment daily    - Set and communicate daily mobility goal to care team and patient/family/caregiver  - Collaborate with rehabilitation services on mobility goals if consulted  - Perform Range of Motion 2 times a day  - Reposition patient every 2 hours    - Dangle patient 2 times a day  - Stand patient 2 times a day  - Ambulate patient 2 times a day  - Out of bed to chair 2 times a day   - Out of bed for meals 2 times a day  - Out of bed for toileting  - Record patient progress and toleration of activity level   5/18/2023 2306 by Luan Wagoner  Outcome: Progressing  5/18/2023 2306 by Luan Wagoner  Outcome: Progressing  5/18/2023 2303 by Luan Wagoner  Outcome: Progressing

## 2023-05-19 NOTE — PLAN OF CARE
Problem: PAIN - ADULT  Goal: Verbalizes/displays adequate comfort level or baseline comfort level  Description: Interventions:  - Encourage patient to monitor pain and request assistance  - Assess pain using appropriate pain scale  - Administer analgesics based on type and severity of pain and evaluate response  - Implement non-pharmacological measures as appropriate and evaluate response  - Consider cultural and social influences on pain and pain management  - Notify physician/advanced practitioner if interventions unsuccessful or patient reports new pain  Outcome: Progressing     Problem: SAFETY ADULT  Goal: Patient will remain free of falls  Description: INTERVENTIONS:  - Educate patient/family on patient safety including physical limitations  - Instruct patient to call for assistance with activity   - Consult OT/PT to assist with strengthening/mobility   - Keep Call bell within reach  - Keep bed low and locked with side rails adjusted as appropriate  - Keep care items and personal belongings within reach  - Initiate and maintain comfort rounds  - Make Fall Risk Sign visible to staff  - Offer Toileting every 2 Hours, in advance of need  - Initiate/Maintain bed alarm  - Obtain necessary fall risk management equipment: bed alarm  - Apply yellow socks and bracelet for high fall risk patients  - Consider moving patient to room near nurses station  Outcome: Progressing  Goal: Maintain or return to baseline ADL function  Description: INTERVENTIONS:  -  Assess patient's ability to carry out ADLs; assess patient's baseline for ADL function and identify physical deficits which impact ability to perform ADLs (bathing, care of mouth/teeth, toileting, grooming, dressing, etc )  - Assess/evaluate cause of self-care deficits   - Assess range of motion  - Assess patient's mobility; develop plan if impaired  - Assess patient's need for assistive devices and provide as appropriate  - Encourage maximum independence but intervene and supervise when necessary  - Involve family in performance of ADLs  - Assess for home care needs following discharge   - Consider OT consult to assist with ADL evaluation and planning for discharge  - Provide patient education as appropriate  Outcome: Progressing  Goal: Maintains/Returns to pre admission functional level  Description: INTERVENTIONS:  - Perform BMAT or MOVE assessment daily    - Set and communicate daily mobility goal to care team and patient/family/caregiver  - Collaborate with rehabilitation services on mobility goals if consulted  - Perform Range of Motion 2 times a day  - Reposition patient every 2 hours    - Dangle patient 2 times a day  - Stand patient 2 times a day  - Ambulate patient 2 times a day  - Out of bed to chair 2 times a day   - Out of bed for meals 2 times a day  - Out of bed for toileting  - Record patient progress and toleration of activity level   Outcome: Progressing     Problem: NEUROSENSORY - ADULT  Goal: Achieves stable or improved neurological status  Description: INTERVENTIONS  - Monitor and report changes in neurological status  - Monitor vital signs such as temperature, blood pressure, glucose, and any other labs ordered   - Initiate measures to prevent increased intracranial pressure  - Monitor for seizure activity and implement precautions if appropriate      Outcome: Progressing  Goal: Remains free of injury related to seizures activity  Description: INTERVENTIONS  - Maintain airway, patient safety  and administer oxygen as ordered  - Monitor patient for seizure activity, document and report duration and description of seizure to physician/advanced practitioner  - If seizure occurs,  ensure patient safety during seizure  - Reorient patient post seizure  - Seizure pads on all 4 side rails  - Instruct patient/family to notify RN of any seizure activity including if an aura is experienced  - Instruct patient/family to call for assistance with activity based on nursing assessment  - Administer anti-seizure medications if ordered    Outcome: Progressing  Goal: Achieves maximal functionality and self care  Description: INTERVENTIONS  - Monitor swallowing and airway patency with patient fatigue and changes in neurological status  - Encourage and assist patient to increase activity and self care     - Encourage visually impaired, hearing impaired and aphasic patients to use assistive/communication devices  Outcome: Progressing     Problem: CARDIOVASCULAR - ADULT  Goal: Maintains optimal cardiac output and hemodynamic stability  Description: INTERVENTIONS:  - Monitor I/O, vital signs and rhythm  - Monitor for S/S and trends of decreased cardiac output  - Administer and titrate ordered vasoactive medications to optimize hemodynamic stability  - Assess quality of pulses, skin color and temperature  - Assess for signs of decreased coronary artery perfusion  - Instruct patient to report change in severity of symptoms  Outcome: Progressing  Goal: Absence of cardiac dysrhythmias or at baseline rhythm  Description: INTERVENTIONS:  - Continuous cardiac monitoring, vital signs, obtain 12 lead EKG if ordered  - Administer antiarrhythmic and heart rate control medications as ordered  - Monitor electrolytes and administer replacement therapy as ordered  Outcome: Progressing

## 2023-05-19 NOTE — ASSESSMENT & PLAN NOTE
· 2/2 blood cultures positive for GPC in chains   Possible from discitis/OM  · Follow up final cultures  · Rocephin IV  · JESSICA pending

## 2023-05-19 NOTE — ASSESSMENT & PLAN NOTE
· Present on admission with leukocytosis and tachypnea  Leukocytosis likely from recent steroid use    · Patient has discitis/OM on MRI and 1/2 blood cultures are positive - suspect this was source of infection  · Remains afebrile and hemodynamically stable   · Rocephin has been started by ID  · ID consult appreciated    · Monitor hemodynamics  · Follow up final blood cultures

## 2023-05-19 NOTE — PROGRESS NOTES
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  Patient initially presented for recurrent back pain and also mentioned abdominal distention  Further work-up with MRI notable for #2  Surveillance blood cultures collected in both sets have isolated Streptococcus mitis  The patient has no other localizing complaints on exam, is hemodynamically stable and has no devices in place  Repeat cultures collected on 5/18 also prior to antibiotics and are positive as well  2D echo recently with #5  Completed transesophageal echo today which was notable for aortic valve vegetations  Suspect primary odontogenic source  Uncertain if there may be an additional occult intra-abdominal source given the appearance of the stomach on imaging  Patient fortunately otherwise hemodynamically stable  Continue ceftriaxone 2 g every 24 hours  Repeat CBC/chemistry tomorrow  Follow-up pending blood cultures  Additional blood cultures ordered for tomorrow, for clearance  Continue to trend fever curve/vitals  Monitor neuro exam  Neurosurgical evaluation appreciated  Follow-up final transesophageal echo read  Would obtain CT of the face and possible OMS evaluation  Pending MRI of the T-spine given #3  Potential GI evaluation given #3  Additional supportive care as per primary  We will provide updated prescription to case management closer to discharge  Plans for 6 weeks of IV antibiotic, possible additional oral therapy given #2  We will arrange ID follow-up closer to discharge  Recommend rapid taper off steroids, if possible  Additional supportive care as per primary     2  L1-L2 vertebral osteomyelitis/discitis  Patient presented for recurrent back pain and abdominal distention    Now MR imaging notable for infection, there is some ventral epidural enhancement and paraspinal edema/enhancement on the images  Reviewed with IR and difficult area for aspiration but also patient's blood cultures are positive and are likely the pathogen  Antibiotics as above  No need for biopsy  Ongoing follow-up by neurosurgery  Recommend MRI of the T-spine given #3  Monitor neuro exam  Anticipate prolonged duration of antibiotic  Potential oral therapy after IV course for this issue  We will also plan for repeat MRI of the L-spine towards end of IV course for completeness     3  Abnormal CT abdomen  Patient CT imaging of the abdomen now showing some gastric distention which is unexpected  Previous colonoscopy noted to be unremarkable  Uncertain if this may be contributing to the above  Abdominal exam otherwise benign  Antibiotics as above  Continue to trend fever curve/WBC  Monitor abdominal exam  Follow-up imaging of the T-spine  May need to consult GI  Additional interventions pending clinical course     4   Persistent leukocytosis  Patient has had persistent leukocytosis since last admission which could be related to steroids  Suspect the above is also contributing  He is fortunately hemodynamically stable  Recommend rapid taper of steroids, if possible  Antibiotics as above  Repeat CBC tomorrow     5  Recently diagnosed AS  Patient's 2D echo reviewed and has heavily calcified disease on the aortic valve  Course now complicated by the above  Antibiotics as above  Follow-up transesophageal echo report  Continue to trend fever curve/WBC  Ongoing follow-up by cardiology  Follow-up with CT surgery outpatient     Above plan discussed in detail with the patient, his wife and primary service      ID consult service will reevaluate this patient again on Monday unless new issues in the interim  Please contact ID attending on call if questions      Antibiotics:  Ceftriaxone 2    24 Hour events:  Yesterday and overnight notes reviewed and no acute events noted    Subjective:  Patient currently denies having any nausea, vomiting, chest pain  He completed transesophageal echo this morning  He is aware of plans still for MRI and potential evaluation with GI if MRI is negative  Tolerating current antibiotic without itching or rash  Wife is present at bedside and she reports that case management visited them today and reported the patient is candidate for home IV antibiotics which they do prefer  We discussed plans for repeat cultures over the weekend and likely timeline for discharge early next week with follow-up  Additional questions answered  Objective:  Vitals:  Temp:  [96 9 °F (36 1 °C)-97 7 °F (36 5 °C)] 97 7 °F (36 5 °C)  HR:  [60-73] 73  Resp:  [17-18] 18  BP: ()/(40-74) 141/49  SpO2:  [95 %-99 %] 96 %  Temp (24hrs), Av 4 °F (36 3 °C), Min:96 9 °F (36 1 °C), Max:97 7 °F (36 5 °C)  Current: Temperature: 97 7 °F (36 5 °C)    Physical Exam:   General Appearance:  Alert, interactive, nontoxic, no acute distress  Throat: Oropharynx moist without lesions  Poor dentition noted  Lungs:   Clear to auscultation bilaterally; no wheezes, rhonchi or rales; respirations unlabored on room air   Heart:  RRR; systolic murmur present, no rubs or gallops   Abdomen:   Soft, non-tender, non-distended, positive bowel sounds  Extremities: No clubbing, cyanosis or edema; no spinal or paraspinal muscle tenderness to palpation I can elicit in the seated position  Patient more so develops discomfort when he is laying flat and then attempting to sit up and turn  Skin: No new rashes or lesions  No new draining wounds noted  Labs, Imaging, & Other studies:   All pertinent labs and imaging studies in PACS were personally reviewed as below    Results from last 7 days   Lab Units 23  0337 23  0539 23  1101   WBC Thousand/uL 16 70* 17 38* 16 63*   HEMOGLOBIN g/dL 10 2* 11 1* 10 7*   PLATELETS Thousands/uL 344 372 361     Results from last 7 days   Lab Units 23  1107 05/17/23  0121 05/16/23  1101   POTASSIUM mmol/L 4 2   < > 3 9   CHLORIDE mmol/L 93*   < > 95*   CO2 mmol/L 30   < > 26   BUN mg/dL 16   < > 19   CREATININE mg/dL 0 75   < > 0 72   EGFR ml/min/1 73sq m 88   < > 89   CALCIUM mg/dL 8 7   < > 8 8   AST U/L  --   --  13   ALT U/L  --   --  13   ALK PHOS U/L  --   --  49    < > = values in this interval not displayed  Results from last 7 days   Lab Units 05/18/23  1135 05/18/23  1116 05/17/23  1216   BLOOD CULTURE  Received in Microbiology Lab  Culture in Progress  --  Streptococcus mitis oralis group*  Alpha Hemolytic Streptococcus*   GRAM STAIN RESULT   --  Gram positive cocci in chains* Gram positive cocci in chains*  Gram positive cocci in chains*       Lab interpretation/comments: White blood cell count remains elevated at 16, creatinine unremarkable  Imaging interpretation/comments: MRI of the T-spine ordered  Formal transesophageal echo report pending but per cardiology notes patient has small aortic valve vegetations  Culture data: Cultures from 5/17 and 5/18 collected prior to antibiotics so far with strep mitis

## 2023-05-19 NOTE — ANESTHESIA POSTPROCEDURE EVALUATION
Post-Op Assessment Note    CV Status:  Stable  Pain Score: 0    Pain management: adequate     Mental Status:  Sleepy   Hydration Status:  Euvolemic   PONV Controlled:  Controlled   Airway Patency:  Patent      Post Op Vitals Reviewed: Yes      Staff: CRNA         No notable events documented      BP (!) 98/47 (05/19/23 1002)    Temp (!) 97 °F (36 1 °C) (05/19/23 1002)    Pulse 60 (05/19/23 1002)   Resp 18 (05/19/23 1002)    SpO2 99 % (05/19/23 1002)

## 2023-05-19 NOTE — PLAN OF CARE
Problem: PAIN - ADULT  Goal: Verbalizes/displays adequate comfort level or baseline comfort level  Description: Interventions:  - Encourage patient to monitor pain and request assistance  - Assess pain using appropriate pain scale  - Administer analgesics based on type and severity of pain and evaluate response  - Implement non-pharmacological measures as appropriate and evaluate response  - Consider cultural and social influences on pain and pain management  - Notify physician/advanced practitioner if interventions unsuccessful or patient reports new pain  Outcome: Progressing     Problem: SAFETY ADULT  Goal: Patient will remain free of falls  Description: INTERVENTIONS:  - Educate patient/family on patient safety including physical limitations  - Instruct patient to call for assistance with activity   - Consult OT/PT to assist with strengthening/mobility   - Keep Call bell within reach  - Keep bed low and locked with side rails adjusted as appropriate  - Keep care items and personal belongings within reach  - Initiate and maintain comfort rounds  - Make Fall Risk Sign visible to staff  - Offer Toileting every 2 Hours, in advance of need  - Initiate/Maintain alarm  - Obtain necessary fall risk management equipment:   - Apply yellow socks and bracelet for high fall risk patients  - Consider moving patient to room near nurses station  Outcome: Progressing  Goal: Maintain or return to baseline ADL function  Description: INTERVENTIONS:  -  Assess patient's ability to carry out ADLs; assess patient's baseline for ADL function and identify physical deficits which impact ability to perform ADLs (bathing, care of mouth/teeth, toileting, grooming, dressing, etc )  - Assess/evaluate cause of self-care deficits   - Assess range of motion  - Assess patient's mobility; develop plan if impaired  - Assess patient's need for assistive devices and provide as appropriate  - Encourage maximum independence but intervene and supervise when necessary  - Involve family in performance of ADLs  - Assess for home care needs following discharge   - Consider OT consult to assist with ADL evaluation and planning for discharge  - Provide patient education as appropriate  Outcome: Progressing  Goal: Maintains/Returns to pre admission functional level  Description: INTERVENTIONS:  - Perform BMAT or MOVE assessment daily    - Set and communicate daily mobility goal to care team and patient/family/caregiver  - Collaborate with rehabilitation services on mobility goals if consulted  - Perform Range of Motion 2 times a day  - Reposition patient every 2 hours    - Dangle patient 2 times a day  - Stand patient 2 times a day  - Ambulate patient 2 times a day  - Out of bed to chair 2 times a day   - Out of bed for meals 2 times a day  - Out of bed for toileting  - Record patient progress and toleration of activity level   Outcome: Progressing     Problem: NEUROSENSORY - ADULT  Goal: Achieves stable or improved neurological status  Description: INTERVENTIONS  - Monitor and report changes in neurological status  - Monitor vital signs such as temperature, blood pressure, glucose, and any other labs ordered   - Initiate measures to prevent increased intracranial pressure  - Monitor for seizure activity and implement precautions if appropriate      Outcome: Progressing  Goal: Remains free of injury related to seizures activity  Description: INTERVENTIONS  - Maintain airway, patient safety  and administer oxygen as ordered  - Monitor patient for seizure activity, document and report duration and description of seizure to physician/advanced practitioner  - If seizure occurs,  ensure patient safety during seizure  - Reorient patient post seizure  - Seizure pads on all 4 side rails  - Instruct patient/family to notify RN of any seizure activity including if an aura is experienced  - Instruct patient/family to call for assistance with activity based on nursing assessment  - Administer anti-seizure medications if ordered    Outcome: Progressing  Goal: Achieves maximal functionality and self care  Description: INTERVENTIONS  - Monitor swallowing and airway patency with patient fatigue and changes in neurological status  - Encourage and assist patient to increase activity and self care     - Encourage visually impaired, hearing impaired and aphasic patients to use assistive/communication devices  Outcome: Progressing     Problem: CARDIOVASCULAR - ADULT  Goal: Maintains optimal cardiac output and hemodynamic stability  Description: INTERVENTIONS:  - Monitor I/O, vital signs and rhythm  - Monitor for S/S and trends of decreased cardiac output  - Administer and titrate ordered vasoactive medications to optimize hemodynamic stability  - Assess quality of pulses, skin color and temperature  - Assess for signs of decreased coronary artery perfusion  - Instruct patient to report change in severity of symptoms  Outcome: Progressing  Goal: Absence of cardiac dysrhythmias or at baseline rhythm  Description: INTERVENTIONS:  - Continuous cardiac monitoring, vital signs, obtain 12 lead EKG if ordered  - Administer antiarrhythmic and heart rate control medications as ordered  - Monitor electrolytes and administer replacement therapy as ordered  Outcome: Progressing

## 2023-05-19 NOTE — PROGRESS NOTES
[facility]  Progress Note  Name: Eliceo Jensen  MRN: [id number]  Unit/Bed#: S -01 I Date of Admission: 5/16/2023   Date of Service: 5/19/2023 I Hospital Day: 3    Assessment/Plan   * Shortness of breath  Assessment & Plan  · POA, likely secondary fluid retention/volume overload   · CT chest with pleural effusions and atelectasis  · Unfortunately no output has been documented but patient does report that he has been urinating with the Lasix  · Continue I/O Daily weights   · Continue Lasix 40 mg QD     Discitis of lumbar region  Assessment & Plan  · Has had back pain for the last few weeks   · He is on a steroid taper and due to start prednisone 20 mg once a day today  · 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   · 2/2 blood cultures with GPC chains, BCIP showing Strep  · Neurosurgery without further recommendations for surgery   · Continue Rocephin per ID  · Pain control ordered  · No need for tissue sampling given 2/2 positive cultures per ID, per IR would likely be technically difficult   · ID consult appreciated    · Follow up JESSICA    Sepsis [facility])  Assessment & Plan  · Present on admission with leukocytosis and tachypnea  Leukocytosis likely from recent steroid use  · Patient has discitis/OM on MRI and 1/2 blood cultures are positive - suspect this was source of infection  · Remains afebrile and hemodynamically stable   · Rocephin has been started by ID  · ID consult appreciated    · Monitor hemodynamics  · Follow up final blood cultures         Bacteremia  Assessment & Plan  · 2/2 blood cultures positive for GPC in chains  Possible from discitis/OM  · Follow up final cultures  · Rocephin IV  · JESSICA pending     Non-ischemic myocardial injury (non-traumatic)  Assessment & Plan  · Elevated troponins in the 200-300 range upon admission and overnight, flat  · Denies chest pain    · Likely secondary to volume overload/aortic stenosis   · Cardiology following  · No further work up inpatient needed  · Outpatient ischemic testing recommended     Aortic stenosis  Assessment & Plan  · Last Echo beginning of this month shows severe stenosis  · No further work up needed    Atrial fibrillation ([address] )  Assessment & Plan  · Presently rate controlled with Lopressor  · Not on anticoagulation per patient - he is concerned about bruising or bleeding   · Given patient will be undergoing biopsy will continue to hold off on starting this at this time     Primary hypertension  Assessment & Plan  · BP acceptable   · Continue Lopressor  · Continue with Lasix    Hyponatremia  Assessment & Plan  · Sodium 126 upon admission appears chronic and stable   · Monitor BMP             VTE Pharmacologic Prophylaxis: VTE Score: 4 Moderate Risk (Score 3-4) - Pharmacological DVT Prophylaxis Ordered: heparin  Patient Centered Rounds: I performed bedside rounds with nursing staff today  Discussions with Specialists or Other Care Team Provider: Discussed with RN, JOLLY    Education and Discussions with Family / Patient: Patient declined call to   Total Time Spent on Date of Encounter in care of patient: 35 minutes This time was spent on one or more of the following: performing physical exam; counseling and coordination of care; obtaining or reviewing history; documenting in the medical record; reviewing/ordering tests, medications or procedures; communicating with other healthcare professionals and discussing with patient's family/caregivers  Current Length of Stay: 3 day(s)  Current Patient Status: Inpatient   Certification Statement: The patient will continue to require additional inpatient hospital stay due to pending JESSICA, MRI T spine, final antibiotic determination   Discharge Plan: Anticipate discharge in 48-72 hrs to home  Code Status: Level 1 - Full Code    Subjective:   Patient reports doing okay today  States that he had some right groin pain that started overnight  Denies fevers or chills  Denies shortness of breath or chest pain  Objective:     Vitals:   Temp (24hrs), Av 4 °F (36 3 °C), Min:96 9 °F (36 1 °C), Max:97 7 °F (36 5 °C)    Temp:  [96 9 °F (36 1 °C)-97 7 °F (36 5 °C)] 96 9 °F (36 1 °C)  HR:  [69-71] 69  Resp:  [17-18] 18  BP: (124-168)/(44-74) 168/74  SpO2:  [96 %] 96 %  Body mass index is 22 19 kg/m²  Input and Output Summary (last 24 hours): Intake/Output Summary (Last 24 hours) at 2023 0841  Last data filed at 2023 0300  Gross per 24 hour   Intake 410 ml   Output 1100 ml   Net -690 ml       Physical Exam:   Physical Exam  Constitutional:       General: He is not in acute distress  Appearance: Normal appearance  He is normal weight  He is not ill-appearing or diaphoretic  HENT:      Head: Normocephalic and atraumatic  Mouth/Throat:      Mouth: Mucous membranes are moist    Eyes:      General: No scleral icterus  Pupils: Pupils are equal, round, and reactive to light  Cardiovascular:      Rate and Rhythm: Normal rate and regular rhythm  Pulses: Normal pulses  Heart sounds: Normal heart sounds, S1 normal and S2 normal  No murmur heard  No systolic murmur is present  No diastolic murmur is present  No gallop  No S3 or S4 sounds  Pulmonary:      Effort: Pulmonary effort is normal  No accessory muscle usage or respiratory distress  Breath sounds: Normal breath sounds  No stridor  No decreased breath sounds, wheezing, rhonchi or rales  Chest:      Chest wall: No tenderness  Abdominal:      General: Bowel sounds are normal  There is no distension  Palpations: Abdomen is soft  Tenderness: There is no abdominal tenderness  There is no guarding  Musculoskeletal:      Right lower leg: No edema  Left lower leg: No edema  Skin:     General: Skin is warm and dry  Coloration: Skin is not jaundiced  Neurological:      General: No focal deficit present  Mental Status: He is alert   Mental status is at baseline  Motor: No tremor or seizure activity  Psychiatric:         Behavior: Behavior is cooperative  Additional Data:     Labs:  Results from last 7 days   Lab Units 05/19/23  0337 05/18/23  0539   WBC Thousand/uL 16 70* 17 38*   HEMOGLOBIN g/dL 10 2* 11 1*   HEMATOCRIT % 31 7* 33 8*   PLATELETS Thousands/uL 344 372   NEUTROS PCT %  --  85*   LYMPHS PCT %  --  7*   MONOS PCT %  --  6   EOS PCT %  --  1     Results from last 7 days   Lab Units 05/19/23  0311 05/17/23  0121 05/16/23  1101   SODIUM mmol/L 126*   < > 128*   POTASSIUM mmol/L 3 9   < > 3 9   CHLORIDE mmol/L 92*   < > 95*   CO2 mmol/L 26   < > 26   BUN mg/dL 18   < > 19   CREATININE mg/dL 0 70   < > 0 72   ANION GAP mmol/L 8   < > 7   CALCIUM mg/dL 8 5   < > 8 8   ALBUMIN g/dL  --   --  3 4*   TOTAL BILIRUBIN mg/dL  --   --  0 39   ALK PHOS U/L  --   --  49   ALT U/L  --   --  13   AST U/L  --   --  13   GLUCOSE RANDOM mg/dL 104   < > 112    < > = values in this interval not displayed  Lines/Drains:  Invasive Devices     Peripheral Intravenous Line  Duration           Peripheral IV 05/16/23 Right Antecubital 3 days                      Imaging: No pertinent imaging reviewed  Recent Cultures (last 7 days):   Results from last 7 days   Lab Units 05/18/23  1135 05/18/23  1116 05/17/23  1216   BLOOD CULTURE  Received in Microbiology Lab  Culture in Progress    --  Alpha Hemolytic Streptococcus*  Alpha Hemolytic Streptococcus*   GRAM STAIN RESULT   --  Gram positive cocci in chains* Gram positive cocci in chains*  Gram positive cocci in chains*       Last 24 Hours Medication List:   Current Facility-Administered Medications   Medication Dose Route Frequency Provider Last Rate   • acetaminophen  650 mg Oral Q6H PRN Kenya Lorenz MD     • cefTRIAXone  2,000 mg Intravenous Q24H Lori Mcguire MD 2,000 mg (05/18/23 1716)   • furosemide  40 mg Oral Daily JESSICA Logan     • heparin (porcine)  5,000 Units Subcutaneous [facility] Asif Amor MD     • HYDROmorphone  0 5 mg Intravenous Q4H PRN Ubaldo Harley PA-C     • lidocaine  1 patch Topical Daily Simin Walsh PA-C     • melatonin  6 mg Oral HS Osmany Bonds PA-C     • metoprolol tartrate  25 mg Oral Q12H [address] María Massey MD     • oxyCODONE  10 mg Oral Q4H PRN Ubaldo Harley PA-C     • oxyCODONE  5 mg Oral Q4H PRN Ubaldo Harley PA-C     • polyethylene glycol  17 g Oral Daily PRN Asif Amor MD     • predniSONE  20 mg Oral Daily Asif Amor MD     • senna-docusate sodium  1 tablet Oral BID Jayashree Grier MD          Today, Patient Was Seen By: Indy Lott PA-C    **Please Note: This note may have been constructed using a voice recognition system  **

## 2023-05-19 NOTE — QUICK NOTE
Discussed with ID later on the in day regarding plan of care going into the weekend  Given 2/2 strep mitis oralis bacteremia in blood cultures, need to assess for oral source of infection  CT facial bones with contrast will be obtained  If there is any significant findings/lucencies/abscess we will discuss with OMFS  In regards to findings of gastric distention on CT abdomen pelvis from admission we will discuss with GI tomorrow if patient will eventually require endoscopy to rule out gastric source of translocation  This very well may be something that is planned outpatient after antibiotics are completed, but we will discuss timing with GI further  With regards to the patient's Prednisone these were prescribed by his PCP for back pain  Given active infection (which is likely causing the patient's pain) we will stop these immediately  No need to further taper given this is only day 7 of steroids and he had already tapered down to 20 mg on Wednesday  Osmany Yan PA-C

## 2023-05-19 NOTE — PROGRESS NOTES
"Cardiology Progress Note - Jolanta Peters 69 y o  male MRN: [id number]    Unit/Bed#: S -01 Encounter: [id number]      Assessment:  Principal Problem:    Shortness of breath  Active Problems:    Hyponatremia    Primary hypertension    Atrial fibrillation (HCC)    Aortic stenosis    Sepsis ([address] )    Non-ischemic myocardial injury (non-traumatic)    Discitis of lumbar region    Bacteremia      Plan:    1  Infective endocarditis - After being found to have gram-positive bacteremia and discitis/osteomyelitis of his lumbar spine he has now been found to have small vegetations on his diseased aortic valve  Suggest 6 weeks of IV antibiotics and then we will have him assessed by CT surgery as an outpatient for TAVR  2  Acute heart failure with preserved ejection fraction - Associated with moderate to severe AS and elevated filling pressures  Started on low-dose IV Lasix and symptoms improved  Agree with Lasix 40 mg daily  Follow urine output and daily labs  Low-sodium diet  3  Moderate to severe aortic stenosis - JESSICA also demonstrates at least moderate AI  We we will continue to treat his aortic valve endocarditis  He is no longer in congestive heart failure and is asymptomatic  For we will allow for completion of IV antibiotics and then outpatient CT surgical consultation  4  Paroxysmal atrial fibrillation - He remains in sinus rhythm  He will continue beta-blocker and is on Eliquis for stroke prevention  Subjective:   Patient seen and examined  No significant events overnight  Seen at the time of his JESSICA  Remains asymptomatic from a CHF standpoint  Found on JESSICA to have multiple small vegetations on the aortic valve  Objective:     Vitals: Blood pressure (!) 124/40, pulse 63, temperature (!) 97 °F (36 1 °C), temperature source Temporal, resp  rate 18, height 5' 8\" (1 727 m), weight 65 8 kg (145 lb), SpO2 95 %  , Body mass index is 22 05 kg/m² ,   Orthostatic Blood Pressures    Flowsheet Row " Most Recent Value   Blood Pressure 124/40 filed at 05/19/2023 1052   Patient Position - Orthostatic VS Sitting filed at 05/19/2023 1052            Intake/Output Summary (Last 24 hours) at 5/19/2023 1428  Last data filed at 5/19/2023 0959  Gross per 24 hour   Intake 400 ml   Output 1000 ml   Net -600 ml       No significant arrhythmias seen on telemetry review      Physical Exam:    GEN: [facility] appears well, alert and oriented x 3, pleasant and cooperative   HEENT: pupils equal, round, and reactive to light; extraocular muscles intact  NECK: supple, no carotid bruits   HEART: regular rhythm, normal S1 and S2, 3/6 systolic ejection murmur, no clicks, gallops or rubs   LUNGS: clear to auscultation bilaterally; no wheezes, rales, or rhonchi   ABDOMEN: normal bowel sounds, soft, no tenderness, no distention  EXTREMITIES: peripheral pulses normal; no clubbing, cyanosis, or edema  NEURO: no focal findings   SKIN: normal without suspicious lesions on exposed skin    Medications:      Current Facility-Administered Medications:   •  acetaminophen (TYLENOL) tablet 650 mg, 650 mg, Oral, Q6H PRN, Kenya Lorenz MD, 650 mg at 05/16/23 2359  •  cefTRIAXone (ROCEPHIN) 2,000 mg in dextrose 5 % 50 mL IVPB, 2,000 mg, Intravenous, Q24H, Lori Mcguire MD, Last Rate: 100 mL/hr at 05/18/23 1716, 2,000 mg at 05/18/23 1716  •  furosemide (LASIX) tablet 40 mg, 40 mg, Oral, Daily, JESSICA Tapia, 40 mg at 05/19/23 0818  •  heparin (porcine) subcutaneous injection 5,000 Units, 5,000 Units, Subcutaneous, Q8H [facility], 5,000 Units at 05/19/23 0602 **AND** Platelet count, , , Once, Kenya Lorenz MD  •  HYDROmorphone (DILAUDID) injection 0 5 mg, 0 5 mg, Intravenous, Q4H PRN, Shai Dill PA-C, 0 5 mg at 05/17/23 1446  •  lidocaine (LIDODERM) 5 % patch 1 patch, 1 patch, Topical, Daily, Ibeth Reeder PA-C, 1 patch at 05/19/23 0602  •  melatonin tablet 6 mg, 6 mg, Oral, HS, Osmany Lawton PA-C, 6 mg at 05/18/23 2123  • metoprolol tartrate (LOPRESSOR) tablet 25 mg, 25 mg, Oral, Q12H [facility], Zaria Resendez MD, 25 mg at 05/19/23 3045  •  oxyCODONE (ROXICODONE) immediate release tablet 10 mg, 10 mg, Oral, Q4H PRN, Rick Ramos PA-C, 10 mg at 05/18/23 1914  •  oxyCODONE (ROXICODONE) IR tablet 5 mg, 5 mg, Oral, Q4H PRN, Rick Ramos PA-C, 5 mg at 05/17/23 1129  •  polyethylene glycol (MIRALAX) packet 17 g, 17 g, Oral, Daily PRN, Modesta Rosado MD, 17 g at 05/18/23 0730  •  predniSONE tablet 20 mg, 20 mg, Oral, Daily, Modesta Rosado MD, 20 mg at 05/19/23 0818  •  senna-docusate sodium (SENOKOT S) 8 6-50 mg per tablet 1 tablet, 1 tablet, Oral, BID, Victoria Burgos MD, 1 tablet at 05/19/23 0818     Labs & Results:        Results from last 7 days   Lab Units 05/19/23  0337 05/18/23  0539 05/16/23  1101   WBC Thousand/uL 16 70* 17 38* 16 63*   HEMOGLOBIN g/dL 10 2* 11 1* 10 7*   HEMATOCRIT % 31 7* 33 8* 32 1*   PLATELETS Thousands/uL 344 372 361         Results from last 7 days   Lab Units 05/19/23  1107 05/19/23  0311 05/18/23  1932 05/17/23  0121 05/16/23  1101   POTASSIUM mmol/L 4 2 3 9 3 8   < > 3 9   CHLORIDE mmol/L 93* 92* 91*   < > 95*   CO2 mmol/L 30 26 28   < > 26   BUN mg/dL 16 18 22   < > 19   CREATININE mg/dL 0 75 0 70 0 75   < > 0 72   CALCIUM mg/dL 8 7 8 5 8 4   < > 8 8   ALK PHOS U/L  --   --   --   --  49   ALT U/L  --   --   --   --  13   AST U/L  --   --   --   --  13    < > = values in this interval not displayed  EKG personally reviewed by Zaria Resendez MD   Sinus rhythm, left atrial lodgment and nonspecific ST changes  ECHO:     •  Left Ventricle: Left ventricular cavity size is normal  Wall thickness is moderately increased  There is mild to moderate concentric hypertrophy  The left ventricular ejection fraction is 59% by visual estimation   Systolic function is normal  Wall motion is normal  Diastolic function is moderately abnormal, consistent with grade II (pseudonormal) relaxation  •  Right Ventricle: Systolic function is normal  Normal tricuspid annular plane systolic excursion (TAPSE) > 1 7 cm  Wall thickness is increased  •  Left Atrium: The atrium is moderately dilated (>48 mL/m2)  •  Right Atrium: The atrium is mildly dilated  •  Aortic Valve: The leaflets are moderately thickened  The leaflets are severely calcified  There is severely reduced mobility  There is mild regurgitation  There is moderate to severe stenosis  The aortic valve peak velocity is 4 1 m/s  The aortic valve peak gradient is 63 mmHg  The aortic valve mean gradient is 35 0 mmHg  •  Mitral Valve: There is mild annular calcification  •  Tricuspid Valve: There is mild regurgitation  The right ventricular systolic pressure is moderately elevated      Compared to prior echo, aortic valve stenosis is now borderline severe  Wall thickness has increased  Increased left atrial dilation          Counseling / Coordination of Care  Total floor / unit time spent today 25 minutes  Greater than 50% of total time was spent with the patient and / or family counseling and / or coordination of care

## 2023-05-20 PROBLEM — I33.0 ACUTE BACTERIAL ENDOCARDITIS: Status: ACTIVE | Noted: 2023-05-20

## 2023-05-20 PROBLEM — B95.5 BACTEREMIA DUE TO STREPTOCOCCUS: Status: ACTIVE | Noted: 2023-05-18

## 2023-05-20 PROBLEM — K31.89 GASTRIC DISTENTION: Status: ACTIVE | Noted: 2023-05-20

## 2023-05-20 LAB
ANION GAP SERPL CALCULATED.3IONS-SCNC: 7 MMOL/L (ref 4–13)
BASOPHILS # BLD AUTO: 0.04 THOUSANDS/ÂΜL (ref 0–0.1)
BASOPHILS NFR BLD AUTO: 0 % (ref 0–1)
BUN SERPL-MCNC: 20 MG/DL (ref 5–25)
CALCIUM SERPL-MCNC: 8.7 MG/DL (ref 8.4–10.2)
CHLORIDE SERPL-SCNC: 92 MMOL/L (ref 96–108)
CO2 SERPL-SCNC: 28 MMOL/L (ref 21–32)
CREAT SERPL-MCNC: 0.84 MG/DL (ref 0.6–1.3)
EOSINOPHIL # BLD AUTO: 0.16 THOUSAND/ÂΜL (ref 0–0.61)
EOSINOPHIL NFR BLD AUTO: 1 % (ref 0–6)
ERYTHROCYTE [DISTWIDTH] IN BLOOD BY AUTOMATED COUNT: 14.4 % (ref 11.6–15.1)
GFR SERPL CREATININE-BSD FRML MDRD: 84 ML/MIN/1.73SQ M
GLUCOSE SERPL-MCNC: 113 MG/DL (ref 65–140)
HCT VFR BLD AUTO: 31.6 % (ref 36.5–49.3)
HGB BLD-MCNC: 10.3 G/DL (ref 12–17)
IMM GRANULOCYTES # BLD AUTO: 0.08 THOUSAND/UL (ref 0–0.2)
IMM GRANULOCYTES NFR BLD AUTO: 1 % (ref 0–2)
LYMPHOCYTES # BLD AUTO: 1.08 THOUSANDS/ÂΜL (ref 0.6–4.47)
LYMPHOCYTES NFR BLD AUTO: 8 % (ref 14–44)
MCH RBC QN AUTO: 29.2 PG (ref 26.8–34.3)
MCHC RBC AUTO-ENTMCNC: 32.6 G/DL (ref 31.4–37.4)
MCV RBC AUTO: 90 FL (ref 82–98)
MONOCYTES # BLD AUTO: 0.79 THOUSAND/ÂΜL (ref 0.17–1.22)
MONOCYTES NFR BLD AUTO: 6 % (ref 4–12)
NEUTROPHILS # BLD AUTO: 11.27 THOUSANDS/ÂΜL (ref 1.85–7.62)
NEUTS SEG NFR BLD AUTO: 84 % (ref 43–75)
NRBC BLD AUTO-RTO: 0 /100 WBCS
PLATELET # BLD AUTO: 315 THOUSANDS/UL (ref 149–390)
PMV BLD AUTO: 8.8 FL (ref 8.9–12.7)
POTASSIUM SERPL-SCNC: 3.8 MMOL/L (ref 3.5–5.3)
RBC # BLD AUTO: 3.53 MILLION/UL (ref 3.88–5.62)
SODIUM SERPL-SCNC: 127 MMOL/L (ref 135–147)
STREPTOCOCCUS DNA BLD POS NAA+NON-PROBE: DETECTED
WBC # BLD AUTO: 13.42 THOUSAND/UL (ref 4.31–10.16)

## 2023-05-20 RX ADMIN — HEPARIN SODIUM 5000 UNITS: 5000 INJECTION INTRAVENOUS; SUBCUTANEOUS at 21:08

## 2023-05-20 RX ADMIN — OXYCODONE HYDROCHLORIDE 5 MG: 5 TABLET ORAL at 22:36

## 2023-05-20 RX ADMIN — HEPARIN SODIUM 5000 UNITS: 5000 INJECTION INTRAVENOUS; SUBCUTANEOUS at 04:33

## 2023-05-20 RX ADMIN — MELATONIN 6 MG: 3 TAB ORAL at 21:08

## 2023-05-20 RX ADMIN — SENNOSIDES AND DOCUSATE SODIUM 1 TABLET: 8.6; 5 TABLET ORAL at 17:23

## 2023-05-20 RX ADMIN — METOPROLOL TARTRATE 25 MG: 25 TABLET, FILM COATED ORAL at 21:08

## 2023-05-20 RX ADMIN — HEPARIN SODIUM 5000 UNITS: 5000 INJECTION INTRAVENOUS; SUBCUTANEOUS at 14:27

## 2023-05-20 RX ADMIN — FUROSEMIDE 40 MG: 40 TABLET ORAL at 09:58

## 2023-05-20 RX ADMIN — METOPROLOL TARTRATE 25 MG: 25 TABLET, FILM COATED ORAL at 09:58

## 2023-05-20 RX ADMIN — SENNOSIDES AND DOCUSATE SODIUM 1 TABLET: 8.6; 5 TABLET ORAL at 09:58

## 2023-05-20 RX ADMIN — CEFTRIAXONE SODIUM 2000 MG: 10 INJECTION, POWDER, FOR SOLUTION INTRAVENOUS at 16:08

## 2023-05-20 RX ADMIN — LIDOCAINE 5% 1 PATCH: 700 PATCH TOPICAL at 04:33

## 2023-05-20 NOTE — ASSESSMENT & PLAN NOTE
· JESSICA positive for aortic valve vegetation  · Possible oral source but CT facial bones negative, but patient did have recent dental work and needs further procedures in the future   · Will need antibiotic prophylaxis in the future for dental work   · Rocephin x 6 weeks   · Follow up with TAVR evaluation after

## 2023-05-20 NOTE — ASSESSMENT & PLAN NOTE
· POA, likely secondary fluid retention/volume overload   · CT chest with pleural effusions and atelectasis  · Unfortunately no output has been documented but patient does report that he has been urinating with the Lasix  · This has improved   · Continue I/O Daily weights   · Continue Lasix 40 mg QD

## 2023-05-20 NOTE — PROGRESS NOTES
"Cardiology Progress Note - Percy Sheriff 69 y o  male MRN: [id number]    Unit/Bed#: S -01 Encounter: [id number]      Assessment:  Principal Problem:    Bacteremia due to Streptococcus  Active Problems:    Hyponatremia    Primary hypertension    Atrial fibrillation (HCC)    Aortic stenosis    Sepsis ([address] )    Shortness of breath    Non-ischemic myocardial injury (non-traumatic)    Discitis of lumbar region    Acute bacterial endocarditis    Gastric distention      Plan:    1  Infective endocarditis - After being found to have gram-positive bacteremia and discitis/osteomyelitis of his lumbar spine he has now been found to have small vegetations on his diseased aortic valve  Suggest 6 weeks of IV antibiotics and then we will have him assessed by CT surgery as an outpatient for TAVR  2  Acute heart failure with preserved ejection fraction - Associated with moderate to severe AS and elevated filling pressures  Started on low-dose IV Lasix and symptoms improved  Agree with Lasix 40 mg daily  Follow urine output and daily labs  Low-sodium diet  He is compensated at this point  3  Moderate to severe aortic stenosis - JESSICA also demonstrates at least moderate AI  We we will continue to treat his aortic valve endocarditis  He is no longer in congestive heart failure and is asymptomatic  For we will allow for completion of IV antibiotics and then outpatient CT surgical consultation  4  Paroxysmal atrial fibrillation - He remains in sinus rhythm  He will continue beta-blocker and is on Eliquis for stroke prevention  Subjective:   Patient seen and examined  No significant events overnight  Continues to feel well  Denies any return of shortness of breath or signs of CHF  Objective:     Vitals: Blood pressure (!) 143/44, pulse 63, temperature 97 7 °F (36 5 °C), temperature source Oral, resp  rate 17, height 5' 8\" (1 727 m), weight 66 1 kg (145 lb 11 6 oz), SpO2 96 %  , Body mass index is 22 16 kg/m²  , " Orthostatic Blood Pressures    Flowsheet Row Most Recent Value   Blood Pressure 143/44 filed at 05/20/2023 9094   Patient Position - Orthostatic VS Sitting filed at 05/20/2023 0631            Intake/Output Summary (Last 24 hours) at 5/20/2023 0951  Last data filed at 5/19/2023 0959  Gross per 24 hour   Intake 400 ml   Output --   Net 400 ml     Physical Exam:    GEN: [facility] appears well, alert and oriented x 3, pleasant and cooperative   HEENT: pupils equal, round, and reactive to light; extraocular muscles intact  NECK: supple, no carotid bruits   HEART: regular rhythm, normal S1 and soft S2, 3/6 systolic ejection murmur, no clicks, gallops or rubs   LUNGS: clear to auscultation bilaterally; no wheezes, rales, or rhonchi   ABDOMEN: normal bowel sounds, soft, no tenderness, no distention  EXTREMITIES: peripheral pulses normal; no clubbing, cyanosis, or edema  NEURO: no focal findings   SKIN: normal without suspicious lesions on exposed skin    Medications:      Current Facility-Administered Medications:   •  acetaminophen (TYLENOL) tablet 650 mg, 650 mg, Oral, Q6H PRN, Lisha Mcwilliams MD, 650 mg at 05/16/23 2359  •  cefTRIAXone (ROCEPHIN) 2,000 mg in dextrose 5 % 50 mL IVPB, 2,000 mg, Intravenous, Q24H, April Cordero MD, Last Rate: 100 mL/hr at 05/19/23 1535, 2,000 mg at 05/19/23 1535  •  furosemide (LASIX) tablet 40 mg, 40 mg, Oral, Daily, [address], CRNP, 40 mg at 05/19/23 0818  •  heparin (porcine) subcutaneous injection 5,000 Units, 5,000 Units, Subcutaneous, Q8H [address], 5,000 Units at 05/20/23 0433 **AND** Platelet count, , , Once, Lisha Mcwilliams MD  •  HYDROmorphone (DILAUDID) injection 0 5 mg, 0 5 mg, Intravenous, Q4H PRN, Shane Aviles PA-C, 0 5 mg at 05/17/23 1446  •  lidocaine (LIDODERM) 5 % patch 1 patch, 1 patch, Topical, Daily, Radha SHIREEN Sheth-C, 1 patch at 05/20/23 0433  •  melatonin tablet 6 mg, 6 mg, Oral, HS, Osmany Vela, CRUZ, 6 mg at 05/19/23 2104  •  metoprolol tartrate (LOPRESSOR) tablet 25 mg, 25 mg, Oral, Q12H [facility], Marce Mota MD, 25 mg at 05/19/23 2104  •  oxyCODONE (ROXICODONE) immediate release tablet 10 mg, 10 mg, Oral, Q4H PRN, Agnieszka Rosa PA-C, 10 mg at 05/19/23 1612  •  oxyCODONE (ROXICODONE) IR tablet 5 mg, 5 mg, Oral, Q4H PRN, Agnieszka Rosa PA-C, 5 mg at 05/17/23 1129  •  polyethylene glycol (MIRALAX) packet 17 g, 17 g, Oral, Daily PRN, Jeffy Barrera MD, 17 g at 05/18/23 0730  •  senna-docusate sodium (SENOKOT S) 8 6-50 mg per tablet 1 tablet, 1 tablet, Oral, BID, Edgar Rene MD, 1 tablet at 05/19/23 1848     Labs & Results:        Results from last 7 days   Lab Units 05/20/23  0339 05/19/23  0337 05/18/23  0539   WBC Thousand/uL 13 42* 16 70* 17 38*   HEMOGLOBIN g/dL 10 3* 10 2* 11 1*   HEMATOCRIT % 31 6* 31 7* 33 8*   PLATELETS Thousands/uL 315 344 372         Results from last 7 days   Lab Units 05/20/23  0335 05/19/23  1953 05/19/23  1107 05/17/23  0121 05/16/23  1101   POTASSIUM mmol/L 3 8 3 4* 4 2   < > 3 9   CHLORIDE mmol/L 92* 90* 93*   < > 95*   CO2 mmol/L 28 29 30   < > 26   BUN mg/dL 20 17 16   < > 19   CREATININE mg/dL 0 84 0 80 0 75   < > 0 72   CALCIUM mg/dL 8 7 8 8 8 7   < > 8 8   ALK PHOS U/L  --   --   --   --  49   ALT U/L  --   --   --   --  13   AST U/L  --   --   --   --  13    < > = values in this interval not displayed  EKG personally reviewed by Marce Mota MD   Sinus rhythm, left atrial lodgment and nonspecific ST changes  ECHO:     •  Left Ventricle: Left ventricular cavity size is normal  Wall thickness is moderately increased  There is mild to moderate concentric hypertrophy  The left ventricular ejection fraction is 59% by visual estimation  Systolic function is normal  Wall motion is normal  Diastolic function is moderately abnormal, consistent with grade II (pseudonormal) relaxation    •  Right Ventricle: Systolic function is normal  Normal tricuspid annular plane systolic excursion (TAPSE) > 1 7 cm  Wall thickness is increased  •  Left Atrium: The atrium is moderately dilated (>48 mL/m2)  •  Right Atrium: The atrium is mildly dilated  •  Aortic Valve: The leaflets are moderately thickened  The leaflets are severely calcified  There is severely reduced mobility  There is mild regurgitation  There is moderate to severe stenosis  The aortic valve peak velocity is 4 1 m/s  The aortic valve peak gradient is 63 mmHg  The aortic valve mean gradient is 35 0 mmHg  •  Mitral Valve: There is mild annular calcification  •  Tricuspid Valve: There is mild regurgitation  The right ventricular systolic pressure is moderately elevated      Compared to prior echo, aortic valve stenosis is now borderline severe  Wall thickness has increased  Increased left atrial dilation          Counseling / Coordination of Care  Total floor / unit time spent today 25 minutes  Greater than 50% of total time was spent with the patient and / or family counseling and / or coordination of care

## 2023-05-20 NOTE — ASSESSMENT & PLAN NOTE
· Noted on CT abdomen pelvis on admission   Patient asymptomatic at this time   · Referral to GI outpatient for endoscopy in the future

## 2023-05-20 NOTE — ASSESSMENT & PLAN NOTE
· Last Echo beginning of this month shows severe stenosis     · JESSICA showed bacterial endocarditis   · Will need eventual TAVR evaluation   · Antibiotics as above

## 2023-05-20 NOTE — ASSESSMENT & PLAN NOTE
· 2/2 blood cultures positive for strep mitis oralis  Discitis/OM noted on MRI  · Patient reports he did have dental work recently but cannot remember   Reports that he does need further dental work in the future   · JESSICA positive for vegetation   · CT facial bones negative for abscess/collection   · Rocephin IV x 6 weeks, possible oral treatment after  · Follow up repeat cultures   · PICC line when negative at 72 hours

## 2023-05-20 NOTE — ASSESSMENT & PLAN NOTE
· Present on admission with leukocytosis and tachypnea  Leukocytosis likely from recent steroid use    · Remains afebrile and hemodynamically stable  · Sepsis criteria resolved    · Antibiotics and culture plan as above

## 2023-05-20 NOTE — PROGRESS NOTES
[facility]  Progress Note  Name: Rocael Weber  MRN: [id number]  Unit/Bed#: S -30 I Date of Admission: 5/16/2023   Date of Service: 5/20/2023 I Hospital Day: 4    Assessment/Plan   * Bacteremia due to Streptococcus  Assessment & Plan  · 2/2 blood cultures positive for strep mitis oralis  Discitis/OM noted on MRI  · Patient reports he did have dental work recently but cannot remember  Reports that he does need further dental work in the future   · JESSICA positive for vegetation   · CT facial bones negative for abscess/collection   · Rocephin IV x 6 weeks, possible oral treatment after  · Follow up repeat cultures   · PICC line when negative at 72 hours     Discitis of lumbar region  Assessment & Plan  · Has had back pain for the last few weeks   · He is on a steroid taper and due to start prednisone 20 mg once a day today  · 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)  Assessment & Plan  · Present on admission with leukocytosis and tachypnea  Leukocytosis likely from recent steroid use    · Remains afebrile and hemodynamically stable  · Sepsis criteria resolved    · Antibiotics and culture plan as above      Acute bacterial endocarditis  Assessment & Plan  · JESSICA positive for aortic valve vegetation  · Possible oral source but CT facial bones negative, but patient did have recent dental work and needs further procedures in the future   · Will need antibiotic prophylaxis in the future for dental work   · Rocephin x 6 weeks   · Follow up with TAVR evaluation after     Gastric distention  Assessment & Plan  · Noted on CT abdomen pelvis on admission  Patient asymptomatic at this time   · Referral to GI outpatient for endoscopy in the future     Non-ischemic myocardial injury (non-traumatic)  Assessment & Plan  · Elevated troponins in the 200-300 range upon admission and overnight, flat  · Denies chest pain  · Likely secondary to volume overload/aortic stenosis   · Cardiology following  · No further work up inpatient needed  · Outpatient ischemic testing recommended     Shortness of breath  Assessment & Plan  · POA, likely secondary fluid retention/volume overload   · CT chest with pleural effusions and atelectasis  · Unfortunately no output has been documented but patient does report that he has been urinating with the Lasix  · This has improved   · Continue I/O Daily weights   · Continue Lasix 40 mg QD     Aortic stenosis  Assessment & Plan  · Last Echo beginning of this month shows severe stenosis  · JESSICA showed bacterial endocarditis   · Will need eventual TAVR evaluation   · Antibiotics as above     Atrial fibrillation ([address] )  Assessment & Plan  · Presently rate controlled with Lopressor  · Not on anticoagulation per patient - he is concerned about bruising or bleeding   · Given patient will be undergoing biopsy will continue to hold off on starting this at this time     Primary hypertension  Assessment & Plan  · BP acceptable   · Continue Lopressor  · Continue with Lasix    Hyponatremia  Assessment & Plan  · Sodium 127 appears chronic and stable   · Monitor BMP             VTE Pharmacologic Prophylaxis: VTE Score: 4 Moderate Risk (Score 3-4) - Pharmacological DVT Prophylaxis Ordered: heparin  Patient Centered Rounds: I performed bedside rounds with nursing staff today  Discussions with Specialists or Other Care Team Provider: Discussed with RN, CM    Education and Discussions with Family / Patient: Patient declined call to        Total Time Spent on Date of Encounter in care of patient: 35 minutes This time was spent on one or more of the following: performing physical exam; counseling and coordination of care; obtaining or reviewing history; documenting in the medical record; reviewing/ordering tests, medications or procedures; communicating with other healthcare professionals and discussing with patient's family/caregivers  Current Length of Stay: 4 day(s)  Current Patient Status: Inpatient   Certification Statement: The patient will continue to require additional inpatient hospital stay due to pending negative blood cultures and PICC line placement   Discharge Plan: Anticipate discharge in >72 hrs to home  Code Status: Level 1 - Full Code    Subjective:   Patient reports feeling well today  Denies chest pain, shortness of breath, fevers, or chills  Objective:     Vitals:   Temp (24hrs), Av 5 °F (36 4 °C), Min:97 °F (36 1 °C), Max:98 1 °F (36 7 °C)    Temp:  [97 °F (36 1 °C)-98 1 °F (36 7 °C)] 97 7 °F (36 5 °C)  HR:  [60-73] 63  Resp:  [17-18] 17  BP: ()/(40-58) 143/44  SpO2:  [95 %-99 %] 96 %  Body mass index is 22 16 kg/m²  Input and Output Summary (last 24 hours): Intake/Output Summary (Last 24 hours) at 2023 0915  Last data filed at 2023 0959  Gross per 24 hour   Intake 400 ml   Output --   Net 400 ml       Physical Exam:   Physical Exam  Constitutional:       General: He is not in acute distress  Appearance: Normal appearance  He is normal weight  He is not ill-appearing or diaphoretic  HENT:      Head: Normocephalic and atraumatic  Mouth/Throat:      Mouth: Mucous membranes are moist    Eyes:      General: No scleral icterus  Pupils: Pupils are equal, round, and reactive to light  Cardiovascular:      Rate and Rhythm: Normal rate and regular rhythm  Pulses: Normal pulses  Heart sounds: Normal heart sounds, S1 normal and S2 normal  No murmur heard  No systolic murmur is present     No diastolic murmur is present  No gallop  No S3 or S4 sounds  Pulmonary:      Effort: Pulmonary effort is normal  No accessory muscle usage or respiratory distress  Breath sounds: Normal breath sounds  No stridor  No decreased breath sounds, wheezing, rhonchi or rales  Chest:      Chest wall: No tenderness  Abdominal:      General: Bowel sounds are normal  There is no distension  Palpations: Abdomen is soft  Tenderness: There is no abdominal tenderness  There is no guarding  Musculoskeletal:      Right lower leg: No edema  Left lower leg: No edema  Skin:     General: Skin is warm and dry  Coloration: Skin is not jaundiced  Neurological:      General: No focal deficit present  Mental Status: He is alert  Mental status is at baseline  Motor: No tremor or seizure activity  Psychiatric:         Behavior: Behavior is cooperative  Additional Data:     Labs:  Results from last 7 days   Lab Units 05/20/23  0339   WBC Thousand/uL 13 42*   HEMOGLOBIN g/dL 10 3*   HEMATOCRIT % 31 6*   PLATELETS Thousands/uL 315   NEUTROS PCT % 84*   LYMPHS PCT % 8*   MONOS PCT % 6   EOS PCT % 1     Results from last 7 days   Lab Units 05/20/23  0335 05/17/23  0121 05/16/23  1101   SODIUM mmol/L 127*   < > 128*   POTASSIUM mmol/L 3 8   < > 3 9   CHLORIDE mmol/L 92*   < > 95*   CO2 mmol/L 28   < > 26   BUN mg/dL 20   < > 19   CREATININE mg/dL 0 84   < > 0 72   ANION GAP mmol/L 7   < > 7   CALCIUM mg/dL 8 7   < > 8 8   ALBUMIN g/dL  --   --  3 4*   TOTAL BILIRUBIN mg/dL  --   --  0 39   ALK PHOS U/L  --   --  49   ALT U/L  --   --  13   AST U/L  --   --  13   GLUCOSE RANDOM mg/dL 113   < > 112    < > = values in this interval not displayed                         Lines/Drains:  Invasive Devices     Peripheral Intravenous Line  Duration           Peripheral IV 05/16/23 Right Antecubital 4 days                      Imaging: Reviewed radiology reports from this admission including: CT head and MRI spine    Recent Cultures (last 7 days):   Results from last 7 days   Lab Units 05/18/23  1135 05/18/23  1116 05/17/23  1216   BLOOD CULTURE   --   --  Streptococcus mitis oralis group*  Alpha Hemolytic Streptococcus*   GRAM STAIN RESULT  Gram positive cocci in chains* Gram positive cocci in chains* Gram positive cocci in chains*  Gram positive cocci in chains*       Last 24 Hours Medication List:   Current Facility-Administered Medications   Medication Dose Route Frequency Provider Last Rate   • acetaminophen  650 mg Oral Q6H PRN Angie Cisneros MD     • cefTRIAXone  2,000 mg Intravenous Q24H Mortimer Schanz, MD 2,000 mg (05/19/23 1535)   • furosemide  40 mg Oral Daily JESSICA Bello     • heparin (porcine)  5,000 Units Subcutaneous On license of [facility] Angie Cisneros MD     • HYDROmorphone  0 5 mg Intravenous Q4H PRN Memo Douglas PA-C     • lidocaine  1 patch Topical Daily Ofelia Mckinnon PA-C     • melatonin  6 mg Oral HS Osmany Lawrence PA-C     • metoprolol tartrate  25 mg Oral Q12H [address] Sergey Mcpherson MD     • oxyCODONE  10 mg Oral Q4H PRN Memo Douglas PA-C     • oxyCODONE  5 mg Oral Q4H PRN Memo Douglas PA-C     • polyethylene glycol  17 g Oral Daily PRN Angie Cisneros MD     • senna-docusate sodium  1 tablet Oral BID Jimi Dixon MD          Today, Patient Was Seen By: Yolanda Riggs PA-C    **Please Note: This note may have been constructed using a voice recognition system  **

## 2023-05-20 NOTE — ASSESSMENT & PLAN NOTE
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

## 2023-05-21 LAB
ANION GAP SERPL CALCULATED.3IONS-SCNC: 7 MMOL/L (ref 4–13)
BACTERIA BLD CULT: ABNORMAL
BUN SERPL-MCNC: 15 MG/DL (ref 5–25)
CALCIUM SERPL-MCNC: 8.3 MG/DL (ref 8.4–10.2)
CHLORIDE SERPL-SCNC: 92 MMOL/L (ref 96–108)
CO2 SERPL-SCNC: 26 MMOL/L (ref 21–32)
CREAT SERPL-MCNC: 0.73 MG/DL (ref 0.6–1.3)
ERYTHROCYTE [DISTWIDTH] IN BLOOD BY AUTOMATED COUNT: 14.4 % (ref 11.6–15.1)
GFR SERPL CREATININE-BSD FRML MDRD: 89 ML/MIN/1.73SQ M
GLUCOSE SERPL-MCNC: 101 MG/DL (ref 65–140)
GRAM STN SPEC: ABNORMAL
HCT VFR BLD AUTO: 30 % (ref 36.5–49.3)
HGB BLD-MCNC: 9.8 G/DL (ref 12–17)
MCH RBC QN AUTO: 29.3 PG (ref 26.8–34.3)
MCHC RBC AUTO-ENTMCNC: 32.7 G/DL (ref 31.4–37.4)
MCV RBC AUTO: 90 FL (ref 82–98)
PLATELET # BLD AUTO: 288 THOUSANDS/UL (ref 149–390)
PMV BLD AUTO: 9.5 FL (ref 8.9–12.7)
POTASSIUM SERPL-SCNC: 4.2 MMOL/L (ref 3.5–5.3)
RBC # BLD AUTO: 3.35 MILLION/UL (ref 3.88–5.62)
SODIUM SERPL-SCNC: 125 MMOL/L (ref 135–147)
WBC # BLD AUTO: 12.17 THOUSAND/UL (ref 4.31–10.16)

## 2023-05-21 RX ADMIN — OXYCODONE HYDROCHLORIDE 10 MG: 10 TABLET ORAL at 21:16

## 2023-05-21 RX ADMIN — FUROSEMIDE 40 MG: 40 TABLET ORAL at 08:26

## 2023-05-21 RX ADMIN — HEPARIN SODIUM 5000 UNITS: 5000 INJECTION INTRAVENOUS; SUBCUTANEOUS at 21:09

## 2023-05-21 RX ADMIN — CEFTRIAXONE SODIUM 2000 MG: 10 INJECTION, POWDER, FOR SOLUTION INTRAVENOUS at 17:11

## 2023-05-21 RX ADMIN — HEPARIN SODIUM 5000 UNITS: 5000 INJECTION INTRAVENOUS; SUBCUTANEOUS at 04:20

## 2023-05-21 RX ADMIN — METOPROLOL TARTRATE 25 MG: 25 TABLET, FILM COATED ORAL at 08:26

## 2023-05-21 RX ADMIN — SENNOSIDES AND DOCUSATE SODIUM 1 TABLET: 8.6; 5 TABLET ORAL at 08:26

## 2023-05-21 RX ADMIN — SENNOSIDES AND DOCUSATE SODIUM 1 TABLET: 8.6; 5 TABLET ORAL at 17:11

## 2023-05-21 RX ADMIN — METOPROLOL TARTRATE 25 MG: 25 TABLET, FILM COATED ORAL at 21:10

## 2023-05-21 RX ADMIN — HEPARIN SODIUM 5000 UNITS: 5000 INJECTION INTRAVENOUS; SUBCUTANEOUS at 14:02

## 2023-05-21 RX ADMIN — MELATONIN 6 MG: 3 TAB ORAL at 21:09

## 2023-05-21 NOTE — PROGRESS NOTES
[facility]  Progress Note  Name: Louis Koch  MRN: [id number]  Unit/Bed#: S -09 I Date of Admission: 5/16/2023   Date of Service: 5/21/2023 I Hospital Day: 5    Assessment/Plan   * Bacteremia due to Streptococcus  Assessment & Plan  · 2/2 blood cultures positive for strep mitis oralis  Discitis/OM noted on MRI  · Patient reports he did have dental work recently but cannot remember  Reports that he does need further dental work in the future   · JESSICA positive for vegetation   · CT facial bones negative for abscess/collection   · Rocephin IV x 6 weeks, possible oral treatment after  · Follow up repeat cultures   · PICC line when negative at 72 hours     Discitis of lumbar region  Assessment & Plan  · Has had back pain for the last few weeks   · He is on a steroid taper and due to start prednisone 20 mg once a day today  · 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   · 2/2 blood cultures with Strep mitis oralis   · Aortic valve vegetation noted   · Neurosurgery without further recommendations for surgery   · Continue Rocephin per ID  · May need oral treatment after 6 weeks of Rocephin   · Pain control ordered  · No need for tissue sampling given 2/2 positive cultures per ID, per IR would likely be technically difficult   · ID consult appreciated      Sepsis [facility])  Assessment & Plan  · Present on admission with leukocytosis and tachypnea  Leukocytosis likely from recent steroid use    · Remains afebrile and hemodynamically stable  · Sepsis criteria resolved    · Antibiotics and culture plan as above      Acute bacterial endocarditis  Assessment & Plan  · JESSICA positive for aortic valve vegetation  · Possible oral source but CT facial bones negative, but patient did have recent dental work and needs further procedures in the future   · Will need antibiotic prophylaxis in the future for dental work   · Rocephin x 6 weeks   · Follow up with TAVR evaluation after     Gastric distention  Assessment & Plan  · Noted on CT abdomen pelvis on admission  Patient asymptomatic at this time   · Referral to GI outpatient for endoscopy in the future     Non-ischemic myocardial injury (non-traumatic)  Assessment & Plan  · Elevated troponins in the 200-300 range upon admission and overnight, flat  · Denies chest pain  · Likely secondary to volume overload/aortic stenosis   · Cardiology following  · No further work up inpatient needed  · Outpatient ischemic testing recommended     Shortness of breath  Assessment & Plan  · POA, likely secondary fluid retention/volume overload   · CT chest with pleural effusions and atelectasis  · Unfortunately no output has been documented but patient does report that he has been urinating with the Lasix  · This has improved   · Continue I/O Daily weights   · Continue Lasix 40 mg QD     Aortic stenosis  Assessment & Plan  · Last Echo beginning of this month shows severe stenosis  · JESSICA showed bacterial endocarditis   · Will need eventual TAVR evaluation   · Antibiotics as above     Atrial fibrillation ([address] )  Assessment & Plan  · Presently rate controlled with Lopressor  · Not on anticoagulation per patient - he is concerned about bruising or bleeding   · Given patient will be undergoing biopsy will continue to hold off on starting this at this time     Primary hypertension  Assessment & Plan  · BP acceptable   · Continue Lopressor  · Continue with Lasix    Hyponatremia  Assessment & Plan  · Sodium 125  · Chronic and stable   · Monitor BMP             VTE Pharmacologic Prophylaxis: VTE Score: 4 Moderate Risk (Score 3-4) - Pharmacological DVT Prophylaxis Ordered: heparin  Patient Centered Rounds: I performed bedside rounds with nursing staff today  Discussions with Specialists or Other Care Team Provider: Discussed with RN, CM    Education and Discussions with Family / Patient: Patient declined call to        Total Time Spent on Date of Encounter in care of patient: 35 minutes This time was spent on one or more of the following: performing physical exam; counseling and coordination of care; obtaining or reviewing history; documenting in the medical record; reviewing/ordering tests, medications or procedures; communicating with other healthcare professionals and discussing with patient's family/caregivers  Current Length of Stay: 5 day(s)  Current Patient Status: Inpatient   Certification Statement: The patient will continue to require additional inpatient hospital stay due to pending blood cultures   Discharge Plan: Anticipate discharge in >72 hrs to home with home services  Code Status: Level 1 - Full Code    Subjective:   Patient reports feeling better today  States that his back pain has improved today  Denies fevers or chills  Objective:     Vitals:   Temp (24hrs), Av 2 °F (36 8 °C), Min:97 9 °F (36 6 °C), Max:98 5 °F (36 9 °C)    Temp:  [97 9 °F (36 6 °C)-98 5 °F (36 9 °C)] 97 9 °F (36 6 °C)  HR:  [71-83] 71  Resp:  [18] 18  BP: (126-136)/(41-48) 136/41  SpO2:  [95 %-97 %] 97 %  Body mass index is 21 99 kg/m²  Input and Output Summary (last 24 hours): Intake/Output Summary (Last 24 hours) at 2023 4431  Last data filed at 2023 1638  Gross per 24 hour   Intake 260 ml   Output 200 ml   Net 60 ml       Physical Exam:   Physical Exam  Constitutional:       General: He is not in acute distress  Appearance: Normal appearance  He is normal weight  He is not ill-appearing or diaphoretic  HENT:      Head: Normocephalic and atraumatic  Mouth/Throat:      Mouth: Mucous membranes are moist    Eyes:      General: No scleral icterus  Pupils: Pupils are equal, round, and reactive to light  Cardiovascular:      Rate and Rhythm: Normal rate and regular rhythm  Pulses: Normal pulses  Heart sounds: Normal heart sounds, S1 normal and S2 normal  No murmur heard  No systolic murmur is present     No diastolic murmur is present  No gallop  No S3 or S4 sounds  Pulmonary:      Effort: Pulmonary effort is normal  No accessory muscle usage or respiratory distress  Breath sounds: Normal breath sounds  No stridor  No decreased breath sounds, wheezing, rhonchi or rales  Chest:      Chest wall: No tenderness  Abdominal:      General: Bowel sounds are normal  There is no distension  Palpations: Abdomen is soft  Tenderness: There is no abdominal tenderness  There is no guarding  Musculoskeletal:      Right lower leg: No edema  Left lower leg: No edema  Skin:     General: Skin is warm and dry  Coloration: Skin is not jaundiced  Neurological:      General: No focal deficit present  Mental Status: He is alert  Mental status is at baseline  Motor: No tremor or seizure activity  Psychiatric:         Behavior: Behavior is cooperative  Additional Data:     Labs:  Results from last 7 days   Lab Units 05/21/23  0425 05/20/23  0339   WBC Thousand/uL 12 17* 13 42*   HEMOGLOBIN g/dL 9 8* 10 3*   HEMATOCRIT % 30 0* 31 6*   PLATELETS Thousands/uL 288 315   NEUTROS PCT %  --  84*   LYMPHS PCT %  --  8*   MONOS PCT %  --  6   EOS PCT %  --  1     Results from last 7 days   Lab Units 05/21/23  0425 05/17/23  0121 05/16/23  1101   SODIUM mmol/L 125*   < > 128*   POTASSIUM mmol/L 4 2   < > 3 9   CHLORIDE mmol/L 92*   < > 95*   CO2 mmol/L 26   < > 26   BUN mg/dL 15   < > 19   CREATININE mg/dL 0 73   < > 0 72   ANION GAP mmol/L 7   < > 7   CALCIUM mg/dL 8 3*   < > 8 8   ALBUMIN g/dL  --   --  3 4*   TOTAL BILIRUBIN mg/dL  --   --  0 39   ALK PHOS U/L  --   --  49   ALT U/L  --   --  13   AST U/L  --   --  13   GLUCOSE RANDOM mg/dL 101   < > 112    < > = values in this interval not displayed                         Lines/Drains:  Invasive Devices     Peripheral Intravenous Line  Duration           Peripheral IV 05/21/23 Left Forearm <1 day                      Imaging: No pertinent imaging reviewed  Recent Cultures (last 7 days):   Results from last 7 days   Lab Units 05/20/23  0334 05/18/23  1135 05/18/23  1116 05/17/23  1216   BLOOD CULTURE  Received in Microbiology Lab  Culture in Progress  Received in Microbiology Lab  Culture in Progress  Alpha Hemolytic Streptococcus*  --  Streptococcus mitis oralis group*  Streptococcus mitis oralis group*   GRAM STAIN RESULT   --  Gram positive cocci in chains* Gram positive cocci in chains* Gram positive cocci in chains*  Gram positive cocci in chains*       Last 24 Hours Medication List:   Current Facility-Administered Medications   Medication Dose Route Frequency Provider Last Rate   • acetaminophen  650 mg Oral Q6H PRN Dhruv Stephen MD     • cefTRIAXone  2,000 mg Intravenous Q24H Felix Phan MD Stopped (05/20/23 1638)   • furosemide  40 mg Oral Daily JESSICA Goyal     • heparin (porcine)  5,000 Units Subcutaneous [facility] Dhruv Stephen MD     • HYDROmorphone  0 5 mg Intravenous Q4H PRN Amanda Hernadez PA-C     • lidocaine  1 patch Topical Daily Edrick Pallas Smith, PA-C     • melatonin  6 mg Oral HS Osmanyreymundo Brewer PA-C     • metoprolol tartrate  25 mg Oral Q12H [address] Randeen Hamman, MD     • oxyCODONE  10 mg Oral Q4H PRN Amanda Hernadez PA-C     • oxyCODONE  5 mg Oral Q4H PRN Amanda Hernadez PA-C     • polyethylene glycol  17 g Oral Daily PRN Dhruv Stephen MD     • senna-docusate sodium  1 tablet Oral BID Yonis Hendrickson MD          Today, Patient Was Seen By: Sandy Oro PA-C    **Please Note: This note may have been constructed using a voice recognition system  **

## 2023-05-21 NOTE — PLAN OF CARE
Problem: PAIN - ADULT  Goal: Verbalizes/displays adequate comfort level or baseline comfort level  Description: Interventions:  - Encourage patient to monitor pain and request assistance  - Assess pain using appropriate pain scale  - Administer analgesics based on type and severity of pain and evaluate response  - Implement non-pharmacological measures as appropriate and evaluate response  - Consider cultural and social influences on pain and pain management  - Notify physician/advanced practitioner if interventions unsuccessful or patient reports new pain  Outcome: Progressing     Problem: SAFETY ADULT  Goal: Patient will remain free of falls  Description: INTERVENTIONS:  - Educate patient/family on patient safety including physical limitations  - Instruct patient to call for assistance with activity   - Consult OT/PT to assist with strengthening/mobility   - Keep Call bell within reach  - Keep bed low and locked with side rails adjusted as appropriate  - Keep care items and personal belongings within reach  - Initiate and maintain comfort rounds  - Make Fall Risk Sign visible to staff  - Offer Toileting every 2 Hours, in advance of need  - Initiate/Maintain bed alarm  - Obtain necessary fall risk management equipment: bed alarm  - Apply yellow socks and bracelet for high fall risk patients  - Consider moving patient to room near nurses station  Outcome: Progressing  Goal: Maintain or return to baseline ADL function  Description: INTERVENTIONS:  -  Assess patient's ability to carry out ADLs; assess patient's baseline for ADL function and identify physical deficits which impact ability to perform ADLs (bathing, care of mouth/teeth, toileting, grooming, dressing, etc )  - Assess/evaluate cause of self-care deficits   - Assess range of motion  - Assess patient's mobility; develop plan if impaired  - Assess patient's need for assistive devices and provide as appropriate  - Encourage maximum independence but intervene and supervise when necessary  - Involve family in performance of ADLs  - Assess for home care needs following discharge   - Consider OT consult to assist with ADL evaluation and planning for discharge  - Provide patient education as appropriate  Outcome: Progressing  Goal: Maintains/Returns to pre admission functional level  Description: INTERVENTIONS:  - Perform BMAT or MOVE assessment daily    - Set and communicate daily mobility goal to care team and patient/family/caregiver  - Collaborate with rehabilitation services on mobility goals if consulted  - Perform Range of Motion 3 times a day  - Reposition patient every 2 hours    - Dangle patient 3 times a day  - Stand patient 3 times a day  - Ambulate patient 3 times a day  - Out of bed to chair 3 times a day   - Out of bed for meals 3 times a day  - Out of bed for toileting  - Record patient progress and toleration of activity level   Outcome: Progressing     Problem: NEUROSENSORY - ADULT  Goal: Achieves stable or improved neurological status  Description: INTERVENTIONS  - Monitor and report changes in neurological status  - Monitor vital signs such as temperature, blood pressure, glucose, and any other labs ordered   - Initiate measures to prevent increased intracranial pressure  - Monitor for seizure activity and implement precautions if appropriate      Outcome: Progressing  Goal: Remains free of injury related to seizures activity  Description: INTERVENTIONS  - Maintain airway, patient safety  and administer oxygen as ordered  - Monitor patient for seizure activity, document and report duration and description of seizure to physician/advanced practitioner  - If seizure occurs,  ensure patient safety during seizure  - Reorient patient post seizure  - Seizure pads on all 4 side rails  - Instruct patient/family to notify RN of any seizure activity including if an aura is experienced  - Instruct patient/family to call for assistance with activity based on nursing assessment  - Administer anti-seizure medications if ordered    Outcome: Progressing  Goal: Achieves maximal functionality and self care  Description: INTERVENTIONS  - Monitor swallowing and airway patency with patient fatigue and changes in neurological status  - Encourage and assist patient to increase activity and self care     - Encourage visually impaired, hearing impaired and aphasic patients to use assistive/communication devices  Outcome: Progressing     Problem: CARDIOVASCULAR - ADULT  Goal: Maintains optimal cardiac output and hemodynamic stability  Description: INTERVENTIONS:  - Monitor I/O, vital signs and rhythm  - Monitor for S/S and trends of decreased cardiac output  - Administer and titrate ordered vasoactive medications to optimize hemodynamic stability  - Assess quality of pulses, skin color and temperature  - Assess for signs of decreased coronary artery perfusion  - Instruct patient to report change in severity of symptoms  Outcome: Progressing  Goal: Absence of cardiac dysrhythmias or at baseline rhythm  Description: INTERVENTIONS:  - Continuous cardiac monitoring, vital signs, obtain 12 lead EKG if ordered  - Administer antiarrhythmic and heart rate control medications as ordered  - Monitor electrolytes and administer replacement therapy as ordered  Outcome: Progressing

## 2023-05-22 ENCOUNTER — TELEPHONE (OUTPATIENT)
Dept: PAIN MEDICINE | Facility: MEDICAL CENTER | Age: 78
End: 2023-05-22

## 2023-05-22 LAB
ANION GAP SERPL CALCULATED.3IONS-SCNC: 8 MMOL/L (ref 4–13)
BACTERIA BLD CULT: ABNORMAL
BUN SERPL-MCNC: 17 MG/DL (ref 5–25)
CALCIUM SERPL-MCNC: 8.8 MG/DL (ref 8.4–10.2)
CHLORIDE SERPL-SCNC: 93 MMOL/L (ref 96–108)
CO2 SERPL-SCNC: 24 MMOL/L (ref 21–32)
CREAT SERPL-MCNC: 0.74 MG/DL (ref 0.6–1.3)
ERYTHROCYTE [DISTWIDTH] IN BLOOD BY AUTOMATED COUNT: 14.6 % (ref 11.6–15.1)
GFR SERPL CREATININE-BSD FRML MDRD: 88 ML/MIN/1.73SQ M
GLUCOSE SERPL-MCNC: 105 MG/DL (ref 65–140)
GRAM STN SPEC: ABNORMAL
HCT VFR BLD AUTO: 32.9 % (ref 36.5–49.3)
HGB BLD-MCNC: 10.7 G/DL (ref 12–17)
MCH RBC QN AUTO: 29.6 PG (ref 26.8–34.3)
MCHC RBC AUTO-ENTMCNC: 32.5 G/DL (ref 31.4–37.4)
MCV RBC AUTO: 91 FL (ref 82–98)
PLATELET # BLD AUTO: 302 THOUSANDS/UL (ref 149–390)
PMV BLD AUTO: 9.1 FL (ref 8.9–12.7)
POTASSIUM SERPL-SCNC: 4.1 MMOL/L (ref 3.5–5.3)
RBC # BLD AUTO: 3.61 MILLION/UL (ref 3.88–5.62)
SODIUM SERPL-SCNC: 125 MMOL/L (ref 135–147)
WBC # BLD AUTO: 10.65 THOUSAND/UL (ref 4.31–10.16)

## 2023-05-22 RX ORDER — SODIUM CHLORIDE 9 MG/ML
20 INJECTION, SOLUTION INTRAVENOUS ONCE
Status: CANCELLED | OUTPATIENT
Start: 2023-05-24

## 2023-05-22 RX ORDER — FUROSEMIDE 10 MG/ML
40 INJECTION INTRAMUSCULAR; INTRAVENOUS ONCE
Status: COMPLETED | OUTPATIENT
Start: 2023-05-22 | End: 2023-05-22

## 2023-05-22 RX ORDER — CEFTRIAXONE 2 G/50ML
2000 INJECTION, SOLUTION INTRAVENOUS ONCE
Status: CANCELLED | OUTPATIENT
Start: 2023-05-24

## 2023-05-22 RX ORDER — TORSEMIDE 20 MG/1
40 TABLET ORAL DAILY
Status: DISCONTINUED | OUTPATIENT
Start: 2023-05-23 | End: 2023-05-23 | Stop reason: HOSPADM

## 2023-05-22 RX ADMIN — METOPROLOL TARTRATE 25 MG: 25 TABLET, FILM COATED ORAL at 09:10

## 2023-05-22 RX ADMIN — METOPROLOL TARTRATE 25 MG: 25 TABLET, FILM COATED ORAL at 22:36

## 2023-05-22 RX ADMIN — SENNOSIDES AND DOCUSATE SODIUM 1 TABLET: 8.6; 5 TABLET ORAL at 17:00

## 2023-05-22 RX ADMIN — FUROSEMIDE 40 MG: 10 INJECTION, SOLUTION INTRAMUSCULAR; INTRAVENOUS at 12:42

## 2023-05-22 RX ADMIN — OXYCODONE HYDROCHLORIDE 10 MG: 10 TABLET ORAL at 22:43

## 2023-05-22 RX ADMIN — FUROSEMIDE 40 MG: 40 TABLET ORAL at 09:10

## 2023-05-22 RX ADMIN — HEPARIN SODIUM 5000 UNITS: 5000 INJECTION INTRAVENOUS; SUBCUTANEOUS at 04:44

## 2023-05-22 RX ADMIN — CEFTRIAXONE SODIUM 2000 MG: 10 INJECTION, POWDER, FOR SOLUTION INTRAVENOUS at 16:56

## 2023-05-22 RX ADMIN — HEPARIN SODIUM 5000 UNITS: 5000 INJECTION INTRAVENOUS; SUBCUTANEOUS at 22:36

## 2023-05-22 RX ADMIN — HEPARIN SODIUM 5000 UNITS: 5000 INJECTION INTRAVENOUS; SUBCUTANEOUS at 14:40

## 2023-05-22 RX ADMIN — LIDOCAINE 5% 1 PATCH: 700 PATCH TOPICAL at 04:44

## 2023-05-22 RX ADMIN — SENNOSIDES AND DOCUSATE SODIUM 1 TABLET: 8.6; 5 TABLET ORAL at 09:10

## 2023-05-22 RX ADMIN — MELATONIN 6 MG: 3 TAB ORAL at 22:36

## 2023-05-22 NOTE — PROGRESS NOTES
[facility]  Progress Note  Name: Lamberto Thomas  MRN: [id number]  Unit/Bed#: S -94 I Date of Admission: 5/16/2023   Date of Service: 5/22/2023 I Hospital Day: 6    Assessment/Plan   * Bacteremia due to Streptococcus  Assessment & Plan  · 2/2 blood cultures positive for strep mitis oralis  Discitis/OM noted on MRI  · Patient reports he did have dental work recently but cannot remember  Reports that he does need further dental work in the future   · JESSICA positive for vegetation   · CT facial bones negative for abscess/collection   · Rocephin IV x 6 weeks, possible oral treatment after  · Follow up repeat cultures   · PICC line when negative at 72 hours     Shortness of breath  Assessment & Plan  · POA, likely secondary fluid retention/volume overload   · CT chest with pleural effusions and atelectasis  · Unfortunately no output has been documented but patient does report that he has been urinating with the Lasix  · This has improved   · Continue I/O Daily weights  · Noted with bilateral lower extremity edema today  · Cardiology give Lasix 40 mg x 1 dose today  · Transition to Torsemide 40 mg daily from tomorrow (5/23)    Discitis of lumbar region  Assessment & Plan  · Has had back pain for the last few weeks   · He is on a steroid taper and due to start prednisone 20 mg once a day today  · 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   · 2/2 blood cultures with Strep mitis oralis   · Aortic valve vegetation noted   · Neurosurgery without further recommendations for surgery   · Continue Rocephin per ID  · May need oral treatment after 6 weeks of Rocephin   · Pain control ordered    · No need for tissue sampling given 2/2 positive cultures per ID, per IR would likely be technically difficult   · ID consult appreciated      Acute bacterial endocarditis  Assessment & Plan  · JESSICA positive for aortic valve vegetation  · Possible oral source but CT facial bones negative, but patient did have recent dental work and needs further procedures in the future   · Will need antibiotic prophylaxis in the future for dental work   · Rocephin x 6 weeks   · Follow up with TAVR evaluation after     Gastric distention  Assessment & Plan  · Noted on CT abdomen pelvis on admission  Patient asymptomatic at this time   · Referral to GI outpatient for endoscopy in the future     Non-ischemic myocardial injury (non-traumatic)  Assessment & Plan  · Elevated troponins in the 200-300 range upon admission and overnight, flat  · Denies chest pain  · Likely secondary to volume overload/aortic stenosis   · Cardiology following  · No further work up inpatient needed  · Outpatient ischemic testing recommended     Sepsis [facility])  Assessment & Plan  · Present on admission with leukocytosis and tachypnea  Leukocytosis likely from recent steroid use  · Remains afebrile and hemodynamically stable  · Sepsis criteria resolved    · Antibiotics and culture plan as above      Aortic stenosis  Assessment & Plan  · Last Echo beginning of this month shows severe stenosis  · JESSICA showed bacterial endocarditis   · Will need eventual TAVR evaluation   · Antibiotics as above     Atrial fibrillation ([address] )  Assessment & Plan  · Presently rate controlled with Lopressor  · Not on anticoagulation per patient - he is concerned about bruising or bleeding   · Given patient will be undergoing biopsy will continue to hold off on starting this at this time     Primary hypertension  Assessment & Plan  · BP acceptable   · Continue Lopressor  · Continue with Lasix    Hyponatremia  Assessment & Plan  · Sodium 125  · Chronic and stable   · Monitor BMP               VTE Pharmacologic Prophylaxis: VTE Score: 4 Moderate Risk (Score 3-4) - Pharmacological DVT Prophylaxis Ordered: heparin  Patient Centered Rounds: I performed bedside rounds with nursing staff today    Discussions with Specialists or Other Care Team Provider: Yes    Education and Discussions with Family / Patient: Patient declined call to   Total Time Spent on Date of Encounter in care of patient: 35 minutes This time was spent on one or more of the following: performing physical exam; counseling and coordination of care; obtaining or reviewing history; documenting in the medical record; reviewing/ordering tests, medications or procedures; communicating with other healthcare professionals and discussing with patient's family/caregivers  Current Length of Stay: 6 day(s)  Current Patient Status: Inpatient   Certification Statement: The patient will continue to require additional inpatient hospital stay due to IV antibiotic  Discharge Plan: Anticipate discharge in 48-72 hrs to home with home services  Code Status: Level 1 - Full Code    Subjective:   Patient seen sitting up in chair  Reported feeling a lot better today  Offers no complaints    Objective:     Vitals:   Temp (24hrs), Av 3 °F (36 8 °C), Min:97 5 °F (36 4 °C), Max:99 2 °F (37 3 °C)    Temp:  [97 5 °F (36 4 °C)-99 2 °F (37 3 °C)] 97 5 °F (36 4 °C)  HR:  [76-85] 76  Resp:  [18] 18  BP: (114-165)/(41-50) 165/50  SpO2:  [95 %-99 %] 99 %  Body mass index is 22 2 kg/m²  Input and Output Summary (last 24 hours): Intake/Output Summary (Last 24 hours) at 2023 1231  Last data filed at 2023 0900  Gross per 24 hour   Intake 700 ml   Output 2250 ml   Net -1550 ml     Physical Exam:   Physical Exam  Constitutional:       General: He is not in acute distress  Appearance: Normal appearance  He is normal weight  He is not ill-appearing or diaphoretic  HENT:      Head: Normocephalic and atraumatic  Mouth/Throat:      Mouth: Mucous membranes are moist    Eyes:      General: No scleral icterus  Pupils: Pupils are equal, round, and reactive to light  Cardiovascular:      Rate and Rhythm: Normal rate and regular rhythm  Pulses: Normal pulses  Heart sounds: Normal heart sounds   No murmur heard   Pulmonary:      Effort: Pulmonary effort is normal  No accessory muscle usage or respiratory distress  Breath sounds: Normal breath sounds  Chest:      Chest wall: No tenderness  Abdominal:      General: Bowel sounds are normal  There is no distension  Palpations: Abdomen is soft  Tenderness: There is no abdominal tenderness  There is no guarding  Musculoskeletal:      Right lower leg: Edema present  Left lower leg: Edema present  Skin:     General: Skin is warm and dry  Coloration: Skin is not jaundiced  Neurological:      General: No focal deficit present  Mental Status: He is alert  Mental status is at baseline  Motor: No tremor or seizure activity  Psychiatric:         Behavior: Behavior is cooperative  Additional Data:     Labs:  Results from last 7 days   Lab Units 05/22/23  1041 05/21/23  0425 05/20/23  0339   WBC Thousand/uL 10 65*   < > 13 42*   HEMOGLOBIN g/dL 10 7*   < > 10 3*   HEMATOCRIT % 32 9*   < > 31 6*   PLATELETS Thousands/uL 302   < > 315   NEUTROS PCT %  --   --  84*   LYMPHS PCT %  --   --  8*   MONOS PCT %  --   --  6   EOS PCT %  --   --  1    < > = values in this interval not displayed  Results from last 7 days   Lab Units 05/22/23  0514 05/17/23  0121 05/16/23  1101   SODIUM mmol/L 125*   < > 128*   POTASSIUM mmol/L 4 1   < > 3 9   CHLORIDE mmol/L 93*   < > 95*   CO2 mmol/L 24   < > 26   BUN mg/dL 17   < > 19   CREATININE mg/dL 0 74   < > 0 72   ANION GAP mmol/L 8   < > 7   CALCIUM mg/dL 8 8   < > 8 8   ALBUMIN g/dL  --   --  3 4*   TOTAL BILIRUBIN mg/dL  --   --  0 39   ALK PHOS U/L  --   --  49   ALT U/L  --   --  13   AST U/L  --   --  13   GLUCOSE RANDOM mg/dL 105   < > 112    < > = values in this interval not displayed                         Lines/Drains:  Invasive Devices     Peripheral Intravenous Line  Duration           Peripheral IV 05/21/23 Left Forearm 1 day                      Imaging: No pertinent imaging reviewed  Recent Cultures (last 7 days):   Results from last 7 days   Lab Units 05/20/23  0334 05/18/23  1135 05/18/23  1116 05/17/23  1216   BLOOD CULTURE  No Growth at 24 hrs  No Growth at 24 hrs  Alpha Hemolytic Streptococcus* Alpha Hemolytic Streptococcus* Alpha Hemolytic Streptococcus*  Alpha Hemolytic Streptococcus*   GRAM STAIN RESULT   --  Gram positive cocci in chains* Gram positive cocci in chains* Gram positive cocci in chains*  Gram positive cocci in chains*       Last 24 Hours Medication List:   Current Facility-Administered Medications   Medication Dose Route Frequency Provider Last Rate   • acetaminophen  650 mg Oral Q6H PRN Maryellen Palomo MD     • cefTRIAXone  2,000 mg Intravenous Q24H Harlan Dela Cruz MD 2,000 mg (05/21/23 1711)   • furosemide  40 mg Intravenous Once Shruthi Cisneros MD     • heparin (porcine)  5,000 Units Subcutaneous [facility] Maryellen Palomo MD     • HYDROmorphone  0 5 mg Intravenous Q4H PRN Vernal Comp, PA-C     • lidocaine  1 patch Topical Daily Sonam Chandra PA-C     • melatonin  6 mg Oral HS Osmany Kerr PA-C     • metoprolol tartrate  25 mg Oral Q12H [address] Shruthi Cisneros MD     • oxyCODONE  10 mg Oral Q4H PRN Vernal Comp PARositaC     • oxyCODONE  5 mg Oral Q4H PRN Vernal Comp PA-C     • polyethylene glycol  17 g Oral Daily PRN Maryellen Palomo MD     • senna-docusate sodium  1 tablet Oral BID Cherry Freire MD     • [START ON 5/23/2023] torsemide  40 mg Oral Daily Shruthi Cisneros MD          Today, Patient Was Seen By: JESSICA Fitch    **Please Note: This note may have been constructed using a voice recognition system  **

## 2023-05-22 NOTE — ASSESSMENT & PLAN NOTE
· POA, likely secondary fluid retention/volume overload   · CT chest with pleural effusions and atelectasis  · Unfortunately no output has been documented but patient does report that he has been urinating with the Lasix  · This has improved   · Continue I/O Daily weights  · Noted with bilateral lower extremity edema today  · Cardiology give Lasix 40 mg x 1 dose today  · Transition to Torsemide 40 mg daily from tomorrow (5/23)

## 2023-05-22 NOTE — TELEPHONE ENCOUNTER
Attempted to contact wife Kyle Rizvi  [facility], provided with cb# and [place]  AS- Please see Hosp note 5/16, pt inpatient at [facility] - Please cancel procedures  on 6/2 and 6/16

## 2023-05-22 NOTE — PROGRESS NOTES
"Cardiology Progress Note - Merlin Leaf 69 y o  male MRN: [id number]    Unit/Bed#: S -01 Encounter: [id number]      Assessment:  Principal Problem:    Bacteremia due to Streptococcus  Active Problems:    Hyponatremia    Primary hypertension    Atrial fibrillation (HCC)    Aortic stenosis    Sepsis ([address] )    Shortness of breath    Non-ischemic myocardial injury (non-traumatic)    Discitis of lumbar region    Acute bacterial endocarditis    Gastric distention      Plan:    1  Infective endocarditis - After being found to have gram-positive bacteremia and discitis/osteomyelitis of his lumbar spine he has now been found to have small vegetations on his diseased aortic valve  Suggest 6 weeks of IV antibiotics and then we will have him assessed by CT surgery as an outpatient for TAVR  2  Acute heart failure with preserved ejection fraction - Associated with moderate to severe AS and elevated filling pressures  Responded to IV Lasix but now showing some edema today  I am good to give 1 dose of IV Lasix and then change his daily regimen to torsemide 40 mg daily  Low-sodium diet recommended  Follow urine output and labs  Close outpatient follow-up will be arranged  3  Moderate to severe aortic stenosis - JESSICA also demonstrates at least moderate AI  We we will continue to treat his aortic valve endocarditis  We will allow for completion of IV antibiotics and then outpatient CT surgical consultation  4  Paroxysmal atrial fibrillation - He remains in sinus rhythm  He will continue beta-blocker and is on Eliquis for stroke prevention  Subjective:   Patient seen and examined  No significant events overnight  Continues to feel well  Having some increasing lower extremity edema, more on the left  No shortness of breath  Back pain improved  Objective:     Vitals: Blood pressure 165/50, pulse 76, temperature 97 5 °F (36 4 °C), resp   rate 18, height 5' 8\" (1 727 m), weight 66 2 kg (146 lb), SpO2 99 " % , Body mass index is 22 2 kg/m² ,   Orthostatic Blood Pressures    Flowsheet Row Most Recent Value   Blood Pressure 165/50 filed at 05/22/2023 0711   Patient Position - Orthostatic VS Lying filed at 05/20/2023 2110            Intake/Output Summary (Last 24 hours) at 5/22/2023 1038  Last data filed at 5/22/2023 0900  Gross per 24 hour   Intake 700 ml   Output 2250 ml   Net -1550 ml     Physical Exam:    GEN: Cayetano Monzon appears well, alert and oriented x 3, pleasant and cooperative   HEENT: pupils equal, round, and reactive to light; extraocular muscles intact  NECK: supple, no carotid bruits   HEART: regular rhythm, normal S1 and soft S2, 3/6 systolic ejection murmur, no clicks, gallops or rubs   LUNGS: clear to auscultation bilaterally; no wheezes, rales, or rhonchi   ABDOMEN: normal bowel sounds, soft, no tenderness, no distention  EXTREMITIES: peripheral pulses normal; no clubbing, cyanosis    ++  Edema on the left, + edema on the right  NEURO: no focal findings   SKIN: normal without suspicious lesions on exposed skin      Medications:      Current Facility-Administered Medications:   •  acetaminophen (TYLENOL) tablet 650 mg, 650 mg, Oral, Q6H PRN, Cristin Adams MD, 650 mg at 05/16/23 2359  •  cefTRIAXone (ROCEPHIN) 2,000 mg in dextrose 5 % 50 mL IVPB, 2,000 mg, Intravenous, Q24H, Yovani Wolfe MD, Last Rate: 100 mL/hr at 05/21/23 1711, 2,000 mg at 05/21/23 1711  •  furosemide (LASIX) tablet 40 mg, 40 mg, Oral, Daily, [address], CRNP, 40 mg at 05/22/23 4882  •  heparin (porcine) subcutaneous injection 5,000 Units, 5,000 Units, Subcutaneous, Q8H [address], 5,000 Units at 05/22/23 0444 **AND** Platelet count, , , Once, Cristin Adams MD  •  HYDROmorphone (DILAUDID) injection 0 5 mg, 0 5 mg, Intravenous, Q4H PRN, Camryn Antoine PA-C, 0 5 mg at 05/17/23 1446  •  lidocaine (LIDODERM) 5 % patch 1 patch, 1 patch, Topical, Daily, Yasmin Jacome PA-C, 1 patch at 05/22/23 5084  •  melatonin tablet 6 mg, 6 mg, Oral, HS, OsmanyWALLY SharmaC, 6 mg at 05/21/23 2109  •  metoprolol tartrate (LOPRESSOR) tablet 25 mg, 25 mg, Oral, Q12H [address], Christiana Cunningham MD, 25 mg at 05/22/23 5306  •  oxyCODONE (ROXICODONE) immediate release tablet 10 mg, 10 mg, Oral, Q4H PRN, Mary Carmen Antonio PA-C, 10 mg at 05/21/23 2116  •  oxyCODONE (ROXICODONE) IR tablet 5 mg, 5 mg, Oral, Q4H PRN, Mary Carmen Antonio PA-C, 5 mg at 05/20/23 2236  •  polyethylene glycol (MIRALAX) packet 17 g, 17 g, Oral, Daily PRN, Radha Farrell MD, 17 g at 05/18/23 0730  •  senna-docusate sodium (SENOKOT S) 8 6-50 mg per tablet 1 tablet, 1 tablet, Oral, BID, Henri Barrera MD, 1 tablet at 05/22/23 0910     Labs & Results:        Results from last 7 days   Lab Units 05/21/23  0425 05/20/23  0339 05/19/23  0337   WBC Thousand/uL 12 17* 13 42* 16 70*   HEMOGLOBIN g/dL 9 8* 10 3* 10 2*   HEMATOCRIT % 30 0* 31 6* 31 7*   PLATELETS Thousands/uL 288 315 344         Results from last 7 days   Lab Units 05/22/23  0514 05/21/23  0425 05/20/23  0335 05/17/23  0121 05/16/23  1101   POTASSIUM mmol/L 4 1 4 2 3 8   < > 3 9   CHLORIDE mmol/L 93* 92* 92*   < > 95*   CO2 mmol/L 24 26 28   < > 26   BUN mg/dL 17 15 20   < > 19   CREATININE mg/dL 0 74 0 73 0 84   < > 0 72   CALCIUM mg/dL 8 8 8 3* 8 7   < > 8 8   ALK PHOS U/L  --   --   --   --  49   ALT U/L  --   --   --   --  13   AST U/L  --   --   --   --  13    < > = values in this interval not displayed  EKG personally reviewed by Christiana Cunningham MD   Sinus rhythm, left atrial lodgment and nonspecific ST changes  ECHO:     •  Left Ventricle: Left ventricular cavity size is normal  Wall thickness is moderately increased  There is mild to moderate concentric hypertrophy  The left ventricular ejection fraction is 59% by visual estimation  Systolic function is normal  Wall motion is normal  Diastolic function is moderately abnormal, consistent with grade II (pseudonormal) relaxation    •  Right Ventricle: Systolic function is normal  Normal tricuspid annular plane systolic excursion (TAPSE) > 1 7 cm  Wall thickness is increased  •  Left Atrium: The atrium is moderately dilated (>48 mL/m2)  •  Right Atrium: The atrium is mildly dilated  •  Aortic Valve: The leaflets are moderately thickened  The leaflets are severely calcified  There is severely reduced mobility  There is mild regurgitation  There is moderate to severe stenosis  The aortic valve peak velocity is 4 1 m/s  The aortic valve peak gradient is 63 mmHg  The aortic valve mean gradient is 35 0 mmHg  •  Mitral Valve: There is mild annular calcification  •  Tricuspid Valve: There is mild regurgitation  The right ventricular systolic pressure is moderately elevated      Compared to prior echo, aortic valve stenosis is now borderline severe  Wall thickness has increased  Increased left atrial dilation          Counseling / Coordination of Care  Total floor / unit time spent today 25 minutes  Greater than 50% of total time was spent with the patient and / or family counseling and / or coordination of care

## 2023-05-22 NOTE — TELEPHONE ENCOUNTER
Caller: patient spouse    Doctor:juliane    Reason for call: would like to cancel procedure, stated  in hospital for an infection in the spine    Call back#:

## 2023-05-23 ENCOUNTER — APPOINTMENT (INPATIENT)
Dept: RADIOLOGY | Facility: HOSPITAL | Age: 78
End: 2023-05-23

## 2023-05-23 VITALS
DIASTOLIC BLOOD PRESSURE: 40 MMHG | WEIGHT: 143.6 LBS | HEIGHT: 68 IN | BODY MASS INDEX: 21.76 KG/M2 | SYSTOLIC BLOOD PRESSURE: 122 MMHG | RESPIRATION RATE: 18 BRPM | HEART RATE: 71 BPM | TEMPERATURE: 97.7 F | OXYGEN SATURATION: 100 %

## 2023-05-23 LAB
ANION GAP SERPL CALCULATED.3IONS-SCNC: 6 MMOL/L (ref 4–13)
BUN SERPL-MCNC: 18 MG/DL (ref 5–25)
CALCIUM SERPL-MCNC: 8.7 MG/DL (ref 8.4–10.2)
CHLORIDE SERPL-SCNC: 93 MMOL/L (ref 96–108)
CO2 SERPL-SCNC: 28 MMOL/L (ref 21–32)
CREAT SERPL-MCNC: 0.83 MG/DL (ref 0.6–1.3)
ERYTHROCYTE [DISTWIDTH] IN BLOOD BY AUTOMATED COUNT: 14.6 % (ref 11.6–15.1)
GFR SERPL CREATININE-BSD FRML MDRD: 84 ML/MIN/1.73SQ M
GLUCOSE SERPL-MCNC: 111 MG/DL (ref 65–140)
HCT VFR BLD AUTO: 31.9 % (ref 36.5–49.3)
HGB BLD-MCNC: 10.5 G/DL (ref 12–17)
MCH RBC QN AUTO: 29.5 PG (ref 26.8–34.3)
MCHC RBC AUTO-ENTMCNC: 32.9 G/DL (ref 31.4–37.4)
MCV RBC AUTO: 90 FL (ref 82–98)
PLATELET # BLD AUTO: 313 THOUSANDS/UL (ref 149–390)
PMV BLD AUTO: 9.3 FL (ref 8.9–12.7)
POTASSIUM SERPL-SCNC: 3.9 MMOL/L (ref 3.5–5.3)
RBC # BLD AUTO: 3.56 MILLION/UL (ref 3.88–5.62)
SODIUM SERPL-SCNC: 127 MMOL/L (ref 135–147)
WBC # BLD AUTO: 9.55 THOUSAND/UL (ref 4.31–10.16)

## 2023-05-23 RX ORDER — OXYCODONE HYDROCHLORIDE 5 MG/1
5 TABLET ORAL EVERY 4 HOURS PRN
Qty: 15 TABLET | Refills: 0 | Status: SHIPPED | OUTPATIENT
Start: 2023-05-23 | End: 2023-06-02

## 2023-05-23 RX ADMIN — TORSEMIDE 40 MG: 20 TABLET ORAL at 08:23

## 2023-05-23 RX ADMIN — METOPROLOL TARTRATE 25 MG: 25 TABLET, FILM COATED ORAL at 08:23

## 2023-05-23 RX ADMIN — LIDOCAINE 5% 1 PATCH: 700 PATCH TOPICAL at 04:49

## 2023-05-23 RX ADMIN — SENNOSIDES AND DOCUSATE SODIUM 1 TABLET: 8.6; 5 TABLET ORAL at 08:23

## 2023-05-23 RX ADMIN — HEPARIN SODIUM 5000 UNITS: 5000 INJECTION INTRAVENOUS; SUBCUTANEOUS at 04:49

## 2023-05-23 NOTE — ASSESSMENT & PLAN NOTE
· 2/2 blood cultures positive for strep mitis oralis  Discitis/OM noted on MRI  · Patient reports he did have dental work recently but cannot remember  Reports that he does need further dental work in the future   · JESSICA positive for vegetation   · CT facial bones negative for abscess/collection   · Rocephin IV x 6 weeks, possible oral treatment after  · Follow up repeat cultures   · PICC line placement prior to dc  · Infusion center at [facility] able to accommodate

## 2023-05-23 NOTE — DISCHARGE INSTR - AVS FIRST PAGE
Dear Shyla Vasquez,     It was our pleasure to care for you here at [facility]  It is our hope that we were always able to exceed the expected standards for your care during your stay  You were hospitalized due to back pain  You were cared for on the 3rd floor by Isaias Horan PA-C under the service of Porsha Ritchie DO with the [facility] who covers for your primary care physician (PCP), Jarred Garcia MD, while you were hospitalized  If you have any questions or concerns related to this hospitalization, you may contact us at [phone]  For follow up as well as any medication refills, we recommend that you follow up with your primary care physician  A registered nurse will reach out to you by phone within a few days after your discharge to answer any additional questions that you may have after going home  However, at this time we provide for you here, the most important instructions / recommendations at discharge:     Notable Medication Adjustments -   Please increase metoprolol to 25 mg twice per day  Starting tomorrow please start taking torsemide 40 mg in the morning  Please also use oxycodone as needed for any back pain you may have  Testing Required after Discharge -   We recommend that you have repeat blood work to assess your sodium levels  ** Please contact your PCP to request testing orders for any of the testing recommended here **  Important follow up information -   Please follow-up with the infusion center tomorrow at 1030 for your infusion of antibiotics    You should be receiving a call from a GI doctor as well as a CT surgeon for further follow-up after hospital discharge  Please review this entire after visit summary as additional general instructions including medication list, appointments, activity, diet, any pertinent wound care, and other additional recommendations from your care team that may be provided for you       Sincerely,     Baylee Hernandez PA-C

## 2023-05-23 NOTE — PROCEDURES
Insert Complex Venous Access Line    Date/Time: 5/23/2023 2:03 PM  Performed by: Sebastian Mccracken RN  Authorized by: Mary Carmen Antonio PA-C     Patient location:  Bedside  Consent:     Consent obtained: Obtained by MD     Consent given by: Obtained by MD     Procedural risks discussed: discussed with MD     Alternatives discussed: Discussed with MD   Universal protocol:     Procedure explained and questions answered to patient or proxy's satisfaction: yes      Relevant documents present and verified: yes      Test results available and properly labeled: yes      Radiology Images displayed and confirmed  If images not available, report reviewed: yes      Required blood products, implants, devices, and special equipment available: yes      Site/side marked: yes      Immediately prior to procedure, a time out was called: yes      Patient identity confirmed:  Verbally with patient and arm band  Pre-procedure details:     Hand hygiene: Hand hygiene performed prior to insertion      Sterile barrier technique: All elements of maximal sterile technique followed      Skin preparation:  ChloraPrep    Skin preparation agent: Skin preparation agent completely dried prior to procedure    Indications:     PICC line indications: long term antibiotics    Anesthesia (see MAR for exact dosages):      Anesthesia method:  Local infiltration    Local anesthetic:  Lidocaine 1% w/o epi (0 5 ml lido w/o epi used)  Procedure details:     Location:  Brachial    Vessel type: vein      Laterality:  Right    Approach: percutaneous technique used      Patient position:  Flat    Procedural supplies:  Single lumen    Catheter size:  4 Fr (RRN#[id number],GLL#[id number],OMY13/32/36)    Landmarks identified: yes      Ultrasound guidance: yes      Ultrasound image availability:  Not saved    Sterile ultrasound techniques: Sterile gel and sterile probe covers were used      Number of attempts:  1    Successful placement: yes      Vessel of catheter tip end: Chest Xray needed to confirm placement    Total catheter length (cm):  40    Catheter out on skin (cm):  0    Max flow rate:  999ml/hr    Arm circumference:  28cm  Post-procedure details:     Post-procedure:  Dressing applied and securement device placed    Assessment:  Blood return through all ports    Post-procedure complications: none      Patient tolerance of procedure:   Tolerated well, no immediate complications

## 2023-05-23 NOTE — PROGRESS NOTES
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   Patient initially presented for recurrent back pain and also mentioned abdominal distention   Further work-up with MRI notable for #2   Surveillance blood cultures collected in both sets have isolated VGS, MICKY elevated to PCN   The patient has no other localizing complaints on exam, is hemodynamically stable and has no devices in place  Repeat cultures collected on 5/18 also prior to antibiotics and are positive as well  2D echo recently with #5  Completed transesophageal echo which was notable for aortic valve vegetations  Suspect primary odontogenic source  No definitive occult intra-abdominal source  CT of the face also largely unremarkable but poor dentition noted on exam   Patient otherwise hemodynamically stable and repeat cultures clearing  Plans for prolonged IV antibiotic with the infusion center    Continue ceftriaxone 2 g every 24 hours  Plan for total 6 weeks of therapy through 6/30  May need to consider additional oral therapy for #2 pending ESR/CRP trend  Plan for repeat MR imaging of the L-spine with contrast towards end of course  Orders placed in acon, starting on Wednesday at [facility] infusion  Requested case management contact infusion center for appointment  Patient will need follow-up in the ID office 2 weeks after discharge  If 5/20 cultures negative at 72 hours, cleared for PICC line  ID office staff notified of follow-up needs  Repeat CBC/chemistry tomorrow  Continue to trend fever curve/vitals  Follow-up with OMS at outpatient  GI evaluation as outpatient  Follow-up with neurosurgery as outpatient  Follow-up with cardiology/CT surgery outpatient  Additional supportive care as per primary     2   L1-L2 vertebral osteomyelitis/discitis   Patient presented for recurrent back pain and abdominal distention   Now MR imaging notable for infection, there is some ventral epidural enhancement and paraspinal edema/enhancement on the images   Reviewed with IR and difficult area for aspiration but also patient's blood cultures are positive and are likely the pathogen  MRI T-spine negative  Patient without significant pain and is ambulatory  Antibiotics as above  No need for biopsy  Ongoing follow-up by neurosurgery  Monitor neuro exam  Prolonged antibiotic as above  Potential oral therapy after IV course for this issue  Plan for repeat MRI of the L-spine towards end of IV course for completeness     3   Abnormal CT abdomen   Patient CT imaging of the abdomen now showing some gastric distention which is unexpected   Previous colonoscopy noted to be unremarkable   Uncertain if this may be contributing to the above   Abdominal exam otherwise benign  Antibiotics as above  Continue to trend fever curve/WBC  Monitor abdominal exam  Recommend GI evaluation as outpatient     4   Persistent leukocytosis   Patient has had persistent leukocytosis since last admission which could be related to steroids   Suspect the above is also contributing   He is fortunately hemodynamically stable  Maintain off steroids  Antibiotics as above  Repeat CBC tomorrow     5  Recently diagnosed AS   Patient's 2D echo reviewed and has heavily calcified disease on the aortic valve  Course now complicated by the above  Antibiotics as above  Continue to trend fever curve/WBC  Ongoing follow-up by cardiology  Follow-up with CT surgery outpatient     Above plan discussed in detail with the patient and with primary service  ID consult service will continue to follow      Antibiotics:  Ceftriaxone 5    24 Hour events:  Yesterday and overnight notes reviewed and no acute events noted    Subjective:  Patient currently denies having any nausea, vomiting, chest pain or shortness of breath    Tolerating current antibiotics without issue  He is noticing improvement in his back pain  He reports at this time plans for transition to the infusion center for outpatient antibiotics  Orders placed later today and discussed with primary service to reach out to case management to arrange infusion center visits  Objective:  Vitals:  Temp:  [97 5 °F (36 4 °C)-98 5 °F (36 9 °C)] 97 9 °F (36 6 °C)  HR:  [76-85] 80  Resp:  [15-18] 15  BP: ()/(39-50) 133/43  SpO2:  [95 %-99 %] 98 %  Temp (24hrs), Av 1 °F (36 7 °C), Min:97 5 °F (36 4 °C), Max:98 5 °F (36 9 °C)  Current: Temperature: 97 9 °F (36 6 °C)    Physical Exam:   General Appearance:  Alert, interactive, nontoxic, no acute distress  Throat: Oropharynx moist without lesions  Again poor dentition noted  Lungs:   Clear to auscultation bilaterally; no wheezes, rhonchi or rales; respirations unlabored on room air   Heart:  RRR; murmur present, no rubs or gallops   Abdomen:   Soft, non-tender, non-distended, positive bowel sounds  Extremities: No clubbing, cyanosis or edema   Skin: No new rashes or lesions  No new draining wounds noted  Labs, Imaging, & Other studies:   All pertinent labs and imaging studies in PACS were personally reviewed as below  Results from last 7 days   Lab Units 23  1041 23  0425 23  0339   WBC Thousand/uL 10 65* 12 17* 13 42*   HEMOGLOBIN g/dL 10 7* 9 8* 10 3*   PLATELETS Thousands/uL 302 288 315     Results from last 7 days   Lab Units 23  0514 23  0121 23  1101   POTASSIUM mmol/L 4 1   < > 3 9   CHLORIDE mmol/L 93*   < > 95*   CO2 mmol/L 24   < > 26   BUN mg/dL 17   < > 19   CREATININE mg/dL 0 74   < > 0 72   EGFR ml/min/1 73sq m 88   < > 89   CALCIUM mg/dL 8 8   < > 8 8   AST U/L  --   --  13   ALT U/L  --   --  13   ALK PHOS U/L  --   --  49    < > = values in this interval not displayed       Results from last 7 days   Lab Units 23  0334 23  1135 23  1116 23  1216   BLOOD CULTURE  No Growth at 48 hrs  No Growth at 48 hrs  Alpha Hemolytic Streptococcus* Alpha Hemolytic Streptococcus* Alpha Hemolytic Streptococcus*  Alpha Hemolytic Streptococcus*   GRAM STAIN RESULT   --  Gram positive cocci in chains* Gram positive cocci in chains* Gram positive cocci in chains*  Gram positive cocci in chains*       Lab interpretation/comments: White blood cell count continues to downtrend  Creatinine unremarkable  Imaging interpretation/comments: MRI thoracic spine without any changes concerning for discitis/osteomyelitis  CT of the facial bones without any concerning drainable fluid collection  Culture data: Repeat cultures remain without growth  MICKY testing reviewed with clinical pharmacy

## 2023-05-23 NOTE — DISCHARGE SUMMARY
[facility]  Discharge- Addi Rodriguez 1945, 69 y o  male MRN: [id number]  Unit/Bed#: S -01 Encounter: [id number]  Primary Care Provider: Lynn Owens MD   Date and time admitted to hospital: 5/16/2023 10:40 AM    Acute bacterial endocarditis  Assessment & Plan  · JESSICA positive for aortic valve vegetation  · Possible oral source but CT facial bones negative, but patient did have recent dental work and needs further procedures in the future   · Will need antibiotic prophylaxis in the future for dental work   · Rocephin x 6 weeks   · Follow up with TAVR evaluation after     Shortness of breath  Assessment & Plan  · POA, likely secondary fluid retention/volume overload   · CT chest with pleural effusions and atelectasis  · Unfortunately no output has been documented but patient does report that he has been urinating with the Lasix  · This has improved   · Continue I/O Daily weights  · Noted with bilateral lower extremity edema today  · Transition to Torsemide 40 mg daily    * Bacteremia due to Streptococcus  Assessment & Plan  · 2/2 blood cultures positive for strep mitis oralis  Discitis/OM noted on MRI  · Patient reports he did have dental work recently but cannot remember  Reports that he does need further dental work in the future   · JESSICA positive for vegetation   · CT facial bones negative for abscess/collection   · Rocephin IV x 6 weeks, possible oral treatment after  · Follow up repeat cultures   · PICC line placement prior to dc  · Infusion center at [facility] able to accommodate  Gastric distention  Assessment & Plan  ·  Noted on CT abdomen pelvis on admission  Patient asymptomatic at this time   · Referral to GI outpatient for endoscopy in the future     Discitis of lumbar region  Assessment & Plan  · Has had back pain for the last few weeks   · He is on a steroid taper and due to start prednisone 20 mg once a day today    · 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 · 2/2 blood cultures with Strep mitis oralis   · Aortic valve vegetation noted   · Neurosurgery without further recommendations for surgery   · Continue Rocephin per ID  · May need oral treatment after 6 weeks of Rocephin   · Pain control ordered  · No need for tissue sampling given 2/2 positive cultures per ID, per IR would likely be technically difficult   · ID consult appreciated      Non-ischemic myocardial injury (non-traumatic)  Assessment & Plan  · Elevated troponins in the 200-300 range upon admission and overnight, flat  · Denies chest pain  · Likely secondary to volume overload/aortic stenosis   · Cardiology following  · No further work up inpatient needed  · Outpatient ischemic testing recommended     Sepsis [facility])  Assessment & Plan  · Present on admission with leukocytosis and tachypnea  Leukocytosis likely from recent steroid use  · Remains afebrile and hemodynamically stable  · Sepsis criteria resolved    · Antibiotics and culture plan as above      Aortic stenosis  Assessment & Plan  · Last Echo beginning of this month shows severe stenosis  · JESSICA showed bacterial endocarditis   · Will need eventual TAVR evaluation   · Antibiotics as above     Atrial fibrillation ([address] )  Assessment & Plan  · Presently rate controlled with Lopressor    · Not on anticoagulation per patient - he is concerned about bruising or bleeding   · Given patient will be undergoing biopsy will continue to hold off on starting this at this time     Primary hypertension  Assessment & Plan  · BP acceptable   · Continue Lopressor/torsemide    Hyponatremia  Assessment & Plan  · Sodium 127  · Seems Chronic and stable   · Monitor BMP as outpatient    Medical Problems     Resolved Problems  Date Reviewed: 5/23/2023          Resolved    Abdominal pain 5/17/2023     Resolved by  Miguel Mae PA-C    Leg swelling 5/17/2023     Resolved by  Miguel Mae PA-C        Discharging Physician / Practitioner: Sophie Pollock CRUZ  PCP: Nita Adams MD  Admission Date:   Admission Orders (From admission, onward)     Ordered        05/16/23 1355  Inpatient Admission  Once                      Discharge Date: 05/23/23    Consultations During Hospital Stay:  · ID  · Cardiology  · NSX    Procedures Performed:   · none    Significant Findings / Test Results:   · CT with new pleural effusions with adjacent atelectasis  · CT with new lucencies upon the endplates of C9-Q3 suggestive of discitis  · 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, as described above  Minimal ventral epidural enhancement is noted without discrete collection  Paraspinal edema and enhancement within the psoas musculature without abscess formation  · Echocardiogram with small vegetations on aortic valve    Incidental Findings:   · Gastric distention noted on CT scan upon admission, asymptomatic, recommending outpatient follow-up    Test Results Pending at Discharge (will require follow up):   · none     Outpatient Tests Requested:  · Outpatient EGD as well as TAVR work-up    Complications:  none    Reason for Admission: back pain    Hospital Course:   Anthony Pierre is a 69 y o  male patient who originally presented to the hospital on 5/16/2023 due to back pain  Patient essentially presented for acute on chronic back pain as well as insomnia  Imaging was initially suspicious for L1/L2 discitis, thus an MRI was pursued  He was seen in consultation by the infection disease team as well as neurosurgery  MRI confirmed the suspicion of discitis and the patient was placed on IV ceftriaxone  Echocardiogram did show vegetations on the aortic valve consistent with endocarditis  Patient was recommended for PICC line as well as 6 weeks of IV antibiotics which she preferred to do at the infusion center    This was set up at [place] [address]   Patient was clinically well upon the time of discharge with a stable hyponatremia recommending outpatient BMP as well as lab work "while on IV's antibiotics  He will be discharged later today once PICC line is in place for continued outpatient follow-up with infection disease as well as recommending a cardiothoracic surgery work-up for his aortic valve  Also was found to have gastric distention on CT scan which was incidental   Ambulatory referral to GI has been placed and he will follow-up after discharge  Please see above list of diagnoses and related plan for additional information  Condition at Discharge: stable    Discharge Day Visit / Exam:   Subjective:  No events  Vitals: Blood Pressure: (!) 122/40 (05/23/23 0825)  Pulse: 71 (05/23/23 0825)  Temperature: 97 7 °F (36 5 °C) (05/23/23 0709)  Temp Source: Oral (05/21/23 2110)  Respirations: 18 (05/23/23 0825)  Height: 5' 8\" (172 7 cm) (05/19/23 1012)  Weight - Scale: 65 1 kg (143 lb 9 6 oz) (05/23/23 0600)  SpO2: 100 % (05/23/23 0825)  Exam:   Physical Exam  Vitals and nursing note reviewed  Constitutional:       General: He is not in acute distress  Appearance: Normal appearance  HENT:      Head: Normocephalic  Mouth/Throat:      Mouth: Mucous membranes are moist    Eyes:      Pupils: Pupils are equal, round, and reactive to light  Cardiovascular:      Rate and Rhythm: Normal rate and regular rhythm  Heart sounds: Murmur heard  Pulmonary:      Effort: Pulmonary effort is normal  No respiratory distress  Breath sounds: Normal breath sounds  No wheezing, rhonchi or rales  Abdominal:      General: Bowel sounds are normal  There is no distension  Palpations: Abdomen is soft  Tenderness: There is no abdominal tenderness  There is no guarding  Musculoskeletal:         General: No deformity  Cervical back: Normal range of motion  Right lower leg: No edema  Left lower leg: No edema  Skin:     Capillary Refill: Capillary refill takes less than 2 seconds  Neurological:      General: No focal deficit present        Mental Status: " He is alert and oriented to person, place, and time  Mental status is at baseline  Discussion with Family: Patient declined call to   Discharge instructions/Information to patient and family:   See after visit summary for information provided to patient and family  Provisions for Follow-Up Care:  See after visit summary for information related to follow-up care and any pertinent home health orders  Disposition:   Home    Planned Readmission: no     Discharge Statement:  I spent 45 minutes discharging the patient  This time was spent on the day of discharge  I had direct contact with the patient on the day of discharge  Greater than 50% of the total time was spent examining patient, answering all patient questions, arranging and discussing plan of care with patient as well as directly providing post-discharge instructions  Additional time then spent on discharge activities  Discharge Medications:  See after visit summary for reconciled discharge medications provided to patient and/or family        **Please Note: This note may have been constructed using a voice recognition system**

## 2023-05-23 NOTE — CASE MANAGEMENT
Case Management Progress Note    Patient name Lisbeth KAUR /S -84 MRN [id number]  : 1945 Date 2023       LOS (days): 7  Geometric Mean LOS (GMLOS) (days): 3 50  Days to GMLOS:-3 4        OBJECTIVE:        Current admission status: Inpatient  Preferred Pharmacy:   [address], [address] [place] [address] [address] [place] [address]  Phone: [phone] Fax: [phone]    Primary Care Provider: Lynda Banda MD    Primary Insurance: [facility]  Secondary Insurance:     PROGRESS NOTE:    Per rounding, patient requesting to go to infusion center instead of home infusion  Patient choice is for [facility]  Call made to [facility] infusion center to schedule initial appointment  First appointment made for 2023 @ 1030  Intake confirmed will provide remainder of patient's schedule at first visit  Provider made aware      Hortencia Diaz, SHU, SHANIQUE, NISH, [facility]  23 10:24 AM

## 2023-05-23 NOTE — ASSESSMENT & PLAN NOTE
· POA, likely secondary fluid retention/volume overload   · CT chest with pleural effusions and atelectasis  · Unfortunately no output has been documented but patient does report that he has been urinating with the Lasix  · This has improved   · Continue I/O Daily weights  · Noted with bilateral lower extremity edema today  · Transition to Torsemide 40 mg daily

## 2023-05-23 NOTE — APP STUDENT NOTE
MARIS STUDENT  Inpatient Progress Note for TRAINING ONLY  Not Part of Legal Medical Record     Progress Note - Percy Sheriff 69 y o  male MRN: [id number]  Unit/Bed#: S -01 Encounter: [id number]     Assessment/Plan:    Bacteremia due to Streptococcus  • 2/2 blood cultures positive for strep mitis oralis  Discitis/OM noted on MRI  • Patient reports he did have dental work recently but cannot remember  Reports that he does need further dental work in the future   • JESSICA positive for vegetation   • CT facial bones negative for abscess/collection   ? Rocephin IV 2000 mg x 6 weeks, possible oral treatment after  ? Follow up repeat cultures   ? PICC line when negative at 72 hours   ? Currently negative at approximately 70 hours  ? [facility] infusion center accepted patient, discharge within 24h   Discitis of lumbar region   • Has had back pain for the last few weeks   • He is on a steroid taper and due to start prednisone 20 mg once a day today  • 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   • 2/2 blood cultures with Strep mitis oralis   • Aortic valve vegetation noted   ? Neurosurgery without further recommendations for surgery   ? Continue Rocephin per ID  - May need oral treatment after 6 weeks of Rocephin   ? Pain control ordered; acetaminophen 650 mg, dilaudid 0 5 mg breakthrough prn, oxycodone 5 mg moderate and 10 mg severe pain  ? No need for tissue sampling given 2/2 positive cultures per ID, per IR would likely be technically difficult   ? ID consult   ? continue current therapy IV, consider oral after  ? Follow up MRI to monitor infection  Shortness of breath  • POA, likely secondary fluid retention/volume overload   • CT chest with pleural effusions and atelectasis  • Unfortunately no output has been documented but patient does report that he has been urinating with the Lasix  • This has improved   ? Continue I/O Daily weights  ?  bilateral lower extremity improving per patient  ?  Lasix 40 mg x 1 dose today, BP of 122/40  ? 100% on room air      Acute bacterial endocarditis  • JESSICA positive for aortic valve vegetation  • Possible oral source but CT facial bones negative, but patient did have recent dental work and needs further procedures in the future   ? Will need antibiotic prophylaxis in the future for dental work   ? Rocephin x 6 weeks   Follow up with TAVR evaluation after      Aortic stenosis  • Last Echo beginning of this month shows severe stenosis  • JESSICA showed bacterial endocarditis   ? Will need eventual TAVR evaluation   ? Antibiotics as above      Sepsis ([facility] [address] )  • Present on admission with leukocytosis and tachypnea  Leukocytosis likely from recent steroid use  • Remains afebrile and hemodynamically stable  • Sepsis criteria resolved    ? Antibiotics and culture plan as above        Non-ischemic myocardial injury (non-traumatic)  •   Elevated troponins in the 200-300 range upon admission and overnight, flat  • Denies chest pain  • Likely secondary to volume overload/aortic stenosis   ? Cardiology following  ? No further work up inpatient needed  ? Outpatient ischemic testing recommended      Gastric distention  • Noted on CT abdomen pelvis on admission  • Patient asymptomatic at this time and is eating well  ? Referral to GI outpatient for endoscopy in the future   Hyponatremia  • Stable hyponatremia at 125 since yesterday  • Patient complained of new hand cramping, likely not due to hyponatremia due to chronicity       • Chronic and stable   ? Monitor BMP    Primary hypertension  • BP acceptable   ? Continue Lopressor  ? Continue with Lasix     [facility]  • Presently rate controlled with Lopressor  ?  Not on anticoagulation per patient - he is concerned about bruising or bleeding   - Given patient will be undergoing biopsy will continue to hold off on starting this at this time   - Patient receiving heparin  VTE Pharmacologic Prophylaxis:   Pharmacologic: Heparin  Mechanical VTE Prophylaxis in Place: No    Patient Centered Rounds: I have performed bedside rounds with nursing staff today  Discussions with Specialists or Other Care Team Provider: cardiology, infectious disease    Education and Discussions with Family / Patient: n/a    Time Spent for Care: 45 minutes  More than 50% of total time spent on counseling and coordination of care as described above  Current Length of Stay: 7 day(s)    Current Patient Status: Inpatient   Certification Statement: The patient, who was admitted as an inpatient, is being discharged sooner than originally anticipated due to: discharge to infusion center    Discharge Plan: 24 hours to infusion center services pending PICC placement  Code Status: Level 1 - Full Code    Subjective:   Patient seen asking nurse a question and sitting up in chair on exam  He reports feeling much better with only the complaint of intermittent cramping of bilateral hands  Denies any nausea, temperature changes, or shortness of breath  He reports getting a good 7 hours of sleep for the first night in a few days  Objective:     Vitals:   Temp (24hrs), Av °F (36 7 °C), Min:97 7 °F (36 5 °C), Max:98 5 °F (36 9 °C)    Temp:  [97 7 °F (36 5 °C)-98 5 °F (36 9 °C)] 97 7 °F (36 5 °C)  HR:  [64-81] 71  Resp:  [15-18] 18  BP: ()/(39-47) 122/40  SpO2:  [96 %-100 %] 100 %  Body mass index is 21 83 kg/m²  Input and Output Summary (last 24 hours): Intake/Output Summary (Last 24 hours) at 2023 1314  Last data filed at 2023 0001  Gross per 24 hour   Intake 270 ml   Output 1350 ml   Net -1080 ml       Physical Exam:     Physical Exam  Vitals and nursing note reviewed  Constitutional:       Appearance: Normal appearance  Cardiovascular:      Rate and Rhythm: Normal rate and regular rhythm  Pulses: Normal pulses  Heart sounds: Murmur heard  Pulmonary:      Effort: Pulmonary effort is normal       Breath sounds: Normal breath sounds  Abdominal:      General: Abdomen is flat  Bowel sounds are normal       Palpations: Abdomen is soft  Neurological:      Mental Status: He is alert and oriented to person, place, and time  Historical Information   History reviewed  No pertinent past medical history  Past Surgical History:   Procedure Laterality Date   • SKIN BIOPSY       Social History   Social History     Substance and Sexual Activity   Alcohol Use Not Currently     Social History     Substance and Sexual Activity   Drug Use Not Currently     Social History     Tobacco Use   Smoking Status Former   • Types: Cigarettes   • Quit date: 3/11/2005   • Years since quittin 2   Smokeless Tobacco Never     Family History: non-contributory    Meds/Allergies   all medications and allergies reviewed  No Known Allergies    Additional Data:     Labs:    Results from last 7 days   Lab Units 23  0515 23  0425 23  0339   WBC Thousand/uL 9 55   < > 13 42*   HEMOGLOBIN g/dL 10 5*   < > 10 3*   HEMATOCRIT % 31 9*   < > 31 6*   PLATELETS Thousands/uL 313   < > 315   NEUTROS PCT %  --   --  84*   LYMPHS PCT %  --   --  8*   MONOS PCT %  --   --  6   EOS PCT %  --   --  1    < > = values in this interval not displayed  Results from last 7 days   Lab Units 23  0515   SODIUM mmol/L 127*   POTASSIUM mmol/L 3 9   CHLORIDE mmol/L 93*   CO2 mmol/L 28   BUN mg/dL 18   CREATININE mg/dL 0 83   ANION GAP mmol/L 6   CALCIUM mg/dL 8 7   GLUCOSE RANDOM mg/dL 111                         * I Have Reviewed All Lab Data Listed Above  * Additional Pertinent Lab Tests Reviewed: [facility] Admission Reviewed    Imaging:    Imaging Reports Reviewed Today Include:     Ct facial bones with contrast 23  There is no suspicious drainable fluid/abscess collection      MRI thoracic spine w wo contrast 23  Known L1-2 discitis/osteomyelitis, incompletely imaged      The thoracic vertebral body heights are maintained demonstrating no acute fracture or subluxation      Mild degenerative disc disease at the T9-10 and T11-12 levels without central canal narrowing      Small to moderate bilateral pleural effusions  Imaging Personally Reviewed by Myself Includes:  ***    Recent Cultures (last 7 days):     Results from last 7 days   Lab Units 05/20/23  0334 05/18/23  1135 05/18/23  1116 05/17/23  1216   BLOOD CULTURE  No Growth at 48 hrs  No Growth at 48 hrs  Alpha Hemolytic Streptococcus* Alpha Hemolytic Streptococcus* Alpha Hemolytic Streptococcus*  Alpha Hemolytic Streptococcus*   GRAM STAIN RESULT   --  Gram positive cocci in chains* Gram positive cocci in chains* Gram positive cocci in chains*  Gram positive cocci in chains*       Last 24 Hours Medication List:   Current Facility-Administered Medications   Medication Dose Route Frequency Provider Last Rate   • acetaminophen  650 mg Oral Q6H PRN Nieves Schaefer MD     • cefTRIAXone  2,000 mg Intravenous Q24H Shona Form, PA-C     • heparin (porcine)  5,000 Units Subcutaneous [facility] Nieves Schaefer MD     • HYDROmorphone  0 5 mg Intravenous Q4H PRN Shona Form, PA-C     • lidocaine  1 patch Topical Daily WALLY ClementC     • melatonin  6 mg Oral HS WALLY GottliebC     • metoprolol tartrate  25 mg Oral Q12H [facility] Muriel Hodgkins, MD     • oxyCODONE  10 mg Oral Q4H PRN Shona Form, PA-C     • oxyCODONE  5 mg Oral Q4H PRN Shona Form, PA-C     • polyethylene glycol  17 g Oral Daily PRN Nieves Schaefer MD     • senna-docusate sodium  1 tablet Oral BID Lou Sanchez MD     • torsemide  40 mg Oral Daily Muriel Hodgkins, MD          Today, Patient Was Seen By: Rose Slater    ** Please Note: Dictation voice to text software may have been used in the creation of this document   **

## 2023-05-23 NOTE — PROGRESS NOTES
Progress Note - Infectious Disease   Cayetano Monzon 69 y o  male MRN: [id number]  Unit/Bed#: S -01 Encounter: [id number]      Impression/Plan:  1   Occult streptococcal bacteremia with aortic valve endocarditis   Patient initially presented for recurrent back pain and also mentioned abdominal distention   Further work-up with MRI notable for #2   Surveillance blood cultures collected in both sets have isolated VGS, MICKY elevated to PCN   The patient has no other localizing complaints on exam, is hemodynamically stable and has no devices in place   Repeat cultures collected on 5/18 also prior to antibiotics and are positive as well   2D echo recently with #5   Completed transesophageal echo which was notable for aortic valve vegetations  Suspect primary odontogenic source  No definitive occult intra-abdominal source  CT of the face also largely unremarkable but poor dentition noted on exam   Patient otherwise hemodynamically stable and repeat cultures clearing  Plans for prolonged IV antibiotic with the infusion center    Continue ceftriaxone 2 g every 24 hours  Plan for total 6 weeks of therapy through 6/30  May need to consider additional oral therapy for #2 pending ESR/CRP trend  Plan for repeat MR imaging of the L-spine with contrast towards end of course  Patient to start therapy tomorrow at the [facility] infusion center  Patient will need follow-up in the ID office 2 weeks after discharge  Maintain current PICC line  ID office staff notified of follow-up needs  Trend fever curve/WBC while admitted  Follow-up with OMS at outpatient--antibiotic prophylaxis required in the future  GI evaluation as outpatient  Follow-up with neurosurgery as outpatient  Follow-up with cardiology/CT surgery outpatient  Additional supportive care as per primary     2   L1-L2 vertebral osteomyelitis/discitis   Patient presented for recurrent back pain and abdominal distention   Now MR imaging notable for infection, there is some ventral epidural enhancement and paraspinal edema/enhancement on the images   Reviewed with IR and difficult area for aspiration but also patient's blood cultures are positive and are likely the pathogen  MRI T-spine negative  Patient without significant pain and is ambulatory  Antibiotics as above  No need for biopsy  Ongoing follow-up by neurosurgery  Monitor neuro exam  Prolonged antibiotic as above  Potential oral therapy after IV course for this issue  Plan for repeat MRI of the L-spine towards end of IV course for completeness     3   Abnormal CT abdomen   Patient CT imaging of the abdomen now showing some gastric distention which is unexpected   Previous colonoscopy noted to be unremarkable   Uncertain if this may be contributing to the above   Abdominal exam otherwise benign  Antibiotics as above  Continue to trend fever curve/WBC  Monitor abdominal exam  Recommend GI evaluation as outpatient     4   Persistent leukocytosis   Patient has had persistent leukocytosis since last admission which could be related to steroids   Suspect the above is also contributing   He is fortunately hemodynamically stable  Normalized today  Maintain off steroids  Antibiotics as above  Outpatient labs as above     5  Recently diagnosed AS   Patient's 2D echo reviewed and has heavily calcified disease on the aortic valve   Course now complicated by the above  Antibiotics as above  Continue to trend fever curve/WBC  Ongoing follow-up by cardiology  Follow-up with CT surgery outpatient     Above plan discussed in detail with the patient, primary service and case management      ID consult service will continue to follow while admitted      Antibiotics:  Ceftriaxone 6    24 Hour events:  Yesterday and overnight notes reviewed and no acute events noted    Subjective:  Patient seen earlier today and he denied having any nausea, vomiting, chest pain or shortness of breath  Was tolerating antibiotic without issue    Reviewed with primary service and patient was cleared for PICC line placement and was recommended that he have dose of antibiotic prior to leaving  Plans for discharge today  Objective:  Vitals:  Temp:  [97 7 °F (36 5 °C)-97 9 °F (36 6 °C)] 97 7 °F (36 5 °C)  HR:  [64-81] 71  Resp:  [18] 18  BP: ()/(39-43) 122/40  SpO2:  [97 %-100 %] 100 %  Temp (24hrs), Av 8 °F (36 6 °C), Min:97 7 °F (36 5 °C), Max:97 9 °F (36 6 °C)  Current: Temperature: 97 7 °F (36 5 °C)    Physical Exam:   General Appearance:  Alert, interactive, nontoxic, no acute distress  Throat: Oropharynx moist without lesions  Lungs:   Clear to auscultation bilaterally; no wheezes, rhonchi or rales; respirations unlabored on room air   Heart:  RRR; murmur present, no rubs or gallops   Abdomen:   Soft, non-tender, non-distended, positive bowel sounds  Extremities: No clubbing, cyanosis or edema   Skin: No new rashes or lesions  No new draining wounds noted  Labs, Imaging, & Other studies:   All pertinent labs and imaging studies in PACS were personally reviewed as below  Results from last 7 days   Lab Units 23  0515 23  1041 23  0425   WBC Thousand/uL 9 55 10 65* 12 17*   HEMOGLOBIN g/dL 10 5* 10 7* 9 8*   PLATELETS Thousands/uL 313 302 288     Results from last 7 days   Lab Units 23  0515   POTASSIUM mmol/L 3 9   CHLORIDE mmol/L 93*   CO2 mmol/L 28   BUN mg/dL 18   CREATININE mg/dL 0 83   EGFR ml/min/1 73sq m 84   CALCIUM mg/dL 8 7     Results from last 7 days   Lab Units 23  0334 23  1135 23  1116 23  1216   BLOOD CULTURE  No Growth at 72 hrs  No Growth at 72 hrs  Alpha Hemolytic Streptococcus* Alpha Hemolytic Streptococcus* Alpha Hemolytic Streptococcus*  Alpha Hemolytic Streptococcus*   GRAM STAIN RESULT   --  Gram positive cocci in chains* Gram positive cocci in chains* Gram positive cocci in chains*  Gram positive cocci in chains*       Lab interpretation/comments:  White blood cell count has now normalized  Creatinine 0 8  Imaging interpretation/comments: No new imaging overnight    Culture data: Blood cultures negative at 72 hours

## 2023-05-24 ENCOUNTER — HOSPITAL ENCOUNTER (OUTPATIENT)
Dept: INFUSION CENTER | Facility: HOSPITAL | Age: 78
Discharge: HOME | End: 2023-05-24
Attending: INTERNAL MEDICINE

## 2023-05-24 ENCOUNTER — TRANSITIONAL CARE MANAGEMENT (OUTPATIENT)
Dept: FAMILY MEDICINE CLINIC | Facility: CLINIC | Age: 78
End: 2023-05-24

## 2023-05-24 VITALS
OXYGEN SATURATION: 100 % | TEMPERATURE: 97.2 F | HEART RATE: 71 BPM | DIASTOLIC BLOOD PRESSURE: 67 MMHG | RESPIRATION RATE: 20 BRPM | SYSTOLIC BLOOD PRESSURE: 147 MMHG

## 2023-05-24 DIAGNOSIS — R78.81 BACTEREMIA DUE TO STREPTOCOCCUS: ICD-10-CM

## 2023-05-24 DIAGNOSIS — M46.46 DISCITIS OF LUMBAR REGION: Primary | ICD-10-CM

## 2023-05-24 DIAGNOSIS — B95.5 BACTEREMIA DUE TO STREPTOCOCCUS: ICD-10-CM

## 2023-05-24 RX ORDER — TORSEMIDE 20 MG/1
TABLET ORAL
COMMUNITY
Start: 2023-05-23

## 2023-05-24 RX ORDER — CEFTRIAXONE 2 G/50ML
2000 INJECTION, SOLUTION INTRAVENOUS ONCE
Status: CANCELLED | OUTPATIENT
Start: 2023-05-25

## 2023-05-24 RX ORDER — CEFTRIAXONE 2 G/50ML
2000 INJECTION, SOLUTION INTRAVENOUS ONCE
Status: COMPLETED | OUTPATIENT
Start: 2023-05-24 | End: 2023-05-24

## 2023-05-24 RX ORDER — SODIUM CHLORIDE 9 MG/ML
20 INJECTION, SOLUTION INTRAVENOUS ONCE
Status: CANCELLED | OUTPATIENT
Start: 2023-05-25

## 2023-05-24 RX ORDER — SODIUM CHLORIDE 9 MG/ML
20 INJECTION, SOLUTION INTRAVENOUS ONCE
Status: COMPLETED | OUTPATIENT
Start: 2023-05-24 | End: 2023-05-24

## 2023-05-24 RX ADMIN — CEFTRIAXONE 2000 MG: 2 INJECTION, SOLUTION INTRAVENOUS at 10:49

## 2023-05-24 RX ADMIN — SODIUM CHLORIDE 20 ML/HR: 0.9 INJECTION, SOLUTION INTRAVENOUS at 10:49

## 2023-05-24 NOTE — PROGRESS NOTES
OPAT NOTE    Supervising/Discharge physician: Jody    Diagnosis: Strep Bacteremia, AV endocarditis    Drug: ceftriaxone    Dose/Frequency:2gq24    End Date: 6/30    Infusion/[facility] contact: [facility]    Next appointment: 6/1

## 2023-05-24 NOTE — UTILIZATION REVIEW
NOTIFICATION OF ADMISSION DISCHARGE   This is a Notification of Discharge from [facility]  Please be advised that this patient has been discharge from our facility  Below you will find the admission and discharge date and time including the patient’s disposition  UTILIZATION REVIEW CONTACT:  Joanie Fontana MA  Utilization   Network Utilization Review Department  Phone: [phone] x carefully listen to the prompts  All voicemails are confidential   Email: [email or web address]     ADMISSION INFORMATION  PRESENTATION DATE: 5/16/2023 10:40 AM  OBERVATION ADMISSION DATE:   INPATIENT ADMISSION DATE: 5/16/23  1:55 PM   DISCHARGE DATE: 5/23/2023  4:02 PM   DISPOSITION:Home/Self Care    IMPORTANT INFORMATION:  Send all requests for admission clinical reviews, approved or denied determinations and any other requests to dedicated fax number below belonging to the campus where the patient is receiving treatment   List of dedicated fax numbers:  [address] DENIALS (Administrative/Medical Necessity) [phone]   [address] (Maternity/NICU/Pediatrics) [phone]   [facility] [phone]   [facility] 87 [phone]   [address] [phone]   220 [facility] [phone]   90 [facility] [phone]   [address] [place] [address] 119 [phone]   [facility]  [phone]   405 [facility] [phone]   [facility] [address] [facility] [phone]

## 2023-05-25 ENCOUNTER — HOSPITAL ENCOUNTER (OUTPATIENT)
Dept: INFUSION CENTER | Facility: HOSPITAL | Age: 78
Discharge: HOME | End: 2023-05-25
Attending: INTERNAL MEDICINE

## 2023-05-25 VITALS
DIASTOLIC BLOOD PRESSURE: 56 MMHG | RESPIRATION RATE: 18 BRPM | OXYGEN SATURATION: 99 % | TEMPERATURE: 97.7 F | SYSTOLIC BLOOD PRESSURE: 131 MMHG | HEART RATE: 71 BPM

## 2023-05-25 DIAGNOSIS — B95.5 BACTEREMIA DUE TO STREPTOCOCCUS: ICD-10-CM

## 2023-05-25 DIAGNOSIS — M46.46 DISCITIS OF LUMBAR REGION: Primary | ICD-10-CM

## 2023-05-25 DIAGNOSIS — R78.81 BACTEREMIA DUE TO STREPTOCOCCUS: ICD-10-CM

## 2023-05-25 LAB
BACTERIA BLD CULT: NORMAL
BACTERIA BLD CULT: NORMAL

## 2023-05-25 RX ORDER — CEFTRIAXONE 2 G/50ML
2000 INJECTION, SOLUTION INTRAVENOUS ONCE
Status: COMPLETED | OUTPATIENT
Start: 2023-05-25 | End: 2023-05-25

## 2023-05-25 RX ORDER — SODIUM CHLORIDE 9 MG/ML
20 INJECTION, SOLUTION INTRAVENOUS ONCE
Status: COMPLETED | OUTPATIENT
Start: 2023-05-25 | End: 2023-05-25

## 2023-05-25 RX ORDER — SODIUM CHLORIDE 9 MG/ML
20 INJECTION, SOLUTION INTRAVENOUS ONCE
Status: CANCELLED | OUTPATIENT
Start: 2023-05-26

## 2023-05-25 RX ORDER — CEFTRIAXONE 2 G/50ML
2000 INJECTION, SOLUTION INTRAVENOUS ONCE
Status: CANCELLED | OUTPATIENT
Start: 2023-05-26

## 2023-05-25 RX ADMIN — SODIUM CHLORIDE 20 ML/HR: 0.9 INJECTION, SOLUTION INTRAVENOUS at 10:44

## 2023-05-25 RX ADMIN — CEFTRIAXONE 2000 MG: 2 INJECTION, SOLUTION INTRAVENOUS at 10:44

## 2023-05-26 ENCOUNTER — HOSPITAL ENCOUNTER (OUTPATIENT)
Dept: INFUSION CENTER | Facility: HOSPITAL | Age: 78
End: 2023-05-26
Attending: INTERNAL MEDICINE

## 2023-05-26 ENCOUNTER — TELEPHONE (OUTPATIENT)
Dept: INFECTIOUS DISEASES | Facility: CLINIC | Age: 78
End: 2023-05-26

## 2023-05-26 VITALS
RESPIRATION RATE: 20 BRPM | OXYGEN SATURATION: 99 % | DIASTOLIC BLOOD PRESSURE: 61 MMHG | TEMPERATURE: 96.7 F | HEART RATE: 74 BPM | SYSTOLIC BLOOD PRESSURE: 123 MMHG

## 2023-05-26 DIAGNOSIS — M46.46 DISCITIS OF LUMBAR REGION: Primary | ICD-10-CM

## 2023-05-26 DIAGNOSIS — B95.5 BACTEREMIA DUE TO STREPTOCOCCUS: ICD-10-CM

## 2023-05-26 DIAGNOSIS — R78.81 BACTEREMIA DUE TO STREPTOCOCCUS: ICD-10-CM

## 2023-05-26 RX ORDER — SODIUM CHLORIDE 9 MG/ML
20 INJECTION, SOLUTION INTRAVENOUS ONCE
Status: COMPLETED | OUTPATIENT
Start: 2023-05-26 | End: 2023-05-26

## 2023-05-26 RX ORDER — CEFTRIAXONE 2 G/50ML
2000 INJECTION, SOLUTION INTRAVENOUS ONCE
Status: CANCELLED | OUTPATIENT
Start: 2023-05-27

## 2023-05-26 RX ORDER — SODIUM CHLORIDE 9 MG/ML
20 INJECTION, SOLUTION INTRAVENOUS ONCE
Status: CANCELLED | OUTPATIENT
Start: 2023-05-27

## 2023-05-26 RX ORDER — CEFTRIAXONE 2 G/50ML
2000 INJECTION, SOLUTION INTRAVENOUS ONCE
Status: COMPLETED | OUTPATIENT
Start: 2023-05-26 | End: 2023-05-26

## 2023-05-26 RX ADMIN — SODIUM CHLORIDE 20 ML/HR: 0.9 INJECTION, SOLUTION INTRAVENOUS at 11:06

## 2023-05-26 RX ADMIN — CEFTRIAXONE 2000 MG: 2 INJECTION, SOLUTION INTRAVENOUS at 11:06

## 2023-05-26 NOTE — TELEPHONE ENCOUNTER
Pt calls office and is wondering if he is supposed to get any labs done and how often  informed pt yes he is to get weekly labs while on abx  Pt states he will get them done every Tuesday

## 2023-05-27 ENCOUNTER — HOSPITAL ENCOUNTER (OUTPATIENT)
Dept: INFUSION CENTER | Facility: HOSPITAL | Age: 78
Discharge: HOME | End: 2023-05-27
Attending: INTERNAL MEDICINE

## 2023-05-27 VITALS
HEART RATE: 62 BPM | TEMPERATURE: 98.1 F | RESPIRATION RATE: 20 BRPM | DIASTOLIC BLOOD PRESSURE: 53 MMHG | SYSTOLIC BLOOD PRESSURE: 111 MMHG | OXYGEN SATURATION: 96 %

## 2023-05-27 DIAGNOSIS — R78.81 BACTEREMIA DUE TO STREPTOCOCCUS: ICD-10-CM

## 2023-05-27 DIAGNOSIS — B95.5 BACTEREMIA DUE TO STREPTOCOCCUS: ICD-10-CM

## 2023-05-27 DIAGNOSIS — M46.46 DISCITIS OF LUMBAR REGION: Primary | ICD-10-CM

## 2023-05-27 RX ORDER — CEFTRIAXONE 2 G/50ML
2000 INJECTION, SOLUTION INTRAVENOUS ONCE
Status: CANCELLED | OUTPATIENT
Start: 2023-05-28

## 2023-05-27 RX ORDER — SODIUM CHLORIDE 9 MG/ML
20 INJECTION, SOLUTION INTRAVENOUS ONCE
Status: CANCELLED | OUTPATIENT
Start: 2023-05-28

## 2023-05-27 RX ORDER — SODIUM CHLORIDE 9 MG/ML
20 INJECTION, SOLUTION INTRAVENOUS ONCE
Status: COMPLETED | OUTPATIENT
Start: 2023-05-27 | End: 2023-05-27

## 2023-05-27 RX ORDER — CEFTRIAXONE 2 G/50ML
2000 INJECTION, SOLUTION INTRAVENOUS ONCE
Status: COMPLETED | OUTPATIENT
Start: 2023-05-27 | End: 2023-05-27

## 2023-05-27 RX ADMIN — CEFTRIAXONE 2000 MG: 2 INJECTION, SOLUTION INTRAVENOUS at 10:30

## 2023-05-27 RX ADMIN — SODIUM CHLORIDE 20 ML/HR: 0.9 INJECTION, SOLUTION INTRAVENOUS at 10:30

## 2023-05-28 ENCOUNTER — HOSPITAL ENCOUNTER (OUTPATIENT)
Dept: INFUSION CENTER | Facility: HOSPITAL | Age: 78
Discharge: HOME | End: 2023-05-28
Attending: INTERNAL MEDICINE

## 2023-05-28 VITALS
RESPIRATION RATE: 18 BRPM | OXYGEN SATURATION: 95 % | TEMPERATURE: 96.9 F | SYSTOLIC BLOOD PRESSURE: 118 MMHG | DIASTOLIC BLOOD PRESSURE: 56 MMHG | HEART RATE: 62 BPM

## 2023-05-28 DIAGNOSIS — B95.5 BACTEREMIA DUE TO STREPTOCOCCUS: ICD-10-CM

## 2023-05-28 DIAGNOSIS — R78.81 BACTEREMIA DUE TO STREPTOCOCCUS: ICD-10-CM

## 2023-05-28 DIAGNOSIS — M46.46 DISCITIS OF LUMBAR REGION: Primary | ICD-10-CM

## 2023-05-28 RX ORDER — CEFTRIAXONE 2 G/50ML
2000 INJECTION, SOLUTION INTRAVENOUS ONCE
Status: COMPLETED | OUTPATIENT
Start: 2023-05-28 | End: 2023-05-28

## 2023-05-28 RX ORDER — SODIUM CHLORIDE 9 MG/ML
20 INJECTION, SOLUTION INTRAVENOUS ONCE
Status: COMPLETED | OUTPATIENT
Start: 2023-05-28 | End: 2023-05-28

## 2023-05-28 RX ORDER — SODIUM CHLORIDE 9 MG/ML
20 INJECTION, SOLUTION INTRAVENOUS ONCE
Status: CANCELLED | OUTPATIENT
Start: 2023-05-29

## 2023-05-28 RX ORDER — CEFTRIAXONE 2 G/50ML
2000 INJECTION, SOLUTION INTRAVENOUS ONCE
Status: CANCELLED | OUTPATIENT
Start: 2023-05-29

## 2023-05-28 RX ADMIN — SODIUM CHLORIDE 20 ML/HR: 0.9 INJECTION, SOLUTION INTRAVENOUS at 10:05

## 2023-05-28 RX ADMIN — CEFTRIAXONE 2000 MG: 2 INJECTION, SOLUTION INTRAVENOUS at 10:06

## 2023-05-29 ENCOUNTER — HOSPITAL ENCOUNTER (OUTPATIENT)
Dept: INFUSION CENTER | Facility: HOSPITAL | Age: 78
Discharge: HOME | End: 2023-05-29
Attending: INTERNAL MEDICINE

## 2023-05-29 VITALS
TEMPERATURE: 97.7 F | OXYGEN SATURATION: 96 % | HEART RATE: 68 BPM | DIASTOLIC BLOOD PRESSURE: 57 MMHG | SYSTOLIC BLOOD PRESSURE: 99 MMHG | RESPIRATION RATE: 19 BRPM

## 2023-05-29 DIAGNOSIS — B95.5 BACTEREMIA DUE TO STREPTOCOCCUS: ICD-10-CM

## 2023-05-29 DIAGNOSIS — R78.81 BACTEREMIA DUE TO STREPTOCOCCUS: ICD-10-CM

## 2023-05-29 DIAGNOSIS — M46.46 DISCITIS OF LUMBAR REGION: Primary | ICD-10-CM

## 2023-05-29 LAB
ALBUMIN SERPL BCP-MCNC: 3.1 G/DL (ref 3.5–5)
ALP SERPL-CCNC: 51 U/L (ref 34–104)
ALT SERPL W P-5'-P-CCNC: 15 U/L (ref 7–52)
ANION GAP SERPL CALCULATED.3IONS-SCNC: 7 MMOL/L (ref 4–13)
AST SERPL W P-5'-P-CCNC: 16 U/L (ref 13–39)
BASOPHILS # BLD AUTO: 0.06 THOUSANDS/ÂΜL (ref 0–0.1)
BASOPHILS NFR BLD AUTO: 1 % (ref 0–1)
BILIRUB SERPL-MCNC: 0.25 MG/DL (ref 0.2–1)
BUN SERPL-MCNC: 17 MG/DL (ref 5–25)
CALCIUM ALBUM COR SERPL-MCNC: 9.1 MG/DL (ref 8.3–10.1)
CALCIUM SERPL-MCNC: 8.4 MG/DL (ref 8.4–10.2)
CHLORIDE SERPL-SCNC: 89 MMOL/L (ref 96–108)
CO2 SERPL-SCNC: 31 MMOL/L (ref 21–32)
CREAT SERPL-MCNC: 1.02 MG/DL (ref 0.6–1.3)
CRP SERPL QL: 19.2 MG/L
EOSINOPHIL # BLD AUTO: 0.24 THOUSAND/ÂΜL (ref 0–0.61)
EOSINOPHIL NFR BLD AUTO: 4 % (ref 0–6)
ERYTHROCYTE [DISTWIDTH] IN BLOOD BY AUTOMATED COUNT: 14.8 % (ref 11.6–15.1)
ERYTHROCYTE [SEDIMENTATION RATE] IN BLOOD: 36 MM/HOUR (ref 0–19)
GFR SERPL CREATININE-BSD FRML MDRD: 70 ML/MIN/1.73SQ M
GLUCOSE SERPL-MCNC: 157 MG/DL (ref 65–140)
HCT VFR BLD AUTO: 27.8 % (ref 36.5–49.3)
HGB BLD-MCNC: 9.1 G/DL (ref 12–17)
IMM GRANULOCYTES # BLD AUTO: 0.03 THOUSAND/UL (ref 0–0.2)
IMM GRANULOCYTES NFR BLD AUTO: 1 % (ref 0–2)
LYMPHOCYTES # BLD AUTO: 0.98 THOUSANDS/ÂΜL (ref 0.6–4.47)
LYMPHOCYTES NFR BLD AUTO: 15 % (ref 14–44)
MCH RBC QN AUTO: 29.5 PG (ref 26.8–34.3)
MCHC RBC AUTO-ENTMCNC: 32.7 G/DL (ref 31.4–37.4)
MCV RBC AUTO: 90 FL (ref 82–98)
MONOCYTES # BLD AUTO: 0.59 THOUSAND/ÂΜL (ref 0.17–1.22)
MONOCYTES NFR BLD AUTO: 9 % (ref 4–12)
NEUTROPHILS # BLD AUTO: 4.58 THOUSANDS/ÂΜL (ref 1.85–7.62)
NEUTS SEG NFR BLD AUTO: 70 % (ref 43–75)
NRBC BLD AUTO-RTO: 0 /100 WBCS
PLATELET # BLD AUTO: 317 THOUSANDS/UL (ref 149–390)
PMV BLD AUTO: 9.1 FL (ref 8.9–12.7)
POTASSIUM SERPL-SCNC: 3 MMOL/L (ref 3.5–5.3)
PROT SERPL-MCNC: 5.6 G/DL (ref 6.4–8.4)
RBC # BLD AUTO: 3.08 MILLION/UL (ref 3.88–5.62)
SODIUM SERPL-SCNC: 127 MMOL/L (ref 135–147)
WBC # BLD AUTO: 6.48 THOUSAND/UL (ref 4.31–10.16)

## 2023-05-29 RX ORDER — CEFTRIAXONE 2 G/50ML
2000 INJECTION, SOLUTION INTRAVENOUS ONCE
Status: CANCELLED | OUTPATIENT
Start: 2023-05-30

## 2023-05-29 RX ORDER — SODIUM CHLORIDE 9 MG/ML
20 INJECTION, SOLUTION INTRAVENOUS ONCE
Status: COMPLETED | OUTPATIENT
Start: 2023-05-29 | End: 2023-05-29

## 2023-05-29 RX ORDER — SODIUM CHLORIDE 9 MG/ML
20 INJECTION, SOLUTION INTRAVENOUS ONCE
Status: CANCELLED | OUTPATIENT
Start: 2023-05-30

## 2023-05-29 RX ORDER — CEFTRIAXONE 2 G/50ML
2000 INJECTION, SOLUTION INTRAVENOUS ONCE
Status: COMPLETED | OUTPATIENT
Start: 2023-05-29 | End: 2023-05-29

## 2023-05-29 RX ADMIN — SODIUM CHLORIDE 20 ML/HR: 0.9 INJECTION, SOLUTION INTRAVENOUS at 11:14

## 2023-05-29 RX ADMIN — CEFTRIAXONE 2000 MG: 2 INJECTION, SOLUTION INTRAVENOUS at 11:14

## 2023-05-30 ENCOUNTER — HOSPITAL ENCOUNTER (OUTPATIENT)
Dept: INFUSION CENTER | Facility: HOSPITAL | Age: 78
Discharge: HOME | End: 2023-05-30
Attending: INTERNAL MEDICINE
Payer: COMMERCIAL

## 2023-05-30 ENCOUNTER — TELEPHONE (OUTPATIENT)
Dept: OTHER | Facility: HOSPITAL | Age: 78
End: 2023-05-30

## 2023-05-30 ENCOUNTER — TELEPHONE (OUTPATIENT)
Dept: INFECTIOUS DISEASES | Facility: CLINIC | Age: 78
End: 2023-05-30

## 2023-05-30 VITALS
HEART RATE: 71 BPM | DIASTOLIC BLOOD PRESSURE: 61 MMHG | TEMPERATURE: 96.9 F | OXYGEN SATURATION: 99 % | SYSTOLIC BLOOD PRESSURE: 129 MMHG | RESPIRATION RATE: 18 BRPM

## 2023-05-30 DIAGNOSIS — B95.5 BACTEREMIA DUE TO STREPTOCOCCUS: ICD-10-CM

## 2023-05-30 DIAGNOSIS — M46.46 DISCITIS OF LUMBAR REGION: Primary | ICD-10-CM

## 2023-05-30 DIAGNOSIS — R78.81 BACTEREMIA DUE TO STREPTOCOCCUS: ICD-10-CM

## 2023-05-30 PROCEDURE — 96365 THER/PROPH/DIAG IV INF INIT: CPT

## 2023-05-30 RX ORDER — CEFTRIAXONE 2 G/50ML
2000 INJECTION, SOLUTION INTRAVENOUS ONCE
Status: CANCELLED | OUTPATIENT
Start: 2023-05-31

## 2023-05-30 RX ORDER — CEFTRIAXONE 2 G/50ML
2000 INJECTION, SOLUTION INTRAVENOUS ONCE
Status: COMPLETED | OUTPATIENT
Start: 2023-05-30 | End: 2023-05-30

## 2023-05-30 RX ORDER — SODIUM CHLORIDE 9 MG/ML
20 INJECTION, SOLUTION INTRAVENOUS ONCE
Status: COMPLETED | OUTPATIENT
Start: 2023-05-30 | End: 2023-05-30

## 2023-05-30 RX ORDER — SODIUM CHLORIDE 9 MG/ML
20 INJECTION, SOLUTION INTRAVENOUS ONCE
Status: CANCELLED | OUTPATIENT
Start: 2023-05-31

## 2023-05-30 RX ADMIN — CEFTRIAXONE 2000 MG: 2 INJECTION, SOLUTION INTRAVENOUS at 10:07

## 2023-05-30 RX ADMIN — SODIUM CHLORIDE 20 ML/HR: 0.9 INJECTION, SOLUTION INTRAVENOUS at 10:07

## 2023-05-30 NOTE — TELEPHONE ENCOUNTER
Spoke to patient's wife  He has been having worsening in breathing at night and with laying flat  He still has easy exertion though with sitting up and being up as well  She reports he was unable to sleep most of last night  Reviewed labs and which were largely benign except for his Na which was known be low from prior visit  He has otherwise been compliant with antibiotics  I discussed having him seen in the ER after infusion visit today  He may need a more rapid evaluation with cardiology and diuresis and repeat ECHO given his moderate AS and recently diagnosed SBE  Will continue with same plans for antibiotics for now  Will forward this to office for reference and ask that they call [facility] as a heads up that the patient is coming in for evaluation  Will keep to plans otherwise for now for follow up

## 2023-05-30 NOTE — TELEPHONE ENCOUNTER
[facility] ED and spoke to Jong aguilarformed her that pt will be coming into

## 2023-05-30 NOTE — TELEPHONE ENCOUNTER
Pt's wife call office an states that she has a couple questions for Dr Vera in regards to pt's treatment and was wondering if she was willing to give her a call back  She states that pt is having difficulty breathing

## 2023-05-31 ENCOUNTER — HOSPITAL ENCOUNTER (OUTPATIENT)
Dept: INFUSION CENTER | Facility: HOSPITAL | Age: 78
Discharge: HOME | End: 2023-05-31
Attending: INTERNAL MEDICINE
Payer: COMMERCIAL

## 2023-05-31 VITALS
SYSTOLIC BLOOD PRESSURE: 149 MMHG | RESPIRATION RATE: 18 BRPM | OXYGEN SATURATION: 96 % | DIASTOLIC BLOOD PRESSURE: 65 MMHG | HEART RATE: 83 BPM

## 2023-05-31 DIAGNOSIS — R78.81 BACTEREMIA DUE TO STREPTOCOCCUS: ICD-10-CM

## 2023-05-31 DIAGNOSIS — B95.5 BACTEREMIA DUE TO STREPTOCOCCUS: ICD-10-CM

## 2023-05-31 DIAGNOSIS — M46.46 DISCITIS OF LUMBAR REGION: Primary | ICD-10-CM

## 2023-05-31 PROCEDURE — 96365 THER/PROPH/DIAG IV INF INIT: CPT

## 2023-05-31 RX ORDER — CEFTRIAXONE 2 G/50ML
2000 INJECTION, SOLUTION INTRAVENOUS ONCE
Status: CANCELLED | OUTPATIENT
Start: 2023-06-01

## 2023-05-31 RX ORDER — SODIUM CHLORIDE 9 MG/ML
20 INJECTION, SOLUTION INTRAVENOUS ONCE
Status: CANCELLED | OUTPATIENT
Start: 2023-06-01

## 2023-05-31 RX ORDER — SODIUM CHLORIDE 9 MG/ML
20 INJECTION, SOLUTION INTRAVENOUS ONCE
Status: COMPLETED | OUTPATIENT
Start: 2023-05-31 | End: 2023-05-31

## 2023-05-31 RX ORDER — CEFTRIAXONE 2 G/50ML
2000 INJECTION, SOLUTION INTRAVENOUS ONCE
Status: COMPLETED | OUTPATIENT
Start: 2023-05-31 | End: 2023-05-31

## 2023-05-31 RX ADMIN — CEFTRIAXONE 2000 MG: 2 INJECTION, SOLUTION INTRAVENOUS at 11:59

## 2023-05-31 RX ADMIN — SODIUM CHLORIDE 20 ML/HR: 0.9 INJECTION, SOLUTION INTRAVENOUS at 11:59

## 2023-06-01 ENCOUNTER — HOSPITAL ENCOUNTER (OUTPATIENT)
Dept: INFUSION CENTER | Facility: HOSPITAL | Age: 78
Discharge: HOME | End: 2023-06-01
Attending: INTERNAL MEDICINE
Payer: COMMERCIAL

## 2023-06-01 ENCOUNTER — TELEPHONE (OUTPATIENT)
Dept: INFECTIOUS DISEASES | Facility: CLINIC | Age: 78
End: 2023-06-01

## 2023-06-01 ENCOUNTER — OFFICE VISIT (OUTPATIENT)
Dept: INFECTIOUS DISEASES | Facility: CLINIC | Age: 78
End: 2023-06-01

## 2023-06-01 VITALS
DIASTOLIC BLOOD PRESSURE: 50 MMHG | SYSTOLIC BLOOD PRESSURE: 110 MMHG | HEART RATE: 71 BPM | BODY MASS INDEX: 23.63 KG/M2 | HEIGHT: 66 IN | WEIGHT: 147 LBS | OXYGEN SATURATION: 91 % | RESPIRATION RATE: 12 BRPM | TEMPERATURE: 97.6 F

## 2023-06-01 VITALS
TEMPERATURE: 97.2 F | HEART RATE: 85 BPM | OXYGEN SATURATION: 93 % | RESPIRATION RATE: 20 BRPM | SYSTOLIC BLOOD PRESSURE: 143 MMHG | DIASTOLIC BLOOD PRESSURE: 60 MMHG

## 2023-06-01 DIAGNOSIS — D72.829 LEUKOCYTOSIS, UNSPECIFIED TYPE: ICD-10-CM

## 2023-06-01 DIAGNOSIS — M46.46 DISCITIS OF LUMBAR REGION: ICD-10-CM

## 2023-06-01 DIAGNOSIS — K31.89 GASTRIC DISTENTION: ICD-10-CM

## 2023-06-01 DIAGNOSIS — I33.0 ACUTE BACTERIAL ENDOCARDITIS: Primary | ICD-10-CM

## 2023-06-01 DIAGNOSIS — R78.81 BACTEREMIA DUE TO STREPTOCOCCUS: ICD-10-CM

## 2023-06-01 DIAGNOSIS — B95.5 BACTEREMIA DUE TO STREPTOCOCCUS: ICD-10-CM

## 2023-06-01 DIAGNOSIS — M46.46 DISCITIS OF LUMBAR REGION: Primary | ICD-10-CM

## 2023-06-01 DIAGNOSIS — I35.0 AORTIC VALVE STENOSIS, ETIOLOGY OF CARDIAC VALVE DISEASE UNSPECIFIED: ICD-10-CM

## 2023-06-01 PROBLEM — F41.9 ANXIETY: Status: ACTIVE | Noted: 2023-06-01

## 2023-06-01 PROCEDURE — 96365 THER/PROPH/DIAG IV INF INIT: CPT

## 2023-06-01 RX ORDER — CEFTRIAXONE 2 G/50ML
2000 INJECTION, SOLUTION INTRAVENOUS ONCE
Status: COMPLETED | OUTPATIENT
Start: 2023-06-01 | End: 2023-06-01

## 2023-06-01 RX ORDER — SODIUM CHLORIDE 9 MG/ML
20 INJECTION, SOLUTION INTRAVENOUS ONCE
Status: COMPLETED | OUTPATIENT
Start: 2023-06-01 | End: 2023-06-01

## 2023-06-01 RX ORDER — SODIUM CHLORIDE 9 MG/ML
20 INJECTION, SOLUTION INTRAVENOUS ONCE
Status: CANCELLED | OUTPATIENT
Start: 2023-06-02

## 2023-06-01 RX ORDER — CEFTRIAXONE 2 G/50ML
2000 INJECTION, SOLUTION INTRAVENOUS ONCE
Status: CANCELLED | OUTPATIENT
Start: 2023-06-02

## 2023-06-01 RX ADMIN — CEFTRIAXONE 2000 MG: 2 INJECTION, SOLUTION INTRAVENOUS at 10:42

## 2023-06-01 RX ADMIN — SODIUM CHLORIDE 20 ML/HR: 0.9 INJECTION, SOLUTION INTRAVENOUS at 10:43

## 2023-06-01 NOTE — PROGRESS NOTES
Assessment/Plan:       Problem List Items Addressed This Visit        Digestive    Gastric distention       Cardiovascular and Mediastinum    Aortic stenosis    Acute bacterial endocarditis - Primary       Musculoskeletal and Integument    Discitis of lumbar region       Other    anxiety       1   Occult streptococcal bacteremia with aortic valve endocarditis   Patient initially presented for recurrent back pain and also mentioned abdominal distention   Further work-up with MRI notable for #2   Surveillance blood cultures collected in both sets have isolated VGS, MICKY elevated to PCN   The patient has no other localizing complaints on exam, is hemodynamically stable and has no devices in place   Repeat cultures collected on 5/18 also prior to antibiotics and are positive as well   2D echo recently with #5   Completed transesophageal echo which was notable for aortic valve vegetations  Suspect primary odontogenic source   No definitive occult intra-abdominal source   CT of the face also largely unremarkable but poor dentition noted on exam   Patient otherwise hemodynamically stable and 5/20/23 repeat cultures cleared   Plans for prolonged IV antibiotic with the infusion center  5/29/23 labs reviewed   CRP 19 2, ESR 36  Continue ceftriaxone 2 g IV daily at the [facility]  Plan for total 6 weeks of therapy from bacteremia clearance through 6/30/23  May need to consider additional oral therapy for #2 pending ESR/CRP trend  Plan for repeat MR imaging of the L-spine with contrast towards end of course  Maintain current PICC line  Follow-up with [facility] at outpatient--antibiotic prophylaxis required in the future  GI evaluation as outpatient  Follow-up with neurosurgery as outpatient  Follow-up with cardiology/CT surgery outpatient  RTO 6/15/23 at 9 am     2   L1-L2 vertebral osteomyelitis/discitis   Patient presented for recurrent back pain and abdominal distention   Now MR imaging notable for infection, there is some ventral epidural enhancement and paraspinal edema/enhancement on the images   Reviewed with IR and difficult area for aspiration but also patient's blood cultures are positive and are likely the pathogen   MRI T-spine negative   Patient without significant pain and is ambulatory  Antibiotics as above  No need for biopsy  Ongoing follow-up by neurosurgery  Monitor neuro exam  Prolonged antibiotic as above  Potential oral therapy after IV course for this issue  Plan for repeat MRI of the L-spine towards end of IV course for completeness     3   Abnormal CT abdomen   Patient CT imaging of the abdomen showing some gastric distention   Previous colonoscopy noted to be unremarkable   Uncertain if this may be contributing to the above   Abdominal exam otherwise benign  Antibiotics as above  Continue to trend fever curve/WBC  Monitor abdominal exam  Recommend GI evaluation as outpatient     4   Persistent leukocytosis   Patient had persistent leukocytosis since last admission which could be related to steroids   Suspect the above is also contributing   He is fortunately hemodynamically stable  Normalized now  Maintain off steroids  Antibiotics as above  Outpatient labs as above     5  Recently diagnosed AS   Patient's 2D echo reviewed and has heavily calcified disease on the aortic valve   Course now complicated by the above  Antibiotics as above  Continue to trend fever curve/WBC  Ongoing follow-up by cardiology  Follow-up with CT surgery outpatient    6  Anxiety/Difficulty sleeping  Patient barely getting 4 hours of sleep  Patient admits to be being anxious at night and notes extra fluid in abdomen and feet  Spouse concurs  Discuss with primary regarding sleep/anxiety aide  Consider diuresis timing adjusts per primary/cardiology    Above plan discussed in detail with the patient and wife at chairside  CC and Epic message to PCP      Antibiotics:  Ceftriaxone 15    Subjective:      Patient ID: Rayo Presybeterian is a "69 y o  male  Patient here for follow up of Occult streptococcal bacteremia with aortic valve endocarditis on IV ceftriaxone  He denied having any nausea, vomiting, chest pain or shortness of breath at present  Was tolerating antibiotic without issue  Patient barely getting 4 hours of sleep  Patient admits to be being anxious more at night and notes extra fluid in abdomen and feet          Review of Systems   All other systems reviewed and are negative  Objective:      /50 (BP Location: Left arm, Patient Position: Sitting, Cuff Size: Standard)   Pulse 71   Temp 97 6 °F (36 4 °C) (Temporal)   Resp 12   Ht 5' 6\" (1 676 m)   Wt 66 7 kg (147 lb)   SpO2 91%   BMI 23 73 kg/m²          Physical Exam  Vitals reviewed  Constitutional:       Appearance: Normal appearance  HENT:      Head: Normocephalic and atraumatic  Right Ear: External ear normal       Left Ear: External ear normal       Nose: Nose normal       Mouth/Throat:      Pharynx: Oropharynx is clear  Eyes:      Extraocular Movements: Extraocular movements intact  Pupils: Pupils are equal, round, and reactive to light  Cardiovascular:      Rate and Rhythm: Normal rate and regular rhythm  Heart sounds: Murmur heard  Pulmonary:      Effort: Pulmonary effort is normal       Breath sounds: Normal breath sounds  Abdominal:      General: Bowel sounds are normal  There is no distension  Palpations: Abdomen is soft  Musculoskeletal:      Cervical back: Normal range of motion and neck supple  Right lower leg: Edema present  Left lower leg: Edema present  Skin:     Findings: No rash  Comments: Right arm PICC nontender, noninflamed   Neurological:      General: No focal deficit present  Mental Status: He is alert and oriented to person, place, and time     Psychiatric:      Comments: Anxious about medical condition, not sleeping at night and does not know if he can make it until 6/30/23 " without surgery

## 2023-06-01 NOTE — TELEPHONE ENCOUNTER
Spoke with Dr Carlee Rasmussen via phone today regarding patient  Per Dr Carlee Rasmussen please call patients wife and have patient go to ED more urgent evaluation  Called and spoke with patients wife Jacque Griffin  Informed Iesha I was call regarding patient  Informed Iesha per Dr Carlee Rasmussen she would like patient to go to ED a more urgent evaluation  Jacque Griffin states she agrees for the patient to go to the ED but she is waiting for another cardiology office to call her back to see if they are able to see patient before she takes him to the ED  Jacque Griffin states if she does not hear back from them this afternoon she will take patient to the ED

## 2023-06-02 ENCOUNTER — TELEPHONE (OUTPATIENT)
Dept: CARDIOLOGY CLINIC | Facility: CLINIC | Age: 78
End: 2023-06-02

## 2023-06-02 ENCOUNTER — HOSPITAL ENCOUNTER (OUTPATIENT)
Dept: INFUSION CENTER | Facility: HOSPITAL | Age: 78
End: 2023-06-02
Attending: INTERNAL MEDICINE
Payer: COMMERCIAL

## 2023-06-02 ENCOUNTER — TELEPHONE (OUTPATIENT)
Dept: NEUROSURGERY | Facility: CLINIC | Age: 78
End: 2023-06-02

## 2023-06-02 DIAGNOSIS — I50.9 CONGESTIVE HEART FAILURE, UNSPECIFIED HF CHRONICITY, UNSPECIFIED HEART FAILURE TYPE (HCC): Primary | ICD-10-CM

## 2023-06-02 DIAGNOSIS — M46.46 DISCITIS OF LUMBAR REGION: Primary | ICD-10-CM

## 2023-06-02 DIAGNOSIS — B95.5 BACTEREMIA DUE TO STREPTOCOCCUS: ICD-10-CM

## 2023-06-02 DIAGNOSIS — R78.81 BACTEREMIA DUE TO STREPTOCOCCUS: ICD-10-CM

## 2023-06-02 RX ORDER — CEFTRIAXONE 2 G/50ML
2000 INJECTION, SOLUTION INTRAVENOUS ONCE
Status: COMPLETED | OUTPATIENT
Start: 2023-06-02 | End: 2023-06-02

## 2023-06-02 RX ORDER — CEFTRIAXONE 2 G/50ML
2000 INJECTION, SOLUTION INTRAVENOUS ONCE
Status: CANCELLED | OUTPATIENT
Start: 2023-06-03

## 2023-06-02 RX ORDER — SODIUM CHLORIDE 9 MG/ML
20 INJECTION, SOLUTION INTRAVENOUS ONCE
Status: CANCELLED | OUTPATIENT
Start: 2023-06-03

## 2023-06-02 RX ORDER — SODIUM CHLORIDE 9 MG/ML
20 INJECTION, SOLUTION INTRAVENOUS ONCE
Status: COMPLETED | OUTPATIENT
Start: 2023-06-02 | End: 2023-06-02

## 2023-06-02 RX ADMIN — SODIUM CHLORIDE 20 ML/HR: 0.9 INJECTION, SOLUTION INTRAVENOUS at 10:22

## 2023-06-02 RX ADMIN — CEFTRIAXONE 2000 MG: 2 INJECTION, SOLUTION INTRAVENOUS at 10:22

## 2023-06-02 NOTE — TELEPHONE ENCOUNTER
CALLED AND SPOKE TO WIFE JOHN SHE WILL CALL BACK TO [facility] F/U AFTER SHE SPEAKS WITH CARMINA  MAILED SCRIPT FOR X RAY L SPINE TO BE DONE 4-5 DAYS PRIOR TO APPT   JOHN WAS APPRECIATIVE OF THE CALL

## 2023-06-02 NOTE — TELEPHONE ENCOUNTER
"Per Dr Nayeli Nye him go up twice daily of the torsemide through the weekend  If his symptoms worsen he will just have to go back to the hospital   Can we get him in for an appointment with one of the practitioners next week? ?  Lets also check a BMP before that visit  Thank you  \"    Called, spoke to pt and wife  Message relayed as given  Verbalized understanding  They will have labs done on Tues @ infusion appt     "

## 2023-06-02 NOTE — TELEPHONE ENCOUNTER
Pt & wife called -     Pt was recently d/c and on IV abx for endocarditis  Pt concerned - having increased SOB w/ minimal exertion as well as nocturnal dyspnea  Last night pt needed to sit up to breathe and is coughing clear/slimy  Pt also has LE edema L > R to ankles  Pt has not been weighing daily at home as they weigh him at infusion  Pt is currently taking Torsemide 40 mg daily  Please advise, thank you

## 2023-06-03 ENCOUNTER — HOSPITAL ENCOUNTER (OUTPATIENT)
Dept: INFUSION CENTER | Facility: HOSPITAL | Age: 78
Discharge: HOME | End: 2023-06-03
Attending: INTERNAL MEDICINE
Payer: COMMERCIAL

## 2023-06-03 VITALS
TEMPERATURE: 97.8 F | RESPIRATION RATE: 18 BRPM | SYSTOLIC BLOOD PRESSURE: 104 MMHG | OXYGEN SATURATION: 96 % | HEART RATE: 73 BPM | DIASTOLIC BLOOD PRESSURE: 52 MMHG

## 2023-06-03 DIAGNOSIS — R78.81 BACTEREMIA DUE TO STREPTOCOCCUS: ICD-10-CM

## 2023-06-03 DIAGNOSIS — B95.5 BACTEREMIA DUE TO STREPTOCOCCUS: ICD-10-CM

## 2023-06-03 DIAGNOSIS — M46.46 DISCITIS OF LUMBAR REGION: Primary | ICD-10-CM

## 2023-06-03 RX ORDER — CEFTRIAXONE 2 G/50ML
2000 INJECTION, SOLUTION INTRAVENOUS ONCE
Status: CANCELLED | OUTPATIENT
Start: 2023-06-04

## 2023-06-03 RX ORDER — CEFTRIAXONE 2 G/50ML
2000 INJECTION, SOLUTION INTRAVENOUS ONCE
Status: COMPLETED | OUTPATIENT
Start: 2023-06-03 | End: 2023-06-03

## 2023-06-03 RX ORDER — SODIUM CHLORIDE 9 MG/ML
20 INJECTION, SOLUTION INTRAVENOUS ONCE
Status: CANCELLED | OUTPATIENT
Start: 2023-06-04

## 2023-06-03 RX ORDER — SODIUM CHLORIDE 9 MG/ML
20 INJECTION, SOLUTION INTRAVENOUS ONCE
Status: COMPLETED | OUTPATIENT
Start: 2023-06-03 | End: 2023-06-03

## 2023-06-03 RX ADMIN — CEFTRIAXONE 2000 MG: 2 INJECTION, SOLUTION INTRAVENOUS at 10:16

## 2023-06-03 RX ADMIN — SODIUM CHLORIDE 20 ML/HR: 0.9 INJECTION, SOLUTION INTRAVENOUS at 10:17

## 2023-06-04 ENCOUNTER — HOSPITAL ENCOUNTER (OUTPATIENT)
Dept: INFUSION CENTER | Facility: HOSPITAL | Age: 78
Discharge: HOME | End: 2023-06-04
Attending: INTERNAL MEDICINE
Payer: COMMERCIAL

## 2023-06-04 VITALS
HEART RATE: 76 BPM | RESPIRATION RATE: 18 BRPM | DIASTOLIC BLOOD PRESSURE: 60 MMHG | SYSTOLIC BLOOD PRESSURE: 145 MMHG | OXYGEN SATURATION: 98 % | TEMPERATURE: 98 F

## 2023-06-04 DIAGNOSIS — R78.81 BACTEREMIA DUE TO STREPTOCOCCUS: ICD-10-CM

## 2023-06-04 DIAGNOSIS — M46.46 DISCITIS OF LUMBAR REGION: Primary | ICD-10-CM

## 2023-06-04 DIAGNOSIS — B95.5 BACTEREMIA DUE TO STREPTOCOCCUS: ICD-10-CM

## 2023-06-04 RX ORDER — SODIUM CHLORIDE 9 MG/ML
20 INJECTION, SOLUTION INTRAVENOUS ONCE
Status: COMPLETED | OUTPATIENT
Start: 2023-06-04 | End: 2023-06-04

## 2023-06-04 RX ORDER — SODIUM CHLORIDE 9 MG/ML
20 INJECTION, SOLUTION INTRAVENOUS ONCE
Status: CANCELLED | OUTPATIENT
Start: 2023-06-05

## 2023-06-04 RX ORDER — CEFTRIAXONE 2 G/50ML
2000 INJECTION, SOLUTION INTRAVENOUS ONCE
Status: COMPLETED | OUTPATIENT
Start: 2023-06-04 | End: 2023-06-04

## 2023-06-04 RX ORDER — CEFTRIAXONE 2 G/50ML
2000 INJECTION, SOLUTION INTRAVENOUS ONCE
Status: CANCELLED | OUTPATIENT
Start: 2023-06-05

## 2023-06-04 RX ADMIN — SODIUM CHLORIDE 20 ML/HR: 0.9 INJECTION, SOLUTION INTRAVENOUS at 10:13

## 2023-06-04 RX ADMIN — CEFTRIAXONE 2000 MG: 2 INJECTION, SOLUTION INTRAVENOUS at 10:13

## 2023-06-05 ENCOUNTER — APPOINTMENT (EMERGENCY)
Dept: RADIOLOGY | Facility: HOSPITAL | Age: 78
End: 2023-06-05
Payer: COMMERCIAL

## 2023-06-05 ENCOUNTER — HOSPITAL ENCOUNTER (INPATIENT)
Facility: HOSPITAL | Age: 78
LOS: 1 days | Discharge: HOME | End: 2023-06-06
Attending: EMERGENCY MEDICINE | Admitting: GENERAL PRACTICE
Payer: COMMERCIAL

## 2023-06-05 ENCOUNTER — HOSPITAL ENCOUNTER (OUTPATIENT)
Dept: INFUSION CENTER | Facility: HOSPITAL | Age: 78
Discharge: HOME | End: 2023-06-05
Attending: INTERNAL MEDICINE
Payer: COMMERCIAL

## 2023-06-05 ENCOUNTER — TELEPHONE (OUTPATIENT)
Dept: INFECTIOUS DISEASES | Facility: CLINIC | Age: 78
End: 2023-06-05

## 2023-06-05 ENCOUNTER — TELEPHONE (OUTPATIENT)
Dept: FAMILY MEDICINE CLINIC | Facility: CLINIC | Age: 78
End: 2023-06-05

## 2023-06-05 VITALS
HEART RATE: 79 BPM | OXYGEN SATURATION: 97 % | DIASTOLIC BLOOD PRESSURE: 54 MMHG | RESPIRATION RATE: 20 BRPM | TEMPERATURE: 98 F | SYSTOLIC BLOOD PRESSURE: 115 MMHG

## 2023-06-05 DIAGNOSIS — B95.5 BACTEREMIA DUE TO STREPTOCOCCUS: Primary | ICD-10-CM

## 2023-06-05 DIAGNOSIS — R78.81 BACTEREMIA DUE TO STREPTOCOCCUS: Primary | ICD-10-CM

## 2023-06-05 DIAGNOSIS — E87.1 HYPONATREMIA: ICD-10-CM

## 2023-06-05 DIAGNOSIS — I50.9 CHF (CONGESTIVE HEART FAILURE) (HCC): ICD-10-CM

## 2023-06-05 DIAGNOSIS — R77.8 ELEVATED TROPONIN: ICD-10-CM

## 2023-06-05 DIAGNOSIS — I50.9 CONGESTIVE HEART FAILURE, UNSPECIFIED HF CHRONICITY, UNSPECIFIED HEART FAILURE TYPE (HCC): ICD-10-CM

## 2023-06-05 DIAGNOSIS — E87.6 HYPOKALEMIA: ICD-10-CM

## 2023-06-05 DIAGNOSIS — B95.5 BACTEREMIA DUE TO STREPTOCOCCUS: ICD-10-CM

## 2023-06-05 DIAGNOSIS — I38 ENDOCARDITIS: ICD-10-CM

## 2023-06-05 DIAGNOSIS — E83.42 HYPOMAGNESEMIA: ICD-10-CM

## 2023-06-05 DIAGNOSIS — M46.46 DISCITIS OF LUMBAR REGION: Primary | ICD-10-CM

## 2023-06-05 DIAGNOSIS — M46.46 DISCITIS OF LUMBAR REGION: ICD-10-CM

## 2023-06-05 DIAGNOSIS — R78.81 BACTEREMIA DUE TO STREPTOCOCCUS: ICD-10-CM

## 2023-06-05 LAB
2HR DELTA HS TROPONIN: 20 NG/L
4HR DELTA HS TROPONIN: -127 NG/L
ALBUMIN SERPL BCP-MCNC: 3.1 G/DL (ref 3.5–5)
ALBUMIN SERPL BCP-MCNC: 3.3 G/DL (ref 3.5–5)
ALP SERPL-CCNC: 51 U/L (ref 34–104)
ALP SERPL-CCNC: 53 U/L (ref 34–104)
ALT SERPL W P-5'-P-CCNC: 12 U/L (ref 7–52)
ALT SERPL W P-5'-P-CCNC: 25 U/L (ref 7–52)
ANION GAP SERPL CALCULATED.3IONS-SCNC: 12 MMOL/L (ref 4–13)
ANION GAP SERPL CALCULATED.3IONS-SCNC: 13 MMOL/L (ref 4–13)
AST SERPL W P-5'-P-CCNC: 14 U/L (ref 13–39)
AST SERPL W P-5'-P-CCNC: 22 U/L (ref 13–39)
BASOPHILS # BLD AUTO: 0.03 THOUSANDS/ÂΜL (ref 0–0.1)
BASOPHILS # BLD AUTO: 0.03 THOUSANDS/ÂΜL (ref 0–0.1)
BASOPHILS NFR BLD AUTO: 0 % (ref 0–1)
BASOPHILS NFR BLD AUTO: 0 % (ref 0–1)
BILIRUB SERPL-MCNC: 0.32 MG/DL (ref 0.2–1)
BILIRUB SERPL-MCNC: 0.45 MG/DL (ref 0.2–1)
BNP SERPL-MCNC: 2299 PG/ML (ref 0–100)
BUN SERPL-MCNC: 22 MG/DL (ref 5–25)
BUN SERPL-MCNC: 23 MG/DL (ref 5–25)
CALCIUM ALBUM COR SERPL-MCNC: 9.1 MG/DL (ref 8.3–10.1)
CALCIUM ALBUM COR SERPL-MCNC: 9.4 MG/DL (ref 8.3–10.1)
CALCIUM SERPL-MCNC: 8.4 MG/DL (ref 8.4–10.2)
CALCIUM SERPL-MCNC: 8.8 MG/DL (ref 8.4–10.2)
CARDIAC TROPONIN I PNL SERPL HS: 351 NG/L
CARDIAC TROPONIN I PNL SERPL HS: 478 NG/L
CARDIAC TROPONIN I PNL SERPL HS: 498 NG/L
CHLORIDE SERPL-SCNC: 87 MMOL/L (ref 96–108)
CHLORIDE SERPL-SCNC: 88 MMOL/L (ref 96–108)
CO2 SERPL-SCNC: 30 MMOL/L (ref 21–32)
CO2 SERPL-SCNC: 31 MMOL/L (ref 21–32)
CREAT SERPL-MCNC: 1.13 MG/DL (ref 0.6–1.3)
CREAT SERPL-MCNC: 1.14 MG/DL (ref 0.6–1.3)
CRP SERPL QL: 122 MG/L
EOSINOPHIL # BLD AUTO: 0.21 THOUSAND/ÂΜL (ref 0–0.61)
EOSINOPHIL # BLD AUTO: 0.45 THOUSAND/ÂΜL (ref 0–0.61)
EOSINOPHIL NFR BLD AUTO: 2 % (ref 0–6)
EOSINOPHIL NFR BLD AUTO: 5 % (ref 0–6)
ERYTHROCYTE [DISTWIDTH] IN BLOOD BY AUTOMATED COUNT: 14.8 % (ref 11.6–15.1)
ERYTHROCYTE [DISTWIDTH] IN BLOOD BY AUTOMATED COUNT: 15.1 % (ref 11.6–15.1)
ERYTHROCYTE [SEDIMENTATION RATE] IN BLOOD: 38 MM/HOUR (ref 0–19)
GFR SERPL CREATININE-BSD FRML MDRD: 61 ML/MIN/1.73SQ M
GFR SERPL CREATININE-BSD FRML MDRD: 62 ML/MIN/1.73SQ M
GLUCOSE SERPL-MCNC: 130 MG/DL (ref 65–140)
GLUCOSE SERPL-MCNC: 147 MG/DL (ref 65–140)
HCT VFR BLD AUTO: 30.7 % (ref 36.5–49.3)
HCT VFR BLD AUTO: 33.9 % (ref 36.5–49.3)
HGB BLD-MCNC: 10.2 G/DL (ref 12–17)
HGB BLD-MCNC: 11.2 G/DL (ref 12–17)
IMM GRANULOCYTES # BLD AUTO: 0.04 THOUSAND/UL (ref 0–0.2)
IMM GRANULOCYTES # BLD AUTO: 0.06 THOUSAND/UL (ref 0–0.2)
IMM GRANULOCYTES NFR BLD AUTO: 0 % (ref 0–2)
IMM GRANULOCYTES NFR BLD AUTO: 1 % (ref 0–2)
LYMPHOCYTES # BLD AUTO: 0.55 THOUSANDS/ÂΜL (ref 0.6–4.47)
LYMPHOCYTES # BLD AUTO: 0.62 THOUSANDS/ÂΜL (ref 0.6–4.47)
LYMPHOCYTES NFR BLD AUTO: 6 % (ref 14–44)
LYMPHOCYTES NFR BLD AUTO: 6 % (ref 14–44)
MAGNESIUM SERPL-MCNC: 1.7 MG/DL (ref 1.9–2.7)
MCH RBC QN AUTO: 29.1 PG (ref 26.8–34.3)
MCH RBC QN AUTO: 29.6 PG (ref 26.8–34.3)
MCHC RBC AUTO-ENTMCNC: 33 G/DL (ref 31.4–37.4)
MCHC RBC AUTO-ENTMCNC: 33.2 G/DL (ref 31.4–37.4)
MCV RBC AUTO: 88 FL (ref 82–98)
MCV RBC AUTO: 89 FL (ref 82–98)
MONOCYTES # BLD AUTO: 0.85 THOUSAND/ÂΜL (ref 0.17–1.22)
MONOCYTES # BLD AUTO: 0.96 THOUSAND/ÂΜL (ref 0.17–1.22)
MONOCYTES NFR BLD AUTO: 10 % (ref 4–12)
MONOCYTES NFR BLD AUTO: 9 % (ref 4–12)
NEUTROPHILS # BLD AUTO: 7.79 THOUSANDS/ÂΜL (ref 1.85–7.62)
NEUTROPHILS # BLD AUTO: 7.79 THOUSANDS/ÂΜL (ref 1.85–7.62)
NEUTS SEG NFR BLD AUTO: 79 % (ref 43–75)
NEUTS SEG NFR BLD AUTO: 82 % (ref 43–75)
NRBC BLD AUTO-RTO: 0 /100 WBCS
NRBC BLD AUTO-RTO: 0 /100 WBCS
PLATELET # BLD AUTO: 334 THOUSANDS/UL (ref 149–390)
PLATELET # BLD AUTO: 344 THOUSANDS/UL (ref 149–390)
PLATELET # BLD AUTO: 355 THOUSANDS/UL (ref 149–390)
PMV BLD AUTO: 9.1 FL (ref 8.9–12.7)
PMV BLD AUTO: 9.2 FL (ref 8.9–12.7)
PMV BLD AUTO: 9.3 FL (ref 8.9–12.7)
POTASSIUM SERPL-SCNC: 2.2 MMOL/L (ref 3.5–5.3)
POTASSIUM SERPL-SCNC: 2.3 MMOL/L (ref 3.5–5.3)
POTASSIUM SERPL-SCNC: 2.4 MMOL/L (ref 3.5–5.3)
PROT SERPL-MCNC: 5.7 G/DL (ref 6.4–8.4)
PROT SERPL-MCNC: 6.3 G/DL (ref 6.4–8.4)
RBC # BLD AUTO: 3.5 MILLION/UL (ref 3.88–5.62)
RBC # BLD AUTO: 3.79 MILLION/UL (ref 3.88–5.62)
SODIUM SERPL-SCNC: 130 MMOL/L (ref 135–147)
SODIUM SERPL-SCNC: 131 MMOL/L (ref 135–147)
WBC # BLD AUTO: 9.49 THOUSAND/UL (ref 4.31–10.16)
WBC # BLD AUTO: 9.89 THOUSAND/UL (ref 4.31–10.16)

## 2023-06-05 PROCEDURE — 80053 COMPREHEN METABOLIC PANEL: CPT | Performed by: INTERNAL MEDICINE

## 2023-06-05 PROCEDURE — 85025 COMPLETE CBC W/AUTO DIFF WBC: CPT | Performed by: INTERNAL MEDICINE

## 2023-06-05 PROCEDURE — 93005 ELECTROCARDIOGRAM TRACING: CPT

## 2023-06-05 PROCEDURE — 86140 C-REACTIVE PROTEIN: CPT | Performed by: INTERNAL MEDICINE

## 2023-06-05 PROCEDURE — 99223 1ST HOSP IP/OBS HIGH 75: CPT | Performed by: INTERNAL MEDICINE

## 2023-06-05 PROCEDURE — 83880 ASSAY OF NATRIURETIC PEPTIDE: CPT

## 2023-06-05 PROCEDURE — 85025 COMPLETE CBC W/AUTO DIFF WBC: CPT | Performed by: EMERGENCY MEDICINE

## 2023-06-05 PROCEDURE — 71045 X-RAY EXAM CHEST 1 VIEW: CPT

## 2023-06-05 PROCEDURE — 96366 THER/PROPH/DIAG IV INF ADDON: CPT

## 2023-06-05 PROCEDURE — 36415 COLL VENOUS BLD VENIPUNCTURE: CPT

## 2023-06-05 PROCEDURE — 84484 ASSAY OF TROPONIN QUANT: CPT | Performed by: EMERGENCY MEDICINE

## 2023-06-05 PROCEDURE — 83735 ASSAY OF MAGNESIUM: CPT

## 2023-06-05 PROCEDURE — 80053 COMPREHEN METABOLIC PANEL: CPT | Performed by: EMERGENCY MEDICINE

## 2023-06-05 PROCEDURE — 99285 EMERGENCY DEPT VISIT HI MDM: CPT

## 2023-06-05 PROCEDURE — 85049 AUTOMATED PLATELET COUNT: CPT

## 2023-06-05 PROCEDURE — 84132 ASSAY OF SERUM POTASSIUM: CPT

## 2023-06-05 PROCEDURE — 85652 RBC SED RATE AUTOMATED: CPT | Performed by: INTERNAL MEDICINE

## 2023-06-05 PROCEDURE — 96365 THER/PROPH/DIAG IV INF INIT: CPT

## 2023-06-05 RX ORDER — POTASSIUM CHLORIDE 20 MEQ/1
40 TABLET, EXTENDED RELEASE ORAL ONCE
Status: COMPLETED | OUTPATIENT
Start: 2023-06-05 | End: 2023-06-05

## 2023-06-05 RX ORDER — POTASSIUM CHLORIDE 14.9 MG/ML
20 INJECTION INTRAVENOUS ONCE
Status: DISCONTINUED | OUTPATIENT
Start: 2023-06-05 | End: 2023-06-05

## 2023-06-05 RX ORDER — MAGNESIUM SULFATE HEPTAHYDRATE 40 MG/ML
2 INJECTION, SOLUTION INTRAVENOUS ONCE
Status: COMPLETED | OUTPATIENT
Start: 2023-06-05 | End: 2023-06-05

## 2023-06-05 RX ORDER — POTASSIUM CHLORIDE 29.8 MG/ML
40 INJECTION INTRAVENOUS ONCE
Status: COMPLETED | OUTPATIENT
Start: 2023-06-05 | End: 2023-06-06

## 2023-06-05 RX ORDER — POTASSIUM CHLORIDE 20 MEQ/1
40 TABLET, EXTENDED RELEASE ORAL ONCE
Status: DISCONTINUED | OUTPATIENT
Start: 2023-06-05 | End: 2023-06-05

## 2023-06-05 RX ORDER — SODIUM CHLORIDE 9 MG/ML
20 INJECTION, SOLUTION INTRAVENOUS ONCE
Status: CANCELLED | OUTPATIENT
Start: 2023-06-06

## 2023-06-05 RX ORDER — SODIUM CHLORIDE 9 MG/ML
20 INJECTION, SOLUTION INTRAVENOUS ONCE
Status: COMPLETED | OUTPATIENT
Start: 2023-06-05 | End: 2023-06-05

## 2023-06-05 RX ORDER — FUROSEMIDE 10 MG/ML
40 INJECTION INTRAMUSCULAR; INTRAVENOUS ONCE
Status: COMPLETED | OUTPATIENT
Start: 2023-06-05 | End: 2023-06-05

## 2023-06-05 RX ORDER — CEFTRIAXONE 2 G/50ML
2000 INJECTION, SOLUTION INTRAVENOUS ONCE
Status: COMPLETED | OUTPATIENT
Start: 2023-06-05 | End: 2023-06-05

## 2023-06-05 RX ORDER — FUROSEMIDE 10 MG/ML
40 INJECTION INTRAMUSCULAR; INTRAVENOUS
Status: DISCONTINUED | OUTPATIENT
Start: 2023-06-06 | End: 2023-06-06

## 2023-06-05 RX ORDER — ENOXAPARIN SODIUM 100 MG/ML
40 INJECTION SUBCUTANEOUS DAILY
Status: DISCONTINUED | OUTPATIENT
Start: 2023-06-06 | End: 2023-06-06 | Stop reason: HOSPADM

## 2023-06-05 RX ORDER — POTASSIUM CHLORIDE 20 MEQ/1
40 TABLET, EXTENDED RELEASE ORAL 2 TIMES DAILY
Status: DISCONTINUED | OUTPATIENT
Start: 2023-06-05 | End: 2023-06-06 | Stop reason: HOSPADM

## 2023-06-05 RX ORDER — POTASSIUM CHLORIDE 14.9 MG/ML
20 INJECTION INTRAVENOUS ONCE
Status: COMPLETED | OUTPATIENT
Start: 2023-06-05 | End: 2023-06-05

## 2023-06-05 RX ORDER — LORAZEPAM 2 MG/ML
0.5 INJECTION INTRAMUSCULAR ONCE
Status: COMPLETED | OUTPATIENT
Start: 2023-06-05 | End: 2023-06-05

## 2023-06-05 RX ORDER — CEFTRIAXONE 2 G/50ML
2000 INJECTION, SOLUTION INTRAVENOUS ONCE
Status: CANCELLED | OUTPATIENT
Start: 2023-06-06

## 2023-06-05 RX ADMIN — FUROSEMIDE 40 MG: 10 INJECTION, SOLUTION INTRAMUSCULAR; INTRAVENOUS at 18:10

## 2023-06-05 RX ADMIN — POTASSIUM CHLORIDE 40 MEQ: 29.8 INJECTION, SOLUTION INTRAVENOUS at 21:33

## 2023-06-05 RX ADMIN — POTASSIUM CHLORIDE 40 MEQ: 1500 TABLET, EXTENDED RELEASE ORAL at 21:04

## 2023-06-05 RX ADMIN — POTASSIUM CHLORIDE 40 MEQ: 1500 TABLET, EXTENDED RELEASE ORAL at 16:04

## 2023-06-05 RX ADMIN — LORAZEPAM 0.5 MG: 2 INJECTION INTRAMUSCULAR; INTRAVENOUS at 23:41

## 2023-06-05 RX ADMIN — METOPROLOL TARTRATE 25 MG: 25 TABLET, FILM COATED ORAL at 20:41

## 2023-06-05 RX ADMIN — MAGNESIUM SULFATE HEPTAHYDRATE 2 G: 40 INJECTION, SOLUTION INTRAVENOUS at 18:16

## 2023-06-05 RX ADMIN — POTASSIUM CHLORIDE 20 MEQ: 14.9 INJECTION, SOLUTION INTRAVENOUS at 16:03

## 2023-06-05 RX ADMIN — SODIUM CHLORIDE 20 ML/HR: 0.9 INJECTION, SOLUTION INTRAVENOUS at 11:33

## 2023-06-05 RX ADMIN — CEFTRIAXONE 2000 MG: 2 INJECTION, SOLUTION INTRAVENOUS at 11:31

## 2023-06-05 NOTE — ASSESSMENT & PLAN NOTE
· Last MRI of thoracic spine 5/19/2023 showed known L1-2 discitis/osteomyelitis, incompletely imaged    · Patient denies symptoms at this time  · Antibiotics as above

## 2023-06-05 NOTE — ASSESSMENT & PLAN NOTE
BP Readings from Last 3 Encounters:   06/05/23 125/59   06/05/23 115/54   06/04/23 145/60     · Continue home metoprolol   · Monitor blood pressure per unit routine

## 2023-06-05 NOTE — H&P
[facility]  H&P  Name: Guevara Nevarez 69 y o  male I MRN: [id number]  Unit/Bed#: ED-10 I Date of Admission: 6/5/2023   Date of Service: 6/5/2023 I Hospital Day: 0      Assessment/Plan   CHF (congestive heart failure) (HCC)  Assessment & Plan  Wt Readings from Last 3 Encounters:   06/01/23 66 7 kg (147 lb)   05/23/23 65 1 kg (143 lb 9 6 oz)   05/15/23 68 6 kg (151 lb 3 2 oz)       Lab Results   Component Value Date    BNP 2,299 (H) 06/05/2023     (H) 05/16/2023    LVEF 65 05/19/2023       • Presents with increased shortness of breath, dyspnea on exertion, lower extremity edema and cough with productive sputum  • Primary cardiologist: Dr Dao Severino  • NYHA Class III , Stage C  • Patient is currently volume overloaded  • JESSICA last admission showed EF 65% with dilated cardiomyopathy of the left ventricle  • Received 40 IV Lasix in ED  • Tropononins 478-498; elevated >300 on prior admission  • Chest x-ray showed evidence of small right pleural effusion and bilateral pulmonary edema  • Patient denies chest pain, or shortness of breath at rest unless supine  Saturating greater than 90% on room air  Plan:  • GDMT: Diuretic: Lasix 40 mg IV twice daily pending cardiology consult  B-Blocker: Metoprolol tartrate 25 mg twice daily, A  · Diet: Sodium restriction  · Labs: BMP, magnesium tomorrow a m    · Maintain Mg > 2 and K > 4; Replete prn  · Elevate HOB > 30°, Daily standing weights, Measure I/Os, Monitor on Telemetry  · Consulted cardiology in setting of endocarditis and volume overload, appreciate recs            Acute bacterial endocarditis  Assessment & Plan  · JESSICA 5/19/23 showed  3 small, mobile vegetations present on the on the aortic aspect of the valve  · PICC in place   · Patient scheduled to receive IV ceftriaxone 2g daily through June 30th   · Patient without clinical evidence of worsening infection  · Hemodynamically stable, AAOX3         Plan:   · Continue antibiotic regimen · Cardiology and ID consulted, appreciate recommendations        Hypomagnesemia  Assessment & Plan  · Magnesium level on presentation 1 7   · in setting of volume overload   · Repleted w/ 2G IV mag sulfate in the ED    Plan:   · Check magnesium tomorrow AM, further repletion as needed    Hypokalemia  Assessment & Plan  Recent Labs     06/05/23  1132 06/05/23  1340   K 2 3* 2 2*   MG  --  1 7*     · Etiology: Volume overload  · Patient diuresed with torsemide outpatient    Plan:  · K/Mg repletion: received 40 mEq KDUR and 20 potassium chloride IVPB in ED (1mEq/kg), magnesium repletion as above  · F/u repeat K+ this evening and replete as needed  · Check magnesium, BMP tomorrow a m  Discitis of lumbar region  Assessment & Plan  · Last MRI of thoracic spine 5/19/2023 showed known L1-2 discitis/osteomyelitis, incompletely imaged  · Patient denies symptoms at this time  · Antibiotics as above    Primary hypertension  Assessment & Plan  BP Readings from Last 3 Encounters:   06/05/23 125/59   06/05/23 115/54   06/04/23 145/60     · Continue home metoprolol   · Monitor blood pressure per unit routine            VTE Pharmacologic Prophylaxis: VTE Score: 4 Moderate Risk (Score 3-4) - Pharmacological DVT Prophylaxis Ordered: enoxaparin (Lovenox)  Code Status: Level 1 - Full Code   Discussion with family: Patient declined call to   Anticipated Length of Stay: Patient will be admitted on an inpatient basis with an anticipated length of stay of greater than 2 midnights secondary to CHF exacerbation with volume overload electrolyte abnormalities  Chief Complaint: Shortness of breath in supine    History of Present Illness:  Gorge Alvarez is a 69 y o  male with a PMH of CHF with left ventricular dilation, and recent hospital admission with current IV antibiotic therapy for endocarditis and osteomyelitis who presents with several day history of worsening orthopnea despite diuresis with torsemide  Patient reports his shortness of breath is most severe when supine  He has been sleeping in a recliner the past several days due to his symptoms  Patient reports that his cardiologist has recently uptitrated his torsemide home dose without relief  In the ED, BNP was 2,299, troponins elevated to >400, consistent with prior presentation  No ischemic changes from prior noted on EKG  patient saturating greater than 90% O2 on ambient air conversing normally throughout ED course  Denies chest pain  Potassium noted to be decreased to 2 2 with magnesium level 1 7 in the ED  Repletion provided at that time  Chest x-ray showed small right pleural effusion with evidence of bilateral pulmonary edema  Likely volume overload secondary to CHF decompensation  Patient received 1 dose of 40 mg IV Lasix  Admitted to  IM service for further work-up and management of volume overload and electrolyte abnormalities  Cardiology and infectious disease consulted on admission in setting of treatment with IV ceftriaxone scheduled through 6/30/2023 for noted endocarditis  We will continue to monitor on telemetry and diurese with IV Lasix pending cardiology recommendations  Review of Systems:  Review of Systems   Constitutional: Negative for chills and fever  HENT: Negative for ear pain and sore throat  Eyes: Negative for pain and visual disturbance  Respiratory: Positive for cough and shortness of breath  Negative for apnea, chest tightness and wheezing  Cardiovascular: Positive for leg swelling  Negative for chest pain and palpitations  Gastrointestinal: Negative for abdominal pain and vomiting  Genitourinary: Negative for dysuria and hematuria  Musculoskeletal: Negative for arthralgias and back pain  Skin: Negative for color change and rash  Neurological: Negative for seizures and syncope  Psychiatric/Behavioral: Negative  All other systems reviewed and are negative        Past Medical and Surgical History:   History reviewed  No pertinent past medical history  Past Surgical History:   Procedure Laterality Date   • SKIN BIOPSY         Meds/Allergies:  Prior to Admission medications    Medication Sig Start Date End Date Taking? Authorizing Provider   metoprolol tartrate (LOPRESSOR) 25 mg tablet Take 1 tablet (25 mg total) by mouth every 12 (twelve) hours 23  Kody King PA-C   torsemide 40 MG TABS Take 40 mg by mouth daily Do not start before May 24, 2023  Patient taking differently: Take 40 mg by mouth 2 (two) times a day 23  Kody King PA-C   acetaminophen (TYLENOL) 325 mg tablet Take 2 tablets (650 mg total) by mouth every 6 (six) hours as needed for mild pain  Patient not taking: Reported on 2023 5/3/23 6/5/23  Yonatan Rivera MD   torsemide [facility]) 20 mg tablet  23  Historical Provider, MD     I have reviewed home medications with patient personally      Allergies: No Known Allergies    Social History:  Marital Status: /Civil Union   Occupation: Retired  Patient Pre-hospital Living Situation: Home  Patient Pre-hospital Level of Mobility: walks  Patient Pre-hospital Diet Restrictions: Sodium and fluid restriction  Substance Use History:   Social History     Substance and Sexual Activity   Alcohol Use Not Currently     Social History     Tobacco Use   Smoking Status Former   • Packs/day: 0 50   • Years: 20 00   • Total pack years: 10 00   • Types: Cigarettes   • Quit date: 3/11/2005   • Years since quittin 2   Smokeless Tobacco Never     Social History     Substance and Sexual Activity   Drug Use Not Currently       Family History:  Family History   Problem Relation Age of Onset   • Colon cancer Mother    • Lung cancer Father    • Mental illness Neg Hx        Physical Exam:     Vitals:   Blood Pressure: 125/59 (23 1800)  Pulse: 80 (23 1800)  Temperature: (!) 97 4 °F (36 3 °C) (23 1335)  Temp Source: Oral (23 1335)  Respirations: 18 (06/05/23 1800)  SpO2: 94 % (06/05/23 1800)    Physical Exam  Vitals and nursing note reviewed  Constitutional:       General: He is not in acute distress  Appearance: Normal appearance  He is well-developed  HENT:      Head: Normocephalic and atraumatic  Eyes:      Conjunctiva/sclera: Conjunctivae normal    Cardiovascular:      Rate and Rhythm: Normal rate and regular rhythm  Heart sounds: No murmur heard  Pulmonary:      Effort: Pulmonary effort is normal  No respiratory distress  Breath sounds: Rhonchi present  No wheezing  Abdominal:      Palpations: Abdomen is soft  Tenderness: There is no abdominal tenderness  Musculoskeletal:         General: No swelling  Cervical back: Neck supple  Right lower leg: Edema present  Left lower leg: Edema present  Comments: Trace edema left>right   Skin:     General: Skin is warm and dry  Capillary Refill: Capillary refill takes less than 2 seconds  Comments: PICC line in place, no s/s infection   Neurological:      General: No focal deficit present  Mental Status: He is alert and oriented to person, place, and time  Mental status is at baseline     Psychiatric:         Mood and Affect: Mood normal           Additional Data:     Lab Results:  Results from last 7 days   Lab Units 06/05/23  1934 06/05/23  1340   EOS PCT %  --  2   HEMATOCRIT %  --  33 9*   HEMOGLOBIN g/dL  --  11 2*   LYMPHS PCT %  --  6*   MONOS PCT %  --  9   NEUTROS PCT %  --  82*   PLATELETS Thousands/uL 344 355   WBC Thousand/uL  --  9 49     Results from last 7 days   Lab Units 06/05/23  1340   ANION GAP mmol/L 13   ALBUMIN g/dL 3 3*   ALK PHOS U/L 53   ALT U/L 25   AST U/L 22   BUN mg/dL 23   CALCIUM mg/dL 8 8   CHLORIDE mmol/L 87*   CO2 mmol/L 30   CREATININE mg/dL 1 13   GLUCOSE RANDOM mg/dL 147*   POTASSIUM mmol/L 2 2*   SODIUM mmol/L 130*   TOTAL BILIRUBIN mg/dL 0 32                       Lines/Drains:  Invasive Devices     Peripherally Inserted Central Catheter Line  Duration           PICC Line 05/23/23 Right Brachial 13 days          Peripheral Intravenous Line  Duration           Peripheral IV 06/05/23 Left Antecubital <1 day                Central Line:  Goal for removal: Will discontinue when meds requiring line are completed  Imaging: Reviewed radiology reports from this admission including: chest xray  XR chest 1 view portable   ED Interpretation by Lamar Douglas DO (06/05 2531)   Vascular pulmonary congestion, right-sided pleural effusion, increased prominence from prior study on 5/23/23  EKG and Other Studies Reviewed on Admission:   · EKG: NSR  HR 78     ** Please Note: This note has been constructed using a voice recognition system   **

## 2023-06-05 NOTE — TELEPHONE ENCOUNTER
Varinder Sims from patients PCP calls the office back today regarding message left in their office  Informed Supriya Smallwood I was calling to make PCP aware patient had a potassium of 2 3 today when he had weekly labs drawn  Supriya Smallwood states she will route message to provider so he is aware  Supriya Smallwood also states she spoke with patients wife early who informed her patient will be going to the ED today

## 2023-06-05 NOTE — TELEPHONE ENCOUNTER
Juan Bazan from [address] lab calls the office today regarding critical lab  Juan Bazan states he is calling with a critical potassium of 2 3 from today  Lab results in patients chart

## 2023-06-05 NOTE — ASSESSMENT & PLAN NOTE
Recent Labs     06/05/23  1132 06/05/23  1340   K 2 3* 2 2*   MG  --  1 7*     · Etiology: Volume overload  · Patient diuresed with torsemide outpatient    Plan:  · K/Mg repletion: received 40 mEq KDUR and 20 potassium chloride IVPB in ED (1mEq/kg), magnesium repletion as above  · F/u repeat K+ this evening and replete as needed  · Check magnesium, BMP tomorrow a m

## 2023-06-05 NOTE — TELEPHONE ENCOUNTER
Gale Sheppard from Infectious Disease left a message on the Provider Voicemail Line asking for a call back regarding critical labs:   Hi, my name's Gale Sheppard  I'm calling from [address] [place] [address] Infectious Disease  Regarding critical labs on a mutual patient, patient's name is Julio César Valerio, last name is WILLIAM MCDERMOTT's date of birth 1945  As somebody could get out of our office, call back at their earliest convenience [phone]

## 2023-06-05 NOTE — ED PROVIDER NOTES
History  Chief Complaint   Patient presents with   • Shortness of Breath     Pt reports SOB with exertion over the last few days  +chest heaviness  Pt being treated for endocarditis      HPI Pt is a 68 y/o m presenting today with LUNA, trouble sleeping when laying flat beginning about a week ago  He awakes gasping for breath when laying flat  He states that he now has to take a break from walking up a flight of stairs which is new in the last month  Pt is being treated for bacterial endocarditis, streptococcus bacteremia daily with IV ceftriaxone, currently on 40mg torsemide as well  Denies fevers, chills, chest pain, palpitations, abdominal pain, n/v/d, headaches, dizziness, syncope  Prior to Admission Medications   Prescriptions Last Dose Informant Patient Reported? Taking?   metoprolol tartrate (LOPRESSOR) 25 mg tablet  Self No No   Sig: Take 1 tablet (25 mg total) by mouth every 12 (twelve) hours   torsemide 40 MG TABS  Self No No   Sig: Take 40 mg by mouth daily Do not start before May 24, 2023  Patient taking differently: Take 40 mg by mouth 2 (two) times a day      Facility-Administered Medications: None       History reviewed  No pertinent past medical history  Past Surgical History:   Procedure Laterality Date   • SKIN BIOPSY         Family History   Problem Relation Age of Onset   • Colon cancer Mother    • Lung cancer Father    • Mental illness Neg Hx      I have reviewed and agree with the history as documented      E-Cigarette/Vaping   • E-Cigarette Use Never User      E-Cigarette/Vaping Substances   • Nicotine No    • THC No    • CBD No    • Flavoring No    • Other No    • Unknown No      Social History     Tobacco Use   • Smoking status: Former     Packs/day: 0 50     Years: 20 00     Total pack years: 10 00     Types: Cigarettes     Quit date: 3/11/2005     Years since quittin 2   • Smokeless tobacco: Never   Vaping Use   • Vaping Use: Never used   Substance Use Topics   • Alcohol use: Not Currently   • Drug use: Not Currently        Review of Systems   Respiratory: Positive for shortness of breath (LUNA)  Cardiovascular: Positive for leg swelling  All other systems reviewed and are negative  Physical Exam  ED Triage Vitals   Temperature Pulse Respirations Blood Pressure SpO2   06/05/23 1335 06/05/23 1332 06/05/23 1332 06/05/23 1335 06/05/23 1332   (!) 97 4 °F (36 3 °C) 83 18 118/57 97 %      Temp Source Heart Rate Source Patient Position - Orthostatic VS BP Location FiO2 (%)   06/05/23 1335 06/05/23 1332 06/05/23 1332 06/05/23 1332 --   Oral Monitor Lying Left arm       Pain Score       --                    Orthostatic Vital Signs  Vitals:    06/05/23 1335 06/05/23 1615 06/05/23 1700 06/05/23 1800   BP: 118/57 154/64 140/65 125/59   Pulse: 75 79 83 80   Patient Position - Orthostatic VS:           Physical Exam  Vitals and nursing note reviewed  Constitutional:       General: He is in acute distress (mildly anxious)  Appearance: He is not toxic-appearing or diaphoretic  Interventions: He is not intubated  HENT:      Head: Normocephalic and atraumatic  Mouth/Throat:      Mouth: Mucous membranes are moist       Pharynx: Oropharynx is clear  Eyes:      Extraocular Movements: Extraocular movements intact  Pupils: Pupils are equal, round, and reactive to light  Cardiovascular:      Rate and Rhythm: Normal rate and regular rhythm  Pulses: No decreased pulses  Heart sounds: Murmur (systolic ejection murmur) heard  Pulmonary:      Effort: Pulmonary effort is normal  No tachypnea, bradypnea, accessory muscle usage or respiratory distress  He is not intubated  Breath sounds: No stridor  Examination of the right-lower field reveals rales  Examination of the left-lower field reveals rales  Rales present  No decreased breath sounds, wheezing or rhonchi  Chest:      Chest wall: No mass, deformity, tenderness, crepitus or edema   There is no dullness to percussion  Abdominal:      Palpations: Abdomen is soft  There is no mass  Tenderness: There is no abdominal tenderness  There is no guarding  Musculoskeletal:      Cervical back: Normal range of motion and neck supple  Right lower leg: No tenderness  Edema (pitting, ankle and distally) present  Left lower leg: No tenderness  Edema (pitting, ankles distally) present  Skin:     General: Skin is warm and dry  Coloration: Skin is not cyanotic or pale  Findings: No ecchymosis, erythema or rash  Nails: There is no clubbing  Neurological:      General: No focal deficit present  Mental Status: He is alert and oriented to person, place, and time  Cranial Nerves: No cranial nerve deficit  Motor: No weakness  Psychiatric:         Mood and Affect: Mood is anxious           Behavior: Behavior normal          ED Medications  Medications   magnesium sulfate 2 g/50 mL IVPB (premix) 2 g (2 g Intravenous New Bag 6/5/23 1816)   metoprolol tartrate (LOPRESSOR) tablet 25 mg (has no administration in time range)   furosemide (LASIX) injection 40 mg (has no administration in time range)   ceftriaxone (ROCEPHIN) 1 g/50 mL in dextrose IVPB (has no administration in time range)   enoxaparin (LOVENOX) subcutaneous injection 40 mg (has no administration in time range)   potassium chloride (K-DUR,KLOR-CON) CR tablet 40 mEq (40 mEq Oral Given 6/5/23 1604)   potassium chloride 20 mEq IVPB (premix) (0 mEq Intravenous Stopped 6/5/23 1815)   furosemide (LASIX) injection 40 mg (40 mg Intravenous Given 6/5/23 1810)       Diagnostic Studies  Results Reviewed     Procedure Component Value Units Date/Time    HS Troponin I 4hr [[id number]]  (Abnormal) Collected: 06/05/23 1810    Lab Status: Final result Specimen: Blood from Arm, Left Updated: 06/05/23 1905     hs TnI 4hr 351 ng/L      Delta 4hr hsTnI -127 ng/L     Platelet count [[id number]]     Lab Status: No result Specimen: Blood     Magnesium [[id number]]  (Abnormal) Collected: 06/05/23 1340    Lab Status: Final result Specimen: Blood from Arm, Left Updated: 06/05/23 1740     Magnesium 1 7 mg/dL     B-Type Natriuretic Peptide(BNP) [[id number]]  (Abnormal) Collected: 06/05/23 1340    Lab Status: Final result Specimen: Blood from Arm, Left Updated: 06/05/23 1722     BNP 2,299 pg/mL     HS Troponin I 2hr [[id number]]  (Abnormal) Collected: 06/05/23 1607    Lab Status: Final result Specimen: Blood from Arm, Left Updated: 06/05/23 1703     hs TnI 2hr 498 ng/L      Delta 2hr hsTnI 20 ng/L     Comprehensive metabolic panel [[id number]]  (Abnormal) Collected: 06/05/23 1340    Lab Status: Final result Specimen: Blood from Arm, Left Updated: 06/05/23 1524     Sodium 130 mmol/L      Potassium 2 2 mmol/L      Chloride 87 mmol/L      CO2 30 mmol/L      ANION GAP 13 mmol/L      BUN 23 mg/dL      Creatinine 1 13 mg/dL      Glucose 147 mg/dL      Calcium 8 8 mg/dL      Corrected Calcium 9 4 mg/dL      AST 22 U/L      ALT 25 U/L      Alkaline Phosphatase 53 U/L      Total Protein 6 3 g/dL      Albumin 3 3 g/dL      Total Bilirubin 0 32 mg/dL      eGFR 62 ml/min/1 73sq m     Narrative:      [place] guidelines for Chronic Kidney Disease (CKD):   •  Stage 1 with normal or high GFR (GFR > 90 mL/min/1 73 square meters)  •  Stage 2 Mild CKD (GFR = 60-89 mL/min/1 73 square meters)  •  Stage 3A Moderate CKD (GFR = 45-59 mL/min/1 73 square meters)  •  Stage 3B Moderate CKD (GFR = 30-44 mL/min/1 73 square meters)  •  Stage 4 Severe CKD (GFR = 15-29 mL/min/1 73 square meters)  •  Stage 5 End Stage CKD (GFR <15 mL/min/1 73 square meters)  Note: GFR calculation is accurate only with a steady state creatinine    HS Troponin 0hr (reflex protocol) [[id number]]  (Abnormal) Collected: 06/05/23 1340    Lab Status: Final result Specimen: Blood from Arm, Left Updated: 06/05/23 1443     hs TnI 0hr 478 ng/L     CBC and differential [[id number]]  (Abnormal) Collected: 06/05/23 1340    Lab Status: Final result Specimen: Blood from Arm, Left Updated: 06/05/23 1409     WBC 9 49 Thousand/uL      RBC 3 79 Million/uL      Hemoglobin 11 2 g/dL      Hematocrit 33 9 %      MCV 89 fL      MCH 29 6 pg      MCHC 33 0 g/dL      RDW 15 1 %      MPV 9 3 fL      Platelets 075 Thousands/uL      nRBC 0 /100 WBCs      Neutrophils Relative 82 %      Immat GRANS % 1 %      Lymphocytes Relative 6 %      Monocytes Relative 9 %      Eosinophils Relative 2 %      Basophils Relative 0 %      Neutrophils Absolute 7 79 Thousands/µL      Immature Grans Absolute 0 06 Thousand/uL      Lymphocytes Absolute 0 55 Thousands/µL      Monocytes Absolute 0 85 Thousand/µL      Eosinophils Absolute 0 21 Thousand/µL      Basophils Absolute 0 03 Thousands/µL                  XR chest 1 view portable   ED Interpretation by Rosibel Gonzales DO (06/05 1543)   Vascular pulmonary congestion, right-sided pleural effusion, increased prominence from prior study on 5/23/23  Procedures  ECG 12 Lead Documentation Only    Date/Time: 6/5/2023 1:34 PM    Performed by: Rosibel Gonzales DO  Authorized by: Rosibel Gonzales DO    Indications / Diagnosis:  LUNA  ECG reviewed by me, the ED Provider: yes    Patient location:  ED  Previous ECG:     Previous ECG:  Compared to current    Comparison ECG info:  5/16/23    Similarity:  No change    Comparison to cardiac monitor: Yes    Interpretation:     Interpretation: abnormal    Rate:     ECG rate:  78    ECG rate assessment: normal    Rhythm:     Rhythm: sinus rhythm    Comments:      ST depression and T wave inversion in lateral leads  No STEMI  NSR @ 78  Left atrial enlargement, left ventricular hypertrophy  ED Course     Pt hemodynamically stable, in NAD  Elevated troponin, bnp, with hypokalemia of 2 2 and hypomagnesemia of 1 7  Pt on torsemide this last month     Pt is agreeable to admission, Elevated BNP, troponin with hypokalemia and hypomagnesemia  Discussed with Dr Monique Esparza, pt admitted to his care  Medical Decision Making  Pt hemodynamically stable, in NAD  Elevated troponin, bnp, with hypokalemia of 2 2 and hypomagnesemia of 1 7  Pt on torsemide this last month  Being treated with daily ceftriaxone infusions for discitis, endocarditis - afebrile with normal/stable vitals  CXR consistent with chf  Amount and/or Complexity of Data Reviewed  Labs: ordered  Radiology: ordered and independent interpretation performed  Risk  Prescription drug management  Decision regarding hospitalization  DDX including but not limited to: ACS, MI, PE, PTX, pneumonia, dissection, pleurisy, pericarditis, myocarditis, rhabdomyolysis, GI etiology  Disposition  Final diagnoses:   CHF (congestive heart failure) ([facility])   Hypokalemia   Hypomagnesemia   Endocarditis   Hyponatremia   Elevated troponin     Time reflects when diagnosis was documented in both MDM as applicable and the Disposition within this note     Time User Action Codes Description Comment    6/5/2023  6:03 PM Lauryn Lat Add [I50 9] CHF (congestive heart failure) ([facility] [address] )     6/5/2023  6:03 PM Lauryn Lat Add [E87 6] Hypokalemia     6/5/2023  6:03 PM Lauryn Lat Add [E83 42] Hypomagnesemia     6/5/2023  6:03 PM Lauryn Lat Add [I38] Endocarditis     6/5/2023  6:05 PM Lauryn Lat Add [E87 1] Hyponatremia     6/5/2023  6:06 PM Lauryn Lat Add [R77 8] Elevated troponin       ED Disposition     ED Disposition   Admit    Condition   Stable    Date/Time   Mon Jun 5, 2023  6:05 PM    Comment   Case was discussed with Dr Monique Esparza [facility] and the patient's admission status was agreed to be Admission Status: inpatient status to the service of Dr Monique Esparza             Follow-up Information    None         Patient's Medications   Discharge Prescriptions    No medications on file     No discharge procedures on file     PDMP Review     None           ED Provider  Attending physically available and evaluated Shai Sheffield I managed the patient along with the ED Attending      Electronically Signed by         Gloria Del Cid DO  06/05/23 1941

## 2023-06-05 NOTE — TELEPHONE ENCOUNTER
Baljeet Sarabia from Infectious Disease call stating they just want to let us know the pts potassium is 2 3  Pts cardiologist is aware as well  Pt is currently in the ED as he has been having SOB

## 2023-06-05 NOTE — TELEPHONE ENCOUNTER
[facility]iology today and spoke with Kurt Chaves, RN  Informed Kurt Chaves I was calling in regards to patients labs  Informed Tonie patient had weekly labs done today for you office and he had a critical lab  Informed Tonie patients potassium  is 2 3  Informed Tonie per Dr Nasir Mercer to make cardiologist aware  Kurt Chaves states she will route the provider a message  Called patients PCP and lmom on office machine to have staff call our office back

## 2023-06-05 NOTE — ASSESSMENT & PLAN NOTE
Wt Readings from Last 3 Encounters:   06/01/23 66 7 kg (147 lb)   05/23/23 65 1 kg (143 lb 9 6 oz)   05/15/23 68 6 kg (151 lb 3 2 oz)       Lab Results   Component Value Date    BNP 2,299 (H) 06/05/2023     (H) 05/16/2023    LVEF 65 05/19/2023       • Presents with increased shortness of breath, dyspnea on exertion, lower extremity edema and cough with productive sputum  • Primary cardiologist: Dr Mendy Strong  • NYHA Class III , Stage C  • Patient is currently volume overloaded  • JESSICA last admission showed EF 65% with dilated cardiomyopathy of the left ventricle  • Received 40 IV Lasix in ED  • Tropononins 478-498; elevated >300 on prior admission  • Chest x-ray showed evidence of small right pleural effusion and bilateral pulmonary edema  • Patient denies chest pain, or shortness of breath at rest unless supine  Saturating greater than 90% on room air  Plan:  • GDMT: Diuretic: Lasix 40 mg IV twice daily pending cardiology consult  B-Blocker: Metoprolol tartrate 25 mg twice daily, A  · Diet: Sodium restriction  · Labs: BMP, magnesium tomorrow a m    · Maintain Mg > 2 and K > 4; Replete prn  · Elevate HOB > 30°, Daily standing weights, Measure I/Os, Monitor on Telemetry  · Consulted cardiology in setting of endocarditis and volume overload, appreciate recs

## 2023-06-05 NOTE — ED ATTENDING ATTESTATION
6/5/2023  I, Manisha Shah MD, saw and evaluated the patient  I have discussed the patient with the resident/non-physician practitioner and agree with the resident's/non-physician practitioner's findings, Plan of Care, and MDM as documented in the resident's/non-physician practitioner's note, except where noted  All available labs and Radiology studies were reviewed  I was present for key portions of any procedure(s) performed by the resident/non-physician practitioner and I was immediately available to provide assistance  At this point I agree with the current assessment done in the Emergency Department  I have conducted an independent evaluation of this patient a history and physical is as follows:    S:  Chief Complaint   Patient presents with   • Shortness of Breath     Pt reports SOB with exertion over the last few days  +chest heaviness  Pt being treated for endocarditis      Rafael Galloway is a 67-year-old male who presents with chief complaint of shortness of breath  Patient reports he notices it mostly at night  Patient reports he will wake up from a good sleep gasping for breath and coughing  Patient reports if he sits up for several minutes he begins to feel better  Patient reports he was recently diagnosed with endocarditis and valve disease after presenting for back pain in May  I reviewed the discharge summary from that visit and the patient had vegetation on his aortic valve noted on ultrasound  It was believed that his heart was seeded from his osteomyelitis/discitis that was discovered in his lumbar spine  He was discharged with a PICC line and is receiving antibiotics as an outpatient      O:  ED Triage Vitals   Temperature Pulse Respirations Blood Pressure SpO2   06/05/23 1335 06/05/23 1332 06/05/23 1332 06/05/23 1335 06/05/23 1332   (!) 97 4 °F (36 3 °C) 83 18 118/57 97 %      Temp Source Heart Rate Source Patient Position - Orthostatic VS BP Location FiO2 (%)   06/05/23 1335 06/05/23 1332 06/05/23 1332 06/05/23 1332 --   Oral Monitor Lying Left arm       Pain Score       --                Physical Exam  Vitals and nursing note reviewed  Constitutional:       General: He is in acute distress (mild)  Appearance: He is well-developed  HENT:      Head: Normocephalic and atraumatic  Eyes:      Extraocular Movements: Extraocular movements intact  Pupils: Pupils are equal, round, and reactive to light  Neck:      Vascular: No JVD  Cardiovascular:      Rate and Rhythm: Normal rate and regular rhythm  Heart sounds: Murmur heard  No friction rub  No gallop  Pulmonary:      Effort: Pulmonary effort is normal  No respiratory distress  Breath sounds: Normal breath sounds  No wheezing or rales  Chest:      Chest wall: No tenderness  Musculoskeletal:         General: No tenderness  Normal range of motion  Cervical back: Normal range of motion  Comments: PICC line rue   Skin:     General: Skin is warm and dry  Neurological:      General: No focal deficit present  Mental Status: He is alert and oriented to person, place, and time  Psychiatric:         Mood and Affect: Mood is anxious  Behavior: Behavior normal          Thought Content:  Thought content normal          Judgment: Judgment normal        A/P:  Background: 69 y o  male presents with sob, zhou, cp    Differential DX includes but is not limited to: chf due to valvular disease, worsening endocarditis, pneumonia, atypical acs, anemia    Plan: cardiac workup, bnp, likely admission      ED Course     Labs Reviewed   CBC AND DIFFERENTIAL - Abnormal       Result Value Ref Range Status    WBC 9 49  4 31 - 10 16 Thousand/uL Final    RBC 3 79 (*) 3 88 - 5 62 Million/uL Final    Hemoglobin 11 2 (*) 12 0 - 17 0 g/dL Final    Hematocrit 33 9 (*) 36 5 - 49 3 % Final    MCV 89  82 - 98 fL Final    MCH 29 6  26 8 - 34 3 pg Final    MCHC 33 0  31 4 - 37 4 g/dL Final    RDW 15 1  11 6 - 15 1 % Final MPV 9 3  8 9 - 12 7 fL Final    Platelets 489  680 - 390 Thousands/uL Final    nRBC 0  /100 WBCs Final    Neutrophils Relative 82 (*) 43 - 75 % Final    Immat GRANS % 1  0 - 2 % Final    Lymphocytes Relative 6 (*) 14 - 44 % Final    Monocytes Relative 9  4 - 12 % Final    Eosinophils Relative 2  0 - 6 % Final    Basophils Relative 0  0 - 1 % Final    Neutrophils Absolute 7 79 (*) 1 85 - 7 62 Thousands/µL Final    Immature Grans Absolute 0 06  0 00 - 0 20 Thousand/uL Final    Lymphocytes Absolute 0 55 (*) 0 60 - 4 47 Thousands/µL Final    Monocytes Absolute 0 85  0 17 - 1 22 Thousand/µL Final    Eosinophils Absolute 0 21  0 00 - 0 61 Thousand/µL Final    Basophils Absolute 0 03  0 00 - 0 10 Thousands/µL Final   COMPREHENSIVE METABOLIC PANEL - Abnormal    Sodium 130 (*) 135 - 147 mmol/L Final    Potassium 2 2 (*) 3 5 - 5 3 mmol/L Final    Chloride 87 (*) 96 - 108 mmol/L Final    CO2 30  21 - 32 mmol/L Final    ANION GAP 13  4 - 13 mmol/L Final    BUN 23  5 - 25 mg/dL Final    Creatinine 1 13  0 60 - 1 30 mg/dL Final    Comment: Standardized to IDMS reference method    Glucose 147 (*) 65 - 140 mg/dL Final    Comment: If the patient is fasting, the ADA then defines impaired fasting glucose as > 100 mg/dL and diabetes as > or equal to 123 mg/dL  Calcium 8 8  8 4 - 10 2 mg/dL Final    Corrected Calcium 9 4  8 3 - 10 1 mg/dL Final    AST 22  13 - 39 U/L Final    ALT 25  7 - 52 U/L Final    Comment: Specimen collection should occur prior to Sulfasalazine administration due to the potential for falsely depressed results  Alkaline Phosphatase 53  34 - 104 U/L Final    Total Protein 6 3 (*) 6 4 - 8 4 g/dL Final    Albumin 3 3 (*) 3 5 - 5 0 g/dL Final    Total Bilirubin 0 32  0 20 - 1 00 mg/dL Final    Comment: Use of this assay is not recommended for patients undergoing treatment with eltrombopag due to the potential for falsely elevated results    N-acetyl-p-benzoquinone imine (metabolite of Acetaminophen) will generate "erroneously low results in samples for patients that have taken an overdose of Acetaminophen  eGFR 62  ml/min/1 73sq m Final    Narrative:     [place] guidelines for Chronic Kidney Disease (CKD):   •  Stage 1 with normal or high GFR (GFR > 90 mL/min/1 73 square meters)  •  Stage 2 Mild CKD (GFR = 60-89 mL/min/1 73 square meters)  •  Stage 3A Moderate CKD (GFR = 45-59 mL/min/1 73 square meters)  •  Stage 3B Moderate CKD (GFR = 30-44 mL/min/1 73 square meters)  •  Stage 4 Severe CKD (GFR = 15-29 mL/min/1 73 square meters)  •  Stage 5 End Stage CKD (GFR <15 mL/min/1 73 square meters)  Note: GFR calculation is accurate only with a steady state creatinine   HS TROPONIN I 0HR - Abnormal    hs TnI 0hr 478 (*) \"Refer to ACS Flowchart\"- see link ng/L Final    Comment:                                              Initial (time 0) result  If >=50 ng/L, Myocardial injury suggested ;  Type of myocardial injury and treatment strategy  to be determined  If 5-49 ng/L, a delta result at 2 hours and or 4 hours will be needed to further evaluate  If <4 ng/L, and chest pain has been >3 hours since onset, patient may qualify for discharge based on the HEART score in the ED  If <5 ng/L and <3hours since onset of chest pain, a delta result at 2 hours will be needed to further evaluate  HS Troponin 99th Percentile URL of a Health Population=12 ng/L with a 95% Confidence Interval of 8-18 ng/L  Second Troponin (time 2 hours)  If calculated delta >= 20 ng/L,  Myocardial injury suggested ; Type of myocardial injury and treatment strategy to be determined  If 5-49 ng/L and the calculated delta is 5-19 ng/L, consult medical service for evaluation  Continue evaluation for ischemia on ecg and other possible etiology and repeat hs troponin at 4 hours  If delta is <5 ng/L at 2 hours, consider discharge based on risk stratification via the HEART score (if in ED), or ALICE risk score in IP/Observation      HS " "Troponin 99th Percentile URL of a Health Population=12 ng/L with a 95% Confidence Interval of 8-18 ng/L    HS TROPONIN I 2HR - Abnormal    hs TnI 2hr 498 (*) \"Refer to ACS Flowchart\"- see link ng/L Final    Comment:                                              Initial (time 0) result  If >=50 ng/L, Myocardial injury suggested ;  Type of myocardial injury and treatment strategy  to be determined  If 5-49 ng/L, a delta result at 2 hours and or 4 hours will be needed to further evaluate  If <4 ng/L, and chest pain has been >3 hours since onset, patient may qualify for discharge based on the HEART score in the ED  If <5 ng/L and <3hours since onset of chest pain, a delta result at 2 hours will be needed to further evaluate  HS Troponin 99th Percentile URL of a Health Population=12 ng/L with a 95% Confidence Interval of 8-18 ng/L  Second Troponin (time 2 hours)  If calculated delta >= 20 ng/L,  Myocardial injury suggested ; Type of myocardial injury and treatment strategy to be determined  If 5-49 ng/L and the calculated delta is 5-19 ng/L, consult medical service for evaluation  Continue evaluation for ischemia on ecg and other possible etiology and repeat hs troponin at 4 hours  If delta is <5 ng/L at 2 hours, consider discharge based on risk stratification via the HEART score (if in ED), or ALICE risk score in IP/Observation  HS Troponin 99th Percentile URL of a Health Population=12 ng/L with a 95% Confidence Interval of 8-18 ng/L  Delta 2hr hsTnI 20 (*) <20 ng/L Final   B-TYPE NATRIURETIC PEPTIDE (BNP) - Abnormal    BNP 2,299 (*) 0 - 100 pg/mL Final   MAGNESIUM - Abnormal    Magnesium 1 7 (*) 1 9 - 2 7 mg/dL Final   HS TROPONIN I 4HR   LIGHT BLUE TOP     XR chest 1 view portable   ED Interpretation   Vascular pulmonary congestion, right-sided pleural effusion, increased prominence from prior study on 5/23/23          Medications   furosemide (LASIX) injection 40 mg (has no administration in time " range)   magnesium sulfate 2 g/50 mL IVPB (premix) 2 g (has no administration in time range)   potassium chloride (K-DUR,KLOR-CON) CR tablet 40 mEq (40 mEq Oral Given 6/5/23 1604)   potassium chloride 20 mEq IVPB (premix) (20 mEq Intravenous New Bag 6/5/23 1603)         Critical Care Time  Procedures    Time reflects when diagnosis was documented in both MDM as applicable and the Disposition within this note     Time User Action Codes Description Comment    6/5/2023  6:03 PM Terri Flavia Add [I50 9] CHF (congestive heart failure) ([facility] [address] )     6/5/2023  6:03 PM Terri Flavia Add [E87 6] Hypokalemia     6/5/2023  6:03 PM Terri Flavia Add [E83 42] Hypomagnesemia     6/5/2023  6:03 PM Terri Flavia Add [I38] Endocarditis       ED Disposition     ED Disposition   Admit    Condition   --    Date/Time   Mon Jun 5, 2023  6:03 PM    Comment   --         Follow-up Information    None

## 2023-06-05 NOTE — ASSESSMENT & PLAN NOTE
· JESSICA 5/19/23 showed  3 small, mobile vegetations present on the on the aortic aspect of the valve  · PICC in place   · Patient scheduled to receive IV ceftriaxone 2g daily through June 30th   · Patient without clinical evidence of worsening infection  · Hemodynamically stable, AAOX3         Plan:   · Continue antibiotic regimen    · Cardiology and ID consulted, appreciate recommendations

## 2023-06-05 NOTE — ASSESSMENT & PLAN NOTE
· Magnesium level on presentation 1 7   · in setting of volume overload   · Repleted w/ 2G IV mag sulfate in the ED    Plan:   · Check magnesium tomorrow AM, further repletion as needed

## 2023-06-06 ENCOUNTER — HOSPITAL ENCOUNTER (OUTPATIENT)
Dept: INFUSION CENTER | Facility: HOSPITAL | Age: 78
Discharge: HOME | End: 2023-06-06
Attending: INTERNAL MEDICINE

## 2023-06-06 VITALS
DIASTOLIC BLOOD PRESSURE: 48 MMHG | TEMPERATURE: 98.6 F | SYSTOLIC BLOOD PRESSURE: 117 MMHG | RESPIRATION RATE: 18 BRPM | HEART RATE: 92 BPM | BODY MASS INDEX: 22.23 KG/M2 | OXYGEN SATURATION: 94 % | WEIGHT: 137.7 LBS

## 2023-06-06 PROBLEM — I50.33 ACUTE ON CHRONIC HEART FAILURE WITH PRESERVED EJECTION FRACTION (HFPEF) (HCC): Status: ACTIVE | Noted: 2023-06-05

## 2023-06-06 PROBLEM — I50.33 ACUTE ON CHRONIC HEART FAILURE WITH PRESERVED EJECTION FRACTION (HFPEF) (HCC): Status: RESOLVED | Noted: 2023-06-05 | Resolved: 2023-06-06

## 2023-06-06 PROBLEM — E83.42 HYPOMAGNESEMIA: Status: RESOLVED | Noted: 2023-06-05 | Resolved: 2023-06-06

## 2023-06-06 LAB
ANION GAP SERPL CALCULATED.3IONS-SCNC: 9 MMOL/L (ref 4–13)
ATRIAL RATE: 78 BPM
BUN SERPL-MCNC: 26 MG/DL (ref 5–25)
CALCIUM SERPL-MCNC: 8.7 MG/DL (ref 8.4–10.2)
CHLORIDE SERPL-SCNC: 93 MMOL/L (ref 96–108)
CO2 SERPL-SCNC: 30 MMOL/L (ref 21–32)
CREAT SERPL-MCNC: 1.1 MG/DL (ref 0.6–1.3)
ERYTHROCYTE [DISTWIDTH] IN BLOOD BY AUTOMATED COUNT: 15 % (ref 11.6–15.1)
GFR SERPL CREATININE-BSD FRML MDRD: 64 ML/MIN/1.73SQ M
GLUCOSE SERPL-MCNC: 131 MG/DL (ref 65–140)
HCT VFR BLD AUTO: 32.5 % (ref 36.5–49.3)
HGB BLD-MCNC: 10.8 G/DL (ref 12–17)
MAGNESIUM SERPL-MCNC: 2 MG/DL (ref 1.9–2.7)
MCH RBC QN AUTO: 29.5 PG (ref 26.8–34.3)
MCHC RBC AUTO-ENTMCNC: 33.2 G/DL (ref 31.4–37.4)
MCV RBC AUTO: 89 FL (ref 82–98)
P AXIS: 80 DEGREES
PHOSPHATE SERPL-MCNC: 2.7 MG/DL (ref 2.3–4.1)
PLATELET # BLD AUTO: 353 THOUSANDS/UL (ref 149–390)
PMV BLD AUTO: 9.3 FL (ref 8.9–12.7)
POTASSIUM SERPL-SCNC: 3.2 MMOL/L (ref 3.5–5.3)
PR INTERVAL: 166 MS
QRS AXIS: 62 DEGREES
QRSD INTERVAL: 106 MS
QT INTERVAL: 352 MS
QTC INTERVAL: 401 MS
RBC # BLD AUTO: 3.66 MILLION/UL (ref 3.88–5.62)
SODIUM SERPL-SCNC: 132 MMOL/L (ref 135–147)
T WAVE AXIS: 235 DEGREES
VENTRICULAR RATE: 78 BPM
WBC # BLD AUTO: 14.28 THOUSAND/UL (ref 4.31–10.16)

## 2023-06-06 PROCEDURE — 85027 COMPLETE CBC AUTOMATED: CPT

## 2023-06-06 PROCEDURE — 80048 BASIC METABOLIC PNL TOTAL CA: CPT

## 2023-06-06 PROCEDURE — 93010 ELECTROCARDIOGRAM REPORT: CPT | Performed by: INTERNAL MEDICINE

## 2023-06-06 PROCEDURE — 99223 1ST HOSP IP/OBS HIGH 75: CPT | Performed by: INTERNAL MEDICINE

## 2023-06-06 PROCEDURE — 83735 ASSAY OF MAGNESIUM: CPT

## 2023-06-06 PROCEDURE — 84100 ASSAY OF PHOSPHORUS: CPT

## 2023-06-06 PROCEDURE — 99238 HOSP IP/OBS DSCHRG MGMT 30/<: CPT | Performed by: HOSPITALIST

## 2023-06-06 RX ORDER — CEFTRIAXONE 2 G/50ML
2000 INJECTION, SOLUTION INTRAVENOUS ONCE
Status: CANCELLED | OUTPATIENT
Start: 2023-06-07

## 2023-06-06 RX ORDER — TORSEMIDE 20 MG/1
40 TABLET ORAL 2 TIMES DAILY
Qty: 120 TABLET | Refills: 0 | Status: SHIPPED | OUTPATIENT
Start: 2023-06-06 | End: 2023-07-06

## 2023-06-06 RX ORDER — FUROSEMIDE 10 MG/ML
40 INJECTION INTRAMUSCULAR; INTRAVENOUS ONCE
Status: COMPLETED | OUTPATIENT
Start: 2023-06-06 | End: 2023-06-06

## 2023-06-06 RX ORDER — FUROSEMIDE 10 MG/ML
80 INJECTION INTRAMUSCULAR; INTRAVENOUS
Status: DISCONTINUED | OUTPATIENT
Start: 2023-06-06 | End: 2023-06-06 | Stop reason: HOSPADM

## 2023-06-06 RX ORDER — POTASSIUM CHLORIDE 20 MEQ/1
40 TABLET, EXTENDED RELEASE ORAL 2 TIMES DAILY
Qty: 6 TABLET | Refills: 0 | Status: SHIPPED | OUTPATIENT
Start: 2023-06-06 | End: 2023-06-16

## 2023-06-06 RX ORDER — SODIUM CHLORIDE 9 MG/ML
20 INJECTION, SOLUTION INTRAVENOUS ONCE
Status: CANCELLED | OUTPATIENT
Start: 2023-06-07

## 2023-06-06 RX ORDER — SPIRONOLACTONE 25 MG/1
25 TABLET ORAL DAILY
Qty: 30 TABLET | Refills: 0 | Status: SHIPPED | OUTPATIENT
Start: 2023-06-07 | End: 2023-07-07

## 2023-06-06 RX ORDER — SPIRONOLACTONE 25 MG/1
25 TABLET ORAL DAILY
Status: DISCONTINUED | OUTPATIENT
Start: 2023-06-06 | End: 2023-06-06 | Stop reason: HOSPADM

## 2023-06-06 RX ADMIN — FUROSEMIDE 40 MG: 10 INJECTION, SOLUTION INTRAMUSCULAR; INTRAVENOUS at 08:11

## 2023-06-06 RX ADMIN — POTASSIUM CHLORIDE 40 MEQ: 1500 TABLET, EXTENDED RELEASE ORAL at 08:11

## 2023-06-06 RX ADMIN — SPIRONOLACTONE 25 MG: 25 TABLET ORAL at 11:38

## 2023-06-06 RX ADMIN — FUROSEMIDE 40 MG: 10 INJECTION, SOLUTION INTRAMUSCULAR; INTRAVENOUS at 11:15

## 2023-06-06 RX ADMIN — CEFTRIAXONE 2000 MG: 10 INJECTION, POWDER, FOR SOLUTION INTRAVENOUS at 10:06

## 2023-06-06 RX ADMIN — POTASSIUM CHLORIDE 40 MEQ: 29.8 INJECTION, SOLUTION INTRAVENOUS at 00:02

## 2023-06-06 RX ADMIN — METOPROLOL TARTRATE 25 MG: 25 TABLET, FILM COATED ORAL at 08:11

## 2023-06-06 NOTE — CONSULTS
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  Bacteria likely originated from dental source  Blood cultures first positive on 5/17/2023 during previous hospitalization  JESSICA from 5/19/2023 confirmed presence of aortic vegetation  Patient did have clear follow up blood cultures on 5/20/2023  A RUE PICC was placed and his has been undergoing a 6-week course of IV ceftriaxone which he's been tolerating without difficulty  He's been receiving his infusions at the infusion center with a planned end date of 6/30/2023  Now back inpatient with CHF  He continues to receive his daily ceftriaxone  We will set him to return to the outpatient infusion center at time of discharge  He will also need to maintain outpatient ID follow up after discharge   -continue IV ceftriaxone through 6/30/2023 for 6-weeks of IV antibiotic treatment  -continue weekly CBCD and CMP while on IV antibiotic  -anticipate return to the infusion center for antibiotic dosing after discharge, we will place [place] orders when patient is closer to discharge  -RUE PICC to be removed after patient's final dose of IV antibiotic  -monitor vitals  -recommend repeat echocardiogram given new presentation of CHF to evaluate for involvement of vegetation in his presenting symptoms   -patient to follow up in the outpatient ID office after discharge, appointment is scheduled for 6/15/2023, will adjust appointment date/time as needed pending his hospital discharge    2  L1-L2 vertebral osteomyelitis/discitis  MRI concerning for ventral epidural enhancement and paraspinal edema/enhancement  Suspect this was secondary to his streptococcal bacteremia  Patient now on antibiotic treatment as above  He will need to complete a repeat MRI prior to ending his antibiotic treatment   He is set up for outpatient neurosurgery follow up later this month   -antibiotic as above  -complete repeat lumbar spine MRI prior to completion of IV antibiotic  -maintain outpatient follow up with neurosurgery     3  RUE PICC intact  -nursing team to continue routine exams and care of PICC  -PICC to be removed by infusion center RN after final dose of IV antibiotic    4  Congestive heart failure  Patient presenting to ED in volume overload with significant electrolyte abnormalities  CXR with pulmonary vascular congestion and B/L pleural effusions  Unclear if his known aortic valve vegetation is playing a role in the CHF, recommend repeat echocardiogram for further assessment  Patient has received diuresis  A cardiology consult is pending   -monitor vitals  -monitor cardiac and respiratory symptoms  -recommend repeat echocardiogram to evaluate for involvement of vegetation in his presenting symptoms   -volume management per primary service/cardiology  -follow up cardiology consult    We will continue to follow along with the patient  Above plan was discussed in detail with patient at the bedside  Above plan was discussed in detail with primary service  I have spent 80 minutes with patient, other providers, and in review of records today in completing this consultation  Total time spent included review of testing, ordering medications and tests, communicating with other providers, documentation time, and counseling/providing education to patient as detailed in my note  HISTORY OF PRESENT ILLNESS:  Reason for Consult: endocarditis  HPI: Dmitry Avila is a 69y o  year old male who presented to the [address] [place] [address] ED on 6/5/2023 with shortness of breath and chest heaviness  Patient reported difficulty laying flat in bed  Also developed a cough and felt congested  Patient presented with a RUE PICC intact and reported he's been undergoing antibiotic treatment for endocarditis      Upon arrival to the ED the patient's temperature was 97 4  HR was 83  RR was 18  O2 saturation was 97% on room air  BP was 118/57  Patient's WBC count was 9 49  Creatinine was 1 13 with GFR = 62  Na = 130  K = 2 2  Patient was taken for CXR which showed B/L pleural effusions and pulmonary vascular congestion  Patient was given lasix  He was admitted for additional medical management  After his admission he was continued on Ceftriaxone for treatment of previously diagnosed streptococcal aortic valve endocarditis/lumbar discitis/osteomyelitis, which likely originated from a dental source  Cardiology has been consulted  We have been asked for formal consult for endocarditis  The patient has anxiety, a fib, OA, BPH, aortic stenosis, mixed hyperlipidemia, chronic pain syndrome, HTN, lumbar spondylosis, and CHF  He has a history of abnormal blood glucose, hyponatremia, elevated D-dime, systolic murmer, carotid bruits, non-ischemic myocardial injury, gastric distention, and skin biopsy  He is a former smoker  He has no known drug allergies  REVIEW OF SYSTEMS:  Patient reports he's ready to leave the hospital  Reports he hasn't been getting sleep for the past 2 nights and knows he won't get any tonight if he stays here  He feels that he can learn how to take his blood pressure mediations and diuretics correctly, and how to adjust his diet, and will be fine to leave today  He denies chest pain and SOB  Reports he is still coughing but is now bringing up some thin phlegm, feels less pressure across his chest now  He denies fever, chills, sweats, shakes  He denies headaches, dizziness, and neck stiffness  He denies nausea, vomiting, abdominal pain, and diarrhea  He denies dysuria  Reports the top of his feet and ankles were swollen at home but are now looking a lot better today  He denies open sores and rashes to his skin  He has no concerns with his RUE PICC  A complete 12 point system-based review of systems is otherwise negative      PAST MEDICAL HISTORY:  History reviewed  No pertinent past medical history  Past Surgical History:   Procedure Laterality Date   • SKIN BIOPSY       FAMILY HISTORY:  Non-contributory    SOCIAL HISTORY:  Social History   Social History     Substance and Sexual Activity   Alcohol Use Not Currently     Social History     Substance and Sexual Activity   Drug Use Not Currently     Social History     Tobacco Use   Smoking Status Former   • Packs/day: 0 50   • Years: 20 00   • Total pack years: 10 00   • Types: Cigarettes   • Quit date: 3/11/2005   • Years since quittin 2   Smokeless Tobacco Never     ALLERGIES:  No Known Allergies    MEDICATIONS:  All current active medications have been reviewed  ANTIBIOTICS:  Ceftriaxone 1  Antibiotics 1 (plus ceftriaxone prior to admission)    PHYSICAL EXAM:  Temp:  [97 4 °F (36 3 °C)-98 9 °F (37 2 °C)] 98 9 °F (37 2 °C)  HR:  [75-84] 84  Resp:  [16-20] 16  BP: (115-154)/(49-65) 121/49  SpO2:  [90 %-97 %] 90 %  Temp (24hrs), Av °F (36 7 °C), Min:97 4 °F (36 3 °C), Max:98 9 °F (37 2 °C)  Current: Temperature: 98 9 °F (37 2 °C)    Intake/Output Summary (Last 24 hours) at 2023 0732  Last data filed at 2023 8039  Gross per 24 hour   Intake 480 ml   Output 700 ml   Net -220 ml     General Appearance:  Appearing well, nontoxic, and in no distress  He appears comfortable sitting up in bed  Head:  Normocephalic, without obvious abnormality, atraumatic  Eyes:  Conjunctiva pink and sclera anicteric, both eyes  Nose: Nares normal, mucosa normal, no drainage  Throat: Oropharynx moist without lesions  Poor dentition  Neck: Supple, symmetrical, no adenopathy, no tenderness/mass/nodules  Lungs:   Clear to auscultation bilaterally, respirations unlabored on room air  Chest Wall:  No tenderness or deformity  Heart:  RRR; +murmur, no rub or gallop  Abdomen:   Soft, non-tender, non-distended, positive bowel sounds throughout  Extremities: No cyanosis or clubbing, no edema  Trace B/L sock lines  RUE PICC intact  Skin: No rashes or lesions noted on exposed skin  Lymph nodes: Cervical, supraclavicular nodes normal    Neurologic: Alert and oriented times 3, follows commands, speech is organized and appropriate, symmetric facial movement  LABS, IMAGING, & OTHER STUDIES:  Lab Results:  I have personally reviewed pertinent labs  Results from last 7 days   Lab Units 06/06/23 0445 06/05/23 1934 06/05/23  1340 06/05/23  1132   HEMOGLOBIN g/dL 10 8*  --  11 2* 10 2*   PLATELETS Thousands/uL 353 344 355 334   WBC Thousand/uL 14 28*  --  9 49 9 89     Results from last 7 days   Lab Units 06/06/23 0445 06/05/23 1934 06/05/23  1340 06/05/23  1132   ALK PHOS U/L  --   --  53 51   ALT U/L  --   --  25 12   AST U/L  --   --  22 14   BUN mg/dL 26*  --  23 22   CALCIUM mg/dL 8 7  --  8 8 8 4   CHLORIDE mmol/L 93*  --  87* 88*   CO2 mmol/L 30  --  30 31   CREATININE mg/dL 1 10  --  1 13 1 14   EGFR ml/min/1 73sq m 64  --  62 61   POTASSIUM mmol/L 3 2*   < > 2 2* 2 3*    < > = values in this interval not displayed  Imaging Studies:   I have personally reviewed pertinent imaging study reports and images in PACS  XR CHEST 1 VIEW PORTABLE 6/5/2023 - I personally reviewed this image which showed pulmonary vascular congestion  Patient has B/L pleural effusions, R>L

## 2023-06-06 NOTE — DISCHARGE SUMMARY
[address]  [facility], 1945, 69 y o  male MRN: [id number]   Unit/Bed#: S /S -01 Encounter: [id number]   Primary Care Physician: Stone Hammond MD   Date and Time Admitted to Hospital: 6/5/2023  3:04 PM       * Acute on chronic heart failure with preserved ejection fraction (HFpEF) (HCC)-resolved as of 6/6/2023  Assessment & Plan  Wt Readings from Last 3 Encounters:   06/06/23 62 5 kg (137 lb 11 2 oz)   06/01/23 66 7 kg (147 lb)   05/23/23 65 1 kg (143 lb 9 6 oz)       Lab Results   Component Value Date    BNP 2,299 (H) 06/05/2023     (H) 05/16/2023    LVEF 65 05/19/2023       POA: Patient with PMH of HFpEF presented with increased dyspnea on exertion, orthopnea, and productive cough  Noted to be volume overloaded with LE edema on admission  Primary Cardiologist, Dr Matt Garcia, recently increased torsemide dose from 40 mg daily to bid with initial improved symptoms but acutely worsened over past 2 days  Hemodynamically stable with SpO2 in mid 90s on RA  Possible mild exacerbation of CHF +/- known valvular vegetation/endocarditis may be contributing  • Hospitalization recently in 05/2023 and found to have valvular endocarditis on JESSICA in the setting of alpha strep bacteremia likely secondary to discitis/osteomyelitis  Systolic function was normal with EF 65% and normal wall motion  Likely diastolic dysfunction with dilated LA and PAF  Started on long-term IV antibiotics per ID rec at the time, through 6/30  • Elevated BNP 2,299 (was 838 in 5/2023)  Chronic nonischemic hs-cTn elevation since last admission (was >300); peaked and fell flat at 498 on admission  Low K 2 2 and Mag 1 7 on admission, repleted  Telemetry reviewed with no significant arrhythmias noted  • CXR showed evidence of pulmonary edema, new from the prior study, with increasing right and stable left pleural effusions    • Started on IV Lasix 40 mg bid with volume improved today with SOB resolved per patient and appeared euvolemic on exam, although did not seem to respond well with 24h  mL documented  • Patient adamant about going home today d/t unable to sleep well in the hospital for the past 2 days and has scheduled f/u appointment with his primary Cardiologist in the office tomorrow a m  Plan:  · Discharge home today   · Continue Kdur 40 mEq bid x total 3 doses  · Continue PTA Lopressor 25 mg Q12H  · Cardiology recommendations appreciated:  · Increase PTA torsemide 40 mg from daily to twice daily  · Start spironolactone 25 mg daily  · Repeat BMP outpatient tomorrow  · F/u with Dr Favian Anderson in a m  as scheduled  · Consider repeat Echo outpatient as deemed appropriate by primary Cardiologist     Acute bacterial endocarditis  Assessment & Plan  POA: JESSICA 5/19/23 showed The aortic valve is trileaflet  The leaflets are moderately thickened  The leaflets are moderately calcified  There is severely reduced mobility  There is moderate regurgitation  There is moderate to severe stenosis  The aortic valve mean gradient is 29 0 mmHg  The aortic valve area is 0 98 cm2  There are 3 small, mobile vegetations present on the on the aortic aspect of the valve  These are all located at the 3 commissures  The one located at the commissure of the non and left coronary cusps appears to be associated with a small perforation  · PICC in place and scheduled to receive IV ceftriaxone 2g daily at Infusion center, through June 30th   · Patient without clinical evidence of worsening infection and remains demodynamically stable  Plan:   · ID input appreciated - to resume IV CTX 2 g Q24H tomorrow, scheduled through 6/30/23  · CM confirmed chair tomorrow 10 a m  with infusion orders placed    Discitis of lumbar region  Assessment & Plan  · Last MRI of thoracic spine 5/19/2023 showed known L1-2 discitis/osteomyelitis, incompletely imaged    · Patient denies symptoms at this time  · IV antibiotics as above    Hypokalemia  Assessment & Plan  Recent Labs     06/05/23  1340 06/05/23  1934 06/06/23  0445   K 2 2* 2 4* 3 2*   MG 1 7*  --  2 0     · Etiology: Volume overload and secondary to diuretics as outpatient  Received IV repletion and started on Kdur 40 mEq bid on admission with serum K improved to 3 2 in a m  · Telemetry reviewed with no significant arrhythmias noted  Plan:  · Continue Kdur 40 mEq x 3 doses   · Repeat BMP as outpatient tomorrow and f/u results with primary Cardiologist       Primary hypertension  Assessment & Plan  BP Readings from Last 3 Encounters:   06/06/23 (!) 117/48   06/05/23 115/54   06/04/23 145/60     · BP controlled at goal  · Start on spironolactone 25 mg daily per Cardiology rec  · Continue home Lopressor and torsemide 40 mg bid  · F/u outpatient with PCP and/or Cardiologist        Medical Problems     Resolved Problems  Date Reviewed: 6/5/2023          Resolved    Hypomagnesemia 6/6/2023     Resolved by  Kristy Doherty MD    * (Principal) Acute on chronic heart failure with preserved ejection fraction (HFpEF) ([facility] [address] ) 6/6/2023     Resolved by  Kristy Doherty MD        Discharging Resident: Kristy Doherty MD  Discharging Attending: No att  providers found  PCP: Bobbi Melendrez MD  Admission Date:   Admission Orders (From admission, onward)     Ordered        06/05/23 1808  INPATIENT ADMISSION  Once                      Discharge Date: 06/06/23    Consultations During Hospital Stay:  · Cardiology   · Infectious Disease    Procedures Performed:   · None     Significant Findings / Test Results:   · Potassium 2 2 and Mg 1 7  · BNP 2,299  · Chronic cTn elevation   · Chest x-ray - evidence of pulmonary edema, new from the prior study, with increasing right and stable left pleural effusions  Incidental Findings:   · None    Test Results Pending at Discharge (will require follow up):   · None     Outpatient Tests Requested:  · BMP     Complications:  None    Reason for Admission: Acute exacerbation of chronic HFpEF    Hospital Course:   Gala Victoria is a 69 y o  male who presented to the hospital on 6/5/2023 initially due to 1 week h/o increasing dyspnea with exertion, orthopnea, and productive cough  Patient was recently hospitalized in 05/2023 d/t endocarditis in the setting of strep bacteremia d/t discitis/osteomyelitis and discharged with PICC line in place for long-term IV antibiotics  JESSICA at the time showed normal LVEF 65% and evidence of valvular vegetation with associated small perforation of commissure  He follows up with Cardiology in the outpatient setting with outpatient torsemide recently increased from daily to twice daily dosing with symptoms initially improved but acutely worsened over the past 2 days prompting him to come to the hospital  He was noted to be volume overloaded with b/l LE edema and CXR with evidence of new pulmonary edema with pleural effusions, consistent with acute exacerbation of HFpEF with underlying endocarditis may be contributing  He was also noted to have marked electrolyte abnormalities on presentation and received IV repletion of potassium and magnesium in the ED  He was started in IV Lasix 40 mg bid with Cardiology and ID consulted on admission  On hospital day 2, telemetry reviewed with no significant arrhythmias noted and electrolytes improved with repletion  Patient reports significantly improved respiratory status with SOB resolved and eager to be discharged home today  He appeared euvolemic on exam with clear lungs bilaterally although breath sounds were decreased  Cardiology recommended increase PTA torsemide 40 mg daily to twice daily with addition of spironolactone 25 mg daily and to f/u with primary Cardiologist tomorrow a m  as scheduled and to repeat BMP as outpatient   Per ID evaluation, PICC line in place with normal function and infusion orders were placed by ID for patient to restart outpatient IV antibiotics at the Infusion center tomorrow with chair availability confirmed by CM prior to discharge  Given that the patient is medically stable with acute exacerbation of HF and SOB resolved, he will be discharged home today with outpatient f/u with primary Cardiologist in the a m  scheduled  The patient was made aware of the discharge and agrees with the plan  Please see above list of diagnoses and related plan for additional information  The patient, initially admitted to the hospital as inpatient, was discharged earlier than expected given the following: Significant clinical improvement earlier than expected  Condition at Discharge: Stable    Discharge Day Visit / Exam:   Subjective: Patient was seen and examined at bedside this a m  in NAD  He reports feeling much better and his symptoms significantly improved since admission and denied any fever, chills, diaphoresis, chest pain, SOB at rest, n/v, palpitations, abdominal pain, or urinary symptoms  He remains medically stable with no significant events overnight and no acute concerns at this time  Vitals: Blood Pressure: (!) 117/48 (06/06/23 0742)  Pulse: 92 (06/06/23 0742)  Temperature: 98 6 °F (37 °C) (06/06/23 0742)  Temp Source: Oral (06/05/23 1335)  Respirations: 18 (06/06/23 0742)  Weight - Scale: 62 5 kg (137 lb 11 2 oz) (06/06/23 0537)  SpO2: 94 % (06/06/23 0742)    Physical Exam  Constitutional:       General: He is not in acute distress  Appearance: He is not toxic-appearing  HENT:      Head: Normocephalic and atraumatic  Mouth/Throat:      Mouth: Mucous membranes are moist    Cardiovascular:      Rate and Rhythm: Normal rate and regular rhythm  Pulses: Normal pulses  Heart sounds: Heart sounds are distant  No murmur heard  Pulmonary:      Effort: Pulmonary effort is normal  No tachypnea or respiratory distress  Breath sounds: Decreased air movement present  Decreased breath sounds present  No wheezing, rhonchi or rales     Abdominal:      General: Bowel sounds are normal       Palpations: Abdomen is soft  Tenderness: There is no abdominal tenderness  Musculoskeletal:         General: Normal range of motion  Cervical back: Normal range of motion and neck supple  Right lower leg: No edema  Left lower leg: No edema  Skin:     General: Skin is warm and dry  Findings: No lesion or rash  Neurological:      General: No focal deficit present  Mental Status: He is alert and oriented to person, place, and time  Psychiatric:         Behavior: Behavior normal          Judgment: Judgment normal         Discussion with Family: Patient declined call to   Discharge instructions/Information to patient and family:   See after visit summary for information provided to patient and family  Provisions for Follow-Up Care:  See after visit summary for information related to follow-up care and any pertinent home health orders  Disposition:   Home    Planned Readmission: No     Discharge Medications:  See after visit summary for reconciled discharge medications provided to patient and/or family        **Please Note: This note may have been constructed using a voice recognition system**

## 2023-06-06 NOTE — CASE MANAGEMENT
Case Management Discharge Planning Note    Patient name Rosemary Das  Location S /S -68 MRN [id number]  : 1945 Date 2023       Current Admission Date: 2023  Current Admission Diagnosis:Acute on chronic heart failure with preserved ejection fraction (HFpEF) [facility])   Patient Active Problem List    Diagnosis Date Noted   • Hypokalemia 2023   • Acute on chronic heart failure with preserved ejection fraction (HFpEF) ([facility] [address] ) 2023   • Anxiety 2023   • Acute bacterial endocarditis 2023   • Gastric distention 2023   • Discitis of lumbar region 2023   • Bacteremia due to Streptococcus 2023   • Non-ischemic myocardial injury (non-traumatic) 2023   • Sepsis ([facility] [address] ) 2023   • Shortness of breath 2023   • Chronic pain syndrome 05/15/2023   • Lumbar spondylosis 05/15/2023   • Chronic bilateral low back pain without sciatica 2023   • Primary hypertension 2023   • Atrial fibrillation ([facility] [address] ) 2023   • Aortic stenosis 2023   • Leukocytosis 2023   • Elevated D-dimer 2023   • Hyponatremia 2023   • Mixed hyperlipidemia 2021   • Abnormal glucose 2021   • BPH with obstruction/lower urinary tract symptoms 2021   • Primary osteoarthritis of right hand 2017      LOS (days): 1  Geometric Mean LOS (GMLOS) (days): 3 90  Days to GMLOS:3     OBJECTIVE:  Risk of Unplanned Readmission Score: 17 01         Current admission status: Inpatient   Preferred Pharmacy:   [address] [place] [address]  Phone: [phone] Fax: [phone]    Primary Care Provider: Kedar Thompson MD    Primary Insurance: Cathalene Primrose [facility] RE[facility]  Secondary Insurance:     DISCHARGE DETAILS:    Discharge planning discussed with[de-identified] Patient  Freedom of Choice: Yes  Comments - Freedom of Choice: Discussed need for transportation home and OP Infusion Appt  CM contacted family/caregiver?: No- see comments (Patient is alert and oriented - Declined CM outreach)  Were Treatment Team discharge recommendations reviewed with patient/caregiver?: Yes  Did patient/caregiver verbalize understanding of patient care needs?: Yes  Were patient/caregiver advised of the risks associated with not following Treatment Team discharge recommendations?: Yes    Contacts  Patient Contacts: Patient  Relationship to Patient[de-identified] Other (Comment)  Contact Method: In Person  Reason/Outcome: Discharge Planning, Continuity of [address]         Is the patient interested in [facility] at discharge?: No    DME Referral Provided  Referral made for DME?: No    Other Referral/Resources/Interventions Provided:  Interventions: Transportation, Other (Specify) (Outpatient Infusion)         Treatment Team Recommendation: Home  Discharge Destination Plan[de-identified] Home  Transport at Discharge : [facility]                                         CM notified that patient is medically stable for DC home today  CM was asked to confirm patient's outpatient Infusion schedule for tomorrow  CM called [facility] infusion [phone]  CM was able to confirm that patient is on the schedule for tomorrow 6/7/23 at 10:00 AM   This information was added to the AVS     CM updated SLIM that outpatient Infusion was all set up for tomorrow  Patient stated he will need a ride home  CM met with patient at bedside  He is able to travel by [facility]  Jose Roberto bravo reviewed and signed by patient  Jose Roberto will be requested once nursing lets CM know that patient is ready to leave  CM encouraged patient to follow up with all recommended appointments after discharge  Patient advised of importance for patient and family to participate in managing patient's medical well being

## 2023-06-06 NOTE — ASSESSMENT & PLAN NOTE
BP Readings from Last 3 Encounters:   06/06/23 (!) 117/48   06/05/23 115/54   06/04/23 145/60     · BP controlled at goal  · Start on spironolactone 25 mg daily per Cardiology rec  · Continue home Lopressor and torsemide 40 mg bid  · F/u outpatient with PCP and/or Cardiologist

## 2023-06-06 NOTE — ASSESSMENT & PLAN NOTE
Recent Labs     06/05/23  1340 06/05/23  1934 06/06/23  0445   K 2 2* 2 4* 3 2*   MG 1 7*  --  2 0     · Etiology: Volume overload and secondary to diuretics as outpatient  Received IV repletion and started on Kdur 40 mEq bid on admission with serum K improved to 3 2 in a m  · Telemetry reviewed with no significant arrhythmias noted      Plan:  · Continue Kdur 40 mEq x 3 doses   · Repeat BMP as outpatient tomorrow and f/u results with primary Cardiologist

## 2023-06-06 NOTE — UTILIZATION REVIEW
Initial Clinical Review    Admission: Date/Time/Statement:   Admission Orders (From admission, onward)     Ordered        06/05/23 1808  INPATIENT ADMISSION  Once                      Orders Placed This Encounter   Procedures   • INPATIENT ADMISSION     Standing Status:   Standing     Number of Occurrences:   1     Order Specific Question:   Level of Care     Answer:   Med Surg [16]     Order Specific Question:   Estimated length of stay     Answer:   More than 2 Midnights     Order Specific Question:   Certification     Answer:   I certify that inpatient services are medically necessary for this patient for a duration of greater than two midnights  See H&P and MD Progress Notes for additional information about the patient's course of treatment  ED Arrival Information     Expected   -    Arrival   6/5/2023 13:13    Acuity   Urgent            Means of arrival   Walk-In    Escorted by   Family Member    Service   Hospitalist    Admission type   Emergency            Arrival complaint   Severe SOB            Chief Complaint   Patient presents with   • Shortness of Breath     Pt reports SOB with exertion over the last few days  +chest heaviness  Pt being treated for endocarditis        Initial Presentation: 69 y o  male  with a PMH of CHFNYHA class 3c with left ventricular dilation, and recent hospital admission with current IV antibiotic therapy for endocarditis andlumbar discitis/ osteomyelitis(IV ceftriaxone scheduled through 6/30/2023)  who presents to ED from home  with several day history of worsening orthopnea despite diuresis with torsemide  SOB most severe when supine, sleeping in recliner  On exam, on RA with O2 sat low 90's  Rhonchi notes, BLE edema, L >R , DOEM, productive cough  PICC in place w/o s/s infection   Labs -  BNP  2,299, troponins elevated to >400, consistent with prior presentation  K 2 2, Mag 1  7 low sodium 130    No ischemic changes from prior noted on EKG   CXR shows small right pleural effusion with evidence of bilateral pulmonary edema  Pt given IV lasix , K and Mag repletion  in ED  Pt admitted as Inpatient to telemetry with vol overload 2/2 CHF decompensation, lyte abnormalities, acute bacterial endocarditis  PLan - telemetry, cardiology and ID consult, IV Lasix 40 mg BID, metoprolol  Salt and fluid restriction   I/O, daily wt  BMP, magnesium in am   IV Ceftriaxone  Date: 6/6   Day 2:    ID consult- 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  L1-L2 vertebral osteomyelitis/discitis    RUE PICC   continue IV ceftriaxone through 6/30/2023 for 6-weeks of IV antibiotic treatment  weekly CBCD and CMP while on IV antibiotic  recommend repeat echocardiogram given new presentation of CHF   Cardio consult-BNP 2299 (up from 838 on 5/16)   CXR with pleural effusions and vascular congestion  I/O limited, -220 ml last 24 hrs   On exam, lungs diminished bilaterally, fine crackles at bases  On room air    Moderate to severe AS, moderate AI- Eventual CT surgery consultation once completes 6 weeks of antibiotics  Pt in NSR on telemetry with hx PAF  Plan - increase Lasix to 80 mg IV BID, strict I/O, daily standing wts   Added spironolactone today  Can d/c telemetry  2 Gm Na diet with FR 1500 ml   Replete and monitor lytes , keep K >4         Update- pt written for d/c to home, continue iv abx infusion as outpt    ED Triage Vitals   Temperature Pulse Respirations Blood Pressure SpO2   06/05/23 1335 06/05/23 1332 06/05/23 1332 06/05/23 1335 06/05/23 1332   (!) 97 4 °F (36 3 °C) 83 18 118/57 97 %      Temp Source Heart Rate Source Patient Position - Orthostatic VS BP Location FiO2 (%)   06/05/23 1335 06/05/23 1332 06/05/23 1332 06/05/23 1332 --   Oral Monitor Lying Left arm       Pain Score       06/05/23 2003       No Pain          Wt Readings from Last 1 Encounters:   06/06/23 62 5 kg (137 lb 11 2 oz)     06/01/23 66 7 kg (147 lb)   05/23/23 65 1 kg (143 lb 9 6 oz)   05/15/23 68 6 kg (151 lb 3 2 oz)       Additional Vital Signs:   Date/Time Temp Pulse Resp BP MAP (mmHg) SpO2   06/06/23 07:42:53 98 6 °F (37 °C) 92 18 117/48 Abnormal  71 94 %   06/05/23 22:14:18 98 9 °F (37 2 °C) 84 16 121/49 Abnormal  73 90 %   06/05/23 2100 -- -- -- -- -- --   06/05/23 2030 -- -- -- -- -- --   06/05/23 20:21:59 97 6 °F (36 4 °C) 84 17 122/51 75 94 %   06/05/23 2003 -- 80 18 132/64 -- 94 %   06/05/23 1800 -- 80 18 125/59 85 94 %   06/05/23 1700 -- 83 18 140/65 94 95 %   06/05/23 1615 -- 79 18 154/64 92 93 %     Pertinent Labs/Diagnostic Test Results:    6/5 ECG- Normal sinus rhythm  Possible Left atrial enlargement  Left ventricular hypertrophy with repolarization abnormality  XR chest 1 view portable   ED Interpretation by Todd Haider DO (06/05 1543)   Vascular pulmonary congestion, right-sided pleural effusion, increased prominence from prior study on 5/23/23  Final Result by Chris King MD (71/18 4647)      Evidence of pulmonary edema, new from the prior study, with increasing right and stable left pleural effusions  This report is in agreement with the preliminary interpretation                 Workstation performed: [id number]               Results from last 7 days   Lab Units 06/06/23 0445 06/05/23 1934 06/05/23  1340 06/05/23  1132   HEMATOCRIT % 32 5*  --  33 9* 30 7*   HEMOGLOBIN g/dL 10 8*  --  11 2* 10 2*   NEUTROS ABS Thousands/µL  --   --  7 79* 7 79*   PLATELETS Thousands/uL 353 344 355 334   WBC Thousand/uL 14 28*  --  9 49 9 89         Results from last 7 days   Lab Units 06/06/23 0445 06/05/23 1934 06/05/23  1340 06/05/23  1132   ANION GAP mmol/L 9  --  13 12   BUN mg/dL 26*  --  23 22   CALCIUM mg/dL 8 7  --  8 8 8 4   CHLORIDE mmol/L 93*  --  87* 88*   CO2 mmol/L 30  --  30 31   CREATININE mg/dL 1 10  --  1 13 1 14   EGFR ml/min/1 73sq m 64  --  62 61   POTASSIUM mmol/L 3 2* 2 4* 2 2* 2 3*   MAGNESIUM mg/dL 2 0  --  1 7*  --    PHOSPHORUS mg/dL 2 7  --   --   -- SODIUM mmol/L 132*  --  130* 131*     Results from last 7 days   Lab Units 06/05/23  1340 06/05/23  1132   ALBUMIN g/dL 3 3* 3 1*   ALK PHOS U/L 53 51   ALT U/L 25 12   AST U/L 22 14   TOTAL BILIRUBIN mg/dL 0 32 0 45   TOTAL PROTEIN g/dL 6 3* 5 7*         Results from last 7 days   Lab Units 06/06/23  0445 06/05/23  1340 06/05/23  1132   GLUCOSE RANDOM mg/dL 131 147* 130               Results from last 7 days   Lab Units 06/05/23  1810 06/05/23  1607 06/05/23  1340   HS TNI 0HR ng/L  --   --  478*   HS TNI 2HR ng/L  --  498*  --    HS TNI 4HR ng/L 351*  --   --    HSTNI D2 ng/L  --  20*  --    HSTNI D4 ng/L -127  --   --            Results from last 7 days   Lab Units 06/05/23  1340   BNP pg/mL 2,299*                         Results from last 7 days   Lab Units 06/05/23  1132   CRP mg/L 122 0*   SED RATE mm/hour 38*                 ED Treatment:   Medication Administration from 06/05/2023 1313 to 06/05/2023 2011       Date/Time Order Dose Route Action     06/05/2023 1604 EDT potassium chloride (K-DUR,KLOR-CON) CR tablet 40 mEq 40 mEq Oral Given     06/05/2023 1603 EDT potassium chloride 20 mEq IVPB (premix) 20 mEq Intravenous New Bag     06/05/2023 1810 EDT furosemide (LASIX) injection 40 mg 40 mg Intravenous Given     06/05/2023 1816 EDT magnesium sulfate 2 g/50 mL IVPB (premix) 2 g 2 g Intravenous New Bag        History reviewed  No pertinent past medical history    Present on Admission:  • Primary hypertension  • Acute bacterial endocarditis  • Discitis of lumbar region  • Hypokalemia  • (Resolved) Hypomagnesemia      Admitting Diagnosis: Hypokalemia [E87 6]  Hypomagnesemia [E83 42]  Endocarditis [I38]  CHF (congestive heart failure) (HCC) [I50 9]  Hyponatremia [E87 1]  SOB (shortness of breath) [R06 02]  Elevated troponin [R77 8]  Age/Sex: 69 y o  male  Admission Orders:  Scheduled Medications:  cefTRIAXone, 2,000 mg, Intravenous, Q24H  enoxaparin, 40 mg, Subcutaneous, Daily  furosemide, 80 mg, Intravenous, BID (diuretic)  metoprolol tartrate, 25 mg, Oral, Q12H JARRETT  potassium chloride, 40 mEq, Oral, BID  spironolactone, 25 mg, Oral, Daily    potassium chloride 40 mEq IVPB (premix)  Dose: 40 mEq  Freq: Once Route: IV x1 6/5 , x1 6/6  LORazepam (ATIVAN) injection 0 5 mg  Dose: 0 5 mg  Freq: Once Route: IV  Start: 06/05/23 2345 End: 06/05/23 2341  furosemide (LASIX) injection 40 mg  Dose: 40 mg  Freq: 2 times daily (diuretic) Route: IV  Start: 06/06/23 0800 End: 06/06/23 1106  furosemide (LASIX) injection 40 mg  Dose: 40 mg  Freq: Once Route: IV  Start: 06/06/23 1100 End: 06/06/23 1115  Continuous IV Infusions:     PRN Meds:     Telemetry   daily wt  I/O   SCD's  4 Gm na , FR 1500 ml        IP CONSULT TO CARDIOLOGY  IP CONSULT TO INFECTIOUS DISEASES    Network Utilization Review Department  ATTENTION: Please call with any questions or concerns to [phone] and carefully listen to the prompts so that you are directed to the right person  All voicemails are confidential   Jaye Galvan all requests for admission clinical reviews, approved or denied determinations and any other requests to dedicated fax number below belonging to the campus where the patient is receiving treatment   List of dedicated fax numbers for the Facilities:  [address] DENIALS (Administrative/Medical Necessity) [phone]   [address] (Maternity/NICU/Pediatrics) [phone]   [address] [phone]   [facility] [phone]   [facility] [phone]   [facility] [place] [address] [address] [place] [id number] [place] [address] [address] [place]   [id number] [place] [address] [phone]   [facility] [address] [place] [address] [phone]

## 2023-06-06 NOTE — DISCHARGE INSTR - AVS FIRST PAGE
Dear Kyle Otto,     It was our pleasure to care for you here at [facility]  It is our hope that we were always able to exceed the expected standards for your care during your stay  You were hospitalized due to acute exacerbation of congestive heart failure  You were cared for on the [facility] 4th floor by Kaylyn Barnett MD under the service of Marck Prieto MD with the [facility] who covers for your primary care physician (PCP), Donnie Frazier MD, while you were hospitalized  If you have any questions or concerns related to this hospitalization, you may contact us at [phone]  For follow up as well as any medication refills, we recommend that you follow up with your primary care physician  A registered nurse will reach out to you by phone within a few days after your discharge to answer any additional questions that you may have after going home  However, at this time we provide for you here, the most important instructions / recommendations at discharge:       Notable Medication Adjustments -   Torsemide changed from 40 mg daily to 40 mg twice a day   Start taking Spironolactone 25 mg daily  Take Kdur (potassium chloride) 40 mEq every 12 hours for total of 3 doses  Testing Required after Discharge -   Please complete BMP blood work tomorrow as ordered by your primary Cardiologist and follow up the results with your doctor  Important follow up information -   Please follow-up with your primary care physician within 1 week of discharge  Please follow-up with your primary Cardiologist as scheduled tomorrow (6/7)  Please follow-up at the [facility] tomorrow (6/7) at 10 a m  to restart your IV antibiotics as scheduled by the Infectious Disease doctor     Other Instructions -   None   Please review this entire after visit summary as additional general instructions including medication list, appointments, activity, diet, any pertinent wound care, and other additional recommendations from your care team that may be provided for you        Sincerely,     Ander Lombard, MD

## 2023-06-06 NOTE — ASSESSMENT & PLAN NOTE
· Last MRI of thoracic spine 5/19/2023 showed known L1-2 discitis/osteomyelitis, incompletely imaged    · Patient denies symptoms at this time  · IV antibiotics as above

## 2023-06-06 NOTE — ASSESSMENT & PLAN NOTE
Wt Readings from Last 3 Encounters:   06/06/23 62 5 kg (137 lb 11 2 oz)   06/01/23 66 7 kg (147 lb)   05/23/23 65 1 kg (143 lb 9 6 oz)       Lab Results   Component Value Date    BNP 2,299 (H) 06/05/2023     (H) 05/16/2023    LVEF 65 05/19/2023       POA: Patient with PMH of HFpEF presented with increased dyspnea on exertion, orthopnea, and productive cough  Noted to be volume overloaded with LE edema on admission  Primary Cardiologist, Dr Ronal Heaton, recently increased torsemide dose from 40 mg daily to bid with initial improved symptoms but acutely worsened over past 2 days  Hemodynamically stable with SpO2 in mid 90s on RA  Possible mild exacerbation of CHF +/- known valvular vegetation/endocarditis may be contributing  • Hospitalization recently in 05/2023 and found to have valvular endocarditis on JESSICA in the setting of alpha strep bacteremia likely secondary to discitis/osteomyelitis  Systolic function was normal with EF 65% and normal wall motion  Likely diastolic dysfunction with dilated LA and PAF  Started on long-term IV antibiotics per ID rec at the time, through 6/30  • Elevated BNP 2,299 (was 838 in 5/2023)  Chronic nonischemic hs-cTn elevation since last admission (was >300); peaked and fell flat at 498 on admission  Low K 2 2 and Mag 1 7 on admission, repleted  Telemetry reviewed with no significant arrhythmias noted  • CXR showed evidence of pulmonary edema, new from the prior study, with increasing right and stable left pleural effusions  • Started on IV Lasix 40 mg bid with volume improved today with SOB resolved per patient and appeared euvolemic on exam, although did not seem to respond well with 24h  mL documented  • Patient adamant about going home today d/t unable to sleep well in the hospital for the past 2 days and has scheduled f/u appointment with his primary Cardiologist in the office tomorrow a m      Plan:  · Discharge home today   · Continue Kdur 40 mEq bid x total 3 doses  · Continue PTA Lopressor 25 mg Q12H  · Cardiology recommendations appreciated:  · Increase PTA torsemide 40 mg from daily to twice daily  · Start spironolactone 25 mg daily  · Repeat BMP outpatient tomorrow  · F/u with Dr Cristina Malone in a m  as scheduled  · Consider repeat Echo outpatient as deemed appropriate by primary Cardiologist

## 2023-06-06 NOTE — ASSESSMENT & PLAN NOTE
POA: JESSICA 5/19/23 showed The aortic valve is trileaflet  The leaflets are moderately thickened  The leaflets are moderately calcified  There is severely reduced mobility  There is moderate regurgitation  There is moderate to severe stenosis  The aortic valve mean gradient is 29 0 mmHg  The aortic valve area is 0 98 cm2  There are 3 small, mobile vegetations present on the on the aortic aspect of the valve  These are all located at the 3 commissures  The one located at the commissure of the non and left coronary cusps appears to be associated with a small perforation  · PICC in place and scheduled to receive IV ceftriaxone 2g daily at Infusion center, through June 30th   · Patient without clinical evidence of worsening infection and remains demodynamically stable      Plan:   · ID input appreciated - to resume IV CTX 2 g Q24H tomorrow, scheduled through 6/30/23  · CM confirmed chair tomorrow 10 a m  with infusion orders placed

## 2023-06-06 NOTE — CONSULTS
Consultation - Cardiology Team One  Rosemary Owens 69 y o  male MRN: [id number]  Unit/Bed#: S -31 Encounter: [id number]    Inpatient consult to Cardiology  Consult performed by: JESSICA Teran  Consult ordered by: Rodrigo Tatum MD      Physician Requesting Consult: Andreea Chen MD  Reason for Consult / Principal Problem: CHF, endocarditis    Assessment    1  Acute on chronic HFpEF  BNP 2299 (up from 838 on 5/16)  CXR with pleural effusions and vascular congestion  Presented with PND, LE edema, and LUNA  Home diuretic: recently increased to torsemide 40 mg BID  Current diuretic: Lasix 40 mg IV BID  I/O: very limited  Weight: 137 lb (discharge weight 143 lb on 5/23)  2  Moderate to severe AS, moderate AI  Eventual CT surgery consultation once completes 6 weeks of antibiotics  3  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- on IV ceftriaxone  4  L1-L2 vertebral discitis/osteomyelitis  5  Paroxysmal atrial fibrillation- maintaining NSR  Not on anticoagulation  On metoprolol tartrate 25 mg BID  6  HTN- controlled, average /57  7  HLD- LDL 63  Used to take rosuvastatin at home  8  Pre diabetes- A1c 5 8%  9  Hypokalemia- K 2 2 on admission, improved to 3 2 this morning  10  Nonischemic myocardial injury in setting of acute CHF    Plan  · Increase Lasix to 80 mg IV BID  · Strict I/Os, daily standing weights, am BMP  · Add spironolactone 25 mg daily  · Can discontinue telemetry from cardiology standpoint  · 2 gm Na diet with 1500 mL FR  · Replete K, goal >4    History of Present Illness   HPI: Rosemary Owens is a 69y o  year old male with recently diagnosed aortic valve endocarditis in setting of lumbar discitis/osteomyelitis, moderate to severe AS and moderate AI, chronic HFpEF, paroxysmal atrial fibrillation not on anticoagulation, prediabetes, HTN, and HLD  He follows with cardiologist Dr Alida Lane and was last seen while hospitalized in May  He was discharged on torsemide 40 mg daily  He was supposed to follow-up with the nurse practitioner on 6/1/2023 but this was canceled and rescheduled for 6/7/2023  Last week he noticed increasing exertional dyspnea, LE edema, and PND  He called the cardiology office and Dr Angelina Lunsford increased his torsemide to 40 mg twice daily  He did notice improvement initially but then had recurrence of nocturnal symptoms the night prior to admission so he presented to the ED for further evaluation  BNP 2,299 which is higher than previous admission at which time it was only 838  High-sensitivity troponin is mildly elevated but flat in the 400s  Initial labs revealed hyponatremia with sodium 130, hypokalemia K2 2, creatinine 1 13, and magnesium 1 7  Potassium has been repleted and he was given Lasix 40 mg IV in the ED  He reports poor response to this dose in terms of his urine output however he is more comfortable from a breathing standpoint this morning  He is anxious to go home because he has not slept well in the last 2 nights and anticipates similar problems if he has to stay another night  Cardiology is consulted for further evaluation management of his CHF exacerbation  EKG reviewed personally: NSR with LVH and ST/T wave abnormalities    Telemetry reviewed personally: NSR    Review of Systems   Constitutional: Negative for chills, decreased appetite, malaise/fatigue and weight gain  Cardiovascular: Positive for dyspnea on exertion, leg swelling, orthopnea and paroxysmal nocturnal dyspnea  Negative for chest pain, palpitations and syncope  Respiratory: Positive for cough  Negative for shortness of breath, sleep disturbances due to breathing and sputum production  Gastrointestinal: Negative for bloating, nausea and vomiting  Neurological: Negative for dizziness, light-headedness and weakness  Psychiatric/Behavioral: Negative for altered mental status  All other systems reviewed and are negative      Historical Information   History reviewed  No pertinent past medical history  Past Surgical History:   Procedure Laterality Date   • SKIN BIOPSY       Social History     Substance and Sexual Activity   Alcohol Use Not Currently     Social History     Substance and Sexual Activity   Drug Use Not Currently     Social History     Tobacco Use   Smoking Status Former   • Packs/day: 0 50   • Years: 20 00   • Total pack years: 10 00   • Types: Cigarettes   • Quit date: 3/11/2005   • Years since quittin 2   Smokeless Tobacco Never     Family History:   Family History   Problem Relation Age of Onset   • Colon cancer Mother    • Lung cancer Father    • Mental illness Neg Hx        Meds/Allergies   all current active meds have been reviewed and current meds:   Current Facility-Administered Medications   Medication Dose Route Frequency   • ceftriaxone (ROCEPHIN) 2 g/50 mL in dextrose IVPB  2,000 mg Intravenous Q24H   • enoxaparin (LOVENOX) subcutaneous injection 40 mg  40 mg Subcutaneous Daily   • furosemide (LASIX) injection 40 mg  40 mg Intravenous BID (diuretic)   • metoprolol tartrate (LOPRESSOR) tablet 25 mg  25 mg Oral Q12H [address]   • potassium chloride (K-DUR,KLOR-CON) CR tablet 40 mEq  40 mEq Oral BID     Facility-Administered Medications Ordered in Other Encounters   Medication Dose Route Frequency   • [MAR Hold] alteplase (CATHFLO) injection 2 mg  2 mg Intracatheter Q1MIN PRN   • [MAR Hold] alteplase (CATHFLO) injection 2 mg  2 mg Intracatheter Q1MIN PRN        No Known Allergies    Objective   Vitals: Blood pressure (!) 117/48, pulse 92, temperature 98 6 °F (37 °C), resp  rate 18, weight 62 5 kg (137 lb 11 2 oz), SpO2 94 %  , Body mass index is 22 23 kg/m²  ,     Systolic (43MNC), WGF:129 , Min:115 , VTO:165     Diastolic (36RSE), DPZ:33, Min:48, Max:65      Intake/Output Summary (Last 24 hours) at 2023 0906  Last data filed at 2023 0709  Gross per 24 hour   Intake 480 ml   Output 700 ml   Net -220 ml     Wt Readings from Last 3 Encounters: 06/06/23 62 5 kg (137 lb 11 2 oz)   06/01/23 66 7 kg (147 lb)   05/23/23 65 1 kg (143 lb 9 6 oz)     Invasive Devices     Peripherally Inserted Central Catheter Line  Duration           PICC Line 05/23/23 Right Brachial 13 days          Peripheral Intravenous Line  Duration           Peripheral IV 06/05/23 Left Antecubital <1 day              Physical Exam  Vitals reviewed  Constitutional:       General: He is not in acute distress  Neck:      Vascular: No hepatojugular reflux or JVD  Cardiovascular:      Rate and Rhythm: Normal rate and regular rhythm  Heart sounds: Murmur heard  Systolic murmur is present  No friction rub  No gallop  Pulmonary:      Effort: Pulmonary effort is normal  No respiratory distress  Breath sounds: No rales  Comments: Diminished bilaterally, fine crackles at bases  On room air  Abdominal:      General: Bowel sounds are normal  There is no distension  Palpations: Abdomen is soft  Tenderness: There is no abdominal tenderness  Musculoskeletal:         General: No tenderness  Normal range of motion  Cervical back: Neck supple  Right lower leg: No edema  Left lower leg: No edema  Skin:     General: Skin is warm and dry  Capillary Refill: Capillary refill takes less than 2 seconds  Findings: No erythema  Neurological:      Mental Status: He is alert and oriented to person, place, and time     Psychiatric:         Mood and Affect: Mood normal        LABORATORY RESULTS:      CBC with diff:   Results from last 7 days   Lab Units 06/06/23  0445 06/05/23  1934 06/05/23  1340 06/05/23  1132   HEMATOCRIT % 32 5*  --  33 9* 30 7*   HEMOGLOBIN g/dL 10 8*  --  11 2* 10 2*   MCH pg 29 5  --  29 6 29 1   MCHC g/dL 33 2  --  33 0 33 2   MCV fL 89  --  89 88   MPV fL 9 3 9 2 9 3 9 1   NRBC AUTO /100 WBCs  --   --  0 0   PLATELETS Thousands/uL 353 344 355 334   RBC Million/uL 3 66*  --  3 79* 3 50*   RDW % 15 0  --  15 1 14 8   WBC Thousand/uL 14 28*  --  9 49 9 89     CMP:  Results from last 7 days   Lab Units 23  1340 23  1132   ALK PHOS U/L  --   --  53 51   ALT U/L  --   --  25 12   AST U/L  --   --  22 14   BUN mg/dL 26*  --  23 22   CALCIUM mg/dL 8 7  --  8 8 8 4   CHLORIDE mmol/L 93*  --  87* 88*   CO2 mmol/L 30  --  30 31   CREATININE mg/dL 1 10  --  1 13 1 14   EGFR ml/min/1 73sq m 64  --  62 61   POTASSIUM mmol/L 3 2* 2 4* 2 2* 2 3*       BMP:  Results from last 7 days   Lab Units 23  1340 23  1132   BUN mg/dL 26*  --  23 22   CALCIUM mg/dL 8 7  --  8 8 8 4   CHLORIDE mmol/L 93*  --  87* 88*   CO2 mmol/L 30  --  30 31   CREATININE mg/dL 1 10  --  1 13 1 14   POTASSIUM mmol/L 3 2* 2 4* 2 2* 2 3*       Lab Results   Component Value Date    CREATININE 1 10 2023    CREATININE 1 13 2023    CREATININE 1 14 2023     Results from last 7 days   Lab Units 23  1340   MAGNESIUM mg/dL 2 0 1 7*       Lipid Profile:   Lab Results   Component Value Date    CHOL 173 2017     Lab Results   Component Value Date    HDL 72 2023    HDL 70 2021    HDL 46 2019     Lab Results   Component Value Date    LDLCALC 63 2023    LDLCALC 116 (H) 2021    LDLCALC 76 2019     Lab Results   Component Value Date    TRIG 68 2023    TRIG 78 2021    TRIG 59 2019     Cardiac testing:   Results for orders placed during the hospital encounter of 10/08/21    Echo complete with contrast if indicated    Narrative  [address] [place] [address]    Transthoracic Echocardiogram  2D, M-mode, Doppler, and Color Doppler    Study date:  08-Oct-2021    Patient: Regino Schulz  MR number: [id number]  Account number: [de-identified]  : 1945  Age: 77 years  Gender: Male  Status: Outpatient  Location: [facility]  Height: 68 in  Weight: 144 lb  BP: 112/ 58 mmHg    Indications: Murmur    Diagnoses: R01 1 - Cardiac murmur, unspecified    Sonographer:  AUBREE Enrique  [facility]  Referring Physician:  Batsheva Vavlerde DO  Group:  [facility]  Interpreting Physician:  Fawn Corcoran MD    IMPRESSIONS:  The presence of any assoicated symptoms should prompt further w/u and/or referral to cardiology  SUMMARY    LEFT VENTRICLE:  Systolic function was normal  Ejection fraction was estimated to be 60 %  There were no regional wall motion abnormalities  Doppler parameters were consistent with abnormal left ventricular relaxation (grade 1 diastolic dysfunction)  LEFT ATRIUM:  The atrium was mildly dilated  MITRAL VALVE:  There was trace regurgitation  AORTIC VALVE:  The valve was trileaflet  Leaflets exhibited markedly increased thickness and marked calcification  Transaortic velocity was increased due to valvular stenosis  There was moderate stenosis  Valve mean gradient was 30 mmHg  Aortic valve area was 1 2 cmï¾² by the continuity equation  HISTORY: PRIOR HISTORY: HLD, Former smoker    PROCEDURE: The study was performed in the [facility]  This was a routine study  The transthoracic approach was used  The study included complete 2D imaging, M-mode, complete spectral Doppler, and color Doppler  The heart rate was 66  bpm, at the start of the study  Images were obtained from the parasternal, apical, subcostal, and suprasternal notch acoustic windows  Image quality was adequate  LEFT VENTRICLE: Size was normal  Systolic function was normal  Ejection fraction was estimated to be 60 %  There were no regional wall motion abnormalities  Wall thickness was normal  DOPPLER: Doppler parameters were consistent with  abnormal left ventricular relaxation (grade 1 diastolic dysfunction)      RIGHT VENTRICLE: The size was normal  Systolic function was normal  Wall thickness was normal     LEFT ATRIUM: The atrium was mildly dilated  RIGHT ATRIUM: Size was normal     MITRAL VALVE: Valve structure was normal  There was normal leaflet separation  DOPPLER: The transmitral velocity was within the normal range  There was no evidence for stenosis  There was trace regurgitation  AORTIC VALVE: The valve was trileaflet  Leaflets exhibited markedly increased thickness and marked calcification  DOPPLER: Transaortic velocity was increased due to valvular stenosis  There was moderate stenosis  There was no significant  regurgitation  TRICUSPID VALVE: The valve structure was normal  There was normal leaflet separation  DOPPLER: The transtricuspid velocity was within the normal range  There was no evidence for stenosis  There was no significant regurgitation  PULMONIC VALVE: Leaflets exhibited normal thickness, no calcification, and normal cuspal separation  DOPPLER: The transpulmonic velocity was within the normal range  There was no significant regurgitation  PERICARDIUM: There was no pericardial effusion  The pericardium was normal in appearance  AORTA: The root exhibited normal size  SYSTEMIC VEINS: IVC: The inferior vena cava was normal in size  MEASUREMENT TABLES    DOPPLER MEASUREMENTS  Aortic valve   (Reference normals)  Peak gradient   52 mmHg   (--)  Mean gradient   30 mmHg   (--)  Valve area, cont   1 2 cmï¾²   (--)    SYSTEM MEASUREMENT TABLES    2D  %FS: 44 35 %  Ao Diam: 3 45 cm  EDV(Teich): 109 24 ml  EF(Teich): 75 51 %  ESV(Teich): 26 75 ml  IVSd: 0 95 cm  LA Diam: 3 29 cm  LAAs A4C: 24 62 cm2  LAESV A-L A4C: 87 65 ml  LAESV MOD A4C: 82 2 ml  LALs A4C: 5 87 cm  LVEDV MOD A4C: 90 82 ml  LVEF MOD A4C: 61 3 %  LVESV MOD A4C: 35 15 ml  LVIDd: 4 83 cm  LVIDs: 2 69 cm  LVLd A4C: 8 05 cm  LVLs A4C: 7 15 cm  LVOT Diam: 2 24 cm  LVPWd: 0 98 cm  RAAs A4C: 19 81 cm2  RAESV A-L: 54 93 ml  RAESV MOD: 55 35 ml  RALs: 6 07 cm  RVIDd: 3 96 cm  RWT: 0 41  SV MOD A4C: 55 67 ml  SV(Teich): 82 48 ml    CW  AV Env  Ti: 351 41 ms  AV VTI: 89 64 cm  AV Vmax: 3 46 m/s  AV Vmean: 2 49 m/s  AV maxP mmHg  AV meanP 9 mmHg    MM  TAPSE: 2 14 cm    PW  MARVIN (VTI): 1 1 cm2  MARVIN Vmax: 1 15 cm2  E': 0 06 m/s  E/E': 10 15  LVOT Env  Ti: 367 06 ms  LVOT VTI: 25 04 cm  LVOT Vmax: 1 01 m/s  LVOT Vmean: 0 69 m/s  LVOT maxP 15 mmHg  LVOT meanP 21 mmHg  LVSV Dopp: 99 ml  MV A Denis: 0 91 m/s  MV Dec Johnson: 1 93 m/s2  MV DecT: 343 04 ms  MV E Denis: 0 65 m/s  MV E/A Ratio: 0 72    [facility] Accredited Echocardiography Laboratory    Prepared and electronically signed by    Vanessa Ruiz MD  Signed 08-Oct-2021 09:24:29    Imaging: I have personally reviewed pertinent reports  and I have personally reviewed pertinent films in PACS  XR chest 1 view portable    Result Date: 2023  Narrative: CHEST INDICATION:   SOB  COMPARISON: 2023 EXAM PERFORMED/VIEWS:  XR CHEST PORTABLE FINDINGS: There are median sternotomy wires indicating prior cardiac surgery  Cardiomediastinal silhouette appears unremarkable  There are new right greater than left groundglass airspace opacities consistent with pulmonary edema  There is an increased small to moderate right pleural effusion and small stable left pleural effusion  Osseous structures appear within normal limits for patient age  Impression: Evidence of pulmonary edema, new from the prior study, with increasing right and stable left pleural effusions  This report is in agreement with the preliminary interpretation  Workstation performed: [id number]     XR chest PICC line portable    Result Date: 2023  Narrative: CHEST INDICATION:   picc PLACEMENT stat WET READ PLEASE  COMPARISON: 2023 EXAM PERFORMED/VIEWS:  XR CHEST PICC LINE PORTABLE AP semierect Images: 2 FINDINGS: Right upper extremity PICC line placed since the prior study  The tip of the line is at the cavoatrial junction  Cardiomediastinal silhouette appears stable and unremarkable   Partial clearing of the pulmonary edema  Trace residual pleural effusions  No focal consolidation or pneumothorax  No acute osseous abnormalities  Calcific tendinitis at the left shoulder  Impression: Improving pulmonary edema with trace residual bilateral pleural effusions  Right upper extremity PICC line tip at the cavoatrial junction  Workstation performed: [id number]     CT facial bones w contrast    Result Date: 5/19/2023  Narrative: CT FACIAL SOFT TISSUES WITH INTRAVENOUS CONTRAST INDICATION:   Nasal abscess Maxillary/facial abscess Sublingual/submandibular abscess Strep Mitis Oralis Bacteremia, assess for oral source of infection  COMPARISON: None  TECHNIQUE:  Axial images through the facial soft tissues were performed  Multiplanar 2D reformatted images were created from the source data  Radiation dose length product (DLP) for this visit:  558 mGy-cm   This examination, like all CT scans performed in the [facility], was performed utilizing techniques to minimize radiation dose exposure, including the use of iterative reconstruction and automated exposure control  IV Contrast:  85 mL of iohexol (OMNIPAQUE) IMAGE QUALITY:  Diagnostic  FINDINGS: SOFT TISSUES: No abscess formation  No hematoma  DENTITION: Normal dentition  FACIAL BONES: There is no facial bone fracture identified  No discrete lytic or blastic lesion  No destructive lesions  ORBITS: Normal globes  Preseptal and retrobulbar soft tissues are unremarkable  SINUSES: The paranasal sinuses are clear  Impression: There is no suspicious drainable fluid/abscess collection  Workstation performed: [id number]     MRI thoracic spine w wo contrast    Result Date: 5/19/2023  Narrative: MRI THORACIC SPINE WITH AND WITHOUT CONTRAST INDICATION: l1/l2 diskitis  COMPARISON: MRI lumbar spine 5/17/2023 TECHNIQUE:  Multiplanar, multisequence imaging of the thoracic spine was performed before and after gadolinium administration     IV Contrast:  6 mL of Gadobutrol injection (SINGLE-DOSE) IMAGE QUALITY:  Diagnostic  FINDINGS: ALIGNMENT:  Normal alignment of the thoracic spine  No compression fracture  No subluxation  No evidence of scoliosis  MARROW SIGNAL: Known L1-2 discitis/osteomyelitis, incompletely imaged on this study   L1 vertebral body edema is again noted  THORACIC CORD:  Normal signal within the thoracic cord  PREVERTEBRAL AND PARASPINAL SOFT TISSUES:   Normal  THORACIC DEGENERATIVE CHANGE: Central disc protrusion is noted at T9-10 without central canal narrowing  T11-12 disc bulge without central canal narrowing  POSTCONTRAST: Patchy enhancement is again noted within the visualized L1 vertebral body  There is no suspicious spinal collection OTHER FINDINGS: Small to moderate bilateral pleural effusions  Impression: Known L1-2 discitis/osteomyelitis, incompletely imaged  The thoracic vertebral body heights are maintained demonstrating no acute fracture or subluxation  Mild degenerative disc disease at the T9-10 and T11-12 levels without central canal narrowing  Small to moderate bilateral pleural effusions  Workstation performed: [id number]     JESSICA    Result Date: 5/19/2023  Narrative: •  Left Ventricle: Left ventricular cavity size is normal  Wall thickness is moderately increased  The left ventricular ejection fraction is 65%  Systolic function is normal  Wall motion is normal  •  Left Atrium: The atrium is dilated  •  Atrial Septum: No patent foramen ovale detected using color flow Doppler at rest  •  Left Atrial Appendage: There is no thrombus  •  Aortic Valve: The aortic valve is trileaflet  The leaflets are moderately thickened  The leaflets are moderately calcified  There is severely reduced mobility  There is moderate regurgitation  There is moderate to severe stenosis  The aortic valve mean gradient is 29 0 mmHg  The aortic valve area is 0 98 cm2  There are 3 small, mobile vegetations present on the on the aortic aspect of the valve    These are all located at the 3 commissures  The one located at the commissure of the non and left coronary cusps appears to be associated with a small perforation  •  Mitral Valve: There is mild regurgitation  •  Tricuspid Valve: There is mild regurgitation  3D was performed for (further investigation, better visualization, additional quantification) of the (aortic valve, mitral valve, left atria, left atria appendage, atrial septum, tricuspid valve, pulmonic valve, right atria, suspected pathology found on TTE)  3D was performed for better visualization of the left atrial appendage closure device as well at the atrial septum for any residual defects in the fossa ovalis membrane  Results from the utilization of 3D are listed in the report below  XR spine lumbar 2 or 3 views injury    Result Date: 5/19/2023  Narrative: LUMBAR SPINE INDICATION:   L1-2 discitis  COMPARISON: Current abdominal CT scan  VIEWS:  XR SPINE LUMBAR 2 OR 3 VIEWS INJURY Images: 3 FINDINGS: There are 5 non rib bearing lumbar vertebral bodies  There is no evidence of acute fracture or destructive osseous lesion  Minimal levoconvex curvature  Minimal degenerative anterolisthesis of L4 on L5  Very severe multilevel degenerative changes involving upper lumbar discs and lower lumbar facet joints  The pedicles appear intact  SI joints appear normal  There are atherosclerotic calcifications  Cholelithiasis  Soft tissues are otherwise unremarkable  Impression: No acute osseous abnormality  Degenerative changes as described  Cholelithiasis  No acute osseous abnormalities  Workstation performed: [id number]     MRI lumbar spine w wo contrast    Result Date: 5/17/2023  Narrative: MRI LUMBAR SPINE WITH AND WITHOUT CONTRAST INDICATION: diskitis? ?  COMPARISON: CT from the day before  TECHNIQUE:  Multiplanar, multisequence imaging of the lumbar spine was performed before and after gadolinium administration     IV Contrast:  7 mL of Gadobutrol injection (SINGLE-DOSE) IMAGE QUALITY:  Diagnostic FINDINGS: VERTEBRAL BODIES:  There are 5 lumbar type vertebral bodies  There is grade 1 anterolisthesis of L4-L5  There is mild retrolisthesis of L1-L2  There is levoscoliosis  There is evidence of discitis osteomyelitis at L1-L2 characterized by fluid in the disc space and surrounding endplate marrow edema as well as enhancement on postcontrast imaging  There is endplate irregularity  There is no significant vertebral body collapse on the current exam  There is minimal ventral epidural enhancement but otherwise no discrete epidural collection identified  There is paraspinal enhancement involving the psoas musculature at this level  SACRUM:  Normal signal within the sacrum  No evidence of insufficiency or stress fracture  DISTAL CORD AND CONUS:  Normal size and signal within the distal cord and conus  PARASPINAL SOFT TISSUES: Paraspinal enhancement and edema as described above  No drainable collections identified  LOWER THORACIC DISC SPACES:  Normal disc height and signal   No disc herniation, canal stenosis or foraminal narrowing  LUMBAR DISC SPACES: L1-L2: Bulging annulus  Facet arthrosis  Mild mass effect on the thecal sac  Mild to moderate bilateral foraminal narrowing  L2-L3: Bulging annulus  Dorsal osteophytosis  Facet arthrosis  Mild mass effect on thecal sac  Mild left foraminal narrowing  Moderate right foraminal narrowing  L3-L4: Bulging annulus  Facet arthrosis  Mild mass effect on the thecal sac  Severe right and mild to moderate left foramen narrowing  There is impingement of the exiting right L3 nerve root  L4-L5: Uncovering of the disc space  There is facet arthrosis with ligamentum flavum thickening  There is mild to moderate mass effect on the thecal sac  There is moderate left and mild right foraminal narrowing  L5-S1: There is facet arthrosis  There is no significant canal stenosis  There is mild bilateral foraminal narrowing  POSTCONTRAST IMAGING: Please see above  OTHER FINDINGS:  None  Impression: 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, as described above  Minimal ventral epidural enhancement is noted without discrete collection  Paraspinal edema and enhancement within the psoas musculature without abscess formation  Degenerative changes of the lumbar spine, described above  The study was marked in [facility] for immediate notification  Workstation performed: [id number]     CT chest abdomen pelvis w contrast    Result Date: 5/17/2023  Narrative: CT CHEST, ABDOMEN AND PELVIS WITH IV CONTRAST INDICATION:   distended abdomen and abdominal pain  History of vomiting; back pain COMPARISON: CT abdomen from 5/2/2023 and lumbar spine TECHNIQUE: CT examination of the chest, abdomen and pelvis was performed  Multiplanar 2D reformatted images were created from the source data  This examination, like all CT scans performed in the [facility], was performed utilizing techniques to minimize radiation dose exposure, including the use of iterative reconstruction and automated exposure control  Radiation dose length product (DLP) for this visit:  358 mGy-cm IV Contrast:  100 mL of iohexol (OMNIPAQUE) Enteric Contrast: Enteric contrast was administered  FINDINGS: CHEST LUNGS: Atelectatic changes are adjacent to small effusions bilaterally in the lower lobes versus small infiltrates  Minimal groundglass opacity seen in the right upper lobe on image 123  Small blebs are seen at the apices right greater than left  Right lower lobe 3 mm nodule, image 166, series 302  Small dense granulomas in the right middle lobe superiorly  2 mm pleural-based nodule right lower lobe, image 197  Mild cylindrical bronchiectasis is noted at the bases  PLEURA: Small effusions appear new since the prior study  HEART/GREAT VESSELS: Heavy aortic valve calcification is seen as well as coronary calcification  No thoracic aortic aneurysm  MEDIASTINUM AND TOBY: 1 1 cm precarinal node appears reactive   Prominent right hilar and subcarinal node is also seen    CHEST WALL AND LOWER NECK:  Unremarkable  ABDOMEN LIVER/BILIARY TREE:  Unremarkable  GALLBLADDER: There are gallstone(s) within the gallbladder, without pericholecystic inflammatory changes  SPLEEN:  Unremarkable  PANCREAS:  Unremarkable  ADRENAL GLANDS:  Unremarkable  KIDNEYS/URETERS: Nonobstructing right renal calculi  No hydronephrosis  Small circumscribed hypodensities are seen statistically most likely tiny cysts but too small to characterize  STOMACH AND BOWEL: The stomach is significantly distended with fluid  No small bowel dilatation  A few fluid filled loops of small bowel are seen  The colon is mostly decompressed  APPENDIX:  No findings to suggest appendicitis  ABDOMINOPELVIC CAVITY: Trace ascites in the pelvis  No pneumoperitoneum  No lymphadenopathy  VESSELS: 2 7 cm abdominal aortic aneurysm  PELVIS REPRODUCTIVE ORGANS:  Unremarkable for patient's age  URINARY BLADDER: The bladder is not well distended  Mild diffuse bladder wall thickening is identified  ABDOMINAL WALL/INGUINAL REGIONS:  Unremarkable  OSSEOUS STRUCTURES:  No acute fracture or destructive osseous lesion  Multilevel degenerative spondylosis is again demonstrated mostly in the lumbar spine  There is endplate sclerosis and some small erosions involving both the L1 and L2 endplates  Some of these erosions appear to be new since the study of May 2 suggesting some possible inflammation  Some erosions are noted on sagittal image 108 as well as 105  Impression: New effusions with adjacent atelectasis versus small infiltrates  Small lung nodules  Based on current Fleischner Society 2017 Guidelines on incidental pulmonary nodule, no routine follow-up is needed if the patient is low risk  If the patient is high risk, optional follow-up chest CT at 12 months can be considered   Mild mediastinal adenopathy may be reactive, Gastric distention with fluid this could be related to gastroparesis or outlet obstruction  Nasogastric tube could be considered  No evidence of small bowel obstruction  Some trace ascites  Some new lucencies are noted along the endplates at X0-L3 and suggest the possibility of inflammatory discitis superimposed on degenerative change  No obvious paraspinal soft tissue mass can be appreciated  Correlation with erythrocyte sedimentation rate  and MRI is suggested  This was discussed with SHIREEN Langford on 5/17/23 Workstation performed: [id number]     XR chest 1 view portable    Result Date: 5/17/2023  Narrative: CHEST INDICATION:   SOB  COMPARISON: Chest CT 5/16/2023  EXAM PERFORMED/VIEWS:  XR CHEST PORTABLE  FINDINGS: Cardiomediastinal silhouette normal  Mild interstitial edema with small effusions and bibasilar atelectasis  Upper abdomen normal  Bones normal for age  Impression: Mild interstitial edema with small effusions and mild bibasilar atelectasis  Workstation performed: [id number]     VAS lower limb venous duplex study, complete bilateral    Result Date: 5/16/2023  Narrative:  [facility] REPORT CLINICAL: Indications: Physician wants to determine patency of the venous system secondary to lower extremity swelling, LT> RT  Risk Factors The patient has history of HTN, Hyperlipidemia and previous smoking (quit >10yrs ago)  FINDINGS:  Right         Reflux  Valve Closure Time  GSV Inguinal  Reflux                0 90     CONCLUSION: Impression:  RIGHT LOWER LIMB: No evidence of acute or chronic deep vein thrombosis  No evidence of superficial thrombophlebitis noted  Doppler evaluation shows evidence of valvular incompetence in the great saphenous vein at the sapheno-femoral junction  Popliteal, posterior tibial and anterior tibial arterial Doppler waveforms are biphasic  LEFT LOWER LIMB: No evidence of acute or chronic deep vein thrombosis  No evidence of superficial thrombophlebitis noted  Doppler evaluation shows a normal response to augmentation maneuvers   Popliteal posterior and anterior tibial arterial Doppler waveforms are biphasic/monophasic  Technical findings were given to Dr Maximiliano Wise via [facility] Text  SIGNATURE: Electronically Signed by: Yogi Palma MD, [address] on 2023-05-16 09:02:14 PM    US bedside procedure    Result Date: 5/16/2023  Narrative: [phone] [id number]  2 [id number] 24 1    Thank you for allowing us to participate in this patient's care  This pt will follow up with Dr Pravin Maza once discharged  Counseling / Coordination of Care  Total floor / unit time spent today 45 minutes  Greater than 50% of total time was spent with the patient and / or family counseling and / or coordination of care  A description of the counseling / coordination of care: Review of history, current assessment, development of a plan  Code Status: Level 1 - Full Code    ** Please Note: Dragon 360 Dictation voice to text software may have been used in the creation of this document   **

## 2023-06-07 ENCOUNTER — TELEPHONE (OUTPATIENT)
Dept: CARDIOLOGY CLINIC | Facility: CLINIC | Age: 78
End: 2023-06-07

## 2023-06-07 ENCOUNTER — HOSPITAL ENCOUNTER (OUTPATIENT)
Dept: INFUSION CENTER | Facility: HOSPITAL | Age: 78
Discharge: HOME | End: 2023-06-07
Attending: INTERNAL MEDICINE
Payer: COMMERCIAL

## 2023-06-07 VITALS
TEMPERATURE: 96.9 F | OXYGEN SATURATION: 96 % | HEART RATE: 94 BPM | BODY MASS INDEX: 22.52 KG/M2 | DIASTOLIC BLOOD PRESSURE: 61 MMHG | RESPIRATION RATE: 18 BRPM | WEIGHT: 139.55 LBS | SYSTOLIC BLOOD PRESSURE: 140 MMHG

## 2023-06-07 DIAGNOSIS — B95.5 BACTEREMIA DUE TO STREPTOCOCCUS: ICD-10-CM

## 2023-06-07 DIAGNOSIS — R78.81 BACTEREMIA DUE TO STREPTOCOCCUS: ICD-10-CM

## 2023-06-07 DIAGNOSIS — M46.46 DISCITIS OF LUMBAR REGION: Primary | ICD-10-CM

## 2023-06-07 RX ORDER — CEFTRIAXONE 2 G/50ML
2000 INJECTION, SOLUTION INTRAVENOUS ONCE
Status: CANCELLED | OUTPATIENT
Start: 2023-06-08

## 2023-06-07 RX ORDER — SODIUM CHLORIDE 9 MG/ML
20 INJECTION, SOLUTION INTRAVENOUS ONCE
Status: CANCELLED | OUTPATIENT
Start: 2023-06-08

## 2023-06-07 RX ORDER — CEFTRIAXONE 2 G/50ML
2000 INJECTION, SOLUTION INTRAVENOUS ONCE
Status: COMPLETED | OUTPATIENT
Start: 2023-06-07 | End: 2023-06-07

## 2023-06-07 RX ORDER — SODIUM CHLORIDE 9 MG/ML
20 INJECTION, SOLUTION INTRAVENOUS ONCE
Status: COMPLETED | OUTPATIENT
Start: 2023-06-07 | End: 2023-06-07

## 2023-06-07 RX ADMIN — CEFTRIAXONE 2000 MG: 2 INJECTION, SOLUTION INTRAVENOUS at 10:23

## 2023-06-07 RX ADMIN — SODIUM CHLORIDE 20 ML/HR: 0.9 INJECTION, SOLUTION INTRAVENOUS at 10:23

## 2023-06-07 NOTE — UTILIZATION REVIEW
NOTIFICATION OF ADMISSION DISCHARGE   This is a Notification of Discharge from [facility]  Please be advised that this patient has been discharge from our facility  Below you will find the admission and discharge date and time including the patient’s disposition  UTILIZATION REVIEW CONTACT:  Mariann Han MA  Utilization   Network Utilization Review Department  Phone: [phone] x carefully listen to the prompts  All voicemails are confidential   Email: [email or web address]     ADMISSION INFORMATION  PRESENTATION DATE: 6/5/2023  3:04 PM  OBERVATION ADMISSION DATE:   INPATIENT ADMISSION DATE: 6/5/23  6:08 PM   DISCHARGE DATE: 6/6/2023  3:20 PM   DISPOSITION:Home/Self Care    IMPORTANT INFORMATION:  Send all requests for admission clinical reviews, approved or denied determinations and any other requests to dedicated fax number below belonging to the campus where the patient is receiving treatment   List of dedicated fax numbers:  [address] DENIALS (Administrative/Medical Necessity) [phone]   [address] (Maternity/NICU/Pediatrics) [phone]   [facility] [phone]   [facility] 87 [phone]   [facility] [address] [phone]   [address] [facility] [phone] 90 [facility] [phone]   [address] [place] [address] [phone]   [facility]  [phone]   [address] [facility] [phone] [facility] [address] [facility] [phone]

## 2023-06-07 NOTE — TELEPHONE ENCOUNTER
Marimar Baker, I got a call from Gricelda GLOVER 47 , He told me that he did not want to come in for the [place]  Follow up because he has an infection and nothing can be done for him at this time, until the infection has cleared up   so he cancelled his appt

## 2023-06-07 NOTE — PLAN OF CARE
Problem: Potential for Falls  Goal: Patient will remain free of falls  Description: INTERVENTIONS:  - Educate patient/family on patient safety including physical limitations  - Instruct patient to call for assistance with activity   - Consult OT/PT to assist with strengthening/mobility   - Keep Call bell within reach  - Keep bed low and locked with side rails adjusted as appropriate  - Keep care items and personal belongings within reach  - Initiate and maintain comfort rounds  - Make Fall Risk Sign visible to staff  - Offer Toileting every 1 Hour, in advance of need  - Initiate/Maintain alarms   - Obtain necessary fall risk management equipment    - Apply yellow socks and bracelet for high fall risk patients  - Consider moving patient to room near nurses station  Outcome: Progressing

## 2023-06-08 ENCOUNTER — HOSPITAL ENCOUNTER (OUTPATIENT)
Dept: INFUSION CENTER | Facility: HOSPITAL | Age: 78
Discharge: HOME | End: 2023-06-08
Attending: INTERNAL MEDICINE
Payer: COMMERCIAL

## 2023-06-08 ENCOUNTER — TELEPHONE (OUTPATIENT)
Dept: INFECTIOUS DISEASES | Facility: CLINIC | Age: 78
End: 2023-06-08

## 2023-06-08 VITALS
HEART RATE: 100 BPM | RESPIRATION RATE: 18 BRPM | DIASTOLIC BLOOD PRESSURE: 56 MMHG | TEMPERATURE: 97.9 F | OXYGEN SATURATION: 90 % | SYSTOLIC BLOOD PRESSURE: 132 MMHG

## 2023-06-08 DIAGNOSIS — R78.81 BACTEREMIA DUE TO STREPTOCOCCUS: ICD-10-CM

## 2023-06-08 DIAGNOSIS — B95.5 BACTEREMIA DUE TO STREPTOCOCCUS: ICD-10-CM

## 2023-06-08 DIAGNOSIS — M46.46 DISCITIS OF LUMBAR REGION: Primary | ICD-10-CM

## 2023-06-08 PROCEDURE — 96365 THER/PROPH/DIAG IV INF INIT: CPT

## 2023-06-08 RX ORDER — SODIUM CHLORIDE 9 MG/ML
20 INJECTION, SOLUTION INTRAVENOUS ONCE
Status: CANCELLED | OUTPATIENT
Start: 2023-06-09

## 2023-06-08 RX ORDER — CEFTRIAXONE 2 G/50ML
2000 INJECTION, SOLUTION INTRAVENOUS ONCE
Status: COMPLETED | OUTPATIENT
Start: 2023-06-08 | End: 2023-06-08

## 2023-06-08 RX ORDER — SODIUM CHLORIDE 9 MG/ML
20 INJECTION, SOLUTION INTRAVENOUS ONCE
Status: COMPLETED | OUTPATIENT
Start: 2023-06-08 | End: 2023-06-08

## 2023-06-08 RX ORDER — CEFTRIAXONE 2 G/50ML
2000 INJECTION, SOLUTION INTRAVENOUS ONCE
Status: CANCELLED | OUTPATIENT
Start: 2023-06-09

## 2023-06-08 RX ADMIN — SODIUM CHLORIDE 20 ML/HR: 0.9 INJECTION, SOLUTION INTRAVENOUS at 11:04

## 2023-06-08 RX ADMIN — CEFTRIAXONE 2000 MG: 2 INJECTION, SOLUTION INTRAVENOUS at 11:05

## 2023-06-08 NOTE — PROGRESS NOTES
Patient tolerated treatment well with no adverse effects  Offers no complaints  Next appointment verified, AVS declined

## 2023-06-08 NOTE — PLAN OF CARE
Problem: INFECTION - ADULT  Goal: Absence or prevention of progression during hospitalization  Description: INTERVENTIONS:  - Assess and monitor for signs and symptoms of infection  - Monitor lab/diagnostic results  - Monitor all insertion sites, i e  indwelling lines, tubes, and drains  - Monitor endotracheal if appropriate and nasal secretions for changes in amount and color  - [place] appropriate cooling/warming therapies per order  - Administer medications as ordered  - Instruct and encourage patient and family to use good hand hygiene technique  - Identify and instruct in appropriate isolation precautions for identified infection/condition  Outcome: Progressing     Problem: Knowledge Deficit  Goal: Patient/family/caregiver demonstrates understanding of disease process, treatment plan, medications, and discharge instructions  Description: Complete learning assessment and assess knowledge base    Interventions:  - Provide teaching at level of understanding  - Provide teaching via preferred learning methods  Outcome: Progressing

## 2023-06-09 ENCOUNTER — HOSPITAL ENCOUNTER (OUTPATIENT)
Dept: INFUSION CENTER | Facility: HOSPITAL | Age: 78
End: 2023-06-09
Attending: INTERNAL MEDICINE
Payer: COMMERCIAL

## 2023-06-09 ENCOUNTER — TELEPHONE (OUTPATIENT)
Dept: INFECTIOUS DISEASES | Facility: CLINIC | Age: 78
End: 2023-06-09

## 2023-06-09 VITALS
DIASTOLIC BLOOD PRESSURE: 53 MMHG | HEART RATE: 88 BPM | TEMPERATURE: 96.6 F | RESPIRATION RATE: 18 BRPM | SYSTOLIC BLOOD PRESSURE: 114 MMHG | OXYGEN SATURATION: 94 %

## 2023-06-09 DIAGNOSIS — M46.46 DISCITIS OF LUMBAR REGION: Primary | ICD-10-CM

## 2023-06-09 DIAGNOSIS — B95.5 BACTEREMIA DUE TO STREPTOCOCCUS: ICD-10-CM

## 2023-06-09 DIAGNOSIS — R78.81 BACTEREMIA DUE TO STREPTOCOCCUS: ICD-10-CM

## 2023-06-09 RX ORDER — SODIUM CHLORIDE 9 MG/ML
20 INJECTION, SOLUTION INTRAVENOUS ONCE
Status: COMPLETED | OUTPATIENT
Start: 2023-06-09 | End: 2023-06-09

## 2023-06-09 RX ORDER — CEFTRIAXONE 2 G/50ML
2000 INJECTION, SOLUTION INTRAVENOUS ONCE
Status: CANCELLED | OUTPATIENT
Start: 2023-06-10

## 2023-06-09 RX ORDER — CEFTRIAXONE 2 G/50ML
2000 INJECTION, SOLUTION INTRAVENOUS ONCE
Status: COMPLETED | OUTPATIENT
Start: 2023-06-09 | End: 2023-06-09

## 2023-06-09 RX ORDER — SODIUM CHLORIDE 9 MG/ML
20 INJECTION, SOLUTION INTRAVENOUS ONCE
Status: CANCELLED | OUTPATIENT
Start: 2023-06-10

## 2023-06-09 RX ADMIN — CEFTRIAXONE 2000 MG: 2 INJECTION, SOLUTION INTRAVENOUS at 10:15

## 2023-06-09 RX ADMIN — SODIUM CHLORIDE 20 ML/HR: 0.9 INJECTION, SOLUTION INTRAVENOUS at 10:12

## 2023-06-09 NOTE — TELEPHONE ENCOUNTER
Pt's wife calls office and lmom she is wondering if Dr Vera is able to complete online fmla form for her because she has been taking care of pt and taking him to and from infusions

## 2023-06-09 NOTE — PLAN OF CARE
Problem: INFECTION - ADULT  Goal: Absence or prevention of progression  Description: INTERVENTIONS:  - Assess and monitor for signs and symptoms of infection  - Monitor lab/diagnostic results  - Monitor all insertion sites ie PICC  - Administer medications as ordered - IV abx  - Instruct and encourage patient and family to use good hand hygiene technique  Outcome: Progressing

## 2023-06-09 NOTE — PLAN OF CARE
Problem: INFECTION - ADULT  Goal: Absence or prevention of progression  Description: INTERVENTIONS:  - Assess and monitor for signs and symptoms of infection  - Monitor lab/diagnostic results  - Monitor all insertion sites ie PICC  - Administer medications as ordered - IV abx  - Instruct and encourage patient and family to use good hand hygiene technique  6/9/2023 1019 by Delia Cedillo RN  Outcome: Progressing  6/9/2023 1019 by Delia Cedillo RN  Outcome: Progressing

## 2023-06-09 NOTE — PROGRESS NOTES
IV abx tolerated well without complications  No complaints offered  AVS declined  Left unit in stable condition

## 2023-06-09 NOTE — TELEPHONE ENCOUNTER
Patient's calls today to speak with Dr Shonda Meraz  He states he's taking the infusion every day and is concerned that it's taking the good and bad stuff out of his body  He wants to know what to do to fight his system because he has lost a lot of strength  Patient wants to know about taking a probiotic and also wants to know more about a diet he should follow  He also says his potassium was extremely low and he was getting medication for that and now this has stopped  He wants to know who stopped this and if he was supposed to stop  He took them for three days but does not have a refill      CB: [phone]

## 2023-06-10 ENCOUNTER — HOSPITAL ENCOUNTER (OUTPATIENT)
Dept: INFUSION CENTER | Facility: HOSPITAL | Age: 78
Discharge: HOME | End: 2023-06-10
Attending: INTERNAL MEDICINE
Payer: COMMERCIAL

## 2023-06-10 VITALS
OXYGEN SATURATION: 96 % | TEMPERATURE: 97.3 F | HEART RATE: 86 BPM | SYSTOLIC BLOOD PRESSURE: 113 MMHG | DIASTOLIC BLOOD PRESSURE: 56 MMHG | RESPIRATION RATE: 18 BRPM

## 2023-06-10 DIAGNOSIS — R78.81 BACTEREMIA DUE TO STREPTOCOCCUS: ICD-10-CM

## 2023-06-10 DIAGNOSIS — B95.5 BACTEREMIA DUE TO STREPTOCOCCUS: ICD-10-CM

## 2023-06-10 DIAGNOSIS — M46.46 DISCITIS OF LUMBAR REGION: Primary | ICD-10-CM

## 2023-06-10 RX ORDER — CEFTRIAXONE 2 G/50ML
2000 INJECTION, SOLUTION INTRAVENOUS ONCE
Status: CANCELLED | OUTPATIENT
Start: 2023-06-11

## 2023-06-10 RX ORDER — CEFTRIAXONE 2 G/50ML
2000 INJECTION, SOLUTION INTRAVENOUS ONCE
Status: COMPLETED | OUTPATIENT
Start: 2023-06-10 | End: 2023-06-10

## 2023-06-10 RX ORDER — SODIUM CHLORIDE 9 MG/ML
20 INJECTION, SOLUTION INTRAVENOUS ONCE
Status: COMPLETED | OUTPATIENT
Start: 2023-06-10 | End: 2023-06-10

## 2023-06-10 RX ORDER — SODIUM CHLORIDE 9 MG/ML
20 INJECTION, SOLUTION INTRAVENOUS ONCE
Status: CANCELLED | OUTPATIENT
Start: 2023-06-11

## 2023-06-10 RX ADMIN — CEFTRIAXONE 2000 MG: 2 INJECTION, SOLUTION INTRAVENOUS at 09:56

## 2023-06-10 RX ADMIN — SODIUM CHLORIDE 20 ML/HR: 0.9 INJECTION, SOLUTION INTRAVENOUS at 09:56

## 2023-06-11 ENCOUNTER — HOSPITAL ENCOUNTER (OUTPATIENT)
Dept: INFUSION CENTER | Facility: HOSPITAL | Age: 78
Discharge: HOME | End: 2023-06-11
Attending: INTERNAL MEDICINE
Payer: COMMERCIAL

## 2023-06-11 VITALS
DIASTOLIC BLOOD PRESSURE: 50 MMHG | OXYGEN SATURATION: 95 % | HEART RATE: 81 BPM | RESPIRATION RATE: 17 BRPM | TEMPERATURE: 97.1 F | SYSTOLIC BLOOD PRESSURE: 98 MMHG

## 2023-06-11 DIAGNOSIS — R78.81 BACTEREMIA DUE TO STREPTOCOCCUS: ICD-10-CM

## 2023-06-11 DIAGNOSIS — M46.46 DISCITIS OF LUMBAR REGION: Primary | ICD-10-CM

## 2023-06-11 DIAGNOSIS — B95.5 BACTEREMIA DUE TO STREPTOCOCCUS: ICD-10-CM

## 2023-06-11 RX ORDER — SODIUM CHLORIDE 9 MG/ML
20 INJECTION, SOLUTION INTRAVENOUS ONCE
Status: COMPLETED | OUTPATIENT
Start: 2023-06-11 | End: 2023-06-11

## 2023-06-11 RX ORDER — CEFTRIAXONE 2 G/50ML
2000 INJECTION, SOLUTION INTRAVENOUS ONCE
Status: COMPLETED | OUTPATIENT
Start: 2023-06-11 | End: 2023-06-11

## 2023-06-11 RX ORDER — SODIUM CHLORIDE 9 MG/ML
20 INJECTION, SOLUTION INTRAVENOUS ONCE
Status: CANCELLED | OUTPATIENT
Start: 2023-06-12

## 2023-06-11 RX ORDER — CEFTRIAXONE 2 G/50ML
2000 INJECTION, SOLUTION INTRAVENOUS ONCE
Status: CANCELLED | OUTPATIENT
Start: 2023-06-12

## 2023-06-11 RX ADMIN — CEFTRIAXONE 2000 MG: 2 INJECTION, SOLUTION INTRAVENOUS at 10:10

## 2023-06-11 RX ADMIN — SODIUM CHLORIDE 20 ML/HR: 0.9 INJECTION, SOLUTION INTRAVENOUS at 10:10

## 2023-06-12 ENCOUNTER — HOSPITAL ENCOUNTER (INPATIENT)
Facility: HOSPITAL | Age: 78
LOS: 4 days | Discharge: HOME | DRG: 682 | End: 2023-06-16
Attending: EMERGENCY MEDICINE | Admitting: INTERNAL MEDICINE
Payer: COMMERCIAL

## 2023-06-12 ENCOUNTER — HOSPITAL ENCOUNTER (OUTPATIENT)
Dept: INFUSION CENTER | Facility: HOSPITAL | Age: 78
Discharge: HOME | End: 2023-06-12
Attending: INTERNAL MEDICINE
Payer: COMMERCIAL

## 2023-06-12 ENCOUNTER — TELEPHONE (OUTPATIENT)
Dept: INFECTIOUS DISEASES | Facility: CLINIC | Age: 78
End: 2023-06-12

## 2023-06-12 VITALS
SYSTOLIC BLOOD PRESSURE: 117 MMHG | OXYGEN SATURATION: 97 % | TEMPERATURE: 97 F | HEART RATE: 82 BPM | DIASTOLIC BLOOD PRESSURE: 57 MMHG | RESPIRATION RATE: 18 BRPM

## 2023-06-12 DIAGNOSIS — I50.30 (HFPEF) HEART FAILURE WITH PRESERVED EJECTION FRACTION (HCC): ICD-10-CM

## 2023-06-12 DIAGNOSIS — M46.40 DISCITIS: ICD-10-CM

## 2023-06-12 DIAGNOSIS — M46.46 DISCITIS OF LUMBAR REGION: Primary | ICD-10-CM

## 2023-06-12 DIAGNOSIS — R78.81 BACTEREMIA DUE TO STREPTOCOCCUS: ICD-10-CM

## 2023-06-12 DIAGNOSIS — I35.0 AORTIC STENOSIS, SEVERE: ICD-10-CM

## 2023-06-12 DIAGNOSIS — Z87.39 HISTORY OF OSTEOMYELITIS: ICD-10-CM

## 2023-06-12 DIAGNOSIS — E87.6 HYPOKALEMIA: ICD-10-CM

## 2023-06-12 DIAGNOSIS — N17.9 AKI (ACUTE KIDNEY INJURY) (HCC): ICD-10-CM

## 2023-06-12 DIAGNOSIS — I33.0 ACUTE BACTERIAL ENDOCARDITIS: ICD-10-CM

## 2023-06-12 DIAGNOSIS — G47.00 INSOMNIA: ICD-10-CM

## 2023-06-12 DIAGNOSIS — B95.5 BACTEREMIA DUE TO STREPTOCOCCUS: ICD-10-CM

## 2023-06-12 DIAGNOSIS — N17.9 ACUTE KIDNEY INJURY (HCC): ICD-10-CM

## 2023-06-12 DIAGNOSIS — K08.9 POOR DENTITION: ICD-10-CM

## 2023-06-12 LAB
ALBUMIN SERPL BCP-MCNC: 3 G/DL (ref 3.5–5)
ALBUMIN SERPL BCP-MCNC: 3 G/DL (ref 3.5–5)
ALP SERPL-CCNC: 51 U/L (ref 34–104)
ALP SERPL-CCNC: 54 U/L (ref 34–104)
ALT SERPL W P-5'-P-CCNC: 23 U/L (ref 7–52)
ALT SERPL W P-5'-P-CCNC: 25 U/L (ref 7–52)
ANION GAP SERPL CALCULATED.3IONS-SCNC: 8 MMOL/L (ref 4–13)
ANION GAP SERPL CALCULATED.3IONS-SCNC: 9 MMOL/L (ref 4–13)
AST SERPL W P-5'-P-CCNC: 15 U/L (ref 13–39)
AST SERPL W P-5'-P-CCNC: 16 U/L (ref 13–39)
ATRIAL RATE: 82 BPM
BASOPHILS # BLD AUTO: 0.07 THOUSANDS/ÂΜL (ref 0–0.1)
BASOPHILS # BLD AUTO: 0.08 THOUSANDS/ÂΜL (ref 0–0.1)
BASOPHILS NFR BLD AUTO: 0 % (ref 0–1)
BASOPHILS NFR BLD AUTO: 1 % (ref 0–1)
BILIRUB SERPL-MCNC: 0.23 MG/DL (ref 0.2–1)
BILIRUB SERPL-MCNC: 0.34 MG/DL (ref 0.2–1)
BUN SERPL-MCNC: 41 MG/DL (ref 5–25)
BUN SERPL-MCNC: 43 MG/DL (ref 5–25)
CALCIUM ALBUM COR SERPL-MCNC: 9.5 MG/DL (ref 8.3–10.1)
CALCIUM ALBUM COR SERPL-MCNC: 9.5 MG/DL (ref 8.3–10.1)
CALCIUM SERPL-MCNC: 8.7 MG/DL (ref 8.4–10.2)
CALCIUM SERPL-MCNC: 8.7 MG/DL (ref 8.4–10.2)
CHLORIDE SERPL-SCNC: 95 MMOL/L (ref 96–108)
CHLORIDE SERPL-SCNC: 95 MMOL/L (ref 96–108)
CO2 SERPL-SCNC: 31 MMOL/L (ref 21–32)
CO2 SERPL-SCNC: 33 MMOL/L (ref 21–32)
CREAT SERPL-MCNC: 1.53 MG/DL (ref 0.6–1.3)
CREAT SERPL-MCNC: 1.66 MG/DL (ref 0.6–1.3)
CRP SERPL QL: 42.1 MG/L
EOSINOPHIL # BLD AUTO: 1.88 THOUSAND/ÂΜL (ref 0–0.61)
EOSINOPHIL # BLD AUTO: 1.92 THOUSAND/ÂΜL (ref 0–0.61)
EOSINOPHIL NFR BLD AUTO: 12 % (ref 0–6)
EOSINOPHIL NFR BLD AUTO: 15 % (ref 0–6)
ERYTHROCYTE [DISTWIDTH] IN BLOOD BY AUTOMATED COUNT: 15 % (ref 11.6–15.1)
ERYTHROCYTE [DISTWIDTH] IN BLOOD BY AUTOMATED COUNT: 15.1 % (ref 11.6–15.1)
ERYTHROCYTE [SEDIMENTATION RATE] IN BLOOD: 42 MM/HOUR (ref 0–19)
GFR SERPL CREATININE-BSD FRML MDRD: 39 ML/MIN/1.73SQ M
GFR SERPL CREATININE-BSD FRML MDRD: 43 ML/MIN/1.73SQ M
GLUCOSE SERPL-MCNC: 116 MG/DL (ref 65–140)
GLUCOSE SERPL-MCNC: 140 MG/DL (ref 65–140)
HCT VFR BLD AUTO: 33.2 % (ref 36.5–49.3)
HCT VFR BLD AUTO: 33.4 % (ref 36.5–49.3)
HGB BLD-MCNC: 10.9 G/DL (ref 12–17)
HGB BLD-MCNC: 11 G/DL (ref 12–17)
IMM GRANULOCYTES # BLD AUTO: 0.1 THOUSAND/UL (ref 0–0.2)
IMM GRANULOCYTES # BLD AUTO: 0.14 THOUSAND/UL (ref 0–0.2)
IMM GRANULOCYTES NFR BLD AUTO: 1 % (ref 0–2)
IMM GRANULOCYTES NFR BLD AUTO: 1 % (ref 0–2)
LYMPHOCYTES # BLD AUTO: 0.76 THOUSANDS/ÂΜL (ref 0.6–4.47)
LYMPHOCYTES # BLD AUTO: 0.83 THOUSANDS/ÂΜL (ref 0.6–4.47)
LYMPHOCYTES NFR BLD AUTO: 5 % (ref 14–44)
LYMPHOCYTES NFR BLD AUTO: 6 % (ref 14–44)
MCH RBC QN AUTO: 29.5 PG (ref 26.8–34.3)
MCH RBC QN AUTO: 29.5 PG (ref 26.8–34.3)
MCHC RBC AUTO-ENTMCNC: 32.8 G/DL (ref 31.4–37.4)
MCHC RBC AUTO-ENTMCNC: 32.9 G/DL (ref 31.4–37.4)
MCV RBC AUTO: 90 FL (ref 82–98)
MCV RBC AUTO: 90 FL (ref 82–98)
MONOCYTES # BLD AUTO: 0.83 THOUSAND/ÂΜL (ref 0.17–1.22)
MONOCYTES # BLD AUTO: 1.11 THOUSAND/ÂΜL (ref 0.17–1.22)
MONOCYTES NFR BLD AUTO: 6 % (ref 4–12)
MONOCYTES NFR BLD AUTO: 7 % (ref 4–12)
NEUTROPHILS # BLD AUTO: 11.52 THOUSANDS/ÂΜL (ref 1.85–7.62)
NEUTROPHILS # BLD AUTO: 9.29 THOUSANDS/ÂΜL (ref 1.85–7.62)
NEUTS SEG NFR BLD AUTO: 71 % (ref 43–75)
NEUTS SEG NFR BLD AUTO: 75 % (ref 43–75)
NRBC BLD AUTO-RTO: 0 /100 WBCS
NRBC BLD AUTO-RTO: 0 /100 WBCS
P AXIS: 59 DEGREES
PLATELET # BLD AUTO: 283 THOUSANDS/UL (ref 149–390)
PLATELET # BLD AUTO: 298 THOUSANDS/UL (ref 149–390)
PMV BLD AUTO: 9.5 FL (ref 8.9–12.7)
PMV BLD AUTO: 9.7 FL (ref 8.9–12.7)
POTASSIUM SERPL-SCNC: 2.8 MMOL/L (ref 3.5–5.3)
POTASSIUM SERPL-SCNC: 2.9 MMOL/L (ref 3.5–5.3)
PR INTERVAL: 162 MS
PROT SERPL-MCNC: 6 G/DL (ref 6.4–8.4)
PROT SERPL-MCNC: 6 G/DL (ref 6.4–8.4)
QRS AXIS: 66 DEGREES
QRSD INTERVAL: 114 MS
QT INTERVAL: 414 MS
QTC INTERVAL: 483 MS
RBC # BLD AUTO: 3.7 MILLION/UL (ref 3.88–5.62)
RBC # BLD AUTO: 3.73 MILLION/UL (ref 3.88–5.62)
SODIUM SERPL-SCNC: 135 MMOL/L (ref 135–147)
SODIUM SERPL-SCNC: 136 MMOL/L (ref 135–147)
T WAVE AXIS: 211 DEGREES
VENTRICULAR RATE: 82 BPM
WBC # BLD AUTO: 12.94 THOUSAND/UL (ref 4.31–10.16)
WBC # BLD AUTO: 15.59 THOUSAND/UL (ref 4.31–10.16)

## 2023-06-12 PROCEDURE — 36415 COLL VENOUS BLD VENIPUNCTURE: CPT | Performed by: EMERGENCY MEDICINE

## 2023-06-12 PROCEDURE — 80053 COMPREHEN METABOLIC PANEL: CPT | Performed by: EMERGENCY MEDICINE

## 2023-06-12 PROCEDURE — 85652 RBC SED RATE AUTOMATED: CPT | Performed by: INTERNAL MEDICINE

## 2023-06-12 PROCEDURE — 86140 C-REACTIVE PROTEIN: CPT | Performed by: INTERNAL MEDICINE

## 2023-06-12 PROCEDURE — 99223 1ST HOSP IP/OBS HIGH 75: CPT | Performed by: INTERNAL MEDICINE

## 2023-06-12 PROCEDURE — 93010 ELECTROCARDIOGRAM REPORT: CPT | Performed by: INTERNAL MEDICINE

## 2023-06-12 PROCEDURE — 93005 ELECTROCARDIOGRAM TRACING: CPT

## 2023-06-12 PROCEDURE — 85025 COMPLETE CBC W/AUTO DIFF WBC: CPT | Performed by: INTERNAL MEDICINE

## 2023-06-12 PROCEDURE — 80053 COMPREHEN METABOLIC PANEL: CPT | Performed by: INTERNAL MEDICINE

## 2023-06-12 PROCEDURE — 99285 EMERGENCY DEPT VISIT HI MDM: CPT

## 2023-06-12 PROCEDURE — 85025 COMPLETE CBC W/AUTO DIFF WBC: CPT | Performed by: EMERGENCY MEDICINE

## 2023-06-12 RX ORDER — POTASSIUM CHLORIDE 14.9 MG/ML
20 INJECTION INTRAVENOUS ONCE
Status: COMPLETED | OUTPATIENT
Start: 2023-06-12 | End: 2023-06-12

## 2023-06-12 RX ORDER — CEFTRIAXONE 2 G/50ML
2000 INJECTION, SOLUTION INTRAVENOUS ONCE
Status: COMPLETED | OUTPATIENT
Start: 2023-06-12 | End: 2023-06-12

## 2023-06-12 RX ORDER — CEFTRIAXONE 2 G/50ML
2000 INJECTION, SOLUTION INTRAVENOUS ONCE
Status: CANCELLED | OUTPATIENT
Start: 2023-06-13

## 2023-06-12 RX ORDER — ACETAMINOPHEN 325 MG/1
975 TABLET ORAL EVERY 8 HOURS PRN
Status: DISCONTINUED | OUTPATIENT
Start: 2023-06-12 | End: 2023-06-16 | Stop reason: HOSPADM

## 2023-06-12 RX ORDER — SODIUM CHLORIDE 9 MG/ML
20 INJECTION, SOLUTION INTRAVENOUS ONCE
Status: CANCELLED | OUTPATIENT
Start: 2023-06-13

## 2023-06-12 RX ORDER — POTASSIUM CHLORIDE 20 MEQ/1
40 TABLET, EXTENDED RELEASE ORAL ONCE
Status: COMPLETED | OUTPATIENT
Start: 2023-06-12 | End: 2023-06-12

## 2023-06-12 RX ORDER — VANCOMYCIN HYDROCHLORIDE 1 G/200ML
15 INJECTION, SOLUTION INTRAVENOUS ONCE AS NEEDED
Status: DISCONTINUED | OUTPATIENT
Start: 2023-06-13 | End: 2023-06-15

## 2023-06-12 RX ORDER — SODIUM CHLORIDE, SODIUM GLUCONATE, SODIUM ACETATE, POTASSIUM CHLORIDE, MAGNESIUM CHLORIDE, SODIUM PHOSPHATE, DIBASIC, AND POTASSIUM PHOSPHATE .53; .5; .37; .037; .03; .012; .00082 G/100ML; G/100ML; G/100ML; G/100ML; G/100ML; G/100ML; G/100ML
100 INJECTION, SOLUTION INTRAVENOUS CONTINUOUS
Status: DISCONTINUED | OUTPATIENT
Start: 2023-06-12 | End: 2023-06-13

## 2023-06-12 RX ORDER — POTASSIUM CHLORIDE 20 MEQ/1
20 TABLET, EXTENDED RELEASE ORAL ONCE
Status: COMPLETED | OUTPATIENT
Start: 2023-06-12 | End: 2023-06-12

## 2023-06-12 RX ORDER — LANOLIN ALCOHOL/MO/W.PET/CERES
6 CREAM (GRAM) TOPICAL
Status: DISCONTINUED | OUTPATIENT
Start: 2023-06-12 | End: 2023-06-15

## 2023-06-12 RX ORDER — SODIUM CHLORIDE 9 MG/ML
20 INJECTION, SOLUTION INTRAVENOUS ONCE
Status: COMPLETED | OUTPATIENT
Start: 2023-06-12 | End: 2023-06-12

## 2023-06-12 RX ORDER — ENOXAPARIN SODIUM 100 MG/ML
40 INJECTION SUBCUTANEOUS DAILY
Status: DISCONTINUED | OUTPATIENT
Start: 2023-06-13 | End: 2023-06-16 | Stop reason: HOSPADM

## 2023-06-12 RX ADMIN — Medication 6 MG: at 23:11

## 2023-06-12 RX ADMIN — CEFTRIAXONE 2000 MG: 2 INJECTION, SOLUTION INTRAVENOUS at 10:02

## 2023-06-12 RX ADMIN — POTASSIUM CHLORIDE 40 MEQ: 1500 TABLET, EXTENDED RELEASE ORAL at 20:03

## 2023-06-12 RX ADMIN — POTASSIUM CHLORIDE 20 MEQ: 1500 TABLET, EXTENDED RELEASE ORAL at 20:06

## 2023-06-12 RX ADMIN — SODIUM CHLORIDE 20 ML/HR: 0.9 INJECTION, SOLUTION INTRAVENOUS at 10:03

## 2023-06-12 RX ADMIN — POTASSIUM CHLORIDE 20 MEQ: 14.9 INJECTION, SOLUTION INTRAVENOUS at 16:16

## 2023-06-12 RX ADMIN — POTASSIUM CHLORIDE 40 MEQ: 1500 TABLET, EXTENDED RELEASE ORAL at 15:59

## 2023-06-12 RX ADMIN — VANCOMYCIN HYDROCHLORIDE 1500 MG: 10 INJECTION, POWDER, LYOPHILIZED, FOR SOLUTION INTRAVENOUS at 17:11

## 2023-06-12 RX ADMIN — SODIUM CHLORIDE, SODIUM GLUCONATE, SODIUM ACETATE, POTASSIUM CHLORIDE, MAGNESIUM CHLORIDE, SODIUM PHOSPHATE, DIBASIC, AND POTASSIUM PHOSPHATE 75 ML/HR: .53; .5; .37; .037; .03; .012; .00082 INJECTION, SOLUTION INTRAVENOUS at 20:28

## 2023-06-12 NOTE — ASSESSMENT & PLAN NOTE
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  IV antibiotics switched from ceftriaxone to vancomycin due to concern for MARIBEL  Per ID, consider primary odontogenic source given poor dentition  JESSICA, 5/19/23: EF 65%  Normal systolic fxn  Dilated LA  No PFO  No thrombous in DAREN  AV leaflets moderately thickened, moderately calcified, severely reduced mobility, mod regurg, mod to severe stenosis  AV vegetations (3 small, mobile vegetations)- suspect primary odontogenic source  No definitive occult intra-abdominal source  WBC 12 94 (history of persistent leukocytosis)  6/12: CRP 42 1 (prev 19 2), ESR 42 (prev 36)  Afebrile    Plan:  · Per ID OP, plan for total 6 weeks of therapy from bacteremia clearance throguh 6/30/23  · Continue Vancomycin, pharmacy consulted  · No indications to check bcx and MRSA at this time  · Maintain current RUE PICC line  · Trend ESR/CRP  · Upon d/c, will need to f/u OMS, ID, cardiology/CT surgery

## 2023-06-12 NOTE — ASSESSMENT & PLAN NOTE
K is 2 8, pt KCl  last Tuesday and has not received any K supplement since  60 meq K given in the ED    EKG, : NSR, biatrial enlargement, LVH with repolarization abn, prolonged QT (483)  No cardiac rhythm concerns associated with hypokalemia at this time      Plan:  Replete 60meq K for total of 120 meq  Recheck K 8pm - plan to d/c telemetry per patient's request if K is >3 5  EKG if pt is symptomatic  Monitor BMP  Goal K>4

## 2023-06-12 NOTE — ED PROVIDER NOTES
History  Chief Complaint   Patient presents with   • Abnormal Lab     Pt sent in for abnormal labs, creatinine 1 66 and potassium 2 9     70 y/o male with history of discitis, endocarditis presents for evaluation of abnormal labs from outpatient  Patient has been getting ceftriaxone through the PICC line  Patient apparently had abnormal renal function and potassium from outpatient labs  I spoke with Dr Edward Rm from infectious disease who recommends switching patient from ceftriaxone to vancomycin and admitting patient for MARIBEL  Patient denies any fevers or chills  No nausea vomiting  No abdominal pain  No back pain  History provided by:  Patient      Prior to Admission Medications   Prescriptions Last Dose Informant Patient Reported? Taking?   metoprolol tartrate (LOPRESSOR) 25 mg tablet  Self No No   Sig: Take 1 tablet (25 mg total) by mouth every 12 (twelve) hours   potassium chloride (K-DUR,KLOR-CON) 20 mEq tablet   No No   Sig: Take 2 tablets (40 mEq total) by mouth 2 (two) times a day for 3 doses Take your next dose 2 tablets (total 40 mEq) today (6/6) at 8 p m  then continue every 12 hours for 3 doses  spironolactone (ALDACTONE) 25 mg tablet   No No   Sig: Take 1 tablet (25 mg total) by mouth daily Do not start before June 7, 2023  torsemide (DEMADEX) 20 mg tablet   No No   Sig: Take 2 tablets (40 mg total) by mouth 2 (two) times a day Take your next dose of 2 tablets (40 mg total) today (6/6) at 4 p m  Facility-Administered Medications: None       Past Medical History:   Diagnosis Date   • CHF (congestive heart failure) (HCC)    • Discitis    • Endocarditis        Past Surgical History:   Procedure Laterality Date   • SKIN BIOPSY         Family History   Problem Relation Age of Onset   • Colon cancer Mother    • Lung cancer Father    • Mental illness Neg Hx      I have reviewed and agree with the history as documented      E-Cigarette/Vaping   • E-Cigarette Use Never User E-Cigarette/Vaping Substances   • Nicotine No    • THC No    • CBD No    • Flavoring No    • Other No    • Unknown No      Social History     Tobacco Use   • Smoking status: Former     Packs/day: 0 50     Years: 20 00     Total pack years: 10 00     Types: Cigarettes     Quit date: 3/11/2005     Years since quittin 2   • Smokeless tobacco: Never   Vaping Use   • Vaping Use: Never used   Substance Use Topics   • Alcohol use: Not Currently   • Drug use: Not Currently       Review of Systems   Constitutional: Negative for chills and fever  HENT: Negative for ear pain and sore throat  Eyes: Negative for pain and visual disturbance  Respiratory: Negative for cough and shortness of breath  Cardiovascular: Negative for chest pain and palpitations  Gastrointestinal: Negative for abdominal pain and vomiting  Genitourinary: Negative for dysuria and hematuria  Musculoskeletal: Negative for arthralgias and back pain  Skin: Negative for color change and rash  Neurological: Negative for seizures and syncope  Hematological: Negative for adenopathy  Psychiatric/Behavioral: Negative for agitation  All other systems reviewed and are negative  Physical Exam  Physical Exam  Vitals and nursing note reviewed  Constitutional:       General: He is not in acute distress  Appearance: He is well-developed  He is not ill-appearing  HENT:      Head: Normocephalic and atraumatic  Nose: Nose normal       Mouth/Throat:      Mouth: Mucous membranes are moist    Eyes:      Extraocular Movements: Extraocular movements intact  Conjunctiva/sclera: Conjunctivae normal       Pupils: Pupils are equal, round, and reactive to light  Cardiovascular:      Rate and Rhythm: Normal rate and regular rhythm  Heart sounds: No murmur heard  Pulmonary:      Effort: Pulmonary effort is normal  No respiratory distress  Breath sounds: Normal breath sounds     Abdominal:      Palpations: Abdomen is soft       Tenderness: There is no abdominal tenderness  Musculoskeletal:         General: No swelling  Normal range of motion  Cervical back: Normal range of motion and neck supple  No rigidity or tenderness  Skin:     General: Skin is warm and dry  Capillary Refill: Capillary refill takes less than 2 seconds  Coloration: Skin is not jaundiced  Findings: No bruising  Neurological:      General: No focal deficit present  Mental Status: He is alert     Psychiatric:         Mood and Affect: Mood normal          Vital Signs  ED Triage Vitals [06/12/23 1246]   Temperature Pulse Respirations Blood Pressure SpO2   97 6 °F (36 4 °C) 82 18 111/51 98 %      Temp Source Heart Rate Source Patient Position - Orthostatic VS BP Location FiO2 (%)   Oral Monitor -- Left arm --      Pain Score       --           Vitals:    06/12/23 1246   BP: 111/51   Pulse: 82         Visual Acuity      ED Medications  Medications   potassium chloride 20 mEq IVPB (premix) (has no administration in time range)   vancomycin (VANCOCIN) 1500 mg in sodium chloride 0 9% 250 mL IVPB (has no administration in time range)   potassium chloride (K-DUR,KLOR-CON) CR tablet 40 mEq (40 mEq Oral Given 6/12/23 1559)       Diagnostic Studies  Results Reviewed     Procedure Component Value Units Date/Time    Comprehensive metabolic panel [[id number]]  (Abnormal) Collected: 06/12/23 1424    Lab Status: Final result Specimen: Blood from Arm, Right Updated: 06/12/23 1501     Sodium 136 mmol/L      Potassium 2 8 mmol/L      Chloride 95 mmol/L      CO2 33 mmol/L      ANION GAP 8 mmol/L      BUN 41 mg/dL      Creatinine 1 53 mg/dL      Glucose 116 mg/dL      Calcium 8 7 mg/dL      Corrected Calcium 9 5 mg/dL      AST 15 U/L      ALT 23 U/L      Alkaline Phosphatase 54 U/L      Total Protein 6 0 g/dL      Albumin 3 0 g/dL      Total Bilirubin 0 23 mg/dL      eGFR 43 ml/min/1 73sq m     Narrative:      Evelia guidelines for Chronic Kidney Disease (CKD):   •  Stage 1 with normal or high GFR (GFR > 90 mL/min/1 73 square meters)  •  Stage 2 Mild CKD (GFR = 60-89 mL/min/1 73 square meters)  •  Stage 3A Moderate CKD (GFR = 45-59 mL/min/1 73 square meters)  •  Stage 3B Moderate CKD (GFR = 30-44 mL/min/1 73 square meters)  •  Stage 4 Severe CKD (GFR = 15-29 mL/min/1 73 square meters)  •  Stage 5 End Stage CKD (GFR <15 mL/min/1 73 square meters)  Note: GFR calculation is accurate only with a steady state creatinine    CBC and differential [[id number]]  (Abnormal) Collected: 06/12/23 1424    Lab Status: Final result Specimen: Blood from Arm, Right Updated: 06/12/23 1438     WBC 12 94 Thousand/uL      RBC 3 70 Million/uL      Hemoglobin 10 9 g/dL      Hematocrit 33 2 %      MCV 90 fL      MCH 29 5 pg      MCHC 32 8 g/dL      RDW 15 1 %      MPV 9 5 fL      Platelets 742 Thousands/uL      nRBC 0 /100 WBCs      Neutrophils Relative 71 %      Immat GRANS % 1 %      Lymphocytes Relative 6 %      Monocytes Relative 6 %      Eosinophils Relative 15 %      Basophils Relative 1 %      Neutrophils Absolute 9 29 Thousands/µL      Immature Grans Absolute 0 10 Thousand/uL      Lymphocytes Absolute 0 76 Thousands/µL      Monocytes Absolute 0 83 Thousand/µL      Eosinophils Absolute 1 88 Thousand/µL      Basophils Absolute 0 08 Thousands/µL                  No orders to display              Procedures  Procedures         ED Course      71-year-old male presents with abnormal labs from outpatient  Patient has MARIBEL and hyperkalemia  Potassium repletion started  Patient's antibiotic switched to vancomycin  Will admit the patient  Dr Cesia Almodovar from medicine team accepted the patient  Medical Decision Making  71-year-old male presents with abnormal labs from outpatient  Patient has MARIBEL and hyperkalemia  Potassium repletion started  Patient's antibiotic switched to vancomycin  Will admit the patient      Romulo from medicine team accepted the patient  Amount and/or Complexity of Data Reviewed  Labs: ordered  Risk  Prescription drug management  Decision regarding hospitalization  Disposition  Final diagnoses:   Hypokalemia   MARIBEL (acute kidney injury) ([facility] )   Discitis     Time reflects when diagnosis was documented in both MDM as applicable and the Disposition within this note     Time User Action Codes Description Comment    6/12/2023  4:05 PM Cecillia Bal Add [E87 6] Hypokalemia     6/12/2023  4:05 PM Cecillia Bal Add [N17 9] MARIBEL (acute kidney injury) ([facility] )     6/12/2023  4:05 PM Cecillia Bal Add [M46 40] Discitis       ED Disposition     ED Disposition   Admit    Condition   Stable    Date/Time   Mon Jun 12, 2023  4:05 PM    Comment   Case was discussed with Dr Rolando Santos and the patient's admission status was agreed to be Admission Status: inpatient status to the service of Dr Rolando Santos   Follow-up Information    None         Patient's Medications   Discharge Prescriptions    No medications on file       No discharge procedures on file      PDMP Review     None          ED Provider  Electronically Signed by           Jody Doan DO  06/12/23 6089

## 2023-06-12 NOTE — PROGRESS NOTES
Bambi Mclean is a 69 y o  male who is currently ordered Vancomycin IV with management by the Pharmacy Consult service  Relevant clinical data and objective / subjective history reviewed  Vancomycin Assessment:  Indication and Goal AUC/Trough: Bone/joint infection (goal -600, trough >10); Bacteremia (goal -600, trough >10); Endocarditis (goal -600, trough >10)  Clinical Status: new  Micro:   none  Renal Function:  SCr: 1 53 mg/dL  CrCl: 36 3 mL/min  Renal replacement: not on dialysis  Days of Therapy: 1  Current Dose: 1000 mg once as needed for random vancomycin level < 15  Vancomycin Plan:  New Dosin mg once as needed for random vancomycin level < 15    Next Level:  @ 0600  Renal Function Monitoring: Daily LOLI and [place] will continue to follow closely for s/sx of nephrotoxicity, infusion reactions and appropriateness of therapy  BMP and CBC will be ordered per protocol  We will continue to follow the patient’s culture results and clinical progress daily      Mercedez Michel, Pharmacist

## 2023-06-12 NOTE — TELEPHONE ENCOUNTER
[facility] and spoke with Peter Umana informed her we were sending pt to the ED for fluids abx change and repeat cardiac imaging due to worsening renal function      Dr Hodge verbalizes understanding and gives thanks

## 2023-06-12 NOTE — ASSESSMENT & PLAN NOTE
Cr 1 66 on admission with baseline 0 8  Pt recently started on spironolactone on 6/6 alongside treatment for endocarditis with ceftriaxone; also on home Torsemide  Ceftriaxone switched to vancomycin due to concern for MARIBEL from ceftriaxone    Impression: Possibly secondary to IV CTX vs diuretic usage in setting of recent poor PO intake    Plan:  Isolyte overnight 75cc/hr - will monitor Cr in AM and volume status  Avoid hypotension - Hold home torsemide and spironolactone  Check morning BMP  Avoid nephrotoxic medication  Monitor PO intake

## 2023-06-12 NOTE — H&P
[facility]  [facility]  Name: Camilla Jhonson 69 y o  male I MRN: [id number]  Unit/Bed#: S -01 I Date of Admission: 2023   Date of Service: 2023 I Hospital Day: 0      Assessment/Plan   Acute kidney injury [facility])  Assessment & Plan  Cr 1 66 on admission with baseline 0 8  Pt recently started on spironolactone on  alongside treatment for endocarditis with ceftriaxone; also on home Torsemide  Ceftriaxone switched to vancomycin due to concern for MARIBEL from ceftriaxone    Impression: Possibly secondary to IV CTX vs diuretic usage in setting of recent poor PO intake    Plan:  Isolyte overnight 75cc/hr - will monitor Cr in AM and volume status  Avoid hypotension - Hold home torsemide and spironolactone  Check morning BMP  Avoid nephrotoxic medication  Monitor PO intake    Hypokalemia  Assessment & Plan  K is 2 8, pt KCl  last Tuesday and has not received any K supplement since  60 meq K given in the ED    EKG, : NSR, biatrial enlargement, LVH with repolarization abn, prolonged QT (483)  No cardiac rhythm concerns associated with hypokalemia at this time  Plan:  Replete 60meq K for total of 120 meq  Recheck K 8pm - plan to d/c telemetry per patient's request if K is >3 5  EKG if pt is symptomatic  Monitor BMP  Goal K>4        Acute bacterial endocarditis  Assessment & Plan  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  IV antibiotics switched from ceftriaxone to vancomycin due to concern for MARIBEL  Per ID, consider primary odontogenic source given poor dentition  JESSICA, 23: EF 65%  Normal systolic fxn  Dilated LA  No PFO  No thrombous in DAREN  AV leaflets moderately thickened, moderately calcified, severely reduced mobility, mod regurg, mod to severe stenosis  AV vegetations (3 small, mobile vegetations)- suspect primary odontogenic source  No definitive occult intra-abdominal source       WBC 12 94 (history of persistent leukocytosis)  6/12: CRP 42 1 (prev 19 2), ESR 42 (prev 36)  Afebrile    Plan:  · Per ID OP, plan for total 6 weeks of therapy from bacteremia clearance [facility] 6/30/23  · Continue Vancomycin, pharmacy consulted  · No indications to check bcx and MRSA at this time  · Maintain current RUE PICC line  · Trend ESR/CRP  · Upon d/c, will need to f/u OMS, ID, cardiology/CT surgery  History of osteomyelitis  Assessment & Plan  · History of recurrent back pain, likely in setting of L1-L2 vertebral osteomyelitis/discitis  · MRI L-imaging, 5/17/23: infection, some ventral epidural enhancement and paraspinal edema/enhancement  · MRI T-spine, 5/19/23: known L1-2 discitis/osteomyelitis, incompletely imaged  · Patient declines pain regiment at this time  · Plan to f/u neurosurg OP  · Neuro checks qshift  · Trend fever curve/WBC  (HFpEF) heart failure with preserved ejection fraction (HCC)  Assessment & Plan  Wt Readings from Last 3 Encounters:   06/12/23 62 1 kg (136 lb 14 5 oz)   06/07/23 63 3 kg (139 lb 8 8 oz)   06/06/23 62 5 kg (137 lb 11 2 oz)     · Hold home spironolactone and torsemide in s/o MARIBEL  · Patient appears hypovolemic given dry mucous membranes; however, noted to have pitting edema in b/l feet  · Daily weights  · I/Os  · Monitor volume status        Insomnia  Assessment & Plan  · Reports has not slept well since May 2023  Likely associated with anxiety 2/2 multiple infectious processes (endocarditis, osteomyelitis, discitis, bacteremia)  · Will trial melatonin 6mg QHS  · May need to consider PRN benzos  VTE Pharmacologic Prophylaxis: VTE Score: 4 Moderate Risk (Score 3-4) - Pharmacological DVT Prophylaxis Ordered: enoxaparin (Lovenox)  Code Status: Level 1 - Full Code Full Code  Discussion with family: Updated  (wife) via phone   Pt updated his wife via phone, but gave permission to update wife    Anticipated Length of Stay: Patient will be admitted on an inpatient basis with an anticipated length of stay of greater than 2 midnights secondary to MARIBEL and hypokalemia  Chief Complaint: Abnormal Labs    History of Present Illness:  Mariella Mosquera is a 69 y o  male with a PMH of HFpEF (on Spironolactone and Torsemide), endocarditis and lumbar osteomyelitis/discitis on IV CTX with RUE PICC line who presented on 6/12/23 form Dr Hilary Guevara office for admission for MARIBEL, hypokalemia, and recommendation to change abx to Vancomycin  Previously seen by Dr Hilary Guevara team on 6/1/2023 for occult streptococcal bacteremia with aortic valve endocarditis  Patient also with history of recurrent back pain and abdominal distention; previous MRI notable for L1-L2 vertebral osteomyelitis/discitis  Prev JESSICA significant for AV vegetations; suspecting primary odontogenic course; no definite occult intra-abdominal source  Prev CT face unremarkable, with noted poor dentation on examination  Of note, recent hospital admission on 6/5-6/6 for acute diastolic heart failure, discharged on spironolactone and torsemide  Patient reports medication compliance, but only takes those two medications  Was temporarily on KDur, last dose on 6/6  Upon arrival to ED, Cr elevated at 1 66 (baseline 0 8) and hypokalemia (2 8)  Admits to poor PO intake for past couple days, otherwise, reports eating regular meals but with unintentional weight loss of 20 lbs over the last month  Also with chronic issues with sleeping, possibly associated with anxiety given current condition of multiple infectious process on long-course of abx  Sophie Riso to have dental work done soon  Admitted to [facility] for management of MARIBEL and hypokalemia  Review of Systems:  Review of Systems   Constitutional: Positive for appetite change, fatigue and unexpected weight change (around 20 lbs over 1 month)  Eyes: Negative for visual disturbance  Respiratory: Positive for shortness of breath (with exertion and sometimes while sleeping (suspecting anxiety))      Cardiovascular: Positive for leg swelling  Negative for chest pain  Gastrointestinal: Negative for abdominal pain, blood in stool, constipation (Pt last non-loose stool this morning), nausea and vomiting  Genitourinary: Negative for difficulty urinating, dysuria and hematuria  Musculoskeletal: Positive for back pain (when sitting for too long)  Allergic/Immunologic: Negative for environmental allergies and food allergies  Neurological: Negative for dizziness, light-headedness and headaches  Hematological: Negative for adenopathy  Psychiatric/Behavioral: Negative for agitation and behavioral problems  The patient is nervous/anxious  All other systems reviewed and are negative  Past Medical and Surgical History:   Past Medical History:   Diagnosis Date   • CHF (congestive heart failure) ([facility] [address] )    • Discitis    • Endocarditis        Past Surgical History:   Procedure Laterality Date   • SKIN BIOPSY         Meds/Allergies:  Prior to Admission medications    Medication Sig Start Date End Date Taking? Authorizing Provider   spironolactone (ALDACTONE) 25 mg tablet Take 1 tablet (25 mg total) by mouth daily Do not start before June 7, 2023 6/7/23 7/7/23 Yes Vale Pepper MD   torsemide [facility]) 20 mg tablet Take 2 tablets (40 mg total) by mouth 2 (two) times a day Take your next dose of 2 tablets (40 mg total) today (6/6) at 4 p m  6/6/23 7/6/23 Yes Vale Pepper MD   potassium chloride (K-DUR,KLOR-CON) 20 mEq tablet Take 2 tablets (40 mEq total) by mouth 2 (two) times a day for 3 doses Take your next dose 2 tablets (total 40 mEq) today (6/6) at 8 p m  then continue every 12 hours for 3 doses    Patient not taking: Reported on 6/12/2023 6/6/23 6/8/23  Vale Pepper MD   metoprolol tartrate (LOPRESSOR) 25 mg tablet Take 1 tablet (25 mg total) by mouth every 12 (twelve) hours  Patient not taking: Reported on 6/12/2023 5/23/23 6/12/23  Leroy Encarnacion PA-C     I have reviewed home medications with patient personally  Allergies: No Known Allergies    Social History:  Marital Status: /Civil Union   Occupation: retired since   Patient Pre-hospital Living Situation: With spouse  Patient Pre-hospital Level of Mobility: walks  Patient Pre-hospital Diet Restrictions: none  Substance Use History:   Social History     Substance and Sexual Activity   Alcohol Use Not Currently     Social History     Tobacco Use   Smoking Status Former   • Packs/day: 0 50   • Years: 20 00   • Total pack years: 10 00   • Types: Cigarettes   • Quit date: 3/11/2005   • Years since quittin 2   Smokeless Tobacco Never     Social History     Substance and Sexual Activity   Drug Use Not Currently       Family History:  Family History   Problem Relation Age of Onset   • Colon cancer Mother    • Lung cancer Father    • Mental illness Neg Hx        Physical Exam:     Vitals:   Blood Pressure: (!) 100/40 (23)  Pulse: 85 (23)  Temperature: 97 7 °F (36 5 °C) (23)  Temp Source: Oral (23 1246)  Respirations: 20 (23)  Weight - Scale: 62 1 kg (136 lb 14 5 oz) (23)  SpO2: 95 % (23)    Physical Exam  Vitals and nursing note reviewed  Constitutional:       General: He is not in acute distress  Appearance: He is ill-appearing  He is not toxic-appearing or diaphoretic  HENT:      Head: Normocephalic and atraumatic  Right Ear: External ear normal       Left Ear: External ear normal       Nose: Nose normal       Mouth/Throat:      Mouth: Mucous membranes are dry  Eyes:      General:         Right eye: No discharge  Left eye: No discharge  Extraocular Movements: Extraocular movements intact  Conjunctiva/sclera: Conjunctivae normal       Pupils: Pupils are equal, round, and reactive to light  Cardiovascular:      Rate and Rhythm: Normal rate and regular rhythm  Pulses: Normal pulses  Heart sounds: Murmur (systolic) heard        No friction rub  No gallop  Pulmonary:      Effort: Pulmonary effort is normal  No respiratory distress  Breath sounds: Normal breath sounds  No stridor  No wheezing, rhonchi or rales  Abdominal:      General: Abdomen is flat  Bowel sounds are normal  There is no distension  Palpations: Abdomen is soft  There is no mass  Tenderness: There is no abdominal tenderness  There is no guarding  Hernia: No hernia is present  Musculoskeletal:         General: No tenderness  Cervical back: Normal range of motion  Right lower leg: Edema (2+ up to ankle) present  Left lower leg: Edema (3+ up to ankle) present  Skin:     General: Skin is warm and dry  Capillary Refill: Capillary refill takes less than 2 seconds  Neurological:      General: No focal deficit present  Mental Status: He is alert  Psychiatric:         Mood and Affect: Mood normal          Behavior: Behavior normal         Additional Data:     Lab Results:  Results from last 7 days   Lab Units 06/12/23  1424   EOS PCT % 15*   HEMATOCRIT % 33 2*   HEMOGLOBIN g/dL 10 9*   LYMPHS PCT % 6*   MONOS PCT % 6   NEUTROS PCT % 71   PLATELETS Thousands/uL 283   WBC Thousand/uL 12 94*     Results from last 7 days   Lab Units 06/12/23  1424   ANION GAP mmol/L 8   ALBUMIN g/dL 3 0*   ALK PHOS U/L 54   ALT U/L 23   AST U/L 15   BUN mg/dL 41*   CALCIUM mg/dL 8 7   CHLORIDE mmol/L 95*   CO2 mmol/L 33*   CREATININE mg/dL 1 53*   GLUCOSE RANDOM mg/dL 116   POTASSIUM mmol/L 2 8*   SODIUM mmol/L 136   TOTAL BILIRUBIN mg/dL 0 23                       Lines/Drains:  Invasive Devices     Peripherally Inserted Central Catheter Line  Duration           PICC Line 05/23/23 Right Brachial 20 days                Central Line:   Goal for removal: Long term antibiotic treatment for endocarditis           Imaging: No pertinent imaging reviewed  No orders to display       EKG and Other Studies Reviewed on Admission:   · EKG: NSR   HR 79     ** Please Note: This note has been constructed using a voice recognition system   **

## 2023-06-12 NOTE — TELEPHONE ENCOUNTER
Contacted pt per Dr Vera to inform him that she would like pt to go to the hospital to get fluids,change abx and for repeat  Cardiac images due to worsening renal function  Pt verbalizes understanding and states he will get to [facility] ED in about an [id number]     informed pt I will call ED to give them a heads up on him coming in      Pt gives thanks

## 2023-06-12 NOTE — PLAN OF CARE
Problem: Potential for Falls  Goal: Patient will remain free of falls  Description: INTERVENTIONS:  - Educate patient/family on patient safety including physical limitations  - Instruct patient to call for assistance with activity   - Consult OT/PT to assist with strengthening/mobility   - Keep Call bell within reach  - Keep bed low and locked with side rails adjusted as appropriate  - Keep care items and personal belongings within reach  - Initiate and maintain comfort rounds  - Make Fall Risk Sign visible to staff  - Offer Toileting every 1 Hour, in advance of need  - Initiate/Maintain alarm  - Obtain necessary fall risk management equipment    - Apply yellow socks and bracelet for high fall risk patients  - Consider moving patient to room near nurses station  Outcome: Progressing

## 2023-06-13 ENCOUNTER — APPOINTMENT (INPATIENT)
Dept: RADIOLOGY | Facility: HOSPITAL | Age: 78
DRG: 682 | End: 2023-06-13
Payer: COMMERCIAL

## 2023-06-13 ENCOUNTER — HOSPITAL ENCOUNTER (OUTPATIENT)
Dept: INFUSION CENTER | Facility: HOSPITAL | Age: 78
Discharge: HOME | End: 2023-06-13
Attending: INTERNAL MEDICINE

## 2023-06-13 ENCOUNTER — APPOINTMENT (INPATIENT)
Dept: NON INVASIVE DIAGNOSTICS | Facility: HOSPITAL | Age: 78
DRG: 682 | End: 2023-06-13
Payer: COMMERCIAL

## 2023-06-13 LAB
ANION GAP SERPL CALCULATED.3IONS-SCNC: 7 MMOL/L (ref 4–13)
AORTIC VALVE MEAN VELOCITY: 20.3 M/S
APICAL FOUR CHAMBER EJECTION FRACTION: 47 %
AV AREA BY CONTINUOUS VTI: 1 CM2
AV AREA PEAK VELOCITY: 1.1 CM2
AV LVOT MEAN GRADIENT: 2 MMHG
AV LVOT PEAK GRADIENT: 4 MMHG
AV MEAN GRADIENT: 19 MMHG
AV PEAK GRADIENT: 34 MMHG
AV REGURGITATION PRESSURE HALF TIME: 525 MS
AV VALVE AREA: 0.96 CM2
AV VELOCITY RATIO: 0.34
BACTERIA UR QL AUTO: ABNORMAL /HPF
BASOPHILS # BLD AUTO: 0.08 THOUSANDS/ÂΜL (ref 0–0.1)
BASOPHILS NFR BLD AUTO: 1 % (ref 0–1)
BILIRUB UR QL STRIP: NEGATIVE
BUN SERPL-MCNC: 41 MG/DL (ref 5–25)
CALCIUM SERPL-MCNC: 8.5 MG/DL (ref 8.4–10.2)
CHLORIDE SERPL-SCNC: 98 MMOL/L (ref 96–108)
CLARITY UR: CLEAR
CO2 SERPL-SCNC: 29 MMOL/L (ref 21–32)
COLOR UR: ABNORMAL
CREAT SERPL-MCNC: 1.55 MG/DL (ref 0.6–1.3)
CRP SERPL QL: 32.4 MG/L
DOP CALC AO PEAK VEL: 2.93 M/S
DOP CALC AO VTI: 55.65 CM
DOP CALC LVOT AREA: 3.14 CM2
DOP CALC LVOT DIAMETER: 2 CM
DOP CALC LVOT PEAK VEL VTI: 17.04 CM
DOP CALC LVOT PEAK VEL: 1.01 M/S
DOP CALC LVOT STROKE INDEX: 29.8 ML/M2
DOP CALC LVOT STROKE VOLUME: 53.51 CM3
EOSINOPHIL # BLD AUTO: 2.31 THOUSAND/ÂΜL (ref 0–0.61)
EOSINOPHIL NFR BLD AUTO: 16 % (ref 0–6)
ERYTHROCYTE [DISTWIDTH] IN BLOOD BY AUTOMATED COUNT: 15.1 % (ref 11.6–15.1)
FRACTIONAL SHORTENING: 22 % (ref 28–44)
GFR SERPL CREATININE-BSD FRML MDRD: 42 ML/MIN/1.73SQ M
GLUCOSE SERPL-MCNC: 120 MG/DL (ref 65–140)
GLUCOSE UR STRIP-MCNC: NEGATIVE MG/DL
HCT VFR BLD AUTO: 32.9 % (ref 36.5–49.3)
HGB BLD-MCNC: 10.6 G/DL (ref 12–17)
HGB UR QL STRIP.AUTO: NEGATIVE
IMM GRANULOCYTES # BLD AUTO: 0.15 THOUSAND/UL (ref 0–0.2)
IMM GRANULOCYTES NFR BLD AUTO: 1 % (ref 0–2)
INTERVENTRICULAR SEPTUM IN DIASTOLE (PARASTERNAL SHORT AXIS VIEW): 1 CM
INTERVENTRICULAR SEPTUM: 1 CM (ref 0.6–1.1)
KETONES UR STRIP-MCNC: NEGATIVE MG/DL
LEFT INTERNAL DIMENSION IN SYSTOLE: 4 CM (ref 2.1–4)
LEFT VENTRICLE DIASTOLIC VOLUME (MOD BIPLANE): 135 ML
LEFT VENTRICLE SYSTOLIC VOLUME (MOD BIPLANE): 71 ML
LEFT VENTRICULAR INTERNAL DIMENSION IN DIASTOLE: 5.1 CM (ref 3.5–6)
LEFT VENTRICULAR POSTERIOR WALL IN END DIASTOLE: 1.3 CM
LEFT VENTRICULAR STROKE VOLUME: 54 ML
LEUKOCYTE ESTERASE UR QL STRIP: NEGATIVE
LV EF: 47 %
LVSV (TEICH): 54 ML
LYMPHOCYTES # BLD AUTO: 0.89 THOUSANDS/ÂΜL (ref 0.6–4.47)
LYMPHOCYTES NFR BLD AUTO: 6 % (ref 14–44)
MAGNESIUM SERPL-MCNC: 2.2 MG/DL (ref 1.9–2.7)
MCH RBC QN AUTO: 29 PG (ref 26.8–34.3)
MCHC RBC AUTO-ENTMCNC: 32.2 G/DL (ref 31.4–37.4)
MCV RBC AUTO: 90 FL (ref 82–98)
MONOCYTES # BLD AUTO: 0.95 THOUSAND/ÂΜL (ref 0.17–1.22)
MONOCYTES NFR BLD AUTO: 6 % (ref 4–12)
MUCOUS THREADS UR QL AUTO: ABNORMAL
NEUTROPHILS # BLD AUTO: 10.42 THOUSANDS/ÂΜL (ref 1.85–7.62)
NEUTS SEG NFR BLD AUTO: 70 % (ref 43–75)
NITRITE UR QL STRIP: NEGATIVE
NON-SQ EPI CELLS URNS QL MICRO: ABNORMAL /HPF
NRBC BLD AUTO-RTO: 0 /100 WBCS
PH UR STRIP.AUTO: 5 [PH]
PLATELET # BLD AUTO: 280 THOUSANDS/UL (ref 149–390)
PMV BLD AUTO: 9.7 FL (ref 8.9–12.7)
POTASSIUM SERPL-SCNC: 3.5 MMOL/L (ref 3.5–5.3)
POTASSIUM SERPL-SCNC: 3.8 MMOL/L (ref 3.5–5.3)
PROT UR STRIP-MCNC: ABNORMAL MG/DL
RA PRESSURE ESTIMATED: 10 MMHG
RBC # BLD AUTO: 3.65 MILLION/UL (ref 3.88–5.62)
RBC #/AREA URNS AUTO: ABNORMAL /HPF
RV PSP: 75 MMHG
SL CV AV DECELERATION TIME RETROGRADE: 1810 MS
SL CV AV PEAK GRADIENT RETROGRADE: 112 MMHG
SL CV LV EF: 55
SL CV PED ECHO LEFT VENTRICLE DIASTOLIC VOLUME (MOD BIPLANE) 2D: 124 ML
SL CV PED ECHO LEFT VENTRICLE SYSTOLIC VOLUME (MOD BIPLANE) 2D: 70 ML
SODIUM SERPL-SCNC: 134 MMOL/L (ref 135–147)
SP GR UR STRIP.AUTO: 1.01 (ref 1–1.03)
TR MAX PG: 65 MMHG
TR PEAK VELOCITY: 4 M/S
TRICUSPID VALVE PEAK REGURGITATION VELOCITY: 4.04 M/S
UROBILINOGEN UR STRIP-ACNC: <2 MG/DL
VANCOMYCIN SERPL-MCNC: 12.7 UG/ML (ref 10–20)
WBC # BLD AUTO: 14.8 THOUSAND/UL (ref 4.31–10.16)
WBC #/AREA URNS AUTO: ABNORMAL /HPF

## 2023-06-13 PROCEDURE — 71045 X-RAY EXAM CHEST 1 VIEW: CPT

## 2023-06-13 PROCEDURE — 80048 BASIC METABOLIC PNL TOTAL CA: CPT

## 2023-06-13 PROCEDURE — 93308 TTE F-UP OR LMTD: CPT | Performed by: INTERNAL MEDICINE

## 2023-06-13 PROCEDURE — 93308 TTE F-UP OR LMTD: CPT

## 2023-06-13 PROCEDURE — 86140 C-REACTIVE PROTEIN: CPT

## 2023-06-13 PROCEDURE — 83735 ASSAY OF MAGNESIUM: CPT

## 2023-06-13 PROCEDURE — 84132 ASSAY OF SERUM POTASSIUM: CPT

## 2023-06-13 PROCEDURE — 87205 SMEAR GRAM STAIN: CPT | Performed by: INTERNAL MEDICINE

## 2023-06-13 PROCEDURE — 81001 URINALYSIS AUTO W/SCOPE: CPT

## 2023-06-13 PROCEDURE — 93321 DOPPLER ECHO F-UP/LMTD STD: CPT | Performed by: INTERNAL MEDICINE

## 2023-06-13 PROCEDURE — 99223 1ST HOSP IP/OBS HIGH 75: CPT | Performed by: INTERNAL MEDICINE

## 2023-06-13 PROCEDURE — 85025 COMPLETE CBC W/AUTO DIFF WBC: CPT

## 2023-06-13 PROCEDURE — 80202 ASSAY OF VANCOMYCIN: CPT

## 2023-06-13 PROCEDURE — 99232 SBSQ HOSP IP/OBS MODERATE 35: CPT | Performed by: INTERNAL MEDICINE

## 2023-06-13 PROCEDURE — 93325 DOPPLER ECHO COLOR FLOW MAPG: CPT | Performed by: INTERNAL MEDICINE

## 2023-06-13 RX ORDER — VANCOMYCIN HYDROCHLORIDE 1 G/200ML
15 INJECTION, SOLUTION INTRAVENOUS ONCE
Status: COMPLETED | OUTPATIENT
Start: 2023-06-13 | End: 2023-06-13

## 2023-06-13 RX ORDER — LORAZEPAM 2 MG/ML
0.5 INJECTION INTRAMUSCULAR ONCE
Status: DISCONTINUED | OUTPATIENT
Start: 2023-06-13 | End: 2023-06-16 | Stop reason: HOSPADM

## 2023-06-13 RX ORDER — SODIUM CHLORIDE, SODIUM GLUCONATE, SODIUM ACETATE, POTASSIUM CHLORIDE, MAGNESIUM CHLORIDE, SODIUM PHOSPHATE, DIBASIC, AND POTASSIUM PHOSPHATE .53; .5; .37; .037; .03; .012; .00082 G/100ML; G/100ML; G/100ML; G/100ML; G/100ML; G/100ML; G/100ML
75 INJECTION, SOLUTION INTRAVENOUS CONTINUOUS
Status: DISCONTINUED | OUTPATIENT
Start: 2023-06-13 | End: 2023-06-13

## 2023-06-13 RX ORDER — POTASSIUM CHLORIDE 14.9 MG/ML
20 INJECTION INTRAVENOUS ONCE
Status: COMPLETED | OUTPATIENT
Start: 2023-06-13 | End: 2023-06-13

## 2023-06-13 RX ADMIN — VANCOMYCIN HYDROCHLORIDE 1000 MG: 1 INJECTION, SOLUTION INTRAVENOUS at 07:55

## 2023-06-13 RX ADMIN — SODIUM CHLORIDE, SODIUM GLUCONATE, SODIUM ACETATE, POTASSIUM CHLORIDE, MAGNESIUM CHLORIDE, SODIUM PHOSPHATE, DIBASIC, AND POTASSIUM PHOSPHATE 75 ML/HR: .53; .5; .37; .037; .03; .012; .00082 INJECTION, SOLUTION INTRAVENOUS at 15:40

## 2023-06-13 RX ADMIN — Medication 6 MG: at 21:22

## 2023-06-13 RX ADMIN — ENOXAPARIN SODIUM 40 MG: 40 INJECTION SUBCUTANEOUS at 08:00

## 2023-06-13 RX ADMIN — POTASSIUM CHLORIDE 20 MEQ: 14.9 INJECTION, SOLUTION INTRAVENOUS at 08:56

## 2023-06-13 NOTE — PROGRESS NOTES
[facility]  Progress Note  Name: Tonie Josue  MRN: [id number]  Unit/Bed#: S -01 I Date of Admission: 2023   Date of Service: 2023 I Hospital Day: 1    Assessment/Plan   Acute kidney injury [facility])  Assessment & Plan  Cr 1 66 on admission with baseline 0 8  Pt recently started on spironolactone on  alongside treatment for endocarditis with ceftriaxone; also on home Torsemide  Ceftriaxone switched to vancomycin due to concern for MARIBEL from ceftriaxone    U/A, : trace proteinuria, otherwise unremarkable    Impression: Possibly secondary to IV CTX vs diuretic usage in setting of recent poor PO intake    Plan:  Isolyte 75cc/hr x 10 hours - will monitor Cr in AM and volume status; encouraged patient to increase PO intake for the day  Avoid hypotension - Hold home torsemide and spironolactone  Check morning BMP  Avoid nephrotoxic medication  Monitor PO intake    Hypokalemia  Assessment & Plan  K is 2 8, pt KCl  last Tuesday and has not received any K supplement since  60 meq K given in the ED    EKG, : NSR, biatrial enlargement, LVH with repolarization abn, prolonged QT (483)  No cardiac rhythm concerns associated with hypokalemia at this time  Plan:  Repleted 60meq K for total of 120 meq  Per pt's request, d/c'ed telemetry  EKG if pt is symptomatic  Monitor BMP  Goal K>4        * Acute bacterial endocarditis  Assessment & Plan  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  IV antibiotics switched from ceftriaxone to vancomycin due to concern for MARIBEL  Per ID, consider primary odontogenic source given poor dentition  JESSICA, 23: EF 65%  Normal systolic fxn  Dilated LA  No PFO  No thrombous in DAREN  AV leaflets moderately thickened, moderately calcified, severely reduced mobility, mod regurg, mod to severe stenosis  AV vegetations (3 small, mobile vegetations)- suspect primary odontogenic source   No definitive occult intra-abdominal source  WBC 12 94 (history of persistent leukocytosis)  6/12: CRP 42 1 (prev 19 2), ESR 42 (prev 36)  Afebrile    Plan:  · Per ID OP, plan for total 6 weeks of therapy from bacteremia clearance through 6/30/23  · Continue Vancomycin, pharmacy consulted  · No indications to check bcx and MRSA at this time  · Maintain current RUE PICC line until last IV abx  · JESSICA on 6/14, NPO at midnight  · Upon d/c, will need to f/u OMS, ID, cardiology/CT surgery  History of osteomyelitis  Assessment & Plan  · History of recurrent back pain, likely in setting of L1-L2 vertebral osteomyelitis/discitis  · MRI L-imaging, 5/17/23: infection, some ventral epidural enhancement and paraspinal edema/enhancement  · MRI T-spine, 5/19/23: known L1-2 discitis/osteomyelitis, incompletely imaged  · Patient declines pain regiment at this time  · Repeat lumbar spine MRI prior to completion of IV antibiotic  · Plan to f/u neurosurg OP  · Pt reports back pain has resolved; no indications to continue neuro checks at this time  · Trend fever curve/WBC  (HFpEF) heart failure with preserved ejection fraction (HCC)  Assessment & Plan  Wt Readings from Last 3 Encounters:   06/13/23 62 1 kg (137 lb)   06/07/23 63 3 kg (139 lb 8 8 oz)   06/06/23 62 5 kg (137 lb 11 2 oz)     · Hold home spironolactone and torsemide in s/o MARIBEL  · Patient appears hypovolemic given dry mucous membranes; however, noted to have pitting edema in b/l feet  · 6/14: Pitting edema of b/l feet improving  Appears more euvolemic compared to at admission  · Daily weights  · I/Os  · Monitor volume status        Insomnia  Assessment & Plan  · Reports has not slept well since May 2023  Likely associated with anxiety 2/2 multiple infectious processes (endocarditis, osteomyelitis, discitis, bacteremia)  · Will trial melatonin 6mg QHS - pt has been refusing  · May need to consider PRN benzos             VTE Pharmacologic Prophylaxis: VTE Score: 4 Moderate Risk (Score 3-4) - Pharmacological DVT Prophylaxis Ordered: enoxaparin (Lovenox)  Patient Centered Rounds: I performed bedside rounds with nursing staff today  Discussions with Specialists or Other Care Team Provider: ID, cardiology, pharmacy    Education and Discussions with Family / Patient: Patient declined call to   Current Length of Stay: 1 day(s)  Current Patient Status: Inpatient   Discharge Plan: Anticipate discharge in 24-48 hrs to home  Code Status: Level 1 - Full Code    Subjective:   Patient seen and examined this AM   Awake, alert, did not appear to be in acute distress  Appeared mildly anxious, stating since diagnosis of endocarditis  Wants to know status of infection  Has been trying to maintain steady PO water intake; however, noting he has been told previously to restrict fluid intake  D/w OK to increase PO intake for today given elevated Cr level, pt expressed understanding  Reports did not sleep well yesterday; possibly secondary to anxiety  Felt more reassured when told we plan to investigate echo to assess heart valves  No other complaints at this time  Objective:     Vitals:   Temp (24hrs), Av 6 °F (36 4 °C), Min:97 4 °F (36 3 °C), Max:97 8 °F (36 6 °C)    Temp:  [97 4 °F (36 3 °C)-97 8 °F (36 6 °C)] 97 4 °F (36 3 °C)  HR:  [79-88] 83  Resp:  [16-20] 17  BP: (100-119)/(40-58) 102/40  SpO2:  [88 %-97 %] 96 %  Body mass index is 22 11 kg/m²  Input and Output Summary (last 24 hours): Intake/Output Summary (Last 24 hours) at 2023 1452  Last data filed at 2023 1210  Gross per 24 hour   Intake 640 ml   Output 1200 ml   Net -560 ml       Physical Exam:   Physical Exam  Vitals and nursing note reviewed  Constitutional:       General: He is not in acute distress  Appearance: He is not ill-appearing, toxic-appearing or diaphoretic  HENT:      Head: Normocephalic and atraumatic        Right Ear: External ear normal       Left Ear: External ear normal       Nose: Nose normal       Mouth/Throat:      Mouth: Mucous membranes are moist    Eyes:      General:         Right eye: No discharge  Left eye: No discharge  Extraocular Movements: Extraocular movements intact  Conjunctiva/sclera: Conjunctivae normal    Cardiovascular:      Rate and Rhythm: Normal rate and regular rhythm  Heart sounds: Murmur heard  No friction rub  No gallop  Pulmonary:      Effort: Pulmonary effort is normal  No respiratory distress  Breath sounds: Normal breath sounds  No stridor  No wheezing, rhonchi or rales  Abdominal:      General: Bowel sounds are normal  There is no distension  Palpations: Abdomen is soft  There is no mass  Tenderness: There is no abdominal tenderness  There is no guarding  Hernia: No hernia is present  Musculoskeletal:         General: No tenderness  Normal range of motion  Cervical back: Normal range of motion  Right lower leg: Edema (1+) present  Left lower leg: Edema (2+) present  Skin:     General: Skin is warm and dry  Neurological:      Mental Status: He is alert     Psychiatric:         Behavior: Behavior normal       Comments: anxious          Additional Data:     Labs:  Results from last 7 days   Lab Units 06/13/23  0513   EOS PCT % 16*   HEMATOCRIT % 32 9*   HEMOGLOBIN g/dL 10 6*   LYMPHS PCT % 6*   MONOS PCT % 6   NEUTROS PCT % 70   PLATELETS Thousands/uL 280   WBC Thousand/uL 14 80*     Results from last 7 days   Lab Units 06/13/23  0513 06/13/23  0014 06/12/23  1424   ANION GAP mmol/L 7  --  8   ALBUMIN g/dL  --   --  3 0*   ALK PHOS U/L  --   --  54   ALT U/L  --   --  23   AST U/L  --   --  15   BUN mg/dL 41*  --  41*   CALCIUM mg/dL 8 5  --  8 7   CHLORIDE mmol/L 98  --  95*   CO2 mmol/L 29  --  33*   CREATININE mg/dL 1 55*  --  1 53*   GLUCOSE RANDOM mg/dL 120  --  116   POTASSIUM mmol/L 3 5   < > 2 8*   SODIUM mmol/L 134*  --  136   TOTAL BILIRUBIN mg/dL  --   --  0 23 < > = values in this interval not displayed  Lines/Drains:  Invasive Devices     Peripherally Inserted Central Catheter Line  Duration           PICC Line 05/23/23 Right Brachial 21 days                Central Line:  Goal for removal: N/A - Chronic PICC             Imaging:   No orders to display       Recent Cultures (last 7 days):         Last 24 Hours Medication List:   Current Facility-Administered Medications   Medication Dose Route Frequency Provider Last Rate   • acetaminophen  975 mg Oral Q8H PRN Patl Ghada, DO     • enoxaparin  40 mg Subcutaneous Daily Zackhall Ghada, DO     • melatonin  6 mg Oral HS Hershall Ghada, DO     • multi-electrolyte  75 mL/hr Intravenous Continuous Hershall Ghada, DO     • vancomycin  15 mg/kg Intravenous Once PRN Patl Ghada DO       Facility-Administered Medications Ordered in Other Encounters   Medication Dose Route Frequency Provider Last Rate   • [MAR Hold] alteplase  2 mg Intracatheter Q1MIN PRN Albin Monique MD     • [MAR Hold] alteplase  2 mg Fleurette Crosser PRN Albin Monique MD          Today, Patient Was Seen By: Jayshree Urrutia DO    **Please Note: This note may have been constructed using a voice recognition system  **

## 2023-06-13 NOTE — PLAN OF CARE
Problem: INFECTION - ADULT  Goal: Absence or prevention of progression during hospitalization  Description: INTERVENTIONS:  - Assess and monitor for signs and symptoms of infection  - Monitor lab/diagnostic results  - Monitor all insertion sites, i e  indwelling lines, tubes, and drains  - Monitor endotracheal if appropriate and nasal secretions for changes in amount and color  - [place] appropriate cooling/warming therapies per order  - Administer medications as ordered  - Instruct and encourage patient and family to use good hand hygiene technique  - Identify and instruct in appropriate isolation precautions for identified infection/condition  Outcome: Progressing     Problem: SAFETY ADULT  Goal: Maintain or return to baseline ADL function  Description: INTERVENTIONS:  -  Assess patient's ability to carry out ADLs; assess patient's baseline for ADL function and identify physical deficits which impact ability to perform ADLs (bathing, care of mouth/teeth, toileting, grooming, dressing, etc )  - Assess/evaluate cause of self-care deficits   - Assess range of motion  - Assess patient's mobility; develop plan if impaired  - Assess patient's need for assistive devices and provide as appropriate  - Encourage maximum independence but intervene and supervise when necessary  - Involve family in performance of ADLs  - Assess for home care needs following discharge   - Consider OT consult to assist with ADL evaluation and planning for discharge  - Provide patient education as appropriate  Outcome: Progressing     Problem: Knowledge Deficit  Goal: Patient/family/caregiver demonstrates understanding of disease process, treatment plan, medications, and discharge instructions  Description: Complete learning assessment and assess knowledge base    Interventions:  - Provide teaching at level of understanding  - Provide teaching via preferred learning methods  Outcome: Progressing

## 2023-06-13 NOTE — CONSULTS
Consultation - Infectious Disease   Keenan Carrera 69 y o  male MRN: [id number]  Unit/Bed#: S -01 Encounter: [id number]    IMPRESSION & RECOMMENDATIONS:   1  Acute kidney injury  Patient's routine outpatient labs were checked on 6/12/2023 and creatinine was noted to be increased to 1 66 and K was noted to be low at 2 9  He was notified by outpatient ID provider to return to the hospital for admission for MARIBEL  Consider role of patient's antibiotic  Consider possibility of worsening heart valve infection  Suspect patient's recent change in diuretic contributed  Patient has received fluids and potassium supplementation  Antibiotic treatment has been changed as below    -check creatinine tomorrow  -dose adjust antibiotic for renal function as needed  -avoid nephrotoxins  -volume management per primary service  -consult nephrology as needed     2  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  Likely originated from dental source  Blood cultures first positive on 5/17/2023  He has been undergoing a 6-week course of IV ceftriaxone which he's been tolerating without difficulty  Patient now with abnormalities on CMP including elevated creatinine and low K  Unclear if antibiotic has played a role  Unclear if worsening valve is playing a role  Suspect recent change in diuretic has contributed  Patient ceftriaxone has been stopped and he's transitioned to IV vancomycin  Will continue this for now  TTE has been repeated and showed no obvious vegetations   He will undergo JESSICA for further assessment    -continue IV vancomycin for now  -continue pharmacy consult for guidance on vancomycin dosage  -anticipate antibiotic treatment through 6/30/2023 for 6-weeks of IV antibiotic treatment  -continue weekly labs (now CBCD, creatinine, vanco trough) while on IV antibiotic  -check JESSICA  -RUE PICC to be removed after patient's final dose of IV antibiotic  -monitor vitals  -will arrange outpatient ID follow up once final antibiotic treatment plan has been established      3  L1-L2 vertebral osteomyelitis/discitis  MRI concerning for ventral epidural enhancement and paraspinal edema/enhancement  Suspect this was secondary to his streptococcal bacteremia  Patient now on antibiotic treatment as above  He will need to complete a repeat MRI prior to ending his antibiotic treatment  He is set up for outpatient neurosurgery follow up later this month   -antibiotic as above  -complete repeat lumbar spine MRI prior to completion of IV antibiotic  -maintain outpatient follow up with neurosurgery      4  RUE PICC intact  -nursing team to continue routine exams and care of PICC  -PICC to be removed by RN after final dose of IV antibiotic     We will continue to follow along with the patient  Above plan was discussed in detail with patient at the bedside  Above plan was discussed in detail with SLIM  I have spent 80 minutes with patient/family, other providers, and in review of records today in completing this consultation  Total time spent included review of testing, ordering medications and tests, communicating with other providers, documentation time, and counseling/providing education to patient, family, caregiver and coordination of outpatient care as detailed in my note  HISTORY OF PRESENT ILLNESS:  Reason for Consult: bacteremia due to streptococcus  HPI: Graciela Gama is a 69y o  year old male who presented to the [facility] ED on 6/12/2023 after being notified of abnormal outpatient lab results  Patient currently has a PICC and is undergoing a 6-week course of IV antibiotic (Ceftriaxone) due to steptococcal bacteremia and aortic valve endocarditis  Patient's weekly outpatient labs showed an elevated creatinine = 1 66 and K = 2 9  Patient was notified of findings and instructed to come to the hospital for admission for MARIBEL  Upon arrival to the ED the patient's temperature was 97 6  HR was 82  RR was 18   O2 saturation was 98% on room air  BP was 111/51  WBC count was 12 94  Creatinine was 1 53 with GFR = 43  LFTs were stable  K was 2 8  He was given K supplementation  He was admitted for additional medical management  After his admission the ID was notified of his admission and recommendation was to change his antibiotic to Vancomycin  Cardiology has been consulted for repeat JESSICA  We have been asked for formal consult for bacteremia due to streptococcus  The patient has anxiety, a fib, OA, BPH, aortic stenosis, mixed hyperlipidemia, chronic pain syndrome, HTN, lumbar spondylosis, and CHF  He has a history of trying to leave hosptial AMA, abnormal blood glucose, hyponatremia, elevated D-dime, systolic murmer, carotid bruits, non-ischemic myocardial injury, gastric distention, and skin biopsy  He is a former smoker  He has no known drug allergies  REVIEW OF SYSTEMS:  Patient reports he's feeling overall well with the exception of being worried about his renal function  He has no concerns with his PICC and reports he felt well on the antibiotics  Was not having any issues at the infusion center  He has no fever, chills, sweats, shakes  He has no worsening back pain  He denies dizziness, headache, neck stiffness  He denies sore throat, runny nose, congestion  He denies cough, difficulty breathing, SOB, and chest pain  He has no nausea, vomiting, abdominal pain, diarrhea  A complete 12 point system-based review of systems is otherwise negative      PAST MEDICAL HISTORY:  Past Medical History:   Diagnosis Date   • CHF (congestive heart failure) (HCC)    • Discitis    • Endocarditis      Past Surgical History:   Procedure Laterality Date   • SKIN BIOPSY       FAMILY HISTORY:  Non-contributory    SOCIAL HISTORY:  Social History   Social History     Substance and Sexual Activity   Alcohol Use Not Currently     Social History     Substance and Sexual Activity   Drug Use Not Currently     Social History     Tobacco Use   Smoking Status Former • Packs/day: 0 50   • Years: 20 00   • Total pack years: 10 00   • Types: Cigarettes   • Quit date: 3/11/2005   • Years since quittin 2   Smokeless Tobacco Never     ALLERGIES:  No Known Allergies    MEDICATIONS:  All current active medications have been reviewed  ANTIBIOTICS:  Vancomycin 2  Antibiotics 2    PHYSICAL EXAM:  Temp:  [97 °F (36 1 °C)-97 8 °F (36 6 °C)] 97 4 °F (36 3 °C)  HR:  [79-88] 83  Resp:  [16-20] 17  BP: (100-119)/(40-58) 102/40  SpO2:  [88 %-98 %] 97 %  Temp (24hrs), Av 5 °F (36 4 °C), Min:97 °F (36 1 °C), Max:97 8 °F (36 6 °C)  Current: Temperature: (!) 97 4 °F (36 3 °C)    Intake/Output Summary (Last 24 hours) at 2023 0913  Last data filed at 2023 0742  Gross per 24 hour   Intake 340 ml   Output 1000 ml   Net -660 ml     General Appearance:  Appearing well, nontoxic, and in no distress  He appears comfortable sitting on the couch  Head:  Normocephalic, without obvious abnormality, atraumatic  Eyes:  Conjunctiva pink and sclera anicteric, both eyes  Nose: Nares normal, mucosa normal, no drainage  Throat: Oropharynx moist without lesions  Poor dentition  Neck: Supple, symmetrical, no adenopathy, no tenderness/mass/nodules  Back:   Symmetric, ROM normal, no CVA tenderness  Lungs:   Clear to auscultation bilaterally, respirations unlabored on room air  Chest Wall:  No tenderness or deformity  Heart:  RRR; no murmur, rub or gallop  Abdomen:   Soft, non-tender, non-distended, positive bowel sounds throughout  Extremities: No cyanosis or clubbing, no edema  RUE PICC intact  Skin: No rashes or lesions noted on exposed skin  Lymph nodes: Cervical, supraclavicular nodes normal    Neurologic: Alert and oriented times 3, follows commands, speech is organized and appropriate, symmetric facial movement  LABS, IMAGING, & OTHER STUDIES:  Lab Results:  I have personally reviewed pertinent labs    Results from last 7 days   Lab Units 23  9775 06/12/23  1424 06/12/23  0946   HEMOGLOBIN g/dL 10 6* 10 9* 11 0*   PLATELETS Thousands/uL 280 283 298   WBC Thousand/uL 14 80* 12 94* 15 59*     Results from last 7 days   Lab Units 06/13/23  0513 06/13/23  0014 06/12/23  1424 06/12/23  0946   ALK PHOS U/L  --   --  54 51   ALT U/L  --   --  23 25   AST U/L  --   --  15 16   BUN mg/dL 41*  --  41* 43*   CALCIUM mg/dL 8 5  --  8 7 8 7   CHLORIDE mmol/L 98  --  95* 95*   CO2 mmol/L 29  --  33* 31   CREATININE mg/dL 1 55*  --  1 53* 1 66*   EGFR ml/min/1 73sq m 42  --  43 39   POTASSIUM mmol/L 3 5   < > 2 8* 2 9*    < > = values in this interval not displayed  Results from last 7 days   Lab Units 06/13/23  0513 06/12/23  0946   CRP mg/L 32 4* 42 1*     Imaging Studies:   I have personally reviewed pertinent imaging study reports and images in PACS  XR CHEST 1 VIEW PORTABLE 6/6/2023 - I personally reviewed this image which showed pulmonary edema and B/L pleural effusions

## 2023-06-13 NOTE — ASSESSMENT & PLAN NOTE
K is 2 8, pt KCl  last Tuesday and has not received any K supplement since  60 meq K given in the ED    EKG, : NSR, biatrial enlargement, LVH with repolarization abn, prolonged QT (483)  No cardiac rhythm concerns associated with hypokalemia at this time      Plan:  Repleted 60meq K for total of 120 meq  Per pt's request, d/c'ed telemetry  EKG if pt is symptomatic  Monitor BMP  Goal K>4

## 2023-06-13 NOTE — UTILIZATION REVIEW
Initial Clinical Review    Admission: Date/Time/Statement:   Admission Orders (From admission, onward)     Ordered        23 1607  INPATIENT ADMISSION  Once                      Orders Placed This Encounter   Procedures   • INPATIENT ADMISSION     Standing Status:   Standing     Number of Occurrences:   1     Order Specific Question:   Level of Care     Answer:   Med Surg [16]     Order Specific Question:   Estimated length of stay     Answer:   More than 2 Midnights     Order Specific Question:   Certification     Answer:   I certify that inpatient services are medically necessary for this patient for a duration of greater than two midnights  See H&P and MD Progress Notes for additional information about the patient's course of treatment  ED Arrival Information     Expected   -    Arrival   2023 12:03    Acuity   Urgent            Means of arrival   Walk-In    Escorted by   Family Member    Service   Hospitalist    Admission type   Emergency            Arrival complaint   Abnormal Lab           Chief Complaint   Patient presents with   • Abnormal Lab     Pt sent in for abnormal labs, creatinine 1 66 and potassium 2 9       Initial Presentation: 69 y o  male with hx CHF on spironolactone and torsemide,lumbar osteomyelitis/ discitis and endocarditis 2/2 streptococcal bacteremia  - receiving outpatient IV  ceftriaxone via PICC, suspected odontogenic primary source who was sent in to the ED due to abnormal outpatient labs with creat 1 66 from baseline 0 8  On exam, systolic murmur, dry mucous membranes, +2 RLE edema up to ankle, +3 LLE edema up to ankle  PICC RUE  Pt reports KCL  last Tuesday and pt hasn't received any since then  Labs K 2 8, creat 1 53, WBC 12 94  EKG- NSR, biatrial enlargement, LVH with repolarization abn, prolonged QT (483)  Pt given IV Vanco and potassium repletion in ED  PT admitted as Inpatient with MARIBEL, hypokalemia, bacterial endocarditis, hx osteomyelitis   Plan - Ceftriaxone switched to vancomycin due to concern for MARIBEL from ceftriaxone  IVF overnight  Monitor creat and vol status in am , BMP in am  Continue repletion Potassium with additional 60 Meq  Check K level@ 2000 tonight  Keep K>4  Hold home torsemide and spironolactone   Trend ESR/CRP  Per ID- IV abx thru 6/30/23  Neuro checks q shift  Date: 6/13  Day 2:   ID consult- Consider role of outpt abx on creat level , MARIBEL  Consider possibility of worsening heart valve infection  Suspect patient's recent change in diuretic contributed  IV Vanco-anticipate antibiotic treatment through 6/30/2023 for 6-weeks   Check creat tomorrow  Obtain JESSICA  Weekly labs   repeat lumbar spine MRI prior to completion of IV antibiotic  Medicine-Pt reports back pain has resolved; no indications to continue neuro checks at this time  Pitting edema of b/l feet improving  Appears more euvolemic compared to admission  Did not sleep well yesterday; possibly secondary to anxiety  Felt more reassured when told of plan to investigate echo to assess heart valves  Pt for JESSICA 6/14  Continue IV vanco, monitoring lytes and renal function  Creat 1 55 today, K 3 5   For CBCw/ diff, BMP and vanco random in am    Telemetry d/c'ed per pt request       ED Triage Vitals   Temperature Pulse Respirations Blood Pressure SpO2   06/12/23 1246 06/12/23 1246 06/12/23 1246 06/12/23 1246 06/12/23 1246   97 6 °F (36 4 °C) 82 18 111/51 98 %      Temp Source Heart Rate Source Patient Position - Orthostatic VS BP Location FiO2 (%)   06/12/23 1246 06/12/23 1246 06/12/23 1622 06/12/23 1246 --   Oral Monitor Sitting Left arm       Pain Score       06/12/23 1959       No Pain          Wt Readings from Last 1 Encounters:   06/13/23 62 1 kg (137 lb)     Additional Vital Signs:   Date/Time Temp Pulse Resp BP MAP (mmHg) SpO2   06/13/23 0921 -- 83 -- 102/40 Abnormal  -- --   06/13/23 0900 -- -- -- -- -- 96 %   06/13/23 07:11:59 97 4 °F (36 3 °C) Abnormal  83 17 102/40 Abnormal  61 Abnormal  97 %   06/12/23 2300 -- -- -- -- -- --   06/12/23 22:30:51 97 8 °F (36 6 °C) 88 16 109/49 Abnormal  69 88 % Abnormal    06/12/23 2024 -- -- -- -- -- --   06/12/23 1927 97 7 °F (36 5 °C) 85 20 100/40 Abnormal  -- 95 %   06/12/23 1622 -- 79 18 119/58 -- 97 %     Date and Time Eye Opening Best Verbal Response Best Motor Response Browning Coma Scale Score   06/13/23 0900 4 5 6 15   06/12/23 2300 4 5 6 15   06/12/23 2024 4 5 6 15   06/12/23 1600 4 5 6 15       Pertinent Labs/Diagnostic Test Results:   6/12 ECG- Normal sinus rhythm  Biatrial enlargement  Left ventricular hypertrophy with repolarization abnormality  Prolonged QT  6/13 Echo- TTE- •  Left Ventricle: Left ventricular cavity size is normal  Wall thickness is normal  The left ventricular ejection fraction is 55%  Systolic function is normal  Wall motion is normal  Diastolic function is moderately abnormal, consistent with grade II (pseudonormal) relaxation  •  Right Ventricle: Right ventricular cavity size is normal  Systolic function is normal   •  Aortic Valve: There is mild regurgitation  There is moderate to severe stenosis  •  Mitral Valve: There is mild regurgitation  •  Tricuspid Valve: There is mild to moderate regurgitation  The right ventricular systolic pressure is severely elevated  The estimated right ventricular systolic pressure is 10 78 mmHg  •  Pericardium: There is a left pleural effusion  •  Prior TTE study available for comparison  Prior study date: 5/3/2023  No significant changes noted compared to the prior study            Results from last 7 days   Lab Units 06/13/23  0513 06/12/23  1424 06/12/23  0946   HEMATOCRIT % 32 9* 33 2* 33 4*   HEMOGLOBIN g/dL 10 6* 10 9* 11 0*   NEUTROS ABS Thousands/µL 10 42* 9 29* 11 52*   PLATELETS Thousands/uL 280 283 298   WBC Thousand/uL 14 80* 12 94* 15 59*         Results from last 7 days   Lab Units 06/13/23  0513 06/13/23  0014 06/12/23  1424 06/12/23  0946   ANION GAP mmol/L 7  --  8 9   BUN mg/dL 41*  --  41* 43*   CALCIUM mg/dL 8 5  --  8 7 8 7   CHLORIDE mmol/L 98  --  95* 95*   CO2 mmol/L 29  --  33* 31   CREATININE mg/dL 1 55*  --  1 53* 1 66*   EGFR ml/min/1 73sq m 42  --  43 39   POTASSIUM mmol/L 3 5 3 8 2 8* 2 9*   MAGNESIUM mg/dL 2 2  --   --   --    SODIUM mmol/L 134*  --  136 135     Results from last 7 days   Lab Units 06/12/23  1424 06/12/23  0946   ALBUMIN g/dL 3 0* 3 0*   ALK PHOS U/L 54 51   ALT U/L 23 25   AST U/L 15 16   TOTAL BILIRUBIN mg/dL 0 23 0 34   TOTAL PROTEIN g/dL 6 0* 6 0*         Results from last 7 days   Lab Units 06/13/23  0513 06/12/23  1424 06/12/23  0946   GLUCOSE RANDOM mg/dL 120 116 140                   Results from last 7 days   Lab Units 06/13/23  0513 06/12/23  0946   CRP mg/L 32 4* 42 1*   SED RATE mm/hour  --  42*             Results from last 7 days   Lab Units 06/13/23  1054   BACTERIA UA /hpf Occasional   BILIRUBIN UA  Negative   BLOOD UA  Negative   CLARITY UA  Clear   COLOR UA  Light Yellow   EPITHELIAL CELLS WET PREP /hpf None Seen   GLUCOSE UA mg/dl Negative   KETONES UA mg/dl Negative   LEUKOCYTES UA  Negative   MUCUS THREADS  Occasional*   NITRITE UA  Negative   PH UA  5 0   PROTEIN UA mg/dl Trace*   RBC UA /hpf 1-2   SPEC GRAV UA  1 014   UROBILINOGEN UA (BE) mg/dl <2 0   WBC UA /hpf 1-2                       Results from last 7 days   Lab Units 06/13/23  0513   VANCOMYCIN RM ug/mL 12 7       ED Treatment:   Medication Administration from 06/12/2023 1203 to 06/12/2023 1920       Date/Time Order Dose Route Action     06/12/2023 1559 EDT potassium chloride (K-DUR,KLOR-CON) CR tablet 40 mEq 40 mEq Oral Given     06/12/2023 1616 EDT potassium chloride 20 mEq IVPB (premix) 20 mEq Intravenous New Bag     06/12/2023 1711 EDT vancomycin (VANCOCIN) 1500 mg in sodium chloride 0 9% 250 mL IVPB 1,500 mg Intravenous New Bag        Past Medical History:   Diagnosis Date   • CHF (congestive heart failure) (HCC)    • Discitis    • Endocarditis      Present on Admission:  • Hypokalemia  • Acute bacterial endocarditis      Admitting Diagnosis: Discitis of lumbar region [M46 46]  Acute bacterial endocarditis [I33 0]  Hypokalemia [E87 6]  Discitis [M46 40]  [facility] [N17 9]  Bacteremia due to Streptococcus [R78 81, B95 5]  Age/Sex: 69 y o  male  Admission Orders:  Scheduled Medications:  enoxaparin, 40 mg, Subcutaneous, Daily  melatonin, 6 mg, Oral, HS    vancomycin (VANCOCIN) IVPB (premix in dextrose) 1,000 mg 200 mL  Dose: 15 mg/kg  Weight Dosing Info: 63 3 kg  Freq: Once Route: IV  Last Dose: Stopped (06/13/23 0855)  Start: 06/13/23 0645 End: 06/13/23 0855  potassium chloride 20 mEq IVPB (premix)  Dose: 20 mEq  Freq: Once Route: IV  Last Dose: Stopped (06/13/23 1056)  Start: 06/13/23 0715 End: 06/13/23 1056    potassium chloride (K-DUR,KLOR-CON) CR tablet 40 mEq  Dose: 40 mEq  Freq: Once Route: PO  Indications of Use: HYPOKALEMIA  Start: 06/12/23 1815 End: 06/12/23 2003   Admin Instructions: Following IV Keq 20mEq infusion  Swallow whole; do not crush or chew  Tablet may be split in half to facilitate swallowing  Followed by  potassium chloride (K-DUR,KLOR-CON) CR tablet 20 mEq  Dose: 20 mEq  Freq:  Once Route: PO  Indications of Use: HYPOKALEMIA  Start: 06/12/23 1815 End: 06/12/23 2006                 Continuous IV Infusions:    multi-electrolyte (PLASMALYTE-A/ISOLYTE-S PH 7 4) IV solution  Rate: 100 mL/hr Dose: 100 mL/hr  Freq: Continuous Route: IV  Indications of Use: IV Hydration  Last Dose: Stopped (06/13/23 0800)  Start: 06/12/23 1815 End: 06/13/23 1155  PRN Meds:  acetaminophen, 975 mg, Oral, Q8H PRN  vancomycin, 15 mg/kg, Intravenous, Once PRN    2 GM na diet--->NPO  Effective 6/13 @0001   neuro checks    OIOB as shaquille   SCD's    IP CONSULT TO PHARMACY  IP CONSULT TO INFECTIOUS DISEASES    Network Utilization Review Department  ATTENTION: Please call with any questions or concerns to [phone] and carefully listen to the prompts so that you are directed to the right person  All voicemails are confidential   Simon Patel all requests for admission clinical reviews, approved or denied determinations and any other requests to dedicated fax number below belonging to the campus where the patient is receiving treatment   List of dedicated fax numbers for the Facilities:  [address] DENIALS (Administrative/Medical Necessity) [phone]   [address] (Maternity/NICU/Pediatrics) [phone]   [address] [phone]   [facility] [phone]   [address] [address] Toni Fermin [facility] 28 [phone]   [facility] 134 815 [facility] [phone]

## 2023-06-13 NOTE — ASSESSMENT & PLAN NOTE
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  IV antibiotics switched from ceftriaxone to vancomycin due to concern for MARIBEL  Per ID, consider primary odontogenic source given poor dentition  JESSICA, 5/19/23: EF 65%  Normal systolic fxn  Dilated LA  No PFO  No thrombous in DAREN  AV leaflets moderately thickened, moderately calcified, severely reduced mobility, mod regurg, mod to severe stenosis  AV vegetations (3 small, mobile vegetations)- suspect primary odontogenic source  No definitive occult intra-abdominal source  WBC 12 94 (history of persistent leukocytosis)  6/12: CRP 42 1 (prev 19 2), ESR 42 (prev 36)  Afebrile    Plan:  · Per ID OP, plan for total 6 weeks of therapy from bacteremia clearance through 6/30/23  · Continue Vancomycin, pharmacy consulted  · No indications to check bcx and MRSA at this time  · Maintain current RUE PICC line until last IV abx  · JESSICA on 6/14, NPO at midnight  · Upon d/c, will need to f/u OMS, ID, cardiology/CT surgery

## 2023-06-13 NOTE — CASE MANAGEMENT
Case Management Assessment & Discharge Planning Note    Patient name Dmitry Avila  Location S /S -89 MRN [id number]  : 1945 Date 2023       Current Admission Date: 2023  Current Admission Diagnosis:Acute bacterial endocarditis   Patient Active Problem List    Diagnosis Date Noted   • Acute kidney injury ([facility] [address] ) 2023   • History of osteomyelitis 2023   • (HFpEF) heart failure with preserved ejection fraction ([address] ) 2023   • Insomnia 2023   • Hypokalemia 2023   • Anxiety 2023   • Acute bacterial endocarditis 2023   • Gastric distention 2023   • Discitis of lumbar region 2023   • Bacteremia due to Streptococcus 2023   • Non-ischemic myocardial injury (non-traumatic) 2023   • Sepsis ([facility] [address] ) 2023   • Shortness of breath 2023   • Chronic pain syndrome 05/15/2023   • Lumbar spondylosis 05/15/2023   • Chronic bilateral low back pain without sciatica 2023   • Primary hypertension 2023   • Atrial fibrillation ([facility] [address] ) 2023   • Aortic stenosis 2023   • Leukocytosis 2023   • Elevated D-dimer 2023   • Hyponatremia 2023   • Mixed hyperlipidemia 2021   • Abnormal glucose 2021   • BPH with obstruction/lower urinary tract symptoms 2021   • Primary osteoarthritis of right hand 2017      LOS (days): 1  Geometric Mean LOS (GMLOS) (days): 4 30  Days to GMLOS:3 3     OBJECTIVE:  PATIENT READMITTED TO HOSPITAL  Risk of Unplanned Readmission Score: 18 81         Current admission status: Inpatient       Preferred Pharmacy:   [facility] [address] [place] [address]  Phone: [phone] Fax: [phone]    Primary Care Provider: Cody Murrell MD    Primary Insurance: [employer] [facility]  Secondary Insurance:     ASSESSMENT:  [facility], [address] Representative - Spouse   Primary Phone: [phone] (Mobile)                         Readmission Root Cause  30 Day Readmission: Yes  Who directed you to return to the hospital?: Other (comment) (ID)  Did you understand whom to contact if you had questions or problems?: Yes  Did you get your prescriptions before you left the hospital?: Yes  Were you able to get your prescriptions filled when you left the hospital?: Yes  Did you take your medications as prescribed?: Yes  Were you able to get to your follow-up appointments?: Yes  During previous admission, was a post-acute recommendation made?: No  Patient was readmitted due to: for MARIBEL, hypokalemia, and recommendation to change abx to Vancomycin  Action Plan: IV abx, ID following, JESSICA tomorrow    Patient Information  Admitted from[de-identified] Home  Mental Status: Alert  Assessment information provided by[de-identified] Patient  Primary Caregiver: Self  Support Systems: Spouse/significant other  [place] of Residence: [address] [place] [address] do you live in?: [address] [place] [address] Road entry access options   Select all that apply : Stairs  Number of steps to enter home : 2  Do the steps have railings?: Yes  Type of Current Residence: [address] home  Upon entering residence, is there a bedroom on the main floor (no further steps)?: Yes  Upon entering residence, is there a bathroom on the main floor (no further steps)?: Yes  In the last 12 months, was there a time when you were not able to pay the mortgage or rent on time?: No  In the last 12 months, how many places have you lived?: 1  In the last 12 months, was there a time when you did not have a steady place to sleep or slept in a shelter (including now)?: No  Homeless/housing insecurity resource given?: N/A  Living Arrangements: Lives w/ Spouse/significant other  Is patient a ?: No    Activities of Daily Living Prior to Admission  Functional Status: Independent  Completes ADLs independently?: Yes  Ambulates independently?: Yes  Does patient use assisted devices?: No  Does patient currently own DME?: No  Does patient have a history of Outpatient Therapy (PT/OT)?: No  Does the patient have a history of Short-Term Rehab?: No  Does patient have a history of HHC?: No  Does patient currently have [address]?: No         Patient Information Continued  Income Source: Pension/care home  Does patient have prescription coverage?: Yes  Within the past 12 months, you worried that your food would run out before you got the money to buy more : Never true  Within the past 12 months, the food you bought just didn't last and you didn't have money to get more : Never true  Food insecurity resource given?: N/A  Does patient receive dialysis treatments?: No  Does patient have a history of substance abuse?: No  Does patient have a history of Mental Health Diagnosis?: No         Means of Transportation  Means of Transport to Appts[de-identified] Family transport (Currently pt's wife is driving pt to medical appointments, but prior to last hospitalization pt was driving self )  In the past 12 months, has lack of transportation kept you from medical appointments or from getting medications?: No  In the past 12 months, has lack of transportation kept you from meetings, work, or from getting things needed for daily living?: No  Was application for public transport provided?: N/A        DISCHARGE DETAILS:    Discharge planning discussed with[de-identified] patient  Freedom of Choice: Yes        Were Treatment Team discharge recommendations reviewed with patient/caregiver?: Yes  Did patient/caregiver verbalize understanding of patient care needs?: Yes  Were patient/caregiver advised of the risks associated with not following Treatment Team discharge recommendations?: Yes    Contacts  Patient Contacts: Patient  Contact Method:  In Person  Reason/Outcome: Discharge Planning, Continuity of [address] [place] [address]         Is the patient interested in [address] at discharge?: No    DME Referral Provided  Referral made for DME?: No    Other Referral/Resources/Interventions Provided:  Referral Comments: Met with pt who provided information for initial assessment  Pt stated he lives with his wife in a [facility]  Pt is independent with ambulation and ADLs at baseline  No hx of VNA or STR  Last hospitalization, pt was discharged home and setup with outpt IV abx at [facility] infusion center  Pt's wife has been driving pt to those appointments  Pt stated his wife is currently on FMLA and assisting pt at home and driving pt to all medical appointments  Pt prefers to return home and continue outpt IV abx at [facility] infusion center if that still is being recommended  Will continue to follow for discharge planning needs           Treatment Team Recommendation: Home  Discharge Destination Plan[de-identified] Home  Transport at Discharge : Family

## 2023-06-13 NOTE — ASSESSMENT & PLAN NOTE
Wt Readings from Last 3 Encounters:   06/13/23 62 1 kg (137 lb)   06/07/23 63 3 kg (139 lb 8 8 oz)   06/06/23 62 5 kg (137 lb 11 2 oz)     · Hold home spironolactone and torsemide in s/o MARIBEL  · Patient appears hypovolemic given dry mucous membranes; however, noted to have pitting edema in b/l feet  · 6/14: Pitting edema of b/l feet improving  Appears more euvolemic compared to at admission    · Daily weights  · I/Os  · Monitor volume status

## 2023-06-13 NOTE — ASSESSMENT & PLAN NOTE
Cr 1 66 on admission with baseline 0 8  Pt recently started on spironolactone on 6/6 alongside treatment for endocarditis with ceftriaxone; also on home Torsemide  Ceftriaxone switched to vancomycin due to concern for MARIBEL from ceftriaxone    U/A, 6/13: trace proteinuria, otherwise unremarkable    Impression: Possibly secondary to IV CTX vs diuretic usage in setting of recent poor PO intake    Plan:  Isolyte 75cc/hr x 10 hours - will monitor Cr in AM and volume status; encouraged patient to increase PO intake for the day  Avoid hypotension - Hold home torsemide and spironolactone  Check morning BMP  Avoid nephrotoxic medication  Monitor PO intake

## 2023-06-13 NOTE — PLAN OF CARE
Problem: PAIN - ADULT  Goal: Verbalizes/displays adequate comfort level or baseline comfort level  Description: Interventions:  - Encourage patient to monitor pain and request assistance  - Assess pain using appropriate pain scale  - Administer analgesics based on type and severity of pain and evaluate response  - Implement non-pharmacological measures as appropriate and evaluate response  - Consider cultural and social influences on pain and pain management  - Notify physician/advanced practitioner if interventions unsuccessful or patient reports new pain  Outcome: Progressing     Problem: INFECTION - ADULT  Goal: Absence or prevention of progression during hospitalization  Description: INTERVENTIONS:  - Assess and monitor for signs and symptoms of infection  - Monitor lab/diagnostic results  - Monitor all insertion sites, i e  indwelling lines, tubes, and drains  - Monitor endotracheal if appropriate and nasal secretions for changes in amount and color  - [place] appropriate cooling/warming therapies per order  - Administer medications as ordered  - Instruct and encourage patient and family to use good hand hygiene technique  - Identify and instruct in appropriate isolation precautions for identified infection/condition  Outcome: Progressing     Problem: SAFETY ADULT  Goal: Patient will remain free of falls  Description: INTERVENTIONS:  - Educate patient/family on patient safety including physical limitations  - Instruct patient to call for assistance with activity   - Consult OT/PT to assist with strengthening/mobility   - Keep Call bell within reach  - Keep bed low and locked with side rails adjusted as appropriate  - Keep care items and personal belongings within reach  - Initiate and maintain comfort rounds  - Make Fall Risk Sign visible to staff  - Offer Toileting every 2 Hours, in advance of need  - Initiate/Maintain bed alarm  - Obtain necessary fall risk management equipment:   - Apply yellow socks and bracelet for high fall risk patients  - Consider moving patient to room near nurses station  Outcome: Progressing  Goal: Maintain or return to baseline ADL function  Description: INTERVENTIONS:  -  Assess patient's ability to carry out ADLs; assess patient's baseline for ADL function and identify physical deficits which impact ability to perform ADLs (bathing, care of mouth/teeth, toileting, grooming, dressing, etc )  - Assess/evaluate cause of self-care deficits   - Assess range of motion  - Assess patient's mobility; develop plan if impaired  - Assess patient's need for assistive devices and provide as appropriate  - Encourage maximum independence but intervene and supervise when necessary  - Involve family in performance of ADLs  - Assess for home care needs following discharge   - Consider OT consult to assist with ADL evaluation and planning for discharge  - Provide patient education as appropriate  Outcome: Progressing  Goal: Maintains/Returns to pre admission functional level  Description: INTERVENTIONS:  - Perform BMAT or MOVE assessment daily    - Set and communicate daily mobility goal to care team and patient/family/caregiver  - Collaborate with rehabilitation services on mobility goals if consulted  - Perform Range of Motion 2 times a day  - Reposition patient every 2 hours    - Dangle patient 2 times a day  - Stand patient 2 times a day  - Ambulate patient 2 times a day  - Out of bed to chair 2 times a day   - Out of bed for meals 2 times a day  - Out of bed for toileting  - Record patient progress and toleration of activity level   Outcome: Progressing     Problem: DISCHARGE PLANNING  Goal: Discharge to home or other facility with appropriate resources  Description: INTERVENTIONS:  - Identify barriers to discharge w/patient and caregiver  - Arrange for needed discharge resources and transportation as appropriate  - Identify discharge learning needs (meds, wound care, etc )  - Arrange for interpretive services to assist at discharge as needed  - Refer to Case Management Department for coordinating discharge planning if the patient needs post-hospital services based on physician/advanced practitioner order or complex needs related to functional status, cognitive ability, or social support system  Outcome: Progressing     Problem: Knowledge Deficit  Goal: Patient/family/caregiver demonstrates understanding of disease process, treatment plan, medications, and discharge instructions  Description: Complete learning assessment and assess knowledge base    Interventions:  - Provide teaching at level of understanding  - Provide teaching via preferred learning methods  Outcome: Progressing

## 2023-06-13 NOTE — ASSESSMENT & PLAN NOTE
· Reports has not slept well since May 2023  Likely associated with anxiety 2/2 multiple infectious processes (endocarditis, osteomyelitis, discitis, bacteremia)  · Will trial melatonin 6mg QHS - pt has been refusing  · May need to consider PRN benzos

## 2023-06-13 NOTE — QUICK NOTE
Mr Mark Issa is a 67-year-old gentleman with a history of AV endocarditis/vegetations, moderate to severe AS/moderate AI, chronic diastolic CHF, and paroxysmal atrial fibrillation  He was recently evaluated by the [address] [place] [address] Cardiology service last month after having been diagnosed with streptococcal bacteremia and was subsequently found to have infective aortic valve endocarditis with  small valvular vegetations  Suspicion for a primary odontogenic source  Of note he was also found to have MRI findings of L1-L2 vertebral osteomyelitis/discitis  He was evaluated by the infectious disease service whom recommended a 6-week course of IV antibiotics to extend through June 30, 2023  He was also recommended to seek evaluation by the  CT surgery service for potential TAVR consideration  He presented to the [address] [place] [address] campus on 6/12/2023 with complaints of generalized weakness and for further evaluation of abnormal lab data  On admission he was found to have an acute kidney injury as well as hypokalemia  His usual home oral diuretics (spironolactone and torsemide) were placed on hold, started on IV fluids for hydration, and electrolytes were replaced  Cardiology was reconsulted at the request of the ID service for reassessment of his known AV endocarditis/valvular vegetations with repeat JESSICA imaging during this hospitalization

## 2023-06-13 NOTE — PROGRESS NOTES
Penny Tai is a 69 y o  male who is currently ordered Vancomycin IV with management by the Pharmacy Consult service  Relevant clinical data and objective / subjective history reviewed  Vancomycin Assessment:  Indication and Goal AUC/Trough: Bone/joint infection (goal -600, trough >10); Bacteremia (goal -600, trough >10); Endocarditis (goal -600, trough >10)  Clinical Status: stable  Micro:     Renal Function:  SCr: 1 55 mg/dL  CrCl: 35 2 mL/min  Renal replacement: Not on dialysis  Days of Therapy: 2  Current Dose: 1000 mg once as needed for random vancomycin level < 15  Vancomycin Plan:  New Dosing: continue current dose , random level this morning is 12 7 so one 1000 mg IV dose will be given  Next Level: 06/14 at 0600  Renal Function Monitoring: Daily BMP and UOP  Pharmacy will continue to follow closely for s/sx of nephrotoxicity, infusion reactions and appropriateness of therapy  BMP and CBC will be ordered per protocol  We will continue to follow the patient’s culture results and clinical progress daily      Nikkie Celis Pharmacist

## 2023-06-13 NOTE — ASSESSMENT & PLAN NOTE
· History of recurrent back pain, likely in setting of L1-L2 vertebral osteomyelitis/discitis  · MRI L-imaging, 5/17/23: infection, some ventral epidural enhancement and paraspinal edema/enhancement  · MRI T-spine, 5/19/23: known L1-2 discitis/osteomyelitis, incompletely imaged  · Patient declines pain regiment at this time  · Repeat lumbar spine MRI prior to completion of IV antibiotic  · Plan to f/u neurosurg OP  · Pt reports back pain has resolved; no indications to continue neuro checks at this time  · Trend fever curve/WBC

## 2023-06-13 NOTE — ASSESSMENT & PLAN NOTE
Wt Readings from Last 3 Encounters:   06/12/23 62 1 kg (136 lb 14 5 oz)   06/07/23 63 3 kg (139 lb 8 8 oz)   06/06/23 62 5 kg (137 lb 11 2 oz)     · Hold home spironolactone and torsemide in s/o MARIBEL  · Patient appears hypovolemic given dry mucous membranes; however, noted to have pitting edema in b/l feet  · Daily weights  · I/Os  · Monitor volume status

## 2023-06-13 NOTE — ASSESSMENT & PLAN NOTE
· History of recurrent back pain, likely in setting of L1-L2 vertebral osteomyelitis/discitis  · MRI L-imaging, 5/17/23: infection, some ventral epidural enhancement and paraspinal edema/enhancement  · MRI T-spine, 5/19/23: known L1-2 discitis/osteomyelitis, incompletely imaged  · Patient declines pain regiment at this time  · Plan to f/u neurosurg OP  · Neuro checks qshift  · Trend fever curve/WBC

## 2023-06-13 NOTE — ASSESSMENT & PLAN NOTE
· Reports has not slept well since May 2023  Likely associated with anxiety 2/2 multiple infectious processes (endocarditis, osteomyelitis, discitis, bacteremia)  · Will trial melatonin 6mg QHS  · May need to consider PRN benzos

## 2023-06-14 ENCOUNTER — HOSPITAL ENCOUNTER (OUTPATIENT)
Dept: INFUSION CENTER | Facility: HOSPITAL | Age: 78
Discharge: HOME | End: 2023-06-14
Attending: INTERNAL MEDICINE

## 2023-06-14 ENCOUNTER — APPOINTMENT (OUTPATIENT)
Dept: NON INVASIVE DIAGNOSTICS | Facility: HOSPITAL | Age: 78
DRG: 682 | End: 2023-06-14
Payer: COMMERCIAL

## 2023-06-14 ENCOUNTER — ANESTHESIA EVENT (INPATIENT)
Dept: NON INVASIVE DIAGNOSTICS | Facility: HOSPITAL | Age: 78
End: 2023-06-14
Payer: COMMERCIAL

## 2023-06-14 ENCOUNTER — ANESTHESIA (INPATIENT)
Dept: NON INVASIVE DIAGNOSTICS | Facility: HOSPITAL | Age: 78
End: 2023-06-14
Payer: COMMERCIAL

## 2023-06-14 LAB
ANION GAP SERPL CALCULATED.3IONS-SCNC: 9 MMOL/L (ref 4–13)
BASOPHILS # BLD AUTO: 0.09 THOUSANDS/ÂΜL (ref 0–0.1)
BASOPHILS NFR BLD AUTO: 1 % (ref 0–1)
BUN SERPL-MCNC: 34 MG/DL (ref 5–25)
CALCIUM SERPL-MCNC: 8.4 MG/DL (ref 8.4–10.2)
CHLORIDE SERPL-SCNC: 95 MMOL/L (ref 96–108)
CO2 SERPL-SCNC: 28 MMOL/L (ref 21–32)
CREAT SERPL-MCNC: 1.33 MG/DL (ref 0.6–1.3)
EOSINOPHIL # BLD AUTO: 2.57 THOUSAND/ÂΜL (ref 0–0.61)
EOSINOPHIL NFR BLD AUTO: 18 % (ref 0–6)
EOSINOPHIL NFR URNS MANUAL: 0 %
ERYTHROCYTE [DISTWIDTH] IN BLOOD BY AUTOMATED COUNT: 15.1 % (ref 11.6–15.1)
GFR SERPL CREATININE-BSD FRML MDRD: 51 ML/MIN/1.73SQ M
GLUCOSE SERPL-MCNC: 116 MG/DL (ref 65–140)
HCT VFR BLD AUTO: 33.7 % (ref 36.5–49.3)
HGB BLD-MCNC: 10.9 G/DL (ref 12–17)
IMM GRANULOCYTES # BLD AUTO: 0.12 THOUSAND/UL (ref 0–0.2)
IMM GRANULOCYTES NFR BLD AUTO: 1 % (ref 0–2)
LYMPHOCYTES # BLD AUTO: 0.99 THOUSANDS/ÂΜL (ref 0.6–4.47)
LYMPHOCYTES NFR BLD AUTO: 7 % (ref 14–44)
MCH RBC QN AUTO: 29 PG (ref 26.8–34.3)
MCHC RBC AUTO-ENTMCNC: 32.3 G/DL (ref 31.4–37.4)
MCV RBC AUTO: 90 FL (ref 82–98)
MONOCYTES # BLD AUTO: 0.92 THOUSAND/ÂΜL (ref 0.17–1.22)
MONOCYTES NFR BLD AUTO: 7 % (ref 4–12)
NEUTROPHILS # BLD AUTO: 9.51 THOUSANDS/ÂΜL (ref 1.85–7.62)
NEUTS SEG NFR BLD AUTO: 66 % (ref 43–75)
NRBC BLD AUTO-RTO: 0 /100 WBCS
OSMOLALITY UR/SERPL-RTO: 288 MMOL/KG (ref 282–298)
PLATELET # BLD AUTO: 285 THOUSANDS/UL (ref 149–390)
PMV BLD AUTO: 9.7 FL (ref 8.9–12.7)
POTASSIUM SERPL-SCNC: 3.3 MMOL/L (ref 3.5–5.3)
RBC # BLD AUTO: 3.76 MILLION/UL (ref 3.88–5.62)
SODIUM SERPL-SCNC: 132 MMOL/L (ref 135–147)
VANCOMYCIN SERPL-MCNC: 11.4 UG/ML (ref 10–20)
WBC # BLD AUTO: 14.2 THOUSAND/UL (ref 4.31–10.16)

## 2023-06-14 PROCEDURE — 80202 ASSAY OF VANCOMYCIN: CPT | Performed by: INTERNAL MEDICINE

## 2023-06-14 PROCEDURE — 93321 DOPPLER ECHO F-UP/LMTD STD: CPT | Performed by: INTERNAL MEDICINE

## 2023-06-14 PROCEDURE — 93312 ECHO TRANSESOPHAGEAL: CPT

## 2023-06-14 PROCEDURE — 93312 ECHO TRANSESOPHAGEAL: CPT | Performed by: INTERNAL MEDICINE

## 2023-06-14 PROCEDURE — 80048 BASIC METABOLIC PNL TOTAL CA: CPT

## 2023-06-14 PROCEDURE — 93325 DOPPLER ECHO COLOR FLOW MAPG: CPT | Performed by: INTERNAL MEDICINE

## 2023-06-14 PROCEDURE — 99232 SBSQ HOSP IP/OBS MODERATE 35: CPT | Performed by: INTERNAL MEDICINE

## 2023-06-14 PROCEDURE — 76376 3D RENDER W/INTRP POSTPROCES: CPT | Performed by: INTERNAL MEDICINE

## 2023-06-14 PROCEDURE — 83930 ASSAY OF BLOOD OSMOLALITY: CPT

## 2023-06-14 PROCEDURE — 85025 COMPLETE CBC W/AUTO DIFF WBC: CPT

## 2023-06-14 PROCEDURE — 76376 3D RENDER W/INTRP POSTPROCES: CPT

## 2023-06-14 PROCEDURE — B24BZZ4 ULTRASONOGRAPHY OF HEART WITH AORTA, TRANSESOPHAGEAL: ICD-10-PCS | Performed by: INTERNAL MEDICINE

## 2023-06-14 RX ORDER — FUROSEMIDE 10 MG/ML
40 INJECTION INTRAMUSCULAR; INTRAVENOUS ONCE
Status: COMPLETED | OUTPATIENT
Start: 2023-06-14 | End: 2023-06-14

## 2023-06-14 RX ORDER — POTASSIUM CHLORIDE 20 MEQ/1
20 TABLET, EXTENDED RELEASE ORAL 2 TIMES DAILY
Status: DISCONTINUED | OUTPATIENT
Start: 2023-06-14 | End: 2023-06-16 | Stop reason: HOSPADM

## 2023-06-14 RX ORDER — SPIRONOLACTONE 25 MG/1
25 TABLET ORAL DAILY
Status: DISCONTINUED | OUTPATIENT
Start: 2023-06-14 | End: 2023-06-16 | Stop reason: HOSPADM

## 2023-06-14 RX ORDER — PROPOFOL 10 MG/ML
INJECTION, EMULSION INTRAVENOUS AS NEEDED
Status: DISCONTINUED | OUTPATIENT
Start: 2023-06-14 | End: 2023-06-14

## 2023-06-14 RX ORDER — PROPOFOL 10 MG/ML
INJECTION, EMULSION INTRAVENOUS CONTINUOUS PRN
Status: DISCONTINUED | OUTPATIENT
Start: 2023-06-14 | End: 2023-06-14

## 2023-06-14 RX ORDER — TORSEMIDE 20 MG/1
40 TABLET ORAL 2 TIMES DAILY
Status: DISCONTINUED | OUTPATIENT
Start: 2023-06-14 | End: 2023-06-15

## 2023-06-14 RX ORDER — SODIUM CHLORIDE 9 MG/ML
50 INJECTION, SOLUTION INTRAVENOUS CONTINUOUS
Status: CANCELLED | OUTPATIENT
Start: 2023-06-14

## 2023-06-14 RX ORDER — HYDROXYZINE HYDROCHLORIDE 25 MG/1
25 TABLET, FILM COATED ORAL ONCE
Status: COMPLETED | OUTPATIENT
Start: 2023-06-14 | End: 2023-06-14

## 2023-06-14 RX ORDER — LIDOCAINE HYDROCHLORIDE 10 MG/ML
INJECTION, SOLUTION EPIDURAL; INFILTRATION; INTRACAUDAL; PERINEURAL AS NEEDED
Status: DISCONTINUED | OUTPATIENT
Start: 2023-06-14 | End: 2023-06-14

## 2023-06-14 RX ORDER — SODIUM CHLORIDE, SODIUM LACTATE, POTASSIUM CHLORIDE, CALCIUM CHLORIDE 600; 310; 30; 20 MG/100ML; MG/100ML; MG/100ML; MG/100ML
INJECTION, SOLUTION INTRAVENOUS CONTINUOUS PRN
Status: DISCONTINUED | OUTPATIENT
Start: 2023-06-14 | End: 2023-06-14

## 2023-06-14 RX ORDER — VANCOMYCIN HYDROCHLORIDE 1 G/200ML
15 INJECTION, SOLUTION INTRAVENOUS ONCE
Status: COMPLETED | OUTPATIENT
Start: 2023-06-14 | End: 2023-06-14

## 2023-06-14 RX ORDER — POTASSIUM CHLORIDE 20 MEQ/1
40 TABLET, EXTENDED RELEASE ORAL 2 TIMES DAILY
Status: DISCONTINUED | OUTPATIENT
Start: 2023-06-14 | End: 2023-06-14

## 2023-06-14 RX ADMIN — TOPICAL ANESTHETIC 1 SPRAY: 200 SPRAY DENTAL; PERIODONTAL at 10:26

## 2023-06-14 RX ADMIN — POTASSIUM CHLORIDE 20 MEQ: 1500 TABLET, EXTENDED RELEASE ORAL at 17:16

## 2023-06-14 RX ADMIN — SODIUM CHLORIDE, SODIUM LACTATE, POTASSIUM CHLORIDE, AND CALCIUM CHLORIDE: .6; .31; .03; .02 INJECTION, SOLUTION INTRAVENOUS at 10:25

## 2023-06-14 RX ADMIN — PROPOFOL 60 MCG/KG/MIN: 10 INJECTION, EMULSION INTRAVENOUS at 10:34

## 2023-06-14 RX ADMIN — VANCOMYCIN HYDROCHLORIDE 1000 MG: 1 INJECTION, SOLUTION INTRAVENOUS at 07:25

## 2023-06-14 RX ADMIN — PROPOFOL 30 MG: 10 INJECTION, EMULSION INTRAVENOUS at 10:34

## 2023-06-14 RX ADMIN — ENOXAPARIN SODIUM 40 MG: 40 INJECTION SUBCUTANEOUS at 08:12

## 2023-06-14 RX ADMIN — SPIRONOLACTONE 25 MG: 25 TABLET ORAL at 08:12

## 2023-06-14 RX ADMIN — PHENYLEPHRINE HYDROCHLORIDE 50 MCG/MIN: 10 INJECTION INTRAVENOUS at 10:34

## 2023-06-14 RX ADMIN — POTASSIUM CHLORIDE 20 MEQ: 1500 TABLET, EXTENDED RELEASE ORAL at 08:12

## 2023-06-14 RX ADMIN — HYDROXYZINE HYDROCHLORIDE 25 MG: 25 TABLET, FILM COATED ORAL at 21:52

## 2023-06-14 RX ADMIN — Medication 6 MG: at 21:28

## 2023-06-14 RX ADMIN — LIDOCAINE HYDROCHLORIDE 40 MG: 10 INJECTION, SOLUTION EPIDURAL; INFILTRATION; INTRACAUDAL; PERINEURAL at 10:34

## 2023-06-14 RX ADMIN — PROPOFOL 30 MG: 10 INJECTION, EMULSION INTRAVENOUS at 10:36

## 2023-06-14 RX ADMIN — FUROSEMIDE 40 MG: 10 INJECTION, SOLUTION INTRAMUSCULAR; INTRAVENOUS at 03:00

## 2023-06-14 RX ADMIN — TORSEMIDE 40 MG: 20 TABLET ORAL at 12:03

## 2023-06-14 NOTE — ASSESSMENT & PLAN NOTE
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  IV antibiotics switched from ceftriaxone to vancomycin due to concern for MARIBEL  Per ID, consider primary odontogenic source given poor dentition  JESSICA, 5/19/23: EF 65%  Normal systolic fxn  Dilated LA  No PFO  No thrombous in DAREN  AV leaflets moderately thickened, moderately calcified, severely reduced mobility, mod regurg, mod to severe stenosis  AV vegetations (3 small, mobile vegetations)- suspect primary odontogenic source  No definitive occult intra-abdominal source  WBC 12 94 (history of persistent leukocytosis)  6/12: CRP 42 1 (prev 19 2), ESR 42 (prev 36)  Afebrile    Plan:  · Per ID OP, plan for total 6 weeks of therapy from bacteremia clearance through 6/30/23  · Continue Vancomycin, pharmacy consulted  · ID consulted, pending final abx recs  · No indications to check bcx and MRSA at this time  · Maintain current RUE PICC line until last IV abx  · S/p JESSICA on 6/14 - reading read, diet resumed  · Upon d/c, will need to f/u OMS, ID, cardiology/CT surgery

## 2023-06-14 NOTE — QUICK NOTE
Was notified by RN that he was feeling anxious and desating to the mid 80s on RA  On examination he reports SOB when laying flat  Improved when sitting up with a pillow behind his back  Reports that he feels a little anxious in regards to being in the bed and not moving around as freely as he would like to  On examination he has 2+ pitting edema in his lower extremity with crackles on auscultation  Will hold IV fluid for now and obtain a Chest xray  Update: chest Xray per my review showing diffuse infiltrate with pleural effusion  Lasix 40 mg IV x 1 dose  Patient is afebrile, will continue Vancomycin

## 2023-06-14 NOTE — ANESTHESIA PREPROCEDURE EVALUATION
Procedure:  JESSICA    Relevant Problems   CARDIO   (+) Aortic stenosis   (+) Atrial fibrillation (HCC)   (+) Mixed hyperlipidemia   (+) Primary hypertension      /RENAL   (+) Acute kidney injury ([address] )   (+) BPH with obstruction/lower urinary tract symptoms      MUSCULOSKELETAL   (+) Chronic bilateral low back pain without sciatica   (+) Discitis of lumbar region   (+) Lumbar spondylosis   (+) Primary osteoarthritis of right hand      NEURO/PSYCH   (+) Anxiety   (+) Chronic bilateral low back pain without sciatica   (+) Chronic pain syndrome      PULMONARY   (+) Shortness of breath        Physical Exam    Airway    Mallampati score: II         Dental       Cardiovascular  Cardiovascular exam normal    Pulmonary  Pulmonary exam normal     Other Findings     61-year-old gentleman with a history of AV endocarditis/vegetations, moderate to severe AS/moderate AI, chronic diastolic CHF, and paroxysmal atrial fibrillation    Anesthesia Plan  ASA Score- 4     Anesthesia Type- IV sedation with anesthesia with ASA Monitors  Additional Monitors:   Airway Plan:           Plan Factors-Exercise tolerance (METS): >4 METS  Chart reviewed  EKG reviewed  Imaging results reviewed  Existing labs reviewed  Patient summary reviewed  Induction- intravenous  Postoperative Plan-     Informed Consent- Anesthetic plan and risks discussed with patient  I personally reviewed this patient with the CRNA  Discussed and agreed on the Anesthesia Plan with the CRNA  Nishant Rogers

## 2023-06-14 NOTE — PROGRESS NOTES
Keaton Castillo is a 69 y o  male who is currently ordered Vancomycin IV with management by the Pharmacy Consult service  Relevant clinical data and objective / subjective history reviewed  Vancomycin Assessment:  Indication and Goal AUC/Trough: Bone/joint infection (goal -600, trough >10); Bacteremia (goal -600, trough >10); Endocarditis (goal -600, trough >10)  Clinical Status: stable  Micro:     Renal Function:  SCr: 1 33 mg/dL  CrCl: 40 9 mL/min  Renal replacement:  MARIBEL  Days of Therapy: 3  Current Dose: 1000 mg once as needed for random vancomycin level < 15  Vancomycin Plan:  New Dosing: continue current dose  Next Level: 06/15 at 0600  Renal Function Monitoring: Daily BMP and UOP  Pharmacy will continue to follow closely for s/sx of nephrotoxicity, infusion reactions and appropriateness of therapy  BMP and CBC will be ordered per protocol  We will continue to follow the patient’s culture results and clinical progress daily      Betty Díaz, Pharmacist

## 2023-06-14 NOTE — ASSESSMENT & PLAN NOTE
Wt Readings from Last 3 Encounters:   06/14/23 63 kg (139 lb)   06/07/23 63 3 kg (139 lb 8 8 oz)   06/06/23 62 5 kg (137 lb 11 2 oz)     · Initially home spironolactone and torsemide in s/o MARIBEL  However, c/f volume overload on 6/14 (also note history of mod-severe AS), resumed home spironolactone and torsemide  S/p IV Lasix 40mg x1 on 6/13-6/14 overnight  · Patient initially hypovolemic given dry mucous membranes; however, noted to have pitting edema in b/l feet  · 6/14: Worsening LE edema  Requiring up to 6L NC; currently on 3L NC, wean as tolerated    · Daily weights  · I/Os  · Monitor volume status

## 2023-06-14 NOTE — ASSESSMENT & PLAN NOTE
K is 2 8, pt KCl  last Tuesday and has not received any K supplement since  60 meq K given in the ED    EKG, : NSR, biatrial enlargement, LVH with repolarization abn, prolonged QT (483)  No cardiac rhythm concerns associated with hypokalemia at this time      Plan:  Per pt's request, d/c'ed telemetry  EKG if pt is symptomatic  Monitor BMP  Goal K>4  KDur 20mEq BID, plan to continue on discharge while on Torsemide

## 2023-06-14 NOTE — ASSESSMENT & PLAN NOTE
Cr 1 66 on admission with baseline 0 8  Pt recently started on spironolactone on 6/6 alongside treatment for endocarditis with ceftriaxone; also on home Torsemide  Ceftriaxone switched to vancomycin due to concern for MARIBEL from ceftriaxone    U/A, 6/13: trace proteinuria, otherwise unremarkable    Impression: Possibly secondary to IV CTX vs diuretic usage in setting of recent poor PO intake    Plan:  S/p IVFs and holding diuretics  However on 6/13-6/14 overnight, patient with SOB requiring up to 6L O2  CXR c/f volume overload  Required IV Lasix x1 and d/c IVFs  Appears to be clinically improving, still on 3L O2, wean as tolerated  Restarted home torsemide and spironolactone      Avoid hypotension  Check morning BMP  Avoid nephrotoxic medication  Monitor PO intake

## 2023-06-14 NOTE — UTILIZATION REVIEW
Continued Stay Review    Date: 6/14/23                    Current Patient Class: Inpatient Current Level of Care: Med Surg    HPI:78 y o  male initially admitted on 6/12/23 6/14/23 Day 3: Has surpassed a 2nd midnight with active treatments and services  6/14 12:47 AM Internal Medicine:  notified by RN that patient was feeling anxious and desating to the mid 80s on RA  On examination he reports SOB when laying flat  Improved when sitting up with a pillow behind his back  Reports that he feels a little anxious in regards to being in the bed and not moving around as freely as he would like to  On examination he has 2+ pitting edema in his lower extremity with crackles on auscultation  Will hold IV fluid for now and obtain a Chest xray  Update: chest Xray per my review showing diffuse infiltrate with pleural effusion  Lasix 40 mg IV x 1 dose  Patient is afebrile, will continue Vancomycin  Infectious Disease: Overnight patient had episode of hypoxia, new lung crackles, and increased lower extremity edema  He was diuresed  He is on supplemental O2 today  Creatinine has decreased to 1 33 this morning  Continue IV vancomycin for now, follow up JESSICA  Internal Medicine: Patient with SOB overnight requiring O2, Lasix  Still on 3L O2, wean as tolerated  Restarted home torsemide and spironolactone  KDur 20 mEq BID  PE: Alert  Normal breath sounds  RLE edema 1+ ankle to knee, LLE edema 2+ ankle to knee         Vital Signs:     Date/Time Temp Pulse Resp BP MAP (mmHg) SpO2 Calculated FIO2 (%) - Nasal Cannula Nasal Cannula O2 Flow Rate (L/min) O2 Device   06/14/23 12:07:05 97 7 °F (36 5 °C) 109 Abnormal  17 134/60 85 88 % Abnormal  -- -- --   06/14/23 1145 -- 87 18 128/62 -- 96 % -- -- None (Room air)   06/14/23 1131 -- 87 18 131/59 -- 95 % 36 4 L/min Nasal cannula   06/14/23 1110 -- 84 18 115/58 -- 93 % 36 4 L/min Nasal cannula   06/14/23 1106 -- 83 -- 133/60 -- -- -- -- --   06/14/23 1056 97 2 °F (36 2 °C) Abnormal  82 18 111/56 -- 96 % 36 4 L/min Simple mask   06/14/23 0928 98 1 °F (36 7 °C) 83 18 133/60 -- 95 % 36 4 L/min Nasal cannula   06/14/23 0743 -- -- -- -- -- -- 36 4 L/min Nasal cannula   06/14/23 06:21:30 97 4 °F (36 3 °C) Abnormal  87 18 124/45 Abnormal  71 92 % -- -- --   06/14/23 02:52:06 -- 85 -- 119/42 Abnormal  68 93 % -- -- --   06/13/23 23:15:42 -- 86 -- 130/50 77 92 % -- -- --   06/13/23 21:44:08 97 5 °F (36 4 °C) 86 18 111/42 Abnormal  65 92 % -- -- --   06/13/23 15:49:14 97 9 °F (36 6 °C) 93 20 127/45 Abnormal  72 94 % -- -- --       Pertinent Labs/Diagnostic Results:     6/14 JESSICA:    •  Left Ventricle: Left ventricular cavity size is normal  Wall thickness is normal  Systolic function is normal  Wall motion is normal   •  Atrial Septum: No patent foramen ovale detected using color flow Doppler at rest   •  Left Atrial Appendage: There is normal function  There is no thrombus  •  Aortic Valve: The aortic valve is trileaflet  The leaflets are moderately thickened  The leaflets are severely calcified  The leaflets exhibit normal mobility  There is moderate regurgitation  There is moderate to severe stenosis visually (doppler was not repeated on this study due to prior study only 1 month ago)  There is a small mobile vegetation at the commissure of the LCC and nonCC  A very small mobile vegetation is noted at the tip of the RCC  •  Mitral Valve: There is mild regurgitation  •  Tricuspid Valve: There is mild regurgitation  •  Pericardium: There is a left pleural effusion  •  Prior JESSICA study available for comparison  Prior study date: 5/19/2023  Side by side analysis performed  No significant change when compared to the prior study          Results from last 7 days   Lab Units 06/14/23  0432 06/13/23  0513 06/12/23  1424 06/12/23  0946   HEMATOCRIT % 33 7* 32 9* 33 2* 33 4*   HEMOGLOBIN g/dL 10 9* 10 6* 10 9* 11 0*   NEUTROS ABS Thousands/µL 9 51* 10 42* 9 29* 11 52*   PLATELETS Thousands/uL 285 280 283 298   WBC Thousand/uL 14 20* 14 80* 12 94* 15 59*         Results from last 7 days   Lab Units 06/14/23  0432 06/13/23  0513 06/13/23  0014 06/12/23  1424 06/12/23  0946   ANION GAP mmol/L 9 7  --  8 9   BUN mg/dL 34* 41*  --  41* 43*   CALCIUM mg/dL 8 4 8 5  --  8 7 8 7   CHLORIDE mmol/L 95* 98  --  95* 95*   CO2 mmol/L 28 29  --  33* 31   CREATININE mg/dL 1 33* 1 55*  --  1 53* 1 66*   EGFR ml/min/1 73sq m 51 42  --  43 39   POTASSIUM mmol/L 3 3* 3 5 3 8 2 8* 2 9*   MAGNESIUM mg/dL  --  2 2  --   --   --    SODIUM mmol/L 132* 134*  --  136 135     Results from last 7 days   Lab Units 06/12/23  1424 06/12/23  0946   ALBUMIN g/dL 3 0* 3 0*   ALK PHOS U/L 54 51   ALT U/L 23 25   AST U/L 15 16   TOTAL BILIRUBIN mg/dL 0 23 0 34   TOTAL PROTEIN g/dL 6 0* 6 0*         Results from last 7 days   Lab Units 06/14/23  0432 06/13/23  0513 06/12/23  1424 06/12/23  0946   GLUCOSE RANDOM mg/dL 116 120 116 140     Results from last 7 days   Lab Units 06/14/23  0432   OSMOLALITY, SERUM mmol/             Results from last 7 days   Lab Units 06/13/23  0513 06/12/23  0946   CRP mg/L 32 4* 42 1*   SED RATE mm/hour  --  42*         Results from last 7 days   Lab Units 06/14/23  0432   OSMOLALITY, SERUM mmol/     Results from last 7 days   Lab Units 06/13/23  1054   BACTERIA UA /hpf Occasional   BILIRUBIN UA  Negative   BLOOD UA  Negative   CLARITY UA  Clear   COLOR UA  Light Yellow   EPITHELIAL CELLS WET PREP /hpf None Seen   GLUCOSE UA mg/dl Negative   KETONES UA mg/dl Negative   LEUKOCYTES UA  Negative   MUCUS THREADS  Occasional*   NITRITE UA  Negative   PH UA  5 0   PROTEIN UA mg/dl Trace*   RBC UA /hpf 1-2   SPEC GRAV UA  1 014   UROBILINOGEN UA (BE) mg/dl <2 0   WBC UA /hpf 1-2             Results from last 7 days   Lab Units 06/14/23  0432 06/13/23  0513   VANCOMYCIN RM ug/mL 11 4 12 7       Medications:   Scheduled Medications:  enoxaparin, 40 mg, Subcutaneous, Daily  LORazepam, 0 5 mg, Intravenous, Once  melatonin, 6 mg, Oral, HS  potassium chloride, 20 mEq, Oral, BID  spironolactone, 25 mg, Oral, Daily    furosemide (LASIX) injection 40 mg  Dose: 40 mg  Freq: Once Route: IV  Start: 06/14/23 0215 End: 06/14/23 0300    vancomycin (VANCOCIN) IVPB (premix in dextrose) 1,000 mg 200 mL  Dose: 15 mg/kg  Weight Dosing Info: 63 3 kg  Freq: Once Route: IV  Last Dose: 1,000 mg (06/14/23 0725)  Start: 06/14/23 0630 End: 06/14/23 0825        Continuous IV Infusions: None  PRN Meds:  acetaminophen, 975 mg, Oral, Q8H PRN          Discharge Plan: TBD        Network Utilization Review Department  ATTENTION: Please call with any questions or concerns to [phone] and carefully listen to the prompts so that you are directed to the right person  All voicemails are confidential   Vadim Saavedra all requests for admission clinical reviews, approved or denied determinations and any other requests to dedicated fax number below belonging to the campus where the patient is receiving treatment   List of dedicated fax numbers for the Facilities:  [address] DENIALS (Administrative/Medical Necessity) [phone]   [address] (Maternity/NICU/Pediatrics) [phone]   [address] [phone]   [place] Gleason 77 [phone]   [address] [place] [address] Toni Dsouza 28 [phone]   [address] [place] Michael Burris [facility] [place] [address] 815 [facility] [phone]

## 2023-06-14 NOTE — PLAN OF CARE
Problem: RESPIRATORY - ADULT  Goal: Achieves optimal ventilation and oxygenation  Description: INTERVENTIONS:  - Assess for changes in respiratory status  - Assess for changes in mentation and behavior  - Position to facilitate oxygenation and minimize respiratory effort  - Oxygen administered by appropriate delivery if ordered  - Initiate smoking cessation education as indicated  - Encourage broncho-pulmonary hygiene including cough, deep breathe, Incentive Spirometry  - Assess and instruct to report SOB or any respiratory difficulty  Outcome: Progressing

## 2023-06-14 NOTE — PROGRESS NOTES
[facility]  Progress Note  Name: Shawna Siddiqui  MRN: [id number]  Unit/Bed#: S -01 I Date of Admission: 2023   Date of Service: 2023 I Hospital Day: 2    Assessment/Plan   Acute kidney injury [facility])  Assessment & Plan  Cr 1 66 on admission with baseline 0 8  Pt recently started on spironolactone on  alongside treatment for endocarditis with ceftriaxone; also on home Torsemide  Ceftriaxone switched to vancomycin due to concern for MARIBEL from ceftriaxone    U/A, : trace proteinuria, otherwise unremarkable    Impression: Possibly secondary to IV CTX vs diuretic usage in setting of recent poor PO intake    Plan:  S/p IVFs and holding diuretics  However on - overnight, patient with SOB requiring up to 6L O2  CXR c/f volume overload  Required IV Lasix x1 and d/c IVFs  Appears to be clinically improving, still on 3L O2, wean as tolerated  Restarted home torsemide and spironolactone  Avoid hypotension  Check morning BMP  Avoid nephrotoxic medication  Monitor PO intake    Hypokalemia  Assessment & Plan  K is 2 8, pt KCl  last Tuesday and has not received any K supplement since  60 meq K given in the ED    EKG, : NSR, biatrial enlargement, LVH with repolarization abn, prolonged QT (483)  No cardiac rhythm concerns associated with hypokalemia at this time  Plan:  Per pt's request, d/c'ed telemetry  EKG if pt is symptomatic  Monitor BMP  Goal K>4  KDur 20mEq BID, plan to continue on discharge while on Torsemide        * Acute bacterial endocarditis  Assessment & Plan  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  IV antibiotics switched from ceftriaxone to vancomycin due to concern for MARIBEL  Per ID, consider primary odontogenic source given poor dentition  JESSICA, 23: EF 65%  Normal systolic fxn  Dilated LA  No PFO  No thrombous in DAREN   AV leaflets moderately thickened, moderately calcified, severely reduced mobility, mod regurg, mod to severe stenosis  AV vegetations (3 small, mobile vegetations)- suspect primary odontogenic source  No definitive occult intra-abdominal source  WBC 12 94 (history of persistent leukocytosis)  6/12: CRP 42 1 (prev 19 2), ESR 42 (prev 36)  Afebrile    Plan:  · Per ID OP, plan for total 6 weeks of therapy from bacteremia clearance through 6/30/23  · Continue Vancomycin, pharmacy consulted  · ID consulted, pending final abx recs  · No indications to check bcx and MRSA at this time  · Maintain current RUE PICC line until last IV abx  · S/p JESSICA on 6/14 - reading read, diet resumed  · Upon d/c, will need to f/u OMS, ID, cardiology/CT surgery  History of osteomyelitis  Assessment & Plan  · History of recurrent back pain, likely in setting of L1-L2 vertebral osteomyelitis/discitis  · MRI L-imaging, 5/17/23: infection, some ventral epidural enhancement and paraspinal edema/enhancement  · MRI T-spine, 5/19/23: known L1-2 discitis/osteomyelitis, incompletely imaged  · Patient declines pain regiment at this time  · Repeat lumbar spine MRI prior to completion of IV antibiotic  · Plan to f/u neurosurg OP  · Pt reports back pain has resolved; no indications to continue neuro checks at this time  · Trend fever curve/WBC  (HFpEF) heart failure with preserved ejection fraction [facility])  Assessment & Plan  Wt Readings from Last 3 Encounters:   06/14/23 63 kg (139 lb)   06/07/23 63 3 kg (139 lb 8 8 oz)   06/06/23 62 5 kg (137 lb 11 2 oz)     · Initially home spironolactone and torsemide in s/o MARIBEL  However, c/f volume overload on 6/14 (also note history of mod-severe AS), resumed home spironolactone and torsemide  S/p IV Lasix 40mg x1 on 6/13-6/14 overnight  · Patient initially hypovolemic given dry mucous membranes; however, noted to have pitting edema in b/l feet  · 6/14: Worsening LE edema  Requiring up to 6L NC; currently on 3L NC, wean as tolerated    · Daily weights  · I/Os  · Monitor volume status        Insomnia  Assessment & Plan  · Reports has not slept well since May 2023  Likely associated with anxiety 2/2 multiple infectious processes (endocarditis, osteomyelitis, discitis, bacteremia)  · Will trial melatonin 6mg QHS - pt has been refusing  · May need to consider PRN benzos  VTE Pharmacologic Prophylaxis: VTE Score: 4 Moderate Risk (Score 3-4) - Pharmacological DVT Prophylaxis Ordered: enoxaparin (Lovenox)  Patient Centered Rounds: I performed bedside rounds with nursing staff today  Discussions with Specialists or Other Care Team Provider: ID, pharmacy    Education and Discussions with Family / Patient: Patient declined call to   Current Length of Stay: 2 day(s)  Current Patient Status: Inpatient   Discharge Plan: Anticipate discharge tomorrow to home  Code Status: Level 1 - Full Code    Subjective:   Patient seen and examined at bedside  Currently on 3L O2 NC, does not appear in acute distress  States he has been having good urinary output s/p Lasix  Looking forward to JESSICA to evaluate hx of endocarditis  No complaints at this time  Javedas Blanca to return home  Objective:     Vitals:   Temp (24hrs), Av 6 °F (36 4 °C), Min:97 2 °F (36 2 °C), Max:98 1 °F (36 7 °C)    Temp:  [97 2 °F (36 2 °C)-98 1 °F (36 7 °C)] 97 7 °F (36 5 °C)  HR:  [] 109  Resp:  [17-20] 17  BP: (111-134)/(42-62) 134/60  SpO2:  [88 %-96 %] 88 %  Body mass index is 22 44 kg/m²  Input and Output Summary (last 24 hours): Intake/Output Summary (Last 24 hours) at 2023 1237  Last data filed at 2023 1051  Gross per 24 hour   Intake 500 ml   Output 1275 ml   Net -775 ml       Physical Exam:   Physical Exam  Vitals and nursing note reviewed  Constitutional:       General: He is not in acute distress  Appearance: He is well-developed  He is not ill-appearing or toxic-appearing  HENT:      Head: Normocephalic and atraumatic        Right Ear: External ear normal  Left Ear: External ear normal       Mouth/Throat:      Mouth: Mucous membranes are moist    Eyes:      General:         Right eye: No discharge  Left eye: No discharge  Extraocular Movements: Extraocular movements intact  Conjunctiva/sclera: Conjunctivae normal    Cardiovascular:      Rate and Rhythm: Normal rate and regular rhythm  Heart sounds: No murmur heard  Pulmonary:      Effort: Pulmonary effort is normal  No respiratory distress  Breath sounds: Normal breath sounds  No stridor  No wheezing, rhonchi or rales  Abdominal:      General: Bowel sounds are normal  There is no distension  Palpations: Abdomen is soft  There is no mass  Tenderness: There is no abdominal tenderness  There is no guarding  Hernia: No hernia is present  Musculoskeletal:         General: No swelling  Cervical back: Neck supple  Right lower leg: Edema (1+ ankle to knee) present  Left lower leg: Edema (2+ ankle to knee) present  Skin:     General: Skin is warm and dry  Neurological:      Mental Status: He is alert     Psychiatric:         Mood and Affect: Mood normal          Behavior: Behavior normal           Additional Data:     Labs:  Results from last 7 days   Lab Units 06/14/23  0432   EOS PCT % 18*   HEMATOCRIT % 33 7*   HEMOGLOBIN g/dL 10 9*   LYMPHS PCT % 7*   MONOS PCT % 7   NEUTROS PCT % 66   PLATELETS Thousands/uL 285   WBC Thousand/uL 14 20*     Results from last 7 days   Lab Units 06/14/23  0432 06/13/23  0014 06/12/23  1424   ANION GAP mmol/L 9   < > 8   ALBUMIN g/dL  --   --  3 0*   ALK PHOS U/L  --   --  54   ALT U/L  --   --  23   AST U/L  --   --  15   BUN mg/dL 34*   < > 41*   CALCIUM mg/dL 8 4   < > 8 7   CHLORIDE mmol/L 95*   < > 95*   CO2 mmol/L 28   < > 33*   CREATININE mg/dL 1 33*   < > 1 53*   GLUCOSE RANDOM mg/dL 116   < > 116   POTASSIUM mmol/L 3 3*   < > 2 8*   SODIUM mmol/L 132*   < > 136   TOTAL BILIRUBIN mg/dL  --   --  0 23    < > = values in this interval not displayed  Lines/Drains:  Invasive Devices     Peripherally Inserted Central Catheter Line  Duration           PICC Line 05/23/23 Right Brachial 21 days                Central Line:  Goal for removal: N/A - Chronic PICC             Imaging:   XR chest portable    (Results Pending)       Recent Cultures (last 7 days):         Last 24 Hours Medication List:   Current Facility-Administered Medications   Medication Dose Route Frequency Provider Last Rate   • acetaminophen  975 mg Oral Q8H PRN Wander Allen DO     • enoxaparin  40 mg Subcutaneous Daily Wander Allen DO     • LORazepam  0 5 mg Intravenous Once Sacha Mims MD     • melatonin  6 mg Oral HS Wander Allen, DO     • potassium chloride  20 mEq Oral BID Wander Allen DO     • spironolactone  25 mg Oral Daily Wander Allen DO     • torsemide  40 mg Oral BID Mckenzie Stover DO     • vancomycin  15 mg/kg Intravenous Once PRN Wander Allen DO       Facility-Administered Medications Ordered in Other Encounters   Medication Dose Route Frequency Provider Last Rate   • [MAR Hold] alteplase  2 mg Anabela Manual PRN William Keating MD          Today, Patient Was Seen By: Wander Allen DO    **Please Note: This note may have been constructed using a voice recognition system  **

## 2023-06-14 NOTE — ANESTHESIA POSTPROCEDURE EVALUATION
Post-Op Assessment Note    CV Status:  Stable    Pain management: adequate     Mental Status:  Lethargic and sleepy   Hydration Status:  Stable   PONV Controlled:  None   Airway Patency:  Patent      Post Op Vitals Reviewed: Yes      Staff: CRNA         No notable events documented      BP   111/56   Temp      Pulse  81   Resp      SpO2   92

## 2023-06-14 NOTE — PROGRESS NOTES
Progress Note - Infectious Disease   Gala Victoria 69 y o  male MRN: [id number]  Unit/Bed#: S -01 Encounter: [id number]    Impression/Plan:  1  Acute kidney injury  Patient's routine outpatient labs were checked on 6/12/2023 and creatinine was noted to be increased to 1 66 and K was noted to be low at 2 9  He was notified by outpatient ID provider to return to the hospital for admission for MARIBEL  Consider role of patient's antibiotic  Suspect patient's recent change in diuretic contributed  Patient has received fluids and potassium supplementation  Antibiotic treatment has been changed as below  Overnight patient had episode of hypoxia, new lung crackles, and increased lower extremity edema  He was diuresed  He is on supplemental O2 today  Creatinine has decreased to 1 33 this morning    -check creatinine tomorrow  -dose adjust antibiotic for renal function as needed  -avoid nephrotoxins  -volume management per primary service  -consult nephrology as needed   -follow up with cardiology as needed as CHF likely playing role     2  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  Likely originated from dental source  Blood cultures first positive on 5/17/2023  He has been undergoing a 6-week course of IV ceftriaxone which he's been tolerating without difficulty  Patient now with abnormalities on CMP including elevated creatinine and low K  Unclear if antibiotic has played a role  Unclear if worsening valve is playing a role  Suspect recent change in diuretic has contributed  Patient ceftriaxone has been stopped and he's transitioned to IV vancomycin  Will continue this for now  TTE has been repeated and showed no obvious vegetations   He will undergo JESSICA later this morning for further assessment    -continue IV vancomycin for now  -continue pharmacy consult for guidance on vancomycin dosage  -anticipate antibiotic treatment through 6/30/2023 for 6-weeks of IV antibiotic treatment  -continue weekly labs (now CBCD, creatinine, vanco trough) while on IV antibiotic  -follow up JESSICA  -RUE PICC to be removed after patient's final dose of IV antibiotic  -monitor vitals  -will arrange outpatient ID follow up once final antibiotic treatment plan has been established      3  L1-L2 vertebral osteomyelitis/discitis  MRI concerning for ventral epidural enhancement and paraspinal edema/enhancement  Suspect this was secondary to his streptococcal bacteremia  Patient now on antibiotic treatment as above  He will need to complete a repeat MRI prior to ending his antibiotic treatment  He is set up for outpatient neurosurgery follow up later this month   -antibiotic as above  -complete repeat lumbar spine MRI prior to completion of IV antibiotic  -maintain outpatient follow up with neurosurgery      4  RUE PICC intact  -nursing team to continue routine exams and care of PICC  -PICC to be removed by RN after final dose of IV antibiotic     5  Heart failure  Patient left hospital early previously during work up for CHF  Now with new hypoxia, crackles, and pitting edema developing yesterday evening  Is admitted for MARIBEL and has required multiple adjustments to his diuretics  He will undergo JESSICA today  Recommend ongoing follow up with cardiology  -follow up JESSICA  -continue follow up with cardiology    Above plan was discussed in detail with patient at the bedside  Above plan was discussed in detail with primary service  Antibiotics:  Vancomycin 3  Antibiotics 3 (plus ceftriaxone prior to admission)    Subjective:  Patient reports he had a scary night  Was feeling very anxious because he developed breathing problems when he laid flat in bed  Notes his legs were very swollen again but look better this morning  He feels his breathing is much better now  He has no fever, chills, sweats, shakes; no nausea, vomiting, abdominal pain, diarrhea, or dysuria; no cough or chest pain  No new symptoms      Objective:  Vitals:  Temp:  [97 4 °F (36 3 °C)-97 9 °F (36 6 °C)] 97 4 °F (36 3 °C)  HR:  [83-93] 87  Resp:  [17-20] 18  BP: (102-130)/(40-50) 124/45  SpO2:  [92 %-97 %] 92 %  Temp (24hrs), Av 6 °F (36 4 °C), Min:97 4 °F (36 3 °C), Max:97 9 °F (36 6 °C)  Current: Temperature: (!) 97 4 °F (36 3 °C)    Physical Exam:   General Appearance:  Alert, interactive, nontoxic, no acute distress  He appears comfortable and calm sitting on the edge of his bed  Throat: Oropharynx moist without lesions  Lungs:   Clear to auscultation bilaterally; no wheezes, rhonchi or rales; respirations unlabored on nasal cannula O2  Heart:  RRR; no murmur, rub or gallop  Abdomen:   Soft, non-tender, non-distended, positive bowel sounds  Extremities: RUE PICC intact  No clubbing or cyanosis, +B/L LE pitting edema  Skin: No new rashes noted on exposed skin  Labs, Imaging, & Other studies:   All pertinent labs and imaging studies were personally reviewed  Results from last 7 days   Lab Units 23  0432 23  0513 23  1424   HEMOGLOBIN g/dL 10 9* 10 6* 10 9*   PLATELETS Thousands/uL 285 280 283   WBC Thousand/uL 14 20* 14 80* 12 94*     Results from last 7 days   Lab Units 23  0432 23  0014 23  1424 23  0946   ALK PHOS U/L  --   --  54 51   ALT U/L  --   --  23 25   AST U/L  --   --  15 16   BUN mg/dL 34*   < > 41* 43*   CALCIUM mg/dL 8 4   < > 8 7 8 7   CHLORIDE mmol/L 95*   < > 95* 95*   CO2 mmol/L 28   < > 33* 31   CREATININE mg/dL 1 33*   < > 1 53* 1 66*   EGFR ml/min/1 73sq m 51   < > 43 39   POTASSIUM mmol/L 3 3*   < > 2 8* 2 9*    < > = values in this interval not displayed

## 2023-06-15 ENCOUNTER — HOSPITAL ENCOUNTER (OUTPATIENT)
Dept: INFUSION CENTER | Facility: HOSPITAL | Age: 78
Discharge: HOME | End: 2023-06-15
Attending: INTERNAL MEDICINE

## 2023-06-15 ENCOUNTER — TELEPHONE (OUTPATIENT)
Dept: INFECTIOUS DISEASES | Facility: CLINIC | Age: 78
End: 2023-06-15

## 2023-06-15 LAB
ALBUMIN SERPL BCP-MCNC: 2.8 G/DL (ref 3.5–5)
ALP SERPL-CCNC: 50 U/L (ref 34–104)
ALT SERPL W P-5'-P-CCNC: 20 U/L (ref 7–52)
ANION GAP SERPL CALCULATED.3IONS-SCNC: 10 MMOL/L (ref 4–13)
ANION GAP SERPL CALCULATED.3IONS-SCNC: 10 MMOL/L (ref 4–13)
AST SERPL W P-5'-P-CCNC: 16 U/L (ref 13–39)
BASOPHILS # BLD AUTO: 0.08 THOUSANDS/ÂΜL (ref 0–0.1)
BASOPHILS NFR BLD AUTO: 1 % (ref 0–1)
BILIRUB SERPL-MCNC: 0.36 MG/DL (ref 0.2–1)
BUN SERPL-MCNC: 33 MG/DL (ref 5–25)
BUN SERPL-MCNC: 34 MG/DL (ref 5–25)
CALCIUM ALBUM COR SERPL-MCNC: 9.3 MG/DL (ref 8.3–10.1)
CALCIUM SERPL-MCNC: 8.3 MG/DL (ref 8.4–10.2)
CALCIUM SERPL-MCNC: 8.6 MG/DL (ref 8.4–10.2)
CHLORIDE SERPL-SCNC: 97 MMOL/L (ref 96–108)
CHLORIDE SERPL-SCNC: 98 MMOL/L (ref 96–108)
CK SERPL-CCNC: 54 U/L (ref 39–308)
CO2 SERPL-SCNC: 27 MMOL/L (ref 21–32)
CO2 SERPL-SCNC: 29 MMOL/L (ref 21–32)
CREAT SERPL-MCNC: 1.36 MG/DL (ref 0.6–1.3)
CREAT SERPL-MCNC: 1.38 MG/DL (ref 0.6–1.3)
EOSINOPHIL # BLD AUTO: 2.34 THOUSAND/ÂΜL (ref 0–0.61)
EOSINOPHIL NFR BLD AUTO: 15 % (ref 0–6)
ERYTHROCYTE [DISTWIDTH] IN BLOOD BY AUTOMATED COUNT: 15.2 % (ref 11.6–15.1)
GFR SERPL CREATININE-BSD FRML MDRD: 48 ML/MIN/1.73SQ M
GFR SERPL CREATININE-BSD FRML MDRD: 49 ML/MIN/1.73SQ M
GLUCOSE SERPL-MCNC: 105 MG/DL (ref 65–140)
GLUCOSE SERPL-MCNC: 108 MG/DL (ref 65–140)
HCT VFR BLD AUTO: 34.3 % (ref 36.5–49.3)
HGB BLD-MCNC: 10.9 G/DL (ref 12–17)
IMM GRANULOCYTES # BLD AUTO: 0.11 THOUSAND/UL (ref 0–0.2)
IMM GRANULOCYTES NFR BLD AUTO: 1 % (ref 0–2)
LYMPHOCYTES # BLD AUTO: 1.11 THOUSANDS/ÂΜL (ref 0.6–4.47)
LYMPHOCYTES NFR BLD AUTO: 7 % (ref 14–44)
MCH RBC QN AUTO: 28.6 PG (ref 26.8–34.3)
MCHC RBC AUTO-ENTMCNC: 31.8 G/DL (ref 31.4–37.4)
MCV RBC AUTO: 90 FL (ref 82–98)
MONOCYTES # BLD AUTO: 0.96 THOUSAND/ÂΜL (ref 0.17–1.22)
MONOCYTES NFR BLD AUTO: 6 % (ref 4–12)
NEUTROPHILS # BLD AUTO: 11.17 THOUSANDS/ÂΜL (ref 1.85–7.62)
NEUTS SEG NFR BLD AUTO: 70 % (ref 43–75)
NRBC BLD AUTO-RTO: 0 /100 WBCS
PLATELET # BLD AUTO: 291 THOUSANDS/UL (ref 149–390)
PMV BLD AUTO: 9.9 FL (ref 8.9–12.7)
POTASSIUM SERPL-SCNC: 3.6 MMOL/L (ref 3.5–5.3)
POTASSIUM SERPL-SCNC: 3.8 MMOL/L (ref 3.5–5.3)
PROT SERPL-MCNC: 5.6 G/DL (ref 6.4–8.4)
RBC # BLD AUTO: 3.81 MILLION/UL (ref 3.88–5.62)
SODIUM SERPL-SCNC: 135 MMOL/L (ref 135–147)
SODIUM SERPL-SCNC: 136 MMOL/L (ref 135–147)
VANCOMYCIN SERPL-MCNC: 13.3 UG/ML (ref 10–20)
WBC # BLD AUTO: 15.77 THOUSAND/UL (ref 4.31–10.16)

## 2023-06-15 PROCEDURE — 80202 ASSAY OF VANCOMYCIN: CPT | Performed by: INTERNAL MEDICINE

## 2023-06-15 PROCEDURE — 99233 SBSQ HOSP IP/OBS HIGH 50: CPT | Performed by: NURSE PRACTITIONER

## 2023-06-15 PROCEDURE — 80053 COMPREHEN METABOLIC PANEL: CPT | Performed by: INTERNAL MEDICINE

## 2023-06-15 PROCEDURE — 85025 COMPLETE CBC W/AUTO DIFF WBC: CPT

## 2023-06-15 PROCEDURE — 82550 ASSAY OF CK (CPK): CPT | Performed by: INTERNAL MEDICINE

## 2023-06-15 PROCEDURE — 99232 SBSQ HOSP IP/OBS MODERATE 35: CPT | Performed by: INTERNAL MEDICINE

## 2023-06-15 PROCEDURE — 80048 BASIC METABOLIC PNL TOTAL CA: CPT

## 2023-06-15 RX ORDER — HYDROXYZINE 50 MG/1
50 TABLET, FILM COATED ORAL
Status: DISCONTINUED | OUTPATIENT
Start: 2023-06-15 | End: 2023-06-15

## 2023-06-15 RX ORDER — VANCOMYCIN HYDROCHLORIDE 1 G/200ML
15 INJECTION, SOLUTION INTRAVENOUS ONCE
Status: COMPLETED | OUTPATIENT
Start: 2023-06-15 | End: 2023-06-15

## 2023-06-15 RX ORDER — ZOLPIDEM TARTRATE 5 MG/1
5 TABLET ORAL
Status: DISCONTINUED | OUTPATIENT
Start: 2023-06-15 | End: 2023-06-16 | Stop reason: HOSPADM

## 2023-06-15 RX ORDER — FUROSEMIDE 10 MG/ML
20 INJECTION INTRAMUSCULAR; INTRAVENOUS ONCE
Status: DISCONTINUED | OUTPATIENT
Start: 2023-06-15 | End: 2023-06-15

## 2023-06-15 RX ORDER — FUROSEMIDE 10 MG/ML
40 INJECTION INTRAMUSCULAR; INTRAVENOUS ONCE
Status: COMPLETED | OUTPATIENT
Start: 2023-06-15 | End: 2023-06-15

## 2023-06-15 RX ORDER — TORSEMIDE 20 MG/1
40 TABLET ORAL
Status: DISCONTINUED | OUTPATIENT
Start: 2023-06-15 | End: 2023-06-16 | Stop reason: HOSPADM

## 2023-06-15 RX ADMIN — ZOLPIDEM TARTRATE 5 MG: 5 TABLET ORAL at 21:37

## 2023-06-15 RX ADMIN — FUROSEMIDE 40 MG: 10 INJECTION, SOLUTION INTRAMUSCULAR; INTRAVENOUS at 11:32

## 2023-06-15 RX ADMIN — SPIRONOLACTONE 25 MG: 25 TABLET ORAL at 09:09

## 2023-06-15 RX ADMIN — POTASSIUM CHLORIDE 20 MEQ: 1500 TABLET, EXTENDED RELEASE ORAL at 18:03

## 2023-06-15 RX ADMIN — ENOXAPARIN SODIUM 40 MG: 40 INJECTION SUBCUTANEOUS at 09:08

## 2023-06-15 RX ADMIN — POTASSIUM CHLORIDE 20 MEQ: 1500 TABLET, EXTENDED RELEASE ORAL at 09:08

## 2023-06-15 RX ADMIN — DAPTOMYCIN 375 MG: 500 INJECTION, POWDER, LYOPHILIZED, FOR SOLUTION INTRAVENOUS at 13:24

## 2023-06-15 RX ADMIN — TORSEMIDE 40 MG: 20 TABLET ORAL at 18:03

## 2023-06-15 RX ADMIN — VANCOMYCIN HYDROCHLORIDE 1000 MG: 1 INJECTION, SOLUTION INTRAVENOUS at 09:35

## 2023-06-15 NOTE — TELEPHONE ENCOUNTER
Patient and patients wife Girish Pierson calls the office today  Patient states he saw Donnie Prescott this morning in the hospital  Patient states there was mention about orders for oxygen and iGrish Pierson would like to speak with [facility] regarding this       CB:[phone] (Girish Pierson)

## 2023-06-15 NOTE — TELEPHONE ENCOUNTER
Called and spoke with Stacia Sin today regarding phone call from patient  Informed Iesha per [address] she did see patient this morning and he asked her regarding being sent home on O2  Per Francine Hines this is not something our service would do and she let the primary care team that is seeing him know so they can assess for it  Stacia Sin verbalizes understanding at this time

## 2023-06-15 NOTE — ASSESSMENT & PLAN NOTE
Cr 1 66 on admission with baseline 0 8  Pt recently started on spironolactone on 6/6 alongside treatment for endocarditis with ceftriaxone; also on home Torsemide  Ceftriaxone switched to vancomycin due to concern for MARIBEL from ceftriaxone    U/A, 6/13: trace proteinuria, otherwise unremarkable    Impression: Possibly secondary to IV CTX vs diuretic usage in setting of recent poor PO intake    Plan:  S/p IVFs and holding diuretics  However on 6/13-6/14 overnight, patient with SOB requiring up to 6L O2  CXR c/f volume overload  Required IV Lasix x1 and d/c IVFs  Appears to be clinically improving, ORA  Restarted home torsemide and spironolactone  Avoid hypotension  Avoid nephrotoxic medication  Monitor PO intake    Nephrology consulted, recs appreciated  Outpatient labs  May need to accept higher Cr to maintain euvolemia in setting of heart valvular dysfunctions  Follow up with nephrology, cardiology, and cardiothoracic surgery outpatient

## 2023-06-15 NOTE — ASSESSMENT & PLAN NOTE
Lab Results   Component Value Date    K 3 8 06/15/2023     K is 2 8, pt KCl  last Tuesday and has not received any K supplement since  60 meq K given in the ED    EKG, : NSR, biatrial enlargement, LVH with repolarization abn, prolonged QT (483)  No cardiac rhythm concerns associated with hypokalemia at this time      Plan:  Per pt's request, d/c'ed telemetry  EKG if pt is symptomatic  Monitor BMP  Goal K>4  Resumed home spironolactone  KDur 20mEq BID, plan to continue on discharge while on Torsemide    Upon discharge, plan to recheck BMP in 3-4 days

## 2023-06-15 NOTE — PROGRESS NOTES
[facility]  Progress Note  Name: Sae Onelil  MRN: [id number]  Unit/Bed#: S -01 I Date of Admission: 2023   Date of Service: 6/15/2023 I Hospital Day: 3    Assessment/Plan   Acute kidney injury [facility])  Assessment & Plan  Recent Labs     23  0513 23  0432 06/15/23  0428   CREATININE 1 55* 1 33* 1 36*   EGFR 42 51 49     Estimated Creatinine Clearance: 40 6 mL/min (A) (by C-G formula based on SCr of 1 36 mg/dL (H))  Possibly due to antibiotic vs volume overload vs overdiuresis    Plan:  Continue diuretics orally  S/p one dose IV lasix 40mg on 6/15  Monitor volume status  Check creatinine tomorrow - if stable vs improving, plan for discharge  Patient very anxious to leave  Dose adjust antibiotic for renal function as needed  Avoid nephrotoxins  Avoid hypotension    Recheck BMP at 14:30 today  If Cr worsening, plan for additional IV Lasix 40mg x1  Hypokalemia  Assessment & Plan  Lab Results   Component Value Date    K 3 8 06/15/2023     K is 2 8, pt KCl  last Tuesday and has not received any K supplement since  60 meq K given in the ED    EKG, : NSR, biatrial enlargement, LVH with repolarization abn, prolonged QT (483)  No cardiac rhythm concerns associated with hypokalemia at this time  Plan:  Per pt's request, d/c'ed telemetry  EKG if pt is symptomatic  Monitor BMP  Goal K>4  Resumed home spironolactone  KDur 20mEq BID, plan to continue on discharge while on Torsemide    Upon discharge, plan to recheck BMP in 3-4 days        * Acute bacterial endocarditis  Assessment & Plan  · Due to streptococcus, likely odontogenic source  · Was previously on ceftriaxone  · 6 week treatment course  · ABX switched to vanco on admission / MARIBEL    JESSICA, : Normal systolic fxn  No PFO  Mod to severe AV stenosis  Small mobile vegetation at the commissure of the LCC and nonCC  A very small mobile vegetation is noted at the tip of the RCC   Left pleural effusion   - Compared to last JESSICA, vegetations improving but still present  Plan:  Per ID, switch to IV daptomycin via PICC through 6/30  Weekly labs (CBC, Creat, CK, Sed, CRP)  RUE PICC to be removed in July     History of osteomyelitis  Assessment & Plan  · History of recurrent back pain, likely in setting of L1-L2 vertebral osteomyelitis/discitis  · MRI L-imaging, 5/17/23: infection, some ventral epidural enhancement and paraspinal edema/enhancement  · MRI T-spine, 5/19/23: known L1-2 discitis/osteomyelitis, incompletely imaged  · Patient declines pain regiment at this time  · Repeat lumbar spine MRI prior to completion of IV antibiotic  · Plan to f/u neurosurg OP  · Pt reports back pain has resolved; no indications to continue neuro checks at this time  · Trend fever curve/WBC  (HFpEF) heart failure with preserved ejection fraction (HCC)  Assessment & Plan  · Acute on Chronic heart failure  · Evidenced by hypoxia (pulse ox 88% on RA), SOB, anxiety, CXR with pulmonary edema treated with CXR, IV lasix, and oxygen administration  · JESSICA, 2/81: Normal systolic fxn  No PFO  Mod to severe AV stenosis  Small mobile vegetation at the commissure of the LCC and nonCC  A very small mobile vegetation is noted at the tip of the RCC  Left pleural effusion  · TTE, 6/13: EF 55%, mod to severe AV stenosis  G2DD  · Home Torsemide 40mg BID and Aldactone 25mg resumed  · Lasix 40mg IV given 6/15 AM due to ongoing hypoxia, extremity edema  · Recommended patient to keep legs elevated, RN aware  · Ordered knee high compression stockings  · Currently requiring 2L O2, likely in s/o volume overload, wean as tolerated  Will need to follow up with cardiology outpatient  Once infection resolved, plan for possible TAVR  Insomnia  Assessment & Plan  · Reports has not slept well since May 2023  Likely associated with anxiety 2/2 multiple infectious processes (endocarditis, osteomyelitis, discitis, bacteremia)  · D/c melatonin   Received Atarax 25mg on  - reported sleeping ok at first, but could not sleep any longer following disruption for vitals and lab work  · 6/15: Will trial Ambien but d/w patient to still anticipate disruption for vitals/lab work  Plan to discharge on  if medically stable  VTE Pharmacologic Prophylaxis: VTE Score: 4 Moderate Risk (Score 3-4) - Pharmacological DVT Prophylaxis Ordered: enoxaparin (Lovenox)  Patient Centered Rounds: I performed bedside rounds with nursing staff today  Discussions with Specialists or Other Care Team Provider: ID    Education and Discussions with Family / Patient: Updated  (wife) at bedside  Current Length of Stay: 3 day(s)  Current Patient Status: Inpatient   Discharge Plan: Anticipate discharge tomorrow to home  Code Status: Level 1 - Full Code    Subjective:   Patient seen and examined at bedside  Still requiring 2L O2, occasionally desatting to mid-80s  Reports no acute respiratory distress  Berl Duong to return home  Unhappy with constant sleep interruptions here in hospital   OK to try Ambien tonight  Understands to elevate legs  Will try compression socks  No other complaints at this time  Objective:     Vitals:   Temp (24hrs), Av 2 °F (36 8 °C), Min:97 9 °F (36 6 °C), Max:98 5 °F (36 9 °C)    Temp:  [97 9 °F (36 6 °C)-98 5 °F (36 9 °C)] 98 4 °F (36 9 °C)  HR:  [] 98  Resp:  [15-18] 18  BP: ()/(30-65) 130/54  SpO2:  [85 %-95 %] 85 %  Body mass index is 22 44 kg/m²  Input and Output Summary (last 24 hours): Intake/Output Summary (Last 24 hours) at 6/15/2023 1555  Last data filed at 6/15/2023 1331  Gross per 24 hour   Intake 100 ml   Output 1650 ml   Net -1550 ml       Physical Exam:   Physical Exam  Vitals and nursing note reviewed  Constitutional:       General: He is not in acute distress  Appearance: He is not ill-appearing or toxic-appearing  Comments: Body mass index is 22 44 kg/m²       HENT:      Head: Normocephalic and atraumatic  Right Ear: External ear normal       Left Ear: External ear normal       Nose: Nose normal       Mouth/Throat:      Mouth: Mucous membranes are moist    Eyes:      General:         Right eye: No discharge  Left eye: No discharge  Extraocular Movements: Extraocular movements intact  Conjunctiva/sclera: Conjunctivae normal    Cardiovascular:      Rate and Rhythm: Normal rate and regular rhythm  Pulses: Normal pulses  Heart sounds: Murmur heard  No friction rub  No gallop  Pulmonary:      Effort: Pulmonary effort is normal  No respiratory distress  Breath sounds: Normal breath sounds  No stridor  No wheezing, rhonchi or rales  Abdominal:      General: Bowel sounds are normal  There is no distension  Palpations: Abdomen is soft  There is no mass  Tenderness: There is no abdominal tenderness  There is no guarding  Hernia: No hernia is present  Musculoskeletal:         General: Swelling present  No tenderness  Normal range of motion  Cervical back: Normal range of motion  Right lower leg: Edema present  Left lower leg: Edema present  Comments: 3+ pitting edema in R foot, 1+ pitting edema in L foot  1+ R ankle to knee  No edema present elsewhere  Skin:     General: Skin is warm and dry  Neurological:      Mental Status: He is alert  Mental status is at baseline  Psychiatric:      Comments: Anxious  Would like to go home            Additional Data:     Labs:  Results from last 7 days   Lab Units 06/15/23  0428   EOS PCT % 15*   HEMATOCRIT % 34 3*   HEMOGLOBIN g/dL 10 9*   LYMPHS PCT % 7*   MONOS PCT % 6   NEUTROS PCT % 70   PLATELETS Thousands/uL 291   WBC Thousand/uL 15 77*     Results from last 7 days   Lab Units 06/15/23  1459 06/15/23  0428   ANION GAP mmol/L 10 10   ALBUMIN g/dL  --  2 8*   ALK PHOS U/L  --  50   ALT U/L  --  20   AST U/L  --  16   BUN mg/dL 33* 34*   CALCIUM mg/dL 8 6 8 3*   CHLORIDE mmol/L 97 98   CO2 mmol/L 29 27   CREATININE mg/dL 1 38* 1 36*   GLUCOSE RANDOM mg/dL 108 105   POTASSIUM mmol/L 3 8 3 6   SODIUM mmol/L 136 135   TOTAL BILIRUBIN mg/dL  --  0 36                       Lines/Drains:  Invasive Devices     Peripherally Inserted Central Catheter Line  Duration           PICC Line 05/23/23 Right Brachial 23 days                Central Line:  Goal for removal: N/A - Chronic PICC             Imaging:   XR chest portable   Final Result by Phylicia Burnett MD (06/15 8619)   Extensive patchy airspace opacities and bibasilar pleural effusions favoring pulmonary edema pattern with underlying infiltrates not excluded  Workstation performed: [id number]             Recent Cultures (last 7 days):         Last 24 Hours Medication List:   Current Facility-Administered Medications   Medication Dose Route Frequency Provider Last Rate   • acetaminophen  975 mg Oral Q8H PRN Prince Dubois DO     • DAPTOmycin  6 mg/kg Intravenous Q24H [facility], CRNP 375 mg (06/15/23 1324)   • enoxaparin  40 mg Subcutaneous Daily Prince Dubois DO     • LORazepam  0 5 mg Intravenous Once Michael Charles MD     • potassium chloride  20 mEq Oral BID Prince Dubois DO     • spironolactone  25 mg Oral Daily Prince Dubois DO     • torsemide  40 mg Oral BID (diuretic) August Hair DO     • zolpidem  5 mg Oral HS Prince Dubois DO          Today, Patient Was Seen By: Prince Dubois DO    **Please Note: This note may have been constructed using a voice recognition system  **

## 2023-06-15 NOTE — PROGRESS NOTES
Progress Note - Infectious Disease   Marcus Menjivar 69 y o  male MRN: [id number]  Unit/Bed#: S -01 Encounter: [id number]    Impression/Plan:  1  Acute kidney injury  Patient's routine outpatient labs were checked on 6/12/2023 and creatinine was noted to be increased to 1 66 and K was noted to be low at 2 9  He was notified by outpatient ID provider to return to the hospital for admission for MARIBEL  Consider role of patient's antibiotic  Suspect patient's recent change in diuretic contributed  Suspect CHF is playing a role  Patient has received fluids and potassium supplementation  Antibiotic treatment has been changed as below  Creatinine is 1 36 this morning  Patient requiring supplemental O2 again today but still remains hypoxic in the 80s  Recommend cardiology involvement   -check creatinine tomorrow  -dose adjust antibiotic for renal function as needed  -avoid nephrotoxins  -volume management per primary service  -consult nephrology as needed   -recommend follow up with cardiology for CHF    2  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  Likely originated from dental source  Blood cultures first positive on 5/17/2023  He had been undergoing a 6-week course of IV ceftriaxone which he'd been tolerating without difficulty  Patient then with abnormalities on CMP including elevated creatinine and low K  Unclear if antibiotic played a role  He was transitioned to IV vancomycin which he has been tolerating, however patient expressed desire to return to infusion center with once daily dosing when it's time for him to go home  I will switch patient to IV Daptomycin now   CK this morning was normal  He will need new [place] orders placed once he's closer to discharge   -stop IV vancomycin  -start IV Daptomycin now  -anticipate antibiotic treatment through 6/30/2023 for 6-weeks of IV antibiotic treatment  -will place new [place] orders once patient closer to discharge  -continue weekly labs (now CBCD, "creatinine, CK, sed, CRP) while on IV antibiotic  -RUE PICC to be removed after patient's final dose of IV antibiotic  -monitor vitals  -will coordinate ongoing outpatient ID follow up once final antibiotic treatment plan has been established  3  Heart failure  Patient left hospital early previously during work up for CHF  Now with new hypoxia, crackles, and pitting edema developing  Is admitted for MARIBEL and has required multiple adjustments to his diuretics  Today remains hypoxic despite supplemental O2  Recommend ongoing follow up with cardiology  -monitor vitals  -monitor respiratory status  -O2 support per primary service  -recommend follow up with cardiology    4  L1-L2 vertebral osteomyelitis/discitis  MRI concerning for ventral epidural enhancement and paraspinal edema/enhancement  Suspect this was secondary to his streptococcal bacteremia  Patient now on antibiotic treatment as above  He will need to complete a repeat MRI prior to ending his antibiotic treatment  He is set up for outpatient neurosurgery follow up later this month   -antibiotic as above  -consider transition to PO antibiotic after completion of IV pending inflammatory markers  -complete repeat lumbar spine MRI prior to completion of IV antibiotic  -maintain outpatient follow up with neurosurgery      5  RUE PICC intact  -nursing team to continue routine exams and care of PICC  -PICC to be removed by RN after final dose of IV antibiotic      Above plan was discussed in detail with patient at the bedside  Above plan was discussed in detail with primary service  Antibiotics:  Vancomycin - stop  Daptomycin 1  Antibiotics 4 (plus ceftriaxone prior to admission)    Subjective:  Patient reports he's very tired this morning as he had a bad night last night  Was unable to sleep more than 3 hours despite \"sleeping medication  \" He also still feels that his breathing is different   Feels alright on the O2 and thinks he may need to use O2 at home " from now on  He feels the swelling in his legs is less  He has no fever, chills, sweats, shakes; no nausea, vomiting, abdominal pain, diarrhea, or dysuria; no cough or chest pain  No new symptoms  Objective:  Vitals:  Temp:  [97 2 °F (36 2 °C)-98 5 °F (36 9 °C)] 97 9 °F (36 6 °C)  HR:  [] 103  Resp:  [15-18] 15  BP: ()/(30-65) 101/65  SpO2:  [87 %-96 %] 89 %  Temp (24hrs), Av 9 °F (36 6 °C), Min:97 2 °F (36 2 °C), Max:98 5 °F (36 9 °C)  Current: Temperature: 97 9 °F (36 6 °C)    Physical Exam:   General Appearance:  Alert, interactive, nontoxic, no acute distress  He appears tired  He appears comfortable sitting up in bed  Throat: Oropharynx moist without lesions  Lungs:   Clear to auscultation bilaterally; no wheezes, rhonchi or rales; respirations unlabored on nasal cannula O2  O2 saturation was 82-87% during my visit  Heart:  Tachycardic, irregular; no murmur, rub or gallop  Abdomen:   Soft, non-tender, non-distended, positive bowel sounds  Extremities: No clubbing or cyanosis, trace B/L LE edema  RUE PICC intact  Skin: No new rashes noted on exposed skin  Labs, Imaging, & Other studies:   All pertinent labs and imaging studies were personally reviewed  Results from last 7 days   Lab Units 06/15/23  0428 06/14/23  0432 23  0513   HEMOGLOBIN g/dL 10 9* 10 9* 10 6*   PLATELETS Thousands/uL 291 285 280   WBC Thousand/uL 15 77* 14 20* 14 80*     Results from last 7 days   Lab Units 06/15/23  0428 23  0014 23  1424 23  0946   ALK PHOS U/L 50  --  54 51   ALT U/L 20  --  23 25   AST U/L 16  --  15 16   BUN mg/dL 34*   < > 41* 43*   CALCIUM mg/dL 8 3*   < > 8 7 8 7   CHLORIDE mmol/L 98   < > 95* 95*   CO2 mmol/L 27   < > 33* 31   CREATININE mg/dL 1 36*   < > 1 53* 1 66*   EGFR ml/min/1 73sq m 49   < > 43 39   POTASSIUM mmol/L 3 6   < > 2 8* 2 9*    < > = values in this interval not displayed

## 2023-06-15 NOTE — ASSESSMENT & PLAN NOTE
· Reports has not slept well since May 2023  Likely associated with anxiety 2/2 multiple infectious processes (endocarditis, osteomyelitis, discitis, bacteremia)  · D/c melatonin  Received Atarax 25mg on 6/14 - reported sleeping ok at first, but could not sleep any longer following disruption for vitals and lab work  · 6/15: Will trial Ambien but d/w patient to still anticipate disruption for vitals/lab work  Plan to discharge on 6/16 if medically stable

## 2023-06-15 NOTE — ASSESSMENT & PLAN NOTE
· Acute on Chronic heart failure  · Evidenced by hypoxia (pulse ox 88% on RA), SOB, anxiety, CXR with pulmonary edema treated with CXR, IV lasix, and oxygen administration  · JESSICA, 8/55: Normal systolic fxn  No PFO  Mod to severe AV stenosis  Small mobile vegetation at the commissure of the LCC and nonCC  A very small mobile vegetation is noted at the tip of the RCC  Left pleural effusion  · TTE, 6/13: EF 55%, mod to severe AV stenosis  G2DD  · Home Torsemide 40mg BID and Aldactone 25mg resumed  · Lasix 40mg IV given 6/15 AM due to ongoing hypoxia, extremity edema  · Recommended patient to keep legs elevated, RN aware  · Ordered knee high compression stockings  · Currently requiring 2L O2, likely in s/o volume overload, wean as tolerated  Will need to follow up with cardiology outpatient  Once infection resolved, plan for possible TAVR

## 2023-06-15 NOTE — PROGRESS NOTES
Lei Boyd is a 69 y o  male who is currently ordered Vancomycin IV with management by the Pharmacy Consult service  Relevant clinical data and objective / subjective history reviewed  Vancomycin Assessment:  Indication and Goal AUC/Trough: Bone/joint infection (goal -600, trough >10); Bacteremia (goal -600, trough >10); Endocarditis (goal -600, trough >10)  Clinical Status: stable  Micro: no results  Renal Function:  SCr: 1 36 mg/dL  CrCl: 40 6 mL/min  Renal replacement: MARIBEL  Days of Therapy: 3  Current Dose: 1000 mg once as needed for random vancomycin level < 15  Vancomycin Plan:  New Dosing: cont  Current dosing  Next Level: 6/16 at 0600  Renal Function Monitoring: Daily BMP and [place] will continue to follow closely for s/sx of nephrotoxicity, infusion reactions and appropriateness of therapy  BMP and CBC will be ordered per protocol  We will continue to follow the patient’s culture results and clinical progress daily      Stephania Davalos, Pharmacist

## 2023-06-15 NOTE — TELEPHONE ENCOUNTER
Pt' wife Daysi Pinto calls office and states that pt is currently admitted in [facility] and called her stating he wants to be discharged today and is not staying any longer  Daysi Pinto is wondering if this is at all possible   Also Isael Love for pt's behavior

## 2023-06-15 NOTE — CONSULTS
Vancomycin IV Pharmacy-to-Dose Consultation     Vancomycin has been discontinued  Pharmacy will sign off  Please contact or re-consult with questions      Audi Marques, Pharmacist

## 2023-06-15 NOTE — ASSESSMENT & PLAN NOTE
· Due to streptococcus, likely odontogenic source  · Was previously on ceftriaxone  · 6 week treatment course  · ABX switched to vanco on admission 2/2 MARIBEL    JESSICA, 7/34: Normal systolic fxn  No PFO  Mod to severe AV stenosis  Small mobile vegetation at the commissure of the LCC and nonCC  A very small mobile vegetation is noted at the tip of the RCC  Left pleural effusion   - Compared to last JESSICA, vegetations improving but still present      Plan:  Per ID, switch to IV daptomycin via PICC through 6/30  Weekly labs (CBC, Creat, CK, Sed, CRP)  RUE PICC to be removed in July

## 2023-06-15 NOTE — ASSESSMENT & PLAN NOTE
Recent Labs     06/13/23  0513 06/14/23  0432 06/15/23  0428   CREATININE 1 55* 1 33* 1 36*   EGFR 42 51 49     Estimated Creatinine Clearance: 40 6 mL/min (A) (by C-G formula based on SCr of 1 36 mg/dL (H))  Possibly due to antibiotic vs volume overload vs overdiuresis    Plan:  Continue diuretics orally  S/p one dose IV lasix 40mg on 6/15  Monitor volume status  Check creatinine tomorrow - if stable vs improving, plan for discharge  Patient very anxious to leave  Dose adjust antibiotic for renal function as needed  Avoid nephrotoxins  Avoid hypotension    Recheck BMP at 14:30 today  If Cr worsening, plan for additional IV Lasix 40mg x1

## 2023-06-16 ENCOUNTER — HOSPITAL ENCOUNTER (OUTPATIENT)
Dept: INFUSION CENTER | Facility: HOSPITAL | Age: 78
Discharge: HOME | End: 2023-06-16
Attending: INTERNAL MEDICINE

## 2023-06-16 ENCOUNTER — TELEPHONE (OUTPATIENT)
Dept: NEPHROLOGY | Facility: CLINIC | Age: 78
End: 2023-06-16

## 2023-06-16 VITALS
RESPIRATION RATE: 18 BRPM | SYSTOLIC BLOOD PRESSURE: 118 MMHG | DIASTOLIC BLOOD PRESSURE: 46 MMHG | TEMPERATURE: 97.3 F | OXYGEN SATURATION: 91 % | BODY MASS INDEX: 21.93 KG/M2 | HEART RATE: 103 BPM | WEIGHT: 136.47 LBS | HEIGHT: 66 IN

## 2023-06-16 DIAGNOSIS — N17.9 ACUTE KIDNEY INJURY (HCC): Primary | ICD-10-CM

## 2023-06-16 PROBLEM — E87.6 HYPOKALEMIA: Status: RESOLVED | Noted: 2023-06-05 | Resolved: 2023-06-16

## 2023-06-16 PROBLEM — J96.00 ACUTE RESPIRATORY FAILURE (HCC): Status: ACTIVE | Noted: 2023-06-16

## 2023-06-16 PROBLEM — R93.5 ABNORMAL ABDOMINAL CT SCAN: Status: ACTIVE | Noted: 2023-06-16

## 2023-06-16 LAB
ALBUMIN SERPL BCP-MCNC: 2.9 G/DL (ref 3.5–5)
ALP SERPL-CCNC: 48 U/L (ref 34–104)
ALT SERPL W P-5'-P-CCNC: 19 U/L (ref 7–52)
ANION GAP SERPL CALCULATED.3IONS-SCNC: 9 MMOL/L (ref 4–13)
AST SERPL W P-5'-P-CCNC: 14 U/L (ref 13–39)
BASOPHILS # BLD AUTO: 0.11 THOUSANDS/ÂΜL (ref 0–0.1)
BASOPHILS NFR BLD AUTO: 1 % (ref 0–1)
BILIRUB SERPL-MCNC: 0.36 MG/DL (ref 0.2–1)
BUN SERPL-MCNC: 33 MG/DL (ref 5–25)
CALCIUM ALBUM COR SERPL-MCNC: 9.4 MG/DL (ref 8.3–10.1)
CALCIUM SERPL-MCNC: 8.5 MG/DL (ref 8.4–10.2)
CHLORIDE SERPL-SCNC: 96 MMOL/L (ref 96–108)
CK SERPL-CCNC: 47 U/L (ref 39–308)
CO2 SERPL-SCNC: 31 MMOL/L (ref 21–32)
CREAT SERPL-MCNC: 1.46 MG/DL (ref 0.6–1.3)
EOSINOPHIL # BLD AUTO: 2.39 THOUSAND/ÂΜL (ref 0–0.61)
EOSINOPHIL NFR BLD AUTO: 14 % (ref 0–6)
ERYTHROCYTE [DISTWIDTH] IN BLOOD BY AUTOMATED COUNT: 15.2 % (ref 11.6–15.1)
GFR SERPL CREATININE-BSD FRML MDRD: 45 ML/MIN/1.73SQ M
GLUCOSE SERPL-MCNC: 121 MG/DL (ref 65–140)
HCT VFR BLD AUTO: 34 % (ref 36.5–49.3)
HGB BLD-MCNC: 11 G/DL (ref 12–17)
IMM GRANULOCYTES # BLD AUTO: 0.12 THOUSAND/UL (ref 0–0.2)
IMM GRANULOCYTES NFR BLD AUTO: 1 % (ref 0–2)
LYMPHOCYTES # BLD AUTO: 1.06 THOUSANDS/ÂΜL (ref 0.6–4.47)
LYMPHOCYTES NFR BLD AUTO: 6 % (ref 14–44)
MCH RBC QN AUTO: 29.1 PG (ref 26.8–34.3)
MCHC RBC AUTO-ENTMCNC: 32.4 G/DL (ref 31.4–37.4)
MCV RBC AUTO: 90 FL (ref 82–98)
MONOCYTES # BLD AUTO: 0.95 THOUSAND/ÂΜL (ref 0.17–1.22)
MONOCYTES NFR BLD AUTO: 6 % (ref 4–12)
NEUTROPHILS # BLD AUTO: 12.69 THOUSANDS/ÂΜL (ref 1.85–7.62)
NEUTS SEG NFR BLD AUTO: 72 % (ref 43–75)
NRBC BLD AUTO-RTO: 0 /100 WBCS
PLATELET # BLD AUTO: 284 THOUSANDS/UL (ref 149–390)
PMV BLD AUTO: 9.6 FL (ref 8.9–12.7)
POTASSIUM SERPL-SCNC: 3.5 MMOL/L (ref 3.5–5.3)
PROT SERPL-MCNC: 5.7 G/DL (ref 6.4–8.4)
RBC # BLD AUTO: 3.78 MILLION/UL (ref 3.88–5.62)
SODIUM SERPL-SCNC: 136 MMOL/L (ref 135–147)
WBC # BLD AUTO: 17.32 THOUSAND/UL (ref 4.31–10.16)

## 2023-06-16 PROCEDURE — 99233 SBSQ HOSP IP/OBS HIGH 50: CPT | Performed by: INTERNAL MEDICINE

## 2023-06-16 PROCEDURE — 99239 HOSP IP/OBS DSCHRG MGMT >30: CPT | Performed by: INTERNAL MEDICINE

## 2023-06-16 PROCEDURE — 99222 1ST HOSP IP/OBS MODERATE 55: CPT | Performed by: INTERNAL MEDICINE

## 2023-06-16 PROCEDURE — 82550 ASSAY OF CK (CPK): CPT | Performed by: INTERNAL MEDICINE

## 2023-06-16 PROCEDURE — 85025 COMPLETE CBC W/AUTO DIFF WBC: CPT | Performed by: INTERNAL MEDICINE

## 2023-06-16 PROCEDURE — 80053 COMPREHEN METABOLIC PANEL: CPT | Performed by: INTERNAL MEDICINE

## 2023-06-16 RX ORDER — FUROSEMIDE 40 MG/1
40 TABLET ORAL
Status: CANCELLED | OUTPATIENT
Start: 2023-06-16

## 2023-06-16 RX ORDER — SODIUM CHLORIDE 9 MG/ML
20 INJECTION, SOLUTION INTRAVENOUS ONCE
Status: CANCELLED | OUTPATIENT
Start: 2023-06-17

## 2023-06-16 RX ORDER — POTASSIUM CHLORIDE 20 MEQ/1
20 TABLET, EXTENDED RELEASE ORAL 2 TIMES DAILY
Qty: 60 TABLET | Refills: 2 | Status: SHIPPED | OUTPATIENT
Start: 2023-06-16

## 2023-06-16 RX ADMIN — ENOXAPARIN SODIUM 40 MG: 40 INJECTION SUBCUTANEOUS at 09:55

## 2023-06-16 RX ADMIN — POTASSIUM CHLORIDE 20 MEQ: 1500 TABLET, EXTENDED RELEASE ORAL at 09:55

## 2023-06-16 RX ADMIN — TORSEMIDE 40 MG: 20 TABLET ORAL at 09:58

## 2023-06-16 RX ADMIN — SPIRONOLACTONE 25 MG: 25 TABLET ORAL at 09:58

## 2023-06-16 RX ADMIN — DAPTOMYCIN 500 MG: 500 INJECTION, POWDER, LYOPHILIZED, FOR SOLUTION INTRAVENOUS at 12:02

## 2023-06-16 NOTE — CONSULTS
[address] 69 y o  male MRN: [id number]  Unit/Bed#: S -01 Encounter: [id number]    ASSESSMENT and PLAN:  Acute kidney injury (POA):  · Admitted on 6/12 creatinine 1 66 improving and reaching a cem near 1 3-1 5 g/dL  · Treated initially with IV fluids then due to increasing edema switch to intermittent dosing of IV Lasix  · Prior to admission being treated for endocarditis with ceftriaxone  Recently started on spironolactone on 6/6 alongside loop diuretic/torsemide  · Urinalysis: Trace protein, 1-2 RBCs, 1-2 WBCs, negative leukocytes  · Urine eosinophil 0%  · Last contrast study was obtained on 5/19  · Prior CT with contrast on 5/16: Kidneys nonobstructing right renal calculi, no hydronephrosis  Small hypodensities most likely tiny cyst   · Etiology: Appears to be primarily cardiorenal rather than related to antibiotic although we will check complements on outpatient labs  · Plan/recommendations: Outpatient follow-up plan discussed with patient and wife  We will arrange for outpatient labs  Bacteremia:  · Prior to current admission being treated for bacteremia due to Streptococcus  · Aortic valve involvement/endocarditis  · Camilla Montemayor had a PICC line in place and was undergoing a 6-week course of ceftriaxone which was DC'd due to MARIBEL  · Currently on daptomycin  · Management per ID    Blood pressure/volume status:  · History of HFpEF: Follows with cardiology as outpatient  · Cardiogram ejection fraction 44%, grade 2 diastolic dysfunction  Moderate to severe aortic stenosis  Mild MR  Mild to moderate TR  Elevated right ventricular systolic pressure 75 mmHg, IVC normal in size, no pericardial effusion  · Home medications: Spironolactone 25 mg daily, torsemide 40 mg twice a day  · Initially on IV fluids  Receiving intermittent boluses of IV Lasix    Received 40 mg IV on 6/14 and 6/15  · Chest x-ray 6/14: Extensive patchy airspace opacities and bibasilar pleural effusions favoring pulmonary edema with underlying infiltrates  · No significant change in standing scale weight, weight down 3 pounds with IV diuretic  · Blood pressure has been quite acceptable  Occasional, intermittent low readings but nothing that appears to be sustained  · Current diuretic regimen: Torsemide 40 mg twice a day, spironolactone 25 mg daily  · Recommendations:  · Target weight appears to be near current weight although he still exhibits pedal edema  Does not appear to be overtly volume overloaded at this time  Recent chest x-ray does favor pulmonary edema which was taken 2 days ago-he has had increased diuresis and weight reduction with IV Lasix  · It appears that there has been a significant change in diuretic need recently  This is likely causing the increase in creatinine at this time which may need to be tolerated for euvolemia until the issues with heart valve are further treated  · Fluid restriction of 50 ounces per day and low-sodium diet discussed with patient and wife  · I believe at this time we need to tolerate a slightly higher creatinine for euvolemia  Continue current regime and we will follow-up as outpatient    Hypokalemia:  · Patient was receiving oral repletion prior to admission  Also on potassium sparing diuretic in addition to torsemide although torsemide was dosed rather high  · Currently on K  Dur 20 mEq twice a day    Anemia:  · Mild, stable, normocytic    SUMMARY OF RECOMMENDATIONS:  Continue current diuretic regimen  Outpatient follow-up    HISTORY OF PRESENT ILLNESS:  Requesting Physician: Nisa Baca MD  Reason for Consult: Acute kidney injury    Yamilka Lewis is a 69 y o  male with a past medical history of CHF, endocarditis, discitis, recent streptococcal bacteremia on antibiotics, atrial fibrillation, BPH, aortic stenosis, hyperlipidemia, hypertension who was admitted to [facility] on 6/12 due to abnormal outpatient labs    According to chart review ID recommended hospitalization due to acute kidney injury/hypokalemia  On admission he was treated with IV fluids and antibiotics were adjusted  It was noted that he had lower extremity edema-he was previously on torsemide and recently added spironolactone  Mika Beasley tells me that he was never on water pills prior to the infection  He was told that the infection likely started in his mouth  He has poor dentition and required multiple dental procedures  When he was having evaluation for aortic valve surgery bacteremia was found with a heart valve involvement and discitis  He has been on antibiotic therapy and most recently escalation in diuretic therapy due to lower extremity edema  According to his wife he has had poor medical follow-up in the past   Mika Beasley reports that he has been urinating well the last several days and that swelling has improved  He was able to walk around the hickey today without any significant change in the oxygen saturation  He is expecting to go home this afternoon  We are asked to see the patient for evaluation of volume status and recommendation regarding diuretic       PAST MEDICAL HISTORY:  Past Medical History:   Diagnosis Date   • CHF (congestive heart failure) (HCC)    • Discitis    • Endocarditis        PAST SURGICAL HISTORY:  Past Surgical History:   Procedure Laterality Date   • SKIN BIOPSY         ALLERGIES:  No Known Allergies    SOCIAL HISTORY:  Social History     Substance and Sexual Activity   Alcohol Use Not Currently     Social History     Substance and Sexual Activity   Drug Use Not Currently     Social History     Tobacco Use   Smoking Status Former   • Packs/day: 0 50   • Years: 20 00   • Total pack years: 10 00   • Types: Cigarettes   • Quit date: 3/11/2005   • Years since quittin 2   Smokeless Tobacco Never       FAMILY HISTORY:  Family History   Problem Relation Age of Onset   • Colon cancer Mother    • Lung cancer Father    • Mental illness Neg Hx MEDICATIONS:    Current Facility-Administered Medications:   •  acetaminophen (TYLENOL) tablet 975 mg, 975 mg, Oral, Q8H PRN, Anniece Jump, DO  •  DAPTOmycin (CUBICIN) 500 mg in sodium chloride 0 9 % 50 mL IVPB, 8 mg/kg, Intravenous, Q24H, Jeanette Duggan MD  •  enoxaparin (LOVENOX) subcutaneous injection 40 mg, 40 mg, Subcutaneous, Daily, Annyeimice Jump, DO, 40 mg at 06/16/23 0955  •  LORazepam (ATIVAN) injection 0 5 mg, 0 5 mg, Intravenous, Once, Natalie Beckwith MD  •  potassium chloride (K-DUR,KLOR-CON) CR tablet 20 mEq, 20 mEq, Oral, BID, Anniece Jump, DO, 20 mEq at 06/16/23 5741  •  spironolactone (ALDACTONE) tablet 25 mg, 25 mg, Oral, Daily, Anniece Jump, DO, 25 mg at 06/16/23 5556  •  torsemide (DEMADEX) tablet 40 mg, 40 mg, Oral, BID (diuretic), Jacquiline Brittny, DO, 40 mg at 06/16/23 3855  •  zolpidem (AMBIEN) tablet 5 mg, 5 mg, Oral, HS, Anniece Jump, DO, 5 mg at 06/15/23 2137    REVIEW OF SYSTEMS:  Constitutional: Today for fatigue  Poor appetite  Weight loss  HENT: Negative for postnasal drip  Eyes: Negative for visual disturbance  Respiratory: Shortness of breath has improved  Better exercise tolerance  Cardiovascular: Negative for chest pain, palpitations  Positive for leg swelling  Gastrointestinal: Negative for abdominal pain, constipation, diarrhea, nausea and vomiting  Genitourinary: No dysuria, hematuria  Musculoskeletal: Negative for unusual arthralgias or myalgia  Positive for back pain  Skin: Negative for rash  Neurological: Negative for focal weakness  Hematological: Negative for bleeding  Psychiatric/Behavioral:  positive for sleep disturbance and anxiety  All the systems were reviewed and were negative except as documented on the HPI      PHYSICAL EXAM:  Current Weight: Weight - Scale: 61 9 kg (136 lb 7 4 oz)  First Weight: Weight - Scale: 62 1 kg (136 lb 14 5 oz)  Vitals:    06/16/23 0747 06/16/23 0858 06/16/23 0957 06/16/23 1020   BP: (!) 104/37  (!) 111/40    Pulse: 92 89 91 Resp: 18      Temp: 97 9 °F (36 6 °C)      TempSrc:       SpO2: (!) 86% 90% 92% 91%   Weight:       Height:           Intake/Output Summary (Last 24 hours) at 6/16/2023 1148  Last data filed at 6/16/2023 0747  Gross per 24 hour   Intake 300 ml   Output 3025 ml   Net -2725 ml     Physical Exam  Vitals reviewed  Constitutional:       General: He is not in acute distress  Appearance: He is well-developed and underweight  He is not diaphoretic  HENT:      Head: Normocephalic and atraumatic  Eyes:      Pupils: Pupils are equal, round, and reactive to light  Neck:      Thyroid: No thyromegaly  Vascular: No carotid bruit or JVD  Trachea: No tracheal deviation  Cardiovascular:      Rate and Rhythm: Normal rate and regular rhythm  Heart sounds: Murmur heard  Systolic murmur is present with a grade of 3/6  No friction rub  No gallop  Pulmonary:      Effort: Pulmonary effort is normal       Breath sounds: Normal breath sounds  Abdominal:      General: Bowel sounds are normal  There is no distension  Palpations: Abdomen is soft  There is no mass  Tenderness: There is no abdominal tenderness  There is no guarding or rebound  Musculoskeletal:         General: No swelling, tenderness or signs of injury  Normal range of motion  Cervical back: Normal range of motion and neck supple  No rigidity or tenderness  Right lower leg: Edema present  Left lower leg: Edema present  Comments: Primarily pedal edema noted  Wearing WILLIAM stockings  Skin:     General: Skin is warm and dry  Capillary Refill: Capillary refill takes less than 2 seconds  Coloration: Skin is not jaundiced or pale  Findings: No erythema or rash  Neurological:      General: No focal deficit present  Mental Status: He is alert and oriented to person, place, and time     Psychiatric:         Mood and Affect: Mood normal          Behavior: Behavior normal          Thought Content: Thought content normal          Judgment: Judgment normal            Invasive Devices:      Lab Results:   Results from last 7 days   Lab Units 06/16/23  0348 06/15/23  1459 06/15/23  0428 06/14/23  0432 06/13/23  0513 06/13/23  0014 06/12/23  1424   WBC Thousand/uL 17 32*  --  15 77* 14 20* 14 80*  --  12 94*   HEMOGLOBIN g/dL 11 0*  --  10 9* 10 9* 10 6*  --  10 9*   HEMATOCRIT % 34 0*  --  34 3* 33 7* 32 9*  --  33 2*   PLATELETS Thousands/uL 284  --  291 285 280  --  283   POTASSIUM mmol/L 3 5 3 8 3 6 3 3* 3 5   < > 2 8*   CHLORIDE mmol/L 96 97 98 95* 98  --  95*   CO2 mmol/L 31 29 27 28 29  --  33*   BUN mg/dL 33* 33* 34* 34* 41*  --  41*   CREATININE mg/dL 1 46* 1 38* 1 36* 1 33* 1 55*  --  1 53*   CALCIUM mg/dL 8 5 8 6 8 3* 8 4 8 5  --  8 7   MAGNESIUM mg/dL  --   --   --   --  2 2  --   --    ALK PHOS U/L 48  --  50  --   --   --  54   ALT U/L 19  --  20  --   --   --  23   AST U/L 14  --  16  --   --   --  15    < > = values in this interval not displayed       Other Studies:

## 2023-06-16 NOTE — CASE MANAGEMENT
Case Management Discharge Planning Note    Patient name Maria Elena Coates  [facility] S /S -50 MRN [id number]  : 1945 Date 2023       Current Admission Date: 2023  Current Admission Diagnosis:Acute bacterial endocarditis   Patient Active Problem List    Diagnosis Date Noted   • Acute kidney injury ([facility] [address] ) 2023   • History of osteomyelitis 2023   • (HFpEF) heart failure with preserved ejection fraction ([facility] [address] ) 2023   • Insomnia 2023   • Hypokalemia 2023   • Anxiety 2023   • Acute bacterial endocarditis 2023   • Gastric distention 2023   • Discitis of lumbar region 2023   • Bacteremia due to Streptococcus 2023   • Non-ischemic myocardial injury (non-traumatic) 2023   • Sepsis ([facility] [address] ) 2023   • Shortness of breath 2023   • Chronic pain syndrome 05/15/2023   • Lumbar spondylosis 05/15/2023   • Chronic bilateral low back pain without sciatica 2023   • Primary hypertension 2023   • Atrial fibrillation ([facility] [address] ) 2023   • Aortic stenosis 2023   • Leukocytosis 2023   • Elevated D-dimer 2023   • Hyponatremia 2023   • Mixed hyperlipidemia 2021   • Abnormal glucose 2021   • BPH with obstruction/lower urinary tract symptoms 2021   • Primary osteoarthritis of right hand 2017      LOS (days): 4  Geometric Mean LOS (GMLOS) (days): 4 30  Days to GMLOS:0 6     OBJECTIVE:  Risk of Unplanned Readmission Score: 19         Current admission status: Inpatient   Preferred Pharmacy:   [facility] [address] [place] [address]  Phone: [phone] Fax: [phone]    Primary Care Provider: Magnolia Mcghee MD    Primary Insurance: [facility]  Secondary Insurance:     DISCHARGE DETAILS:    Discharge planning discussed with[de-identified] wife/[facility]  [place] of Choice: Yes  Comments - Freedom of Choice: CM spoke with pt's wife to review plans for DC  Per wife pt will continue to go to [facility] for IV abx through 6/30  She is concerned pt would need O2 upon DC adn would want a portable concentrator if that is the case  At this time, pt is on RA  CM contacted [facility] to arrange appointments for pt however they stated pt is set up and will continue his current schedule  They have to change his appointment for tomorrow to 1100 however he will then be on his normal schedule  The [facility] is aware the medication has changed and the [facility] order is in the computer                  Contacts  Patient Contacts: wife  Relationship to Patient[de-identified] Family  Contact Method: Phone  Reason/Outcome: Discharge Planning, Continuity of Care

## 2023-06-16 NOTE — DISCHARGE SUMMARY
[facility]  Discharge- Patrick Schwab 1945, 69 y o  male MRN: [id number]  Unit/Bed#: S -01 Encounter: [id number]  Primary Care Provider: Nitza Meza MD   Date and time admitted to hospital: 6/12/2023 12:51 PM    Acute kidney injury [facility])  Assessment & Plan  Recent Labs     06/15/23  0428 06/15/23  1459 06/16/23  0348   CREATININE 1 36* 1 38* 1 46*   EGFR 49 48 45     Estimated Creatinine Clearance: 37 1 mL/min (A) (by C-G formula based on SCr of 1 46 mg/dL (H))  Possibly due to antibiotic vs volume overload vs overdiuresis  Cr 1 66 POA on 6/12, improving now reaching a cem near 1 3-1 5  Treated initially with IVFs, then due to increasing edema, switched to resuming home diuretics with intermittent dosing of IV lasix  Recently started on spironolactone on 6/6 alongside loop diuretic/torsemide  U/A: trace proteins, 1-2 RBCs, 1-2 WBCs, negative leukocytes  Last contrast study was obtained on 5/19  Prior CT with contrast on 5/16: Kidneys nonobstructing R renal calculi, no hydronephrosis  Small hypodensities most likely tiny cyst     Etiology: Appears to be primarily cardiorenal rather than related to antibiotic, although nephrology team will check complements on outpatient labs      Plan:  S/p one dose IV lasix 40mg on 6/15  Monitor volume status  Dose adjust antibiotic for renal function as needed  Avoid nephrotoxins  Avoid hypotension  Plan to continue KDur 20mEq BID upon discharge    Nephrology consulted, recs appreciated -  · Continue current diuretic regiment - Spironolactone 25mg daily, Torsemide 40mg BID  · Outpatient follow-up  · Outpatient labs: CMP, C3, C4, CBCP - will follow closely with biweekly labs  · Increase in Cr at this time may need to be tolerated for euvolemic until issues with heart valve are further treated  · FR of 50oz daily and low-sodium diet    Hypokalemia-resolved as of 6/16/2023  Assessment & Plan  Lab Results   Component Value Date    K 3 5 2023     K is 2 8, pt KCl  last Tuesday and has not received any K supplement since  60 meq K given in the ED    EKG, : NSR, biatrial enlargement, LVH with repolarization abn, prolonged QT (483)  No cardiac rhythm concerns associated with hypokalemia at this time  Plan:  Per pt's request, d/c'ed telemetry  EKG if pt is symptomatic  Monitor BMP  Goal K>4  Resumed home spironolactone  KDur 20mEq BID, plan to continue on discharge while on Torsemide    Monitor CMP upon discharge, follow with OP nephrology team        Acute respiratory failure [facility])  Assessment & Plan  · Present with acute respiratory failure during hospital course, likely following holding home diuretics and initiating IVFs in setting of MARIBEL suspected to initially be due to dehydration given recent poor PO intake while on diuretics  Patient required O2 up to 6L  Noting history of heart failure and moderate to severe aortic stenosis  Treated with CXR, discontinuation of IVFs, reinitiation of home diuretics, intermittent Lasix dosages, nephrology consult, and O2 via NC  · Resolved - currently ORA  Continue home diuretics  Follow up OP with nephrology, cardiology, and cardiothoracic surgery  * Acute bacterial endocarditis  Assessment & Plan  · Due to streptococcus, likely odontogenic source  · Was previously on ceftriaxone  · 6 week treatment course  · ABX switched to vanco on admission  MARIBEL    JESSICA, : Normal systolic fxn  No PFO  Mod to severe AV stenosis  Small mobile vegetation at the commissure of the LCC and nonCC  A very small mobile vegetation is noted at the tip of the RCC  Left pleural effusion   - Compared to last JESSICA, vegetations improving but still present      Plan:  Per ID, switch to IV daptomycin via PICC through  - daptomycin 500 mg daily for outpatient regimen  Weekly labs (CBC, Creat, CK, Sed, CRP) - follow with ID team  RUE PICC to be removed in July     Abnormal abdominal CT scan  Assessment & Plan  · Abnormal CT abdomen on prior hospital, including findings of:  · 5/16/23: Gastric distention with fluid this could be related to gastroparesis or outlet obstruction  · ID team recommended OP GI consult; however, will defer at this time given patient without GI complaints during this admission  Recommend PCP to follow and refer to GI if clinically indicated at hospital follow up appointment  History of osteomyelitis  Assessment & Plan  · History of recurrent back pain, likely in setting of L1-L2 vertebral osteomyelitis/discitis  · MRI L-imaging, 5/17/23: infection, some ventral epidural enhancement and paraspinal edema/enhancement  · MRI T-spine, 5/19/23: known L1-2 discitis/osteomyelitis, incompletely imaged  · Patient declines pain regiment at this time  · Repeat lumbar spine MRI prior to completion of IV antibiotic  · Plan to f/u neurosurg OP  · Pt reports back pain has resolved; no indications to continue neuro checks at this time  · Trend fever curve/WBC  (HFpEF) heart failure with preserved ejection fraction (HCC)  Assessment & Plan  · Acute on Chronic heart failure  · Evidenced by hypoxia (pulse ox 88% on RA), SOB, anxiety, CXR with pulmonary edema treated with CXR, IV lasix, and oxygen administration  · JESSICA, 0/88: Normal systolic fxn  No PFO  Mod to severe AV stenosis  Small mobile vegetation at the commissure of the LCC and nonCC  A very small mobile vegetation is noted at the tip of the RCC  Left pleural effusion  · TTE, 6/13: EF 55%, mod to severe AV stenosis  G2DD  · Home Torsemide 40mg BID and Aldactone 25mg resumed  · Lasix 40mg IV given 6/15 AM due to ongoing hypoxia, extremity edema  · Recommended patient to keep legs elevated, RN aware  · Ordered knee high compression stockings - pedal edema improving  · Currently ORA    Will need to follow up with cardiology and cardiothoracic surgery outpatient  Once infection resolved, plan for possible TAVR      Insomnia  Assessment & Plan  · Reports has not slept well since May 2023  Likely associated with anxiety 2/2 multiple infectious processes (endocarditis, osteomyelitis, discitis, bacteremia)  · D/c melatonin  Received Atarax 25mg on 6/14 - reported sleeping ok at first, but could not sleep any longer following disruption for vitals and lab work  · 6/15: Will trial Ambien but d/w patient to still anticipate disruption for vitals/lab work  Will not continue upon discharge  · Concerns for possible sleep apnea  Ambulatory referral to sleep medicine  Medical Problems     Resolved Problems  Date Reviewed: 6/5/2023          Resolved    Hypokalemia 6/16/2023     Resolved by  Von Sams DO        Discharging Resident: Von Sams DO  Discharging Attending: Carlton Grande MD  PCP: Michael Uriostegui MD  Admission Date:   Admission Orders (From admission, onward)     Ordered        06/12/23 [address]  Once                      Discharge Date: 06/16/23    Consultations During Hospital Stay:  · Nephrology  · ID  · Pharmacy    Procedures Performed:   · JESSICA    Significant Findings / Test Results:   JESSICA, 8/45: Normal systolic fxn  No PFO  Mod to severe AV stenosis  Small mobile vegetation at the commissure of the LCC and nonCC  A very small mobile vegetation is noted at the tip of the RCC  Left pleural effusion  Cr: 1 66 >> 1 46  K: 2 9 >> 3 5    XR chest portable   Final Result by Ander Nieves MD (06/15 1653)   Extensive patchy airspace opacities and bibasilar pleural effusions favoring pulmonary edema pattern with underlying infiltrates not excluded  Workstation performed: [id number]             Incidental Findings:   · None     Test Results Pending at Discharge (will require follow up): · None     Outpatient Tests Requested:  · Biweekly labs per Nephrology team: CMP, C3, C4, CBC and platelet  · Weekly labs per Infectious disease (ID) team: CBC, Creatinine, CK, Sed, CRP    · Plan to repeat MRI Lumbar spine with contrast towards the end of antibiotic course  Antibiotic course scheduled to end on 6/30/2023  Complications:  Volume overload s/p IVFs and holding home diuretics in setting of c/f prerenal MARIBEL, requiring discontinuation of IVFs and resuming home diuretics, and nephrology consult  Volume status likely complicated by history of heart failure and moderate to severe AS  Reason for Admission: MARIBEL, hypokalemia  Hospital Course:   Agusto Jara is a 69 y o  male patient who originally presented to the hospital on 6/12/2023 due to MARIBEL and hypokalemia  Patient with history of endocarditis and lumbar osteomyelitis/discitis on IV CTX with PICC line presented from Dr Jeffrey Katz office given abnormal lab findings  Also recommended by Dr Bettina Ceron to initiate Vancomycin while inpatient, given initial concerns MARIBEL may have been associated with CTX  POA Cr 1 66 (compared to baseline of 0 8) and K 2 9  Of note, patient no longer receiving KDur while outpatient, while remaining compliance with home Spironolactone and Torsemide  Patient reported recent poor PO intake and with hypovolemic presentation, home diuretics were held and IVFs were initiated  Potassium levels replaced, and started patient on KDur 20mEq BID  However, previous echo noted heart failure with moderate aortic stenosis  Patient proceeded to experience fluid overload as evidenced on chest x-ray, physical examination, and oxygen desaturation requiring up to 6L O2  IVFs discontinued, home diuretics resumed with intermittent Lasix dosages, O2 weaned to room air as tolerated  Of note, patient with recurrent oxygen desaturations at night even while sitting up, which patient attributes to not sleeping well and anxiety; patient declined repeat CXR on day of discharge   JESSICA showed normal systolic function but moderate to severe AV stenosis; also noting small mobile vegetation at the commissure of the LCC and nonCC and a very small mobile vegetation noted at tip of RCC, along with L pleural effusion  Nephrology consulted for evaluation of MARIBEL and diuresis recommendations, agreeable to outpatient f/u with continuation of home diuretics; noting patient may require increased Cr levels to maintain euvolemia until completion of heart valve dysfunctions  Patient also encouraged to wear compression stockings and elevate legs  Patient will continue with IV daptomycin via infusion therapy outpatient through 6/30/2023  · Testing Required after Discharge -   · Biweekly labs per Nephrology team: CMP, C3, C4, CBC and platelet  · Weekly labs per Infectious disease (ID) team: CBC, Creatinine, CK, Sed, CRP  · Plan to repeat MRI Lumbar spine with contrast towards the end of antibiotic course  Antibiotic course scheduled to end on 6/30/2023  · Important follow up information -   · Follow up with primary care provider (PCP) within 1 week of hospital discharge  · Follow up with ID office 2 weeks after discharge  · Ambulatory referral to Cardiology and Cardiothoracic surgery for heart failure and moderate to severe aortic stenosis  · Ambulatory referral to OMFS (Oral and Maxillofacial Surgery) for management of dentition  · Ambulatory referral to Nephrology for management of kidney injury  · Ambulatory referral to Neurosurgery for management of L1-L2 vertebral osteomyelitis/discitis  · Ambulatory referral to Sleep Specialists for possible concerns of sleep apnea  · Defer referral to GI for previous abnormal CT abdomen finding, given no GI issues at this time  Will defer to PCP  · Other Instructions -   · Plan to restart with infusion center at [facility] on 6/17/23  · Recommend fluid restriction up to 50oz daily and low-sodium diet  Please see above list of diagnoses and related plan for additional information  Condition at Discharge: good    Discharge Day Visit / Exam:   Subjective:  Patient seen and examined this morning   Sitting in recliner, did not appear to be in acute distress, "satting well on room air  Of note, overnight, patient desatted to mid-80s requiring up to 6L O2  Pedal edema appeared to be improving  Patient has been wearing compression stockings  Patient reports no acute distress at this time  Denies CP, SOB, abdominal pain, dysuria, GI issues, or any other concerns at this time  States he has been unable to sleep well in the hospital  Declines repeat CXR given concerns for additional radiation exposure  He is eager to return home  Vitals: Blood Pressure: (!) 118/46 (06/16/23 1450)  Pulse: 103 (06/16/23 1450)  Temperature: (!) 97 3 °F (36 3 °C) (06/16/23 1450)  Temp Source: Temporal (06/14/23 1056)  Respirations: 18 (06/16/23 1450)  Height: 5' 6\" (167 6 cm) (06/14/23 1106)  Weight - Scale: 61 9 kg (136 lb 7 4 oz) (06/16/23 0600)  SpO2: 91 % (06/16/23 1450)  Exam:   Physical Exam  Vitals and nursing note reviewed  Constitutional:       General: He is not in acute distress  Appearance: He is not ill-appearing or toxic-appearing  HENT:      Head: Normocephalic and atraumatic  Right Ear: External ear normal       Left Ear: External ear normal       Nose: Nose normal       Mouth/Throat:      Mouth: Mucous membranes are moist    Eyes:      General:         Right eye: No discharge  Left eye: No discharge  Extraocular Movements: Extraocular movements intact  Conjunctiva/sclera: Conjunctivae normal    Cardiovascular:      Rate and Rhythm: Normal rate and regular rhythm  Pulses: Normal pulses  Heart sounds: Murmur heard  No friction rub  No gallop  Pulmonary:      Effort: Pulmonary effort is normal  No respiratory distress  Breath sounds: Normal breath sounds  No stridor  No wheezing, rhonchi or rales  Abdominal:      General: Bowel sounds are normal  There is no distension  Palpations: Abdomen is soft  There is no mass  Tenderness: There is no abdominal tenderness  There is no guarding        Hernia: No hernia is " present  Musculoskeletal:         General: No tenderness  Normal range of motion  Cervical back: Normal range of motion  Right lower leg: Edema (trace) present  Left lower leg: Edema (trace) present  Skin:     General: Skin is warm and dry  Neurological:      Mental Status: He is alert  Psychiatric:      Comments: anxious          Discussion with Family: Patient declined call to   Discharge instructions/Information to patient and family:   See after visit summary for information provided to patient and family  Provisions for Follow-Up Care:  See after visit summary for information related to follow-up care and any pertinent home health orders  Disposition:   Home    Planned Readmission: None    Discharge Medications:  See after visit summary for reconciled discharge medications provided to patient and/or family        **Please Note: This note may have been constructed using a voice recognition system**

## 2023-06-16 NOTE — ASSESSMENT & PLAN NOTE
· Present with acute respiratory failure during hospital course, likely following holding home diuretics and initiating IVFs in setting of MARIBEL suspected to initially be due to dehydration given recent poor PO intake while on diuretics  Patient required O2 up to 6L  Noting history of heart failure and moderate to severe aortic stenosis  Treated with CXR, discontinuation of IVFs, reinitiation of home diuretics, intermittent Lasix dosages, nephrology consult, and O2 via NC  · Resolved - currently ORA  Continue home diuretics  Follow up OP with nephrology, cardiology, and cardiothoracic surgery

## 2023-06-16 NOTE — ASSESSMENT & PLAN NOTE
Recent Labs     06/15/23  0428 06/15/23  1459 06/16/23  0348   CREATININE 1 36* 1 38* 1 46*   EGFR 49 48 45     Estimated Creatinine Clearance: 37 1 mL/min (A) (by C-G formula based on SCr of 1 46 mg/dL (H))  Possibly due to antibiotic vs volume overload vs overdiuresis  Cr 1 66 POA on 6/12, improving now reaching a cem near 1 3-1 5  Treated initially with IVFs, then due to increasing edema, switched to resuming home diuretics with intermittent dosing of IV lasix  Recently started on spironolactone on 6/6 alongside loop diuretic/torsemide  U/A: trace proteins, 1-2 RBCs, 1-2 WBCs, negative leukocytes  Last contrast study was obtained on 5/19  Prior CT with contrast on 5/16: Kidneys nonobstructing R renal calculi, no hydronephrosis  Small hypodensities most likely tiny cyst     Etiology: Appears to be primarily cardiorenal rather than related to antibiotic, although nephrology team will check complements on outpatient labs      Plan:  S/p one dose IV lasix 40mg on 6/15  Monitor volume status  Dose adjust antibiotic for renal function as needed  Avoid nephrotoxins  Avoid hypotension  Plan to continue KDur 20mEq BID upon discharge    Nephrology consulted, recs appreciated -  · Continue current diuretic regiment - Spironolactone 25mg daily, Torsemide 40mg BID  · Outpatient follow-up  · Outpatient labs: CMP, C3, C4, CBCP - will follow closely with biweekly labs  · Increase in Cr at this time may need to be tolerated for euvolemic until issues with heart valve are further treated  · FR of 50oz daily and low-sodium diet

## 2023-06-16 NOTE — PROGRESS NOTES
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   Patient remains under active treatment   Course is now complicated by #1   1W echo fortunately without any progressing disease and same for JESSICA  3   L1-L2 vertebral osteomyelitis/discitis   Patient without progressing neurologic complaints or significant back pain   Inflammatory markers likely fluctuating in the setting of #1   Patient will need follow-up with neurosurgery after discharge  4   Previous abnormal CT of the abdomen   Noted to be abnormal on prior admission without any acute findings on MRI of the T-spine   Recommend GI evaluation outpatient  5   Leukocytosis and peripheral eosinophilia   Noted on last admission and now with progressing eosinophilia due to #1   Suspect this may be drug side effect  6   Heart failure with recently diagnosed AF   Will need follow-up with cardiology   Suspect diuretics may have contributed to #1        We will continue on daptomycin 500 mg daily for outpatient regimen  Plan remains for total 6 weeks of therapy through 6/30  Orders placed in beacon for patient to restart with the infusion center at [facility]  Continue to trend fever curve/vitals while admitted  Monitor CBC/CMP and CK while admitted  The patient will still need to follow-up with cardiology, neurosurgery, OMFS and GI after discharge  Ongoing diuretic management as per primary  Patient will need follow-up in the ID office 2 weeks after discharge    ID office staff notified  Tentative plan remains to trend sed rate and CRP while on antibiotic with plans to repeat MRI of the L-spine with contrast towards the end of antibiotic course  We will assess the need for additional oral therapy based on imaging and trend of inflammatory markers  Discussed with the patient and I anticipate transition to amoxicillin until further imaging available after IV course completed  Additional supportive care/discharge as per primary  Above plan discussed in detail with the patient, senior resident and primary service resident  ID consult service will reevaluate this patient again on Monday, if admitted  Please contact ID attending on call if questions in the interim  Antibiotics:  Daptomycin 2  Total antibiotic 5    24 Hour events:  Yesterday and overnight notes reviewed and no acute events noted    Subjective:  Patient currently denies having any nausea, vomiting, chest pain  He is sitting comfortably actually on room air satting at 93%  He is hopeful to go home today  He does want to continue antibiotics with the infusion center and so I let him know that we have placed orders in beacon for that  I did review with him that he is pending repeat imaging and so he may be transition to oral antibiotic prior to that  He is aware that our office will contact him to arrange follow-up again  Patient denies having any progressing or new back pain or itching or rash  Objective:  Vitals:  Temp:  [97 9 °F (36 6 °C)-98 8 °F (37 1 °C)] 97 9 °F (36 6 °C)  HR:  [] 91  Resp:  [18] 18  BP: (104-130)/(37-54) 111/40  SpO2:  [84 %-94 %] 91 %  Temp (24hrs), Av 5 °F (36 9 °C), Min:97 9 °F (36 6 °C), Max:98 8 °F (37 1 °C)  Current: Temperature: 97 9 °F (36 6 °C)    Physical Exam:   General Appearance:  Alert, interactive, nontoxic, no acute distress  Chronically ill-appearing  Throat: Oropharynx moist without lesions      Lungs:    Decreased breath sounds throughout, no wheezes or rales    Heart:  RRR; no murmur, rub or gallop noted   Abdomen:   Soft, non-tender, non-distended, positive bowel sounds  Extremities: No clubbing, cyanosis; pitting edema noted in the lower legs bilaterally but patient has compression also on  Skin: No new rashes or lesions  No new draining wounds noted  Labs, Imaging, & Other studies:   All pertinent labs and imaging studies in PACS were personally reviewed as below  Results from last 7 days   Lab Units 06/16/23  0348 06/15/23  0428 06/14/23  0432   WBC Thousand/uL 17 32* 15 77* 14 20*   HEMOGLOBIN g/dL 11 0* 10 9* 10 9*   PLATELETS Thousands/uL 284 291 285     Results from last 7 days   Lab Units 06/16/23  0348 06/15/23  1459 06/15/23  0428 06/13/23  0014 06/12/23  1424   POTASSIUM mmol/L 3 5   < > 3 6   < > 2 8*   CHLORIDE mmol/L 96   < > 98   < > 95*   CO2 mmol/L 31   < > 27   < > 33*   BUN mg/dL 33*   < > 34*   < > 41*   CREATININE mg/dL 1 46*   < > 1 36*   < > 1 53*   EGFR ml/min/1 73sq m 45   < > 49   < > 43   CALCIUM mg/dL 8 5   < > 8 3*   < > 8 7   AST U/L 14  --  16  --  15   ALT U/L 19  --  20  --  23   ALK PHOS U/L 48  --  50  --  54    < > = values in this interval not displayed  Lab interpretation/comments: White blood cell count remains slightly elevated and differential predominantly with peripheral eosinophilia  Creatinine remains mildly elevated at 1 4      Imaging interpretation/comments: No new images overnight    Culture data: None

## 2023-06-16 NOTE — ASSESSMENT & PLAN NOTE
· Due to streptococcus, likely odontogenic source  · Was previously on ceftriaxone  · 6 week treatment course  · ABX switched to vanco on admission 2/2 MARIBEL    JESSICA, 5/82: Normal systolic fxn  No PFO  Mod to severe AV stenosis  Small mobile vegetation at the commissure of the LCC and nonCC  A very small mobile vegetation is noted at the tip of the RCC  Left pleural effusion   - Compared to last JESSICA, vegetations improving but still present      Plan:  Per ID, switch to IV daptomycin via PICC through 6/30 - daptomycin 500 mg daily for outpatient regimen  Weekly labs (CBC, Creat, CK, Sed, CRP) - follow with ID team  RUE PICC to be removed in July

## 2023-06-16 NOTE — TELEPHONE ENCOUNTER
Labs placed and mailed to patient:    ----- Message from Leandra Rhoades, [address] Andre  sent at 6/16/2023  1:19 PM EDT -----  Bella Laguna was seen during hospitalization for acute kidney injury and volume overload  He will need a follow-up appointment  Please arrange for labs in 5 to 7 days  Check CMP, C3, C4, CBC and platelet count    He may be requesting follow-up at [facility]

## 2023-06-16 NOTE — ASSESSMENT & PLAN NOTE
· Abnormal CT abdomen on prior hospital, including findings of:  · 5/16/23: Gastric distention with fluid this could be related to gastroparesis or outlet obstruction  · ID team recommended OP GI consult; however, will defer at this time given patient without GI complaints during this admission  Recommend PCP to follow and refer to GI if clinically indicated at hospital follow up appointment

## 2023-06-16 NOTE — QUICK NOTE
Patient declining chest x-ray to assess for pulmonary concerns in setting of oxygen desaturation overnight  6/14/23 CXR showed extensive patchy airspace opacities and bibasilar pleural effusions favoring pulmonary edema pattern with underlying infiltrates not excluded  Patient is concerned about additional exposure to radiation and states he does not want further evaluations and would like to go home  Discussed benefits and risks for obtaining CXR vs not obtaining CXR, including risk of not adequately diuresing with possible repeated oxygen desaturations  Patient expressed understanding and would like to decline CXR  Will partake in ambulatory pulse ox  Pending nephrology evaluation for diuretic recommendations  Plan for discharge home today

## 2023-06-16 NOTE — ASSESSMENT & PLAN NOTE
· Acute on Chronic heart failure  · Evidenced by hypoxia (pulse ox 88% on RA), SOB, anxiety, CXR with pulmonary edema treated with CXR, IV lasix, and oxygen administration  · JESSICA, 4/62: Normal systolic fxn  No PFO  Mod to severe AV stenosis  Small mobile vegetation at the commissure of the LCC and nonCC  A very small mobile vegetation is noted at the tip of the RCC  Left pleural effusion  · TTE, 6/13: EF 55%, mod to severe AV stenosis  G2DD  · Home Torsemide 40mg BID and Aldactone 25mg resumed  · Lasix 40mg IV given 6/15 AM due to ongoing hypoxia, extremity edema  · Recommended patient to keep legs elevated, RN aware  · Ordered knee high compression stockings - pedal edema improving  · Currently ORA    Will need to follow up with cardiology and cardiothoracic surgery outpatient  Once infection resolved, plan for possible TAVR

## 2023-06-16 NOTE — ASSESSMENT & PLAN NOTE
Lab Results   Component Value Date    K 3 5 2023     K is 2 8, pt KCl  last Tuesday and has not received any K supplement since  60 meq K given in the ED    EKG, : NSR, biatrial enlargement, LVH with repolarization abn, prolonged QT (483)  No cardiac rhythm concerns associated with hypokalemia at this time      Plan:  Per pt's request, d/c'ed telemetry  EKG if pt is symptomatic  Monitor BMP  Goal K>4  Resumed home spironolactone  KDur 20mEq BID, plan to continue on discharge while on Torsemide    Monitor CMP upon discharge, follow with OP nephrology team

## 2023-06-16 NOTE — ASSESSMENT & PLAN NOTE
· Reports has not slept well since May 2023  Likely associated with anxiety 2/2 multiple infectious processes (endocarditis, osteomyelitis, discitis, bacteremia)  · D/c melatonin  Received Atarax 25mg on 6/14 - reported sleeping ok at first, but could not sleep any longer following disruption for vitals and lab work  · 6/15: Will trial Ambien but d/w patient to still anticipate disruption for vitals/lab work  Will not continue upon discharge  · Concerns for possible sleep apnea  Ambulatory referral to sleep medicine

## 2023-06-16 NOTE — DISCHARGE INSTR - AVS FIRST PAGE
Dear Oj Bethea,     It was our pleasure to care for you here at [facility]  It is our hope that we were always able to exceed the expected standards for your care during your stay  You were hospitalized due to acute kidney injury and low potassium levels  You were cared for on the [facility] 4th floor by Latesha Shelton DO under the service of Samir Gomez MD with the [facility] who covers for your primary care physician (PCP), Curtis Keen MD, while you were hospitalized  If you have any questions or concerns related to this hospitalization, you may contact us at [phone]  For follow up as well as any medication refills, we recommend that you follow up with your primary care physician  A registered nurse will reach out to you by phone within a few days after your discharge to answer any additional questions that you may have after going home  However, at this time we provide for you here, the most important instructions / recommendations at discharge:     Notable Medication Adjustments -   Start taking potassium supplements 20mEq twice a day  Continue home medications as prescribed prior to this hospitalization, including Spironolactone 25mg daily and Torsemide 40mg twice a day  Testing Required after Discharge -   Biweekly labs per Nephrology team: CMP, C3, C4, CBC and platelet  Weekly labs per Infectious disease (ID) team: CBC, Creatinine, CK, Sed, CRP  Plan to repeat MRI Lumbar spine with contrast towards the end of antibiotic course  Antibiotic course scheduled to end on 6/30/2023  Important follow up information -   Follow up with your primary care provider (PCP) within 1 week of hospital discharge  Follow up with ID office 2 weeks after discharge - ID office staff have been notified and will be in touch    Ambulatory referral to Cardiology and Cardiothoracic surgery for heart failure and moderate to severe aortic stenosis  Ambulatory referral to OMFS (Oral and Maxillofacial Surgery) for management of dentition  Ambulatory referral to Nephrology for management of kidney injury  Ambulatory referral to Neurosurgery for management of L1-L2 vertebral osteomyelitis/discitis  Ambulatory referral to Sleep Specialists for possible concerns of sleep apnea  Defer referral to GI for previous abnormal CT abdomen finding, given no GI issues at this time  Will defer to PCP  Other Instructions -   Plan to restart with infusion center at [place] on 6/17/23  Recommend fluid restriction up to 50oz daily and low-sodium diet  If acute concerns for chest pain, shortness of breath, weakness, or any other major issues, please go to your nearest emergency department for evaluation and management  Please review this entire after visit summary as additional general instructions including medication list, appointments, activity, diet, any pertinent wound care, and other additional recommendations from your care team that may be provided for you        Sincerely,     Aleshia Jansen, DO

## 2023-06-16 NOTE — PLAN OF CARE
Problem: PAIN - ADULT  Goal: Verbalizes/displays adequate comfort level or baseline comfort level  Description: Interventions:  - Encourage patient to monitor pain and request assistance  - Assess pain using appropriate pain scale  - Administer analgesics based on type and severity of pain and evaluate response  - Implement non-pharmacological measures as appropriate and evaluate response  - Consider cultural and social influences on pain and pain management  - Notify physician/advanced practitioner if interventions unsuccessful or patient reports new pain  Outcome: Progressing     Problem: INFECTION - ADULT  Goal: Absence or prevention of progression during hospitalization  Description: INTERVENTIONS:  - Assess and monitor for signs and symptoms of infection  - Monitor lab/diagnostic results  - Monitor all insertion sites, i e  indwelling lines, tubes, and drains  - Monitor endotracheal if appropriate and nasal secretions for changes in amount and color  - [place] appropriate cooling/warming therapies per order  - Administer medications as ordered  - Instruct and encourage patient and family to use good hand hygiene technique  - Identify and instruct in appropriate isolation precautions for identified infection/condition  Outcome: Progressing     Problem: SAFETY ADULT  Goal: Patient will remain free of falls  Description: INTERVENTIONS:  - Educate patient/family on patient safety including physical limitations  - Instruct patient to call for assistance with activity   - Consult OT/PT to assist with strengthening/mobility   - Keep Call bell within reach  - Keep bed low and locked with side rails adjusted as appropriate  - Keep care items and personal belongings within reach  - Initiate and maintain comfort rounds  - Make Fall Risk Sign visible to staff  - Offer Toileting every 2 Hours, in advance of need  - Initiate/Maintain alarm  - Obtain necessary fall risk management equipment  - Apply yellow socks and bracelet for high fall risk patients  - Consider moving patient to room near nurses station  Outcome: Progressing  Goal: Maintain or return to baseline ADL function  Description: INTERVENTIONS:  -  Assess patient's ability to carry out ADLs; assess patient's baseline for ADL function and identify physical deficits which impact ability to perform ADLs (bathing, care of mouth/teeth, toileting, grooming, dressing, etc )  - Assess/evaluate cause of self-care deficits   - Assess range of motion  - Assess patient's mobility; develop plan if impaired  - Assess patient's need for assistive devices and provide as appropriate  - Encourage maximum independence but intervene and supervise when necessary  - Involve family in performance of ADLs  - Assess for home care needs following discharge   - Consider OT consult to assist with ADL evaluation and planning for discharge  - Provide patient education as appropriate  Outcome: Progressing  Goal: Maintains/Returns to pre admission functional level  Description: INTERVENTIONS:  - Perform BMAT or MOVE assessment daily    - Set and communicate daily mobility goal to care team and patient/family/caregiver  - Collaborate with rehabilitation services on mobility goals if consulted  - Perform Range of Motion 3 times a day  - Reposition patient every 2 hours    - Dangle patient 3 times a day  - Stand patient 3 times a day  - Ambulate patient 3 times a day  - Out of bed to chair 3 times a day   - Out of bed for meals 3 times a day  - Out of bed for toileting  - Record patient progress and toleration of activity level   Outcome: Progressing     Problem: DISCHARGE PLANNING  Goal: Discharge to home or other facility with appropriate resources  Description: INTERVENTIONS:  - Identify barriers to discharge w/patient and caregiver  - Arrange for needed discharge resources and transportation as appropriate  - Identify discharge learning needs (meds, wound care, etc )  - Arrange for interpretive services to assist at discharge as needed  - Refer to Case Management Department for coordinating discharge planning if the patient needs post-hospital services based on physician/advanced practitioner order or complex needs related to functional status, cognitive ability, or social support system  Outcome: Progressing     Problem: MOBILITY - ADULT  Goal: Maintain or return to baseline ADL function  Description: INTERVENTIONS:  -  Assess patient's ability to carry out ADLs; assess patient's baseline for ADL function and identify physical deficits which impact ability to perform ADLs (bathing, care of mouth/teeth, toileting, grooming, dressing, etc )  - Assess/evaluate cause of self-care deficits   - Assess range of motion  - Assess patient's mobility; develop plan if impaired  - Assess patient's need for assistive devices and provide as appropriate  - Encourage maximum independence but intervene and supervise when necessary  - Involve family in performance of ADLs  - Assess for home care needs following discharge   - Consider OT consult to assist with ADL evaluation and planning for discharge  - Provide patient education as appropriate  Outcome: Progressing  Goal: Maintains/Returns to pre admission functional level  Description: INTERVENTIONS:  - Perform BMAT or MOVE assessment daily    - Set and communicate daily mobility goal to care team and patient/family/caregiver  - Collaborate with rehabilitation services on mobility goals if consulted  - Perform Range of Motion 3 times a day  - Reposition patient every 2 hours    - Dangle patient 3 times a day  - Stand patient 3 times a day  - Ambulate patient 3 times a day  - Out of bed to chair 3 times a day   - Out of bed for meals 3 times a day  - Out of bed for toileting  - Record patient progress and toleration of activity level   Outcome: Progressing     Problem: Knowledge Deficit  Goal: Patient/family/caregiver demonstrates understanding of disease process, treatment plan, medications, and discharge instructions  Description: Complete learning assessment and assess knowledge base    Interventions:  - Provide teaching at level of understanding  - Provide teaching via preferred learning methods  Outcome: Progressing     Problem: RESPIRATORY - ADULT  Goal: Achieves optimal ventilation and oxygenation  Description: INTERVENTIONS:  - Assess for changes in respiratory status  - Assess for changes in mentation and behavior  - Position to facilitate oxygenation and minimize respiratory effort  - Oxygen administered by appropriate delivery if ordered  - Initiate smoking cessation education as indicated  - Encourage broncho-pulmonary hygiene including cough, deep breathe, Incentive Spirometry  - Assess the need for suctioning and aspirate as needed  - Assess and instruct to report SOB or any respiratory difficulty  - Respiratory Therapy support as indicated  Outcome: Progressing     Problem: Prexisting or High Potential for Compromised Skin Integrity  Goal: Skin integrity is maintained or improved  Description: INTERVENTIONS:  - Identify patients at risk for skin breakdown  - Assess and monitor skin integrity  - Assess and monitor nutrition and hydration status  - Monitor labs   - Assess for incontinence   - Turn and reposition patient  - Assist with mobility/ambulation  - Relieve pressure over bony prominences  - Avoid friction and shearing  - Provide appropriate hygiene as needed including keeping skin clean and dry  - Evaluate need for skin moisturizer/barrier cream  - Collaborate with interdisciplinary team   - Patient/family teaching  - Consider wound care consult   Outcome: Progressing

## 2023-06-17 ENCOUNTER — HOSPITAL ENCOUNTER (OUTPATIENT)
Dept: INFUSION CENTER | Facility: HOSPITAL | Age: 78
Discharge: HOME | End: 2023-06-17
Attending: INTERNAL MEDICINE
Payer: COMMERCIAL

## 2023-06-17 VITALS
TEMPERATURE: 97.9 F | SYSTOLIC BLOOD PRESSURE: 124 MMHG | OXYGEN SATURATION: 93 % | RESPIRATION RATE: 20 BRPM | DIASTOLIC BLOOD PRESSURE: 56 MMHG | HEART RATE: 82 BPM

## 2023-06-17 DIAGNOSIS — B95.5 BACTEREMIA DUE TO STREPTOCOCCUS: ICD-10-CM

## 2023-06-17 DIAGNOSIS — R78.81 BACTEREMIA DUE TO STREPTOCOCCUS: ICD-10-CM

## 2023-06-17 DIAGNOSIS — M46.46 DISCITIS OF LUMBAR REGION: Primary | ICD-10-CM

## 2023-06-17 PROCEDURE — 96365 THER/PROPH/DIAG IV INF INIT: CPT

## 2023-06-17 RX ORDER — SODIUM CHLORIDE 9 MG/ML
20 INJECTION, SOLUTION INTRAVENOUS ONCE
Status: CANCELLED | OUTPATIENT
Start: 2023-06-18

## 2023-06-17 RX ORDER — SODIUM CHLORIDE 9 MG/ML
20 INJECTION, SOLUTION INTRAVENOUS ONCE
Status: COMPLETED | OUTPATIENT
Start: 2023-06-17 | End: 2023-06-17

## 2023-06-17 RX ADMIN — SODIUM CHLORIDE 20 ML/HR: 0.9 INJECTION, SOLUTION INTRAVENOUS at 10:58

## 2023-06-17 RX ADMIN — DAPTOMYCIN 500 MG: 500 INJECTION, POWDER, LYOPHILIZED, FOR SOLUTION INTRAVENOUS at 10:58

## 2023-06-18 ENCOUNTER — HOSPITAL ENCOUNTER (OUTPATIENT)
Dept: INFUSION CENTER | Facility: HOSPITAL | Age: 78
Discharge: HOME | End: 2023-06-18
Attending: INTERNAL MEDICINE
Payer: COMMERCIAL

## 2023-06-18 VITALS
HEART RATE: 78 BPM | OXYGEN SATURATION: 96 % | SYSTOLIC BLOOD PRESSURE: 113 MMHG | DIASTOLIC BLOOD PRESSURE: 53 MMHG | RESPIRATION RATE: 18 BRPM | TEMPERATURE: 97.6 F

## 2023-06-18 DIAGNOSIS — R78.81 BACTEREMIA DUE TO STREPTOCOCCUS: ICD-10-CM

## 2023-06-18 DIAGNOSIS — M46.46 DISCITIS OF LUMBAR REGION: Primary | ICD-10-CM

## 2023-06-18 DIAGNOSIS — B95.5 BACTEREMIA DUE TO STREPTOCOCCUS: ICD-10-CM

## 2023-06-18 PROCEDURE — 96365 THER/PROPH/DIAG IV INF INIT: CPT

## 2023-06-18 RX ORDER — SODIUM CHLORIDE 9 MG/ML
20 INJECTION, SOLUTION INTRAVENOUS ONCE
Status: COMPLETED | OUTPATIENT
Start: 2023-06-18 | End: 2023-06-18

## 2023-06-18 RX ORDER — SODIUM CHLORIDE 9 MG/ML
20 INJECTION, SOLUTION INTRAVENOUS ONCE
Status: CANCELLED | OUTPATIENT
Start: 2023-06-19

## 2023-06-18 RX ADMIN — DAPTOMYCIN 500 MG: 500 INJECTION, POWDER, LYOPHILIZED, FOR SOLUTION INTRAVENOUS at 11:02

## 2023-06-18 RX ADMIN — SODIUM CHLORIDE 20 ML/HR: 0.9 INJECTION, SOLUTION INTRAVENOUS at 11:02

## 2023-06-18 NOTE — PLAN OF CARE
Problem: Potential for Falls  Goal: Patient will remain free of falls  Description: INTERVENTIONS:  - Educate patient/family on patient safety including physical limitations  - Instruct patient to call for assistance with activity   - Keep Call bell within reach  - Keep care items and personal belongings within reach  Outcome: Progressing
Yes

## 2023-06-19 ENCOUNTER — HOSPITAL ENCOUNTER (OUTPATIENT)
Dept: INFUSION CENTER | Facility: HOSPITAL | Age: 78
Discharge: HOME | End: 2023-06-19
Attending: INTERNAL MEDICINE
Payer: COMMERCIAL

## 2023-06-19 ENCOUNTER — TRANSITIONAL CARE MANAGEMENT (OUTPATIENT)
Dept: FAMILY MEDICINE CLINIC | Facility: CLINIC | Age: 78
End: 2023-06-19

## 2023-06-19 ENCOUNTER — TELEPHONE (OUTPATIENT)
Dept: INFECTIOUS DISEASES | Facility: CLINIC | Age: 78
End: 2023-06-19

## 2023-06-19 ENCOUNTER — TELEPHONE (OUTPATIENT)
Dept: HEMATOLOGY ONCOLOGY | Facility: CLINIC | Age: 78
End: 2023-06-19

## 2023-06-19 VITALS
HEART RATE: 80 BPM | SYSTOLIC BLOOD PRESSURE: 110 MMHG | OXYGEN SATURATION: 94 % | TEMPERATURE: 97.2 F | RESPIRATION RATE: 18 BRPM | DIASTOLIC BLOOD PRESSURE: 49 MMHG

## 2023-06-19 DIAGNOSIS — M46.46 DISCITIS OF LUMBAR REGION: Primary | ICD-10-CM

## 2023-06-19 DIAGNOSIS — B95.5 BACTEREMIA DUE TO STREPTOCOCCUS: ICD-10-CM

## 2023-06-19 DIAGNOSIS — R78.81 BACTEREMIA DUE TO STREPTOCOCCUS: ICD-10-CM

## 2023-06-19 LAB
ALBUMIN SERPL BCP-MCNC: 2.9 G/DL (ref 3.5–5)
ALP SERPL-CCNC: 47 U/L (ref 34–104)
ALT SERPL W P-5'-P-CCNC: 26 U/L (ref 7–52)
ANION GAP SERPL CALCULATED.3IONS-SCNC: 9 MMOL/L (ref 4–13)
AST SERPL W P-5'-P-CCNC: 24 U/L (ref 13–39)
BASOPHILS # BLD AUTO: 0.07 THOUSANDS/ÂΜL (ref 0–0.1)
BASOPHILS NFR BLD AUTO: 1 % (ref 0–1)
BILIRUB SERPL-MCNC: 0.31 MG/DL (ref 0.2–1)
BUN SERPL-MCNC: 32 MG/DL (ref 5–25)
CALCIUM ALBUM COR SERPL-MCNC: 9.3 MG/DL (ref 8.3–10.1)
CALCIUM SERPL-MCNC: 8.4 MG/DL (ref 8.4–10.2)
CHLORIDE SERPL-SCNC: 92 MMOL/L (ref 96–108)
CK SERPL-CCNC: 55 U/L (ref 39–308)
CO2 SERPL-SCNC: 33 MMOL/L (ref 21–32)
CREAT SERPL-MCNC: 1.45 MG/DL (ref 0.6–1.3)
CRP SERPL QL: 71.2 MG/L
EOSINOPHIL # BLD AUTO: 2.21 THOUSAND/ÂΜL (ref 0–0.61)
EOSINOPHIL NFR BLD AUTO: 17 % (ref 0–6)
ERYTHROCYTE [DISTWIDTH] IN BLOOD BY AUTOMATED COUNT: 15.1 % (ref 11.6–15.1)
ERYTHROCYTE [SEDIMENTATION RATE] IN BLOOD: 46 MM/HOUR (ref 0–19)
GFR SERPL CREATININE-BSD FRML MDRD: 46 ML/MIN/1.73SQ M
GLUCOSE SERPL-MCNC: 102 MG/DL (ref 65–140)
HCT VFR BLD AUTO: 31.6 % (ref 36.5–49.3)
HGB BLD-MCNC: 10.4 G/DL (ref 12–17)
IMM GRANULOCYTES # BLD AUTO: 0.09 THOUSAND/UL (ref 0–0.2)
IMM GRANULOCYTES NFR BLD AUTO: 1 % (ref 0–2)
LYMPHOCYTES # BLD AUTO: 1.02 THOUSANDS/ÂΜL (ref 0.6–4.47)
LYMPHOCYTES NFR BLD AUTO: 8 % (ref 14–44)
MCH RBC QN AUTO: 29.5 PG (ref 26.8–34.3)
MCHC RBC AUTO-ENTMCNC: 32.9 G/DL (ref 31.4–37.4)
MCV RBC AUTO: 90 FL (ref 82–98)
MONOCYTES # BLD AUTO: 0.66 THOUSAND/ÂΜL (ref 0.17–1.22)
MONOCYTES NFR BLD AUTO: 5 % (ref 4–12)
NEUTROPHILS # BLD AUTO: 9.32 THOUSANDS/ÂΜL (ref 1.85–7.62)
NEUTS SEG NFR BLD AUTO: 68 % (ref 43–75)
NRBC BLD AUTO-RTO: 0 /100 WBCS
PLATELET # BLD AUTO: 300 THOUSANDS/UL (ref 149–390)
PMV BLD AUTO: 9.5 FL (ref 8.9–12.7)
POTASSIUM SERPL-SCNC: 3 MMOL/L (ref 3.5–5.3)
PROT SERPL-MCNC: 5.6 G/DL (ref 6.4–8.4)
RBC # BLD AUTO: 3.53 MILLION/UL (ref 3.88–5.62)
SODIUM SERPL-SCNC: 134 MMOL/L (ref 135–147)
WBC # BLD AUTO: 13.37 THOUSAND/UL (ref 4.31–10.16)

## 2023-06-19 PROCEDURE — 85025 COMPLETE CBC W/AUTO DIFF WBC: CPT | Performed by: INTERNAL MEDICINE

## 2023-06-19 PROCEDURE — 82550 ASSAY OF CK (CPK): CPT | Performed by: INTERNAL MEDICINE

## 2023-06-19 PROCEDURE — 80053 COMPREHEN METABOLIC PANEL: CPT | Performed by: INTERNAL MEDICINE

## 2023-06-19 PROCEDURE — 85652 RBC SED RATE AUTOMATED: CPT | Performed by: INTERNAL MEDICINE

## 2023-06-19 PROCEDURE — 86140 C-REACTIVE PROTEIN: CPT | Performed by: INTERNAL MEDICINE

## 2023-06-19 RX ORDER — SODIUM CHLORIDE 9 MG/ML
20 INJECTION, SOLUTION INTRAVENOUS ONCE
Status: CANCELLED | OUTPATIENT
Start: 2023-06-20

## 2023-06-19 RX ORDER — SODIUM CHLORIDE 9 MG/ML
20 INJECTION, SOLUTION INTRAVENOUS ONCE
Status: COMPLETED | OUTPATIENT
Start: 2023-06-19 | End: 2023-06-19

## 2023-06-19 RX ADMIN — DAPTOMYCIN 500 MG: 500 INJECTION, POWDER, LYOPHILIZED, FOR SOLUTION INTRAVENOUS at 11:17

## 2023-06-19 RX ADMIN — SODIUM CHLORIDE 20 ML/HR: 0.9 INJECTION, SOLUTION INTRAVENOUS at 11:17

## 2023-06-19 NOTE — TELEPHONE ENCOUNTER
I called Valentín Andrade in response to a referral that was received for patient to establish care with Thoracic Surgery  Outreach was made to complete patient's intake questionnaire   I left a voicemail explaining the reason for my call and advised patient to call [facility] at [phone]  Another attempt will be made to contact patient

## 2023-06-19 NOTE — UTILIZATION REVIEW
NOTIFICATION OF ADMISSION DISCHARGE   This is a Notification of Discharge from [address]  Please be advised that this patient has been discharge from our facility  Below you will find the admission and discharge date and time including the patient’s disposition  UTILIZATION REVIEW CONTACT:  Floridalma Conley MA  Utilization   Network Utilization Review Department  Phone: [phone] x carefully listen to the prompts  All voicemails are confidential   Email: [email or web address]     ADMISSION INFORMATION  PRESENTATION DATE: 6/12/2023 12:51 PM  OBERVATION ADMISSION DATE:   INPATIENT ADMISSION DATE: 6/12/23  4:07 PM   DISCHARGE DATE: 6/16/2023  3:15 PM   DISPOSITION:Home/Self Care    IMPORTANT INFORMATION:  Send all requests for admission clinical reviews, approved or denied determinations and any other requests to dedicated fax number below belonging to the campus where the patient is receiving treatment   List of dedicated fax numbers:  [address] DENIALS (Administrative/Medical Necessity) [phone]   [address] (Maternity/NICU/Pediatrics) [phone]   [facility] [phone]   [facility] 87 [phone]   [address] [phone]   220 [facility] [phone] 90 [facility] [phone]   [address] [place] [address] 119 [phone]   [facility]  [phone]   [address] [facility] [phone] 412 [facility] [address] [facility] [phone]

## 2023-06-19 NOTE — PROGRESS NOTES
OPAT NOTE    Supervising/Discharge physician: Jody    Diagnosis: Strep Bacteremia, AV Endocarditis, L Spine Discitis    Drug: Dapto    Dose/Frequency: 500mg Daily    End Date: 6/30    Infusion/[facility]NA contact: Inf Ctr    Next appointment: 6/30        MA assigned: Ronan Hatfield

## 2023-06-19 NOTE — TELEPHONE ENCOUNTER
Contacted pt to inform him that we are following up with him after his hosp d/c  Informed him that he has appt scheduled for 6/30 and Dr Vera is requesting that pt have MRI done prior to that appt  Pt asked if he can have his wife give us a call back he is on his way to infusion center

## 2023-06-20 ENCOUNTER — HOSPITAL ENCOUNTER (OUTPATIENT)
Dept: INFUSION CENTER | Facility: HOSPITAL | Age: 78
Discharge: HOME | End: 2023-06-20
Attending: INTERNAL MEDICINE
Payer: COMMERCIAL

## 2023-06-20 VITALS
DIASTOLIC BLOOD PRESSURE: 49 MMHG | HEART RATE: 80 BPM | OXYGEN SATURATION: 96 % | RESPIRATION RATE: 16 BRPM | SYSTOLIC BLOOD PRESSURE: 114 MMHG | TEMPERATURE: 97.4 F

## 2023-06-20 DIAGNOSIS — R78.81 BACTEREMIA DUE TO STREPTOCOCCUS: ICD-10-CM

## 2023-06-20 DIAGNOSIS — M46.46 DISCITIS OF LUMBAR REGION: Primary | ICD-10-CM

## 2023-06-20 DIAGNOSIS — B95.5 BACTEREMIA DUE TO STREPTOCOCCUS: ICD-10-CM

## 2023-06-20 PROCEDURE — 96365 THER/PROPH/DIAG IV INF INIT: CPT

## 2023-06-20 RX ORDER — SODIUM CHLORIDE 9 MG/ML
20 INJECTION, SOLUTION INTRAVENOUS ONCE
Status: COMPLETED | OUTPATIENT
Start: 2023-06-20 | End: 2023-06-20

## 2023-06-20 RX ORDER — SODIUM CHLORIDE 9 MG/ML
20 INJECTION, SOLUTION INTRAVENOUS ONCE
Status: CANCELLED | OUTPATIENT
Start: 2023-06-21

## 2023-06-20 RX ADMIN — SODIUM CHLORIDE 20 ML/HR: 0.9 INJECTION, SOLUTION INTRAVENOUS at 10:18

## 2023-06-20 RX ADMIN — DAPTOMYCIN 500 MG: 500 INJECTION, POWDER, LYOPHILIZED, FOR SOLUTION INTRAVENOUS at 10:18

## 2023-06-20 NOTE — PLAN OF CARE
Problem: Potential for Falls  Goal: Patient will remain free of falls  Description: INTERVENTIONS:  - Educate patient/family on patient safety including physical limitations  - Instruct patient to call for assistance with activity   - Consult OT/PT to assist with strengthening/mobility   - Keep Call bell within reach  - Keep bed low and locked with side rails adjusted as appropriate  - Keep care items and personal belongings within reach  - Initiate and maintain comfort rounds  - Make Fall Risk Sign visible to staff  - Offer Toileting every 1 Hour in advance of need  - Initiate/Maintain alarms   - Obtain necessary fall risk management equipment    - Apply yellow socks and bracelet for high fall risk patients  - Consider moving patient to room near nurses station  Outcome: Progressing

## 2023-06-21 ENCOUNTER — HOSPITAL ENCOUNTER (OUTPATIENT)
Dept: INFUSION CENTER | Facility: HOSPITAL | Age: 78
Discharge: HOME | End: 2023-06-21
Attending: INTERNAL MEDICINE
Payer: COMMERCIAL

## 2023-06-21 VITALS
SYSTOLIC BLOOD PRESSURE: 117 MMHG | TEMPERATURE: 97.7 F | OXYGEN SATURATION: 94 % | HEART RATE: 75 BPM | DIASTOLIC BLOOD PRESSURE: 53 MMHG | RESPIRATION RATE: 16 BRPM

## 2023-06-21 DIAGNOSIS — R78.81 BACTEREMIA DUE TO STREPTOCOCCUS: ICD-10-CM

## 2023-06-21 DIAGNOSIS — M46.46 DISCITIS OF LUMBAR REGION: Primary | ICD-10-CM

## 2023-06-21 DIAGNOSIS — B95.5 BACTEREMIA DUE TO STREPTOCOCCUS: ICD-10-CM

## 2023-06-21 PROCEDURE — 96365 THER/PROPH/DIAG IV INF INIT: CPT

## 2023-06-21 RX ORDER — SODIUM CHLORIDE 9 MG/ML
20 INJECTION, SOLUTION INTRAVENOUS ONCE
Status: COMPLETED | OUTPATIENT
Start: 2023-06-21 | End: 2023-06-21

## 2023-06-21 RX ORDER — SODIUM CHLORIDE 9 MG/ML
20 INJECTION, SOLUTION INTRAVENOUS ONCE
Status: CANCELLED | OUTPATIENT
Start: 2023-06-22

## 2023-06-21 RX ADMIN — SODIUM CHLORIDE 20 ML/HR: 0.9 INJECTION, SOLUTION INTRAVENOUS at 10:53

## 2023-06-21 RX ADMIN — DAPTOMYCIN 500 MG: 500 INJECTION, POWDER, LYOPHILIZED, FOR SOLUTION INTRAVENOUS at 10:53

## 2023-06-22 ENCOUNTER — HOSPITAL ENCOUNTER (OUTPATIENT)
Dept: RADIOLOGY | Facility: HOSPITAL | Age: 78
Discharge: HOME | End: 2023-06-22
Payer: COMMERCIAL

## 2023-06-22 ENCOUNTER — HOSPITAL ENCOUNTER (OUTPATIENT)
Dept: INFUSION CENTER | Facility: HOSPITAL | Age: 78
Discharge: HOME | End: 2023-06-22
Attending: INTERNAL MEDICINE
Payer: COMMERCIAL

## 2023-06-22 VITALS
DIASTOLIC BLOOD PRESSURE: 49 MMHG | HEART RATE: 79 BPM | RESPIRATION RATE: 20 BRPM | TEMPERATURE: 98.1 F | SYSTOLIC BLOOD PRESSURE: 116 MMHG | OXYGEN SATURATION: 96 %

## 2023-06-22 DIAGNOSIS — M46.46 DISCITIS OF LUMBAR REGION: ICD-10-CM

## 2023-06-22 DIAGNOSIS — R78.81 BACTEREMIA DUE TO STREPTOCOCCUS: ICD-10-CM

## 2023-06-22 DIAGNOSIS — M46.46 DISCITIS OF LUMBAR REGION: Primary | ICD-10-CM

## 2023-06-22 DIAGNOSIS — B95.5 BACTEREMIA DUE TO STREPTOCOCCUS: ICD-10-CM

## 2023-06-22 PROCEDURE — 72100 X-RAY EXAM L-S SPINE 2/3 VWS: CPT

## 2023-06-22 PROCEDURE — 96365 THER/PROPH/DIAG IV INF INIT: CPT

## 2023-06-22 RX ORDER — SODIUM CHLORIDE 9 MG/ML
20 INJECTION, SOLUTION INTRAVENOUS ONCE
Status: CANCELLED | OUTPATIENT
Start: 2023-06-23

## 2023-06-22 RX ORDER — SODIUM CHLORIDE 9 MG/ML
20 INJECTION, SOLUTION INTRAVENOUS ONCE
Status: COMPLETED | OUTPATIENT
Start: 2023-06-22 | End: 2023-06-22

## 2023-06-22 RX ADMIN — SODIUM CHLORIDE 20 ML/HR: 0.9 INJECTION, SOLUTION INTRAVENOUS at 09:22

## 2023-06-22 RX ADMIN — DAPTOMYCIN 500 MG: 500 INJECTION, POWDER, LYOPHILIZED, FOR SOLUTION INTRAVENOUS at 09:22

## 2023-06-22 NOTE — TELEPHONE ENCOUNTER
Patient returned call stating he does not wish to schedule hosp f/u at this time and will call the office if he changes his mind

## 2023-06-23 ENCOUNTER — HOSPITAL ENCOUNTER (OUTPATIENT)
Dept: INFUSION CENTER | Facility: HOSPITAL | Age: 78
End: 2023-06-23
Attending: INTERNAL MEDICINE
Payer: COMMERCIAL

## 2023-06-23 DIAGNOSIS — R78.81 BACTEREMIA DUE TO STREPTOCOCCUS: ICD-10-CM

## 2023-06-23 DIAGNOSIS — B95.5 BACTEREMIA DUE TO STREPTOCOCCUS: ICD-10-CM

## 2023-06-23 DIAGNOSIS — M46.46 DISCITIS OF LUMBAR REGION: Primary | ICD-10-CM

## 2023-06-23 PROCEDURE — 96365 THER/PROPH/DIAG IV INF INIT: CPT

## 2023-06-23 RX ORDER — SODIUM CHLORIDE 9 MG/ML
20 INJECTION, SOLUTION INTRAVENOUS ONCE
Status: COMPLETED | OUTPATIENT
Start: 2023-06-23 | End: 2023-06-23

## 2023-06-23 RX ORDER — SODIUM CHLORIDE 9 MG/ML
20 INJECTION, SOLUTION INTRAVENOUS ONCE
Status: CANCELLED | OUTPATIENT
Start: 2023-06-24

## 2023-06-23 RX ADMIN — DAPTOMYCIN 500 MG: 500 INJECTION, POWDER, LYOPHILIZED, FOR SOLUTION INTRAVENOUS at 10:16

## 2023-06-23 RX ADMIN — SODIUM CHLORIDE 20 ML/HR: 0.9 INJECTION, SOLUTION INTRAVENOUS at 10:12

## 2023-06-24 ENCOUNTER — HOSPITAL ENCOUNTER (OUTPATIENT)
Dept: INFUSION CENTER | Facility: HOSPITAL | Age: 78
Discharge: HOME | End: 2023-06-24
Attending: INTERNAL MEDICINE
Payer: COMMERCIAL

## 2023-06-24 VITALS
HEART RATE: 82 BPM | RESPIRATION RATE: 17 BRPM | OXYGEN SATURATION: 97 % | DIASTOLIC BLOOD PRESSURE: 46 MMHG | SYSTOLIC BLOOD PRESSURE: 104 MMHG | TEMPERATURE: 97 F

## 2023-06-24 DIAGNOSIS — M46.46 DISCITIS OF LUMBAR REGION: Primary | ICD-10-CM

## 2023-06-24 DIAGNOSIS — B95.5 BACTEREMIA DUE TO STREPTOCOCCUS: ICD-10-CM

## 2023-06-24 DIAGNOSIS — R78.81 BACTEREMIA DUE TO STREPTOCOCCUS: ICD-10-CM

## 2023-06-24 PROCEDURE — 96365 THER/PROPH/DIAG IV INF INIT: CPT

## 2023-06-24 RX ORDER — SODIUM CHLORIDE 9 MG/ML
20 INJECTION, SOLUTION INTRAVENOUS ONCE
Status: COMPLETED | OUTPATIENT
Start: 2023-06-24 | End: 2023-06-24

## 2023-06-24 RX ORDER — SODIUM CHLORIDE 9 MG/ML
20 INJECTION, SOLUTION INTRAVENOUS ONCE
Status: CANCELLED | OUTPATIENT
Start: 2023-06-25

## 2023-06-24 RX ADMIN — DAPTOMYCIN 500 MG: 500 INJECTION, POWDER, LYOPHILIZED, FOR SOLUTION INTRAVENOUS at 10:11

## 2023-06-24 RX ADMIN — SODIUM CHLORIDE 20 ML/HR: 0.9 INJECTION, SOLUTION INTRAVENOUS at 10:11

## 2023-06-25 ENCOUNTER — HOSPITAL ENCOUNTER (OUTPATIENT)
Dept: INFUSION CENTER | Facility: HOSPITAL | Age: 78
Discharge: HOME | End: 2023-06-25
Attending: INTERNAL MEDICINE
Payer: COMMERCIAL

## 2023-06-25 VITALS
OXYGEN SATURATION: 96 % | RESPIRATION RATE: 18 BRPM | SYSTOLIC BLOOD PRESSURE: 111 MMHG | DIASTOLIC BLOOD PRESSURE: 51 MMHG | HEART RATE: 79 BPM | TEMPERATURE: 97.3 F

## 2023-06-25 DIAGNOSIS — M46.46 DISCITIS OF LUMBAR REGION: Primary | ICD-10-CM

## 2023-06-25 DIAGNOSIS — B95.5 BACTEREMIA DUE TO STREPTOCOCCUS: ICD-10-CM

## 2023-06-25 DIAGNOSIS — R78.81 BACTEREMIA DUE TO STREPTOCOCCUS: ICD-10-CM

## 2023-06-25 PROCEDURE — 96365 THER/PROPH/DIAG IV INF INIT: CPT

## 2023-06-25 RX ORDER — SODIUM CHLORIDE 9 MG/ML
20 INJECTION, SOLUTION INTRAVENOUS ONCE
Status: COMPLETED | OUTPATIENT
Start: 2023-06-25 | End: 2023-06-25

## 2023-06-25 RX ORDER — SODIUM CHLORIDE 9 MG/ML
20 INJECTION, SOLUTION INTRAVENOUS ONCE
Status: CANCELLED | OUTPATIENT
Start: 2023-06-26

## 2023-06-25 RX ADMIN — SODIUM CHLORIDE 20 ML/HR: 0.9 INJECTION, SOLUTION INTRAVENOUS at 10:05

## 2023-06-25 RX ADMIN — DAPTOMYCIN 500 MG: 500 INJECTION, POWDER, LYOPHILIZED, FOR SOLUTION INTRAVENOUS at 10:06

## 2023-06-25 NOTE — PROGRESS NOTES
Outpatient Virtual Visit Progress Note - [facility] Infectious Disease   Jayy Cazares 69 y o  male MRN: [id number]  Encounter: [id number]    REQUIRED DOCUMENTATION:   1  Patient is located in his car in the state of [place] in which I hold an active license  2  The visit is being conducted through the [employer] platform  He agrees to proceed  3  Identify all parties in the room during virtual visit: CRNP, patient, patient's wife  4  After connecting through video, patient was identified by name and date of birth  Patient was informed this is a telemedicine/virtual visit and that the exam was being conducted confidentially over secure lines  My office door was closed  No one else was in the room  Patient acknowledged consent and understanding of privacy and security of telemedicine  The patient agreed to participate  Patient is aware this is a billable service  5  Total visit duration: 30     Impression/Plan:  1  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  Likely originated from dental source  Blood cultures first positive on 5/17/2023  Patient was undergoing a 6-week course of IV ceftriaxone but returned to the inpatient setting in [place]  Unclear if antibiotic played a role  Suspect new diuretic dosing was involved  Patient was changed to IV vancomycin and then to IV Daptomycin at time of discharge so that he could remain on once daily dosing at the infusion center  He continues to tolerate antibiotic treatment without difficulty  I reviewed his most recent lab work from 6/26/2023 which showed stable WBC count and stable serum creatinine = 1 33  Most recent CK = 45  Patient will complete his Daptomycin as planned on 6/30/2023  He will require transition to PO Amoxicillin as below for ongoing treatment of his L1-L2 vertebral discitis/osteomyelitis    -cotninue IV Daptomycin through 6/30/2023 for 6-weeks of IV antibiotic treatment  -anticipate transition to oral amoxicillin as below for ongoing treatment of spinal infection  -RUE PICC to be removed by infusion center RN after patient's final dose of IV antibiotic     2  L1-L2 vertebral osteomyelitis/discitis  Initial MRI concerning for ventral epidural enhancement and paraspinal edema/enhancement  Suspect this was secondary to his streptococcal bacteremia  Patient now completing IV antibiotic treatment as above  Most recent CRP was improved to 9 9  Most recent sed rate with slight improvement to 43  He is set up for outpatient neurosurgery follow up later this month  He was supposed to complete repeat MRI imaging before this appointment but it is scheduled for next month  Given ongoing elevation of inflammatory markers, and need for imaging, I will transition him to oral Amoxicillin now  He should complete repeat lab work in 3 weeks and return to the ID office in 1 month for follow up   -complete IV antibiotic as above  -complete lumbar spine MRI  -on 7/1/2023 patient will transition to oral Amoxicillin, 500mg q8, dosed for current creatinine clearance of 40 7, and will remain on oral antibiotic treatment until his sed rate and CRP normalize or plateau  -prescription for sent to [facility] in [place]  -complete lab work (CBCD, creatinine, sed rate, and CRP) prior to next outpatient ID office follow up  -return to the outpatient ID office for follow up in 1 month   -maintain outpatient follow up with neurosurgery      3  Acute kidney injury  Patient's outpatient lab work concerning and he was instructed to return inpatient for immediate evaluation  Consider role of recent diuretic change  Less likely is antibiotic  Suspect CHF plays a role  He received fluids and potassium supplementation  Patient's most recent creatinine from 6/26/2023 has improved to 1 33   -continue weekly creatinine while on IV antibiotic  -recommend nephrology follow up as needed  -recommend ongoing follow up with cardiology    4  RUE PICC intact    -nursing team to continue routine exams and care of PICC  -PICC to be removed by RN after final dose of IV antibiotic     5  Congestive heart failure  Patient with questions regarding his diuretics, goal fluid intake, and weight goals  He has missed previous cardiology appointments due to ending up back inpatient  Patient and his wife should reach out to [address] [place] [address] And [address] [place] [address], [phone], to schedule an appointment  Above plan was discussed in detail with patient and his wife over virtual video visit  Antibiotics:  Daptomycin    Subjective:  Patient's visit was transitioned to virtual as he presented to the wrong location for his visit  Patient is following up for ongoing IV antibiotic treatment of aortic valve endocarditis and L1-2 spinal osteomyelitis/diskitis due to streptococcal bacteremia  He has a PICC in place and is undergoing a 6- week course of IV antibiotic at the infusion center  He is currently tolerating Daptomycin without difficulty  Patient has no new back pain and no new weakness of the B/L LE  He has no fever, chills, sweats, shakes; no nausea, vomiting, abdominal pain, diarrhea, or dysuria; no cough, shortness of breath, or chest pain  No new symptoms  Objective:  Vitals:  Not obtained as visit was transitioned to virtual visit  Physical Exam:   General Appearance:  Alert, interactive, nontoxic, no acute distress  He appears comfortable sitting in his car with his wife  Lungs:   Respirations unlabored on room air  Extremities: RUE PICC intact  Skin: No new rashes noted on exposed skin       Labs, Imaging, & Other studies:   All pertinent labs and imaging studies were personally reviewed by me including    Results from last 7 days   Lab Units 06/26/23  1015 06/26/23  1014   WBC Thousand/uL 9 83 9 83   HEMOGLOBIN g/dL 10 4* 10 4*   PLATELETS Thousands/uL 282 282     Results from last 7 days   Lab Units 06/26/23  1014   POTASSIUM mmol/L 3 7   CHLORIDE mmol/L 97   CO2 mmol/L 33* BUN mg/dL 33*   CREATININE mg/dL 1 33*   EGFR ml/min/1 73sq m 51   CALCIUM mg/dL 8 6   AST U/L 18   ALT U/L 25   ALK PHOS U/L 55

## 2023-06-25 NOTE — PATIENT INSTRUCTIONS
Continue daily IV Daptomycin infusions through 6/30/2023  Continue weekly CBCD, creatinine, CK, sed rate, and CRP, while on IV antibiotic treatment  Complete lumbar spine MRI as soon as possible  Infusion center RN to remove PICC after final dose of IV antibiotic on 6/30/2023  On 7/1/2023 patient will transition to oral Amoxicillin, 500mg every 8 hours  Prescription for sent to [facility] in [place]  Complete lab work (CBCD, creatinine, sed rate, CRP) prior to next outpatient infectious disease office visit  Return to the outpatient infectious disease office for follow up in 1 month  Patient needs to set up ongoing cardiology follow up with [facility]  The number to contact their office for an appointment is [phone]

## 2023-06-26 ENCOUNTER — HOSPITAL ENCOUNTER (OUTPATIENT)
Dept: INFUSION CENTER | Facility: HOSPITAL | Age: 78
Discharge: HOME | End: 2023-06-26
Attending: INTERNAL MEDICINE
Payer: COMMERCIAL

## 2023-06-26 VITALS
RESPIRATION RATE: 18 BRPM | HEART RATE: 84 BPM | TEMPERATURE: 96.5 F | SYSTOLIC BLOOD PRESSURE: 126 MMHG | DIASTOLIC BLOOD PRESSURE: 51 MMHG | OXYGEN SATURATION: 98 %

## 2023-06-26 DIAGNOSIS — R78.81 BACTEREMIA DUE TO STREPTOCOCCUS: ICD-10-CM

## 2023-06-26 DIAGNOSIS — B95.5 BACTEREMIA DUE TO STREPTOCOCCUS: ICD-10-CM

## 2023-06-26 DIAGNOSIS — M46.46 DISCITIS OF LUMBAR REGION: Primary | ICD-10-CM

## 2023-06-26 LAB
ALBUMIN SERPL BCP-MCNC: 3 G/DL (ref 3.5–5)
ALP SERPL-CCNC: 55 U/L (ref 34–104)
ALT SERPL W P-5'-P-CCNC: 25 U/L (ref 7–52)
ANION GAP SERPL CALCULATED.3IONS-SCNC: 5 MMOL/L
AST SERPL W P-5'-P-CCNC: 18 U/L (ref 13–39)
BASOPHILS # BLD AUTO: 0.09 THOUSANDS/ÂΜL (ref 0–0.1)
BASOPHILS NFR BLD AUTO: 1 % (ref 0–1)
BILIRUB SERPL-MCNC: 0.25 MG/DL (ref 0.2–1)
BUN SERPL-MCNC: 33 MG/DL (ref 5–25)
C3 SERPL-MCNC: 102 MG/DL (ref 90–180)
C4 SERPL-MCNC: 25 MG/DL (ref 10–40)
CALCIUM ALBUM COR SERPL-MCNC: 9.4 MG/DL (ref 8.3–10.1)
CALCIUM SERPL-MCNC: 8.6 MG/DL (ref 8.4–10.2)
CHLORIDE SERPL-SCNC: 97 MMOL/L (ref 96–108)
CK SERPL-CCNC: 45 U/L (ref 39–308)
CO2 SERPL-SCNC: 33 MMOL/L (ref 21–32)
CREAT SERPL-MCNC: 1.33 MG/DL (ref 0.6–1.3)
CRP SERPL QL: 9.9 MG/L
EOSINOPHIL # BLD AUTO: 1.41 THOUSAND/ÂΜL (ref 0–0.61)
EOSINOPHIL NFR BLD AUTO: 14 % (ref 0–6)
ERYTHROCYTE [DISTWIDTH] IN BLOOD BY AUTOMATED COUNT: 15.2 % (ref 11.6–15.1)
ERYTHROCYTE [DISTWIDTH] IN BLOOD BY AUTOMATED COUNT: 15.2 % (ref 11.6–15.1)
ERYTHROCYTE [SEDIMENTATION RATE] IN BLOOD: 43 MM/HOUR (ref 0–19)
GFR SERPL CREATININE-BSD FRML MDRD: 51 ML/MIN/1.73SQ M
GLUCOSE SERPL-MCNC: 69 MG/DL (ref 65–140)
HCT VFR BLD AUTO: 32.6 % (ref 36.5–49.3)
HCT VFR BLD AUTO: 32.6 % (ref 36.5–49.3)
HGB BLD-MCNC: 10.4 G/DL (ref 12–17)
HGB BLD-MCNC: 10.4 G/DL (ref 12–17)
IMM GRANULOCYTES # BLD AUTO: 0.04 THOUSAND/UL (ref 0–0.2)
IMM GRANULOCYTES NFR BLD AUTO: 0 % (ref 0–2)
LYMPHOCYTES # BLD AUTO: 1.25 THOUSANDS/ÂΜL (ref 0.6–4.47)
LYMPHOCYTES NFR BLD AUTO: 13 % (ref 14–44)
MCH RBC QN AUTO: 29.1 PG (ref 26.8–34.3)
MCH RBC QN AUTO: 29.1 PG (ref 26.8–34.3)
MCHC RBC AUTO-ENTMCNC: 31.9 G/DL (ref 31.4–37.4)
MCHC RBC AUTO-ENTMCNC: 31.9 G/DL (ref 31.4–37.4)
MCV RBC AUTO: 91 FL (ref 82–98)
MCV RBC AUTO: 91 FL (ref 82–98)
MONOCYTES # BLD AUTO: 1.06 THOUSAND/ÂΜL (ref 0.17–1.22)
MONOCYTES NFR BLD AUTO: 11 % (ref 4–12)
NEUTROPHILS # BLD AUTO: 5.98 THOUSANDS/ÂΜL (ref 1.85–7.62)
NEUTS SEG NFR BLD AUTO: 61 % (ref 43–75)
NRBC BLD AUTO-RTO: 0 /100 WBCS
PLATELET # BLD AUTO: 282 THOUSANDS/UL (ref 149–390)
PLATELET # BLD AUTO: 282 THOUSANDS/UL (ref 149–390)
PMV BLD AUTO: 9.3 FL (ref 8.9–12.7)
PMV BLD AUTO: 9.3 FL (ref 8.9–12.7)
POTASSIUM SERPL-SCNC: 3.7 MMOL/L (ref 3.5–5.3)
PROT SERPL-MCNC: 6 G/DL (ref 6.4–8.4)
RBC # BLD AUTO: 3.57 MILLION/UL (ref 3.88–5.62)
RBC # BLD AUTO: 3.57 MILLION/UL (ref 3.88–5.62)
SODIUM SERPL-SCNC: 135 MMOL/L (ref 135–147)
WBC # BLD AUTO: 9.83 THOUSAND/UL (ref 4.31–10.16)
WBC # BLD AUTO: 9.83 THOUSAND/UL (ref 4.31–10.16)

## 2023-06-26 PROCEDURE — 82550 ASSAY OF CK (CPK): CPT | Performed by: INTERNAL MEDICINE

## 2023-06-26 PROCEDURE — 80053 COMPREHEN METABOLIC PANEL: CPT | Performed by: INTERNAL MEDICINE

## 2023-06-26 PROCEDURE — 85025 COMPLETE CBC W/AUTO DIFF WBC: CPT | Performed by: INTERNAL MEDICINE

## 2023-06-26 PROCEDURE — 86140 C-REACTIVE PROTEIN: CPT | Performed by: INTERNAL MEDICINE

## 2023-06-26 PROCEDURE — 85652 RBC SED RATE AUTOMATED: CPT | Performed by: INTERNAL MEDICINE

## 2023-06-26 PROCEDURE — 86160 COMPLEMENT ANTIGEN: CPT | Performed by: NURSE PRACTITIONER

## 2023-06-26 RX ORDER — SODIUM CHLORIDE 9 MG/ML
20 INJECTION, SOLUTION INTRAVENOUS ONCE
Status: CANCELLED | OUTPATIENT
Start: 2023-06-27

## 2023-06-26 RX ORDER — SODIUM CHLORIDE 9 MG/ML
20 INJECTION, SOLUTION INTRAVENOUS ONCE
Status: COMPLETED | OUTPATIENT
Start: 2023-06-26 | End: 2023-06-26

## 2023-06-26 RX ADMIN — SODIUM CHLORIDE 20 ML/HR: 0.9 INJECTION, SOLUTION INTRAVENOUS at 10:23

## 2023-06-26 RX ADMIN — DAPTOMYCIN 500 MG: 500 INJECTION, POWDER, LYOPHILIZED, FOR SOLUTION INTRAVENOUS at 10:23

## 2023-06-27 ENCOUNTER — TELEMEDICINE (OUTPATIENT)
Dept: NEUROSURGERY | Facility: CLINIC | Age: 78
End: 2023-06-27
Payer: COMMERCIAL

## 2023-06-27 ENCOUNTER — TELEPHONE (OUTPATIENT)
Dept: INFECTIOUS DISEASES | Facility: CLINIC | Age: 78
End: 2023-06-27

## 2023-06-27 ENCOUNTER — HOSPITAL ENCOUNTER (OUTPATIENT)
Dept: INFUSION CENTER | Facility: HOSPITAL | Age: 78
Discharge: HOME | End: 2023-06-27
Attending: INTERNAL MEDICINE
Payer: COMMERCIAL

## 2023-06-27 VITALS
RESPIRATION RATE: 18 BRPM | TEMPERATURE: 97.9 F | OXYGEN SATURATION: 98 % | DIASTOLIC BLOOD PRESSURE: 85 MMHG | SYSTOLIC BLOOD PRESSURE: 125 MMHG | HEART RATE: 85 BPM

## 2023-06-27 DIAGNOSIS — R78.81 BACTEREMIA DUE TO STREPTOCOCCUS: ICD-10-CM

## 2023-06-27 DIAGNOSIS — B95.5 BACTEREMIA DUE TO STREPTOCOCCUS: ICD-10-CM

## 2023-06-27 DIAGNOSIS — Z87.39 HISTORY OF OSTEOMYELITIS: ICD-10-CM

## 2023-06-27 DIAGNOSIS — M46.46 DISCITIS OF LUMBAR REGION: Primary | ICD-10-CM

## 2023-06-27 DIAGNOSIS — M46.40 DISCITIS: ICD-10-CM

## 2023-06-27 PROCEDURE — 99213 OFFICE O/P EST LOW 20 MIN: CPT | Performed by: PHYSICIAN ASSISTANT

## 2023-06-27 RX ORDER — SODIUM CHLORIDE 9 MG/ML
20 INJECTION, SOLUTION INTRAVENOUS ONCE
Status: COMPLETED | OUTPATIENT
Start: 2023-06-27 | End: 2023-06-27

## 2023-06-27 RX ORDER — SODIUM CHLORIDE 9 MG/ML
20 INJECTION, SOLUTION INTRAVENOUS ONCE
Status: CANCELLED | OUTPATIENT
Start: 2023-06-28

## 2023-06-27 RX ADMIN — DAPTOMYCIN 500 MG: 500 INJECTION, POWDER, LYOPHILIZED, FOR SOLUTION INTRAVENOUS at 10:07

## 2023-06-27 RX ADMIN — SODIUM CHLORIDE 20 ML/HR: 0.9 INJECTION, SOLUTION INTRAVENOUS at 10:07

## 2023-06-27 NOTE — PROGRESS NOTES
Virtual Regular Visit    Verification of patient location:    Patient is located at Home in the following state in which I hold an active license [place]      Assessment:    Patient is a 69years old pleasant gentleman here today for 5 weeks virtual video visit for his L1-L2 discitis in the setting of bacteremia with positive blood culture for gram-positive cocci in chains  Patient is on antibiotics through PICC line, following up with ID  Is not wearing brace   Reports his improvement with his back pain, rated pain at 3/10, no radicular symptoms, bowel/bladder issues  No no fever, chills, or rigors  He had upright lumbar spine x-rays which demonstrate decreased disc space at at L1-2  ESR 43 ( not that much change from previous value), CRP 9 9, previously 31 7  Recent WBC 9 83  Repeat blood culture was negative about 5 weeks ago  Exam-patient was able to stand up and walk, slightly hunched over back otherwise stable gait  Some Exam limited    Hx, PEx, and images reviewed with the patient and his wife  Management plan discussed  I would recommend follow-up upright lumbar spine x-rays in 4 weeks  Continue antibiotics and follow-up with ID  Fall precaution, avoid lifting heavy objects, excessive bending or twisting  All questions and concerns were answered to patient satisfaction  Patient expresses understanding's and agreed with the plan  Plan:  1  Upright lumbar spine x-rays in 4 weeks  2  Continue with antibiotics and follow-up with ID  3  For precaution, avoid lifting heavy objects, excessive bending or twisting  4  Follow-up in 4 weeks  5   Call with question or concern    Problem List Items Addressed This Visit        Other    History of osteomyelitis   Other Visit Diagnoses     Discitis                   Reason for visit is   Chief Complaint   Patient presents with   • Virtual Regular Visit        Encounter provider Dolores Perez PA-C    Provider located at [place] [address] "[facility] [address]  [address]  [place] [id number]      Recent Visits  No visits were found meeting these conditions  Showing recent visits within past 7 days and meeting all other requirements  Today's Visits  Date Type Provider Dept   06/27/23 Telemedicine Dolores Perez PA-C [facility]   Showing today's visits and meeting all other requirements  Future Appointments  No visits were found meeting these conditions  Showing future appointments within next 150 days and meeting all other requirements       The patient was identified by name and date of birth  Jayy Cazares was informed that this is a telemedicine visit and that the visit is being conducted through the [address] [employer] platform  He agrees to proceed     My office door was closed  No one else was in the room  He acknowledged consent and understanding of privacy and security of the video platform  The patient has agreed to participate and understands they can discontinue the visit at any time  Patient is aware this is a billable service  Subjective    C/C: \" Here for 5 weeks follow-up for his L1-2 discitis\"    HPI     All patient's medical histories were reviewed and updated as appropriate: Allergies, current medication list, past medical history, past surgical history, family history, social history, and current medical lists    Patient is a 71years old gentleman with history of discitis of L1-2 in the setting of bacteremia, positive for gram-positive cocci in chains about 5 weeks ago  He is on antibiotics through PICC line and following up with ID  Patient reports his improvement with his back pain  Denies any radicular symptoms, weakness, numbness or paresthesia of the lower extremities  Slight gait issues otherwise walks without a walker or cane  No bowel or bladder dysfunction or saddle anesthesia  Denies any fever, chills, rigors, cough or chest pain      Past Medical History: " Diagnosis Date   • CHF (congestive heart failure) (HCC)    • Discitis    • Endocarditis        Past Surgical History:   Procedure Laterality Date   • SKIN BIOPSY         Current Outpatient Medications   Medication Sig Dispense Refill   • potassium chloride (K-DUR,KLOR-CON) 20 mEq tablet Take 1 tablet (20 mEq total) by mouth 2 (two) times a day 60 tablet 2   • spironolactone (ALDACTONE) 25 mg tablet Take 1 tablet (25 mg total) by mouth daily Do not start before June 7, 2023  30 tablet 0   • torsemide (DEMADEX) 20 mg tablet Take 2 tablets (40 mg total) by mouth 2 (two) times a day Take your next dose of 2 tablets (40 mg total) today (6/6) at 4 p m  120 tablet 0     No current facility-administered medications for this visit  Facility-Administered Medications Ordered in Other Visits   Medication Dose Route Frequency Provider Last Rate Last Admin   • alteplase (CATHFLO) injection 2 mg  2 mg Intracatheter Q1MIN PRN Merlene Cook MD       • alteplase (CATHFLO) injection 2 mg  2 mg Intracatheter Q1MIN PRN Merlene Cook MD       • alteplase (CATHFLO) injection 2 mg  2 mg Intracatheter Q1MIN PRN Merlene Cook MD            No Known Allergies    Review of Systems   Respiratory: Positive for cough and shortness of breath  Negative for chest tightness  Cardiovascular: Positive for chest pain  Genitourinary:        Due to water pills    Musculoskeletal: Positive for back pain (pain is better can flare up depending on position ) and gait problem (walking slow)  1MO HFU W/ XRAY LSPINE DONE 6/22/23      pain is better can flare up depending on position- especially when laying on back for too long   B/l feet tingling on/off    Neurological: Positive for numbness (more B/l feet tingling on/off )  All other systems reviewed and are negative  Video Exam    There were no vitals filed for this visit      Physical Exam     Visit Time  Total Visit Duration:  A total of 20  minutes was spent discussing the presentation, physical exam and formulating a management plan and coordination of care

## 2023-06-27 NOTE — TELEPHONE ENCOUNTER
Pt calls office today insisting he speak to  Dr Vera or Shari   He has questions and would like a call back     Cb:[phone]

## 2023-06-28 ENCOUNTER — TELEPHONE (OUTPATIENT)
Dept: INFECTIOUS DISEASES | Facility: CLINIC | Age: 78
End: 2023-06-28

## 2023-06-28 ENCOUNTER — HOSPITAL ENCOUNTER (OUTPATIENT)
Dept: INFUSION CENTER | Facility: HOSPITAL | Age: 78
Discharge: HOME | End: 2023-06-28
Attending: INTERNAL MEDICINE
Payer: COMMERCIAL

## 2023-06-28 VITALS
SYSTOLIC BLOOD PRESSURE: 123 MMHG | RESPIRATION RATE: 18 BRPM | TEMPERATURE: 97.4 F | OXYGEN SATURATION: 98 % | HEART RATE: 85 BPM | DIASTOLIC BLOOD PRESSURE: 55 MMHG

## 2023-06-28 DIAGNOSIS — B95.5 BACTEREMIA DUE TO STREPTOCOCCUS: ICD-10-CM

## 2023-06-28 DIAGNOSIS — R78.81 BACTEREMIA DUE TO STREPTOCOCCUS: ICD-10-CM

## 2023-06-28 DIAGNOSIS — M46.46 DISCITIS OF LUMBAR REGION: Primary | ICD-10-CM

## 2023-06-28 RX ORDER — SODIUM CHLORIDE 9 MG/ML
20 INJECTION, SOLUTION INTRAVENOUS ONCE
Status: CANCELLED | OUTPATIENT
Start: 2023-06-29

## 2023-06-28 RX ORDER — SODIUM CHLORIDE 9 MG/ML
20 INJECTION, SOLUTION INTRAVENOUS ONCE
Status: COMPLETED | OUTPATIENT
Start: 2023-06-28 | End: 2023-06-28

## 2023-06-28 RX ADMIN — DAPTOMYCIN 500 MG: 500 INJECTION, POWDER, LYOPHILIZED, FOR SOLUTION INTRAVENOUS at 10:17

## 2023-06-28 RX ADMIN — SODIUM CHLORIDE 20 ML/HR: 0.9 INJECTION, SOLUTION INTRAVENOUS at 10:17

## 2023-06-28 NOTE — TELEPHONE ENCOUNTER
When calling central scheduling I spoke with Elena and informed her we were wanting to get pt's original appt of 7/3 @ 8 am back  She informs me that that appt was taken and the soonest after that would be 7/5 8 am @ [facility]       Pt is now scheduled for 7/5 @ 8 am

## 2023-06-28 NOTE — TELEPHONE ENCOUNTER
Contacted pt to inform him that we r/s his MRI for a location closer to his home on 7/10 @ 4:45  Pt was upset due to him not wanting to get MRI done on or prior to 7/3  informed pt I would try my best to get his original appt back

## 2023-06-28 NOTE — TELEPHONE ENCOUNTER
Contacted pt's insurance for prior auth and spoke with Priyank Mckee she was able to get prior auth approved       Garrett Kramer #[id number]  Ref # [id number]

## 2023-06-28 NOTE — TELEPHONE ENCOUNTER
Contacted pt's insurance to change location NPI  Spoke with Jana Zapien and he has successfully changed location NPI of MRI to [facility]       Authorization and reference numbers remain the same

## 2023-06-28 NOTE — TELEPHONE ENCOUNTER
Contacted central scheduling and spoke with Larissa Steven  Informed her that pt is scheduled for 7/3 at [facility] and this location is a bit far for pt and we were hoping we can get him an appt closer to his home ([place] or [place])  She explains they the soonest would be in [place] 7/10  MRI is now scheduled for 7/10 @ 4:45

## 2023-06-29 ENCOUNTER — HOSPITAL ENCOUNTER (OUTPATIENT)
Dept: INFUSION CENTER | Facility: HOSPITAL | Age: 78
Discharge: HOME | End: 2023-06-29
Attending: INTERNAL MEDICINE
Payer: COMMERCIAL

## 2023-06-29 VITALS
TEMPERATURE: 98 F | RESPIRATION RATE: 18 BRPM | HEART RATE: 82 BPM | OXYGEN SATURATION: 98 % | SYSTOLIC BLOOD PRESSURE: 136 MMHG | DIASTOLIC BLOOD PRESSURE: 63 MMHG

## 2023-06-29 DIAGNOSIS — M46.46 DISCITIS OF LUMBAR REGION: Primary | ICD-10-CM

## 2023-06-29 DIAGNOSIS — R78.81 BACTEREMIA DUE TO STREPTOCOCCUS: ICD-10-CM

## 2023-06-29 DIAGNOSIS — B95.5 BACTEREMIA DUE TO STREPTOCOCCUS: ICD-10-CM

## 2023-06-29 PROCEDURE — 96365 THER/PROPH/DIAG IV INF INIT: CPT

## 2023-06-29 RX ORDER — SODIUM CHLORIDE 9 MG/ML
20 INJECTION, SOLUTION INTRAVENOUS ONCE
Status: COMPLETED | OUTPATIENT
Start: 2023-06-29 | End: 2023-06-29

## 2023-06-29 RX ORDER — SODIUM CHLORIDE 9 MG/ML
20 INJECTION, SOLUTION INTRAVENOUS ONCE
Status: CANCELLED | OUTPATIENT
Start: 2023-06-30

## 2023-06-29 RX ADMIN — DAPTOMYCIN 500 MG: 500 INJECTION, POWDER, LYOPHILIZED, FOR SOLUTION INTRAVENOUS at 10:35

## 2023-06-29 RX ADMIN — SODIUM CHLORIDE 20 ML/HR: 0.9 INJECTION, SOLUTION INTRAVENOUS at 10:34

## 2023-06-30 ENCOUNTER — TELEMEDICINE (OUTPATIENT)
Dept: INFECTIOUS DISEASES | Facility: CLINIC | Age: 78
End: 2023-06-30
Payer: COMMERCIAL

## 2023-06-30 ENCOUNTER — HOSPITAL ENCOUNTER (OUTPATIENT)
Dept: INFUSION CENTER | Facility: HOSPITAL | Age: 78
End: 2023-06-30
Attending: INTERNAL MEDICINE
Payer: COMMERCIAL

## 2023-06-30 VITALS
TEMPERATURE: 97.5 F | SYSTOLIC BLOOD PRESSURE: 112 MMHG | DIASTOLIC BLOOD PRESSURE: 50 MMHG | RESPIRATION RATE: 18 BRPM | HEART RATE: 77 BPM | OXYGEN SATURATION: 98 %

## 2023-06-30 DIAGNOSIS — B95.5 BACTEREMIA DUE TO STREPTOCOCCUS: ICD-10-CM

## 2023-06-30 DIAGNOSIS — R78.81 BACTEREMIA DUE TO STREPTOCOCCUS: ICD-10-CM

## 2023-06-30 DIAGNOSIS — M46.46 DISCITIS OF LUMBAR REGION: Primary | ICD-10-CM

## 2023-06-30 DIAGNOSIS — Z45.2 PICC (PERIPHERALLY INSERTED CENTRAL CATHETER) IN PLACE: ICD-10-CM

## 2023-06-30 DIAGNOSIS — N17.9 ACUTE KIDNEY INJURY (HCC): ICD-10-CM

## 2023-06-30 DIAGNOSIS — I38 ENDOCARDITIS: Primary | ICD-10-CM

## 2023-06-30 DIAGNOSIS — M46.46 DISCITIS OF LUMBAR REGION: ICD-10-CM

## 2023-06-30 PROCEDURE — 96365 THER/PROPH/DIAG IV INF INIT: CPT

## 2023-06-30 RX ORDER — SODIUM CHLORIDE 9 MG/ML
20 INJECTION, SOLUTION INTRAVENOUS ONCE
Status: COMPLETED | OUTPATIENT
Start: 2023-06-30 | End: 2023-06-30

## 2023-06-30 RX ORDER — SODIUM CHLORIDE 9 MG/ML
20 INJECTION, SOLUTION INTRAVENOUS ONCE
Status: CANCELLED | OUTPATIENT
Start: 2023-06-30

## 2023-06-30 RX ORDER — AMOXICILLIN 500 MG/1
500 CAPSULE ORAL EVERY 8 HOURS SCHEDULED
Qty: 90 CAPSULE | Refills: 0 | Status: SHIPPED | OUTPATIENT
Start: 2023-06-30 | End: 2023-07-30

## 2023-06-30 RX ADMIN — DAPTOMYCIN 500 MG: 500 INJECTION, POWDER, LYOPHILIZED, FOR SOLUTION INTRAVENOUS at 09:35

## 2023-06-30 RX ADMIN — SODIUM CHLORIDE 20 ML/HR: 0.9 INJECTION, SOLUTION INTRAVENOUS at 09:35

## 2023-07-03 ENCOUNTER — HOSPITAL ENCOUNTER (OUTPATIENT)
Dept: RADIOLOGY | Facility: HOSPITAL | Age: 78
Discharge: HOME | End: 2023-07-03
Attending: INTERNAL MEDICINE
Payer: COMMERCIAL

## 2023-07-03 ENCOUNTER — TELEPHONE (OUTPATIENT)
Dept: INFECTIOUS DISEASES | Facility: CLINIC | Age: 78
End: 2023-07-03

## 2023-07-03 DIAGNOSIS — M46.46 DISCITIS OF LUMBAR REGION: ICD-10-CM

## 2023-07-03 PROCEDURE — G1004 CDSM NDSC: HCPCS

## 2023-07-03 PROCEDURE — A9585 GADOBUTROL INJECTION: HCPCS | Performed by: INTERNAL MEDICINE

## 2023-07-03 PROCEDURE — 72158 MRI LUMBAR SPINE W/O & W/DYE: CPT

## 2023-07-03 RX ADMIN — GADOBUTROL 6 ML: 604.72 INJECTION INTRAVENOUS at 18:06

## 2023-07-03 NOTE — TELEPHONE ENCOUNTER
Called pt to get him scheduled for 1 month follow up. lmom informing pt to call office back at his earliest convenience.

## 2023-07-06 ENCOUNTER — TELEPHONE (OUTPATIENT)
Dept: INFECTIOUS DISEASES | Facility: CLINIC | Age: 78
End: 2023-07-06

## 2023-07-06 NOTE — TELEPHONE ENCOUNTER
Called and lmom for Iesha today. Informed Iesha to call the office back. Iesha calls the office back. Informed Iesha patient MRI was read and Dr. Dolly Castelan did review. Per Dr. Dolly Castelna patients MRI is better. Per Dr. Dolly Castelan patient should have labs done prior to follow up appointment. Per Dr. Dolly Castelan labs, MRI and antibiotic plan will be gone over at follow up appointment. Cee Patterson verbalizes understanding at this time.

## 2023-07-06 NOTE — TELEPHONE ENCOUNTER
Patients wife Tamera Davis calls the office today regarding MRI. Tamera Davis states patient had his MRI done on 7/3 and she was calling to see if the results were back yet. Informed Iesha there are no results back on the [facility] at this time. Also informed Tamera Ryan patient needs to be scheduled for his 1 month follow up. Patient is scheduled 7/26/23 at 2:30PM with Tamika Anton  at the [facility]. Also reminded Tamera Davis to have patient get labs done prior to appointment. Tamera Davis verbalizes understanding at this time. Called [facility] and spoke with [facility]. Informed Mayra patient had an MRI done that needs to be read. [facility] states she will send it to be read.

## 2023-07-07 ENCOUNTER — APPOINTMENT (EMERGENCY)
Dept: CT IMAGING | Facility: HOSPITAL | Age: 78
End: 2023-07-07
Payer: COMMERCIAL

## 2023-07-07 ENCOUNTER — APPOINTMENT (OUTPATIENT)
Dept: RADIOLOGY | Facility: HOSPITAL | Age: 78
End: 2023-07-07
Payer: COMMERCIAL

## 2023-07-07 ENCOUNTER — APPOINTMENT (EMERGENCY)
Dept: RADIOLOGY | Facility: HOSPITAL | Age: 78
End: 2023-07-07
Payer: COMMERCIAL

## 2023-07-07 ENCOUNTER — HOSPITAL ENCOUNTER (OUTPATIENT)
Facility: HOSPITAL | Age: 78
Setting detail: OBSERVATION
Discharge: HOME | End: 2023-07-08
Attending: EMERGENCY MEDICINE | Admitting: INTERNAL MEDICINE
Payer: COMMERCIAL

## 2023-07-07 DIAGNOSIS — D64.9 ANEMIA: ICD-10-CM

## 2023-07-07 DIAGNOSIS — R55 SYNCOPE: Primary | ICD-10-CM

## 2023-07-07 DIAGNOSIS — R07.89 LEFT-SIDED CHEST WALL PAIN: ICD-10-CM

## 2023-07-07 DIAGNOSIS — I50.9 CONGESTIVE HEART FAILURE, UNSPECIFIED HF CHRONICITY, UNSPECIFIED HEART FAILURE TYPE (HCC): ICD-10-CM

## 2023-07-07 DIAGNOSIS — R77.8 ELEVATED TROPONIN: ICD-10-CM

## 2023-07-07 DIAGNOSIS — R07.81 RIB PAIN: ICD-10-CM

## 2023-07-07 LAB
2HR DELTA HS TROPONIN: 58 NG/L
4HR DELTA HS TROPONIN: 78 NG/L
ALBUMIN SERPL BCP-MCNC: 3.2 G/DL (ref 3.5–5)
ALP SERPL-CCNC: 58 U/L (ref 34–104)
ALT SERPL W P-5'-P-CCNC: 18 U/L (ref 7–52)
ANION GAP SERPL CALCULATED.3IONS-SCNC: 11 MMOL/L
AST SERPL W P-5'-P-CCNC: 15 U/L (ref 13–39)
ATRIAL RATE: 74 BPM
ATRIAL RATE: 90 BPM
BASOPHILS # BLD AUTO: 0.05 THOUSANDS/ÂΜL (ref 0–0.1)
BASOPHILS NFR BLD AUTO: 1 % (ref 0–1)
BILIRUB SERPL-MCNC: 0.34 MG/DL (ref 0.2–1)
BUN SERPL-MCNC: 26 MG/DL (ref 5–25)
CALCIUM ALBUM COR SERPL-MCNC: 9.4 MG/DL (ref 8.3–10.1)
CALCIUM SERPL-MCNC: 8.8 MG/DL (ref 8.4–10.2)
CARDIAC TROPONIN I PNL SERPL HS: 103 NG/L
CARDIAC TROPONIN I PNL SERPL HS: 123 NG/L
CARDIAC TROPONIN I PNL SERPL HS: 127 NG/L (ref 8–18)
CARDIAC TROPONIN I PNL SERPL HS: 45 NG/L
CHLORIDE SERPL-SCNC: 97 MMOL/L (ref 96–108)
CK SERPL-CCNC: 261 U/L (ref 39–308)
CO2 SERPL-SCNC: 27 MMOL/L (ref 21–32)
CREAT SERPL-MCNC: 1.2 MG/DL (ref 0.6–1.3)
EOSINOPHIL # BLD AUTO: 0.12 THOUSAND/ÂΜL (ref 0–0.61)
EOSINOPHIL NFR BLD AUTO: 2 % (ref 0–6)
ERYTHROCYTE [DISTWIDTH] IN BLOOD BY AUTOMATED COUNT: 15.6 % (ref 11.6–15.1)
GFR SERPL CREATININE-BSD FRML MDRD: 57 ML/MIN/1.73SQ M
GLUCOSE SERPL-MCNC: 183 MG/DL (ref 65–140)
HCT VFR BLD AUTO: 34.6 % (ref 36.5–49.3)
HGB BLD-MCNC: 10.9 G/DL (ref 12–17)
IMM GRANULOCYTES # BLD AUTO: 0.08 THOUSAND/UL (ref 0–0.2)
IMM GRANULOCYTES NFR BLD AUTO: 1 % (ref 0–2)
LYMPHOCYTES # BLD AUTO: 1.03 THOUSANDS/ÂΜL (ref 0.6–4.47)
LYMPHOCYTES NFR BLD AUTO: 16 % (ref 14–44)
MAGNESIUM SERPL-MCNC: 2.4 MG/DL (ref 1.9–2.7)
MCH RBC QN AUTO: 28.8 PG (ref 26.8–34.3)
MCHC RBC AUTO-ENTMCNC: 31.5 G/DL (ref 31.4–37.4)
MCV RBC AUTO: 92 FL (ref 82–98)
MONOCYTES # BLD AUTO: 0.41 THOUSAND/ÂΜL (ref 0.17–1.22)
MONOCYTES NFR BLD AUTO: 7 % (ref 4–12)
NEUTROPHILS # BLD AUTO: 4.66 THOUSANDS/ÂΜL (ref 1.85–7.62)
NEUTS SEG NFR BLD AUTO: 73 % (ref 43–75)
NRBC BLD AUTO-RTO: 0 /100 WBCS
P AXIS: 66 DEGREES
P AXIS: 74 DEGREES
PLATELET # BLD AUTO: 322 THOUSANDS/UL (ref 149–390)
PLATELET # BLD AUTO: 343 THOUSANDS/UL (ref 149–390)
PMV BLD AUTO: 9.2 FL (ref 8.9–12.7)
PMV BLD AUTO: 9.5 FL (ref 8.9–12.7)
POTASSIUM SERPL-SCNC: 3.6 MMOL/L (ref 3.5–5.3)
PR INTERVAL: 158 MS
PR INTERVAL: 162 MS
PROT SERPL-MCNC: 6.4 G/DL (ref 6.4–8.4)
QRS AXIS: 20 DEGREES
QRS AXIS: 34 DEGREES
QRSD INTERVAL: 104 MS
QRSD INTERVAL: 96 MS
QT INTERVAL: 384 MS
QT INTERVAL: 394 MS
QTC INTERVAL: 437 MS
QTC INTERVAL: 469 MS
RBC # BLD AUTO: 3.78 MILLION/UL (ref 3.88–5.62)
SODIUM SERPL-SCNC: 135 MMOL/L (ref 135–147)
T WAVE AXIS: 115 DEGREES
T WAVE AXIS: 74 DEGREES
VENTRICULAR RATE: 74 BPM
VENTRICULAR RATE: 90 BPM
WBC # BLD AUTO: 6.35 THOUSAND/UL (ref 4.31–10.16)

## 2023-07-07 PROCEDURE — 80053 COMPREHEN METABOLIC PANEL: CPT

## 2023-07-07 PROCEDURE — 85049 AUTOMATED PLATELET COUNT: CPT | Performed by: INTERNAL MEDICINE

## 2023-07-07 PROCEDURE — 82550 ASSAY OF CK (CPK): CPT | Performed by: INTERNAL MEDICINE

## 2023-07-07 PROCEDURE — 99223 1ST HOSP IP/OBS HIGH 75: CPT | Performed by: INTERNAL MEDICINE

## 2023-07-07 PROCEDURE — 70450 CT HEAD/BRAIN W/O DYE: CPT

## 2023-07-07 PROCEDURE — 71101 X-RAY EXAM UNILAT RIBS/CHEST: CPT

## 2023-07-07 PROCEDURE — 84484 ASSAY OF TROPONIN QUANT: CPT

## 2023-07-07 PROCEDURE — 85025 COMPLETE CBC W/AUTO DIFF WBC: CPT

## 2023-07-07 PROCEDURE — 83735 ASSAY OF MAGNESIUM: CPT | Performed by: INTERNAL MEDICINE

## 2023-07-07 PROCEDURE — 99285 EMERGENCY DEPT VISIT HI MDM: CPT | Performed by: EMERGENCY MEDICINE

## 2023-07-07 PROCEDURE — 93005 ELECTROCARDIOGRAM TRACING: CPT

## 2023-07-07 PROCEDURE — 70486 CT MAXILLOFACIAL W/O DYE: CPT

## 2023-07-07 PROCEDURE — 84484 ASSAY OF TROPONIN QUANT: CPT | Performed by: INTERNAL MEDICINE

## 2023-07-07 PROCEDURE — 36415 COLL VENOUS BLD VENIPUNCTURE: CPT

## 2023-07-07 PROCEDURE — 72125 CT NECK SPINE W/O DYE: CPT

## 2023-07-07 PROCEDURE — 71045 X-RAY EXAM CHEST 1 VIEW: CPT

## 2023-07-07 PROCEDURE — G1004 CDSM NDSC: HCPCS

## 2023-07-07 PROCEDURE — 93010 ELECTROCARDIOGRAM REPORT: CPT | Performed by: INTERNAL MEDICINE

## 2023-07-07 PROCEDURE — 99285 EMERGENCY DEPT VISIT HI MDM: CPT

## 2023-07-07 RX ORDER — ACETAMINOPHEN 325 MG/1
650 TABLET ORAL EVERY 6 HOURS PRN
Status: DISCONTINUED | OUTPATIENT
Start: 2023-07-07 | End: 2023-07-07

## 2023-07-07 RX ORDER — AMOXICILLIN 250 MG/1
500 CAPSULE ORAL EVERY 8 HOURS SCHEDULED
Status: DISCONTINUED | OUTPATIENT
Start: 2023-07-07 | End: 2023-07-08 | Stop reason: HOSPADM

## 2023-07-07 RX ORDER — LIDOCAINE 50 MG/G
1 PATCH TOPICAL EVERY 24 HOURS
Status: DISCONTINUED | OUTPATIENT
Start: 2023-07-07 | End: 2023-07-08 | Stop reason: HOSPADM

## 2023-07-07 RX ORDER — ACETAMINOPHEN 325 MG/1
975 TABLET ORAL EVERY 8 HOURS SCHEDULED
Status: DISCONTINUED | OUTPATIENT
Start: 2023-07-07 | End: 2023-07-08 | Stop reason: HOSPADM

## 2023-07-07 RX ORDER — AMOXICILLIN 250 MG/1
500 CAPSULE ORAL ONCE
Status: COMPLETED | OUTPATIENT
Start: 2023-07-07 | End: 2023-07-07

## 2023-07-07 RX ORDER — SODIUM CHLORIDE 9 MG/ML
50 INJECTION, SOLUTION INTRAVENOUS ONCE
Status: COMPLETED | OUTPATIENT
Start: 2023-07-07 | End: 2023-07-07

## 2023-07-07 RX ORDER — ACETAMINOPHEN 325 MG/1
650 TABLET ORAL EVERY 6 HOURS PRN
Status: DISCONTINUED | OUTPATIENT
Start: 2023-07-07 | End: 2023-07-08 | Stop reason: HOSPADM

## 2023-07-07 RX ORDER — ENOXAPARIN SODIUM 100 MG/ML
40 INJECTION SUBCUTANEOUS DAILY
Status: DISCONTINUED | OUTPATIENT
Start: 2023-07-07 | End: 2023-07-08 | Stop reason: HOSPADM

## 2023-07-07 RX ORDER — ACETAMINOPHEN 325 MG/1
650 TABLET ORAL ONCE
Status: COMPLETED | OUTPATIENT
Start: 2023-07-07 | End: 2023-07-07

## 2023-07-07 RX ORDER — POTASSIUM CHLORIDE 20 MEQ/1
40 TABLET, EXTENDED RELEASE ORAL ONCE
Status: COMPLETED | OUTPATIENT
Start: 2023-07-07 | End: 2023-07-07

## 2023-07-07 RX ADMIN — ACETAMINOPHEN 975 MG: 325 TABLET, FILM COATED ORAL at 21:14

## 2023-07-07 RX ADMIN — AMOXICILLIN 500 MG: 250 CAPSULE ORAL at 16:02

## 2023-07-07 RX ADMIN — ACETAMINOPHEN 975 MG: 325 TABLET, FILM COATED ORAL at 15:45

## 2023-07-07 RX ADMIN — AMOXICILLIN 500 MG: 250 CAPSULE ORAL at 21:14

## 2023-07-07 RX ADMIN — AMOXICILLIN 500 MG: 250 CAPSULE ORAL at 08:57

## 2023-07-07 RX ADMIN — SODIUM CHLORIDE 50 ML/HR: 0.9 INJECTION, SOLUTION INTRAVENOUS at 15:45

## 2023-07-07 RX ADMIN — LIDOCAINE 1 PATCH: 50 PATCH TOPICAL at 17:47

## 2023-07-07 RX ADMIN — ENOXAPARIN SODIUM 40 MG: 40 INJECTION SUBCUTANEOUS at 15:45

## 2023-07-07 RX ADMIN — ACETAMINOPHEN 650 MG: 325 TABLET, FILM COATED ORAL at 08:23

## 2023-07-07 RX ADMIN — POTASSIUM CHLORIDE 40 MEQ: 1500 TABLET, EXTENDED RELEASE ORAL at 15:45

## 2023-07-07 NOTE — ASSESSMENT & PLAN NOTE
· Recetn aortic valve endocarditis, L1-L2 vertebral osteomyelitis  due to Strep bacteremia felt from dental source. Completed 6 weeks of IV Daptomycin on June 30 and now on oral Amoxycillin. · Repeat MRI LS on 7/3/2023 showed improvement  · Status post extraction of multiple teeth 7/6/23  · Appears stable. Afebrile here.  NL WBC

## 2023-07-07 NOTE — ASSESSMENT & PLAN NOTE
· Syncopal episode early this morning around 6am when pt was not fully awake. Jodine Fidel forward to the floor and resulted mild abrasion in the left periorbital area. LOC for about one minute  · Syncope contributed by dehydration in the setting of known moderate to severe AS. Pt had only cereal in the morning yesterday due to his dental procedure and multiple tooth extraction. · Minimal trop elevation with no chest pain. The only complaint is rib pain after his fall with reproducible tenderness   · EKG NSR/ PVCs. Will monitor on tele. Orthostatic BP. Gentle  ml x 1. Appears dry.

## 2023-07-07 NOTE — ED PROVIDER NOTES
History  Chief Complaint   Patient presents with   • Syncope     Syncopal episode this am     The patient is a 71-year-old male with a past medical history of bacteremia with positive streptococcal cultures and receiving infusions of daptomycin and aortic valve endocarditis who presents to the emergency department with complaint of syncope with fall and head strike this morning. The patient states that he awoke from bed this morning and set up at his bedside preparing to get up to go pee when he states that he lost consciousness falling over and striking the left side of his face on his nightstand. His wife is at bedside and reports that she heard him making "gurgling sounds". She states that he was completely unresponsive when she tried to speak with him. He gained consciousness approximately 1 minute later but would not speak initially. The patient also reports that he was incontinent but that he often has episodes of incontinence due to taking Demadex. The wife denies noticing any clonic/tonic movements or convulsions. The patient reports that he had 5 teeth pulled yesterday because infectious disease believes that his teeth were the source of his streptococcal bacteremia. The patient denies taking any anticoagulation medication or aspirin. The patient reports mild headache at this time that he rates a 4 out of 10 but denies change in vision, lightheadedness, chest pain, shortness of breath, nausea, vomiting, abdominal pain, diarrhea, hematuria, dysuria, numbness, tingling, rash or fever. Prior to Admission Medications   Prescriptions Last Dose Informant Patient Reported? Taking?    Multiple Vitamins-Minerals (PRESERVISION AREDS PO)   Yes No   Sig: Take 5 mg by mouth 2 (two) times a day Two tablets daily   amoxicillin (AMOXIL) 500 mg capsule   No No   Sig: Take 1 capsule (500 mg total) by mouth every 8 (eight) hours   potassium chloride (K-DUR,KLOR-CON) 20 mEq tablet   No No   Sig: Take 1 tablet (20 mEq total) by mouth 2 (two) times a day   spironolactone (ALDACTONE) 25 mg tablet   No No   Sig: Take 1 tablet (25 mg total) by mouth daily Do not start before 2023. torsemide (DEMADEX) 20 mg tablet   No No   Sig: Take 2 tablets (40 mg total) by mouth 2 (two) times a day Take your next dose of 2 tablets (40 mg total) today () at 4 p.m. Facility-Administered Medications: None       Past Medical History:   Diagnosis Date   • CHF (congestive heart failure) (HCC)    • Discitis    • Endocarditis        Past Surgical History:   Procedure Laterality Date   • SKIN BIOPSY         Family History   Problem Relation Age of Onset   • Colon cancer Mother    • Lung cancer Father    • Mental illness Neg Hx      I have reviewed and agree with the history as documented. E-Cigarette/Vaping   • E-Cigarette Use Never User      E-Cigarette/Vaping Substances   • Nicotine No    • THC No    • CBD No    • Flavoring No    • Other No    • Unknown No      Social History     Tobacco Use   • Smoking status: Former     Packs/day: 0.50     Years: 20.00     Total pack years: 10.00     Types: Cigarettes     Quit date: 3/11/2005     Years since quittin.3   • Smokeless tobacco: Never   Vaping Use   • Vaping Use: Never used   Substance Use Topics   • Alcohol use: Not Currently   • Drug use: Not Currently        Review of Systems   Constitutional: Positive for fatigue. Negative for chills and fever. HENT: Negative for congestion, ear pain and sore throat. Eyes: Negative for photophobia, pain and visual disturbance. Respiratory: Negative for cough, shortness of breath, wheezing and stridor. Cardiovascular: Negative for chest pain, palpitations and leg swelling. Gastrointestinal: Negative for abdominal distention, abdominal pain, constipation, diarrhea, nausea and vomiting. Endocrine: Negative. Genitourinary: Positive for frequency. Negative for dysuria and hematuria.    Musculoskeletal: Negative for arthralgias, back pain, neck pain and neck stiffness. Skin: Positive for wound. Negative for color change and rash. Abrasion with ecchymosis and edema in the periorbital region of the left side and temporal area. Allergic/Immunologic: Negative. Neurological: Positive for syncope and headaches. Negative for dizziness, seizures, weakness, light-headedness and numbness. Hematological: Negative. Psychiatric/Behavioral: Negative. All other systems reviewed and are negative. Physical Exam  ED Triage Vitals   Temperature Pulse Respirations Blood Pressure SpO2   07/07/23 0745 07/07/23 0745 07/07/23 0745 07/07/23 0745 07/07/23 0745   97.5 °F (36.4 °C) 86 18 141/65 96 %      Temp Source Heart Rate Source Patient Position - Orthostatic VS BP Location FiO2 (%)   07/07/23 0745 07/07/23 0745 07/07/23 0745 07/07/23 0745 --   Oral Monitor Lying Right arm       Pain Score       07/07/23 0812       3             Orthostatic Vital Signs  Vitals:    07/07/23 0745 07/07/23 1031   BP: 141/65 119/55   Pulse: 86 74   Patient Position - Orthostatic VS: Lying Sitting       Physical Exam  Vitals and nursing note reviewed. Constitutional:       General: He is not in acute distress. Appearance: He is well-developed and normal weight. He is ill-appearing. HENT:      Head: Normocephalic. Comments: Ecchymosis and abrasion of the temporal and periorbital region on the left side of his face. Right Ear: Tympanic membrane normal.      Left Ear: Tympanic membrane normal.      Nose: Nose normal.      Mouth/Throat:      Mouth: Mucous membranes are moist.      Pharynx: Oropharynx is clear. Comments: Coagulation tooth socket of the front lower teeth after being extracted yesterday. The wounds appear clean and without purulence or, edema or erythema. Eyes:      Extraocular Movements: Extraocular movements intact.       Conjunctiva/sclera: Conjunctivae normal.      Pupils: Pupils are equal, round, and reactive to light. Cardiovascular:      Rate and Rhythm: Normal rate and regular rhythm. Pulses: Normal pulses. Heart sounds: Normal heart sounds. No murmur heard. No friction rub. Pulmonary:      Effort: Pulmonary effort is normal. No respiratory distress. Breath sounds: Normal breath sounds. No stridor. No wheezing, rhonchi or rales. Abdominal:      General: Abdomen is flat. Bowel sounds are normal. There is no distension. Palpations: Abdomen is soft. Tenderness: There is no abdominal tenderness. There is no right CVA tenderness, left CVA tenderness, guarding or rebound. Musculoskeletal:         General: Tenderness and deformity present. No swelling. Normal range of motion. Cervical back: Normal range of motion and neck supple. No rigidity or tenderness. Right lower leg: Edema present. Left lower leg: Edema present. Lymphadenopathy:      Cervical: No cervical adenopathy. Skin:     General: Skin is warm and dry. Capillary Refill: Capillary refill takes less than 2 seconds. Coloration: Skin is not jaundiced. Findings: Bruising present. No erythema or rash. Neurological:      General: No focal deficit present. Mental Status: He is alert and oriented to person, place, and time. Mental status is at baseline. Cranial Nerves: No cranial nerve deficit. Sensory: No sensory deficit. Motor: No weakness.    Psychiatric:         Mood and Affect: Mood normal.         ED Medications  Medications   acetaminophen (TYLENOL) tablet 650 mg (650 mg Oral Given 7/7/23 0823)   amoxicillin (AMOXIL) capsule 500 mg (500 mg Oral Given 7/7/23 0857)       Diagnostic Studies  Results Reviewed     Procedure Component Value Units Date/Time    HS Troponin I 4hr [[id number]] Collected: 07/07/23 1252    Lab Status: No result Specimen: Blood from Arm, Right     HS Troponin I 2hr [[id number]]  (Abnormal) Collected: 07/07/23 1031    Lab Status: Final result Specimen: Blood from Arm, Left Updated: 07/07/23 1106     hs TnI 2hr 103 ng/L      Delta 2hr hsTnI 58 ng/L     Comprehensive metabolic panel [[id number]]  (Abnormal) Collected: 07/07/23 0812    Lab Status: Final result Specimen: Blood from Arm, Right Updated: 07/07/23 0847     Sodium 135 mmol/L      Potassium 3.6 mmol/L      Chloride 97 mmol/L      CO2 27 mmol/L      ANION GAP 11 mmol/L      BUN 26 mg/dL      Creatinine 1.20 mg/dL      Glucose 183 mg/dL      Calcium 8.8 mg/dL      Corrected Calcium 9.4 mg/dL      AST 15 U/L      ALT 18 U/L      Alkaline Phosphatase 58 U/L      Total Protein 6.4 g/dL      Albumin 3.2 g/dL      Total Bilirubin 0.34 mg/dL      eGFR 57 ml/min/1.73sq m     Narrative:      [place] guidelines for Chronic Kidney Disease (CKD):   •  Stage 1 with normal or high GFR (GFR > 90 mL/min/1.73 square meters)  •  Stage 2 Mild CKD (GFR = 60-89 mL/min/1.73 square meters)  •  Stage 3A Moderate CKD (GFR = 45-59 mL/min/1.73 square meters)  •  Stage 3B Moderate CKD (GFR = 30-44 mL/min/1.73 square meters)  •  Stage 4 Severe CKD (GFR = 15-29 mL/min/1.73 square meters)  •  Stage 5 End Stage CKD (GFR <15 mL/min/1.73 square meters)  Note: GFR calculation is accurate only with a steady state creatinine    HS Troponin 0hr (reflex protocol) [[id number]]  (Normal) Collected: 07/07/23 0812    Lab Status: Final result Specimen: Blood from Arm, Right Updated: 07/07/23 0845     hs TnI 0hr 45 ng/L     CBC and differential [[id number]]  (Abnormal) Collected: 07/07/23 0812    Lab Status: Final result Specimen: Blood from Arm, Right Updated: 07/07/23 0821     WBC 6.35 Thousand/uL      RBC 3.78 Million/uL      Hemoglobin 10.9 g/dL      Hematocrit 34.6 %      MCV 92 fL      MCH 28.8 pg      MCHC 31.5 g/dL      RDW 15.6 %      MPV 9.5 fL      Platelets 365 Thousands/uL      nRBC 0 /100 WBCs      Neutrophils Relative 73 %      Immat GRANS % 1 %      Lymphocytes Relative 16 %      Monocytes Relative 7 % Eosinophils Relative 2 %      Basophils Relative 1 %      Neutrophils Absolute 4.66 Thousands/µL      Immature Grans Absolute 0.08 Thousand/uL      Lymphocytes Absolute 1.03 Thousands/µL      Monocytes Absolute 0.41 Thousand/µL      Eosinophils Absolute 0.12 Thousand/µL      Basophils Absolute 0.05 Thousands/µL                  CT head without contrast   Final Result by Elina Navarro MD (07/07 1024)      No acute intracranial abnormality. Workstation performed: [id number]         CT spine cervical without contrast   Final Result by LESTER Caceres MD (07/07 1108)      No cervical spine fracture or traumatic malalignment. Degenerative spondylosis most pronounced at C5-C6. Workstation performed: [id number]         CT facial bones without contrast   Final Result by Elina Navarro MD (07/07 1036)      No fracture identified. Workstation performed: [id number]         XR chest 1 view portable   Final Result by Ifeanyi Aggarwal MD (07/07 2925)      Persistent bilateral infiltrates right increased and greater, left decreased. Pleural effusions increased on left.                   Workstation performed: [id number]               Procedures  ECG 12 Lead Documentation Only    Date/Time: 7/7/2023 7:45 AM    Performed by: Kelley Brooks DO  Authorized by: Kelley Brooks DO    Indications / Diagnosis:  Syncope  ECG reviewed by me, the ED Provider: yes    Patient location:  ED  Previous ECG:     Previous ECG:  Compared to current    Comparison ECG info:  Frequent PVCs now present    Similarity:  Changes noted    Comparison to cardiac monitor: No    Interpretation:     Interpretation: abnormal    Quality:     Tracing quality:  Limited by artifact  Rate:     ECG rate:  90    ECG rate assessment: normal    Rhythm:     Rhythm: sinus rhythm    Ectopy:     Ectopy: PVCs      PVCs:  Frequent  QRS:     QRS axis:  Normal    QRS intervals: Wide  Conduction:     Conduction: normal    ST segments:     ST segments:  Normal  T waves:     T waves: inverted      Inverted:  V4 and V1  Other findings:     Other findings: LAE    Comments:      Normal sinus rhythm with frequent PVCs and possible left atrial enlargement. T wave inversions in V1 and V4 that are unchanged from previous tracing. Patient denies chest pain or shortness of breath. We are assessing troponin. No evidence of acute ischemia or STEMI. ECG 12 Lead Documentation Only    Date/Time: 7/7/2023 11:24 AM    Performed by: Delgado Damon DO  Authorized by: Delgado Damon DO    Indications / Diagnosis:  Upward trending troponin  ECG reviewed by me, the ED Provider: yes    Patient location:  ED  Previous ECG:     Previous ECG:  Compared to current    Comparison ECG info:  PVCs no longer present    Similarity:  Changes noted    Comparison to cardiac monitor: No    Interpretation:     Interpretation: abnormal    Quality:     Tracing quality:  Limited by artifact  Rate:     ECG rate:  74    ECG rate assessment: normal    Rhythm:     Rhythm: sinus rhythm    Ectopy:     Ectopy: none    QRS:     QRS axis:  Normal    QRS intervals:  Normal  Conduction:     Conduction: normal    ST segments:     ST segments:  Normal  T waves:     T waves: inverted      Inverted:  V1, V4 and V5  Comments:      Normal sinus rhythm with inverted T waves in V1, V4 and V5. No evidence of acute ischemia or STEMI. Denies chest pain or shortness of breath. We are continue to trend troponin. ED Course  ED Course as of 07/07/23 1255   Fri Jul 07, 2023   6287 ECG 12 lead  Normal sinus rhythm with frequent PVCs and possible left atrial enlargement. T wave inversions in V1 and V4 that are unchanged from previous tracing. Patient denies chest pain or shortness of breath. We are assessing troponin.   No evidence of acute ischemia or STEMI.   3360 Patient is scheduled to take 500 mg of amoxicillin this morning. I will order it at this time. 3605 Comprehensive metabolic panel(!)  BUN is improving from patient baseline. Mild hyperglycemia. Remainder of CMP nonconcerning. 0904 CBC and differential(!)  Abnormal results are not patient baseline at this time. 0904 hs TnI 0hr: 45  Decreased from 478 previously. 0904 XR chest 1 view portable  IMPRESSION:     Persistent bilateral infiltrates right increased and greater, left decreased.     Pleural effusions increased on left. 1037 CT facial bones without contrast  IMPRESSION:     No fracture identified. 1038 CT head without contrast  IMPRESSION:     No acute intracranial abnormality. 1121 hs TnI 2hr(!): 103  Significantly worsened. We will recheck EKG at this time. 26 CT spine cervical without contrast  IMPRESSION:     No cervical spine fracture or traumatic malalignment. Degenerative spondylosis most pronounced at C5-C6.   1127 ECG 12 lead  Normal sinus rhythm. PVCs are no longer present when compared to previous tracing. Inverted T waves in V1, V4 and V5. Still no evidence of acute ischemia or STEMI. Patient denies chest pain or shortness of breath at this time. We are continuing to trend troponin. 1242 I spoke with Dr. Dany Goldmann of cardiology who recommended holding on heparin at this time. We appreciate their recommendations and will follow. We will look to admit the patient at this time. 1243 Heart score is 9   1252 Dr. Maeve Haque agreed to admission as inpatient at this time.              HEART Risk Score    Flowsheet Row Most Recent Value   Heart Score Risk Calculator    History 2 Filed at: 07/07/2023 1243   ECG 1 Filed at: 07/07/2023 1243   Age 2 Filed at: 07/07/2023 1243   Risk Factors 2 Filed at: 07/07/2023 1243   Troponin 2 Filed at: 07/07/2023 1243   HEART Score 9 Filed at: 07/07/2023 1243                                Medical Decision Making  The patient is a 52-year-old male with a past medical history of bacteremia with positive streptococcal cultures and receiving infusions of daptomycin and aortic valve endocarditis who presents to the emergency department with complaint of syncope with fall and head strike this morning. Upon initial presentation there was noted ecchymosis and edema with abrasions in the periorbital region around his left eye and left temporal area. There is also noted coagulation where his front lower teeth were removed yesterday. The wounds appear clean and without purulence or edema. The remainder of his physical exam was grossly unremarkable. The differential includes but is not limited to ACS, seizure, subdural hematoma, epidural hematoma or cellulitis. I have ordered a CBC, CMP, troponin, EKG, chest x-ray, CT of his head without contrast at this time. Of also ordered Tylenol for pain management. Results pending. The patient received 500 mL of IV fluid in route to the hospital by EMS. Amount and/or Complexity of Data Reviewed  Labs: ordered. Radiology: ordered. ECG/medicine tests:  Decision-making details documented in ED Course. Risk  OTC drugs. Disposition  Final diagnoses:   Syncope   Elevated troponin   Anemia     Time reflects when diagnosis was documented in both MDM as applicable and the Disposition within this note     Time User Action Codes Description Comment    7/7/2023 12:54 PM Maxine Stair Add [R55] Syncope     7/7/2023 12:54 PM Maxine Stair Add [R77.8] Elevated troponin     7/7/2023 12:54 PM Maxine Stair Add [D64.9] Anemia       ED Disposition     ED Disposition   Admit    Condition   Stable    Date/Time   Fri Jul 7, 2023 12:54 PM    Comment   Case was discussed with Dr. Melinda Cortez and the patient's admission status was agreed to be Admission Status: inpatient status to the service of Dr. Melinda Cortez . Follow-up Information    None         Patient's Medications   Discharge Prescriptions    No medications on file     No discharge procedures on file.     PDMP Review     None ED Provider  Attending physically available and evaluated Trisha Murdock. I managed the patient along with the ED Attending.     Electronically Signed by         Bella Goetz DO  07/07/23 8666

## 2023-07-07 NOTE — ED NOTES
Walked to [facility] with out difficulty, pts bed is ready.  Will go to floor     Rigo Zuñiga RN  07/07/23 2059

## 2023-07-07 NOTE — ASSESSMENT & PLAN NOTE
Known moderate to severe AS on recent Echo. Outpt card followup   Hold diuretics today for dehydration.  May resume next 24 hours

## 2023-07-07 NOTE — H&P
[address] [facility]  H&P  Name: Lawrence Burns 69 y.o. male I MRN: [id number]  Unit/Bed#: S -01 I Date of Admission: 7/7/2023   Date of Service: 7/7/2023 I Hospital Day: 1      Assessment/Plan   * Syncope  Assessment & Plan  · Syncopal episode early this morning around 6am when pt was not fully awake. Nicki Crazier forward to the floor and resulted mild abrasion in the left periorbital area. LOC for about one minute  · Syncope contributed by dehydration in the setting of known moderate to severe AS. Pt had only cereal in the morning yesterday due to his dental procedure and multiple tooth extraction. · Minimal trop elevation with no chest pain. The only complaint is rib pain after his fall with reproducible tenderness   · EKG NSR/ PVCs. Will monitor on tele. Orthostatic BP. Gentle  ml x 1. Appears dry. Left-sided chest wall pain  Assessment & Plan  Chest wall pain after wall. Reproducible tenderness. Will check rib series. Lidoderm patch     Aortic stenosis  Assessment & Plan  Known moderate to severe AS on recent Echo. Outpt card followup   Hold diuretics today for dehydration. May resume next 24 hours    Recent aortic valve endocarditis due to Strep bactermia   Assessment & Plan  · Recetn aortic valve endocarditis, L1-L2 vertebral osteomyelitis  due to Strep bacteremia felt from dental source. Completed 6 weeks of IV Daptomycin on June 30 and now on oral Amoxycillin. · Repeat MRI LS on 7/3/2023 showed improvement  · Status post extraction of multiple teeth 7/6/23  · Appears stable. Afebrile here. NL WBC         VTE Prophylaxis: Enoxaparin (Lovenox)    Code Status: Full code  POLST: POLST form is not discussed and not completed at this time. Discussion with family:     Anticipated Length of Stay:  Patient will be admitted on an Observation basis with an anticipated length of stay of  < 2 midnights.    Justification for Hospital Stay: Above    Chief Complaint:   " I fell this morning"    History of Present Illness:    Douglas Person is a 69 y.o. male with PMHx of recent AV endocarditis, lumbar vertebral osteomyelitis due to streptococcal bacteremia with suspected dental source completed 6 weeks of IV Abx last week, and AS who presents with fall and syncope. Pt had 5 teeth removed yesterday. He had only a bowl of cereal in the morning for the entire day yesterday due to the dental procedure. He did take his water pills before the surgery. Around 6 am this morning pt decided to sit up at the edge of the bed while he kept dozing off to sleep. He said he caught himself a couple of times before he fell facing forward to the floor. Wife witnessed his fall and noted LOC for about 1 minute. Pt reports pain in the left side of his chest wall since the fall, otherwise denies chest pain, palpitation, nausea, vomiting, fever or chills. He reports mild dizziness when he stood up earlier. Review of Systems:    Review of Systems   Constitutional: Negative for chills, fatigue and fever. Respiratory: Negative for cough, shortness of breath and wheezing. Cardiovascular: Negative for palpitations and leg swelling. Gastrointestinal: Negative for abdominal pain and diarrhea. Neurological: Positive for syncope. All other systems reviewed and are negative. Past Medical and Surgical History:     Past Medical History:   Diagnosis Date   • CHF (congestive heart failure) ([address])    • Discitis    • Endocarditis        Past Surgical History:   Procedure Laterality Date   • SKIN BIOPSY         Meds/Allergies:    Prior to Admission medications    Medication Sig Start Date End Date Taking?  Authorizing Provider   amoxicillin (AMOXIL) 500 mg capsule Take 1 capsule (500 mg total) by mouth every 8 (eight) hours 6/30/23 7/30/23  JESSICA Duran   Multiple Vitamins-Minerals (PRESERVISION AREDS PO) Take 5 mg by mouth 2 (two) times a day Two tablets daily    Historical Provider, MD   potassium chloride (K-DUR,KLOR-CON) 20 mEq tablet Take 1 tablet (20 mEq total) by mouth 2 (two) times a day 23   Roque Dsouza DO   spironolactone (ALDACTONE) 25 mg tablet Take 1 tablet (25 mg total) by mouth daily Do not start before 2023. 23  Elpidio Walls MD   torsemide [facility]) 20 mg tablet Take 2 tablets (40 mg total) by mouth 2 (two) times a day Take your next dose of 2 tablets (40 mg total) today () at 4 p.m. 23  Elpidio Walls MD         Allergies: No Known Allergies    Social History:     Marital Status:   Occupation:   Patient Pre-hospital Living Situation: Home  Patient Pre-hospital Level of Mobility: ambulatory    Social History     Substance and Sexual Activity   Alcohol Use Not Currently     Social History     Tobacco Use   Smoking Status Former   • Packs/day: 0.50   • Years: 20.00   • Total pack years: 10.00   • Types: Cigarettes   • Quit date: 3/11/2005   • Years since quittin.3   Smokeless Tobacco Never     Social History     Substance and Sexual Activity   Drug Use Not Currently       Family History:    Family History   Problem Relation Age of Onset   • Colon cancer Mother    • Lung cancer Father    • Mental illness Neg Hx        Physical Exam:     Vitals:   Blood Pressure: (!) 108/36 (23 1500)  Pulse: 74 (23 1500)  Temperature: 98.2 °F (36.8 °C) (23 1500)  Temp Source: Oral (23 1500)  Respirations: 16 (23 1500)  Weight - Scale: 63.2 kg (139 lb 5.3 oz) (23 0747)  SpO2: 98 % (23 1500)    Physical Exam  Constitutional:       General: He is not in acute distress. Appearance: He is not ill-appearing, toxic-appearing or diaphoretic. Eyes:      General:         Right eye: No discharge. Left eye: No discharge. Cardiovascular:      Rate and Rhythm: Normal rate and regular rhythm. Pulses: Normal pulses. Pulmonary:      Effort: Pulmonary effort is normal. No respiratory distress. Breath sounds: No wheezing. Abdominal:      General: Abdomen is flat. There is no distension. Palpations: Abdomen is soft. Tenderness: There is no abdominal tenderness. Musculoskeletal:         General: No swelling. Skin:     General: Skin is warm and dry. Neurological:      Mental Status: He is oriented to person, place, and time. Mental status is at baseline. Psychiatric:         Mood and Affect: Mood normal.         Behavior: Behavior normal.           Additional Data:     Lab Results:     Results from last 7 days   Lab Units 07/07/23  0812   WBC Thousand/uL 6.35   HEMOGLOBIN g/dL 10.9*   HEMATOCRIT % 34.6*   PLATELETS Thousands/uL 343   NEUTROS PCT % 73   LYMPHS PCT % 16   MONOS PCT % 7   EOS PCT % 2     Results from last 7 days   Lab Units 07/07/23  0812   SODIUM mmol/L 135   POTASSIUM mmol/L 3.6   CHLORIDE mmol/L 97   CO2 mmol/L 27   BUN mg/dL 26*   CREATININE mg/dL 1.20   ANION GAP mmol/L 11   CALCIUM mg/dL 8.8   ALBUMIN g/dL 3.2*   TOTAL BILIRUBIN mg/dL 0.34   ALK PHOS U/L 58   ALT U/L 18   AST U/L 15   GLUCOSE RANDOM mg/dL 183*                       Imaging:     CT head without contrast   Final Result by Belkys Moeller MD (07/07 1024)      No acute intracranial abnormality. Workstation performed: [id number]         CT spine cervical without contrast   Final Result by LESTER Jorgensen MD (07/07 1108)      No cervical spine fracture or traumatic malalignment. Degenerative spondylosis most pronounced at C5-C6. Workstation performed: [id number]         CT facial bones without contrast   Final Result by Belkys Moeller MD (07/07 1036)      No fracture identified. Workstation performed: [id number]         XR chest 1 view portable   Final Result by Vlad Giraldo MD (07/07 2150)      Persistent bilateral infiltrates right increased and greater, left decreased. Pleural effusions increased on left.                   Workstation performed: [id number] XR [id number] 2 vw left    (Results Pending)       EKG, Pathology, and Other Studies Reviewed on Admission:   · EKG: NSR/PVCs    Allscripts / Epic Records Reviewed: Yes     ** Please Note: This note has been constructed using a voice recognition system.  **

## 2023-07-07 NOTE — ED NOTES
Pts sats dropped to 88% while sleeping, placed on o2 at 3lpm nc, wife stated he was recently dx with sleep apnea.      Abner Mora RN  07/07/23 5142

## 2023-07-08 VITALS
WEIGHT: 131.39 LBS | BODY MASS INDEX: 21.21 KG/M2 | DIASTOLIC BLOOD PRESSURE: 40 MMHG | SYSTOLIC BLOOD PRESSURE: 125 MMHG | TEMPERATURE: 98.2 F | OXYGEN SATURATION: 96 % | RESPIRATION RATE: 18 BRPM | HEART RATE: 75 BPM

## 2023-07-08 LAB
ANION GAP SERPL CALCULATED.3IONS-SCNC: 7 MMOL/L
BASOPHILS # BLD AUTO: 0.06 THOUSANDS/ÂΜL (ref 0–0.1)
BASOPHILS NFR BLD AUTO: 1 % (ref 0–1)
BUN SERPL-MCNC: 21 MG/DL (ref 5–25)
CALCIUM SERPL-MCNC: 8.9 MG/DL (ref 8.4–10.2)
CHLORIDE SERPL-SCNC: 99 MMOL/L (ref 96–108)
CO2 SERPL-SCNC: 29 MMOL/L (ref 21–32)
CREAT SERPL-MCNC: 1.05 MG/DL (ref 0.6–1.3)
EOSINOPHIL # BLD AUTO: 0.3 THOUSAND/ÂΜL (ref 0–0.61)
EOSINOPHIL NFR BLD AUTO: 3 % (ref 0–6)
ERYTHROCYTE [DISTWIDTH] IN BLOOD BY AUTOMATED COUNT: 15.5 % (ref 11.6–15.1)
GFR SERPL CREATININE-BSD FRML MDRD: 68 ML/MIN/1.73SQ M
GLUCOSE P FAST SERPL-MCNC: 107 MG/DL (ref 65–99)
GLUCOSE SERPL-MCNC: 107 MG/DL (ref 65–140)
HCT VFR BLD AUTO: 33.3 % (ref 36.5–49.3)
HGB BLD-MCNC: 10.5 G/DL (ref 12–17)
IMM GRANULOCYTES # BLD AUTO: 0.05 THOUSAND/UL (ref 0–0.2)
IMM GRANULOCYTES NFR BLD AUTO: 1 % (ref 0–2)
LYMPHOCYTES # BLD AUTO: 1.84 THOUSANDS/ÂΜL (ref 0.6–4.47)
LYMPHOCYTES NFR BLD AUTO: 21 % (ref 14–44)
MCH RBC QN AUTO: 29 PG (ref 26.8–34.3)
MCHC RBC AUTO-ENTMCNC: 31.5 G/DL (ref 31.4–37.4)
MCV RBC AUTO: 92 FL (ref 82–98)
MONOCYTES # BLD AUTO: 0.83 THOUSAND/ÂΜL (ref 0.17–1.22)
MONOCYTES NFR BLD AUTO: 9 % (ref 4–12)
NEUTROPHILS # BLD AUTO: 5.76 THOUSANDS/ÂΜL (ref 1.85–7.62)
NEUTS SEG NFR BLD AUTO: 65 % (ref 43–75)
NRBC BLD AUTO-RTO: 0 /100 WBCS
PLATELET # BLD AUTO: 338 THOUSANDS/UL (ref 149–390)
PMV BLD AUTO: 9.5 FL (ref 8.9–12.7)
POTASSIUM SERPL-SCNC: 3.5 MMOL/L (ref 3.5–5.3)
RBC # BLD AUTO: 3.62 MILLION/UL (ref 3.88–5.62)
SODIUM SERPL-SCNC: 135 MMOL/L (ref 135–147)
WBC # BLD AUTO: 8.84 THOUSAND/UL (ref 4.31–10.16)

## 2023-07-08 PROCEDURE — 80048 BASIC METABOLIC PNL TOTAL CA: CPT | Performed by: INTERNAL MEDICINE

## 2023-07-08 PROCEDURE — 85025 COMPLETE CBC W/AUTO DIFF WBC: CPT | Performed by: INTERNAL MEDICINE

## 2023-07-08 PROCEDURE — 99239 HOSP IP/OBS DSCHRG MGMT >30: CPT | Performed by: INTERNAL MEDICINE

## 2023-07-08 RX ORDER — HYDROXYZINE HYDROCHLORIDE 25 MG/1
25 TABLET, FILM COATED ORAL ONCE
Status: COMPLETED | OUTPATIENT
Start: 2023-07-08 | End: 2023-07-08

## 2023-07-08 RX ORDER — TORSEMIDE 20 MG/1
20 TABLET ORAL 2 TIMES DAILY
Refills: 0
Start: 2023-07-08

## 2023-07-08 RX ORDER — TORSEMIDE 20 MG/1
20 TABLET ORAL 2 TIMES DAILY
Refills: 0
Start: 2023-07-08 | End: 2023-07-08 | Stop reason: SDUPTHER

## 2023-07-08 RX ORDER — LIDOCAINE 50 MG/G
1 PATCH TOPICAL EVERY 24 HOURS
Qty: 5 PATCH | Refills: 0 | Status: SHIPPED | OUTPATIENT
Start: 2023-07-08

## 2023-07-08 RX ORDER — ACETAMINOPHEN 325 MG/1
650 TABLET ORAL EVERY 6 HOURS PRN
Refills: 0
Start: 2023-07-08

## 2023-07-08 RX ADMIN — ENOXAPARIN SODIUM 40 MG: 40 INJECTION SUBCUTANEOUS at 08:32

## 2023-07-08 RX ADMIN — AMOXICILLIN 500 MG: 250 CAPSULE ORAL at 05:30

## 2023-07-08 RX ADMIN — HYDROXYZINE HYDROCHLORIDE 25 MG: 25 TABLET, FILM COATED ORAL at 03:12

## 2023-07-08 NOTE — DISCHARGE INSTR - AVS FIRST PAGE
Dear Trisha Murdock,     It was our pleasure to care for you [facility] It is our hope that we were always able to exceed the expected standards for your care during your stay. You were hospitalized due to fall with loss of consciousness. You were cared for on the [facility] 3rd floor under the service of Caterina Candelario MD with the [facility] who covers for your primary care physician (PCP), Wagner Ambrosio MD, while you were hospitalized. If you have any questions or concerns related to this hospitalization, you may contact us at [phone]. For follow up as well as medication refills, we recommend that you follow up with your primary care physician. A registered nurse will reach out to you by phone within a few days after your discharge to answer any additional questions that you may have after going home. However, at this time we provide for you here, the most important instructions / recommendations at discharge:       Notable Medication Adjustments -   Torsemide dose decreased to 20 mg twice daily  Use Lidoderm patch for rib pain    Testing Required after Discharge -   none    Important follow up information -   Follow up with your primary care physician in 1 week and your cardiologist and other care providers as per your prior schedules. Other Instructions -   We hope you feel better soon. If your symptoms worsen, please call 911 immediately or go to the nearest Emergency Department. Please review this entire after visit summary as additional general instructions including medication list, appointments, activity, diet, any pertinent wound care, and other additional recommendations from your care team that may be provided for you.       Sincerely,     Caterina Candelario MD

## 2023-07-08 NOTE — UTILIZATION REVIEW
Initial Clinical Review    Admission: Date/Time/Statement:   Admission Orders (From admission, onward)     Ordered        07/07/23 1504  Place in Observation  Once            07/07/23 1255  Inpatient Admission  Once                      Orders Placed This Encounter   Procedures   • Inpatient Admission     Standing Status:   Standing     Number of Occurrences:   1     Order Specific Question:   Level of Care     Answer:   Med Surg [16]     Order Specific Question:   Estimated length of stay     Answer:   More than 2 Midnights     Order Specific Question:   Certification     Answer:   I certify that inpatient services are medically necessary for this patient for a duration of greater than two midnights. See H&P and MD Progress Notes for additional information about the patient's course of treatment. • Place in Observation     Standing Status:   Standing     Number of Occurrences:   1     Order Specific Question:   Level of Care     Answer:   Med Surg [16]     Order Specific Question:   Bed request comments     Answer:   tele     ED Arrival Information     Expected   -    Arrival   7/7/2023 07:42    Acuity   Emergent            Means of arrival   Ambulance    Escorted by   [facility]    Admission type   Emergency            Arrival complaint   SYNCOPE           Chief Complaint   Patient presents with   • Syncope     Syncopal episode this am       Initial Presentation: 69 y.o. male PMHx of recent AV endocarditis, lumbar vertebral osteomyelitis due to streptococcal bacteremia with suspected dental source completed 6 weeks of IV Abx last week, and AS who presents to ED from home via EMS with fall and syncope. Pt had 5 teeth removed yesterday, ate minimal yesterday 2/2 dental procedure, did take diuretic before dental sx . Pt  reports sitting on edge of bed, dozing off, fell facing forward to floor witnessed by wife, LOC x 1 min . Pt reports L sided chest pain since the fall.  Mild dizziness upon standing . On exam, has abrasion in the left periorbital area. Reproducible chest wall tenderness. Labs - Hgb 10.9, troponin 45--->103 . CT head, C spine and facial shows nothing acute EKG NSR/ PVCs. Pt admitted as OBS to telemetry with syncope contributed by dehydration in the setting of known moderate to severe AS. L sided chest wall pain . Plan - telemetry, hold diuretics , check ortho BP, IVF x 500 ml . XR ribs, Lido derm patch . Continue home Amoxicillin .           Date: 7/8    ED Triage Vitals   Temperature Pulse Respirations Blood Pressure SpO2   07/07/23 0745 07/07/23 0745 07/07/23 0745 07/07/23 0745 07/07/23 0745   97.5 °F (36.4 °C) 86 18 141/65 96 %      Temp Source Heart Rate Source Patient Position - Orthostatic VS BP Location FiO2 (%)   07/07/23 0745 07/07/23 0745 07/07/23 0745 07/07/23 0745 --   Oral Monitor Lying Right arm       Pain Score       07/07/23 0812       3          Wt Readings from Last 1 Encounters:   07/08/23 59.6 kg (131 lb 6.3 oz)     Additional Vital Signs:   Date/Time Temp Pulse Resp BP MAP (mmHg) SpO2 Calculated FIO2 (%) - Nasal Cannula Nasal Cannula O2 Flow Rate (L/min)   07/08/23 07:17:57 98.2 °F (36.8 °C) 75 18 125/40 Abnormal  68 -- -- --   07/08/23 00:54:08 -- -- 17 110/49 Abnormal  69 -- -- --   07/07/23 2301 -- -- -- 110/49 Abnormal  -- -- -- --   07/07/23 2120 97.5 °F (36.4 °C) 78 18 121/47 Abnormal  72 96 % -- --   07/07/23 1855 -- -- -- 117/60 86 -- -- --   07/07/23 1500 98.2 °F (36.8 °C) 74 16 108/36 Abnormal  -- 98 % -- --   07/07/23 1333 97.9 °F (36.6 °C) 73 18 113/53 76 96 % -- --   07/07/23 1031 -- 74 18 119/55 79 98 % 28 2 L/min     Date and Time Eye Opening Best Verbal Response Best Motor Response Trent Coma Scale Score   07/07/23 2120 4 5 6 15   07/07/23 1330 4 5 6 15   07/07/23 0812 4 5 6 15                 Pertinent Labs/Diagnostic Test Results:   7/7 ECG-Sinus rhythm with frequent and consecutive Premature ventricular complexes  Possible Left atrial enlargement  Left ventricular hypertrophy with repolarization abnormality    CT head without contrast   Final Result by Lynn Agustin MD (07/07 1024)      No acute intracranial abnormality. Workstation performed: [id number]         CT spine cervical without contrast   Final Result by LESTER Adams MD (07/07 1108)      No cervical spine fracture or traumatic malalignment. Degenerative spondylosis most pronounced at C5-C6. Workstation performed: [id number]         CT facial bones without contrast   Final Result by Lynn Agustin MD (07/07 1036)      No fracture identified. Workstation performed: [id number]         XR chest 1 view portable   Final Result by Tyron Bess MD (07/07 3718)      Persistent bilateral infiltrates right increased and greater, left decreased. Pleural effusions increased on left.                   Workstation performed: [id number]         XR ribs left w pa chest min 3 views    (Results Pending)         Results from last 7 days   Lab Units 07/08/23 0534 07/07/23  1610 07/07/23  0812   WBC Thousand/uL 8.84  --  6.35   HEMOGLOBIN g/dL 10.5*  --  10.9*   HEMATOCRIT % 33.3*  --  34.6*   PLATELETS Thousands/uL 338 322 343   NEUTROS ABS Thousands/µL 5.76  --  4.66         Results from last 7 days   Lab Units 07/08/23  0534 07/07/23  1610 07/07/23  0812   SODIUM mmol/L 135  --  135   POTASSIUM mmol/L 3.5  --  3.6   CHLORIDE mmol/L 99  --  97   CO2 mmol/L 29  --  27   ANION GAP mmol/L 7  --  11   BUN mg/dL 21  --  26*   CREATININE mg/dL 1.05  --  1.20   EGFR ml/min/1.73sq m 68  --  57   CALCIUM mg/dL 8.9  --  8.8   MAGNESIUM mg/dL  --  2.4  --      Results from last 7 days   Lab Units 07/07/23  0812   AST U/L 15   ALT U/L 18   ALK PHOS U/L 58   TOTAL PROTEIN g/dL 6.4   ALBUMIN g/dL 3.2*   TOTAL BILIRUBIN mg/dL 0.34         Results from last 7 days   Lab Units 07/08/23  0534 07/07/23  0812   GLUCOSE RANDOM mg/dL 107 183* No results found for: "BETA-HYDROXYBUTYRATE"               Results from last 7 days   Lab Units 07/07/23  1610   CK TOTAL U/L 261     Results from last 7 days   Lab Units 07/07/23  1252 07/07/23  1031 07/07/23  0812   HS TNI 0HR ng/L  --   --  45   HS TNI 2HR ng/L  --  103*  --    HSTNI D2 ng/L  --  58*  --    HS TNI 4HR ng/L 123*  --   --    HSTNI D4 ng/L 78*  --   --                                                                                                                        ED Treatment:   Medication Administration from 07/07/2023 0741 to 07/07/2023 1333       Date/Time Order Dose Route Action     07/07/2023 0823 EDT acetaminophen (TYLENOL) tablet 650 mg 650 mg Oral Given     07/07/2023 0857 EDT amoxicillin (AMOXIL) capsule 500 mg 500 mg Oral Given        Past Medical History:   Diagnosis Date   • CHF (congestive heart failure) (HCC)    • Discitis    • Endocarditis      Present on Admission:  • Aortic stenosis  • Recent aortic valve endocarditis due to Strep bactermia       Admitting Diagnosis: Syncope [R55]  Anemia [D64.9]  Elevated troponin [R77.8]  Age/Sex: 69 y.o. male  Admission Orders:  Scheduled Medications:  acetaminophen, 975 mg, Oral, Q8H JARRETT  amoxicillin, 500 mg, Oral, Q8H JARRETT  enoxaparin, 40 mg, Subcutaneous, Daily  lidocaine, 1 patch, Topical, Q24H    sodium chloride 0.9 % infusion  Dose: 50 mL/hr  Freq: Once Route: IV  Indications of Use: IV Hydration  Indications Comment: 500 ml  Last Dose: 50 mL/hr (07/07/23 1545)  Start: 07/07/23 1445 End: 07/07/23 1545  potassium chloride (K-DUR,KLOR-CON) CR tablet 40 mEq  Dose: 40 mEq  Freq: Once Route: PO  Start: 07/07/23 1600 End: 07/07/23 1545  hydrOXYzine HCL (ATARAX) tablet 25 mg  Dose: 25 mg  Freq:  Once Route: PO  Start: 07/08/23 0245 End: 07/08/23 0312              Continuous IV Infusions:     PRN Meds:  acetaminophen, 650 mg, Oral, Q6H PRN      Telemetry  OOB as shaquille   [facility]'s    Network Utilization Review Department  ATTENTION: Please call with any questions or concerns to [phone] and carefully listen to the prompts so that you are directed to the right person. All voicemails are confidential.  [place] all requests for admission clinical reviews, approved or denied determinations and any other requests to dedicated fax number below belonging to the campus where the patient is receiving treatment.  List of dedicated fax numbers for the Facilities:  [place] DENIALS (Administrative/Medical Necessity) [phone] [address] [facility] (Maternity/NICU/Pediatrics) [phone]   [address] [place] [address] Drive [phone]   [facility] [address] [facility] [phone]   [address] [place] [address] [address] [place] [address] [phone]   [address] [address] [phone]

## 2023-07-08 NOTE — DISCHARGE SUMMARY
Discharge Summary - [facility]    Patient Information: Jaida Longoria 69 y.o. male MRN: [id number]  Unit/Bed#: S -01 Encounter: [id number]    Discharging Physician / Practitioner: Jefe Estrada MD  PCP: Mae Hector MD  Admission Date: 7/7/2023  Discharge Date: 07/08/23    Reason for Admission: Possible syncope. Discharge Diagnoses:     Principal Problem:    Syncope  Active Problems: Aortic stenosis    Recent aortic valve endocarditis due to Strep bactermia     Left-sided chest wall pain      Consultations During Hospital Stay:  · None    Procedures Performed:   · None    Significant findings:  · None    Hospital Course:   Jaida Longoria is a 69 y.o. male patient who originally presented to the hospital on 7/7/2023 due to fall at home. The patient tells me he has not been getting enough sleep in a very long time and was sitting at the edge of bed, feels sleepy and subsequently fell forward hitting the left side of his head and losing consciousness for short period of time. He reported left-sided rib pain which was reproducible on evaluation. He was monitored overnight on telemetry which was unremarkable for any significant findings. The patient denied dizziness prior to fall and denies syncopal episode. He emphatically states that he fell due to nodding off while sitting at the edge of the bed. Work-up in the ED showed mildly elevated troponin which remained flat. EKG was unremarkable for any acute ischemic changes. There was concern for dehydration at the time of admission for which diuretics were held and he received gentle hydration for total of 500 mL. Due to concern for dehydration, dose of torsemide was decreased from 40 mg twice daily to 20 mg twice daily at the time of discharge. He is instructed to follow-up with his cardiologist for continued monitoring and further adjustment to diuretic dose.   He remained in stable condition and was cleared for discharge home on 7/8/2023. Condition at Discharge: stable     Discharge Day Visit / Exam:     Subjective: Patient reports not sleeping well overnight due to multiple interruptions during the night. He reports left-sided rib pain which is stable and pain over left temple where he fell. He denies any dizziness or lightheadedness, no shortness of breath, no fever or chills. Vitals: Blood Pressure: (!) 125/40 (07/08/23 0717)  Pulse: 75 (07/08/23 0717)  Temperature: 98.2 °F (36.8 °C) (07/08/23 0717)  Temp Source: Oral (07/08/23 0717)  Respirations: 18 (07/08/23 0717)  Weight - Scale: 59.6 kg (131 lb 6.3 oz) (07/08/23 0530)  SpO2: 96 % (07/07/23 2120)    General Appearance:    Alert, cooperative, no distress, appropriately responsive    Head:    Normocephalic, mucous membranes slightly dry   Eyes:    Conjunctiva/corneas clear, EOM's intact   Neck:   Supple   Lungs:     Clear to auscultation bilaterally, respirations unlabored, no crackles or wheeze     Heart:    Regular rate and rhythm, S1 and S2, +SM   Abdomen:     Soft, non-tender, nondistended   Extremities:   Extremities normal, atraumatic, no cyanosis or edema   Neurologic:  nonfocal      Discharge instructions/Information to patient and family:   See after visit summary for information provided to patient and family. Provisions for Follow-Up Care:  See after visit summary for information related to follow-up care and any pertinent home health orders. Disposition: Home    Patient and spouse were updated at the bedside, all questions answered    Discharge Statement:  I spent >30 minutes discharging the patient. This time was spent on the day of discharge. I had direct contact with the patient on the day of discharge. Greater than 50% of the total time was spent examining patient, answering all patient questions, arranging and discussing plan of care with patient as well as directly providing post-discharge instructions.   Additional time then spent on discharge activities. Discharge Medications:  See after visit summary for reconciled discharge medications provided to patient and family. ** Please Note: Dragon 360 Dictation voice to text software may have been used in the creation of this document.  **

## 2023-07-10 ENCOUNTER — NURSE TRIAGE (OUTPATIENT)
Age: 78
End: 2023-07-10

## 2023-07-10 LAB
ATRIAL RATE: 74 BPM
P AXIS: 74 DEGREES
PR INTERVAL: 162 MS
QRS AXIS: 34 DEGREES
QRSD INTERVAL: 96 MS
QT INTERVAL: 394 MS
QTC INTERVAL: 437 MS
T WAVE AXIS: 115 DEGREES
VENTRICULAR RATE: 74 BPM

## 2023-07-10 PROCEDURE — 93010 ELECTROCARDIOGRAM REPORT: CPT | Performed by: INTERNAL MEDICINE

## 2023-07-10 NOTE — TELEPHONE ENCOUNTER
Reinier from interventional cardiology office calling in for MRI results of spine done on 7/3/23. Patient is not a GI patient. I advised her to call medical records and provided her with number to get MRI faxed. No further questions.

## 2023-07-17 PROBLEM — A41.9 SEPSIS (HCC): Status: RESOLVED | Noted: 2023-05-16 | Resolved: 2023-07-17

## 2023-07-19 ENCOUNTER — LAB (OUTPATIENT)
Dept: LAB | Facility: CLINIC | Age: 78
End: 2023-07-19
Payer: COMMERCIAL

## 2023-07-19 ENCOUNTER — OFFICE VISIT (OUTPATIENT)
Dept: CARDIOLOGY CLINIC | Facility: CLINIC | Age: 78
End: 2023-07-19
Payer: COMMERCIAL

## 2023-07-19 VITALS
HEART RATE: 72 BPM | OXYGEN SATURATION: 97 % | DIASTOLIC BLOOD PRESSURE: 60 MMHG | BODY MASS INDEX: 20.92 KG/M2 | WEIGHT: 138 LBS | SYSTOLIC BLOOD PRESSURE: 110 MMHG | HEIGHT: 68 IN

## 2023-07-19 DIAGNOSIS — I50.30 (HFPEF) HEART FAILURE WITH PRESERVED EJECTION FRACTION (HCC): ICD-10-CM

## 2023-07-19 DIAGNOSIS — E87.6 HYPOKALEMIA: ICD-10-CM

## 2023-07-19 DIAGNOSIS — I50.32 CHRONIC DIASTOLIC HEART FAILURE (HCC): Primary | ICD-10-CM

## 2023-07-19 DIAGNOSIS — I35.0 MODERATE TO SEVERE AORTIC STENOSIS: ICD-10-CM

## 2023-07-19 DIAGNOSIS — I50.32 CHRONIC HEART FAILURE WITH PRESERVED EJECTION FRACTION (HCC): Primary | ICD-10-CM

## 2023-07-19 DIAGNOSIS — I35.1 MODERATE AORTIC INSUFFICIENCY: ICD-10-CM

## 2023-07-19 DIAGNOSIS — N17.9 ACUTE RENAL FAILURE, UNSPECIFIED ACUTE RENAL FAILURE TYPE (HCC): ICD-10-CM

## 2023-07-19 DIAGNOSIS — I33.0 ACUTE BACTERIAL ENDOCARDITIS: ICD-10-CM

## 2023-07-19 LAB
ALBUMIN SERPL BCP-MCNC: 3.4 G/DL (ref 3.5–5)
ALP SERPL-CCNC: 67 U/L (ref 34–104)
ALT SERPL W P-5'-P-CCNC: 20 U/L (ref 7–52)
ANION GAP SERPL CALCULATED.3IONS-SCNC: 7 MMOL/L
AST SERPL W P-5'-P-CCNC: 18 U/L (ref 13–39)
BASOPHILS # BLD AUTO: 0.04 THOUSANDS/ÂΜL (ref 0–0.1)
BASOPHILS NFR BLD AUTO: 0 % (ref 0–1)
BILIRUB SERPL-MCNC: 0.33 MG/DL (ref 0.2–1)
BUN SERPL-MCNC: 24 MG/DL (ref 5–25)
C3 SERPL-MCNC: 97.6 MG/DL (ref 90–180)
C4 SERPL-MCNC: 27 MG/DL (ref 10–40)
CALCIUM ALBUM COR SERPL-MCNC: 9.5 MG/DL (ref 8.3–10.1)
CALCIUM SERPL-MCNC: 9 MG/DL (ref 8.4–10.2)
CHLORIDE SERPL-SCNC: 100 MMOL/L (ref 96–108)
CO2 SERPL-SCNC: 29 MMOL/L (ref 21–32)
CREAT SERPL-MCNC: 0.94 MG/DL (ref 0.6–1.3)
CRP SERPL QL: 1.8 MG/L
EOSINOPHIL # BLD AUTO: 0.12 THOUSAND/ÂΜL (ref 0–0.61)
EOSINOPHIL NFR BLD AUTO: 1 % (ref 0–6)
ERYTHROCYTE [DISTWIDTH] IN BLOOD BY AUTOMATED COUNT: 17.2 % (ref 11.6–15.1)
ERYTHROCYTE [SEDIMENTATION RATE] IN BLOOD: 34 MM/HOUR (ref 0–19)
GFR SERPL CREATININE-BSD FRML MDRD: 77 ML/MIN/1.73SQ M
GLUCOSE SERPL-MCNC: 101 MG/DL (ref 65–140)
HCT VFR BLD AUTO: 35.1 % (ref 36.5–49.3)
HGB BLD-MCNC: 11.1 G/DL (ref 12–17)
IMM GRANULOCYTES # BLD AUTO: 0.04 THOUSAND/UL (ref 0–0.2)
IMM GRANULOCYTES NFR BLD AUTO: 0 % (ref 0–2)
LYMPHOCYTES # BLD AUTO: 1.2 THOUSANDS/ÂΜL (ref 0.6–4.47)
LYMPHOCYTES NFR BLD AUTO: 13 % (ref 14–44)
MCH RBC QN AUTO: 29.2 PG (ref 26.8–34.3)
MCHC RBC AUTO-ENTMCNC: 31.6 G/DL (ref 31.4–37.4)
MCV RBC AUTO: 92 FL (ref 82–98)
MONOCYTES # BLD AUTO: 1.06 THOUSAND/ÂΜL (ref 0.17–1.22)
MONOCYTES NFR BLD AUTO: 11 % (ref 4–12)
NEUTROPHILS # BLD AUTO: 7.02 THOUSANDS/ÂΜL (ref 1.85–7.62)
NEUTS SEG NFR BLD AUTO: 75 % (ref 43–75)
NRBC BLD AUTO-RTO: 0 /100 WBCS
PLATELET # BLD AUTO: 373 THOUSANDS/UL (ref 149–390)
PMV BLD AUTO: 9.4 FL (ref 8.9–12.7)
POTASSIUM SERPL-SCNC: 3.4 MMOL/L (ref 3.5–5.3)
PROT SERPL-MCNC: 6.2 G/DL (ref 6.4–8.4)
RBC # BLD AUTO: 3.8 MILLION/UL (ref 3.88–5.62)
SODIUM SERPL-SCNC: 136 MMOL/L (ref 135–147)
WBC # BLD AUTO: 9.48 THOUSAND/UL (ref 4.31–10.16)

## 2023-07-19 PROCEDURE — 85025 COMPLETE CBC W/AUTO DIFF WBC: CPT

## 2023-07-19 PROCEDURE — 85652 RBC SED RATE AUTOMATED: CPT

## 2023-07-19 PROCEDURE — 86140 C-REACTIVE PROTEIN: CPT

## 2023-07-19 PROCEDURE — 80053 COMPREHEN METABOLIC PANEL: CPT

## 2023-07-19 PROCEDURE — 86160 COMPLEMENT ANTIGEN: CPT

## 2023-07-19 PROCEDURE — 36415 COLL VENOUS BLD VENIPUNCTURE: CPT

## 2023-07-19 PROCEDURE — 99205 OFFICE O/P NEW HI 60 MIN: CPT | Performed by: INTERNAL MEDICINE

## 2023-07-19 RX ORDER — POTASSIUM CHLORIDE 20 MEQ/1
40 TABLET, EXTENDED RELEASE ORAL 2 TIMES DAILY
Qty: 120 TABLET | Refills: 5 | Status: SHIPPED | OUTPATIENT
Start: 2023-07-19

## 2023-07-19 NOTE — PATIENT INSTRUCTIONS
Stop spironolactone  Continue torsemide and potassium   Obtain BMP today  2g sodium diet  2L fluid restriction  Daily weights  Call office for weight gain of 2-3 lbs in a day or 5 lbs in 5-7 days

## 2023-07-19 NOTE — PROGRESS NOTES
Cardiology Outpatient Consult Note - Rahel Quintanilla 69 y.o. male MRN: [id number]    Encounter: [id number]      Assessment/Plan:    Patient Active Problem List    Diagnosis Date Noted   • Syncope 07/07/2023   • Left-sided chest wall pain 07/07/2023   • Recent aortic valve endocarditis due to Strep bactermia     • PICC (peripherally inserted central catheter) in place    • Abnormal abdominal CT scan 06/16/2023   • Acute respiratory failure ([address]) 06/16/2023   • Acute kidney injury ([address]) 06/12/2023   • History of osteomyelitis 06/12/2023   • (HFpEF) heart failure with preserved ejection fraction ([address]) 06/12/2023   • Insomnia 06/12/2023   • Anxiety 06/01/2023   • Acute bacterial endocarditis 05/20/2023   • Gastric distention 05/20/2023   • Discitis of lumbar region 05/18/2023   • Bacteremia due to Streptococcus 05/18/2023   • Non-ischemic myocardial injury (non-traumatic) 05/17/2023   • Shortness of breath 05/16/2023   • Chronic pain syndrome 05/15/2023   • Lumbar spondylosis 05/15/2023   • Chronic bilateral low back pain without sciatica 05/03/2023   • Primary hypertension 05/03/2023   • Atrial fibrillation ([address]) 05/03/2023   • Aortic stenosis 05/03/2023   • Leukocytosis 05/03/2023   • Elevated D-dimer 05/03/2023   • Hyponatremia 05/02/2023   • Mixed hyperlipidemia 08/09/2021   • Abnormal glucose 08/09/2021   • BPH with obstruction/lower urinary tract symptoms 08/09/2021   • Primary osteoarthritis of right hand 04/25/2017       # H/o aortic valve endocarditis s/p IV antibiotics, currently on oral antibiotics and scheduled to follow up with ID next week  # Moderate to severe aortic valve stenosis  # Moderate aortic valve insufficiency  # HFPEF, euvolemic to mildly volume up on exam  Diuretic: torsemide 20mg BID with potassium 20 meq BID; spironolactone 25mg daily - ran out a couple of days ago  # Paroysmal atrial fibrillation  Currently not on anticoagulation.  Per chart review, patient did not want to be on anticoagulation due to bleeding risk, he was also scheduled for a biopsy at that time. He is unsure at the moment if he wants to be on anticoagulation. Discussed risks and benefits of anticoagulation and will be thinking about it. Weight:  NT proBNP:     Studies- personally reviewed by me    JESSICA 6/14/23:  Normal LV systolic function  Aortic valve leaflets are severely calcified, normal mobility, moderate regurgitation. Moderate to severe stenosis visually. Small mobile vegetation at the commissure of the LCC and on CC. Very small mobile vegetation noted tip of the RCC  Normal RV size and systolic function  Mild MR  Mild TR  No PFO on color Doppler  No significant change compared to prior study 5/19/2023. TTE 6/13/2023  LVEF: 55%  LVIDd: 5.1cm  RV: Normal size and systolic function  MR: Mild  AV: moderate to severe stenosis, moderate AI  PASP: 75 mmHg, mild to moderate TR, estimated RA pressure of 10 mmHg  RVOT: Unable to review  Other: Grade 2 diastology, no obvious endocarditis    JESSICA 5/19/2023  LVEF 65%. Moderately increased wall thickness. No PFO on color Doppler. No left atrial appendage thrombus. Moderate aortic valve regurgitation, moderate to severe stenosis aortic valve area of 0.98 cm². Mean gradient 29 mmHg. 3 small, mobile vegetations present in the aortic aspect of the valve. All located at the 3 commissures  The one located at the commissure of the non and left coronary cusps appears to be associated with a small perforation. Mild MR  Mild TR  Normal RV size and systolic function    TTE 6/4/5122:  Normal biventricular size and systolic function. Mild AI. Moderate to severe aortic valve stenosis. Mean gradient 35 mmHg.     TTE 7/19/2021:'  LVEF 60%  Grade 1 diastology  Trace MR  Moderate aortic valve stenosis, mean gradient 30 mmHg, aortic valve area 1.2 cm2 continuity equation     Diet:  2 g sodium diet  2000 mL fluid restriction    Therapeutic:  Spironolactone 25 mg daily-not taking the last couple of days, ran out  Torsemide 20 mg twice daily with potassium 20 mEq twice daily    Today's Plan:  Patient needs ID follow up and surveillance culture post antibiotic course to determine clearance of bacteremia  Following up with dental   Euvolemic on exam, continue current dose of torsemide as above  Will discontinue spironolactone given moderate to severe AS, normotensive without taking the medication for a couple of days  Obtain BMP  Patient will think about starting anticoagulation  Patient will decide where they want to pursue SAVR/TAVR. Has seen team at [facility] for consultation. Also considering seeing CT surgery team at [facility]. HPI:   69 M with recently diagnosed native aortic valve endocarditis, moderate to severe AS and moderate AI, chronic HFpEF, paroxysmal atrial fibrillation who presented with shortness of breath. Recent admission to [facility] from 5/16/2023 - 5/23/2023 after presenting with back pain. He was found to have lumbar discitis osteomyelitis on MRI. JESSICA was ordered in the setting of bacteremia and demonstrated small mobile vegetations on the aortic aspect of the aortic valve, moderate to severe AS, moderate AI. He was treated with IV diuretics and subsequently discharged on torsemide 40 mg daily. He then had increasing LUNA, LE swelling, and PND. He called the cardiology office and his torsemide was increased to 40 mg twice daily. He did notice an initial improvement but then had recurrence of symptoms the night prior to admission and so presented for evaluation. He was diuresed with IV lasix. He presented to the [address] [place] [address] on 6/12/2023 with complaints of generalized weakness and for further evaluation of abnormal lab data. On admission he was found to have an acute kidney injury as well as hypokalemia. His usual home oral diuretics (spironolactone and torsemide) were placed on hold, started on IV fluids for hydration, and electrolytes were replaced.  Cardiology was reconsulted at the request of the ID service for reassessment of his known AV endocarditis/valvular vegetations with repeat JESSICA imaging during this hospitalization. JESSICA showed similar findings as prior. Admitted 7/7/23 with syncope thought to be due to dehydration, torsemide decreased from 40mg to 20mg daily. He still reports shortness of breath but no leg swelling, PND, orthopnea or abdominal distension. Also with intermittent episodes of dizziness/lightheadedness and fatigue. Recently seen at [facility] for TAVR evaluation. Past Medical History:   Diagnosis Date   • CHF (congestive heart failure) (HCC)    • Discitis    • Endocarditis        Review of Systems   Constitutional: Positive for fatigue. Negative for chills and fever. HENT: Negative for ear pain and sore throat. Eyes: Negative for pain and visual disturbance. Respiratory: Positive for shortness of breath. Negative for cough. Cardiovascular: Negative for chest pain, palpitations and leg swelling. Gastrointestinal: Negative for abdominal distention, abdominal pain and vomiting. Genitourinary: Negative for dysuria and hematuria. Musculoskeletal: Negative for arthralgias and back pain. Skin: Negative for color change and rash. Neurological: Positive for dizziness. Negative for seizures, syncope and light-headedness. All other systems reviewed and are negative. No Known Allergies  .     Current Outpatient Medications:   •  acetaminophen (TYLENOL) 325 mg tablet, Take 2 tablets (650 mg total) by mouth every 6 (six) hours as needed for mild pain, Disp: , Rfl: 0  •  amoxicillin (AMOXIL) 500 mg capsule, Take 1 capsule (500 mg total) by mouth every 8 (eight) hours, Disp: 90 capsule, Rfl: 0  •  lidocaine (LIDODERM) 5 %, Apply 1 patch topically over 12 hours every 24 hours Remove & Discard patch within 12 hours or as directed by MD, Disp: 5 patch, Rfl: 0  •  Multiple Vitamins-Minerals (PRESERVISION AREDS PO), Take 5 mg by mouth 2 (two) times a day Two tablets daily, Disp: , Rfl:   •  potassium chloride (K-DUR,KLOR-CON) 20 mEq tablet, Take 1 tablet (20 mEq total) by mouth 2 (two) times a day, Disp: 60 tablet, Rfl: 2  •  torsemide (DEMADEX) 20 mg tablet, Take 1 tablet (20 mg total) by mouth 2 (two) times a day, Disp: , Rfl: 0  •  spironolactone (ALDACTONE) 25 mg tablet, Take 1 tablet (25 mg total) by mouth daily Do not start before 2023., Disp: 30 tablet, Rfl: 0    Social History     Socioeconomic History   • Marital status: /Civil Union     Spouse name: Not on file   • Number of children: Not on file   • Years of education: Not on file   • Highest education level: Not on file   Occupational History   • Not on file   Tobacco Use   • Smoking status: Former     Packs/day: 0.50     Years: 20.00     Total pack years: 10.00     Types: Cigarettes     Quit date: 3/11/2005     Years since quittin.3   • Smokeless tobacco: Never   Vaping Use   • Vaping Use: Never used   Substance and Sexual Activity   • Alcohol use: Not Currently   • Drug use: Not Currently   • Sexual activity: Not on file   Other Topics Concern   • Not on file   Social History Narrative   • Not on file     Social Determinants of Health     Financial Resource Strain: Not on file   Food Insecurity: No Food Insecurity (2023)    Hunger Vital Sign    • Worried About Running Out of Food in the Last Year: Never true    • Ran Out of Food in the Last Year: Never true   Transportation Needs: No Transportation Needs (2023)    PRAPARE - Transportation    • Lack of Transportation (Medical): No    • Lack of Transportation (Non-Medical):  No   Physical Activity: Not on file   Stress: Not on file   Social Connections: Not on file   Intimate Partner Violence: Not on file   Housing Stability: Low Risk  (2023)    Housing Stability Vital Sign    • Unable to Pay for Housing in the Last Year: No    • Number of Places Lived in the Last Year: 1    • Unstable Housing in the Last Year: No       Family History   Problem Relation Age of Onset   • Colon cancer Mother    • Lung cancer Father    • Mental illness Neg Hx        Physical Exam:    Vitals: Blood pressure 110/60, pulse 72, height 5' 8" (1.727 m), weight 62.6 kg (138 lb), SpO2 97 %. , Body mass index is 20.98 kg/m².,   Wt Readings from Last 3 Encounters:   07/19/23 62.6 kg (138 lb)   07/08/23 59.6 kg (131 lb 6.3 oz)   06/16/23 61.9 kg (136 lb 7.4 oz)       Physical Exam  Constitutional:       General: He is not in acute distress. Appearance: Normal appearance. HENT:      Head: Normocephalic and atraumatic. Mouth/Throat:      Mouth: Mucous membranes are moist.   Eyes:      General: No scleral icterus. Extraocular Movements: Extraocular movements intact. Cardiovascular:      Rate and Rhythm: Normal rate and regular rhythm. Pulses: Normal pulses. Heart sounds: S1 normal and S2 normal. Murmur heard. Systolic murmur is present with a grade of 3/6. Diastolic murmur is present with a grade of 2/4. No friction rub. No gallop. Comments: Nonelevated JVP. Trace pitting edema bilaterally  Pulmonary:      Effort: Pulmonary effort is normal.      Breath sounds: Normal breath sounds. Abdominal:      General: There is no distension. Palpations: Abdomen is soft. Tenderness: There is no abdominal tenderness. There is no guarding or rebound. Musculoskeletal:         General: Normal range of motion. Cervical back: Neck supple. Skin:     General: Skin is warm and dry. Capillary Refill: Capillary refill takes less than 2 seconds. Neurological:      General: No focal deficit present. Mental Status: He is alert and oriented to person, place, and time.    Psychiatric:         Mood and Affect: Mood normal.         Labs & Results:    Lab Results   Component Value Date    WBC 8.84 07/08/2023    HGB 10.5 (L) 07/08/2023    HCT 33.3 (L) 07/08/2023    MCV 92 07/08/2023  07/08/2023     Lab Results   Component Value Date    SODIUM 135 07/08/2023    K 3.5 07/08/2023    CL 99 07/08/2023    CO2 29 07/08/2023    BUN 21 07/08/2023    CREATININE 1.05 07/08/2023    GLUC 107 07/08/2023    CALCIUM 8.9 07/08/2023     No results found for: "NTBNP"   Lab Results   Component Value Date    CHOLESTEROL 149 05/11/2023    CHOLESTEROL 200 (H) 07/20/2021    CHOLESTEROL 134 01/16/2019     Lab Results   Component Value Date    HDL 72 05/11/2023    HDL 70 07/20/2021    HDL 46 01/16/2019     Lab Results   Component Value Date    TRIG 68 05/11/2023    TRIG 78 07/20/2021    TRIG 59 01/16/2019     No results found for: "[address]"    EKG personally reviewed by Lauren Rosales MD.     Counseling / Coordination of Care  Time spent today 40 minutes. Greater than 50% of total time was spent with the patient and / or family counseling and / or coordination of care. We discussed diagnoses, most recent studies and any changes in treatment    Thank you for the opportunity to participate in the care of this patient.     Lauren Rosales MD  ADVANCED HEART FAILURE AND MECHANICAL CIRCULATORY SUPPORT  [address]

## 2023-07-20 NOTE — RESULT ENCOUNTER NOTE
Please let Alessandra Lord know that labs are stable. Renal function looks fine. Potassium is slightly low. Please have him increase intake of potassium containing foods, review  high potassium diet with Alessandra Lord. Thank you. I believe he is scheduled for follow-up.

## 2023-07-21 ENCOUNTER — TELEPHONE (OUTPATIENT)
Dept: NEPHROLOGY | Facility: CLINIC | Age: 78
End: 2023-07-21

## 2023-07-21 DIAGNOSIS — I50.9 CONGESTIVE HEART FAILURE, UNSPECIFIED HF CHRONICITY, UNSPECIFIED HEART FAILURE TYPE (HCC): ICD-10-CM

## 2023-07-21 NOTE — TELEPHONE ENCOUNTER
07/05/23 0500   Clinical Encounter Info   Visit Type In person   Visited With Family/Friend;Patient not available   Reason for Visit Routine Visit   Spiritual Care Consult Needed No needs at this time   Referral from Interdisciplinary team   Length of Visit 45 minutes   Taxonomy   Intended Effects Build relationship of care and support   Methods Encourage self care;Offer support   Interventions Acknowledge current situation;Ask guided questions;Active listening   Patient Assessment   Patient Spiritual Assessment Unable to assess   Patient  Intervention Empathic Listening;Emotional Support   Family/Friend Assessment   Family/Friend Name Deven Daughter   Family/Friend Affect at Time of Visit Open to  visit;Sad;Tearful   Family/Friend Spiritual Assessment Anxious;Fear;Grief ;Guilt;Sad   Family/Friend  Intervention  Support;Empathic Listening     Called by patients nurse that the patients daughter was on site and needed support. I met with the patients daughter and listened as she reviewed the events leading up to her mothers hospitalization. Daughter reports familiar support from , son and daughters. The daughters are currently out of the country. Deven, discussed what her mothers wishes would be and she stated she was leaning toward comfort care. Deven asked that a  come do anointing in the morning.    ----- Message from [address] [facility] sent at 7/20/2023  6:05 PM EDT -----  Please let Brock Camejo know that labs are stable. Renal function looks fine. Potassium is slightly low. Please have him increase intake of potassium containing foods, review  high potassium diet with Brock Camejo. Thank you. I believe he is scheduled for follow-up.

## 2023-07-24 ENCOUNTER — TELEPHONE (OUTPATIENT)
Dept: CARDIOLOGY CLINIC | Facility: CLINIC | Age: 78
End: 2023-07-24

## 2023-07-24 DIAGNOSIS — I50.9 CONGESTIVE HEART FAILURE, UNSPECIFIED HF CHRONICITY, UNSPECIFIED HEART FAILURE TYPE (HCC): ICD-10-CM

## 2023-07-24 RX ORDER — TORSEMIDE 20 MG/1
TABLET ORAL
Qty: 120 TABLET | Refills: 0 | OUTPATIENT
Start: 2023-07-24

## 2023-07-24 RX ORDER — TORSEMIDE 20 MG/1
20 TABLET ORAL 2 TIMES DAILY
Qty: 180 TABLET | Refills: 0 | Status: CANCELLED | OUTPATIENT
Start: 2023-07-24

## 2023-07-24 NOTE — TELEPHONE ENCOUNTER
----- Message from Zulema Garza MD sent at 5/25/2020 10:57 PM CDT -----  Please see how he is doing.  ----- Message -----  From: Discharge Provider, Automatic  Sent: 5/24/2020   3:11 PM CDT  To: Zulema Garza MD       K 3.4 on 7/19/2023

## 2023-07-25 NOTE — PROGRESS NOTES
Assessment/Plan:       Problem List Items Addressed This Visit        Cardiovascular and Mediastinum    Acute bacterial endocarditis    (HFpEF) heart failure with preserved ejection fraction (HCC)       Musculoskeletal and Integument    Discitis of lumbar region - Primary         1. L1-L2 vertebral osteomyelitis/discitis. Initial MRI concerning for ventral epidural enhancement and paraspinal edema/enhancement. Suspect this was secondary to his streptococcal bacteremia. Patient now completed IV antibiotic treatment as below and is on PO Amoxicillin. 7/19 CRP 1.8  improved from high of 122. 7/19 sed rate 34. He is set up for outpatient neurosurgery follow up later this month. 7/1/23 MRI lumbar spine: Improved appearance of discitis and osteomyelitis of L1-2. Less muscular edema without abscess. Spondylotic degenerative changes noted. -completed IV antibiotic as above  -on 7/1/2023 patient was transitioned to oral Amoxicillin, 500mg q8, dosed for current eGFR 77 to remain on oral antibiotic treatment until his sed rate and CRP normalize or plateau which has occurred  -can discontinue Amoxicillin now. Patient prefers to continue Amoxicillin through his dental extractions. -follow up with ID prn     2. Recent Aortic valve endocarditis. Secondary to streptococcal bacteremia. Likely originated from dental source. Blood cultures first positive on 5/17/2023. Patient was undergoing a 6-week course of IV ceftriaxone but returned to the inpatient setting in [place]. Patient completed 6-weeks of IV antibiotic treatment with IV Daptomycin through 6/30/2023.  -completed IV antibiotic course  -oral surgery appt tomorrow  -JESSICA next week  -no contraindication to proceeding with TAVR when scheduled    3. Congestive heart failure. Management per cardiology.     Above plan was discussed in detail with patient and his wife at chairside     Antibiotics:  Amoxicillin      Subjective:      Patient ID: Jamaal Mathew is a 69 y.o. male.    Patient is following up for ongoing L1-2 spinal osteomyelitis/diskitis treatment, s/p IV antibiotic treatment of aortic valve endocarditis, now on amoxicillin. Patient has no new back pain and no new weakness of the B/L LE. He has no fever, chills, sweats, shakes; no nausea, vomiting, abdominal pain, diarrhea, or dysuria; no cough, shortness of breath, or chest pain. No new symptoms. He is in the process of getting his teeth extracted. The following portions of the patient's history were reviewed and updated as appropriate: allergies, current medications, past family history, past medical history, past social history, past surgical history and problem list.    Review of Systems   Constitutional: Negative for chills and fever. HENT: Positive for dental problem. Respiratory: Negative for cough and shortness of breath. Cardiovascular: Negative for chest pain. Gastrointestinal: Negative for diarrhea, nausea and vomiting. Musculoskeletal: Negative for back pain. Skin: Negative for rash. All other systems reviewed and are negative. Objective:      /70   Pulse 74   Temp 98.4 °F (36.9 °C)   Resp 18   Ht 5' 8" (1.727 m)   Wt 63.5 kg (140 lb)   SpO2 97%   BMI 21.29 kg/m²          Physical Exam  Vitals reviewed. Constitutional:       Appearance: Normal appearance. HENT:      Head: Normocephalic and atraumatic. Right Ear: External ear normal.      Left Ear: External ear normal.      Mouth/Throat:      Mouth: Mucous membranes are moist.      Comments: Poor dentition; missing and broken teeth. Eyes:      Extraocular Movements: Extraocular movements intact. Pupils: Pupils are equal, round, and reactive to light. Cardiovascular:      Rate and Rhythm: Normal rate. Heart sounds: Normal heart sounds. Pulmonary:      Effort: Pulmonary effort is normal.      Breath sounds: Normal breath sounds. Abdominal:      General: Abdomen is flat.  Bowel sounds are normal.      Palpations: Abdomen is soft. Musculoskeletal:      Cervical back: Normal range of motion and neck supple. Right lower leg: No edema. Left lower leg: No edema. Skin:     Findings: No rash. Neurological:      General: No focal deficit present. Mental Status: He is alert and oriented to person, place, and time.    Psychiatric:         Mood and Affect: Mood normal.

## 2023-07-25 NOTE — TELEPHONE ENCOUNTER
Left voicemail for the patient relaying the message above. Advised patient to call back with any questions or concerns.

## 2023-07-26 ENCOUNTER — PATIENT MESSAGE (OUTPATIENT)
Dept: FAMILY MEDICINE CLINIC | Facility: CLINIC | Age: 78
End: 2023-07-26

## 2023-07-26 ENCOUNTER — OFFICE VISIT (OUTPATIENT)
Dept: INFECTIOUS DISEASES | Facility: CLINIC | Age: 78
End: 2023-07-26
Payer: COMMERCIAL

## 2023-07-26 VITALS
HEIGHT: 68 IN | HEART RATE: 74 BPM | BODY MASS INDEX: 21.22 KG/M2 | DIASTOLIC BLOOD PRESSURE: 70 MMHG | RESPIRATION RATE: 18 BRPM | TEMPERATURE: 98.4 F | WEIGHT: 140 LBS | OXYGEN SATURATION: 97 % | SYSTOLIC BLOOD PRESSURE: 126 MMHG

## 2023-07-26 DIAGNOSIS — I50.30 (HFPEF) HEART FAILURE WITH PRESERVED EJECTION FRACTION (HCC): ICD-10-CM

## 2023-07-26 DIAGNOSIS — I50.9 CONGESTIVE HEART FAILURE, UNSPECIFIED HF CHRONICITY, UNSPECIFIED HEART FAILURE TYPE (HCC): ICD-10-CM

## 2023-07-26 DIAGNOSIS — I33.0 ACUTE BACTERIAL ENDOCARDITIS: ICD-10-CM

## 2023-07-26 DIAGNOSIS — M46.46 DISCITIS OF LUMBAR REGION: Primary | ICD-10-CM

## 2023-07-26 PROCEDURE — 99214 OFFICE O/P EST MOD 30 MIN: CPT | Performed by: PHYSICIAN ASSISTANT

## 2023-07-26 RX ORDER — TORSEMIDE 20 MG/1
20 TABLET ORAL 2 TIMES DAILY
Qty: 90 TABLET | Refills: 0 | Status: SHIPPED | OUTPATIENT
Start: 2023-07-26

## 2023-07-26 NOTE — TELEPHONE ENCOUNTER
From: Surendra Leslie  To: Lito Oar  Sent: 7/26/2023 10:50 AM EDT  Subject: Need torsimide refill    Hello, we have been trying to get this medication refilled by Dr. Ava Guerrier but nobody is responding to the pharmacy. I have been out of this important medication for a few days. Would you be able to refill? Thank you!

## 2023-08-09 ENCOUNTER — OFFICE VISIT (OUTPATIENT)
Dept: CARDIOLOGY CLINIC | Facility: CLINIC | Age: 78
End: 2023-08-09
Payer: COMMERCIAL

## 2023-08-09 VITALS
DIASTOLIC BLOOD PRESSURE: 72 MMHG | HEIGHT: 68 IN | BODY MASS INDEX: 20.72 KG/M2 | SYSTOLIC BLOOD PRESSURE: 114 MMHG | WEIGHT: 136.7 LBS | HEART RATE: 76 BPM | OXYGEN SATURATION: 98 %

## 2023-08-09 DIAGNOSIS — I33.0 ACUTE BACTERIAL ENDOCARDITIS: ICD-10-CM

## 2023-08-09 DIAGNOSIS — I35.0 MODERATE TO SEVERE AORTIC STENOSIS: ICD-10-CM

## 2023-08-09 DIAGNOSIS — I50.30 (HFPEF) HEART FAILURE WITH PRESERVED EJECTION FRACTION (HCC): Primary | ICD-10-CM

## 2023-08-09 DIAGNOSIS — I48.0 PAROXYSMAL ATRIAL FIBRILLATION (HCC): ICD-10-CM

## 2023-08-09 DIAGNOSIS — I35.1 MODERATE AORTIC INSUFFICIENCY: ICD-10-CM

## 2023-08-09 PROCEDURE — 99214 OFFICE O/P EST MOD 30 MIN: CPT | Performed by: INTERNAL MEDICINE

## 2023-08-09 NOTE — H&P (VIEW-ONLY)
Cardiology Outpatient Progress Note - William Prescott 69 y.o. male MRN: [id number]    Encounter: [id number]      Assessment/Plan:    Patient Active Problem List    Diagnosis Date Noted   • Syncope 07/07/2023   • Left-sided chest wall pain 07/07/2023   • Recent aortic valve endocarditis due to Strep bactermia     • PICC (peripherally inserted central catheter) in place    • Abnormal abdominal CT scan 06/16/2023   • Acute respiratory failure ([address]) 06/16/2023   • Acute kidney injury ([address]) 06/12/2023   • History of osteomyelitis 06/12/2023   • (HFpEF) heart failure with preserved ejection fraction ([address]) 06/12/2023   • Insomnia 06/12/2023   • Anxiety 06/01/2023   • Acute bacterial endocarditis 05/20/2023   • Gastric distention 05/20/2023   • Discitis of lumbar region 05/18/2023   • Bacteremia due to Streptococcus 05/18/2023   • Non-ischemic myocardial injury (non-traumatic) 05/17/2023   • Shortness of breath 05/16/2023   • Chronic pain syndrome 05/15/2023   • Lumbar spondylosis 05/15/2023   • Chronic bilateral low back pain without sciatica 05/03/2023   • Primary hypertension 05/03/2023   • Atrial fibrillation ([address]) 05/03/2023   • Aortic stenosis 05/03/2023   • Leukocytosis 05/03/2023   • Elevated D-dimer 05/03/2023   • Hyponatremia 05/02/2023   • Mixed hyperlipidemia 08/09/2021   • Abnormal glucose 08/09/2021   • BPH with obstruction/lower urinary tract symptoms 08/09/2021   • Primary osteoarthritis of right hand 04/25/2017       # H/o aortic valve endocarditis s/p IV antibiotics, then was on amoxicillin, seen by ID 7/26/23 and advised he amoxicillin can be discontinued, deemed no contraindication for proceeding with TAVR  # Moderate to severe aortic valve stenosis  # Moderate aortic valve insufficiency  # HFPEF, euvolemic to mildly volume up on exam  Diuretic: torsemide 20mg BID with potassium 20 meq BID; spironolactone 25mg daily - ran out a couple of days ago  # Paroysmal atrial fibrillation  Seen on EKG 5/3/23. Currently not on anticoagulation. Per chart review, patient did not want to be on anticoagulation due to bleeding risk, he was also scheduled for a biopsy at that time. He is unsure at the moment if he wants to be on anticoagulation. Discussed risks and benefits of anticoagulation and will be thinking about it. Studies- personally reviewed by me    JESSICA 6/14/23:  Normal LV systolic function  Aortic valve leaflets are severely calcified, normal mobility, moderate regurgitation. Moderate to severe stenosis visually. Small mobile vegetation at the commissure of the LCC and on CC. Very small mobile vegetation noted tip of the RCC  Normal RV size and systolic function  Mild MR  Mild TR  No PFO on color Doppler  No significant change compared to prior study 5/19/2023. TTE 6/13/2023  LVEF: 55%  LVIDd: 5.1cm  RV: Normal size and systolic function  MR: Mild  AV: moderate to severe stenosis, moderate AI  PASP: 75 mmHg, mild to moderate TR, estimated RA pressure of 10 mmHg  RVOT: Unable to review  Other: Grade 2 diastology, no obvious endocarditis    JESSICA 5/19/2023  LVEF 65%. Moderately increased wall thickness. No PFO on color Doppler. No left atrial appendage thrombus. Moderate aortic valve regurgitation, moderate to severe stenosis aortic valve area of 0.98 cm². Mean gradient 29 mmHg. 3 small, mobile vegetations present in the aortic aspect of the valve. All located at the 3 commissures  The one located at the commissure of the non and left coronary cusps appears to be associated with a small perforation. Mild MR  Mild TR  Normal RV size and systolic function    TTE 4/9/5803:  Normal biventricular size and systolic function. Mild AI. Moderate to severe aortic valve stenosis. Mean gradient 35 mmHg.     TTE 7/19/2021:'  LVEF 60%  Grade 1 diastology  Trace MR  Moderate aortic valve stenosis, mean gradient 30 mmHg, aortic valve area 1.2 cm2 continuity equation     Diet:  2 g sodium diet  2000 mL fluid restriction    Therapeutic:  Torsemide 20 mg twice daily with potassium 40 mEq twice daily    Today's Plan:  Euvolemic on current diuretic dose  ID cleared patient for TAVR  Patient would like to pursue TAVR/SAVR evaluation at [facility]  Patient still would like to hold off on anticoagulation for afib, discussed extended ambulatory holter monitor and possible loop recorder implant if holter unrevealing. He would like to defer until evaluation for AVR completed. HPI:   69 M with recently diagnosed native aortic valve endocarditis, moderate to severe AS and moderate AI, chronic HFpEF, paroxysmal atrial fibrillation who presented with shortness of breath. Recent admission to [facility] from 5/16/2023 - 5/23/2023 after presenting with back pain. He was found to have lumbar discitis osteomyelitis on MRI. JESSICA was ordered in the setting of bacteremia and demonstrated small mobile vegetations on the aortic aspect of the aortic valve, moderate to severe AS, moderate AI. He was treated with IV diuretics and subsequently discharged on torsemide 40 mg daily. He then had increasing LUNA, LE swelling, and PND. He called the cardiology office and his torsemide was increased to 40 mg twice daily. He did notice an initial improvement but then had recurrence of symptoms the night prior to admission and so presented for evaluation. He was diuresed with IV lasix. He presented to the [address] [place] [address] on 6/12/2023 with complaints of generalized weakness and for further evaluation of abnormal lab data. On admission he was found to have an acute kidney injury as well as hypokalemia. His usual home oral diuretics (spironolactone and torsemide) were placed on hold, started on IV fluids for hydration, and electrolytes were replaced. Cardiology was reconsulted at the request of the ID service for reassessment of his known AV endocarditis/valvular vegetations with repeat JESSICA imaging during this hospitalization.  JESSICA showed similar findings as prior. Admitted 7/7/23 with syncope thought to be due to dehydration, torsemide decreased from 40mg to 20mg daily. He still reports shortness of breath but no leg swelling, PND, orthopnea or abdominal distension. Also with intermittent episodes of dizziness/lightheadedness and fatigue. Recently seen at [facility] for TAVR evaluation. Interval History:  8/9/23: here for follow up. Na 136 K 3.4 and creatinine 0.94. Increased potassium to 40meq BID. He continues to do well. No leg swelling or shortness of breath on current level of exertion. No PND or orthopnea. Seen by ID and stopped amoxicillin. He had his teeth extraction. Past Medical History:   Diagnosis Date   • CHF (congestive heart failure) (HCC)    • Discitis    • Endocarditis        Review of Systems   Constitutional: Positive for fatigue. Negative for chills and fever. HENT: Negative for ear pain and sore throat. Eyes: Negative for pain and visual disturbance. Respiratory: Negative for cough and shortness of breath. Cardiovascular: Negative for chest pain, palpitations and leg swelling. Gastrointestinal: Negative for abdominal distention, abdominal pain and vomiting. Genitourinary: Negative for dysuria and hematuria. Musculoskeletal: Negative for arthralgias and back pain. Skin: Negative for color change and rash. Neurological: Negative for dizziness, seizures, syncope and light-headedness. All other systems reviewed and are negative. No Known Allergies  .     Current Outpatient Medications:   •  potassium chloride (K-DUR,KLOR-CON) 20 mEq tablet, Take 2 tablets (40 mEq total) by mouth 2 (two) times a day, Disp: 120 tablet, Rfl: 5  •  torsemide (DEMADEX) 20 mg tablet, Take 1 tablet (20 mg total) by mouth 2 (two) times a day, Disp: 90 tablet, Rfl: 0  •  acetaminophen (TYLENOL) 325 mg tablet, Take 2 tablets (650 mg total) by mouth every 6 (six) hours as needed for mild pain (Patient not taking: Reported on 2023), Disp: , Rfl: 0  •  lidocaine (LIDODERM) 5 %, Apply 1 patch topically over 12 hours every 24 hours Remove & Discard patch within 12 hours or as directed by MD (Patient not taking: Reported on 2023), Disp: 5 patch, Rfl: 0  •  Multiple Vitamins-Minerals (PRESERVISION AREDS PO), Take 5 mg by mouth 2 (two) times a day Two tablets daily (Patient not taking: Reported on 2023), Disp: , Rfl:     Social History     Socioeconomic History   • Marital status: /Civil Union     Spouse name: Not on file   • Number of children: Not on file   • Years of education: Not on file   • Highest education level: Not on file   Occupational History   • Not on file   Tobacco Use   • Smoking status: Former     Packs/day: 0.50     Years: 20.00     Total pack years: 10.00     Types: Cigarettes     Quit date: 3/11/2005     Years since quittin.4   • Smokeless tobacco: Never   Vaping Use   • Vaping Use: Never used   Substance and Sexual Activity   • Alcohol use: Not Currently   • Drug use: Not Currently   • Sexual activity: Not on file   Other Topics Concern   • Not on file   Social History Narrative   • Not on file     Social Determinants of Health     Financial Resource Strain: Not on file   Food Insecurity: No Food Insecurity (2023)    Hunger Vital Sign    • Worried About Running Out of Food in the Last Year: Never true    • Ran Out of Food in the Last Year: Never true   Transportation Needs: No Transportation Needs (2023)    PRAPARE - Transportation    • Lack of Transportation (Medical): No    • Lack of Transportation (Non-Medical):  No   Physical Activity: Not on file   Stress: Not on file   Social Connections: Not on file   Intimate Partner Violence: Not on file   Housing Stability: Low Risk  (2023)    Housing Stability Vital Sign    • Unable to Pay for Housing in the Last Year: No    • Number of Places Lived in the Last Year: 1    • Unstable Housing in the Last Year: No Family History   Problem Relation Age of Onset   • Colon cancer Mother    • Lung cancer Father    • Mental illness Neg Hx        Physical Exam:    Vitals: Blood pressure 114/72, pulse 76, height 5' 8" (1.727 m), weight 62 kg (136 lb 11.2 oz), SpO2 98 %. , Body mass index is 20.79 kg/m².,   Wt Readings from Last 3 Encounters:   08/09/23 62 kg (136 lb 11.2 oz)   07/26/23 63.5 kg (140 lb)   07/19/23 62.6 kg (138 lb)       Physical Exam  Constitutional:       General: He is not in acute distress. Appearance: Normal appearance. HENT:      Head: Normocephalic and atraumatic. Mouth/Throat:      Mouth: Mucous membranes are moist.   Eyes:      General: No scleral icterus. Extraocular Movements: Extraocular movements intact. Cardiovascular:      Rate and Rhythm: Normal rate and regular rhythm. Pulses: Normal pulses. Heart sounds: S1 normal and S2 normal. Murmur heard. Systolic murmur is present with a grade of 3/6. Diastolic murmur is present with a grade of 2/4. No friction rub. No gallop. Comments: Nonelevated JVP  Pulmonary:      Effort: Pulmonary effort is normal.      Breath sounds: Normal breath sounds. Abdominal:      General: There is no distension. Palpations: Abdomen is soft. Tenderness: There is no abdominal tenderness. There is no guarding or rebound. Musculoskeletal:         General: Normal range of motion. Cervical back: Neck supple. Right lower leg: No edema. Left lower leg: No edema. Skin:     General: Skin is warm and dry. Capillary Refill: Capillary refill takes less than 2 seconds. Neurological:      General: No focal deficit present. Mental Status: He is alert and oriented to person, place, and time.    Psychiatric:         Mood and Affect: Mood normal.         Labs & Results:    Lab Results   Component Value Date    WBC 9.48 07/19/2023    HGB 11.1 (L) 07/19/2023    HCT 35.1 (L) 07/19/2023    MCV 92 07/19/2023  07/19/2023     Lab Results   Component Value Date    SODIUM 136 07/19/2023    K 3.4 (L) 07/19/2023     07/19/2023    CO2 29 07/19/2023    BUN 24 07/19/2023    CREATININE 0.94 07/19/2023    GLUC 101 07/19/2023    CALCIUM 9.0 07/19/2023     No results found for: "NTBNP"   Lab Results   Component Value Date    CHOLESTEROL 149 05/11/2023    CHOLESTEROL 200 (H) 07/20/2021    CHOLESTEROL 134 01/16/2019     Lab Results   Component Value Date    HDL 72 05/11/2023    HDL 70 07/20/2021    HDL 46 01/16/2019     Lab Results   Component Value Date    TRIG 68 05/11/2023    TRIG 78 07/20/2021    TRIG 59 01/16/2019     No results found for: "[address]"    EKG personally reviewed by Fazal Dacosta MD.     Counseling / Coordination of Care  Time spent today 40 minutes. Greater than 50% of total time was spent with the patient and / or family counseling and / or coordination of care. We discussed diagnoses, most recent studies and any changes in treatment    Thank you for the opportunity to participate in the care of this patient.     Fazal Dacosta MD  ADVANCED HEART FAILURE [facility]CHAN[facility]CULATORY SUPPORT  [address]

## 2023-08-09 NOTE — PATIENT INSTRUCTIONS
Continue current medications  Obtain BMP  Follow up at [facility] [place] for TAVR evaluation as scheduled  2g sodium diet  2L fluid restriction  Daily weights

## 2023-08-09 NOTE — PROGRESS NOTES
Cardiology Outpatient Progress Note - Jaida Longoria 69 y.o. male MRN: [id number]    Encounter: [id number]      Assessment/Plan:    Patient Active Problem List    Diagnosis Date Noted   • Syncope 07/07/2023   • Left-sided chest wall pain 07/07/2023   • Recent aortic valve endocarditis due to Strep bactermia     • PICC (peripherally inserted central catheter) in place    • Abnormal abdominal CT scan 06/16/2023   • Acute respiratory failure ([address]) 06/16/2023   • Acute kidney injury ([address]) 06/12/2023   • History of osteomyelitis 06/12/2023   • (HFpEF) heart failure with preserved ejection fraction ([address]) 06/12/2023   • Insomnia 06/12/2023   • Anxiety 06/01/2023   • Acute bacterial endocarditis 05/20/2023   • Gastric distention 05/20/2023   • Discitis of lumbar region 05/18/2023   • Bacteremia due to Streptococcus 05/18/2023   • Non-ischemic myocardial injury (non-traumatic) 05/17/2023   • Shortness of breath 05/16/2023   • Chronic pain syndrome 05/15/2023   • Lumbar spondylosis 05/15/2023   • Chronic bilateral low back pain without sciatica 05/03/2023   • Primary hypertension 05/03/2023   • Atrial fibrillation ([address]) 05/03/2023   • Aortic stenosis 05/03/2023   • Leukocytosis 05/03/2023   • Elevated D-dimer 05/03/2023   • Hyponatremia 05/02/2023   • Mixed hyperlipidemia 08/09/2021   • Abnormal glucose 08/09/2021   • BPH with obstruction/lower urinary tract symptoms 08/09/2021   • Primary osteoarthritis of right hand 04/25/2017       # H/o aortic valve endocarditis s/p IV antibiotics, then was on amoxicillin, seen by ID 7/26/23 and advised he amoxicillin can be discontinued, deemed no contraindication for proceeding with TAVR  # Moderate to severe aortic valve stenosis  # Moderate aortic valve insufficiency  # HFPEF, euvolemic to mildly volume up on exam  Diuretic: torsemide 20mg BID with potassium 20 meq BID; spironolactone 25mg daily - ran out a couple of days ago  # Paroysmal atrial fibrillation  Seen on EKG 5/3/23. Currently not on anticoagulation. Per chart review, patient did not want to be on anticoagulation due to bleeding risk, he was also scheduled for a biopsy at that time. He is unsure at the moment if he wants to be on anticoagulation. Discussed risks and benefits of anticoagulation and will be thinking about it. Studies- personally reviewed by me    JESSICA 6/14/23:  Normal LV systolic function  Aortic valve leaflets are severely calcified, normal mobility, moderate regurgitation. Moderate to severe stenosis visually. Small mobile vegetation at the commissure of the LCC and on CC. Very small mobile vegetation noted tip of the RCC  Normal RV size and systolic function  Mild MR  Mild TR  No PFO on color Doppler  No significant change compared to prior study 5/19/2023. TTE 6/13/2023  LVEF: 55%  LVIDd: 5.1cm  RV: Normal size and systolic function  MR: Mild  AV: moderate to severe stenosis, moderate AI  PASP: 75 mmHg, mild to moderate TR, estimated RA pressure of 10 mmHg  RVOT: Unable to review  Other: Grade 2 diastology, no obvious endocarditis    JESSICA 5/19/2023  LVEF 65%. Moderately increased wall thickness. No PFO on color Doppler. No left atrial appendage thrombus. Moderate aortic valve regurgitation, moderate to severe stenosis aortic valve area of 0.98 cm². Mean gradient 29 mmHg. 3 small, mobile vegetations present in the aortic aspect of the valve. All located at the 3 commissures  The one located at the commissure of the non and left coronary cusps appears to be associated with a small perforation. Mild MR  Mild TR  Normal RV size and systolic function    TTE 7/0/3003:  Normal biventricular size and systolic function. Mild AI. Moderate to severe aortic valve stenosis. Mean gradient 35 mmHg.     TTE 7/19/2021:'  LVEF 60%  Grade 1 diastology  Trace MR  Moderate aortic valve stenosis, mean gradient 30 mmHg, aortic valve area 1.2 cm2 continuity equation     Diet:  2 g sodium diet  2000 mL fluid restriction    Therapeutic:  Torsemide 20 mg twice daily with potassium 40 mEq twice daily    Today's Plan:  Euvolemic on current diuretic dose  ID cleared patient for TAVR  Patient would like to pursue TAVR/[facility]VR evaluation at [facility]  Patient still would like to hold off on anticoagulation for afib, discussed extended ambulatory holter monitor and possible loop recorder implant if holter unrevealing. He would like to defer until evaluation for AVR completed. HPI:   69 M with recently diagnosed native aortic valve endocarditis, moderate to severe AS and moderate AI, chronic HFpEF, paroxysmal atrial fibrillation who presented with shortness of breath. Recent admission to [facility] from 5/16/2023 - 5/23/2023 after presenting with back pain. He was found to have lumbar discitis osteomyelitis on MRI. JESSICA was ordered in the setting of bacteremia and demonstrated small mobile vegetations on the aortic aspect of the aortic valve, moderate to severe AS, moderate AI. He was treated with IV diuretics and subsequently discharged on torsemide 40 mg daily. He then had increasing LUNA, LE swelling, and PND. He called the cardiology office and his torsemide was increased to 40 mg twice daily. He did notice an initial improvement but then had recurrence of symptoms the night prior to admission and so presented for evaluation. He was diuresed with IV lasix. He presented to the [address] [place] [address] on 6/12/2023 with complaints of generalized weakness and for further evaluation of abnormal lab data. On admission he was found to have an acute kidney injury as well as hypokalemia. His usual home oral diuretics (spironolactone and torsemide) were placed on hold, started on IV fluids for hydration, and electrolytes were replaced. Cardiology was reconsulted at the request of the ID service for reassessment of his known AV endocarditis/valvular vegetations with repeat JESSICA imaging during this hospitalization.  JESSICA showed similar findings as prior. Admitted 7/7/23 with syncope thought to be due to dehydration, torsemide decreased from 40mg to 20mg daily. He still reports shortness of breath but no leg swelling, PND, orthopnea or abdominal distension. Also with intermittent episodes of dizziness/lightheadedness and fatigue. Recently seen at [facility] for TAVR evaluation. Interval History:  8/9/23: here for follow up. Na 136 K 3.4 and creatinine 0.94. Increased potassium to 40meq BID. He continues to do well. No leg swelling or shortness of breath on current level of exertion. No PND or orthopnea. Seen by ID and stopped amoxicillin. He had his teeth extraction. Past Medical History:   Diagnosis Date   • CHF (congestive heart failure) (HCC)    • Discitis    • Endocarditis        Review of Systems   Constitutional: Positive for fatigue. Negative for chills and fever. HENT: Negative for ear pain and sore throat. Eyes: Negative for pain and visual disturbance. Respiratory: Negative for cough and shortness of breath. Cardiovascular: Negative for chest pain, palpitations and leg swelling. Gastrointestinal: Negative for abdominal distention, abdominal pain and vomiting. Genitourinary: Negative for dysuria and hematuria. Musculoskeletal: Negative for arthralgias and back pain. Skin: Negative for color change and rash. Neurological: Negative for dizziness, seizures, syncope and light-headedness. All other systems reviewed and are negative. No Known Allergies  .     Current Outpatient Medications:   •  potassium chloride (K-DUR,KLOR-CON) 20 mEq tablet, Take 2 tablets (40 mEq total) by mouth 2 (two) times a day, Disp: 120 tablet, Rfl: 5  •  torsemide (DEMADEX) 20 mg tablet, Take 1 tablet (20 mg total) by mouth 2 (two) times a day, Disp: 90 tablet, Rfl: 0  •  acetaminophen (TYLENOL) 325 mg tablet, Take 2 tablets (650 mg total) by mouth every 6 (six) hours as needed for mild pain (Patient not taking: Reported on 2023), Disp: , Rfl: 0  •  lidocaine (LIDODERM) 5 %, Apply 1 patch topically over 12 hours every 24 hours Remove & Discard patch within 12 hours or as directed by MD (Patient not taking: Reported on 2023), Disp: 5 patch, Rfl: 0  •  Multiple Vitamins-Minerals (PRESERVISION AREDS PO), Take 5 mg by mouth 2 (two) times a day Two tablets daily (Patient not taking: Reported on 2023), Disp: , Rfl:     Social History     Socioeconomic History   • Marital status: /Civil Union     Spouse name: Not on file   • Number of children: Not on file   • Years of education: Not on file   • Highest education level: Not on file   Occupational History   • Not on file   Tobacco Use   • Smoking status: Former     Packs/day: 0.50     Years: 20.00     Total pack years: 10.00     Types: Cigarettes     Quit date: 3/11/2005     Years since quittin.4   • Smokeless tobacco: Never   Vaping Use   • Vaping Use: Never used   Substance and Sexual Activity   • Alcohol use: Not Currently   • Drug use: Not Currently   • Sexual activity: Not on file   Other Topics Concern   • Not on file   Social History Narrative   • Not on file     Social Determinants of Health     Financial Resource Strain: Not on file   Food Insecurity: No Food Insecurity (2023)    Hunger Vital Sign    • Worried About Running Out of Food in the Last Year: Never true    • Ran Out of Food in the Last Year: Never true   Transportation Needs: No Transportation Needs (2023)    PRAPARE - Transportation    • Lack of Transportation (Medical): No    • Lack of Transportation (Non-Medical):  No   Physical Activity: Not on file   Stress: Not on file   Social Connections: Not on file   Intimate Partner Violence: Not on file   Housing Stability: Low Risk  (2023)    Housing Stability Vital Sign    • Unable to Pay for Housing in the Last Year: No    • Number of Places Lived in the Last Year: 1    • Unstable Housing in the Last Year: No Family History   Problem Relation Age of Onset   • Colon cancer Mother    • Lung cancer Father    • Mental illness Neg Hx        Physical Exam:    Vitals: Blood pressure 114/72, pulse 76, height 5' 8" (1.727 m), weight 62 kg (136 lb 11.2 oz), SpO2 98 %. , Body mass index is 20.79 kg/m².,   Wt Readings from Last 3 Encounters:   08/09/23 62 kg (136 lb 11.2 oz)   07/26/23 63.5 kg (140 lb)   07/19/23 62.6 kg (138 lb)       Physical Exam  Constitutional:       General: He is not in acute distress. Appearance: Normal appearance. HENT:      Head: Normocephalic and atraumatic. Mouth/Throat:      Mouth: Mucous membranes are moist.   Eyes:      General: No scleral icterus. Extraocular Movements: Extraocular movements intact. Cardiovascular:      Rate and Rhythm: Normal rate and regular rhythm. Pulses: Normal pulses. Heart sounds: S1 normal and S2 normal. Murmur heard. Systolic murmur is present with a grade of 3/6. Diastolic murmur is present with a grade of 2/4. No friction rub. No gallop. Comments: Nonelevated JVP  Pulmonary:      Effort: Pulmonary effort is normal.      Breath sounds: Normal breath sounds. Abdominal:      General: There is no distension. Palpations: Abdomen is soft. Tenderness: There is no abdominal tenderness. There is no guarding or rebound. Musculoskeletal:         General: Normal range of motion. Cervical back: Neck supple. Right lower leg: No edema. Left lower leg: No edema. Skin:     General: Skin is warm and dry. Capillary Refill: Capillary refill takes less than 2 seconds. Neurological:      General: No focal deficit present. Mental Status: He is alert and oriented to person, place, and time.    Psychiatric:         Mood and Affect: Mood normal.         Labs & Results:    Lab Results   Component Value Date    WBC 9.48 07/19/2023    HGB 11.1 (L) 07/19/2023    HCT 35.1 (L) 07/19/2023    MCV 92 07/19/2023  07/19/2023     Lab Results   Component Value Date    SODIUM 136 07/19/2023    K 3.4 (L) 07/19/2023     07/19/2023    CO2 29 07/19/2023    BUN 24 07/19/2023    CREATININE 0.94 07/19/2023    GLUC 101 07/19/2023    CALCIUM 9.0 07/19/2023     No results found for: "NTBNP"   Lab Results   Component Value Date    CHOLESTEROL 149 05/11/2023    CHOLESTEROL 200 (H) 07/20/2021    CHOLESTEROL 134 01/16/2019     Lab Results   Component Value Date    HDL 72 05/11/2023    HDL 70 07/20/2021    HDL 46 01/16/2019     Lab Results   Component Value Date    TRIG 68 05/11/2023    TRIG 78 07/20/2021    TRIG 59 01/16/2019     No results found for: "[address]"    EKG personally reviewed by Krupa Canela MD.     Counseling / Coordination of Care  Time spent today 40 minutes. Greater than 50% of total time was spent with the patient and / or family counseling and / or coordination of care. We discussed diagnoses, most recent studies and any changes in treatment    Thank you for the opportunity to participate in the care of this patient.     Krupa Canela MD  ADVANCED HEART FAILURE AND MECHANICAL CIRCULATORY SUPPORT  [address]

## 2023-08-10 ENCOUNTER — TELEPHONE (OUTPATIENT)
Dept: NEUROSURGERY | Facility: CLINIC | Age: 78
End: 2023-08-10

## 2023-08-14 ENCOUNTER — TELEPHONE (OUTPATIENT)
Dept: CARDIOLOGY CLINIC | Facility: CLINIC | Age: 78
End: 2023-08-14

## 2023-08-14 DIAGNOSIS — I50.30 HEART FAILURE WITH PRESERVED EJECTION FRACTION, UNSPECIFIED HF CHRONICITY (HCC): Primary | ICD-10-CM

## 2023-08-14 NOTE — TELEPHONE ENCOUNTER
Received a call from [address] [place] [address], from The [address] [place] [address] Road, in regard patient is getting a valve replacement work out, patient had a CTA test done, patient will need to have a cardiac cath procedure prior to the Surgery. Dr Debby Wetzel, this patient is under your care. Can you please place the pre for procedure order for this cardiac cath to be schedule. Thank you.

## 2023-08-15 ENCOUNTER — TELEPHONE (OUTPATIENT)
Dept: CARDIOLOGY CLINIC | Facility: CLINIC | Age: 78
End: 2023-08-15

## 2023-08-15 NOTE — TELEPHONE ENCOUNTER
Pt had CT angio prior to the Valve replacement. The results were non diagnostic. They would like a cath. Ok to order?       Please advise

## 2023-08-16 NOTE — TELEPHONE ENCOUNTER
Called patient in regard cardiac cath to be schedule as a part of pre-testing for Valve replace procedure. LVM to call us back.

## 2023-08-16 NOTE — TELEPHONE ENCOUNTER
Trevon Holt approved Fredrick Willis # [id number] for [id number] @ [facility]. Dr. Adwoa Poe.   Valid 8/16/23-2/12/24

## 2023-08-16 NOTE — TELEPHONE ENCOUNTER
Patient scheduled for LHC at [facility] on 8/24/2023  with Dr Abraham  Patient aware of general instructions, mail out with labs order.   Meds holds: Torsemide to hold the morning of the procedure.     Can we please check insurance for approval.

## 2023-08-17 ENCOUNTER — APPOINTMENT (OUTPATIENT)
Dept: LAB | Facility: HOSPITAL | Age: 78
End: 2023-08-17
Payer: COMMERCIAL

## 2023-08-17 LAB
ALBUMIN SERPL BCP-MCNC: 3.5 G/DL (ref 3.5–5)
ALP SERPL-CCNC: 63 U/L (ref 34–104)
ALT SERPL W P-5'-P-CCNC: 13 U/L (ref 7–52)
ANION GAP SERPL CALCULATED.3IONS-SCNC: 6 MMOL/L
AST SERPL W P-5'-P-CCNC: 16 U/L (ref 13–39)
BASOPHILS # BLD AUTO: 0.04 THOUSANDS/ÂΜL (ref 0–0.1)
BASOPHILS NFR BLD AUTO: 1 % (ref 0–1)
BILIRUB SERPL-MCNC: 0.31 MG/DL (ref 0.2–1)
BUN SERPL-MCNC: 21 MG/DL (ref 5–25)
CALCIUM SERPL-MCNC: 8.8 MG/DL (ref 8.4–10.2)
CHLORIDE SERPL-SCNC: 104 MMOL/L (ref 96–108)
CO2 SERPL-SCNC: 27 MMOL/L (ref 21–32)
CREAT SERPL-MCNC: 1.18 MG/DL (ref 0.6–1.3)
EOSINOPHIL # BLD AUTO: 0.29 THOUSAND/ÂΜL (ref 0–0.61)
EOSINOPHIL NFR BLD AUTO: 4 % (ref 0–6)
ERYTHROCYTE [DISTWIDTH] IN BLOOD BY AUTOMATED COUNT: 15.9 % (ref 11.6–15.1)
GFR SERPL CREATININE-BSD FRML MDRD: 59 ML/MIN/1.73SQ M
GLUCOSE SERPL-MCNC: 109 MG/DL (ref 65–140)
HCT VFR BLD AUTO: 37.5 % (ref 36.5–49.3)
HGB BLD-MCNC: 12 G/DL (ref 12–17)
IMM GRANULOCYTES # BLD AUTO: 0.02 THOUSAND/UL (ref 0–0.2)
IMM GRANULOCYTES NFR BLD AUTO: 0 % (ref 0–2)
LYMPHOCYTES # BLD AUTO: 1.45 THOUSANDS/ÂΜL (ref 0.6–4.47)
LYMPHOCYTES NFR BLD AUTO: 22 % (ref 14–44)
MCH RBC QN AUTO: 29.7 PG (ref 26.8–34.3)
MCHC RBC AUTO-ENTMCNC: 32 G/DL (ref 31.4–37.4)
MCV RBC AUTO: 93 FL (ref 82–98)
MONOCYTES # BLD AUTO: 0.74 THOUSAND/ÂΜL (ref 0.17–1.22)
MONOCYTES NFR BLD AUTO: 11 % (ref 4–12)
NEUTROPHILS # BLD AUTO: 4.05 THOUSANDS/ÂΜL (ref 1.85–7.62)
NEUTS SEG NFR BLD AUTO: 62 % (ref 43–75)
NRBC BLD AUTO-RTO: 0 /100 WBCS
PLATELET # BLD AUTO: 306 THOUSANDS/UL (ref 149–390)
PMV BLD AUTO: 10.3 FL (ref 8.9–12.7)
POTASSIUM SERPL-SCNC: 4.1 MMOL/L (ref 3.5–5.3)
PROT SERPL-MCNC: 6.2 G/DL (ref 6.4–8.4)
PSA SERPL-MCNC: 0.99 NG/ML (ref 0–4)
RBC # BLD AUTO: 4.04 MILLION/UL (ref 3.88–5.62)
SODIUM SERPL-SCNC: 137 MMOL/L (ref 135–147)
WBC # BLD AUTO: 6.59 THOUSAND/UL (ref 4.31–10.16)

## 2023-08-17 PROCEDURE — 80053 COMPREHEN METABOLIC PANEL: CPT

## 2023-08-17 PROCEDURE — 85025 COMPLETE CBC W/AUTO DIFF WBC: CPT

## 2023-08-21 ENCOUNTER — TELEMEDICINE (OUTPATIENT)
Dept: FAMILY MEDICINE CLINIC | Facility: CLINIC | Age: 78
End: 2023-08-21
Payer: COMMERCIAL

## 2023-08-21 VITALS — HEIGHT: 68 IN | BODY MASS INDEX: 20.61 KG/M2 | WEIGHT: 136 LBS

## 2023-08-21 DIAGNOSIS — I33.0 ACUTE BACTERIAL ENDOCARDITIS: ICD-10-CM

## 2023-08-21 DIAGNOSIS — I10 PRIMARY HYPERTENSION: ICD-10-CM

## 2023-08-21 DIAGNOSIS — I48.0 PAROXYSMAL ATRIAL FIBRILLATION (HCC): Primary | ICD-10-CM

## 2023-08-21 PROBLEM — M19.041 PRIMARY OSTEOARTHRITIS OF RIGHT HAND: Status: RESOLVED | Noted: 2017-04-25 | Resolved: 2023-08-21

## 2023-08-21 PROBLEM — Z86.79 HISTORY OF ENDOCARDITIS: Status: ACTIVE | Noted: 2023-07-05

## 2023-08-21 PROCEDURE — 99213 OFFICE O/P EST LOW 20 MIN: CPT | Performed by: FAMILY MEDICINE

## 2023-08-21 NOTE — PROGRESS NOTES
Virtual Regular Visit    Verification of patient location:    Patient is located at Home in the following state in which I hold an active license [place]      Assessment/Plan:    Problem List Items Addressed This Visit        Cardiovascular and Mediastinum    Primary hypertension    Atrial fibrillation ([address]) - Primary    Acute bacterial endocarditis              Reason for visit is   Chief Complaint   Patient presents with   • Follow-up     Discuss update on health issues. • Virtual Regular Visit        Encounter provider Wayne Cortés MD    Provider located at [address] [id number]      Recent Visits  No visits were found meeting these conditions. Showing recent visits within past 7 days and meeting all other requirements  Today's Visits  Date Type Provider Dept   08/21/23 Telemedicine Wayne Cortés MD [facility]   Showing today's visits and meeting all other requirements  Future Appointments  No visits were found meeting these conditions. Showing future appointments within next 150 days and meeting all other requirements       The patient was identified by name and date of birth. Giovanni Thomas was informed that this is a telemedicine visit and that the visit is being conducted through the [employer]. He agrees to proceed. .  My office door was closed. No one else was in the room. He acknowledged consent and understanding of privacy and security of the video platform. The patient has agreed to participate and understands they can discontinue the visit at any time. Patient is aware this is a billable service. Subjective  Giovanni Thomas is a 69 y.o. male    .       PATIENT IS S/P RECENT HOSPITALIZATIONS FOR CHF AND ENDOCARDITIS  CURRENTLY WILL BE HAVING CARDIAC CATHETERIZATION THURSDAY - PRE TAVAR EVAL    DENIES ANY CP,PALPITATIONS  NO DIZZINESS  SOME MILD LUNA  NO FEVER OR CHILLS       Past Medical History:   Diagnosis Date   • CHF (congestive heart failure) (HCC)    • Discitis    • Endocarditis        Past Surgical History:   Procedure Laterality Date   • SKIN BIOPSY         Current Outpatient Medications   Medication Sig Dispense Refill   • acetaminophen (TYLENOL) 325 mg tablet Take 2 tablets (650 mg total) by mouth every 6 (six) hours as needed for mild pain  0   • Multiple Vitamins-Minerals (PRESERVISION AREDS PO) Take 5 mg by mouth 2 (two) times a day Two tablets daily     • potassium chloride (K-DUR,KLOR-CON) 20 mEq tablet Take 2 tablets (40 mEq total) by mouth 2 (two) times a day 120 tablet 5   • torsemide (DEMADEX) 20 mg tablet Take 1 tablet (20 mg total) by mouth 2 (two) times a day 90 tablet 0     No current facility-administered medications for this visit. No Known Allergies    Review of Systems   Constitutional: Negative for chills, fatigue and fever. HENT: Negative for congestion, ear discharge, ear pain, mouth sores, postnasal drip, sore throat and trouble swallowing. Eyes: Negative for pain, discharge and visual disturbance. Respiratory: Positive for shortness of breath. Negative for cough and wheezing. Cardiovascular: Negative for chest pain, palpitations and leg swelling. Gastrointestinal: Negative for abdominal distention, abdominal pain, blood in stool, diarrhea and nausea. Endocrine: Negative for polydipsia, polyphagia and polyuria. Genitourinary: Negative for dysuria, frequency, hematuria and urgency. Musculoskeletal: Negative for arthralgias, gait problem and joint swelling. Skin: Negative for pallor and rash. Neurological: Negative for dizziness, syncope, speech difficulty, weakness, light-headedness, numbness and headaches. Hematological: Negative for adenopathy. Psychiatric/Behavioral: Negative for behavioral problems, confusion and sleep disturbance. The patient is nervous/anxious.         Video Exam    Vitals:    08/21/23 1245   Weight: 61.7 kg (136 lb)   Height: 5' 8" (1.727 m)       Physical Exam     PATIENT VISUALLY APPEARS  IN NO OBVIOUS DISTRESS    Visit Time  Total Visit Duration: 15

## 2023-08-21 NOTE — PATIENT INSTRUCTIONS
CONTINUE CURRENT TREATMENT PLAN  MONITOR SYMPTOMS  MONITOR DIETARY SODIUM INTAKE    CALL WITH UPDATE AFTER CATH    CALL SOONER PRN

## 2023-08-21 NOTE — TELEPHONE ENCOUNTER
The last time I saw the patient, they were pursuing TAVR evaluation at [facility]. They self-referred there. Are they completing the work-up with [facility]?

## 2023-08-24 ENCOUNTER — TELEPHONE (OUTPATIENT)
Dept: CARDIOLOGY CLINIC | Facility: CLINIC | Age: 78
End: 2023-08-24

## 2023-08-24 ENCOUNTER — HOSPITAL ENCOUNTER (OUTPATIENT)
Facility: HOSPITAL | Age: 78
Setting detail: OUTPATIENT SURGERY
Discharge: HOME | End: 2023-08-24
Attending: INTERNAL MEDICINE | Admitting: INTERNAL MEDICINE
Payer: COMMERCIAL

## 2023-08-24 VITALS
RESPIRATION RATE: 16 BRPM | HEART RATE: 78 BPM | HEIGHT: 69 IN | DIASTOLIC BLOOD PRESSURE: 63 MMHG | TEMPERATURE: 96.8 F | OXYGEN SATURATION: 91 % | BODY MASS INDEX: 20.73 KG/M2 | WEIGHT: 140 LBS | SYSTOLIC BLOOD PRESSURE: 146 MMHG

## 2023-08-24 DIAGNOSIS — I38 PRE-OPERATIVE CARDIOVASCULAR EXAMINATION, VALVULAR HEART DISEASE: ICD-10-CM

## 2023-08-24 DIAGNOSIS — I50.9 CONGESTIVE HEART FAILURE, UNSPECIFIED HF CHRONICITY, UNSPECIFIED HEART FAILURE TYPE (HCC): ICD-10-CM

## 2023-08-24 DIAGNOSIS — Z01.810 PRE-OPERATIVE CARDIOVASCULAR EXAMINATION, VALVULAR HEART DISEASE: ICD-10-CM

## 2023-08-24 DIAGNOSIS — I25.10 CORONARY ARTERY DISEASE INVOLVING NATIVE CORONARY ARTERY: Primary | ICD-10-CM

## 2023-08-24 LAB
ANION GAP SERPL CALCULATED.3IONS-SCNC: 7 MMOL/L
ATRIAL RATE: 73 BPM
BUN SERPL-MCNC: 29 MG/DL (ref 5–25)
CALCIUM SERPL-MCNC: 8.8 MG/DL (ref 8.4–10.2)
CHLORIDE SERPL-SCNC: 105 MMOL/L (ref 96–108)
CO2 SERPL-SCNC: 26 MMOL/L (ref 21–32)
CREAT SERPL-MCNC: 0.99 MG/DL (ref 0.6–1.3)
GFR SERPL CREATININE-BSD FRML MDRD: 73 ML/MIN/1.73SQ M
GLUCOSE P FAST SERPL-MCNC: 110 MG/DL (ref 65–99)
GLUCOSE SERPL-MCNC: 110 MG/DL (ref 65–140)
P AXIS: 59 DEGREES
POTASSIUM SERPL-SCNC: 4.1 MMOL/L (ref 3.5–5.3)
PR INTERVAL: 168 MS
QRS AXIS: 24 DEGREES
QRSD INTERVAL: 110 MS
QT INTERVAL: 396 MS
QTC INTERVAL: 436 MS
SODIUM SERPL-SCNC: 138 MMOL/L (ref 135–147)
T WAVE AXIS: 100 DEGREES
VENTRICULAR RATE: 73 BPM

## 2023-08-24 PROCEDURE — C1894 INTRO/SHEATH, NON-LASER: HCPCS | Performed by: INTERNAL MEDICINE

## 2023-08-24 PROCEDURE — 99153 MOD SED SAME PHYS/QHP EA: CPT | Performed by: INTERNAL MEDICINE

## 2023-08-24 PROCEDURE — 99152 MOD SED SAME PHYS/QHP 5/>YRS: CPT | Performed by: INTERNAL MEDICINE

## 2023-08-24 PROCEDURE — C1769 GUIDE WIRE: HCPCS | Performed by: INTERNAL MEDICINE

## 2023-08-24 PROCEDURE — 93454 CORONARY ARTERY ANGIO S&I: CPT | Performed by: INTERNAL MEDICINE

## 2023-08-24 PROCEDURE — 93010 ELECTROCARDIOGRAM REPORT: CPT | Performed by: INTERNAL MEDICINE

## 2023-08-24 PROCEDURE — 80048 BASIC METABOLIC PNL TOTAL CA: CPT

## 2023-08-24 PROCEDURE — 93005 ELECTROCARDIOGRAM TRACING: CPT

## 2023-08-24 RX ORDER — METOPROLOL SUCCINATE 25 MG/1
25 TABLET, EXTENDED RELEASE ORAL DAILY
Qty: 90 TABLET | Refills: 3 | Status: SHIPPED | OUTPATIENT
Start: 2023-08-24

## 2023-08-24 RX ORDER — NITROGLYCERIN 0.4 MG/1
0.4 TABLET SUBLINGUAL ONCE AS NEEDED
Status: DISCONTINUED | OUTPATIENT
Start: 2023-08-24 | End: 2023-08-24 | Stop reason: HOSPADM

## 2023-08-24 RX ORDER — MIDAZOLAM HYDROCHLORIDE 2 MG/2ML
INJECTION, SOLUTION INTRAMUSCULAR; INTRAVENOUS CODE/TRAUMA/SEDATION MEDICATION
Status: DISCONTINUED | OUTPATIENT
Start: 2023-08-24 | End: 2023-08-24 | Stop reason: HOSPADM

## 2023-08-24 RX ORDER — ONDANSETRON 2 MG/ML
4 INJECTION INTRAMUSCULAR; INTRAVENOUS ONCE AS NEEDED
Status: DISCONTINUED | OUTPATIENT
Start: 2023-08-24 | End: 2023-08-24 | Stop reason: HOSPADM

## 2023-08-24 RX ORDER — ROSUVASTATIN CALCIUM 40 MG/1
40 TABLET, COATED ORAL DAILY
Qty: 90 TABLET | Refills: 3 | Status: SHIPPED | OUTPATIENT
Start: 2023-08-24

## 2023-08-24 RX ORDER — LIDOCAINE HYDROCHLORIDE 10 MG/ML
INJECTION, SOLUTION EPIDURAL; INFILTRATION; INTRACAUDAL; PERINEURAL CODE/TRAUMA/SEDATION MEDICATION
Status: DISCONTINUED | OUTPATIENT
Start: 2023-08-24 | End: 2023-08-24 | Stop reason: HOSPADM

## 2023-08-24 RX ORDER — FENTANYL CITRATE 50 UG/ML
INJECTION, SOLUTION INTRAMUSCULAR; INTRAVENOUS CODE/TRAUMA/SEDATION MEDICATION
Status: DISCONTINUED | OUTPATIENT
Start: 2023-08-24 | End: 2023-08-24 | Stop reason: HOSPADM

## 2023-08-24 RX ORDER — TORSEMIDE 20 MG/1
20 TABLET ORAL 2 TIMES DAILY
Qty: 90 TABLET | Refills: 0
Start: 2023-08-25

## 2023-08-24 RX ORDER — SODIUM CHLORIDE 9 MG/ML
50 INJECTION, SOLUTION INTRAVENOUS CONTINUOUS
Status: DISPENSED | OUTPATIENT
Start: 2023-08-24 | End: 2023-08-24

## 2023-08-24 RX ORDER — ASPIRIN 81 MG/1
324 TABLET, CHEWABLE ORAL ONCE
Status: COMPLETED | OUTPATIENT
Start: 2023-08-24 | End: 2023-08-24

## 2023-08-24 RX ORDER — SODIUM CHLORIDE 9 MG/ML
75 INJECTION, SOLUTION INTRAVENOUS CONTINUOUS
Status: DISCONTINUED | OUTPATIENT
Start: 2023-08-24 | End: 2023-08-24

## 2023-08-24 RX ORDER — ACETAMINOPHEN 325 MG/1
975 TABLET ORAL ONCE AS NEEDED
Status: DISCONTINUED | OUTPATIENT
Start: 2023-08-24 | End: 2023-08-24 | Stop reason: HOSPADM

## 2023-08-24 RX ORDER — ASPIRIN 81 MG/1
81 TABLET, CHEWABLE ORAL DAILY
Refills: 0
Start: 2023-08-24

## 2023-08-24 RX ADMIN — ASPIRIN 81 MG CHEWABLE TABLET 324 MG: 81 TABLET CHEWABLE at 07:14

## 2023-08-24 RX ADMIN — SODIUM CHLORIDE 185.1 ML: 0.9 INJECTION, SOLUTION INTRAVENOUS at 07:14

## 2023-08-24 NOTE — INTERVAL H&P NOTE
H&P reviewed. After examining the patient, I find no changed to the H&P since it had been written. 78M is referred for preop [facility] for TAVR. There were no vitals taken for this visit. Patient re-evaluated.  Accept as history and physical.    Pamela Valderrama, DO/August 24, 2023/7:03 AM

## 2023-08-24 NOTE — DISCHARGE INSTR - AVS FIRST PAGE
1. Please see the post cardiac catheterization dishcarge instructions. No heavy lifting, greater than 10 lbs. or strenuous  activity for 48 hrs. 2.Remove band aid tomorrow. Shower and wash area- groin gently with soap and water- beginning tomorrow. Rinse and pat dry. Apply new water seal band aid. Repeat this process for 5 days. No powders, creams lotions or antibiotic ointments  for 5 days. No tub baths, hot tubs or swimming for 5 days. 3. Please call our office ([phone]) if you have any fever, redness, swelling, discharge from your groin access site.     4.No driving for 2 days    5. none

## 2023-08-24 NOTE — TELEPHONE ENCOUNTER
Called Jeanine at [address] [place] [address]. [facility] for her to call me back with Fax #.

## 2023-08-25 ENCOUNTER — TELEPHONE (OUTPATIENT)
Dept: CARDIOLOGY CLINIC | Facility: CLINIC | Age: 78
End: 2023-08-25

## 2023-08-28 ENCOUNTER — TELEPHONE (OUTPATIENT)
Dept: CARDIOLOGY CLINIC | Facility: CLINIC | Age: 78
End: 2023-08-28

## 2023-09-08 ENCOUNTER — OFFICE VISIT (OUTPATIENT)
Dept: CARDIOLOGY CLINIC | Facility: CLINIC | Age: 78
End: 2023-09-08
Payer: COMMERCIAL

## 2023-09-08 VITALS
OXYGEN SATURATION: 96 % | DIASTOLIC BLOOD PRESSURE: 40 MMHG | SYSTOLIC BLOOD PRESSURE: 115 MMHG | WEIGHT: 139.9 LBS | BODY MASS INDEX: 20.96 KG/M2 | HEART RATE: 62 BPM

## 2023-09-08 DIAGNOSIS — I25.10 CORONARY ARTERY DISEASE INVOLVING NATIVE CORONARY ARTERY OF NATIVE HEART WITHOUT ANGINA PECTORIS: ICD-10-CM

## 2023-09-08 DIAGNOSIS — I35.0 MODERATE TO SEVERE AORTIC STENOSIS: Primary | ICD-10-CM

## 2023-09-08 DIAGNOSIS — I50.32 CHRONIC HEART FAILURE WITH PRESERVED EJECTION FRACTION (HCC): ICD-10-CM

## 2023-09-08 DIAGNOSIS — I35.1 MODERATE AORTIC INSUFFICIENCY: ICD-10-CM

## 2023-09-08 PROCEDURE — 99214 OFFICE O/P EST MOD 30 MIN: CPT | Performed by: INTERNAL MEDICINE

## 2023-09-08 NOTE — PROGRESS NOTES
Cardiology Outpatient Progress Note - Cristhian Whitten 69 y.o. male MRN: [id number]    Encounter: [id number]      Assessment/Plan:    Patient Active Problem List    Diagnosis Date Noted   • Pre-operative cardiovascular examination, valvular heart disease 08/24/2023   • Coronary artery disease involving native coronary artery 08/24/2023   • Syncope 07/07/2023   • Left-sided chest wall pain 07/07/2023   • History of endocarditis 07/05/2023   • Recent aortic valve endocarditis due to Strep bactermia     • Abnormal abdominal CT scan 06/16/2023   • Acute respiratory failure ([address]) 06/16/2023   • Acute kidney injury ([address]) 06/12/2023   • History of osteomyelitis 06/12/2023   • (HFpEF) heart failure with preserved ejection fraction ([address]) 06/12/2023   • Insomnia 06/12/2023   • Anxiety 06/01/2023   • Acute bacterial endocarditis 05/20/2023   • Gastric distention 05/20/2023   • Discitis of lumbar region 05/18/2023   • Bacteremia due to Streptococcus 05/18/2023   • Non-ischemic myocardial injury (non-traumatic) 05/17/2023   • Shortness of breath 05/16/2023   • Chronic pain syndrome 05/15/2023   • Lumbar spondylosis 05/15/2023   • Chronic bilateral low back pain without sciatica 05/03/2023   • Primary hypertension 05/03/2023   • Atrial fibrillation ([address]) 05/03/2023   • Aortic stenosis 05/03/2023   • Leukocytosis 05/03/2023   • Elevated D-dimer 05/03/2023   • Hyponatremia 05/02/2023   • Mixed hyperlipidemia 08/09/2021   • Abnormal glucose 08/09/2021   • BPH with obstruction/lower urinary tract symptoms 08/09/2021       # H/o aortic valve endocarditis s/p IV antibiotics, then was on amoxicillin, seen by ID 7/26/23 and advised he amoxicillin can be discontinued, deemed no contraindication for proceeding with AVR  # Moderate to severe aortic valve stenosis  # Moderate aortic valve insufficiency  # HFPEF, euvolemic  Diuretic: torsemide 20mg BID with potassium 40 meq BID  # Paroysmal atrial fibrillation  Seen on EKG 5/3/23.  Currently not on anticoagulation. Per chart review, patient did not want to be on anticoagulation due to bleeding risk, he was also scheduled for a biopsy at that time. He is unsure at the moment if he wants to be on anticoagulation. Discussed risks and benefits of anticoagulation and will be thinking about it. # Severe 3-vessel CAD including left main  Rx: aspirin, statin, BB. No anginal symptoms    Studies- personally reviewed by [facility] 8/24/23:  •  Dist LM lesion is 70% stenosed. •  Ost Cx lesion is 50% stenosed. •  Mid LAD lesion is 70% stenosed. •  Mid RCA lesion is 80% stenosed. •  1st Diag lesion is 90% stenosed.   3v CAD with LM    JESSICA 6/14/23:  Normal LV systolic function  Aortic valve leaflets are severely calcified, normal mobility, moderate regurgitation. Moderate to severe stenosis visually. Small mobile vegetation at the commissure of the LCC and on CC. Very small mobile vegetation noted tip of the RCC  Normal RV size and systolic function  Mild MR  Mild TR  No PFO on color Doppler  No significant change compared to prior study 5/19/2023. TTE 6/13/2023  LVEF: 55%  LVIDd: 5.1cm  RV: Normal size and systolic function  MR: Mild  AV: moderate to severe stenosis, moderate AI  PASP: 75 mmHg, mild to moderate TR, estimated RA pressure of 10 mmHg  RVOT: Unable to review  Other: Grade 2 diastology, no obvious endocarditis    JESSICA 5/19/2023  LVEF 65%. Moderately increased wall thickness. No PFO on color Doppler. No left atrial appendage thrombus. Moderate aortic valve regurgitation, moderate to severe stenosis aortic valve area of 0.98 cm². Mean gradient 29 mmHg. 3 small, mobile vegetations present in the aortic aspect of the valve. All located at the 3 commissures  The one located at the commissure of the non and left coronary cusps appears to be associated with a small perforation. Mild MR  Mild TR  Normal RV size and systolic function    TTE 7/0/7604:  Normal biventricular size and systolic function. Mild AI. Moderate to severe aortic valve stenosis. Mean gradient 35 mmHg. TTE 7/19/2021:'  LVEF 60%  Grade 1 diastology  Trace MR  Moderate aortic valve stenosis, mean gradient 30 mmHg, aortic valve area 1.2 cm2 continuity equation     Diet:  2 g sodium diet  2000 mL fluid restriction    Therapeutic:  Torsemide 20 mg twice daily with potassium 40 mEq twice daily    Today's Plan:  Continue current medications  SAVR with CABG in [facility] as scheduled      HPI:   69 M with recently diagnosed native aortic valve endocarditis, moderate to severe AS and moderate AI, chronic HFpEF, paroxysmal atrial fibrillation who presented with shortness of breath. Recent admission to [facility] from 5/16/2023 - 5/23/2023 after presenting with back pain. He was found to have lumbar discitis osteomyelitis on MRI. JESSICA was ordered in the setting of bacteremia and demonstrated small mobile vegetations on the aortic aspect of the aortic valve, moderate to severe AS, moderate AI. He was treated with IV diuretics and subsequently discharged on torsemide 40 mg daily. He then had increasing LUNA, LE swelling, and PND. He called the cardiology office and his torsemide was increased to 40 mg twice daily. He did notice an initial improvement but then had recurrence of symptoms the night prior to admission and so presented for evaluation. He was diuresed with IV lasix. He presented to the [facility] on 6/12/2023 with complaints of generalized weakness and for further evaluation of abnormal lab data. On admission he was found to have an acute kidney injury as well as hypokalemia. His usual home oral diuretics (spironolactone and torsemide) were placed on hold, started on IV fluids for hydration, and electrolytes were replaced. Cardiology was reconsulted at the request of the ID service for reassessment of his known AV endocarditis/valvular vegetations with repeat JESSICA imaging during this hospitalization.  JESSICA showed similar findings as prior. Admitted 7/7/23 with syncope thought to be due to dehydration, torsemide decreased from 40mg to 20mg daily. He still reports shortness of breath but no leg swelling, PND, orthopnea or abdominal distension. Also with intermittent episodes of dizziness/lightheadedness and fatigue. Recently seen at [facility] for TAVR evaluation. 8/9/23: here for follow up. Na 136 K 3.4 and creatinine 0.94. Increased potassium to 40meq BID. He continues to do well. No leg swelling or shortness of breath on current level of exertion. No PND or orthopnea. Seen by ID and stopped amoxicillin. He had his teeth extraction. Interval History:  9/8/23: Here for follow up. Underwent LHC which showed severe 3-vessel disease including left main disease. Denies chest pain. Stable shortness of breath on exertion. No leg swelling or abdominal distension. Reports seeing CT surgery in [facility], Dr. Angus Jenkins on 9/6/23 and scheduled for SAVR/CABG on 9/25/23. Past Medical History:   Diagnosis Date   • CHF (congestive heart failure) (HCC)    • Discitis    • Endocarditis        Review of Systems   Constitutional: Positive for fatigue. Negative for chills and fever. HENT: Negative for ear pain and sore throat. Eyes: Negative for pain and visual disturbance. Respiratory: Negative for cough and shortness of breath. Cardiovascular: Negative for chest pain, palpitations and leg swelling. Gastrointestinal: Negative for abdominal distention, abdominal pain and vomiting. Genitourinary: Negative for dysuria and hematuria. Musculoskeletal: Negative for arthralgias and back pain. Skin: Negative for color change and rash. Neurological: Negative for dizziness, seizures, syncope and light-headedness. All other systems reviewed and are negative. No Known Allergies  .     Current Outpatient Medications:   •  acetaminophen (TYLENOL) 325 mg tablet, Take 2 tablets (650 mg total) by mouth every 6 (six) hours as needed for mild pain, Disp: , Rfl: 0  •  aspirin 81 mg chewable tablet, Chew 1 tablet (81 mg total) daily, Disp: , Rfl: 0  •  metoprolol succinate (TOPROL-XL) 25 mg 24 hr tablet, Take 1 tablet (25 mg total) by mouth daily, Disp: 90 tablet, Rfl: 3  •  Multiple Vitamins-Minerals (PRESERVISION AREDS PO), Take 5 mg by mouth 2 (two) times a day Two tablets daily, Disp: , Rfl:   •  potassium chloride (K-DUR,KLOR-CON) 20 mEq tablet, Take 2 tablets (40 mEq total) by mouth 2 (two) times a day, Disp: 120 tablet, Rfl: 5  •  rosuvastatin (CRESTOR) 40 MG tablet, Take 1 tablet (40 mg total) by mouth daily, Disp: 90 tablet, Rfl: 3  •  torsemide (DEMADEX) 20 mg tablet, Take 1 tablet (20 mg total) by mouth 2 (two) times a day Do not start before 2023., Disp: 90 tablet, Rfl: 0    Social History     Socioeconomic History   • Marital status: /Civil Union     Spouse name: Not on file   • Number of children: Not on file   • Years of education: Not on file   • Highest education level: Not on file   Occupational History   • Not on file   Tobacco Use   • Smoking status: Former     Packs/day: 0.50     Years: 20.00     Total pack years: 10.00     Types: Cigarettes     Quit date: 3/11/2005     Years since quittin.5   • Smokeless tobacco: Never   Vaping Use   • Vaping Use: Never used   Substance and Sexual Activity   • Alcohol use: Not Currently   • Drug use: Not Currently   • Sexual activity: Not on file   Other Topics Concern   • Not on file   Social History Narrative   • Not on file     Social Determinants of Health     Financial Resource Strain: Not on file   Food Insecurity: No Food Insecurity (2023)    Hunger Vital Sign    • Worried About Running Out of Food in the Last Year: Never true    • Ran Out of Food in the Last Year: Never true   Transportation Needs: No Transportation Needs (2023)    PRAPARE - Transportation    • Lack of Transportation (Medical): No    • Lack of Transportation (Non-Medical):  No Physical Activity: Not on file   Stress: Not on file   Social Connections: Not on file   Intimate Partner Violence: Not on file   Housing Stability: Low Risk  (6/13/2023)    Housing Stability Vital Sign    • Unable to Pay for Housing in the Last Year: No    • Number of Places Lived in the Last Year: 1    • Unstable Housing in the Last Year: No       Family History   Problem Relation Age of Onset   • Colon cancer Mother    • Lung cancer Father    • Mental illness Neg Hx        Physical Exam:    Vitals: There were no vitals taken for this visit. , There is no height or weight on file to calculate BMI.,   Wt Readings from Last 3 Encounters:   08/24/23 63.5 kg (140 lb)   08/21/23 61.7 kg (136 lb)   08/09/23 62 kg (136 lb 11.2 oz)       Physical Exam  Constitutional:       General: He is not in acute distress. Appearance: Normal appearance. HENT:      Head: Normocephalic and atraumatic. Mouth/Throat:      Mouth: Mucous membranes are moist.   Eyes:      General: No scleral icterus. Extraocular Movements: Extraocular movements intact. Cardiovascular:      Rate and Rhythm: Normal rate and regular rhythm. Pulses: Normal pulses. Heart sounds: S1 normal and S2 normal. Murmur heard. Systolic murmur is present with a grade of 3/6. Diastolic murmur is present with a grade of 2/4. No friction rub. No gallop. Comments: Nonelevated JVP  Pulmonary:      Effort: Pulmonary effort is normal.      Breath sounds: Normal breath sounds. Abdominal:      General: There is no distension. Palpations: Abdomen is soft. Tenderness: There is no abdominal tenderness. There is no guarding or rebound. Musculoskeletal:         General: Normal range of motion. Cervical back: Neck supple. Right lower leg: No edema. Left lower leg: No edema. Skin:     General: Skin is warm and dry. Capillary Refill: Capillary refill takes less than 2 seconds.    Neurological:      General: No focal deficit present. Mental Status: He is alert and oriented to person, place, and time. Psychiatric:         Mood and Affect: Mood normal.         Labs & Results:    Lab Results   Component Value Date    WBC 6.59 08/17/2023    HGB 12.0 08/17/2023    HCT 37.5 08/17/2023    MCV 93 08/17/2023     08/17/2023     Lab Results   Component Value Date    SODIUM 138 08/24/2023    K 4.1 08/24/2023     08/24/2023    CO2 26 08/24/2023    BUN 29 (H) 08/24/2023    CREATININE 0.99 08/24/2023    GLUC 110 08/24/2023    CALCIUM 8.8 08/24/2023     No results found for: "NTBNP"   Lab Results   Component Value Date    CHOLESTEROL 149 05/11/2023    CHOLESTEROL 200 (H) 07/20/2021    CHOLESTEROL 134 01/16/2019     Lab Results   Component Value Date    HDL 72 05/11/2023    HDL 70 07/20/2021    HDL 46 01/16/2019     Lab Results   Component Value Date    TRIG 68 05/11/2023    TRIG 78 07/20/2021    TRIG 59 01/16/2019     No results found for: "[address]"    EKG personally reviewed by Alexandra Fink MD.     Counseling / Coordination of Care  Time spent today 25 minutes. Greater than 50% of total time was spent with the patient and / or family counseling and / or coordination of care. We discussed diagnoses, most recent studies and any changes in treatment    Thank you for the opportunity to participate in the care of this patient.     Alexandra Fink MD  [facility] FAILURE [facility]CULAT[facility]  [address]

## 2023-09-10 ENCOUNTER — HOSPITAL ENCOUNTER (INPATIENT)
Facility: HOSPITAL | Age: 78
LOS: 1 days | Discharge: ANOTHER HOSPITAL | End: 2023-09-12
Attending: EMERGENCY MEDICINE | Admitting: ANESTHESIOLOGY
Payer: COMMERCIAL

## 2023-09-10 ENCOUNTER — APPOINTMENT (EMERGENCY)
Dept: RADIOLOGY | Facility: HOSPITAL | Age: 78
End: 2023-09-10
Payer: COMMERCIAL

## 2023-09-10 DIAGNOSIS — I50.9 CHF (CONGESTIVE HEART FAILURE) (HCC): ICD-10-CM

## 2023-09-10 DIAGNOSIS — J96.01 ACUTE RESPIRATORY FAILURE WITH HYPOXIA (HCC): Primary | ICD-10-CM

## 2023-09-10 LAB
ALBUMIN SERPL BCP-MCNC: 4 G/DL (ref 3.5–5)
ALP SERPL-CCNC: 71 U/L (ref 34–104)
ALT SERPL W P-5'-P-CCNC: 18 U/L (ref 7–52)
ANION GAP SERPL CALCULATED.3IONS-SCNC: 16 MMOL/L
APTT PPP: 30 SECONDS (ref 23–37)
AST SERPL W P-5'-P-CCNC: 26 U/L (ref 13–39)
BASE EX.OXY STD BLDV CALC-SCNC: 78.6 % (ref 60–80)
BASE EXCESS BLDV CALC-SCNC: -2.6 MMOL/L
BASOPHILS # BLD MANUAL: 0 THOUSAND/UL (ref 0–0.1)
BASOPHILS NFR MAR MANUAL: 0 % (ref 0–1)
BILIRUB SERPL-MCNC: 0.38 MG/DL (ref 0.2–1)
BNP SERPL-MCNC: 3143 PG/ML (ref 0–100)
BUN SERPL-MCNC: 26 MG/DL (ref 5–25)
CALCIUM SERPL-MCNC: 9.2 MG/DL (ref 8.4–10.2)
CARDIAC TROPONIN I PNL SERPL HS: 45 NG/L
CHLORIDE SERPL-SCNC: 101 MMOL/L (ref 96–108)
CO2 SERPL-SCNC: 21 MMOL/L (ref 21–32)
CREAT SERPL-MCNC: 1.75 MG/DL (ref 0.6–1.3)
EOSINOPHIL # BLD MANUAL: 0 THOUSAND/UL (ref 0–0.4)
EOSINOPHIL NFR BLD MANUAL: 0 % (ref 0–6)
ERYTHROCYTE [DISTWIDTH] IN BLOOD BY AUTOMATED COUNT: 15.7 % (ref 11.6–15.1)
FLUAV RNA RESP QL NAA+PROBE: NEGATIVE
FLUBV RNA RESP QL NAA+PROBE: NEGATIVE
GFR SERPL CREATININE-BSD FRML MDRD: 36 ML/MIN/1.73SQ M
GIANT PLATELETS BLD QL SMEAR: PRESENT
GLUCOSE SERPL-MCNC: 246 MG/DL (ref 65–140)
HCO3 BLDV-SCNC: 22.1 MMOL/L (ref 24–30)
HCT VFR BLD AUTO: 43.6 % (ref 36.5–49.3)
HGB BLD-MCNC: 13.5 G/DL (ref 12–17)
INR PPP: 0.97 (ref 0.84–1.19)
LYMPHOCYTES # BLD AUTO: 30 % (ref 14–44)
LYMPHOCYTES # BLD AUTO: 4.48 THOUSAND/UL (ref 0.6–4.47)
MACROCYTES BLD QL AUTO: PRESENT
MAGNESIUM SERPL-MCNC: 2.7 MG/DL (ref 1.9–2.7)
MCH RBC QN AUTO: 29.9 PG (ref 26.8–34.3)
MCHC RBC AUTO-ENTMCNC: 31 G/DL (ref 31.4–37.4)
MCV RBC AUTO: 97 FL (ref 82–98)
MONOCYTES # BLD AUTO: 0.87 THOUSAND/UL (ref 0–1.22)
MONOCYTES NFR BLD: 7 % (ref 4–12)
NEUTROPHILS # BLD MANUAL: 7.1 THOUSAND/UL (ref 1.85–7.62)
NEUTS SEG NFR BLD AUTO: 57 % (ref 43–75)
O2 CT BLDV-SCNC: 14.7 ML/DL
PCO2 BLDV: 38.3 MM HG (ref 42–50)
PH BLDV: 7.38 [PH] (ref 7.3–7.4)
PLATELET # BLD AUTO: 273 THOUSANDS/UL (ref 149–390)
PLATELET BLD QL SMEAR: ADEQUATE
PMV BLD AUTO: 10.4 FL (ref 8.9–12.7)
PO2 BLDV: 48.1 MM HG (ref 35–45)
POTASSIUM SERPL-SCNC: 3.8 MMOL/L (ref 3.5–5.3)
PROT SERPL-MCNC: 6.6 G/DL (ref 6.4–8.4)
PROTHROMBIN TIME: 13 SECONDS (ref 11.6–14.5)
RBC # BLD AUTO: 4.51 MILLION/UL (ref 3.88–5.62)
RBC MORPH BLD: PRESENT
RSV RNA RESP QL NAA+PROBE: NEGATIVE
SARS-COV-2 RNA RESP QL NAA+PROBE: NEGATIVE
SMUDGE CELLS BLD QL SMEAR: PRESENT
SODIUM SERPL-SCNC: 138 MMOL/L (ref 135–147)
VARIANT LYMPHS # BLD AUTO: 6 %
WBC # BLD AUTO: 12.45 THOUSAND/UL (ref 4.31–10.16)

## 2023-09-10 PROCEDURE — 71045 X-RAY EXAM CHEST 1 VIEW: CPT

## 2023-09-10 PROCEDURE — 87040 BLOOD CULTURE FOR BACTERIA: CPT | Performed by: EMERGENCY MEDICINE

## 2023-09-10 PROCEDURE — 80053 COMPREHEN METABOLIC PANEL: CPT | Performed by: EMERGENCY MEDICINE

## 2023-09-10 PROCEDURE — 99285 EMERGENCY DEPT VISIT HI MDM: CPT

## 2023-09-10 PROCEDURE — 36415 COLL VENOUS BLD VENIPUNCTURE: CPT | Performed by: EMERGENCY MEDICINE

## 2023-09-10 PROCEDURE — 93005 ELECTROCARDIOGRAM TRACING: CPT

## 2023-09-10 PROCEDURE — 94760 N-INVAS EAR/PLS OXIMETRY 1: CPT

## 2023-09-10 PROCEDURE — 94002 VENT MGMT INPAT INIT DAY: CPT

## 2023-09-10 PROCEDURE — 85027 COMPLETE CBC AUTOMATED: CPT | Performed by: EMERGENCY MEDICINE

## 2023-09-10 PROCEDURE — 85610 PROTHROMBIN TIME: CPT | Performed by: EMERGENCY MEDICINE

## 2023-09-10 PROCEDURE — 85007 BL SMEAR W/DIFF WBC COUNT: CPT | Performed by: EMERGENCY MEDICINE

## 2023-09-10 PROCEDURE — 96375 TX/PRO/DX INJ NEW DRUG ADDON: CPT

## 2023-09-10 PROCEDURE — 85730 THROMBOPLASTIN TIME PARTIAL: CPT | Performed by: EMERGENCY MEDICINE

## 2023-09-10 PROCEDURE — 99291 CRITICAL CARE FIRST HOUR: CPT | Performed by: EMERGENCY MEDICINE

## 2023-09-10 PROCEDURE — 83735 ASSAY OF MAGNESIUM: CPT | Performed by: EMERGENCY MEDICINE

## 2023-09-10 PROCEDURE — 0241U HB NFCT DS VIR RESP RNA 4 TRGT: CPT | Performed by: EMERGENCY MEDICINE

## 2023-09-10 PROCEDURE — 83605 ASSAY OF LACTIC ACID: CPT | Performed by: EMERGENCY MEDICINE

## 2023-09-10 PROCEDURE — 96374 THER/PROPH/DIAG INJ IV PUSH: CPT

## 2023-09-10 PROCEDURE — 84484 ASSAY OF TROPONIN QUANT: CPT | Performed by: EMERGENCY MEDICINE

## 2023-09-10 PROCEDURE — 82805 BLOOD GASES W/O2 SATURATION: CPT | Performed by: EMERGENCY MEDICINE

## 2023-09-10 PROCEDURE — 83880 ASSAY OF NATRIURETIC PEPTIDE: CPT | Performed by: EMERGENCY MEDICINE

## 2023-09-10 PROCEDURE — 94640 AIRWAY INHALATION TREATMENT: CPT

## 2023-09-10 RX ORDER — FUROSEMIDE 10 MG/ML
80 INJECTION INTRAMUSCULAR; INTRAVENOUS ONCE
Status: COMPLETED | OUTPATIENT
Start: 2023-09-10 | End: 2023-09-10

## 2023-09-10 RX ORDER — METHYLPREDNISOLONE SODIUM SUCCINATE 125 MG/2ML
60 INJECTION, POWDER, LYOPHILIZED, FOR SOLUTION INTRAMUSCULAR; INTRAVENOUS ONCE
Status: COMPLETED | OUTPATIENT
Start: 2023-09-10 | End: 2023-09-10

## 2023-09-10 RX ORDER — IPRATROPIUM BROMIDE AND ALBUTEROL SULFATE 2.5; .5 MG/3ML; MG/3ML
3 SOLUTION RESPIRATORY (INHALATION) ONCE
Status: DISCONTINUED | OUTPATIENT
Start: 2023-09-10 | End: 2023-09-10

## 2023-09-10 RX ORDER — IPRATROPIUM BROMIDE AND ALBUTEROL SULFATE 2.5; .5 MG/3ML; MG/3ML
3 SOLUTION RESPIRATORY (INHALATION) ONCE
Status: COMPLETED | OUTPATIENT
Start: 2023-09-10 | End: 2023-09-10

## 2023-09-10 RX ADMIN — FUROSEMIDE 80 MG: 10 INJECTION, SOLUTION INTRAMUSCULAR; INTRAVENOUS at 22:50

## 2023-09-10 RX ADMIN — METHYLPREDNISOLONE SODIUM SUCCINATE 60 MG: 125 INJECTION, POWDER, FOR SOLUTION INTRAMUSCULAR; INTRAVENOUS at 22:44

## 2023-09-10 RX ADMIN — IPRATROPIUM BROMIDE AND ALBUTEROL SULFATE 3 ML: .5; 3 SOLUTION RESPIRATORY (INHALATION) at 22:45

## 2023-09-11 ENCOUNTER — APPOINTMENT (INPATIENT)
Dept: NON INVASIVE DIAGNOSTICS | Facility: HOSPITAL | Age: 78
End: 2023-09-11
Payer: COMMERCIAL

## 2023-09-11 ENCOUNTER — APPOINTMENT (INPATIENT)
Dept: RADIOLOGY | Facility: HOSPITAL | Age: 78
End: 2023-09-11
Payer: COMMERCIAL

## 2023-09-11 PROBLEM — I48.0 PAROXYSMAL ATRIAL FIBRILLATION (HCC): Status: ACTIVE | Noted: 2023-05-03

## 2023-09-11 PROBLEM — R65.10 SIRS (SYSTEMIC INFLAMMATORY RESPONSE SYNDROME) (HCC): Status: ACTIVE | Noted: 2023-09-11

## 2023-09-11 PROBLEM — E78.2 MIXED HYPERLIPIDEMIA: Chronic | Status: ACTIVE | Noted: 2021-08-09

## 2023-09-11 PROBLEM — J96.01 ACUTE RESPIRATORY FAILURE WITH HYPOXIA (HCC): Status: ACTIVE | Noted: 2023-06-16

## 2023-09-11 PROBLEM — I35.0 NONRHEUMATIC AORTIC VALVE STENOSIS: Chronic | Status: ACTIVE | Noted: 2023-05-03

## 2023-09-11 PROBLEM — I10 PRIMARY HYPERTENSION: Chronic | Status: ACTIVE | Noted: 2023-05-03

## 2023-09-11 PROBLEM — I25.10 CORONARY ARTERY DISEASE INVOLVING NATIVE CORONARY ARTERY: Chronic | Status: ACTIVE | Noted: 2023-08-24

## 2023-09-11 LAB
2HR DELTA HS TROPONIN: 195 NG/L
4HR DELTA HS TROPONIN: 556 NG/L
ANION GAP SERPL CALCULATED.3IONS-SCNC: 6 MMOL/L
AORTIC ROOT: 3.3 CM
AORTIC VALVE MEAN VELOCITY: 24.9 M/S
APICAL FOUR CHAMBER EJECTION FRACTION: 50 %
APTT PPP: 29 SECONDS (ref 23–37)
APTT PPP: 46 SECONDS (ref 23–37)
ATRIAL RATE: 118 BPM
ATRIAL RATE: 73 BPM
AV AREA BY CONTINUOUS VTI: 1 CM2
AV AREA PEAK VELOCITY: 1 CM2
AV LVOT MEAN GRADIENT: 3 MMHG
AV LVOT PEAK GRADIENT: 6 MMHG
AV MEAN GRADIENT: 29 MMHG
AV PEAK GRADIENT: 58 MMHG
AV VALVE AREA: 1.02 CM2
AV VELOCITY RATIO: 0.31
BACTERIA UR QL AUTO: ABNORMAL /HPF
BASOPHILS # BLD AUTO: 0.01 THOUSANDS/ÂΜL (ref 0–0.1)
BASOPHILS NFR BLD AUTO: 0 % (ref 0–1)
BILIRUB UR QL STRIP: NEGATIVE
BUN SERPL-MCNC: 26 MG/DL (ref 5–25)
CALCIUM SERPL-MCNC: 9.2 MG/DL (ref 8.4–10.2)
CARDIAC TROPONIN I PNL SERPL HS: 240 NG/L
CARDIAC TROPONIN I PNL SERPL HS: 601 NG/L
CARDIAC TROPONIN I PNL SERPL HS: 715 NG/L (ref 8–18)
CARDIAC TROPONIN I PNL SERPL HS: 860 NG/L (ref 8–18)
CHLORIDE SERPL-SCNC: 103 MMOL/L (ref 96–108)
CLARITY UR: CLEAR
CO2 SERPL-SCNC: 30 MMOL/L (ref 21–32)
COLOR UR: YELLOW
CREAT SERPL-MCNC: 1.43 MG/DL (ref 0.6–1.3)
CREAT UR-MCNC: 34.5 MG/DL
DOP CALC AO PEAK VEL: 3.82 M/S
DOP CALC AO VTI: 81.38 CM
DOP CALC LVOT AREA: 3.14 CM2
DOP CALC LVOT CARDIAC INDEX: 3.75 L/MIN/M2
DOP CALC LVOT CARDIAC OUTPUT: 6.64 L/MIN
DOP CALC LVOT DIAMETER: 2 CM
DOP CALC LVOT PEAK VEL VTI: 26.53 CM
DOP CALC LVOT PEAK VEL: 1.17 M/S
DOP CALC LVOT STROKE INDEX: 45.2 ML/M2
DOP CALC LVOT STROKE VOLUME: 83.3
E WAVE DECELERATION TIME: 126 MS
EOSINOPHIL # BLD AUTO: 0 THOUSAND/ÂΜL (ref 0–0.61)
EOSINOPHIL NFR BLD AUTO: 0 % (ref 0–6)
ERYTHROCYTE [DISTWIDTH] IN BLOOD BY AUTOMATED COUNT: 15.6 % (ref 11.6–15.1)
ERYTHROCYTE [DISTWIDTH] IN BLOOD BY AUTOMATED COUNT: 15.6 % (ref 11.6–15.1)
EST. AVERAGE GLUCOSE BLD GHB EST-MCNC: 131 MG/DL
FRACTIONAL SHORTENING: 29 (ref 28–44)
GFR SERPL CREATININE-BSD FRML MDRD: 46 ML/MIN/1.73SQ M
GLUCOSE SERPL-MCNC: 128 MG/DL (ref 65–140)
GLUCOSE SERPL-MCNC: 131 MG/DL (ref 65–140)
GLUCOSE SERPL-MCNC: 140 MG/DL (ref 65–140)
GLUCOSE SERPL-MCNC: 151 MG/DL (ref 65–140)
GLUCOSE SERPL-MCNC: 151 MG/DL (ref 65–140)
GLUCOSE SERPL-MCNC: 89 MG/DL (ref 65–140)
GLUCOSE UR STRIP-MCNC: NEGATIVE MG/DL
HBA1C MFR BLD: 6.2 %
HCT VFR BLD AUTO: 34 % (ref 36.5–49.3)
HCT VFR BLD AUTO: 36.1 % (ref 36.5–49.3)
HGB BLD-MCNC: 11.1 G/DL (ref 12–17)
HGB BLD-MCNC: 11.6 G/DL (ref 12–17)
HGB UR QL STRIP.AUTO: NEGATIVE
HYALINE CASTS #/AREA URNS LPF: ABNORMAL /LPF
IMM GRANULOCYTES # BLD AUTO: 0.05 THOUSAND/UL (ref 0–0.2)
IMM GRANULOCYTES NFR BLD AUTO: 1 % (ref 0–2)
INR PPP: 1.02 (ref 0.84–1.19)
INTERVENTRICULAR SEPTUM IN DIASTOLE (PARASTERNAL SHORT AXIS VIEW): 1 CM
INTERVENTRICULAR SEPTUM: 1 CM (ref 0.6–1.1)
KETONES UR STRIP-MCNC: NEGATIVE MG/DL
LAAS-AP2: 23.3 CM2
LAAS-AP4: 14.9 CM2
LACTATE SERPL-SCNC: 1.1 MMOL/L (ref 0.5–2)
LACTATE SERPL-SCNC: 7.4 MMOL/L (ref 0.5–2)
LEFT ATRIUM SIZE: 5 CM
LEFT ATRIUM VOLUME (MOD BIPLANE): 61 ML
LEFT INTERNAL DIMENSION IN SYSTOLE: 3.6 CM (ref 2.1–4)
LEFT VENTRICULAR INTERNAL DIMENSION IN DIASTOLE: 5.1 CM (ref 3.5–6)
LEFT VENTRICULAR POSTERIOR WALL IN END DIASTOLE: 1.2 CM
LEFT VENTRICULAR STROKE VOLUME: 71 ML
LEUKOCYTE ESTERASE UR QL STRIP: NEGATIVE
LVSV (TEICH): 71 ML
LYMPHOCYTES # BLD AUTO: 0.66 THOUSANDS/ÂΜL (ref 0.6–4.47)
LYMPHOCYTES NFR BLD AUTO: 8 % (ref 14–44)
MAGNESIUM SERPL-MCNC: 2.3 MG/DL (ref 1.9–2.7)
MCH RBC QN AUTO: 30.1 PG (ref 26.8–34.3)
MCH RBC QN AUTO: 30.2 PG (ref 26.8–34.3)
MCHC RBC AUTO-ENTMCNC: 32.1 G/DL (ref 31.4–37.4)
MCHC RBC AUTO-ENTMCNC: 32.6 G/DL (ref 31.4–37.4)
MCV RBC AUTO: 93 FL (ref 82–98)
MCV RBC AUTO: 94 FL (ref 82–98)
MONOCYTES # BLD AUTO: 0.14 THOUSAND/ÂΜL (ref 0.17–1.22)
MONOCYTES NFR BLD AUTO: 2 % (ref 4–12)
MV E'TISSUE VEL-LAT: 13 CM/S
MV E'TISSUE VEL-SEP: 8 CM/S
MV PEAK A VEL: 0.61 M/S
MV PEAK E VEL: 94 CM/S
MV STENOSIS PRESSURE HALF TIME: 36 MS
MV VALVE AREA P 1/2 METHOD: 6.11
NEUTROPHILS # BLD AUTO: 7.5 THOUSANDS/ÂΜL (ref 1.85–7.62)
NEUTS SEG NFR BLD AUTO: 89 % (ref 43–75)
NITRITE UR QL STRIP: NEGATIVE
NON-SQ EPI CELLS URNS QL MICRO: ABNORMAL /HPF
NRBC BLD AUTO-RTO: 0 /100 WBCS
P AXIS: 61 DEGREES
P AXIS: 70 DEGREES
PH UR STRIP.AUTO: 6.5 [PH]
PHOSPHATE SERPL-MCNC: 4.2 MG/DL (ref 2.3–4.1)
PLATELET # BLD AUTO: 234 THOUSANDS/UL (ref 149–390)
PLATELET # BLD AUTO: 240 THOUSANDS/UL (ref 149–390)
PMV BLD AUTO: 10.4 FL (ref 8.9–12.7)
PMV BLD AUTO: 10.6 FL (ref 8.9–12.7)
POTASSIUM SERPL-SCNC: 4.3 MMOL/L (ref 3.5–5.3)
PR INTERVAL: 162 MS
PR INTERVAL: 168 MS
PROCALCITONIN SERPL-MCNC: 0.37 NG/ML
PROT UR STRIP-MCNC: ABNORMAL MG/DL
PROTHROMBIN TIME: 13.5 SECONDS (ref 11.6–14.5)
QRS AXIS: 19 DEGREES
QRS AXIS: 81 DEGREES
QRSD INTERVAL: 104 MS
QRSD INTERVAL: 94 MS
QT INTERVAL: 326 MS
QT INTERVAL: 396 MS
QTC INTERVAL: 436 MS
QTC INTERVAL: 456 MS
RBC # BLD AUTO: 3.67 MILLION/UL (ref 3.88–5.62)
RBC # BLD AUTO: 3.86 MILLION/UL (ref 3.88–5.62)
RBC #/AREA URNS AUTO: ABNORMAL /HPF
RIGHT ATRIUM AREA SYSTOLE A4C: 13.1 CM2
RIGHT VENTRICLE ID DIMENSION: 3.3 CM
SL CV LEFT ATRIUM LENGTH A2C: 5.5 CM
SL CV LV EF: 50
SL CV PED ECHO LEFT VENTRICLE DIASTOLIC VOLUME (MOD BIPLANE) 2D: 126 ML
SL CV PED ECHO LEFT VENTRICLE SYSTOLIC VOLUME (MOD BIPLANE) 2D: 55 ML
SODIUM 24H UR-SCNC: 69 MOL/L
SODIUM SERPL-SCNC: 139 MMOL/L (ref 135–147)
SP GR UR STRIP.AUTO: 1.01 (ref 1–1.03)
T WAVE AXIS: 101 DEGREES
T WAVE AXIS: 260 DEGREES
TR MAX PG: 57 MMHG
TR PEAK VELOCITY: 3.8 M/S
TRICUSPID ANNULAR PLANE SYSTOLIC EXCURSION: 1.9 CM
TRICUSPID VALVE PEAK REGURGITATION VELOCITY: 3.79 M/S
UROBILINOGEN UR QL STRIP.AUTO: 0.2 E.U./DL
VENTRICULAR RATE: 118 BPM
VENTRICULAR RATE: 73 BPM
WBC # BLD AUTO: 10.25 THOUSAND/UL (ref 4.31–10.16)
WBC # BLD AUTO: 8.36 THOUSAND/UL (ref 4.31–10.16)
WBC #/AREA URNS AUTO: ABNORMAL /HPF

## 2023-09-11 PROCEDURE — 84484 ASSAY OF TROPONIN QUANT: CPT

## 2023-09-11 PROCEDURE — 80048 BASIC METABOLIC PNL TOTAL CA: CPT | Performed by: NURSE PRACTITIONER

## 2023-09-11 PROCEDURE — 84540 ASSAY OF URINE/UREA-N: CPT

## 2023-09-11 PROCEDURE — 93010 ELECTROCARDIOGRAM REPORT: CPT | Performed by: INTERNAL MEDICINE

## 2023-09-11 PROCEDURE — 84484 ASSAY OF TROPONIN QUANT: CPT | Performed by: NURSE PRACTITIONER

## 2023-09-11 PROCEDURE — 82570 ASSAY OF URINE CREATININE: CPT

## 2023-09-11 PROCEDURE — 81001 URINALYSIS AUTO W/SCOPE: CPT | Performed by: EMERGENCY MEDICINE

## 2023-09-11 PROCEDURE — 85610 PROTHROMBIN TIME: CPT

## 2023-09-11 PROCEDURE — 84300 ASSAY OF URINE SODIUM: CPT

## 2023-09-11 PROCEDURE — 84145 PROCALCITONIN (PCT): CPT | Performed by: NURSE PRACTITIONER

## 2023-09-11 PROCEDURE — 94760 N-INVAS EAR/PLS OXIMETRY 1: CPT

## 2023-09-11 PROCEDURE — 83735 ASSAY OF MAGNESIUM: CPT | Performed by: NURSE PRACTITIONER

## 2023-09-11 PROCEDURE — 83605 ASSAY OF LACTIC ACID: CPT | Performed by: EMERGENCY MEDICINE

## 2023-09-11 PROCEDURE — 82948 REAGENT STRIP/BLOOD GLUCOSE: CPT

## 2023-09-11 PROCEDURE — 85730 THROMBOPLASTIN TIME PARTIAL: CPT | Performed by: STUDENT IN AN ORGANIZED HEALTH CARE EDUCATION/TRAINING PROGRAM

## 2023-09-11 PROCEDURE — 85025 COMPLETE CBC W/AUTO DIFF WBC: CPT | Performed by: NURSE PRACTITIONER

## 2023-09-11 PROCEDURE — 99291 CRITICAL CARE FIRST HOUR: CPT | Performed by: NURSE PRACTITIONER

## 2023-09-11 PROCEDURE — 93306 TTE W/DOPPLER COMPLETE: CPT | Performed by: INTERNAL MEDICINE

## 2023-09-11 PROCEDURE — 99223 1ST HOSP IP/OBS HIGH 75: CPT | Performed by: INTERNAL MEDICINE

## 2023-09-11 PROCEDURE — 85730 THROMBOPLASTIN TIME PARTIAL: CPT

## 2023-09-11 PROCEDURE — 93306 TTE W/DOPPLER COMPLETE: CPT

## 2023-09-11 PROCEDURE — 83036 HEMOGLOBIN GLYCOSYLATED A1C: CPT | Performed by: NURSE PRACTITIONER

## 2023-09-11 PROCEDURE — 87081 CULTURE SCREEN ONLY: CPT | Performed by: ANESTHESIOLOGY

## 2023-09-11 PROCEDURE — 84484 ASSAY OF TROPONIN QUANT: CPT | Performed by: EMERGENCY MEDICINE

## 2023-09-11 PROCEDURE — 84100 ASSAY OF PHOSPHORUS: CPT | Performed by: NURSE PRACTITIONER

## 2023-09-11 PROCEDURE — 71045 X-RAY EXAM CHEST 1 VIEW: CPT

## 2023-09-11 PROCEDURE — 85027 COMPLETE CBC AUTOMATED: CPT

## 2023-09-11 PROCEDURE — 36415 COLL VENOUS BLD VENIPUNCTURE: CPT | Performed by: EMERGENCY MEDICINE

## 2023-09-11 PROCEDURE — NC001 PR NO CHARGE: Performed by: STUDENT IN AN ORGANIZED HEALTH CARE EDUCATION/TRAINING PROGRAM

## 2023-09-11 PROCEDURE — 93005 ELECTROCARDIOGRAM TRACING: CPT

## 2023-09-11 RX ORDER — METOPROLOL SUCCINATE 25 MG/1
25 TABLET, EXTENDED RELEASE ORAL DAILY
Status: DISCONTINUED | OUTPATIENT
Start: 2023-09-11 | End: 2023-09-12 | Stop reason: HOSPADM

## 2023-09-11 RX ORDER — HEPARIN SODIUM 1000 [USP'U]/ML
1800 INJECTION, SOLUTION INTRAVENOUS; SUBCUTANEOUS EVERY 6 HOURS PRN
Status: DISCONTINUED | OUTPATIENT
Start: 2023-09-11 | End: 2023-09-12 | Stop reason: HOSPADM

## 2023-09-11 RX ORDER — HEPARIN SODIUM 5000 [USP'U]/ML
5000 INJECTION, SOLUTION INTRAVENOUS; SUBCUTANEOUS EVERY 8 HOURS SCHEDULED
Status: DISCONTINUED | OUTPATIENT
Start: 2023-09-11 | End: 2023-09-11

## 2023-09-11 RX ORDER — CLOPIDOGREL BISULFATE 75 MG/1
75 TABLET ORAL DAILY
Status: DISCONTINUED | OUTPATIENT
Start: 2023-09-11 | End: 2023-09-11

## 2023-09-11 RX ORDER — HEPARIN SODIUM 1000 [USP'U]/ML
3600 INJECTION, SOLUTION INTRAVENOUS; SUBCUTANEOUS EVERY 6 HOURS PRN
Status: DISCONTINUED | OUTPATIENT
Start: 2023-09-11 | End: 2023-09-12 | Stop reason: HOSPADM

## 2023-09-11 RX ORDER — ASPIRIN 81 MG/1
81 TABLET, CHEWABLE ORAL DAILY
Status: DISCONTINUED | OUTPATIENT
Start: 2023-09-11 | End: 2023-09-12 | Stop reason: HOSPADM

## 2023-09-11 RX ORDER — FUROSEMIDE 10 MG/ML
80 INJECTION INTRAMUSCULAR; INTRAVENOUS ONCE
Status: DISCONTINUED | OUTPATIENT
Start: 2023-09-11 | End: 2023-09-11

## 2023-09-11 RX ORDER — ACETAMINOPHEN 325 MG/1
650 TABLET ORAL EVERY 6 HOURS PRN
Status: DISCONTINUED | OUTPATIENT
Start: 2023-09-11 | End: 2023-09-12 | Stop reason: HOSPADM

## 2023-09-11 RX ORDER — METOPROLOL SUCCINATE 25 MG/1
25 TABLET, EXTENDED RELEASE ORAL DAILY
Status: DISCONTINUED | OUTPATIENT
Start: 2023-09-11 | End: 2023-09-11

## 2023-09-11 RX ORDER — FUROSEMIDE 10 MG/ML
80 INJECTION INTRAMUSCULAR; INTRAVENOUS
Status: DISCONTINUED | OUTPATIENT
Start: 2023-09-11 | End: 2023-09-11

## 2023-09-11 RX ORDER — ATORVASTATIN CALCIUM 40 MG/1
80 TABLET, FILM COATED ORAL
Status: DISCONTINUED | OUTPATIENT
Start: 2023-09-11 | End: 2023-09-12 | Stop reason: HOSPADM

## 2023-09-11 RX ORDER — HEPARIN SODIUM 10000 [USP'U]/100ML
3-20 INJECTION, SOLUTION INTRAVENOUS
Status: DISCONTINUED | OUTPATIENT
Start: 2023-09-11 | End: 2023-09-12 | Stop reason: HOSPADM

## 2023-09-11 RX ORDER — INSULIN LISPRO 100 [IU]/ML
1-5 INJECTION, SOLUTION INTRAVENOUS; SUBCUTANEOUS EVERY 6 HOURS SCHEDULED
Status: DISCONTINUED | OUTPATIENT
Start: 2023-09-11 | End: 2023-09-12 | Stop reason: HOSPADM

## 2023-09-11 RX ADMIN — HEPARIN SODIUM 5000 UNITS: 5000 INJECTION INTRAVENOUS; SUBCUTANEOUS at 06:49

## 2023-09-11 RX ADMIN — ATORVASTATIN CALCIUM 80 MG: 40 TABLET, FILM COATED ORAL at 16:22

## 2023-09-11 RX ADMIN — Medication 1 TABLET: at 08:25

## 2023-09-11 RX ADMIN — INSULIN LISPRO 1 UNITS: 100 INJECTION, SOLUTION INTRAVENOUS; SUBCUTANEOUS at 07:15

## 2023-09-11 RX ADMIN — METOPROLOL SUCCINATE 25 MG: 25 TABLET, EXTENDED RELEASE ORAL at 14:57

## 2023-09-11 RX ADMIN — HEPARIN SODIUM 1800 UNITS: 1000 INJECTION INTRAVENOUS; SUBCUTANEOUS at 20:36

## 2023-09-11 RX ADMIN — HEPARIN SODIUM 12 UNITS/KG/HR: 10000 INJECTION, SOLUTION INTRAVENOUS at 12:53

## 2023-09-11 RX ADMIN — ASPIRIN 81 MG CHEWABLE TABLET 81 MG: 81 TABLET CHEWABLE at 08:25

## 2023-09-11 NOTE — ASSESSMENT & PLAN NOTE
· No CP on admission. Troponin 45. EKG Sinus tachycardia without ST changes. · Left heart cath 8/24/23: 3v CAD with LM  •  Dist LM lesion is 70% stenosed. •  Ost Cx lesion is 50% stenosed. •  Mid LAD lesion is 70% stenosed. •  Mid RCA lesion is 80% stenosed. •  1st Diag lesion is 90% stenosed. · Scheduled for CABG (and AVR) at [facility] 9/25  · Continue ASA, statin.  Resume home metoprolol when appropriate  · Continuous cardiac monitoring  · EKG in AM

## 2023-09-11 NOTE — ASSESSMENT & PLAN NOTE
· Hx severe aortic valve stenosis  · Most recent JESSICA 6/14:  Aortic valve leaflets are severely calcified, normal mobility, moderate regurgitation. Moderate to severe stenosis visually. Small mobile vegetation at the commissure of the LCC and on CC.   Very small mobile vegetation noted tip of the RCC  · Scheduled for surgical AVR at [facility] on 9/25  · Obtain updated ECHO in setting of acute heart failure

## 2023-09-11 NOTE — ED PROVIDER NOTES
History  Chief Complaint   Patient presents with   • Respiratory Distress     Pt brought in by S after respiratory distress history of COPD started about 30 minutes prior to arrival. CPAP upon arrival respirations 50's     Patient brought in by S with CPAP in place. Patient called 911 stating onset of shortness of breath this evening. Has a history of COPD and CHF. Also complaining of some chest pain. History provided by:  Patient and EMS personnel  History limited by:  Severe respiratory distress   used: No        Prior to Admission Medications   Prescriptions Last Dose Informant Patient Reported? Taking? Multiple Vitamins-Minerals (PRESERVISION AREDS PO) 9/10/2023 Self Yes Yes   Sig: Take 5 mg by mouth 2 (two) times a day Two tablets daily   acetaminophen (TYLENOL) 325 mg tablet  Self No No   Sig: Take 2 tablets (650 mg total) by mouth every 6 (six) hours as needed for mild pain   aspirin 81 mg chewable tablet 9/10/2023 Self No Yes   Sig: Chew 1 tablet (81 mg total) daily   metoprolol succinate (TOPROL-XL) 25 mg 24 hr tablet 9/10/2023 Self No Yes   Sig: Take 1 tablet (25 mg total) by mouth daily   potassium chloride (K-DUR,KLOR-CON) 20 mEq tablet 9/10/2023 Self No Yes   Sig: Take 2 tablets (40 mEq total) by mouth 2 (two) times a day   rosuvastatin (CRESTOR) 40 MG tablet 9/10/2023 Self No Yes   Sig: Take 1 tablet (40 mg total) by mouth daily   torsemide (DEMADEX) 20 mg tablet 9/10/2023 Self No Yes   Sig: Take 1 tablet (20 mg total) by mouth 2 (two) times a day Do not start before August 25, 2023.       Facility-Administered Medications: None       Past Medical History:   Diagnosis Date   • Aortic stenosis    • CAD (coronary artery disease), native coronary artery    • CHF (congestive heart failure) ([facility])    • COPD (chronic obstructive pulmonary disease) ([facility])    • Discitis    • Endocarditis        Past Surgical History:   Procedure Laterality Date   • CARDIAC CATHETERIZATION N/A 2023    Procedure: Cardiac RHC/LHC; Surgeon: Sami Reilly MD;  Location: [facility]; Service: Cardiology   • SKIN BIOPSY         Family History   Problem Relation Age of Onset   • Colon cancer Mother    • Lung cancer Father    • Mental illness Neg Hx      I have reviewed and agree with the history as documented. E-Cigarette/Vaping   • E-Cigarette Use Never User      E-Cigarette/Vaping Substances   • Nicotine No    • THC No    • CBD No    • Flavoring No    • Other No    • Unknown No      Social History     Tobacco Use   • Smoking status: Former     Packs/day: 0.50     Years: 20.00     Total pack years: 10.00     Types: Cigarettes     Quit date: 3/11/2005     Years since quittin.5   • Smokeless tobacco: Never   Vaping Use   • Vaping Use: Never used   Substance Use Topics   • Alcohol use: Not Currently   • Drug use: Not Currently       Review of Systems   Respiratory: Positive for shortness of breath. Cardiovascular: Positive for chest pain. All other systems reviewed and are negative. Physical Exam  Physical Exam  Vitals and nursing note reviewed. Constitutional:       General: He is in acute distress. Eyes:      General: No scleral icterus. Conjunctiva/sclera: Conjunctivae normal.   Cardiovascular:      Rate and Rhythm: Normal rate and regular rhythm. Pulmonary:      Effort: Respiratory distress present. Breath sounds: Rhonchi and rales present. Abdominal:      General: Abdomen is flat. Bowel sounds are normal. There is no distension. Palpations: Abdomen is soft. Tenderness: There is no abdominal tenderness. There is no guarding or rebound. Skin:     Capillary Refill: Capillary refill takes less than 2 seconds. Findings: No rash. Neurological:      General: No focal deficit present. Mental Status: He is alert and oriented to person, place, and time.          Vital Signs  ED Triage Vitals   Temperature Pulse Respirations Blood Pressure SpO2 09/10/23 2258 09/10/23 2237 09/10/23 2237 09/10/23 2239 09/10/23 2237   (!) 96.5 °F (35.8 °C) (!) 123 (!) 40 (!) 196/87 99 %      Temp Source Heart Rate Source Patient Position - Orthostatic VS BP Location FiO2 (%)   09/10/23 2258 09/10/23 2237 09/10/23 2237 09/10/23 2237 09/10/23 2302   Axillary Monitor Sitting Left arm 40      Pain Score       09/10/23 2237       No Pain           Vitals:    09/11/23 0015 09/11/23 0030 09/11/23 0100 09/11/23 0200   BP: 126/56 119/56 147/64 129/60   Pulse: 81 82 93 77   Patient Position - Orthostatic VS:   Sitting          Visual Acuity      ED Medications  Medications   acetaminophen (TYLENOL) tablet 650 mg (has no administration in time range)   aspirin chewable tablet 81 mg (has no administration in time range)   multivitamin-minerals (CENTRUM) tablet 1 tablet (has no administration in time range)   atorvastatin (LIPITOR) tablet 80 mg (has no administration in time range)   insulin lispro (HumaLOG) 100 units/mL subcutaneous injection 1-5 Units ( Subcutaneous Not Given 9/11/23 0117)   furosemide (LASIX) injection 80 mg (has no administration in time range)   heparin (porcine) subcutaneous injection 5,000 Units (has no administration in time range)   methylPREDNISolone sodium succinate (Solu-MEDROL) injection 60 mg (60 mg Intravenous Given 9/10/23 2244)   ipratropium-albuterol (DUO-NEB) 0.5-2.5 mg/3 mL inhalation solution 3 mL (3 mL Nebulization Given 9/10/23 2245)   furosemide (LASIX) injection 80 mg (80 mg Intravenous Given 9/10/23 2250)       Diagnostic Studies  Results Reviewed     Procedure Component Value Units Date/Time    HS Troponin I 4hr [[id number]] Collected: 09/11/23 0241    Lab Status:  In process Specimen: Blood from Arm, Right Updated: 09/11/23 0245    HS Troponin I 2hr [[id number]]  (Abnormal) Collected: 09/11/23 0031    Lab Status: Final result Specimen: Blood from Arm, Left Updated: 09/11/23 0059     hs TnI 2hr 240 ng/L      Delta 2hr hsTnI 195 ng/L     Urine Microscopic [[id number]]  (Abnormal) Collected: 09/11/23 0006    Lab Status: Final result Specimen: Urine, Clean Catch Updated: 09/11/23 0031     RBC, UA 0-1 /hpf      WBC, UA 0-1 /hpf      Epithelial Cells Occasional /hpf      Bacteria, UA Occasional /hpf      Hyaline Casts, UA 10-20 /lpf     UA (URINE) with reflex to Scope [[id number]]  (Abnormal) Collected: 09/11/23 0006    Lab Status: Final result Specimen: Urine, Clean Catch Updated: 09/11/23 0013     Color, UA Yellow     Clarity, UA Clear     Specific Gravity, UA 1.015     pH, UA 6.5     Leukocytes, UA Negative     Nitrite, UA Negative     Protein, UA Trace mg/dl      Glucose, UA Negative mg/dl      Ketones, UA Negative mg/dl      Urobilinogen, UA 0.2 E.U./dl      Bilirubin, UA Negative     Occult Blood, UA Negative    RBC Morphology Reflex Test [[id number]] Collected: 09/10/23 2235    Lab Status: Final result Specimen: Blood from Arm, Left Updated: 09/11/23 0001    APTT [[id number]]  (Normal) Collected: 09/10/23 2301    Lab Status: Final result Specimen: Blood from Arm, Left Updated: 09/10/23 2356     PTT 30 seconds     Protime-INR [[id number]]  (Normal) Collected: 09/10/23 2301    Lab Status: Final result Specimen: Blood from Arm, Left Updated: 09/10/23 2356     Protime 13.0 seconds      INR 0.97    CBC and differential [[id number]]  (Abnormal) Collected: 09/10/23 2235    Lab Status: Final result Specimen: Blood from Arm, Left Updated: 09/10/23 2347     WBC 12.45 Thousand/uL      RBC 4.51 Million/uL      Hemoglobin 13.5 g/dL      Hematocrit 43.6 %      MCV 97 fL      MCH 29.9 pg      MCHC 31.0 g/dL      RDW 15.7 %      MPV 10.4 fL      Platelets 107 Thousands/uL     Narrative: This is an appended report. These results have been appended to a previously verified report.     Manual Differential(PHLEBS Do Not Order) [[id number]]  (Abnormal) Collected: 09/10/23 2235    Lab Status: Final result Specimen: Blood from Arm, Left Updated: 09/10/23 2347     Segmented % 57 %      Lymphocytes % 30 %      Monocytes % 7 %      Eosinophils, % 0 %      Basophils % 0 %      Atypical Lymphocytes % 6 %      Absolute Neutrophils 7.10 Thousand/uL      Lymphocytes Absolute 4.48 Thousand/uL      Monocytes Absolute 0.87 Thousand/uL      Eosinophils Absolute 0.00 Thousand/uL      Basophils Absolute 0.00 Thousand/uL      Total Counted --     Smudge Cells Present     RBC Morphology Present     Platelet Estimate Adequate     Giant PLTs Present     Macrocytes Present    FLU/RSV/COVID - if FLU/RSV clinically relevant [[id number]]  (Normal) Collected: 09/10/23 2235    Lab Status: Final result Specimen: Nares from Nose Updated: 09/10/23 2327     SARS-CoV-2 Negative     INFLUENZA A PCR Negative     INFLUENZA B PCR Negative     RSV PCR Negative    Narrative:      FOR PEDIATRIC PATIENTS - copy/paste COVID Guidelines URL to browser: [email or web address]    SARS-CoV-2 assay is a Nucleic Acid Amplification assay intended for the  qualitative detection of nucleic acid from SARS-CoV-2 in nasopharyngeal  swabs. Results are for the presumptive identification of SARS-CoV-2 RNA. Positive results are indicative of infection with SARS-CoV-2, the virus  causing COVID-19, but do not rule out bacterial infection or co-infection  with other viruses. Laboratories within the [facility] and its  territories are required to report all positive results to the appropriate  public health authorities. Negative results do not preclude SARS-CoV-2  infection and should not be used as the sole basis for treatment or other  patient management decisions. Negative results must be combined with  clinical observations, patient history, and epidemiological information. This test has not been FDA cleared or approved. This test has been authorized by FDA under an Emergency Use Authorization  (EUA).  This test is only authorized for the duration of time the  declaration that circumstances exist justifying the authorization of the  emergency use of an in vitro diagnostic tests for detection of SARS-CoV-2  virus and/or diagnosis of COVID-19 infection under section 564(b)(1) of  the Act, 21 U. S.C. 845AAD-6(P)(0), unless the authorization is terminated  or revoked sooner. The test has been validated but independent review by FDA  and CLIA is pending. Test performed using [employer] GeneXpert: This RT-PCR assay targets N2,  a region unique to SARS-CoV-2. A conserved region in the E-gene was chosen  for pan-Sarbecovirus detection which includes SARS-CoV-2. According to CMS-2020-01-R, this platform meets the definition of high-throughput technology.     Blood gas, venous [[id number]]  (Abnormal) Collected: 09/10/23 2301    Lab Status: Final result Specimen: Blood from Arm, Right Updated: 09/10/23 2317     pH, Manish 7.380     pCO2, Manish 38.3 mm Hg      pO2, Manish 48.1 mm Hg      HCO3, Manish 22.1 mmol/L      Base Excess, Manish -2.6 mmol/L      O2 Content, Manish 14.7 ml/dL      O2 HGB, VENOUS 78.6 %     HS Troponin 0hr (reflex protocol) [[id number]]  (Normal) Collected: 09/10/23 2235    Lab Status: Final result Specimen: Blood from Arm, Left Updated: 09/10/23 2314     hs TnI 0hr 45 ng/L     B-Type Natriuretic Peptide(BNP) [[id number]]  (Abnormal) Collected: 09/10/23 2235    Lab Status: Final result Specimen: Blood from Arm, Left Updated: 09/10/23 2312     BNP 3,143 pg/mL     Comprehensive metabolic panel [[id number]]  (Abnormal) Collected: 09/10/23 2235    Lab Status: Final result Specimen: Blood from Arm, Left Updated: 09/10/23 2310     Sodium 138 mmol/L      Potassium 3.8 mmol/L      Chloride 101 mmol/L      CO2 21 mmol/L      ANION GAP 16 mmol/L      BUN 26 mg/dL      Creatinine 1.75 mg/dL      Glucose 246 mg/dL      Calcium 9.2 mg/dL      AST 26 U/L      ALT 18 U/L      Alkaline Phosphatase 71 U/L      Total Protein 6.6 g/dL      Albumin 4.0 g/dL      Total Bilirubin 0.38 mg/dL      eGFR 36 ml/min/1.73sq m     Narrative:      National Kidney Disease Foundation guidelines for Chronic Kidney Disease (CKD):   •  Stage 1 with normal or high GFR (GFR > 90 mL/min/1.73 square meters)  •  Stage 2 Mild CKD (GFR = 60-89 mL/min/1.73 square meters)  •  Stage 3A Moderate CKD (GFR = 45-59 mL/min/1.73 square meters)  •  Stage 3B Moderate CKD (GFR = 30-44 mL/min/1.73 square meters)  •  Stage 4 Severe CKD (GFR = 15-29 mL/min/1.73 square meters)  •  Stage 5 End Stage CKD (GFR <15 mL/min/1.73 square meters)  Note: GFR calculation is accurate only with a steady state creatinine    Magnesium [[id number]]  (Normal) Collected: 09/10/23 2235    Lab Status: Final result Specimen: Blood from Arm, Left Updated: 09/10/23 2310     Magnesium 2.7 mg/dL     Blood culture #2 [[id number]] Collected: 09/10/23 2243    Lab Status: In process Specimen: Blood from Arm, Right Updated: 09/10/23 2249    Blood culture #1 [[id number]] Collected: 09/10/23 2235    Lab Status:  In process Specimen: Blood from Arm, Left Updated: 09/10/23 2249    Lactic acid, plasma (w/reflex if result > 2.0) [[id number]] Collected: 09/10/23 2235    Lab Status: No result Specimen: Blood from Arm, Left                  XR chest 1 view portable    (Results Pending)              Procedures  ECG 12 Lead Documentation Only    Date/Time: 9/10/2023 10:47 PM    Performed by: Eriberto Cody DO  Authorized by: Eriberto Cody DO    ECG reviewed by me, the ED Provider: yes    Patient location:  ED  Interpretation:     Interpretation: abnormal    Rate:     ECG rate:  118    ECG rate assessment: tachycardic    Rhythm:     Rhythm: sinus rhythm    Ectopy:     Ectopy: none    ST segments:     ST segments:  Normal  T waves:     T waves: normal    Other findings:     Other findings: LAE and LVH      CriticalCare Time    Date/Time: 9/11/2023 3:13 AM    Performed by: Eriberto Cody DO  Authorized by: Eriberto Cody DO    Critical care provider statement:     Critical care time (minutes):  45    Critical care time was exclusive of:  Separately billable procedures and treating other patients    Critical care was necessary to treat or prevent imminent or life-threatening deterioration of the following conditions:  Respiratory failure    Critical care was time spent personally by me on the following activities:  Blood draw for specimens, obtaining history from patient or surrogate, development of treatment plan with patient or surrogate, discussions with consultants, evaluation of patient's response to treatment, examination of patient, ventilator management, review of old charts, re-evaluation of patient's condition, ordering and review of radiographic studies, ordering and review of laboratory studies, ordering and performing treatments and interventions and interpretation of cardiac output measurements             ED Course                                             Medical Decision Making  Pulse ox 87% on room air indicating inadequate oxygenation  Pulse ox 99% on BiPAP indicating adequate oxygenation. CXR: Pulmonary vascular congestion as read by me    Differential diagnosis includes but not limited to congestive heart failure, COPD exacerbation, pneumonia, COVID-19, arrhythmia, ACS    [facility] ICU APN on-call contacted and case discussed for admission under Dr. Lindy Murillo. Acute respiratory failure with hypoxia [facility]): acute illness or injury  CHF (congestive heart failure) ([address]): acute illness or injury  Amount and/or Complexity of Data Reviewed  Labs: ordered. Radiology: ordered and independent interpretation performed. ECG/medicine tests: ordered and independent interpretation performed. Risk  Prescription drug management. Decision regarding hospitalization.           Disposition  Final diagnoses:   Acute respiratory failure with hypoxia (HCC)   CHF (congestive heart failure) ([address])     Time reflects when diagnosis was documented in both MDM as applicable and the Disposition within this note     Time User Action Codes Description Comment    9/10/2023 11:51 PM Mer Rivera [J96.01] Acute respiratory failure with hypoxia ([address])     9/10/2023 11:51 PM Isabel Jaun6 [facility] [I50.9] CHF (congestive heart failure) [facility])       ED Disposition     ED Disposition   Admit    Condition   Stable    Date/Time   Sun Sep 10, 2023 11:51 PM    Comment   Case was discussed with ERYN Toscano and the patient's admission status was agreed to be Admission Status: inpatient status to the service of Dr. Daphne Quiroga. Follow-up Information    None         Current Discharge Medication List      CONTINUE these medications which have NOT CHANGED    Details   aspirin 81 mg chewable tablet Chew 1 tablet (81 mg total) daily  Refills: 0    Associated Diagnoses: Coronary artery disease involving native coronary artery      metoprolol succinate (TOPROL-XL) 25 mg 24 hr tablet Take 1 tablet (25 mg total) by mouth daily  Qty: 90 tablet, Refills: 3    Associated Diagnoses: Coronary artery disease involving native coronary artery      Multiple Vitamins-Minerals (PRESERVISION AREDS PO) Take 5 mg by mouth 2 (two) times a day Two tablets daily      potassium chloride (K-DUR,KLOR-CON) 20 mEq tablet Take 2 tablets (40 mEq total) by mouth 2 (two) times a day  Qty: 120 tablet, Refills: 5    Associated Diagnoses: Hypokalemia; (HFpEF) heart failure with preserved ejection fraction (HCC)      rosuvastatin (CRESTOR) 40 MG tablet Take 1 tablet (40 mg total) by mouth daily  Qty: 90 tablet, Refills: 3    Associated Diagnoses: Coronary artery disease involving native coronary artery      torsemide (DEMADEX) 20 mg tablet Take 1 tablet (20 mg total) by mouth 2 (two) times a day Do not start before August 25, 2023.   Qty: 90 tablet, Refills: 0    Associated Diagnoses: Congestive heart failure, unspecified HF chronicity, unspecified heart failure type (HCC)      acetaminophen (TYLENOL) 325 mg tablet Take 2 tablets (650 mg total) by mouth every 6 (six) hours as needed for mild pain  Refills: 0    Associated Diagnoses: Rib pain             No discharge procedures on file.     PDMP Review     None          ED Provider  Electronically Signed by           Nancy De Guzman DO  09/11/23 6090

## 2023-09-11 NOTE — RESPIRATORY THERAPY NOTE
Placed pt on bipap for resp distress. Medium ffm applied ad pt tolerating well. Settings adjusted and MD aware. Nebs given in line.

## 2023-09-11 NOTE — ASSESSMENT & PLAN NOTE
· Recent streptococcus bacteremia and endocarditis in May. S/p 6 week antibiotic treatment. Source likely odontogenic.  Has since had dental extractions in anticipation of AVR and CABG later this month  · Blood cultures obtained on admission for completeness  · Monitor off antibiotics  · Trend CBC, temperature curve  · Procal pending

## 2023-09-11 NOTE — ASSESSMENT & PLAN NOTE
· Baseline creatinine 0.9-1.2  · Admission creatinine 1.75. Reports no change in PO intake in recent days. Has been compliant with diuretics  · ? Cardio-renal/Pre-renal in setting of heart failure with severe AS vs. dehydration in setting of diuresis  · Received furosemide 80mg IV in ED for pulmonary edema.  Discuss additional dosing on rounds  · Trend renal indices  · Close I&O monitoring  · Avoid nephrotoxins and hypotension

## 2023-09-11 NOTE — ASSESSMENT & PLAN NOTE
· Tachycardic and tachypneic in ED. Afebrile but with WBC slightly elevated at 12.4  · Suspect SIRS in setting of acute heart failure. Denies any infectious symptoms prior to acute onset of his SOB  · BC x2 pending  · Lactic acid not obtained due to low suspicion for infection  · COVID and flu negative  · CXR without focal opacification on my read. Shows pulmonary edema with left effusion.   · Monitor off antibiotics  · Trend CBC, temperature curve  · Check procal for completeness

## 2023-09-11 NOTE — ASSESSMENT & PLAN NOTE
Presents via EMS after acute onset of SOB last evening. Has Hx of HFpEF with EF 55%, on BID torsemide at home. Severe AS and 3 vessel CAD, scheduled for CABG and AVR at [facility] 9/25. Significant WOB and RR 50's on arrival, placed on BiPap 20/10 in ED with improvement in WOB. · BNP 3,143 on admission (prior 2,299)  · CXR showing pulmonary edema  · S/p furosemide 80mg x1 in ED. Has only put out 100ml urine. Bladder scan without significant retention  · S/p duoneb and solumedrol 60mg in ED     Plan  · Continue BiPap overnight.  Attempt to wean in AM  · Goal SpO2 >92%  · Hold additional steroids as no wheezing on exam, suspect symptoms 2/2 heart failure exacerbation  · Additional diuresis pending results of ECHO, see plan below  · Check procal, though low suspicion for infectious etiology   · Monitor off antibiotics

## 2023-09-11 NOTE — H&P
[id number] [address]  H&P  Name: Katie Orozco 69 y.o. male I MRN: [id number]  Unit/Bed#: ICU 15 I Date of Admission: 9/10/2023   Date of Service: 9/11/2023 I Hospital Day: 0      Assessment/Plan   * Acute respiratory failure with hypoxia [facility])  Assessment & Plan  Presents via EMS after acute onset of SOB last evening. Has Hx of HFpEF with EF 55%, on BID torsemide at home. Severe AS and 3 vessel CAD, scheduled for CABG and AVR at [facility] 9/25. Significant WOB and RR 50's on arrival, placed on BiPap 20/10 in ED with improvement in WOB. · BNP 3,143 on admission (prior 2,299)  · CXR showing pulmonary edema  · S/p furosemide 80mg x1 in ED. Has only put out 100ml urine. Bladder scan without significant retention  · S/p duoneb and solumedrol 60mg in ED     Plan  · Continue BiPap overnight. Attempt to wean in AM  · Goal SpO2 >92%  · Hold additional steroids as no wheezing on exam, suspect symptoms 2/2 heart failure exacerbation  · Additional diuresis pending results of ECHO, see plan below  · Check procal, though low suspicion for infectious etiology   · Monitor off antibiotics      Acute on chronic heart failure with preserved ejection fraction (HFpEF) ([facility])  Assessment & Plan  Wt Readings from Last 3 Encounters:   09/08/23 63.5 kg (139 lb 14.4 oz)   08/24/23 63.5 kg (140 lb)   08/21/23 61.7 kg (136 lb)     Last TTE 6/13: EF 55%, mod to severe aortic stenosis, moderate insufficiency. Presented to ED with acute onset SOB requiring placement of BiPap for WOB. Reports compliance with home Torsemide. Weight is 64.4kg on admission, about 3kg above recent baseline.      · BMP 3,143 on admission from prior 2,299  · CXR demonstrates diffuse pulmonary congestion  · S/p furosemide 80mg IV x1 in ED  · WOB improved on BiPap, though still tachypneic  · Obtain repeat ECHO  · Hold home metoprolol for now  · Daily weights  · Close I&O monitoring  · Discuss diuretic dosing on rounds pending AM labs   · Monitor renal function closely in setting of MARIBEL. Patient did not diurese well with ED dose of lasix and appears somewhat dehydrated on exam   · Resting comfortably on BiPap. Can consider additional diuretics if respiratory status worsens. Coronary artery disease involving native coronary artery  Assessment & Plan  · No CP on admission. Troponin 45. EKG Sinus tachycardia without ST changes. · Left heart cath 8/24/23: 3v CAD with LM  •  Dist LM lesion is 70% stenosed. •  Ost Cx lesion is 50% stenosed. •  Mid LAD lesion is 70% stenosed. •  Mid RCA lesion is 80% stenosed. •  1st Diag lesion is 90% stenosed. · Scheduled for CABG (and AVR) at [facility] 9/25  · Continue ASA, statin. Resume home metoprolol when appropriate  · Continuous cardiac monitoring  · EKG in AM      SIRS (systemic inflammatory response syndrome) (HCC)  Assessment & Plan  · Tachycardic and tachypneic in ED. Afebrile but with WBC slightly elevated at 12.4  · Suspect SIRS in setting of acute heart failure. Denies any infectious symptoms prior to acute onset of his SOB  · BC x2 pending  · Lactic acid not obtained due to low suspicion for infection  · COVID and flu negative  · CXR without focal opacification on my read. Shows pulmonary edema with left effusion. · Monitor off antibiotics  · Trend CBC, temperature curve  · Check procal for completeness    Nonrheumatic aortic valve stenosis  Assessment & Plan  · Hx severe aortic valve stenosis  · Most recent JESSICA 6/14:  Aortic valve leaflets are severely calcified, normal mobility, moderate regurgitation. Moderate to severe stenosis visually. Small mobile vegetation at the commissure of the LCC and on CC. Very small mobile vegetation noted tip of the RCC  · Scheduled for surgical AVR at [facility] on 9/25  · Obtain updated ECHO in setting of acute heart failure    Acute kidney injury [facility])  Assessment & Plan  · Baseline creatinine 0.9-1.2  · Admission creatinine 1.75.  Reports no change in PO intake in recent days. Has been compliant with diuretics  · ? Cardio-renal/Pre-renal in setting of heart failure with severe AS vs. dehydration in setting of diuresis  · Received furosemide 80mg IV in ED for pulmonary edema. Discuss additional dosing on rounds  · Trend renal indices  · Close I&O monitoring  · Avoid nephrotoxins and hypotension    Abnormal glucose  Assessment & Plan  · Glucose elevated at 246 in ED. Most recent A1c 5.8/pre-diabetes  · Not on any home medications for diabetes, not on steroids, though received 1 dose in ED  · Q6h accuchecks with SSI for now  · Goal BG <140  · Would benefit from improved glucose control with upcoming cardiac surgery. Mixed hyperlipidemia  Assessment & Plan  · Continue statin    Primary hypertension  Assessment & Plan  · Normotensive currently  · Home metoprolol held on admission. Not on other antihypertensive medications. Resume as appropriate  · VS per unit protocol    History of Acute bacterial endocarditis  Assessment & Plan  · Recent streptococcus bacteremia and endocarditis in May. S/p 6 week antibiotic treatment. Source likely odontogenic. Has since had dental extractions in anticipation of AVR and CABG later this month  · Blood cultures obtained on admission for completeness  · Monitor off antibiotics  · Trend CBC, temperature curve  · Procal pending          History of Present Illness     HPI: Michelle Mota is a 69 y.o. with PMHx of HTN, HLD, CAD with severe 3 vessel disease including left main, severe aortic stenosis, recent endocarditis with antibiotic course completed in July, and HFpEF. He presented with acute onset of SOB which began 30 minutes prior to arrival. RR was 50's on arrival and was placed on BiPap for WOB. CXR showed pulmonary edema and he was given 80mg IV furosemide. Was also wheezy on exam and received a duoneb and 60mg solumedrol. Labs were significant for WBC 12.4, creatinine 1.75, BNP 3,143.  He denied any CP and EKG showed sinus tachycardia with no ST changes. First troponin was 45. Denies fever, chills, or other complaints. Reports compliance with his diuretics. On exam he is more comfortable on BiPap, but still tachypneic. He has mild pedal edema on exam which he states is at baseline. UOP was only 100ml after diuretic administration. He will be admitted to ICU for continued treatment of his acute heart failure. Case was discussed via TT with on-call ICU attending. Of note, the patient is scheduled for CABG and AVR later this month at [facility].     History obtained from chart review and the patient. Review of Systems   Constitutional: Negative for chills, fatigue and fever. Respiratory: Positive for shortness of breath (acute onset 30 minutes PTA). Negative for cough. Cardiovascular: Positive for leg swelling ("it looks the same"). Negative for chest pain and palpitations. Gastrointestinal: Negative. Genitourinary: Negative. All other systems reviewed and are negative. Historical Information   Past Medical History:  No date: Aortic stenosis  No date: CAD (coronary artery disease), native coronary artery  No date: CHF (congestive heart failure) ([facility])  No date: COPD (chronic obstructive pulmonary disease) ([facility])  No date: Discitis  No date: Endocarditis Past Surgical History:  8/24/2023: CARDIAC CATHETERIZATION; N/A      Comment:  Procedure: Cardiac RHC/LHC; Surgeon: Sho Grier MD;               Location: [facility]ARDIAC [facility];   Service: Cardiology  No date: SKIN BIOPSY   Current Outpatient Medications   Medication Instructions   • acetaminophen (TYLENOL) 650 mg, Oral, Every 6 hours PRN   • aspirin 81 mg, Oral, Daily   • metoprolol succinate (TOPROL-XL) 25 mg, Oral, Daily   • Multiple Vitamins-Minerals (PRESERVISION AREDS PO) 5 mg, Oral, 2 times daily, Two tablets daily    • potassium chloride (K-DUR,KLOR-CON) 20 mEq tablet 40 mEq, Oral, 2 times daily   • rosuvastatin (CRESTOR) 40 mg, Oral, Daily   • torsemide (DEMADEX) 20 mg, Oral, 2 times daily    No Known Allergies   Social History     Tobacco Use   • Smoking status: Former     Packs/day: 0.50     Years: 20.00     Total pack years: 10.00     Types: Cigarettes     Quit date: 3/11/2005     Years since quittin.5   • Smokeless tobacco: Never   Vaping Use   • Vaping Use: Never used   Substance Use Topics   • Alcohol use: Not Currently   • Drug use: Not Currently    Family History   Problem Relation Age of Onset   • Colon cancer Mother    • Lung cancer Father    • Mental illness Neg Hx           Objective                            Vitals I/O      Most Recent Min/Max in 24hrs   Temp (!) 97.4 °F (36.3 °C) Temp  Min: 96.5 °F (35.8 °C)  Max: 97.4 °F (36.3 °C)   Pulse 77 Pulse  Min: 77  Max: 123   Resp 20 Resp  Min: 20  Max: 40   /60 BP  Min: 111/53  Max: 196/87   O2 Sat 99 % SpO2  Min: 91 %  Max: 99 %      Intake/Output Summary (Last 24 hours) at 2023 0249  Last data filed at 2023 0242  Gross per 24 hour   Intake 20 ml   Output 610 ml   Net -590 ml         Diet NPO     Invasive Monitoring Physical exam   N/A Physical Exam  Eyes:      General: Lids are normal.      Extraocular Movements: Extraocular movements intact. Conjunctiva/sclera:      Right eye: Right conjunctiva is injected. Left eye: Left conjunctiva is injected. Skin:     General: Skin is warm and dry. HENT:      Head: Normocephalic. Mouth/Throat:      Mouth: Mucous membranes are dry. Cardiovascular:      Rate and Rhythm: Normal rate and regular rhythm. Pulses:           Radial pulses are 2+ on the right side and 2+ on the left side. Dorsalis pedis pulses are detected w/ Doppler on the right side and detected w/ Doppler on the left side. Heart sounds: Murmur heard. Musculoskeletal:      Right lower le+ Edema (pedal) present. Left lower le+ Edema (pedal) present.       Comments: Left pedal edema chronically larger than right per patient     Abdominal: General: Bowel sounds are normal. There is no distension. Palpations: Abdomen is soft. Tenderness: There is no abdominal tenderness. Constitutional:       General: He is not in acute distress (mild conversational dyspnea on BiPap, still tachypneic). Appearance: He is ill-appearing. Interventions: Face mask in place. Pulmonary:      Effort: Tachypnea present. Breath sounds: Examination of the right-middle field reveals rales. Examination of the left-middle field reveals rales. Examination of the right-lower field reveals rales. Examination of the left-lower field reveals rales. Rales present. Psychiatric:         Attention and Perception: Attention normal.         Mood and Affect: Mood normal.         Behavior: Behavior is cooperative. Neurological:      General: No focal deficit present. Mental Status: He is alert and oriented to person, place and time. GCS: GCS eye subscore is 4. GCS verbal subscore is 5. GCS motor subscore is 6. Vitals and nursing note reviewed. Diagnostic Studies      EKG: Sinus tachycardia, rate 118. No ST changes  Imaging: CXR 9/10; Diffuse pulmonary edema on my read with left pleural effusion.  Formal report pending I have personally reviewed pertinent films in PACS     Medications:  Scheduled PRN   aspirin, 81 mg, Daily  atorvastatin, 80 mg, Daily With Dinner  furosemide, 80 mg, BID (diuretic)  heparin (porcine), 5,000 Units, Q8H [address]  insulin lispro, 1-5 Units, Q6H [address]  multivitamin-minerals, 1 tablet, Daily      acetaminophen, 650 mg, Q6H PRN       Continuous          Labs:    CBC    Recent Labs     09/10/23  2235   WBC 12.45*   HGB 13.5   HCT 43.6        BMP    Recent Labs     09/10/23  2235   SODIUM 138   K 3.8      CO2 21   AGAP 16   BUN 26*   CREATININE 1.75*   CALCIUM 9.2       Coags    Recent Labs     09/10/23  2301   INR 0.97   PTT 30        Additional Electrolytes  Recent Labs     09/10/23  2235   MG 2.7 Blood Gas    No recent results  Recent Labs     09/10/23  2301   PHVEN 7.380   QGC8LDK 38.3*   PO2VEN 48.1*   NUK2SKH 22.1*   BEVEN -2.6    LFTs  Recent Labs     09/10/23  2235   ALT 18   AST 26   ALKPHOS 71   ALB 4.0   TBILI 0.38       Infectious  No recent results  Glucose  Recent Labs     09/10/23  2235   GLUC 246*             Critical Care Time Delivered: Upon my evaluation, this patient had a high probability of imminent or life-threatening deterioration due to acute respiratory failure, congestive heart failure, MARIBEL, which required my direct attention, intervention, and personal management. I have personally provided 45 minutes of critical care time, exclusive of procedures, teaching, family meetings, and any prior time recorded by providers other than myself.    Anticipated Length of Stay is > 2 midnights  Vinnie Prince

## 2023-09-11 NOTE — PROGRESS NOTES
Critical Care Attending Note; Paulino Hummel   Note Date: 23  Note Time: 11:03 AM    Patient: Andre Morley  Age, : 69 y. o., 1945 MRN: [id number] Code Status: Level 1 - Full Code Patient Location: ICU 12/ICU [address] [place] [address] LOS:0 days  ]   Patient seen and examined, medical record reviewed, discussed with house staff and nursing staff. HPI   CC: Flash Pulmonary Edema  69 M with a PMH of HTN, HLD, CAD(Severe 3 Vessel), severe AS, Endocarditis, and HFpEF who presents with acute shortness of breath. Main ICU Plans:       #Card  Acute on Chronic heart failure exacerbation - Likely secondary to hypertension in the setting of severe AS - POCUS revealed CVP 0-5mmHg, Normal to borderline low LV Function, Normal RV size and function. Patient does not have active infection at this time. - Improved with lasix and bipap  - Restart metoprolol   - Hold Diuretics     EKG/Troponemia - New q waves II-AVF - POCUS revealed no obvious WMA  - Asa/Plavix/Statin  - Hep gtt   - Cardiology Consult    #Renal  MARIBEL - Unclear etiology - No RBCs on UA lower concerns for hypertensive emergency, exam no consistent with cardiorenal.   - FeUrea  - Renally dose medications    #DVT/GI ppx  Heparin Gtt    #Lines/Tubes/Drains:   Peripheral IV 09/10/23 Left;Ventral (anterior) Forearm (Active)   Number of days: 1       Peripheral IV 09/10/23 Right Antecubital (Active)   Number of days: 1       #Nutrition:   Diet Cardiovascular; Sodium 2 GM; Fluid Restriction 1500 ML        #Code Status:   Level 1 - Full Code    #Dispo:   ICU        Paulino Hummel MD  Pulmonary, Critical Care    Critical care time, excluding procedures, teaching, family meetings, and excludes any prior time recorded by the AP/resident, 35 minutes. Upon my evaluation, this patient has a high probability of imminent or life-threatening deterioration due to above problems which required my direct attention, intervention, and personal management. Impression/Active Problems: MARIBEL   CAD   Severe AS   HTN Emergency   HLD   HFpEF    Physical Exam:     Vital Signs:   Weight: 64.8 kg (142 lb 13.7 oz)  IBW: Ideal body weight: 68.4 kg (150 lb 12.7 oz)  Temp:  [96.5 °F (35.8 °C)-97.4 °F (36.3 °C)] 97.4 °F (36.3 °C)  HR:  [] 99  Resp:  [15-40] 15  BP: (111-196)/(53-87) 135/60  FiO2 (%):  [40] 40  General: NAD  Neuro: AxO 3  Heart: RRR  Lungs: Bilateral Crackles  Abdomen: Soft NT  Extremities: No Edema                 Ventilator Settings:    FiO2 (%):  [40] 40    Results from last 7 days   Lab Units 09/10/23  2301   PO2 ROSI mm Hg 48.1*     Radiologic Images Reviewed:    CXR  IMPRESSION:  Development of diffuse bilateral opacities, likely representing multifocal pneumonia.  Small bilateral pleural effusions  Input / Output:       Intake/Output Summary (Last 24 hours) at 9/11/2023 1103  Last data filed at 9/11/2023 0800  Gross per 24 hour   Intake 20 ml   Output 810 ml   Net -790 ml            Infusions:     Scheduled Medications:  Current Facility-Administered Medications   Medication Dose Route Frequency Provider Last Rate   • acetaminophen  650 mg Oral Q6H PRN JESSICA Milian     • aspirin  81 mg Oral Daily JESSICA Milian     • atorvastatin  80 mg Oral Daily With Dinner JESSICA Milian     • furosemide  80 mg Intravenous BID (diuretic) JESSICA Milian     • heparin (porcine)  5,000 Units Subcutaneous Q8H [facility] JESSICA Milian     • insulin lispro  1-5 Units Subcutaneous Q6H [facility] JESSICA Milian     • multivitamin-minerals  1 tablet Oral Daily JESSICA Milian         PRN Medications:  •  acetaminophen    Labs Reviewed:  Results from last 7 days   Lab Units 09/11/23  0659 09/10/23  2235   WBC Thousand/uL 8.36 12.45*   HEMOGLOBIN g/dL 11.6* 13.5   HEMATOCRIT % 36.1* 43.6   PLATELETS Thousands/uL 240 273      Results from last 7 days   Lab Units 09/11/23  0659 09/10/23  2235   SODIUM mmol/L 139 138   CO2 mmol/L 30 21   BUN mg/dL 26* 26*   CALCIUM mg/dL 9.2 9.2   MAGNESIUM mg/dL 2.3 2.7   PHOSPHORUS mg/dL 4.2*  --          Invalid input(s): "ASTSGOT", "ALTSGPT"LABRCNTIP@ ,alkphos:3,tbilirubin:3,dbilirubin:3)@  Results from last 7 days   Lab Units 09/10/23  2301   INR  0.97     Invalid input(s): "TROPT", "CPK", "PBNP"             I have personally seen and examined the patient on (09/11/23 between [id number]). I discussed the patient with the AP/resident including, but not limited to, verifying findings; reviewing labs and x-rays; discussing with consultants; developing the plan of care with the bedside nurse; and discussing treatment plan with patient or surrogate. I have reviewed the note and assessment performed by the AP/resident and agree with the AP/resident’s documented findings and plan of care with the above additions/exceptions. Please see my comments for details and adjustments.

## 2023-09-11 NOTE — ASSESSMENT & PLAN NOTE
· Glucose elevated at 246 in ED. Most recent A1c 5.8/pre-diabetes  · Not on any home medications for diabetes, not on steroids, though received 1 dose in ED  · Q6h accuchecks with SSI for now  · Goal BG <140  · Would benefit from improved glucose control with upcoming cardiac surgery.

## 2023-09-11 NOTE — ASSESSMENT & PLAN NOTE
· Normotensive currently  · Home metoprolol held on admission. Not on other antihypertensive medications.  Resume as appropriate  · VS per unit protocol

## 2023-09-11 NOTE — CONSULTS
Consultation - Cardiology   Clark Perry 69 y.o. male MRN: [id number]  Unit/Bed#: ICU 12 Encounter: [id number]    Assessment/Plan     Assessment:  1. Shortness of breath concerning for multifocal pneumonia  2. Triple-vessel coronary artery disease  3. Moderate to severe aortic stenosis with AI: Patient with history of aortic valve endocarditis  4. MARIBEL/chronic kidney disease  5. Dyslipidemia  6. Primary hypertension      Plan:  Patient has been admitted to the intensive care unit on ICU service  1. Hold any further diuretic resistance as patient appears to be euvolemic at this time    2. IV heparin ACS protocol started per primary team due to elevated troponins    3. Do not start Plavix or Brilinta as patient is tentatively for open heart surgery this week with [facility]. 4.  Awaiting contact from [facility] regarding transfer    5. Continue to monitor renal function. Appears baseline creatinine is 0.9-1.2. Admission creatinine was 1.75. History of Present Illness   Physician Requesting Consult: Jameel Ewing MD  Reason for Consult / Principal Problem: Coronary artery disease with moderate to severe AS, resolved aortic valve endocarditis      HPI: Clark Perry is a 69y.o. year old male who presented to the emergency room last evening with complaints of sudden onset of shortness of breath. He states that he had been laying in bed when he got up to go downstairs and when he started walking in the hallway he was unable to catch his breath. He was tachypneic on presentation to the emergency room and was placed on BiPAP. He also received 80 mg of IV Lasix with only 600 mL of fluid postmedication. He has been admitted to the intensive care unit for further evaluation    High-sensitivity troponins were 240, 601 and 860. Twelve-lead EKG demonstrated sinus rhythm with LVH per voltage criteria. BNP was 3143.   Radiology read of chest x-ray demonstrated diffuse bilateral opacities concerning for multifocal pneumonia and small bilateral pleural effusions. Patient had a 2D echocardiogram performed this morning which noted moderate to severe aortic stenosis with aortic insufficiency, EF of 55% and small pericardial effusion. IVC collapses 50% or more with inspiration    Patient has a history for paroxysmal atrial fibrillation, CAD, syncope, chronic back pain, hypertension, aortic stenosis, and patient recently had discitis with streptococcal bacteremia and aortic valve endocarditis. He is tentatively scheduled for surgical aortic valve replacement and CABG at [facility] on 9/25/2023. Patient had left heart catheterization on 8/24/2023 which demonstrated left main of 70% stenosis, ostial left circumflex of 50% stenosis, mid LAD of 70% stenosis, mid RCA of 80% stenosis, and first diagonal with 90%. JESSICA from 5/19/2023 noted 3 small mobile vegetations on the aortic aspect of the aortic valve. Patient completed his antibiotic course and has been cleared by infectious disease to undergo valve replacement. He has also completed all his dental procedures. During interview, patient stated that his wife spoke with Dr. Rach Hutchison office at [facility] and they would like patient to be transferred for surgery this week with Dr. Ramsey Marie. I did speak to his wife Ronna Correia at [phone], and she did confirm she did speak with Dr. Rach hood and that they should be calling in the hospital to make arrangements for patient's transfer. I did forward this message and contact information for Dr. Rach hood to the ICU practitioner. Inpatient consult to Cardiology  Consult performed by: JESSICA Cavanaugh  Consult ordered by: JESSICA Merritt          Review of Systems   Constitutional: Positive for activity change and fatigue. HENT: Negative. Negative for congestion, facial swelling, sinus pain and tinnitus. Eyes: Negative. Negative for photophobia and visual disturbance.    Respiratory: Positive for cough and shortness of breath. Cardiovascular: Negative. Negative for chest pain, palpitations and leg swelling. Gastrointestinal: Negative. Negative for abdominal distention, nausea and vomiting. Endocrine: Negative. Negative for polydipsia, polyphagia and polyuria. Genitourinary: Negative for difficulty urinating. Musculoskeletal: Negative. Negative for gait problem. Skin: Negative. Neurological: Negative. Negative for dizziness, syncope, weakness and light-headedness. Hematological: Negative. Psychiatric/Behavioral: Negative. Historical Information   Past Medical History:   Diagnosis Date   • Aortic stenosis    • CAD (coronary artery disease), native coronary artery    • CHF (congestive heart failure) ([facility])    • COPD (chronic obstructive pulmonary disease) ([address])    • Discitis    • Endocarditis      Past Surgical History:   Procedure Laterality Date   • CARDIAC CATHETERIZATION N/A 2023    Procedure: Cardiac RHC/LHC; Surgeon: Dianne Hope MD;  Location: [facility];   Service: Cardiology   • SKIN BIOPSY       Social History     Substance and Sexual Activity   Alcohol Use Not Currently     Social History     Substance and Sexual Activity   Drug Use Not Currently     E-Cigarette/Vaping   • E-Cigarette Use Never User      E-Cigarette/Vaping Substances   • Nicotine No    • THC No    • CBD No    • Flavoring No    • Other No    • Unknown No      Social History     Tobacco Use   Smoking Status Former   • Packs/day: 0.50   • Years: 20.00   • Total pack years: 10.00   • Types: Cigarettes   • Quit date: 3/11/2005   • Years since quittin.5   Smokeless Tobacco Never     Family History:   Family History   Problem Relation Age of Onset   • Colon cancer Mother    • Lung cancer Father    • Mental illness Neg Hx        Meds/Allergies   all current active meds have been reviewed, current meds:   Current Facility-Administered Medications   Medication Dose Route Frequency   • acetaminophen (TYLENOL) tablet 650 mg  650 mg Oral Q6H PRN   • aspirin chewable tablet 81 mg  81 mg Oral Daily   • atorvastatin (LIPITOR) tablet 80 mg  80 mg Oral Daily With Dinner   • heparin (porcine) 25,000 units in 0.45% NaCl 250 mL infusion (premix)  3-20 Units/kg/hr (Order-Specific) Intravenous Titrated   • heparin (porcine) injection 1,800 Units  1,800 Units Intravenous Q6H PRN   • heparin (porcine) injection 3,600 Units  3,600 Units Intravenous Q6H PRN   • insulin lispro (HumaLOG) 100 units/mL subcutaneous injection 1-5 Units  1-5 Units Subcutaneous Q6H [address]   • multivitamin-minerals (CENTRUM) tablet 1 tablet  1 tablet Oral Daily    and PTA meds:   Prior to Admission Medications   Prescriptions Last Dose Informant Patient Reported? Taking? Multiple Vitamins-Minerals (PRESERVISION AREDS PO) 9/10/2023 Self Yes Yes   Sig: Take 5 mg by mouth 2 (two) times a day Two tablets daily   acetaminophen (TYLENOL) 325 mg tablet  Self No No   Sig: Take 2 tablets (650 mg total) by mouth every 6 (six) hours as needed for mild pain   aspirin 81 mg chewable tablet 9/10/2023 Self No Yes   Sig: Chew 1 tablet (81 mg total) daily   metoprolol succinate (TOPROL-XL) 25 mg 24 hr tablet 9/10/2023 Self No Yes   Sig: Take 1 tablet (25 mg total) by mouth daily   potassium chloride (K-DUR,KLOR-CON) 20 mEq tablet 9/10/2023 Self No Yes   Sig: Take 2 tablets (40 mEq total) by mouth 2 (two) times a day   rosuvastatin (CRESTOR) 40 MG tablet 9/10/2023 Self No Yes   Sig: Take 1 tablet (40 mg total) by mouth daily   torsemide (DEMADEX) 20 mg tablet 9/10/2023 Self No Yes   Sig: Take 1 tablet (20 mg total) by mouth 2 (two) times a day Do not start before August 25, 2023. Facility-Administered Medications: None     No Known Allergies    Objective   Vitals: Blood pressure 155/64, pulse 82, temperature (!) 97.4 °F (36.3 °C), temperature source Temporal, resp.  rate (!) 31, height 5' 8" (1.727 m), weight 64.8 kg (142 lb 13.7 oz), SpO2 94 %. Orthostatic Blood Pressures    Flowsheet Row Most Recent Value   Blood Pressure 155/64 filed at 09/11/2023 1200   Patient Position - Orthostatic VS Sitting filed at 09/11/2023 0100            Intake/Output Summary (Last 24 hours) at 9/11/2023 1310  Last data filed at 9/11/2023 0800  Gross per 24 hour   Intake 20 ml   Output 810 ml   Net -790 ml       Invasive Devices     Peripheral Intravenous Line  Duration           Peripheral IV 09/10/23 Left;Ventral (anterior) Forearm <1 day    Peripheral IV 09/10/23 Right Antecubital <1 day                Physical Exam  Vitals and nursing note reviewed. Constitutional:       General: He is not in acute distress. Appearance: Normal appearance. He is normal weight. HENT:      Right Ear: External ear normal.      Left Ear: External ear normal.   Eyes:      General: No scleral icterus. Right eye: No discharge. Left eye: No discharge. Cardiovascular:      Rate and Rhythm: Normal rate and regular rhythm. Pulses: Normal pulses. Heart sounds: Murmur heard. Pulmonary:      Effort: Pulmonary effort is normal. No accessory muscle usage or respiratory distress. Breath sounds: Examination of the right-middle field reveals decreased breath sounds. Examination of the right-lower field reveals decreased breath sounds. Examination of the left-lower field reveals decreased breath sounds. Decreased breath sounds present. Comments: Coarse breath sounds at bases  Abdominal:      General: Bowel sounds are normal. There is no distension. Palpations: Abdomen is soft. Musculoskeletal:      Right lower leg: No edema. Left lower leg: No edema. Skin:     General: Skin is warm and dry. Capillary Refill: Capillary refill takes less than 2 seconds. Neurological:      General: No focal deficit present. Mental Status: He is alert and oriented to person, place, and time. Mental status is at baseline.    Psychiatric:         Mood and Affect: Mood normal.         Lab Results:   I have personally reviewed pertinent lab results. CBC with diff:   Results from last 7 days   Lab Units 09/11/23  1240   WBC Thousand/uL 10.25*   RBC Million/uL 3.67*   HEMOGLOBIN g/dL 11.1*   HEMATOCRIT % 34.0*   MCV fL 93   MCH pg 30.2   MCHC g/dL 32.6   RDW % 15.6*   MPV fL 10.6   PLATELETS Thousands/uL 234     CMP:   Results from last 7 days   Lab Units 09/11/23  0659 09/10/23  2235   SODIUM mmol/L 139 138   CHLORIDE mmol/L 103 101   CO2 mmol/L 30 21   BUN mg/dL 26* 26*   CREATININE mg/dL 1.43* 1.75*   CALCIUM mg/dL 9.2 9.2   AST U/L  --  26   ALT U/L  --  18   ALK PHOS U/L  --  71   EGFR ml/min/1.73sq m 46 36     HS Troponin:   0   Lab Value Date/Time    HSTNI 860 (H) 09/11/2023 0659    HSTNI0 45 09/10/2023 2235    HSTNI2 240 (H) 09/11/2023 0031    HSTNI4 601 (H) 09/11/2023 0241    HSTNI4 123 (H) 07/07/2023 1252    HSTNI4 351 (H) 06/05/2023 1810    HSTNI4 229 (H) 05/17/2023 0337    HSTNI4 299 (H) 05/16/2023 1626     BNP:   Results from last 7 days   Lab Units 09/11/23  0659   POTASSIUM mmol/L 4.3   CHLORIDE mmol/L 103   CO2 mmol/L 30   BUN mg/dL 26*   CREATININE mg/dL 1.43*   CALCIUM mg/dL 9.2   EGFR ml/min/1.73sq m 46     Coags:   Results from last 7 days   Lab Units 09/11/23  1240   PTT seconds 29   INR  1.02     TSH:     Magnesium:   Results from last 7 days   Lab Units 09/11/23  0659   MAGNESIUM mg/dL 2.3     Lipid Profile:     Imaging: I have personally reviewed pertinent reports.     EKG: Sinus rhythm with voltage criteria for LVH  VTE Prophylaxis: Sequential compression device (Venodyne)  and Heparin    Code Status: Level 1 - Full Code  Advance Directive and Living Will:      Power of :    POLST:      [place]  [facility]

## 2023-09-11 NOTE — ASSESSMENT & PLAN NOTE
Wt Readings from Last 3 Encounters:   09/08/23 63.5 kg (139 lb 14.4 oz)   08/24/23 63.5 kg (140 lb)   08/21/23 61.7 kg (136 lb)     Last TTE 6/13: EF 55%, mod to severe aortic stenosis, moderate insufficiency. Presented to ED with acute onset SOB requiring placement of BiPap for WOB. Reports compliance with home Torsemide. Weight is 64.4kg on admission, about 3kg above recent baseline. · BMP 3,143 on admission from prior 2,299  · CXR demonstrates diffuse pulmonary congestion  · S/p furosemide 80mg IV x1 in ED  · WOB improved on BiPap, though still tachypneic  · Obtain repeat ECHO  · Hold home metoprolol for now  · Daily weights  · Close I&O monitoring  · Discuss diuretic dosing on rounds pending AM labs   · Monitor renal function closely in setting of MARIBEL. Patient did not diurese well with ED dose of lasix and appears somewhat dehydrated on exam   · Resting comfortably on BiPap. Can consider additional diuretics if respiratory status worsens.

## 2023-09-11 NOTE — ED NOTES
Pt placed on BiPap by RRT at bedside. Settings 20/10 40% FiO2. Patient tolerating well. Work of breathing improved with BiPap and breathing treatments. Accessory muscle use remains. Pt wife at bedside and updated to status. Pt reports feeling better. Will continue to monitor.       Vick Odell RN  09/10/23 [id number]

## 2023-09-12 VITALS
SYSTOLIC BLOOD PRESSURE: 170 MMHG | DIASTOLIC BLOOD PRESSURE: 86 MMHG | TEMPERATURE: 97.8 F | HEART RATE: 71 BPM | RESPIRATION RATE: 22 BRPM | OXYGEN SATURATION: 95 % | WEIGHT: 139.99 LBS | BODY MASS INDEX: 21.22 KG/M2 | HEIGHT: 68 IN

## 2023-09-12 PROBLEM — J96.01 ACUTE RESPIRATORY FAILURE WITH HYPOXIA (HCC): Status: RESOLVED | Noted: 2023-06-16 | Resolved: 2023-09-12

## 2023-09-12 PROBLEM — R65.10 SIRS (SYSTEMIC INFLAMMATORY RESPONSE SYNDROME) (HCC): Status: RESOLVED | Noted: 2023-09-11 | Resolved: 2023-09-12

## 2023-09-12 PROBLEM — N17.9 ACUTE KIDNEY INJURY (HCC): Status: RESOLVED | Noted: 2023-06-12 | Resolved: 2023-09-12

## 2023-09-12 LAB
ANION GAP SERPL CALCULATED.3IONS-SCNC: 7 MMOL/L
APTT PPP: 55 SECONDS (ref 23–37)
APTT PPP: 62 SECONDS (ref 23–37)
BASOPHILS # BLD AUTO: 0.05 THOUSANDS/ÂΜL (ref 0–0.1)
BASOPHILS NFR BLD AUTO: 1 % (ref 0–1)
BUN SERPL-MCNC: 25 MG/DL (ref 5–25)
CA-I BLD-SCNC: 1.15 MMOL/L (ref 1.12–1.32)
CALCIUM SERPL-MCNC: 9.1 MG/DL (ref 8.4–10.2)
CHLORIDE SERPL-SCNC: 103 MMOL/L (ref 96–108)
CO2 SERPL-SCNC: 27 MMOL/L (ref 21–32)
CREAT SERPL-MCNC: 1.18 MG/DL (ref 0.6–1.3)
EOSINOPHIL # BLD AUTO: 0.17 THOUSAND/ÂΜL (ref 0–0.61)
EOSINOPHIL NFR BLD AUTO: 2 % (ref 0–6)
ERYTHROCYTE [DISTWIDTH] IN BLOOD BY AUTOMATED COUNT: 15.5 % (ref 11.6–15.1)
GFR SERPL CREATININE-BSD FRML MDRD: 59 ML/MIN/1.73SQ M
GLUCOSE SERPL-MCNC: 116 MG/DL (ref 65–140)
GLUCOSE SERPL-MCNC: 123 MG/DL (ref 65–140)
GLUCOSE SERPL-MCNC: 127 MG/DL (ref 65–140)
HCT VFR BLD AUTO: 34.3 % (ref 36.5–49.3)
HGB BLD-MCNC: 11.2 G/DL (ref 12–17)
IMM GRANULOCYTES # BLD AUTO: 0.04 THOUSAND/UL (ref 0–0.2)
IMM GRANULOCYTES NFR BLD AUTO: 0 % (ref 0–2)
LYMPHOCYTES # BLD AUTO: 1.92 THOUSANDS/ÂΜL (ref 0.6–4.47)
LYMPHOCYTES NFR BLD AUTO: 20 % (ref 14–44)
MAGNESIUM SERPL-MCNC: 2.4 MG/DL (ref 1.9–2.7)
MCH RBC QN AUTO: 30 PG (ref 26.8–34.3)
MCHC RBC AUTO-ENTMCNC: 32.7 G/DL (ref 31.4–37.4)
MCV RBC AUTO: 92 FL (ref 82–98)
MONOCYTES # BLD AUTO: 0.98 THOUSAND/ÂΜL (ref 0.17–1.22)
MONOCYTES NFR BLD AUTO: 10 % (ref 4–12)
MRSA NOSE QL CULT: NORMAL
NEUTROPHILS # BLD AUTO: 6.24 THOUSANDS/ÂΜL (ref 1.85–7.62)
NEUTS SEG NFR BLD AUTO: 67 % (ref 43–75)
NRBC BLD AUTO-RTO: 0 /100 WBCS
PLATELET # BLD AUTO: 227 THOUSANDS/UL (ref 149–390)
PMV BLD AUTO: 10.4 FL (ref 8.9–12.7)
POTASSIUM SERPL-SCNC: 3.9 MMOL/L (ref 3.5–5.3)
PROCALCITONIN SERPL-MCNC: 0.53 NG/ML
RBC # BLD AUTO: 3.73 MILLION/UL (ref 3.88–5.62)
SODIUM SERPL-SCNC: 137 MMOL/L (ref 135–147)
UUN 24H UR-MCNC: 190 MG/DL
WBC # BLD AUTO: 9.4 THOUSAND/UL (ref 4.31–10.16)

## 2023-09-12 PROCEDURE — 99238 HOSP IP/OBS DSCHRG MGMT 30/<: CPT | Performed by: STUDENT IN AN ORGANIZED HEALTH CARE EDUCATION/TRAINING PROGRAM

## 2023-09-12 PROCEDURE — NC001 PR NO CHARGE: Performed by: STUDENT IN AN ORGANIZED HEALTH CARE EDUCATION/TRAINING PROGRAM

## 2023-09-12 PROCEDURE — 82948 REAGENT STRIP/BLOOD GLUCOSE: CPT

## 2023-09-12 PROCEDURE — 85730 THROMBOPLASTIN TIME PARTIAL: CPT | Performed by: STUDENT IN AN ORGANIZED HEALTH CARE EDUCATION/TRAINING PROGRAM

## 2023-09-12 PROCEDURE — 99232 SBSQ HOSP IP/OBS MODERATE 35: CPT | Performed by: INTERNAL MEDICINE

## 2023-09-12 PROCEDURE — 85025 COMPLETE CBC W/AUTO DIFF WBC: CPT

## 2023-09-12 PROCEDURE — 83735 ASSAY OF MAGNESIUM: CPT

## 2023-09-12 PROCEDURE — 84145 PROCALCITONIN (PCT): CPT

## 2023-09-12 PROCEDURE — 82330 ASSAY OF CALCIUM: CPT

## 2023-09-12 PROCEDURE — 94760 N-INVAS EAR/PLS OXIMETRY 1: CPT

## 2023-09-12 PROCEDURE — 80048 BASIC METABOLIC PNL TOTAL CA: CPT

## 2023-09-12 RX ORDER — GINSENG 100 MG
1 CAPSULE ORAL 2 TIMES DAILY
Status: DISCONTINUED | OUTPATIENT
Start: 2023-09-12 | End: 2023-09-12 | Stop reason: HOSPADM

## 2023-09-12 RX ORDER — DOCUSATE SODIUM 100 MG/1
100 CAPSULE, LIQUID FILLED ORAL 2 TIMES DAILY
Status: DISCONTINUED | OUTPATIENT
Start: 2023-09-12 | End: 2023-09-12 | Stop reason: HOSPADM

## 2023-09-12 RX ADMIN — ASPIRIN 81 MG CHEWABLE TABLET 81 MG: 81 TABLET CHEWABLE at 09:32

## 2023-09-12 RX ADMIN — HEPARIN SODIUM 1800 UNITS: 1000 INJECTION INTRAVENOUS; SUBCUTANEOUS at 11:25

## 2023-09-12 RX ADMIN — Medication 1 TABLET: at 09:32

## 2023-09-12 RX ADMIN — HEPARIN SODIUM 16 UNITS/KG/HR: 10000 INJECTION, SOLUTION INTRAVENOUS at 13:45

## 2023-09-12 RX ADMIN — BACITRACIN ZINC 1 SMALL APPLICATION: 500 OINTMENT TOPICAL at 09:32

## 2023-09-12 RX ADMIN — METOPROLOL SUCCINATE 25 MG: 25 TABLET, EXTENDED RELEASE ORAL at 09:32

## 2023-09-12 RX ADMIN — DOCUSATE SODIUM 100 MG: 100 CAPSULE, LIQUID FILLED ORAL at 09:31

## 2023-09-12 NOTE — UTILIZATION REVIEW
Initial Clinical Review    Admission: Date/Time/Statement:   Admission Orders (From admission, onward)     Ordered        09/11/23 0017  INPATIENT ADMISSION  Once                      Orders Placed This Encounter   Procedures   • INPATIENT ADMISSION     Standing Status:   Standing     Number of Occurrences:   1     Order Specific Question:   Level of Care     Answer:   Level 1 Stepdown [13]     Order Specific Question:   Estimated length of stay     Answer:   More than 2 Midnights     Order Specific Question:   Certification     Answer:   I certify that inpatient services are medically necessary for this patient for a duration of greater than two midnights. See H&P and MD Progress Notes for additional information about the patient's course of treatment. ED Arrival Information     Expected   -    Arrival   9/10/2023 22:31    Acuity   Immediate            Means of arrival   Ambulance    Escorted by   [id number] [facility]/ICU    Admission type   Emergency            Arrival complaint   SOB           Chief Complaint   Patient presents with   • Respiratory Distress     Pt brought in by BLS after respiratory distress history of COPD started about 30 minutes prior to arrival. CPAP upon arrival respirations 50's       Initial Presentation: 69 y.o. male , presented to the ED @ [facility], from home via EMS. Admitted as Inpatient due to Acute Respiratory Failure with Hypoxia. PMHx of HTN, HLD, CAD with severe 3 vessel disease including left main, severe aortic stenosis, recent endocarditis with antibiotic course completed in July, and HFpEF. Date: 09/11/2023   Presented with acute onset of SOB which began 30 minutes prior to arrival. RR was 50's on arrival and was placed on BiPap for WOB. CXR showed pulmonary edema and he was given 80mg IV furosemide. Was also wheezy on exam and received a duoneb and 60mg solumedrol. Labs were significant for WBC 12.4, creatinine 1.75, BNP 3,143.  He denied any CP and EKG showed sinus tachycardia with no ST changes. First troponin was 45. On exam he is more comfortable on BiPap, but still tachypneic. He has mild pedal edema on exam which he states is at baseline. UOP was only 100ml after diuretic administration. Of note, the patient is scheduled for CABG and AVR later this month at [facility].     09/11/2023  Consult Cardio:   Shortness of breath concerning for multifocal pneumonia. Triple-vessel coronary artery disease. Moderate to severe aortic stenosis with AI: Patient with history of aortic valve endocarditis. MARIBEL/chronic kidney disease. Dyslipidemia. Primary hypertension.     Plan:   Patient has been admitted to the intensive care unit on ICU service. Hold any further diuretic resistance as patient appears to be euvolemic at this time. IV heparin ACS protocol started per primary team due to elevated troponins. Do not start Plavix or Brilinta as patient is tentatively for open heart surgery this week with [facility]. Awaiting contact from [facility] regarding transfer. Continue to monitor renal function. Appears baseline creatinine is 0.9-1.2.   Admission creatinine was 1.75.     Day 2: 09/12/2023  Transferred to [address] [place] [address] '"[employer]    ED Triage Vitals   Temperature Pulse Respirations Blood Pressure SpO2   09/10/23 2258 09/10/23 2237 09/10/23 2237 09/10/23 2239 09/10/23 2237   (!) 96.5 °F (35.8 °C) (!) 123 (!) 40 (!) 196/87 99 %      Temp Source Heart Rate Source Patient Position - Orthostatic VS BP Location FiO2 (%)   09/10/23 2258 09/10/23 2237 09/10/23 2237 09/10/23 2237 09/10/23 2302   Axillary Monitor Sitting Left arm 40      Pain Score       09/10/23 2237       No Pain          Wt Readings from Last 1 Encounters:   09/12/23 63.5 kg (139 lb 15.9 oz)     Additional Vital Signs:   Date/Time Temp Pulse Resp BP MAP (mmHg) SpO2 FiO2 (%) Calculated FIO2 (%) - Nasal Cannula Nasal Cannula O2 Flow Rate (L/min) O2 Device O2 Interface Device Patient Position - Orthostatic VS   09/12/23 1115 -- 71 22 170/86 116 95 % -- -- -- -- -- --   09/12/23 1100 -- 70 30 Abnormal  109/76 88 94 % -- -- -- -- -- --   09/12/23 0800 -- 79 30 Abnormal  140/62 89 92 % -- -- -- -- -- --   09/12/23 0700 -- 70 14 146/63 91 94 % -- -- -- -- -- --   09/12/23 0638 -- 82 28 Abnormal  -- -- 93 % -- -- -- None (Room air) -- --   09/12/23 0600 -- 66 23 Abnormal  115/57 82 98 % -- -- -- -- -- --   09/12/23 0544 -- 69 26 Abnormal  -- -- 97 % -- -- -- -- -- --   09/12/23 0500 -- 69 25 Abnormal  121/56 81 97 % -- -- -- -- -- --   09/12/23 0400 97.8 °F (36.6 °C) 70 25 Abnormal  114/55 79 96 % 30 -- -- BiPAP -- Lying   09/12/23 0351 -- -- -- -- -- 99 % 30 -- -- BiPAP Full face mask --   09/12/23 0300 -- 77 22 147/62 89 95 % -- -- -- -- -- --   09/12/23 0200 -- 70 28 Abnormal  117/58 84 97 % -- -- -- -- -- --   09/12/23 0100 -- 73 25 Abnormal  123/60 86 97 % -- -- -- -- -- --   09/12/23 0000 97.5 °F (36.4 °C) 73 20 118/56 81 96 % 30 -- -- BiPAP -- Lying   09/11/23 2315 -- -- -- -- -- 96 % 30 -- -- BiPAP Full face mask --   09/11/23 2300 -- 96 38 Abnormal  138/63 91 96 % -- -- -- -- -- --   09/11/23 2200 -- 77 22 141/63 90 96 % -- -- -- -- -- --   09/11/23 2100 -- 79 27 Abnormal  134/59 85 96 % -- -- -- -- -- --   09/11/23 2000 96.8 °F (36 °C) Abnormal  97 32 Abnormal  144/63 91 93 % 30 -- -- BiPAP  -- Lying   O2 Device: 14/8 at 09/11/23 2000 09/11/23 1950 -- 114 Abnormal  30 Abnormal  -- -- 94 % -- -- -- -- -- --   09/11/23 1944 -- 106 Abnormal  37 Abnormal  140/95 112 94 % -- -- -- -- Face mask --   09/11/23 1931 -- 99 30 Abnormal  -- -- 91 % -- 28 2 L/min Nasal cannula -- --   09/11/23 1900 -- 98 32 Abnormal  -- -- 92 % -- -- -- -- -- --   09/11/23 1809 -- 87 34 Abnormal  131/61 88 94 % -- -- -- -- -- --   09/11/23 1808 -- 90 36 Abnormal  135/75 96 94 % -- -- -- -- -- --   09/11/23 1700 -- 86 34 Abnormal  -- -- 92 % -- -- -- -- -- --   09/11/23 1600 98.6 °F (37 °C) 77 28 Abnormal  120/59 85 93 % -- -- -- None (Room air) -- --   Comment rows:   OBSERV: pt sitting in recliner talking to his wife at the bedside. at 09/11/23 1600   09/11/23 1500 -- 79 26 Abnormal  121/56 81 93 % -- -- -- -- -- --   09/11/23 1457 -- 79 -- 121/56 -- -- -- -- -- -- -- --   09/11/23 1400 -- 79 32 Abnormal  125/60 86 94 % -- -- -- None (Room air) -- --   09/11/23 1300 -- 80 27 Abnormal  -- -- 95 % -- -- -- -- -- --   09/11/23 1200 98.2 °F (36.8 °C) 82 31 Abnormal  155/64 92 94 % -- -- -- None (Room air) -- --   Comment rows:   OBSERV: pt in chair eating lunch.  at 09/11/23 1200   09/11/23 1100 -- 79 24 Abnormal  -- -- -- -- -- -- -- -- --   09/11/23 1005 -- 99 15 135/60 -- 92 % -- -- -- -- -- --   09/11/23 1000 -- 92 22 -- 87 93 % -- -- -- -- -- --   09/11/23 0900 -- 75 24 Abnormal  -- -- 94 % -- -- -- -- -- --   09/11/23 0800 97.9 °F (36.6 °C) 75 22 123/57 82 99 % -- -- -- None (Room air) -- --   09/11/23 0700 -- 74 25 Abnormal  -- -- 96 % -- -- -- -- -- --   09/11/23 0313 -- -- -- -- -- -- -- -- -- -- Full face mask --   09/11/23 0200 -- 77 20 129/60 86 99 % -- -- -- -- -- --   09/11/23 0110 -- -- -- -- -- -- -- -- -- -- Full face mask --   09/11/23 0100 97.4 °F (36.3 °C) Abnormal  93 34 Abnormal  147/64 92 91 % -- 44 6 L/min Nasal cannula -- Sitting   09/11/23 0030 -- 82 24 Abnormal  119/56 81 96 % -- -- -- -- -- --   09/11/23 0015 -- 81 26 Abnormal  126/56 81 96 % -- -- -- -- -- --   09/10/23 2350 -- -- -- -- -- -- -- -- -- -- Full face mask --   09/10/23 2345 -- 84 26 Abnormal  122/59 85 95 % -- -- -- -- -- --   09/10/23 2330 -- 86 24 Abnormal  113/56 80 94 % -- -- -- -- -- --   09/10/23 2315 -- 88 24 Abnormal  111/53 77 94 % -- -- -- -- -- --   09/10/23 2302 -- -- -- -- -- -- 40 -- -- BiPAP -- --   09/10/23 2300 -- 91 30 Abnormal  137/64 92 94 % -- -- -- -- -- --   09/10/23 2258 96.5 °F (35.8 °C) Abnormal  -- -- -- -- -- -- -- -- -- -- --   09/10/23 2245 -- 111 Abnormal  32 Abnormal  170/72 104 94 % -- -- -- BiPAP -- Sitting         Pertinent Labs/Diagnostic Test Results:   XR chest portable ICU   Final Result by Sadie Baez MD (09/12 2876)   Cardiomegaly. Pulmonary edema. Left-sided pleural effusion. XR chest 1 view portable   Final Result by Micheal Waterman MD (13/15 8003)   Development of diffuse bilateral opacities, likely representing multifocal pneumonia.  Small bilateral pleural effusions        Results from last 7 days   Lab Units 09/10/23  2235   SARS-COV-2  Negative     Results from last 7 days   Lab Units 09/12/23  0543 09/11/23  1240 09/11/23  0659 09/10/23  2235   WBC Thousand/uL 9.40 10.25* 8.36 12.45*   HEMOGLOBIN g/dL 11.2* 11.1* 11.6* 13.5   HEMATOCRIT % 34.3* 34.0* 36.1* 43.6   PLATELETS Thousands/uL 227 234 240 273   NEUTROS ABS Thousands/µL 6.24  --  7.50  --      Results from last 7 days   Lab Units 09/12/23  0543 09/11/23 0659 09/10/23  2235   SODIUM mmol/L 137 139 138   POTASSIUM mmol/L 3.9 4.3 3.8   CHLORIDE mmol/L 103 103 101   CO2 mmol/L 27 30 21   ANION GAP mmol/L 7 6 16   BUN mg/dL 25 26* 26*   CREATININE mg/dL 1.18 1.43* 1.75*   EGFR ml/min/1.73sq m 59 46 36   CALCIUM mg/dL 9.1 9.2 9.2   CALCIUM, IONIZED mmol/L 1.15  --   --    MAGNESIUM mg/dL 2.4 2.3 2.7   PHOSPHORUS mg/dL  --  4.2*  --      Results from last 7 days   Lab Units 09/10/23  2235   AST U/L 26   ALT U/L 18   ALK PHOS U/L 71   TOTAL PROTEIN g/dL 6.6   ALBUMIN g/dL 4.0   TOTAL BILIRUBIN mg/dL 0.38     Results from last 7 days   Lab Units 09/12/23  0543 09/11/23  2328 09/11/23  1658 09/11/23  1158 09/11/23  0650 09/11/23  0115   POC GLUCOSE mg/dl 127 131 89 128 151* 140     Results from last 7 days   Lab Units 09/12/23  0543 09/11/23  0659 09/10/23  2235   GLUCOSE RANDOM mg/dL 123 151* 246*     Results from last 7 days   Lab Units 09/11/23  0659   HEMOGLOBIN A1C % 6.2*   EAG mg/dl 131     Results from last 7 days   Lab Units 09/10/23  2301   PH ROSI  7.380   PCO2 ROSI mm Hg 38.3*   PO2 ROSI mm Hg 48.1* HCO3 ROSI mmol/L 22.1*   BASE EXC ROSI mmol/L -2.6   O2 CONTENT ROSI ml/dL 14.7   O2 HGB, VENOUS % 78.6     Results from last 7 days   Lab Units 09/11/23  0241 09/11/23  0031 09/10/23  2235   HS TNI 0HR ng/L  --   --  45   HS TNI 2HR ng/L  --  240*  --    HSTNI D2 ng/L  --  195*  --    HS TNI 4HR ng/L 601*  --   --    HSTNI D4 ng/L 556*  --   --      Results from last 7 days   Lab Units 09/12/23  0918 09/12/23  0258 09/11/23  1931 09/11/23  1240 09/10/23  2301   PROTIME seconds  --   --   --  13.5 13.0   INR   --   --   --  1.02 0.97   PTT seconds 55* 62* 46* 29 30     Results from last 7 days   Lab Units 09/12/23  0543 09/11/23  0659   PROCALCITONIN ng/ml 0.53* 0.37*     Results from last 7 days   Lab Units 09/11/23  0843 09/10/23  2235   LACTIC ACID mmol/L 1.1 7.4*     Results from last 7 days   Lab Units 09/10/23  2235   BNP pg/mL 3,143*     Results from last 7 days   Lab Units 09/11/23  0006   CLARITY UA  Clear   COLOR UA  Yellow   SPEC GRAV UA  1.015   PH UA  6.5   GLUCOSE UA mg/dl Negative   KETONES UA mg/dl Negative   BLOOD UA  Negative   PROTEIN UA mg/dl Trace*   NITRITE UA  Negative   BILIRUBIN UA  Negative   UROBILINOGEN UA E.U./dl 0.2   LEUKOCYTES UA  Negative   WBC UA /hpf 0-1   RBC UA /hpf 0-1   BACTERIA UA /hpf Occasional   EPITHELIAL CELLS WET PREP /hpf Occasional   SODIUM UR  69   CREATININE UR mg/dL 34.5     Results from last 7 days   Lab Units 09/10/23  2235   INFLUENZA A PCR  Negative   INFLUENZA B PCR  Negative   RSV PCR  Negative     Results from last 7 days   Lab Units 09/10/23  2243 09/10/23  2235   BLOOD CULTURE  No Growth at 24 hrs. No Growth at 24 hrs.      ED Treatment:   Medication Administration from 09/10/2023 2230 to 09/11/2023 0050       Date/Time Order Dose Route Action     09/10/2023 2244 EDT methylPREDNISolone sodium succinate (Solu-MEDROL) injection 60 mg 60 mg Intravenous Given     09/10/2023 2245 EDT ipratropium-albuterol (DUO-NEB) 0.5-2.5 mg/3 mL inhalation solution 3 mL 3 mL Nebulization Given     09/10/2023 2250 EDT furosemide (LASIX) injection 80 mg 80 mg Intravenous Given        Past Medical History:   Diagnosis Date   • Aortic stenosis    • CAD (coronary artery disease), native coronary artery    • CHF (congestive heart failure) ([facility])    • COPD (chronic obstructive pulmonary disease) ([facility])    • Discitis    • Endocarditis      Present on Admission:  • Acute respiratory failure with hypoxia ([facility])  • Acute on chronic heart failure with preserved ejection fraction (HFpEF) ([facility])  • Abnormal glucose  • History of Acute bacterial endocarditis  • Acute kidney injury ([address])  • Nonrheumatic aortic valve stenosis  • Coronary artery disease involving native coronary artery  • Mixed hyperlipidemia  • Primary hypertension  • SIRS (systemic inflammatory response syndrome) ([facility])      Admitting Diagnosis: CHF (congestive heart failure) ([facility]) [I50.9]  Respiratory distress [R06.03]  Acute respiratory failure with hypoxia ([facility]) [J96.01]  Age/Sex: 69 y.o. male  Admission Orders:  Dyspagia screen  >  CV diet with fld restriction 1500ml  OOB to chair / fall precautions  Daily weight  I&O q2h  BiPAP 14/8, FiO2 40%,   Bright SCDs      Scheduled Medications:  aspirin, 81 mg, Oral, Daily  atorvastatin, 80 mg, Oral, Daily With Dinner  bacitracin, 1 small application, Topical, BID  docusate sodium, 100 mg, Oral, BID  insulin lispro, 1-5 Units, Subcutaneous, Q6H JARRETT  metoprolol succinate, 25 mg, Oral, Daily  multivitamin-minerals, 1 tablet, Oral, Daily      Continuous IV Infusions:  heparin (porcine), 3-20 Units/kg/hr (Order-Specific), Intravenous, Titrated      PRN Meds:  acetaminophen, 650 mg, Oral, Q6H PRN  heparin (porcine), 1,800 Units, Intravenous, Q6H PRN  heparin (porcine), 3,600 Units, Intravenous, Q6H PRN        IP CONSULT TO CASE MANAGEMENT  IP CONSULT TO CARDIOLOGY    Network Utilization Review Department  ATTENTION: Please call with any questions or concerns to [phone] and carefully listen to the prompts so that you are directed to the right person. All voicemails are confidential.  Lui Hendrickson all requests for admission clinical reviews, approved or denied determinations and any other requests to dedicated fax number below belonging to the campus where the patient is receiving treatment.  List of dedicated fax numbers for the Facilities:  [place] DENIALS (Administrative/Medical Necessity) [phone] [address] [facility] (Maternity/NICU/Pediatrics) [phone]   [address] [place] [address] [phone]   [facility] [address] [facility] [phone]   [facility] [address] [place] [address] [id number] [place] [address] [phone]   [address] [address] [phone]

## 2023-09-12 NOTE — PROGRESS NOTES
[address]  Progress Note  Name: Chris Baptiste  MRN: [id number]  Unit/Bed#: ICU 12 I Date of Admission: 9/10/2023   Date of Service: 9/12/2023 I Hospital Day: 1    Assessment/Plan   * Acute respiratory failure with hypoxia [facility])  Assessment & Plan  · Presented on 9/11 with SOB   · BNP 3,143 on admission (prior 2,299)  · CXR showing pulmonary edema  · S/p furosemide 80mg x1 in ED. Has only put out 100ml urine. Bladder scan without significant retention  · S/p duoneb and solumedrol 60mg in ED   · bipap initially than weaned during the day - needed bipap again overnight for increased WOB   · Goal SpO2 >92%  · procal - 0.37 continue to trend   · Monitor off antibiotics since no source of infection       Acute on chronic heart failure with preserved ejection fraction (HFpEF) ([facility])  Assessment & Plan  Wt Readings from Last 3 Encounters:   09/11/23 64.8 kg (142 lb 13.7 oz)   09/08/23 63.5 kg (139 lb 14.4 oz)   08/24/23 63.5 kg (140 lb)     · Last TTE 6/13: EF 55%, mod to severe aortic stenosis, moderate insufficiency  · BMP 3,143 on admission from prior 2,299  · CXR demonstrates diffuse pulmonary congestion  · S/p furosemide 80mg IV x1 in ED  · WOB improved on BiPap, though still tachypneic - weaned off bipap during the day and back on HS due to increase WOB   · Hold home metoprolol for now  · Daily weights  · Close I&O monitoring    Coronary artery disease involving native coronary artery  Assessment & Plan  · No CP on admission. Troponin 45. EKG Sinus tachycardia without ST changes. · Left heart cath 8/24/23: 3v CAD with LM  •  Dist LM lesion is 70% stenosed. •  Ost Cx lesion is 50% stenosed. •  Mid LAD lesion is 70% stenosed. •  Mid RCA lesion is 80% stenosed. •  1st Diag lesion is 90% stenosed. · Scheduled for CABG (and AVR) at [place] 9/25  · Continue ASA, statin.  Resume home metoprolol when appropriate  · Continuous cardiac monitoring      Nonrheumatic aortic valve stenosis  Assessment & Plan  · Hx severe aortic valve stenosis  · Most recent JESSICA 6/14:  Aortic valve leaflets are severely calcified, normal mobility, moderate regurgitation. Moderate to severe stenosis visually. Small mobile vegetation at the commissure of the LCC and on CC. Very small mobile vegetation noted tip of the RCC  · Scheduled for surgical AVR at [place] on 9/25    Acute kidney injury [facility])  Assessment & Plan  · Baseline creatinine 0.9-1.2  · Admission creatinine 1.75. Reports no change in PO intake in recent days. Has been compliant with diuretics  · Received furosemide 80mg IV in ED for pulmonary edema - appears euvolemic at this time   · Trend renal indices  · Close I&O monitoring  · Avoid nephrotoxins and hypotension    SIRS (systemic inflammatory response syndrome) (HCC)  Assessment & Plan  · Tachycardic and tachypneic in ED. Afebrile but with WBC slightly elevated at 12.4  · Suspect SIRS in setting of acute heart failure. Denies any infectious symptoms prior to acute onset of his SOB  · BC x2 pending  · Lactic acid not obtained due to low suspicion for infection  · COVID and flu negative  · CXR without focal opacification on my read. Shows pulmonary edema with left effusion. · Monitor off antibiotics  · Trend CBC, temperature curve  · Procal 0.37 - continue to trend     Abnormal glucose  Assessment & Plan  · Glucose elevated at 246 in ED. Most recent A1c 5.8/pre-diabetes  · Not on any home medications for diabetes, not on steroids, though received 1 dose in ED  · Continue sliding scale coverage   · Goal BG <140  · Would benefit from improved glucose control with upcoming cardiac surgery. Mixed hyperlipidemia  Assessment & Plan  · Continue statin    Primary hypertension  Assessment & Plan  · Normotensive currently  · Home metoprolol held on admission. Not on other antihypertensive medications.  Resume as appropriate  · VS per unit protocol    History of Acute bacterial endocarditis  Assessment & Plan  · Recent streptococcus bacteremia and endocarditis in May. S/p 6 week antibiotic treatment. Source likely odontogenic. Has since had dental extractions in anticipation of AVR and CABG later this month  · Blood cultures obtained on admission for completeness  · Monitor off antibiotics  · Trend CBC, temperature curve  · Procal 0.37 - continue to trend            ICU Core Measures     A: Assess, Prevent, and Manage Pain · Has pain been assessed? Yes  · Need for changes to pain regimen? No   B: Both SAT/SAT  · N/A   C: Choice of Sedation · RASS Goal: 0 Alert and Calm  · Need for changes to sedation or analgesia regimen? NA   D: Delirium · CAM-ICU: Negative   E: Early Mobility  · Plan for early mobility? Yes   F: Family Engagement · Plan for family engagement today? Yes         Prophylaxis:  VTE VTE covered by:  heparin (porcine), Intravenous, 14 Units/kg/hr at 09/11/23 2038  heparin (porcine), Intravenous, 1,800 Units at 09/11/23 2036  heparin (porcine), Intravenous       Stress Ulcer  not ordered       Subjective   HPI/24hr events: Patient was able to wean off of BiPAP yesterday morning. He remained on room air throughout the day. Unfortunately, last evening around 8 PM, the patient had significant increased work of breathing with tripod posturing and tachypnea. He stated he was short of breath and felt like he could not get air in. Repeat chest x-ray was obtained patient was placed back on BiPAP 14/8 with improvement. Hold on further diuresis since patient continues to appear euvolemic and blood pressure was 140/95. Patient rested comfortably throughout the night on BiPAP without any further issues. Review of Systems   Constitutional: Positive for fatigue. HENT: Negative. Eyes: Negative. Respiratory: Positive for shortness of breath. Cardiovascular: Negative. Gastrointestinal: Negative. Endocrine: Negative. Genitourinary: Negative.     Musculoskeletal: Negative. Skin: Negative. Allergic/Immunologic: Negative. Hematological: Negative. Psychiatric/Behavioral: Negative. Objective                            Vitals I/O      Most Recent Min/Max in 24hrs   Temp 97.8 °F (36.6 °C) Temp  Min: 96.8 °F (36 °C)  Max: 98.6 °F (37 °C)   Pulse 69 Pulse  Min: 69  Max: 114   Resp (!) 26 Resp  Min: 15  Max: 38   /56 BP  Min: 114/55  Max: 155/64   O2 Sat 97 % SpO2  Min: 91 %  Max: 99 %      Intake/Output Summary (Last 24 hours) at 9/12/2023 0544  Last data filed at 9/12/2023 0401  Gross per 24 hour   Intake 916.96 ml   Output 1100 ml   Net -183.04 ml         Diet Cardiovascular; Sodium 2 GM; Fluid Restriction 1500 ML     Invasive Monitoring Physical exam    Physical Exam  Eyes:      General: Vision grossly intact. Extraocular Movements: Extraocular movements intact. Conjunctiva/sclera: Conjunctivae normal.      Pupils: Pupils are equal, round, and reactive to light. Skin:     General: Skin is warm and dry. HENT:      Head: Normocephalic and atraumatic. Right Ear: Hearing and tympanic membrane normal.      Left Ear: Hearing and tympanic membrane normal.      Mouth/Throat:      Mouth: Mucous membranes are moist.   Cardiovascular:      Rate and Rhythm: Normal rate and regular rhythm. Musculoskeletal:         General: Normal range of motion. Abdominal:      General: Bowel sounds are normal.   Constitutional:       General: He is in acute distress. Appearance: He is ill-appearing. Pulmonary:      Comments: On bipap 14/8  B/L breath sounds diminished with crackles in the bases  Neurological:      Mental Status: He is alert and oriented to person, place and time. Mental status is at baseline. Motor: gross motor function is at baseline for patient. Strength full and intact in all extremities. Genitourinary/Anorectal:     Comments: Due to void   Vitals and nursing note reviewed.           Diagnostic Studies      EKG: NSR alarms on   Imaging: XR chest 1 view portable    Result Date: 9/11/2023  Impression: Development of diffuse bilateral opacities, likely representing multifocal pneumonia. Small bilateral pleural effusions Workstation performed: [id number]  I have personally reviewed pertinent reports.        Medications:  Scheduled PRN   aspirin, 81 mg, Daily  atorvastatin, 80 mg, Daily With Dinner  insulin lispro, 1-5 Units, Q6H [address]  metoprolol succinate, 25 mg, Daily  multivitamin-minerals, 1 tablet, Daily      acetaminophen, 650 mg, Q6H PRN  heparin (porcine), 1,800 Units, Q6H PRN  heparin (porcine), 3,600 Units, Q6H PRN       Continuous    heparin (porcine), 3-20 Units/kg/hr (Order-Specific), Last Rate: 14 Units/kg/hr (09/11/23 2038)         Labs:    CBC    Recent Labs     09/11/23 0659 09/11/23  1240   WBC 8.36 10.25*   HGB 11.6* 11.1*   HCT 36.1* 34.0*    234     BMP    Recent Labs     09/10/23  2235 09/11/23  0659   SODIUM 138 139   K 3.8 4.3    103   CO2 21 30   AGAP 16 6   BUN 26* 26*   CREATININE 1.75* 1.43*   CALCIUM 9.2 9.2       Coags    Recent Labs     09/10/23  2301 09/11/23  1240 09/11/23  1931 09/12/23  0258   INR 0.97 1.02  --   --    PTT 30 29 46* 62*        Additional Electrolytes  Recent Labs     09/10/23  2235 09/11/23  0659   MG 2.7 2.3   PHOS  --  4.2*          Blood Gas    No recent results  Recent Labs     09/10/23  2301   PHVEN 7.380   [id number] 38.3*   PO2VEN 48.1*   [id number] 22.1*   BEVEN -2.6    LFTs  Recent Labs     09/10/23  2235   ALT 18   AST 26   ALKPHOS 71   ALB 4.0   TBILI 0.38       Infectious  Recent Labs     09/11/23  0659   PROCALCITONI 0.37*     Glucose  Recent Labs     09/10/23  2235 09/11/23  0659   GLUC 246* [facility] Ellyn Sutton

## 2023-09-12 NOTE — ASSESSMENT & PLAN NOTE
· No CP on admission. Troponin 45. EKG Sinus tachycardia without ST changes. · Left heart cath 8/24/23: 3v CAD with LM  •  Dist LM lesion is 70% stenosed. •  Ost Cx lesion is 50% stenosed. •  Mid LAD lesion is 70% stenosed. •  Mid RCA lesion is 80% stenosed. •  1st Diag lesion is 90% stenosed. · Scheduled for CABG (and AVR) at [facility] 9/25  · Continue ASA, statin.  Resume home metoprolol when appropriate  · Continuous cardiac monitoring

## 2023-09-12 NOTE — ASSESSMENT & PLAN NOTE
· No CP on admission. Troponin 45. EKG Sinus tachycardia without ST changes. · Left heart cath 8/24/23: 3v CAD with LM  •  Dist LM lesion is 70% stenosed. •  Ost Cx lesion is 50% stenosed. •  Mid LAD lesion is 70% stenosed. •  Mid RCA lesion is 80% stenosed. •  1st Diag lesion is 90% stenosed.    · Transferring to [facility] for CABG and AVR

## 2023-09-12 NOTE — PROGRESS NOTES
Progress Note - Cardiology   [facility]     Sammi Rader 69 y.o. male MRN: [id number]  : 1945  Unit/Bed#: ICU 12 Encounter: [id number]    Assessment and Plan:   1. Shortness of breath concerning for multifocal pneumonia: Patient tends to have episodes of profound shortness of breath at that time, question component of anxiety regarding his known coronary artery disease and severe aortic stenosis    -   Episode resolved with BiPAP    -   Continue DuoNeb and Solu-Medrol    2. Triple-vessel coronary artery disease: Cardiac catheterization on 2023 demonstrated left main of 70% stenosis, ostial left circumflex of 50% stenosis, mid LAD of 70% stenosis, mid RCA of 80% stenosis, and first diagonal with 90%. -   Patient scheduled and awaiting transfer to [facility] for CABG and surgical AVR    -   Continue Toprol-XL 25 mg daily with parameters    -   Continue Lipitor 80 mg once a day    3. Moderate to severe aortic stenosis with AI: Patient with history of aortic valve endocarditis    -   Awaiting transfer to [facility] for surgical aortic valve replacement with Dr. Lakisha Ramírez    -   Avoid profound hypotension and afterload reduction    4. MARIBEL/chronic kidney disease: Creatinine appears to be returning to baseline from 1.75.    -   Baseline creatinine appears to be 0.9-1.1    5. Abnormal troponins/ nonischemic myocardial injury: In the setting of shortness of breath, severe AS and coronary artery disease with acute kidney injury    -   High-sensitivity troponins on admission: 240 (0hr), 601 (2hr), 860 (4hr), 715 (10 hr)    -   Patient started on heparin ACS protocol per intensive care team, may discontinue this evening    -   Continue Lipitor 80 mg once a day    -   Continue Toprol-XL 25 mg once a day with parameters    5. Dyslipidemia: Continue Lipitor 80 mg once a day    6. Primary hypertension: Systolic blood pressure ranging from 120-140.   This is acceptable at this time due to his aortic stenosis    -    Avoid profound hypotension    Subjective / Objective:   Patient seen and examined. He had episode again last evening at bedtime of sudden onset of shortness of breath which was relieved with BiPAP. Renal function is returning to baseline with discontinuation of diuretics. Patient awaiting transfer to [facility] for CABG and surgical aortic valve replacement. Vitals: Blood pressure 140/62, pulse 79, temperature 97.8 °F (36.6 °C), temperature source Temporal, resp. rate (!) 30, height 5' 8" (1.727 m), weight 63.5 kg (139 lb 15.9 oz), SpO2 92 %. Vitals:    09/11/23 1005 09/12/23 0544   Weight: 64.8 kg (142 lb 13.7 oz) 63.5 kg (139 lb 15.9 oz)     Body mass index is 21.29 kg/m². BP Readings from Last 3 Encounters:   09/12/23 140/62   09/08/23 (!) 115/40   08/24/23 146/63     Orthostatic Blood Pressures    Flowsheet Row Most Recent Value   Blood Pressure 140/62 filed at 09/12/2023 0800   Patient Position - Orthostatic VS Lying filed at 09/12/2023 0400        I/O       09/10 0701  09/11 0700 09/11 0701  09/12 0700 09/12 0701  09/13 0700    P. O.  990     I.V. (mL/kg) 20 (0.3) 164.6 (2.6)     Total Intake(mL/kg) 20 (0.3) 1154.6 (18.2)     Urine (mL/kg/hr) 610 1300 (0.9)     Stool  0     Total Output 610 1300     Net -590 -145. 4            Unmeasured Urine Occurrence  1 x     Unmeasured Stool Occurrence  1 x         Invasive Devices     Peripheral Intravenous Line  Duration           Peripheral IV 09/10/23 Left;Ventral (anterior) Forearm 1 day    Peripheral IV 09/10/23 Right Antecubital 1 day                  Intake/Output Summary (Last 24 hours) at 9/12/2023 3939  Last data filed at 9/12/2023 0630  Gross per 24 hour   Intake 1154.62 ml   Output 1100 ml   Net 54.62 ml         Physical Exam:   Physical Exam  Vitals and nursing note reviewed. Constitutional:       General: He is not in acute distress. Appearance: Normal appearance. He is normal weight.    HENT: Right Ear: External ear normal.      Left Ear: External ear normal.   Eyes:      General: No scleral icterus. Right eye: No discharge. Left eye: No discharge. Cardiovascular:      Rate and Rhythm: Normal rate and regular rhythm. Pulses: Normal pulses. Heart sounds: Murmur heard. Pulmonary:      Effort: Pulmonary effort is normal. No accessory muscle usage or respiratory distress. Breath sounds: Examination of the right-middle field reveals decreased breath sounds. Examination of the right-lower field reveals decreased breath sounds. Examination of the left-lower field reveals decreased breath sounds. Decreased breath sounds present. Comments: Coarse bases  Abdominal:      General: Bowel sounds are normal. There is no distension. Palpations: Abdomen is soft. Musculoskeletal:      Right lower leg: No edema. Left lower leg: No edema. Skin:     General: Skin is warm and dry. Capillary Refill: Capillary refill takes less than 2 seconds. Neurological:      General: No focal deficit present. Mental Status: He is alert and oriented to person, place, and time. Mental status is at baseline.    Psychiatric:         Mood and Affect: Mood normal.            Medications/ Allergies:     Current Facility-Administered Medications   Medication Dose Route Frequency Provider Last Rate   • acetaminophen  650 mg Oral Q6H PRN JESSICA De Luna     • aspirin  81 mg Oral Daily JESSICA De Luna     • atorvastatin  80 mg Oral Daily With Dinner JESSICA De Luna     • bacitracin  1 small application Topical BID JESSICA Morris     • docusate sodium  100 mg Oral BID JESSICA Morris     • heparin (porcine)  3-20 Units/kg/hr (Order-Specific) Intravenous Titrated Galindo House MD 14 Units/kg/hr (09/11/23 2038)   • heparin (porcine)  1,800 Units Intravenous Q6H PRN Galindo House MD     • heparin (porcine)  3,600 Units Intravenous Q6H PRN Mai Dan MD     • insulin lispro  1-5 Units Subcutaneous Q6H [facility] JESSICA Cat     • metoprolol succinate  25 mg Oral Daily JESSICA Rader     • multivitamin-minerals  1 tablet Oral Daily JESSICA Cat       acetaminophen, 650 mg, Q6H PRN  heparin (porcine), 1,800 Units, Q6H PRN  heparin (porcine), 3,600 Units, Q6H PRN      No Known Allergies    VTE Pharmacologic Prophylaxis:   Heparin    Labs:   Troponins:  Results from last 7 days   Lab Units 09/11/23  1240 09/11/23  0659 09/11/23  0241 09/11/23  0031   HS TNI RAND ng/L 715* 860*  --   --    HSTNI D2 ng/L  --   --   --  195*   HSTNI D4 ng/L  --   --  556*  --      CBC with diff:  Results from last 7 days   Lab Units 09/12/23  0543 09/11/23  1240 09/11/23  0659 09/10/23  2235   WBC Thousand/uL 9.40 10.25* 8.36 12.45*   HEMOGLOBIN g/dL 11.2* 11.1* 11.6* 13.5   HEMATOCRIT % 34.3* 34.0* 36.1* 43.6   MCV fL 92 93 94 97   PLATELETS Thousands/uL 227 234 240 273   RBC Million/uL 3.73* 3.67* 3.86* 4.51   MCH pg 30.0 30.2 30.1 29.9   MCHC g/dL 32.7 32.6 32.1 31.0*   RDW % 15.5* 15.6* 15.6* 15.7*   MPV fL 10.4 10.6 10.4 10.4   NRBC AUTO /100 WBCs 0  --  0  --      CMP:  Results from last 7 days   Lab Units 09/12/23  0543 09/11/23  0659 09/10/23  2235   SODIUM mmol/L 137 139 138   POTASSIUM mmol/L 3.9 4.3 3.8   CHLORIDE mmol/L 103 103 101   CO2 mmol/L 27 30 21   ANION GAP mmol/L 7 6 16   BUN mg/dL 25 26* 26*   CREATININE mg/dL 1.18 1.43* 1.75*   CALCIUM mg/dL 9.1 9.2 9.2   AST U/L  --   --  26   ALT U/L  --   --  18   ALK PHOS U/L  --   --  71   TOTAL PROTEIN g/dL  --   --  6.6   ALBUMIN g/dL  --   --  4.0   TOTAL BILIRUBIN mg/dL  --   --  0.38   EGFR ml/min/1.73sq m 59 46 36     Magnesium:  Results from last 7 days   Lab Units 09/12/23  0543 09/11/23  0659 09/10/23  2235   MAGNESIUM mg/dL 2.4 2.3 2.7     Coags:  Results from last 7 days   Lab Units 09/12/23  0258 09/11/23  1931 09/11/23  1240 09/10/23  2301   PTT seconds 62* 46* 29 30   INR   --   --  1.02 0.97     Hgb A1c:  Results from last 7 days   Lab Units 09/11/23  0659   HEMOGLOBIN A1C % 6.2*       Imaging & Testing   I have personally reviewed pertinent reports. XR chest portable ICU    Result Date: 9/12/2023  Narrative: CHEST INDICATION:   increased work of breathing. COMPARISON: 09/10/2023 EXAM PERFORMED/VIEWS:  XR CHEST PORTABLE ICU Images:  1 FINDINGS: Cardiomediastinal silhouette appears enlarged. The patient isn't pulmonary edema. Left-sided pleural effusion. No pneumothorax. Superimposed infiltrates difficult to exclude. Chronic changes are present. Osseous structures appear within normal limits for patient age. Impression: Cardiomegaly. Pulmonary edema. Left-sided pleural effusion. Workstation performed: [id number]     Echo complete w/ contrast if indicated    Result Date: 9/11/2023  Narrative: •  Left Ventricle: Left ventricular cavity size is normal. Wall thickness is mildly increased. There is mild concentric hypertrophy. The left ventricular ejection fraction is 50% by visual estimation. . Systolic function is low normal. Wall motion is  low normal. Diastolic function is moderately abnormal, consistent with grade II (pseudonormal) relaxation. •  Left Atrium: The atrium is mildly dilated (35-41 mL/m2). •  Right Atrium: The atrium is normal in upper size. •  Aortic Valve: The aortic valve is trileaflet. The leaflets are moderately thickened. The leaflets are moderately calcified. There is moderately reduced mobility. There is at least mild to moderate regurgitation. Jet is eccentric and not measured accurately. There is moderate to severe stenosis. Peak gradient 67 mean gradient 32 and calculated valve area 0.9 to 1.0 cm 2. •  Mitral Valve: There is mild annular calcification. There is mild to moderate regurgitation. •  Tricuspid Valve: There is mild to moderate regurgitation. Pulmonary artery pressure around 55 to 60 mmHg.  •  Pericardium: There is a small pericardial effusion along the right atrial free wall. There is a left pleural effusion. •  As compared to previous echo no significant change. Aortic valve is severely thickened and has some mobile echodensities correlate clinically. XR chest 1 view portable    Result Date: 9/11/2023  Narrative: CHEST INDICATION:   SOB. COMPARISON: 7/7/2023 EXAM PERFORMED/VIEWS:  XR CHEST PORTABLE Single view FINDINGS: Development of diffuse bilateral opacities likely representing multifocal pneumonia Persistent small bilateral pleural effusions Cardiomediastinal silhouette appears unremarkable. No pneumothorax Osseous structures appear within normal limits for patient age. Impression: Development of diffuse bilateral opacities, likely representing multifocal pneumonia. Small bilateral pleural effusions Workstation performed: [id number]     Cardiac catheterization    Result Date: 8/24/2023  Narrative: •  Dist LM lesion is 70% stenosed. •  Ost Cx lesion is 50% stenosed. •  Mid LAD lesion is 70% stenosed. •  Mid RCA lesion is 80% stenosed. •  1st Diag lesion is 90% stenosed. 3v CAD with LM Tortuous calcified iliac system        EKG / Monitor: Personally reviewed. Sinus rhythm no ectopy          [address] [facility]  Cardiology      "This note was completed in part utilizing [employer] direct voice recognition software. Grammatical errors, random word insertion, spelling mistakes, and incomplete sentences may be an occasional consequence of the system secondary to software limitations, ambient noise and hardware issues. Please read the chart carefully and recognize, using context, where substitutions have occurred.   If you have any questions or concerns about the context, text or information contained within the body of this dictation, please contact myself, the provider, for further clarification."

## 2023-09-12 NOTE — ASSESSMENT & PLAN NOTE
· Hx severe aortic valve stenosis  · Most recent JESSICA 6/14:  Aortic valve leaflets are severely calcified, normal mobility, moderate regurgitation. Moderate to severe stenosis visually. Small mobile vegetation at the commissure of the LCC and on CC.   Very small mobile vegetation noted tip of the RCC  · Scheduled for surgical AVR at [facility] on 9/25

## 2023-09-12 NOTE — ASSESSMENT & PLAN NOTE
· Recent streptococcus bacteremia and endocarditis in May. S/p 6 week antibiotic treatment. Source likely odontogenic.  Has since had dental extractions in anticipation of AVR and CABG later this month  · Blood cultures obtained on admission for completeness  · Monitor off antibiotics  · Trend CBC, temperature curve  · Procal 0.37 - continue to trend

## 2023-09-12 NOTE — [place] FOR[place] UN[place] FOR[place]  (ALL ITEMS MUST BE COMPLETED)    1. TRANSFER FROM: [facility] TO: ***    2. DATE OF TRANSFER: 9/12/2023                        TIME OF TRANSFER: ***    3. PATIENT NAME: Deana Rogel,        YOB: 1945                             GENDER: male    4. LANGUAGE:   English    5. PHYSICIAN NAME:  Flora Baxter MD                   PHONE: [phone]    6. CODE STATUS: Level 1 - Full Code        Out of Hospital DNR Attached: {Yes/No:20651}    7. :                                      :  Extended Emergency Contact Information  Primary Emergency Contact: Iesha Stephens  Address: [address], [address] [facility]  Mobile Phone: [phone]  Relation: [address] Representative/Proxy:  {Yes/No:20651}           Legal Guardian:  {Yes/No:20651}             NAME OF:           HEALTH CARE REPRESENTATIVE/PROXY:                                         OR           LEGAL GUARDIAN, IF NOT :                                               PHONE:  (Day)           (Night)                        (Cell)    8. REASON FOR TRANSFER: (Must include brief medical history and recent changes in physical function or cognition.) ***            V/S: /63   Pulse 70   Temp 97.8 °F (36.6 °C) (Temporal)   Resp 14   Ht 5' 8" (1.727 m)   Wt 63.5 kg (139 lb 15.9 oz)   SpO2 94%   BMI 21.29 kg/m²           PAIN: {Pain:30764}    9. PRIMARY DIAGNOSIS: Acute respiratory failure with hypoxia (HCC)      Secondary Diagnosis:         Pacemaker: {Yes/No:20651}      Internal Defib: {Yes/No:20651}          Mental Health Diagnosis (if Applicable):    10. RESTRAINTS: {Restraints:20654}     11. RESPIRATORY NEEDS: {Respiratory Needs:20655}    12. ISOLATION/PRECAUTION: {Isolation Precautions:20657}    13. ALLERGY: Patient has no known allergies.     14. SENSORY:       {Sensory:89146}    15. SKIN CONDITION: {Skin Cond:85870}    16. DIET: {Diet:20783}    17. IV ACCESS: {IV Access:20887}    18. PERSONAL ITEMS SENT WITH PATIENT: {Personal Items:20888}    19. ATTACHED DOCUMENTS: MUST ATTACH CURRENT MEDICATION INFORMATION {Attached Documents:20891}    20. AT RISK ALERTS:{Risks:20892}        HARM TO: {Harm To:20893}    21. WEIGHT BEARING STATUS:         Left Leg: {None/Limited/Full:05676}        Right Leg: {None/Limited/Full:38872}    22. MENTAL STATUS:{Mental Status:04928}    23. FUNCTION:        Walk: {Self/With Help/Not Able:20899}        Transfer: {Self/With Help/Not Able:20899}        Toilet: {Self/With Help/Not Able:20899}        Feed: {Self/With Help/Not Able:20899}    24. IMMUNIZATIONS/SCREENING:     Immunization History   Administered Date(s) Administered   • COVID-19 PFIZER VACCINE 0.3 ML IM 01/20/2021, 02/09/2021, 12/21/2021   • Pneumococcal Conjugate 13-Valent 05/04/2017   • Pneumococcal Polysaccharide PPV23 03/11/2020       25. BOWEL: {Bowel:20900}    26. BLADDER: {Bladder:20901}    27.  SENDING FACILITY CONTACT: ***                  Title: ***        Unit: ***        Phone: ***          REC'G FACILITY CONTACT (if known):        Title:        Unit:         Phone:         FORM PREFILLED BY (if applicable)       Title:       Unit:        Phone:         Shantelle Liriano BY Tennille Monsalve RN      Title: ***      Phone: ***

## 2023-09-12 NOTE — QUICK NOTE
Nurse arrived at the bedside and patient stated he was short of breath. He was found to have tachypnea with increased work of breathing and accessory muscle use. O2 sat was 92% on room air. CXR and EKG were obtained and patient was placed on bipap with some improvement in work of breathing. /95. Lung sounds are noted to have crackles in the bases R>L. Will hold of on diuretics for now and try to manage patient with bipap only. Dr. Sarah Sutton aware.

## 2023-09-12 NOTE — ASSESSMENT & PLAN NOTE
· Tachycardic and tachypneic in ED. Afebrile but with WBC slightly elevated at 12.4  · Suspect SIRS in setting of acute heart failure. Denies any infectious symptoms prior to acute onset of his SOB  · BC x2 pending  · Lactic acid not obtained due to low suspicion for infection  · COVID and flu negative  · CXR without focal opacification on my read. Shows pulmonary edema with left effusion.   · Monitor off antibiotics  · Trend CBC, temperature curve  · Procal 0.37 - continue to trend

## 2023-09-12 NOTE — ASSESSMENT & PLAN NOTE
Addended by: Radhika Tam on: 1/24/2018 02:51 PM     Modules accepted: Orders · Glucose elevated at 246 in ED.  Most recent A1c 5.8/pre-diabetes  · Not on any home medications for diabetes, not on steroids, though received 1 dose in ED  · Received SSI

## 2023-09-12 NOTE — ASSESSMENT & PLAN NOTE
· Glucose elevated at 246 in ED. Most recent A1c 5.8/pre-diabetes  · Not on any home medications for diabetes, not on steroids, though received 1 dose in ED  · Continue sliding scale coverage   · Goal BG <140  · Would benefit from improved glucose control with upcoming cardiac surgery.

## 2023-09-12 NOTE — EMTALA/ACUTE CARE TRANSFER
[facility] INTENSIVE CARE  UNIT  [address]  [address] [address]  Dept: [phone]      ACUTE CARE TRANSFER CONSENT    NAME Poonam GLOVER 1945                              MRN [id number]    I have been informed of my rights regarding examination, treatment, and transfer   by Dr. Angie Cornell MD    Benefits:      Risks:        Transfer Request:  I acknowledge that my medical condition has been evaluated and explained to me by the treating physician or other qualified medical person and/or my attending physician who has recommended and offered to me further medical examination and treatment. I understand the Hospital's obligation with respect to the treatment and stabilization of my medical condition. I nevertheless request to be transferred. I release the Hospital, the doctor, and any other persons caring for me from all responsibility or liability for any injury or ill effects that may result from my transfer and agree to accept all responsibility for the consequences of my choice to transfer, rather than receive stabilizing treatment at the Hospital. I understand that because the transfer is my request, my insurance may not provide reimbursement for the services. The Hospital will assist and direct me and my family in how to make arrangements for transfer, but the hospital is not liable for any fees charged by the transport service. In spite of this understanding, I refuse to consent to further medical examination and treatment which has been offered to me, and request transfer to  . I authorize the performance of emergency medical procedures and treatments upon me in both transit and upon arrival at the receiving facility. Additionally, I authorize the release of any and all medical records to the receiving facility and request they be transported with me, if possible.     I authorize the performance of emergency medical procedures and treatments upon me in both transit and upon arrival at the receiving facility. Additionally, I authorize the release of any and all medical records to the receiving facility and request they be transported with me, if possible. I understand that the safest mode of transportation during a medical emergency is an ambulance and that the Hospital advocates the use of this mode of transport. Risks of traveling to the receiving facility by car, including absence of medical control, life sustaining equipment, such as oxygen, and medical personnel has been explained to me and I fully understand them. (SPEEDY CORRECT BOX BELOW)  [  ]  I consent to the stated transfer and to be transported by ambulance/helicopter. [  ]  I consent to the stated transfer, but refuse transportation by ambulance and accept full responsibility for my transportation by car. I understand the risks of non-ambulance transfers and I exonerate the Hospital and its staff from any deterioration in my condition that results from this refusal.    X___________________________________________    DATE  23  TIME________  Signature of patient or legally responsible individual signing on patient behalf           RELATIONSHIP TO PATIENT_________________________          Provider Certification    NAME Michelle Mota                                        [facility] 1945                              MRN [id number]    A medical screening exam was performed on the above named patient.   Based on the examination:    Condition Necessitating Transfer Requiring CABG and AVR    Patient Condition:      Reason for Transfer:      Transfer Requirements: Facility     · Space available and qualified personnel available for treatment as acknowledged by    · Agreed to accept transfer and to provide appropriate medical treatment as acknowledged by          · Appropriate medical records of the examination and treatment of the patient are provided at the time of transfer   STAFF INITIAL WHEN COMPLETED _______  · Transfer will be performed by qualified personnel from    and appropriate transfer equipment as required, including the use of necessary and appropriate life support measures. Provider Certification: I have examined the patient and explained the following risks and benefits of being transferred/refusing transfer to the patient/family:         Based on these reasonable risks and benefits to the patient and/or the unborn child(paras), and based upon the information available at the time of the patient’s examination, I certify that the medical benefits reasonably to be expected from the provision of appropriate medical treatments at another medical facility outweigh the increasing risks, if any, to the individual’s medical condition, and in the case of labor to the unborn child, from effecting the transfer.     X____________________________________________ DATE 09/12/23        TIME_______      ORIGINAL - SEND TO MEDICAL RECORDS   COPY - SEND WITH PATIENT DURING TRANSFER

## 2023-09-12 NOTE — ASSESSMENT & PLAN NOTE
· Baseline creatinine 0.9-1.2  · Admission creatinine 1.75. Reports no change in PO intake in recent days.  Has been compliant with diuretics  · Received furosemide 80mg IV in ED for pulmonary edema - appears euvolemic at this time   · Trend renal indices  · Close I&O monitoring  · Avoid nephrotoxins and hypotension

## 2023-09-12 NOTE — ASSESSMENT & PLAN NOTE
Wt Readings from Last 3 Encounters:   09/12/23 63.5 kg (139 lb 15.9 oz)   09/08/23 63.5 kg (139 lb 14.4 oz)   08/24/23 63.5 kg (140 lb)     · Last TTE 6/13: EF 55%, mod to severe aortic stenosis, moderate insufficiency  · BMP 3,143 on admission from prior 2,299  · CXR demonstrates diffuse pulmonary congestion  · S/p furosemide 80mg IV x1 in ED  · WOB improved on BiPap, though still tachypneic - weaned off bipap during the day and back on HS due to increase WOB   · Metoprolol was resumed  · POCUS without evidence of hypervolemia

## 2023-09-12 NOTE — ASSESSMENT & PLAN NOTE
· Presented on 9/11 with SOB   · BNP 3,143 on admission (prior 2,299)  · CXR showing pulmonary edema  · S/p furosemide 80mg x1 in ED. Has only put out 100ml urine.  Bladder scan without significant retention  · S/p duoneb and solumedrol 60mg in ED   · bipap initially than weaned during the day - needed bipap again overnight for increased WOB   · Goal SpO2 >92%  · procal - 0.37 continue to trend   · Monitor off antibiotics since no source of infection

## 2023-09-12 NOTE — ASSESSMENT & PLAN NOTE
Wt Readings from Last 3 Encounters:   09/11/23 64.8 kg (142 lb 13.7 oz)   09/08/23 63.5 kg (139 lb 14.4 oz)   08/24/23 63.5 kg (140 lb)     · Last TTE 6/13: EF 55%, mod to severe aortic stenosis, moderate insufficiency  · BMP 3,143 on admission from prior 2,299  · CXR demonstrates diffuse pulmonary congestion  · S/p furosemide 80mg IV x1 in ED  · WOB improved on BiPap, though still tachypneic - weaned off bipap during the day and back on HS due to increase WOB   · Hold home metoprolol for now  · Daily weights  · Close I&O monitoring

## 2023-09-12 NOTE — ASSESSMENT & PLAN NOTE
· Recent streptococcus bacteremia and endocarditis in May. · S/p 6 week antibiotic treatment. Source likely odontogenic.    · Has since had dental extractions in anticipation of AVR and CABG later this month  · Blood cultures obtained on admission for completeness  · Monitored off antibiotics

## 2023-09-12 NOTE — DISCHARGE SUMMARY
[address]  Discharge- Hero Trista 1945, 69 y.o. male MRN: [id number]  Unit/Bed#: ICU 12 Encounter: [id number]  Primary Care Provider: Lilian Ace MD   Date and time admitted to hospital: 9/10/2023 10:31 PM    Acute on chronic heart failure with preserved ejection fraction (HFpEF) ([address])  Assessment & Plan  Wt Readings from Last 3 Encounters:   09/12/23 63.5 kg (139 lb 15.9 oz)   09/08/23 63.5 kg (139 lb 14.4 oz)   08/24/23 63.5 kg (140 lb)     · Last TTE 6/13: EF 55%, mod to severe aortic stenosis, moderate insufficiency  · BMP 3,143 on admission from prior 2,299  · CXR demonstrates diffuse pulmonary congestion  · S/p furosemide 80mg IV x1 in ED  · WOB improved on BiPap, though still tachypneic - weaned off bipap during the day and back on HS due to increase WOB   · Metoprolol was resumed  · POCUS without evidence of hypervolemia     Coronary artery disease involving native coronary artery  Assessment & Plan  · No CP on admission. Troponin 45. EKG Sinus tachycardia without ST changes. · Left heart cath 8/24/23: 3v CAD with LM  •  Dist LM lesion is 70% stenosed. •  Ost Cx lesion is 50% stenosed. •  Mid LAD lesion is 70% stenosed. •  Mid RCA lesion is 80% stenosed. •  1st Diag lesion is 90% stenosed. · Transferring to [facility] for CABG and AVR      Nonrheumatic aortic valve stenosis  Assessment & Plan  · Hx severe aortic valve stenosis  · Most recent JESSICA 6/14:  Aortic valve leaflets are severely calcified, normal mobility, moderate regurgitation. Moderate to severe stenosis visually. Small mobile vegetation at the commissure of the LCC and on CC. Very small mobile vegetation noted tip of the RCC  · Transferring for surgical AVR at [facility]     Abnormal glucose  Assessment & Plan  · Glucose elevated at 246 in ED.  Most recent A1c 5.8/pre-diabetes  · Not on any home medications for diabetes, not on steroids, though received 1 dose in ED  · Received SSI    Mixed hyperlipidemia  Assessment & Plan  · Statin    Primary hypertension  Assessment & Plan  · Normotensive currently  · Metoprolol     History of Acute bacterial endocarditis  Assessment & Plan  · Recent streptococcus bacteremia and endocarditis in May. · S/p 6 week antibiotic treatment. Source likely odontogenic. · Has since had dental extractions in anticipation of AVR and CABG later this month  · Blood cultures obtained on admission for completeness  · Monitored off antibiotics              Medical Problems     Resolved Problems  Date Reviewed: 9/12/2023          Resolved    Acute kidney injury ([address]) 9/12/2023     Resolved by  JESSICA Gloria    * (Principal) Acute respiratory failure with hypoxia ([address]) 9/12/2023     Resolved by  JESSICA Gloria    SIRS (systemic inflammatory response syndrome) ([address]) 9/12/2023     Resolved by  Evan Franklin, Almaz [facility]          Admission Date:   Admission Orders (From admission, onward)     Ordered        09/11/23 0017  INPATIENT ADMISSION  Once                        Admitting Diagnosis: CHF (congestive heart failure) ([address]) [I50.9]  Respiratory distress [R06.03]  Acute respiratory failure with hypoxia ([address]) [J96.01]    HPI: Per JESSICA Stoner "Elda Sánchez is a 69 y.o. with PMHx of HTN, HLD, CAD with severe 3 vessel disease including left main, severe aortic stenosis, recent endocarditis with antibiotic course completed in July, and HFpEF. He presented with acute onset of SOB which began 30 minutes prior to arrival. RR was 50's on arrival and was placed on BiPap for WOB. CXR showed pulmonary edema and he was given 80mg IV furosemide. Was also wheezy on exam and received a duoneb and 60mg solumedrol. Labs were significant for WBC 12.4, creatinine 1.75, BNP 3,143. He denied any CP and EKG showed sinus tachycardia with no ST changes. First troponin was 45. Denies fever, chills, or other complaints. Reports compliance with his diuretics.  On exam he is more comfortable on BiPap, but still tachypneic. He has mild pedal edema on exam which he states is at baseline. UOP was only 100ml after diuretic administration. He will be admitted to ICU for continued treatment of his acute heart failure."    Procedures Performed:   Orders Placed This Encounter   Procedures   • Critical Care   • ED ECG Documentation Only       Summary of Hospital Course:   9/12 Discharged to [facility]  9/11 Bipap   9/11 CXR - Pulmonary edema  9/10 80 lasix/ Bipap  9/10 CXR: Pulmonary congestion and L. Effusion   9/10 Admit to Stepdown     Significant Findings, Care, Treatment and Services Provided: See above    Complications: N/A    Condition at Discharge: stable         Discharge instructions/Information to patient and family:   See after visit summary for information provided to patient and family. Provisions for Follow-Up Care:  See after visit summary for information related to follow-up care and any pertinent home health orders. PCP: Kleber Shaw MD    Disposition: [facility]    Planned Readmission: No    Discharge Statement   I spent 15 minutes discharging the patient. This time was spent on the day of discharge. I had direct contact with the patient on the day of discharge. Additional documentation is required if more than 30 minutes were spent on discharge. Discharge Medications:  See after visit summary for reconciled discharge medications provided to patient and family.

## 2023-09-12 NOTE — ASSESSMENT & PLAN NOTE
· Hx severe aortic valve stenosis  · Most recent JESSICA 6/14:  Aortic valve leaflets are severely calcified, normal mobility, moderate regurgitation. Moderate to severe stenosis visually. Small mobile vegetation at the commissure of the LCC and on CC.   Very small mobile vegetation noted tip of the RCC  · Transferring for surgical AVR at [facility]

## 2023-09-12 NOTE — [place]IVERS[place]SF[place] FORM
[place]IV[place] FOR[place]  (ALL ITEMS MUST BE COMPLETED)    1. TRANSFER FROM: [facility] [address]      TRANSFER TO: [facility]     2. DATE OF TRANSFER: 9/12/2023                        TIME OF TRANSFER: 1330    3. PATIENT NAME: William Prescott,        YOB: 1945                             GENDER: male    4. LANGUAGE:   English    5. PHYSICIAN NAME:  Kathryn Mcgill MD                   PHONE: [phone]    6. CODE STATUS: Level 1 - Full Code        Out of Hospital DNR Attached: No    7. :                                      :  Extended Emergency Contact Information  Primary Emergency Contact: Iesha Stephens  Address: [address], [address] [facility] [address]  Mobile Phone: [phone]  Relation: [id number] Representative/Proxy:  No           Legal Guardian:  No             NAME OF:           HEALTH CARE REPRESENTATIVE/PROXY:                                         OR           LEGAL GUARDIAN, IF NOT :                                               PHONE:  (Day)           (Night)                        (Cell)    8. REASON FOR TRANSFER: (Must include brief medical history and recent changes in physical function or cognition.) [facility]            V/S: /86   Pulse 71   Temp 97.8 °F (36.6 °C) (Temporal)   Resp 22   Ht 5' 8" (1.727 m)   Wt 63.5 kg (139 lb 15.9 oz)   SpO2 95%   BMI 21.29 kg/m²           PAIN: None    9. PRIMARY DIAGNOSIS: Acute respiratory failure with hypoxia (HCC)      Secondary Diagnosis:         Pacemaker: No      Internal Defib: No          Mental Health Diagnosis (if Applicable):    10. RESTRAINTS: No     11. RESPIRATORY NEEDS: None    12. ISOLATION/PRECAUTION: None    13. ALLERGY: Patient has no known allergies. 14. SENSORY:       Vision Good, Hearing Good  and Speech Clear    15.  SKIN CONDITION: No Wounds    16. DIET: Special (describe)Cardiac 2 gm sodium diet 1500 fliud restriction    17. IV ACCESS: IVAD    18. PERSONAL ITEMS SENT WITH PATIENT: None    19. ATTACHED DOCUMENTS: MUST ATTACH CURRENT MEDICATION INFORMATION Face Sheet, MAR, Medication Reconciliation, Code Status, Discharge Summary, PT Note and HX/PE    20. AT RISK ALERTS:None        HARM TO: N/A    21. WEIGHT BEARING STATUS:         Left Leg: Full        Right Leg: Full    22. MENTAL STATUS:Alert and Oriented    23. FUNCTION:        Walk: Self        Transfer: Self        Toilet: Self        Feed: Self    24. IMMUNIZATIONS/SCREENING:     Immunization History   Administered Date(s) Administered   • COVID-19 PFIZER VACCINE 0.3 ML IM 01/20/2021, 02/09/2021, 12/21/2021   • Pneumococcal Conjugate 13-Valent 05/04/2017   • Pneumococcal Polysaccharide PPV23 03/11/2020       25. BOWEL: Continent and Date Last BM9/12/23    32. BLADDER: Continent    27.  SENDING FACILITY CONTACT: Varinder Hobson                  Title: RN         Unit: ICU         Phone: [phone] (if known):        Title:        Unit:         Phone:         FORM PREFILLED BY (if applicable)       Title:       Unit:        Phone:         FORM COMPLETED BY Marcia Mercedes RN      Title: BENITA      Phone: [phone]

## 2023-09-13 NOTE — UTILIZATION REVIEW
NOTIFICATION OF ADMISSION DISCHARGE   This is a Notification of Discharge from [facility]. Please be advised that this patient has been discharge from our facility. Below you will find the admission and discharge date and time including the patient’s disposition. UTILIZATION REVIEW CONTACT:  [facility]  Utilization   Network Utilization Review Department  Phone: [phone] x carefully listen to the prompts. All voicemails are confidential.  Email: [email or web address]     ADMISSION INFORMATION  PRESENTATION DATE: 9/10/2023 10:31 PM  OBERVATION ADMISSION DATE:   INPATIENT ADMISSION DATE: 9/11/23 12:17 AM   DISCHARGE DATE: 9/12/2023  1:50 PM   [facility]    IMPORTANT INFORMATION:  Send all requests for admission clinical reviews, approved or denied determinations and any other requests to dedicated fax number below belonging to the campus where the patient is receiving treatment.  List of dedicated fax numbers:  [place] DENIALS (Administrative/Medical Necessity) [phone] [address] [facility] (Maternity/NICU/Pediatrics) [phone]   [facility] [phone]   [facility] [phone] [address] [facility] [phone] [address] [facility] [phone]   [facility] [phone]   [address] [facility] [address] [facility] [phone]   [address] [facility] [phone] [address] [facility] [phone] [address] [facility] [address] [facility] [phone]

## 2023-09-15 LAB
ATRIAL RATE: 85 BPM
P AXIS: 23 DEGREES
PR INTERVAL: 166 MS
QRS AXIS: 6 DEGREES
QRSD INTERVAL: 108 MS
QT INTERVAL: 386 MS
QTC INTERVAL: 459 MS
T WAVE AXIS: 141 DEGREES
VENTRICULAR RATE: 85 BPM

## 2023-09-15 PROCEDURE — 93010 ELECTROCARDIOGRAM REPORT: CPT | Performed by: INTERNAL MEDICINE

## 2023-09-16 LAB
BACTERIA BLD CULT: NORMAL
BACTERIA BLD CULT: NORMAL

## 2023-12-07 ENCOUNTER — APPOINTMENT (INPATIENT)
Dept: RADIOLOGY | Facility: HOSPITAL | Age: 78
DRG: 101 | End: 2023-12-07
Payer: COMMERCIAL

## 2023-12-07 ENCOUNTER — APPOINTMENT (EMERGENCY)
Dept: RADIOLOGY | Facility: HOSPITAL | Age: 78
DRG: 101 | End: 2023-12-07
Payer: COMMERCIAL

## 2023-12-07 ENCOUNTER — HOSPITAL ENCOUNTER (INPATIENT)
Facility: HOSPITAL | Age: 78
LOS: 2 days | Discharge: HOME | DRG: 101 | End: 2023-12-09
Attending: EMERGENCY MEDICINE | Admitting: INTERNAL MEDICINE
Payer: COMMERCIAL

## 2023-12-07 DIAGNOSIS — I50.32 CHRONIC DIASTOLIC HEART FAILURE (HCC): ICD-10-CM

## 2023-12-07 DIAGNOSIS — I25.10 CORONARY ARTERY DISEASE INVOLVING NATIVE CORONARY ARTERY: ICD-10-CM

## 2023-12-07 DIAGNOSIS — I48.0 PAROXYSMAL ATRIAL FIBRILLATION (HCC): ICD-10-CM

## 2023-12-07 DIAGNOSIS — I65.21 STENOSIS OF RIGHT CAROTID ARTERY: ICD-10-CM

## 2023-12-07 DIAGNOSIS — R56.9 NEW ONSET SEIZURE (HCC): Primary | ICD-10-CM

## 2023-12-07 DIAGNOSIS — I5A NON-ISCHEMIC MYOCARDIAL INJURY (NON-TRAUMATIC): ICD-10-CM

## 2023-12-07 DIAGNOSIS — I33.0 ACUTE BACTERIAL ENDOCARDITIS: ICD-10-CM

## 2023-12-07 DIAGNOSIS — R41.82 ALTERED MENTAL STATUS: ICD-10-CM

## 2023-12-07 PROBLEM — R73.03 PREDIABETES: Status: ACTIVE | Noted: 2023-12-07

## 2023-12-07 LAB
2HR DELTA HS TROPONIN: 16 NG/L
4HR DELTA HS TROPONIN: 44 NG/L
ALBUMIN SERPL BCP-MCNC: 4 G/DL (ref 3.5–5)
ALP SERPL-CCNC: 57 U/L (ref 34–104)
ALT SERPL W P-5'-P-CCNC: 16 U/L (ref 7–52)
AMPHETAMINES SERPL QL SCN: NEGATIVE
ANION GAP SERPL CALCULATED.3IONS-SCNC: 12 MMOL/L
APAP SERPL-MCNC: <2 UG/ML (ref 10–20)
APTT PPP: 32 SECONDS (ref 23–37)
AST SERPL W P-5'-P-CCNC: 18 U/L (ref 13–39)
BACTERIA UR QL AUTO: ABNORMAL /HPF
BARBITURATES UR QL: NEGATIVE
BASOPHILS # BLD AUTO: 0.04 THOUSANDS/ÂΜL (ref 0–0.1)
BASOPHILS NFR BLD AUTO: 1 % (ref 0–1)
BENZODIAZ UR QL: NEGATIVE
BILIRUB SERPL-MCNC: 0.34 MG/DL (ref 0.2–1)
BILIRUB UR QL STRIP: NEGATIVE
BNP SERPL-MCNC: 356 PG/ML (ref 0–100)
BUN SERPL-MCNC: 36 MG/DL (ref 5–25)
CALCIUM SERPL-MCNC: 9.2 MG/DL (ref 8.4–10.2)
CARDIAC TROPONIN I PNL SERPL HS: 24 NG/L
CARDIAC TROPONIN I PNL SERPL HS: 40 NG/L
CARDIAC TROPONIN I PNL SERPL HS: 68 NG/L
CHLORIDE SERPL-SCNC: 104 MMOL/L (ref 96–108)
CLARITY UR: CLEAR
CO2 SERPL-SCNC: 21 MMOL/L (ref 21–32)
COCAINE UR QL: NEGATIVE
COLOR UR: ABNORMAL
CREAT SERPL-MCNC: 1.25 MG/DL (ref 0.6–1.3)
EOSINOPHIL # BLD AUTO: 0.16 THOUSAND/ÂΜL (ref 0–0.61)
EOSINOPHIL NFR BLD AUTO: 2 % (ref 0–6)
ERYTHROCYTE [DISTWIDTH] IN BLOOD BY AUTOMATED COUNT: 15.2 % (ref 11.6–15.1)
EST. AVERAGE GLUCOSE BLD GHB EST-MCNC: 120 MG/DL
ETHANOL SERPL-MCNC: <10 MG/DL
GFR SERPL CREATININE-BSD FRML MDRD: 55 ML/MIN/1.73SQ M
GLUCOSE SERPL-MCNC: 123 MG/DL (ref 65–140)
GLUCOSE SERPL-MCNC: 145 MG/DL (ref 65–140)
GLUCOSE UR STRIP-MCNC: ABNORMAL MG/DL
HBA1C MFR BLD: 5.8 %
HCT VFR BLD AUTO: 39.9 % (ref 36.5–49.3)
HGB BLD-MCNC: 13 G/DL (ref 12–17)
HGB UR QL STRIP.AUTO: NEGATIVE
HYALINE CASTS #/AREA URNS LPF: ABNORMAL /LPF
IMM GRANULOCYTES # BLD AUTO: 0.03 THOUSAND/UL (ref 0–0.2)
IMM GRANULOCYTES NFR BLD AUTO: 0 % (ref 0–2)
INR PPP: 0.97 (ref 0.84–1.19)
KETONES UR STRIP-MCNC: NEGATIVE MG/DL
LEUKOCYTE ESTERASE UR QL STRIP: ABNORMAL
LYMPHOCYTES # BLD AUTO: 0.97 THOUSANDS/ÂΜL (ref 0.6–4.47)
LYMPHOCYTES NFR BLD AUTO: 13 % (ref 14–44)
MCH RBC QN AUTO: 31 PG (ref 26.8–34.3)
MCHC RBC AUTO-ENTMCNC: 32.6 G/DL (ref 31.4–37.4)
MCV RBC AUTO: 95 FL (ref 82–98)
METHADONE UR QL: NEGATIVE
MONOCYTES # BLD AUTO: 0.55 THOUSAND/ÂΜL (ref 0.17–1.22)
MONOCYTES NFR BLD AUTO: 7 % (ref 4–12)
NEUTROPHILS # BLD AUTO: 5.94 THOUSANDS/ÂΜL (ref 1.85–7.62)
NEUTS SEG NFR BLD AUTO: 77 % (ref 43–75)
NITRITE UR QL STRIP: NEGATIVE
NON-SQ EPI CELLS URNS QL MICRO: ABNORMAL /HPF
NRBC BLD AUTO-RTO: 0 /100 WBCS
OPIATES UR QL SCN: NEGATIVE
OTHER STN SPEC: ABNORMAL
OXYCODONE+OXYMORPHONE UR QL SCN: NEGATIVE
PCP UR QL: NEGATIVE
PH UR STRIP.AUTO: 5 [PH]
PLATELET # BLD AUTO: 222 THOUSANDS/UL (ref 149–390)
PMV BLD AUTO: 9.5 FL (ref 8.9–12.7)
POTASSIUM SERPL-SCNC: 4.1 MMOL/L (ref 3.5–5.3)
PROT SERPL-MCNC: 6.6 G/DL (ref 6.4–8.4)
PROT UR STRIP-MCNC: ABNORMAL MG/DL
PROTHROMBIN TIME: 13.1 SECONDS (ref 11.6–14.5)
RBC # BLD AUTO: 4.19 MILLION/UL (ref 3.88–5.62)
RBC #/AREA URNS AUTO: ABNORMAL /HPF
SALICYLATES SERPL-MCNC: <5 MG/DL (ref 3–20)
SODIUM SERPL-SCNC: 137 MMOL/L (ref 135–147)
SP GR UR STRIP.AUTO: >=1.03 (ref 1–1.03)
THC UR QL: POSITIVE
TSH SERPL DL<=0.05 MIU/L-ACNC: 2.02 UIU/ML (ref 0.45–4.5)
UROBILINOGEN UR QL STRIP.AUTO: 0.2 E.U./DL
WBC # BLD AUTO: 7.69 THOUSAND/UL (ref 4.31–10.16)
WBC #/AREA URNS AUTO: ABNORMAL /HPF

## 2023-12-07 PROCEDURE — 80143 DRUG ASSAY ACETAMINOPHEN: CPT | Performed by: EMERGENCY MEDICINE

## 2023-12-07 PROCEDURE — 81001 URINALYSIS AUTO W/SCOPE: CPT | Performed by: EMERGENCY MEDICINE

## 2023-12-07 PROCEDURE — G1004 CDSM NDSC: HCPCS

## 2023-12-07 PROCEDURE — 71045 X-RAY EXAM CHEST 1 VIEW: CPT

## 2023-12-07 PROCEDURE — 93005 ELECTROCARDIOGRAM TRACING: CPT

## 2023-12-07 PROCEDURE — 82948 REAGENT STRIP/BLOOD GLUCOSE: CPT

## 2023-12-07 PROCEDURE — 85025 COMPLETE CBC W/AUTO DIFF WBC: CPT | Performed by: EMERGENCY MEDICINE

## 2023-12-07 PROCEDURE — 82077 ASSAY SPEC XCP UR&BREATH IA: CPT | Performed by: EMERGENCY MEDICINE

## 2023-12-07 PROCEDURE — 99222 1ST HOSP IP/OBS MODERATE 55: CPT | Performed by: STUDENT IN AN ORGANIZED HEALTH CARE EDUCATION/TRAINING PROGRAM

## 2023-12-07 PROCEDURE — 80307 DRUG TEST PRSMV CHEM ANLYZR: CPT | Performed by: STUDENT IN AN ORGANIZED HEALTH CARE EDUCATION/TRAINING PROGRAM

## 2023-12-07 PROCEDURE — 83880 ASSAY OF NATRIURETIC PEPTIDE: CPT | Performed by: STUDENT IN AN ORGANIZED HEALTH CARE EDUCATION/TRAINING PROGRAM

## 2023-12-07 PROCEDURE — 99285 EMERGENCY DEPT VISIT HI MDM: CPT | Performed by: EMERGENCY MEDICINE

## 2023-12-07 PROCEDURE — 80179 DRUG ASSAY SALICYLATE: CPT | Performed by: EMERGENCY MEDICINE

## 2023-12-07 PROCEDURE — 80053 COMPREHEN METABOLIC PANEL: CPT | Performed by: EMERGENCY MEDICINE

## 2023-12-07 PROCEDURE — 99285 EMERGENCY DEPT VISIT HI MDM: CPT

## 2023-12-07 PROCEDURE — 83036 HEMOGLOBIN GLYCOSYLATED A1C: CPT | Performed by: STUDENT IN AN ORGANIZED HEALTH CARE EDUCATION/TRAINING PROGRAM

## 2023-12-07 PROCEDURE — 85610 PROTHROMBIN TIME: CPT | Performed by: EMERGENCY MEDICINE

## 2023-12-07 PROCEDURE — 84443 ASSAY THYROID STIM HORMONE: CPT | Performed by: STUDENT IN AN ORGANIZED HEALTH CARE EDUCATION/TRAINING PROGRAM

## 2023-12-07 PROCEDURE — 36415 COLL VENOUS BLD VENIPUNCTURE: CPT | Performed by: EMERGENCY MEDICINE

## 2023-12-07 PROCEDURE — 85730 THROMBOPLASTIN TIME PARTIAL: CPT | Performed by: EMERGENCY MEDICINE

## 2023-12-07 PROCEDURE — 96360 HYDRATION IV INFUSION INIT: CPT

## 2023-12-07 PROCEDURE — 84484 ASSAY OF TROPONIN QUANT: CPT | Performed by: EMERGENCY MEDICINE

## 2023-12-07 PROCEDURE — 70450 CT HEAD/BRAIN W/O DYE: CPT

## 2023-12-07 RX ORDER — LORAZEPAM 2 MG/ML
2 INJECTION INTRAMUSCULAR EVERY 8 HOURS PRN
Status: DISCONTINUED | OUTPATIENT
Start: 2023-12-07 | End: 2023-12-09 | Stop reason: HOSPADM

## 2023-12-07 RX ORDER — ONDANSETRON 2 MG/ML
4 INJECTION INTRAMUSCULAR; INTRAVENOUS EVERY 6 HOURS PRN
Status: DISCONTINUED | OUTPATIENT
Start: 2023-12-07 | End: 2023-12-09 | Stop reason: HOSPADM

## 2023-12-07 RX ORDER — LORAZEPAM 2 MG/ML
INJECTION INTRAMUSCULAR
Status: COMPLETED
Start: 2023-12-07 | End: 2023-12-07

## 2023-12-07 RX ORDER — METOPROLOL TARTRATE 1 MG/ML
2.5 INJECTION, SOLUTION INTRAVENOUS EVERY 6 HOURS PRN
Status: DISCONTINUED | OUTPATIENT
Start: 2023-12-07 | End: 2023-12-08

## 2023-12-07 RX ORDER — LORAZEPAM 2 MG/ML
1 INJECTION INTRAMUSCULAR EVERY 6 HOURS PRN
Status: COMPLETED | OUTPATIENT
Start: 2023-12-07 | End: 2023-12-08

## 2023-12-07 RX ORDER — ENOXAPARIN SODIUM 100 MG/ML
30 INJECTION SUBCUTANEOUS
Status: DISCONTINUED | OUTPATIENT
Start: 2023-12-07 | End: 2023-12-09 | Stop reason: HOSPADM

## 2023-12-07 RX ORDER — LORAZEPAM 2 MG/ML
1 INJECTION INTRAMUSCULAR ONCE
Status: COMPLETED | OUTPATIENT
Start: 2023-12-07 | End: 2023-12-07

## 2023-12-07 RX ORDER — METOPROLOL SUCCINATE 25 MG/1
25 TABLET, EXTENDED RELEASE ORAL DAILY
Status: DISCONTINUED | OUTPATIENT
Start: 2023-12-08 | End: 2023-12-09 | Stop reason: HOSPADM

## 2023-12-07 RX ADMIN — LEVETIRACETAM 1000 MG: 100 INJECTION, SOLUTION INTRAVENOUS at 16:47

## 2023-12-07 RX ADMIN — LORAZEPAM 1 MG: 2 INJECTION INTRAMUSCULAR; INTRAVENOUS at 16:45

## 2023-12-07 RX ADMIN — SODIUM CHLORIDE 1000 ML: 0.9 INJECTION, SOLUTION INTRAVENOUS at 12:51

## 2023-12-07 RX ADMIN — LORAZEPAM 1 MG: 2 INJECTION INTRAMUSCULAR; INTRAVENOUS at 19:03

## 2023-12-07 RX ADMIN — LORAZEPAM 1 MG: 2 INJECTION INTRAMUSCULAR; INTRAVENOUS at 15:47

## 2023-12-07 RX ADMIN — ENOXAPARIN SODIUM 30 MG: 30 INJECTION SUBCUTANEOUS at 21:57

## 2023-12-07 NOTE — ASSESSMENT & PLAN NOTE
EKG NSR  TSH-pending  Recent echo 5 months ago: EF 47%  History of AV strep mitis i.e. 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  Continue home Lopressor, as needed IV if not able to tolerate oral  Telemetry  [id number]: Per chart review: Not on [facility] D/T per patient - he is concerned about bruising or bleeding

## 2023-12-07 NOTE — ED PROVIDER NOTES
History  Chief Complaint   Patient presents with    Altered Mental Status     Reportedly patient was sleeping late, wife woke up at 10 am, patient said he was still tired and wanted more sleep. Wife came later to wake him up, patient lethargic, irritable. Responsive to voice. Patient has a significant cardiac history of recent aortic valve replacement in September, with history of coronary artery disease CHF endocarditis and COPD. Patient reportedly slept in late this morning. His wife had difficulty arousing him and called 911. Medics at the scene noted that the patient was at first lethargic and nonverbal and then became quite agitated. Patient was administered a milligram of lorazepam.  He arrives at the ED sleepy and lethargic. He is poorly cooperative with exam.  Patient at first was not answering questions for the medics but will answer questions for me and is able to spell his last name. Patient states he just wants to sleep and rolls over on his side        Prior to Admission Medications   Prescriptions Last Dose Informant Patient Reported? Taking? Multiple Vitamins-Minerals (PRESERVISION AREDS PO)  Self Yes No   Sig: Take 5 mg by mouth 2 (two) times a day Two tablets daily   acetaminophen (TYLENOL) 325 mg tablet  Self No No   Sig: Take 2 tablets (650 mg total) by mouth every 6 (six) hours as needed for mild pain   aspirin 81 mg chewable tablet  Self No No   Sig: Chew 1 tablet (81 mg total) daily   metoprolol succinate (TOPROL-XL) 25 mg 24 hr tablet  Self No No   Sig: Take 1 tablet (25 mg total) by mouth daily   potassium chloride (K-DUR,KLOR-CON) 20 mEq tablet  Self No No   Sig: Take 2 tablets (40 mEq total) by mouth 2 (two) times a day   rosuvastatin (CRESTOR) 40 MG tablet  Self No No   Sig: Take 1 tablet (40 mg total) by mouth daily   torsemide (DEMADEX) 20 mg tablet  Self No No   Sig: Take 1 tablet (20 mg total) by mouth 2 (two) times a day Do not start before August 25, 2023. Facility-Administered Medications: None       Past Medical History:   Diagnosis Date    Aortic stenosis     CAD (coronary artery disease), native coronary artery     CHF (congestive heart failure) (HCC)     COPD (chronic obstructive pulmonary disease) ([address])     Discitis     Endocarditis        Past Surgical History:   Procedure Laterality Date    CARDIAC CATHETERIZATION N/A 2023    Procedure: Cardiac RHC/LHC; Surgeon: Guero Jurado MD;  Location: [facility]; Service: Cardiology    SKIN BIOPSY         Family History   Problem Relation Age of Onset    Colon cancer Mother     Lung cancer Father     Mental illness Neg Hx      I have reviewed and agree with the history as documented. E-Cigarette/Vaping    E-Cigarette Use Never User      E-Cigarette/Vaping Substances    Nicotine No     THC No     CBD No     Flavoring No     Other No     Unknown No      Social History     Tobacco Use    Smoking status: Former     Packs/day: 0.50     Years: 20.00     Total pack years: 10.00     Types: Cigarettes     Quit date: 3/11/2005     Years since quittin.7    Smokeless tobacco: Never   Vaping Use    Vaping Use: Never used   Substance Use Topics    Alcohol use: Not Currently    Drug use: Not Currently       Review of Systems   Constitutional:  Negative for chills and fever. HENT:  Negative for congestion. Eyes:  Negative for visual disturbance. Respiratory:  Negative for cough and shortness of breath. Cardiovascular:  Negative for chest pain. Gastrointestinal:  Negative for abdominal pain and vomiting. Genitourinary:  Negative for dysuria. Musculoskeletal:  Negative for arthralgias and neck pain. Skin:  Negative for rash. Neurological:  Negative for syncope and headaches. Psychiatric/Behavioral:  Positive for agitation and sleep disturbance. All other systems reviewed and are negative. Physical Exam  Physical Exam  Vitals and nursing note reviewed.    Constitutional: Appearance: He is well-developed. HENT:      Head: Normocephalic and atraumatic. Mouth/Throat:      Mouth: Mucous membranes are moist.   Eyes:      Extraocular Movements: Extraocular movements intact. Cardiovascular:      Rate and Rhythm: Normal rate and regular rhythm. Heart sounds: Normal heart sounds. Pulmonary:      Effort: Pulmonary effort is normal.   Abdominal:      Palpations: Abdomen is soft. Tenderness: There is no abdominal tenderness. Musculoskeletal:         General: No swelling or tenderness. Normal range of motion. Cervical back: Normal range of motion. Skin:     General: Skin is warm and dry. Capillary Refill: Capillary refill takes less than 2 seconds. Neurological:      Mental Status: He is alert. Psychiatric:         Behavior: Behavior is agitated.          Vital Signs  ED Triage Vitals [12/07/23 1145]   Temperature Pulse Respirations Blood Pressure SpO2   (!) 97.3 °F (36.3 °C) 83 20 127/61 94 %      Temp Source Heart Rate Source Patient Position - Orthostatic VS BP Location FiO2 (%)   Oral Monitor Lying Right arm --      Pain Score       --           Vitals:    12/07/23 1430 12/07/23 1500 12/07/23 1530 12/07/23 1600   BP: 169/75 (!) 173/75 152/70 (!) 214/97   Pulse: 78 75 76 (!) 106   Patient Position - Orthostatic VS:             Visual Acuity  Visual Acuity      Flowsheet Row Most Recent Value   L Pupil Size (mm) 2   R Pupil Size (mm) 2            ED Medications  Medications   levETIRAcetam (KEPPRA) 1,000 mg in sodium chloride 0.9 % 100 mL IVPB (has no administration in time range)   LORazepam (ATIVAN) injection 1 mg (has no administration in time range)   sodium chloride 0.9 % bolus 1,000 mL (0 mL Intravenous Stopped 12/7/23 1410)   LORazepam (ATIVAN) 2 mg/mL injection **ADS Override Pull** (1 mg  Given 12/7/23 1547)       Diagnostic Studies  Results Reviewed       Procedure Component Value Units Date/Time    HS Troponin I 4hr [[id number]]  (Abnormal) Collected: 12/07/23 1559    Lab Status: Final result Specimen: Blood from Arm, Left Updated: 12/07/23 1628     hs TnI 4hr 68 ng/L      Delta 4hr hsTnI 44 ng/L     HS Troponin I 2hr [[id number]]  (Normal) Collected: 12/07/23 1419    Lab Status: Final result Specimen: Blood from Arm, Right Updated: 12/07/23 1453     hs TnI 2hr 40 ng/L      Delta 2hr hsTnI 16 ng/L     Urine Microscopic [[id number]]  (Abnormal) Collected: 12/07/23 1355    Lab Status: Final result Specimen: Urine, Clean Catch Updated: 12/07/23 1428     RBC, UA 0-1 /hpf      WBC, UA 0-1 /hpf      Epithelial Cells None Seen /hpf      Bacteria, UA Occasional /hpf      Hyaline Casts, UA 1-2 /lpf      OTHER OBSERVATIONS Sperm Present    UA (URINE) with reflex to Scope [[id number]]  (Abnormal) Collected: 12/07/23 1355    Lab Status: Final result Specimen: Urine, Clean Catch Updated: 12/07/23 1403     Color, UA Light Yellow     Clarity, UA Clear     Specific Gravity, UA >=1.030     pH, UA 5.0     Leukocytes, UA Elevated glucose may cause decreased leukocyte values. See urine microscopic for UWBC result     Nitrite, UA Negative     Protein, UA Trace mg/dl      Glucose, UA >=1000 (1%) mg/dl      Ketones, UA Negative mg/dl      Urobilinogen, UA 0.2 E.U./dl      Bilirubin, UA Negative     Occult Blood, UA Negative    Salicylate level [[id number]]  (Normal) Collected: 12/07/23 1204    Lab Status: Final result Specimen: Blood from Arm, Right Updated: 45/44/89 5193     Salicylate Lvl <5 mg/dL     HS Troponin 0hr (reflex protocol) [[id number]]  (Normal) Collected: 12/07/23 1204    Lab Status: Final result Specimen: Blood from Arm, Right Updated: 12/07/23 1233     hs TnI 0hr 24 ng/L     Acetaminophen level-If concentration is detectable, please discuss with medical  on call.  [[id number]]  (Abnormal) Collected: 12/07/23 1204    Lab Status: Final result Specimen: Blood from Arm, Right Updated: 12/07/23 1228     Acetaminophen Level <2 ug/mL     Comprehensive metabolic panel [[id number]]  (Abnormal) Collected: 12/07/23 1204    Lab Status: Final result Specimen: Blood from Arm, Right Updated: 12/07/23 1228     Sodium 137 mmol/L      Potassium 4.1 mmol/L      Chloride 104 mmol/L      CO2 21 mmol/L      ANION GAP 12 mmol/L      BUN 36 mg/dL      Creatinine 1.25 mg/dL      Glucose 123 mg/dL      Calcium 9.2 mg/dL      AST 18 U/L      ALT 16 U/L      Alkaline Phosphatase 57 U/L      Total Protein 6.6 g/dL      Albumin 4.0 g/dL      Total Bilirubin 0.34 mg/dL      eGFR 55 ml/min/1.73sq m     Narrative:      [facility] guidelines [facility] Kid[facility] Disease (CKD):     Stage 1 with normal or high GFR (GFR > 90 mL/min/1.73 square meters)    Stage 2 Mild CKD (GFR = 60-89 mL/min/1.73 square meters)    Stage 3A Moderate CKD (GFR = 45-59 mL/min/1.73 square meters)    Stage 3B Moderate CKD (GFR = 30-44 mL/min/1.73 square meters)    Stage 4 Severe CKD (GFR = 15-29 mL/min/1.73 square meters)    Stage 5 End Stage CKD (GFR <15 mL/min/1.73 square meters)  Note: GFR calculation is accurate only with a steady state creatinine    Ethanol [[id number]]  (Normal) Collected: 12/07/23 1204    Lab Status: Final result Specimen: Blood from Arm, Right Updated: 12/07/23 1228     Ethanol Lvl <10 mg/dL     Protime-INR [[id number]]  (Normal) Collected: 12/07/23 1204    Lab Status: Final result Specimen: Blood from Arm, Right Updated: 12/07/23 1223     Protime 13.1 seconds      INR 0.97    APTT [[id number]]  (Normal) Collected: 12/07/23 1204    Lab Status: Final result Specimen: Blood from Arm, Right Updated: 12/07/23 1223     PTT 32 seconds     CBC and differential [[id number]]  (Abnormal) Collected: 12/07/23 1204    Lab Status: Final result Specimen: Blood from Arm, Right Updated: 12/07/23 1210     WBC 7.69 Thousand/uL      RBC 4.19 Million/uL      Hemoglobin 13.0 g/dL      Hematocrit 39.9 %      MCV 95 fL      MCH 31.0 pg      MCHC 32.6 g/dL      RDW 15.2 %      MPV 9.5 fL Platelets 701 Thousands/uL      nRBC 0 /100 WBCs      Neutrophils Relative 77 %      Immat GRANS % 0 %      Lymphocytes Relative 13 %      Monocytes Relative 7 %      Eosinophils Relative 2 %      Basophils Relative 1 %      Neutrophils Absolute 5.94 Thousands/µL      Immature Grans Absolute 0.03 Thousand/uL      Lymphocytes Absolute 0.97 Thousands/µL      Monocytes Absolute 0.55 Thousand/µL      Eosinophils Absolute 0.16 Thousand/µL      Basophils Absolute 0.04 Thousands/µL     Fingerstick Glucose (POCT) [[id number]]  (Abnormal) Collected: 12/07/23 1138    Lab Status: Final result Updated: 12/07/23 1139     POC Glucose 145 mg/dl                    CT head without contrast   Final Result by Chaparro Ray MD (12/07 1431)      No acute intracranial abnormality. Workstation performed: [id number]         XR chest 1 view portable    (Results Pending)   MRI inpatient order    (Results Pending)   CTA head and neck with and without contrast    (Results Pending)              Procedures  ECG 12 Lead Documentation Only    Date/Time: 12/7/2023 12:03 PM    Performed by: Jemima Chester MD  Authorized by: Jemima Chester MD    Indications / Diagnosis:  AMS  ECG reviewed by me, the ED Provider: yes    Patient location:  ED  Interpretation:     Interpretation: abnormal    Rate:     ECG rate:  75    ECG rate assessment: normal    Rhythm:     Rhythm: sinus rhythm    Ectopy:     Ectopy: none    QRS:     QRS axis:  Normal    QRS intervals:  Normal  Conduction:     Conduction: normal    ST segments:     ST segments:  Normal  T waves:     T waves: normal    Q waves:     Q waves:  II, III and aVF  Other findings:     Other findings: LAE             ED Course                                             Medical Decision Making  Patient is evaluated for AMS including CT head and metabolic and coma panels.     During her evaluation the patient had a witnessed tonic-clonic generalized seizure with postictal state that was very similar to the what the wife had witnessed this morning. Patient medicated with Ativan    Amount and/or Complexity of Data Reviewed  Labs: ordered. Radiology: ordered. Risk  Prescription drug management. Decision regarding hospitalization. Disposition  Final diagnoses: Altered mental status   New onset seizure ([address])     Time reflects when diagnosis was documented in both MDM as applicable and the Disposition within this note       Time User Action Codes Description Comment    12/7/2023  4:09 PM Saurav Schafer Add [R41.82] Altered mental status     12/7/2023  4:09 PM Bebe [place] A Add [R56.9] New onset seizure ([address])     12/7/2023  4:09 PM aSurav Schafer Modify [R41.82] Altered mental status     12/7/2023  4:09 PM Saurav Schafer Modify [R56.9] New onset seizure [facility])           ED Disposition       ED Disposition   Admit    Condition   Stable    Date/Time   Thu Dec 7, 2023  4:09 PM    Comment   Case was discussed with hospitalist and the patient's admission status was agreed to be Admission Status: inpatient status to the service of Dr. Nithya Robles. Follow-up Information    None         Patient's Medications   Discharge Prescriptions    No medications on file       No discharge procedures on file.     PDMP Review       None            ED Provider  Electronically Signed by             Saurav Schafer MD  12/07/23 9098

## 2023-12-07 NOTE — ASSESSMENT & PLAN NOTE
Slight elevation in troponins     Hx of PAF, CAD, s/p CABGx3, AV valve replacement per chart review  Delta after 4h=44  EKG: NSR, LAE, T wave changes resolved  Likely 2/2 possible seizure like activity  Tele  Will monitor

## 2023-12-07 NOTE — ED NOTES
Called into pts room by wife @ [phone] for seizure like activity. Lorazepam pulled by Parker Aguilera as witness for medication pull and does administration;  as override from [facility] as per dr Winnie Ohara at bedside.       Grace Wood RN  12/07/23 4725

## 2023-12-07 NOTE — H&P
[id number] [place] [employer]  H&P  Name: Yazmin Hui 69 y.o. male I MRN: [id number]  Unit/Bed#: [address] [place] [address] I Date of Admission: 12/7/2023   Date of Service: 12/7/2023 I Hospital Day: 0      Assessment/Plan   Seizure-like activity [facility])  Assessment & Plan  Minimally responsive PTA, witnessed seizure in ED with enuresis. Multiple seizure-like episodes, increased frequency per wife report now after reflection and visualizing an actual seizure    ED: Loading dose Keppra 1000 mg, Ativan 1 mg  CT head in the ED: No acute intracranial abnormality. MRI of the brain w/o- pending  CTA head and neck w/contrast -pending  EEG-pending  Telemetry-monitor for arrhythmias.   Prolactin-pending  UDS- pending  HbA1c- 6.2%, TSH -pending  Etoh-wnl  Ativan as needed  neuro-check q4h  PT/OT consult  Bedside swallow evaluation  Seizure/fall precautions  Neuro consult          Non-ischemic myocardial injury (non-traumatic)  Assessment & Plan  Slight elevation in troponins     Hx of PAF, CAD, s/p CABGx3, AV valve replacement per chart review  Delta after 4h=44  EKG: NSR, LAE, T wave changes resolved  Likely 2/2 possible seizure like activity  Tele  Will monitor      Prediabetes  Assessment & Plan  Last A1c 6.2%-repeat pending  UA: Glucosuria greater than thousand  SSI once diet is advanced    Coronary artery disease involving native coronary artery  Assessment & Plan  Continue aspirin, statin  Followed by cardiology [facility] [place]    Chronic heart failure [facility])  Assessment & Plan  Wt Readings from Last 3 Encounters:   09/12/23 63.5 kg (139 lb 15.9 oz)   09/08/23 63.5 kg (139 lb 14.4 oz)   08/24/23 63.5 kg (140 lb)     Reported LifeVest per chart review and wife's report noncompliant  Last TTE 6/13: EF 55%, mod to severe aortic stenosis, moderate insufficiency, repeat pending  BNP-356  CXR-pending  No home meds taken today  Does not look like an exacerbation  Continue home metoprolol, refused [facility]  Telemetry  Cardio consulted        History of Acute bacterial endocarditis  Assessment & Plan  Recent streptococcus bacteremia and endocarditis in May. S/p 6-12 week antibiotic treatment. Source likely odontogenic. Has since had dental extractions in anticipation of bioprosthetic AVR and CABG sequentially  Followed by outpatient cardiology  Echo-pending      Paroxysmal atrial fibrillation [facility])  Assessment & Plan  EKG NSR  TSH-pending  Recent echo 5 months ago: EF 47%  History of AV strep mitis i.e. 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  Continue home Lopressor, as needed IV if not able to tolerate oral  Telemetry  [id number]: Per chart review: Not on [facility] by [facility] D/T per patient - he is concerned about bruising or bleeding         Primary hypertension  Assessment & Plan  Continue home Lopressor with parameters  As needed IV    Mixed hyperlipidemia  Assessment & Plan  Continue home Crestor 40 mg  Recent lipid panel 5 months ago WNL         VTE Pharmacologic Prophylaxis:   Moderate Risk (Score 3-4) - Pharmacological DVT Prophylaxis Ordered: enoxaparin (Lovenox). Code Status: Level 1 - Full Code   Discussion with family: Updated  (wife) via phone. Anticipated Length of Stay: Patient will be admitted on an inpatient basis with an anticipated length of stay of greater than 2 midnights secondary to clinical course and specialist recommendations. Total Time Spent on Date of Encounter in care of patient: 45 mins. This time was spent on one or more of the following: performing physical exam; counseling and coordination of care; obtaining or reviewing history; documenting in the medical record; reviewing/ordering tests, medications or procedures; communicating with other healthcare professionals and discussing with patient's family/caregivers.     Chief Complaint: Seizure-like activity    History of Present Illness:  Yazmin Hui is a 69 y.o. male with a PMH of AV strep mitis IE, 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, CAD s/p 3 vessel CABG, and hyperlipidemia, prediabetes risk who presents with seizure-like activity. Patients wife stated that he seem slikes he had a seizure this am, was very tired and sleepy, then patient yelled out then heard a gurgling sound and called 911. Symptoms including jerking, urination, foaming. Wife reported prior seizure like activity in July and prior  Av valve replacement in September, pt recent valve replacement and the CABG x3 in September through [facility] and was on antibiotics for 6 weeks prior and 6 weeks post-op; completion of antibiotics 3 weeks ago. Patient wife stated that as she reflects he may have had couple episodes over last week. No alcohol, consumes medical [employer] for his anxiety for last couple of days. In the ED patient had slight postictal presentation became more alert, able to walk around and use the restroom and communicate with staff, later nurse reported patient had a witnessed seizure with enuresis around 4 PM and given antiepileptics. Patient obtained CT head; neuro requested CTA head and ordered MRI. Patient did not take any meds today. Patient is followed by [facility] Dr Gabby Tovar who helped oversee patients recent surgical procedures; wife reported cardiologist maintaining heart function 25%, recent scheduled echo next week to see if he needed a pacemaker; patient has lifevest that he does not always wear. Upon evaluation in ED patient minimally responsive, HPI given by patient's wife, patient report limited.   Patient full code per wife report    Review of Systems:  Review of Systems   Unable to perform ROS: Acuity of condition       Past Medical and Surgical History:   Past Medical History:   Diagnosis Date    Aortic stenosis     CAD (coronary artery disease), native coronary artery     CHF (congestive heart failure) (HCC)     COPD (chronic obstructive pulmonary disease) ([address] )     Discitis     Endocarditis        Past Surgical History:   Procedure Laterality Date    CARDIAC CATHETERIZATION N/A 2023    Procedure: Cardiac RHC/LHC; Surgeon: Tamara Azul MD;  Location: [facility] CARD[facility]; Service: Cardiology    SKIN BIOPSY         Meds/Allergies:  Prior to Admission medications    Medication Sig Start Date End Date Taking? Authorizing Provider   acetaminophen (TYLENOL) 325 mg tablet Take 2 tablets (650 mg total) by mouth every 6 (six) hours as needed for mild pain 23   Gino Pichardo MD   aspirin 81 mg chewable tablet Chew 1 tablet (81 mg total) daily 23   JESSICA Rinaldi   metoprolol succinate (TOPROL-XL) 25 mg 24 hr tablet Take 1 tablet (25 mg total) by mouth daily 23   JESSICA Rinaldi   Multiple Vitamins-Minerals (PRESERVISION AREDS PO) Take 5 mg by mouth 2 (two) times a day Two tablets daily    Historical Provider, MD   potassium chloride (K-DUR,KLOR-CON) 20 mEq tablet Take 2 tablets (40 mEq total) by mouth 2 (two) times a day 23   Ever Ang MD   rosuvastatin (CRESTOR) 40 MG tablet Take 1 tablet (40 mg total) by mouth daily 23   JESSICA Rinaldi   torsemide (DEMADEX) 20 mg tablet Take 1 tablet (20 mg total) by mouth 2 (two) times a day Do not start before 2023. 23   JESSICA Rinaldi     I have reviewed home medications using recent Epic encounter.     Allergies: No Known Allergies    Social History:  Marital Status: /Civil Union   Occupation:   Patient Pre-hospital Living Situation: Home  Patient Pre-hospital Level of Mobility: unable to be assessed at time of evaluation  Patient Pre-hospital Diet Restrictions:   Substance Use History:   Social History     Substance and Sexual Activity   Alcohol Use Not Currently     Social History     Tobacco Use   Smoking Status Former    Packs/day: 0.50    Years: 20.00    Total pack years: 10.00    Types: Cigarettes    Quit date: 3/11/2005    Years since quittin.7   Smokeless Tobacco Never     Social History     Substance and Sexual Activity   Drug Use Not Currently       Family History:  Family History   Problem Relation Age of Onset    Colon cancer Mother     Lung cancer Father     Mental illness Neg Hx        Physical Exam:     Vitals:   Blood Pressure: 137/99 (12/07/23 1813)  Pulse: 99 (12/07/23 1813)  Temperature: (!) 97.2 °F (36.2 °C) (12/07/23 1813)  Temp Source: Temporal (12/07/23 1755)  Respirations: 16 (12/07/23 1813)  SpO2: 96 % (12/07/23 1813)    Physical Exam  Vitals and nursing note reviewed. Constitutional:       General: He is not in acute distress. Appearance: He is well-developed. Comments: unarousable   HENT:      Head: Normocephalic and atraumatic. Cardiovascular:      Rate and Rhythm: Normal rate and regular rhythm. Heart sounds: No murmur heard. Pulmonary:      Effort: Pulmonary effort is normal. No respiratory distress. Breath sounds: Normal breath sounds. Abdominal:      Palpations: Abdomen is soft. Tenderness: There is no abdominal tenderness. Musculoskeletal:         General: No swelling. Cervical back: Neck supple. Skin:     General: Skin is warm and dry. Capillary Refill: Capillary refill takes less than 2 seconds. Neurological:      Mental Status: He is unresponsive.       Comments: Response to pain          Additional Data:     Lab Results:  Results from last 7 days   Lab Units 12/07/23  1204   WBC Thousand/uL 7.69   HEMOGLOBIN g/dL 13.0   HEMATOCRIT % 39.9   PLATELETS Thousands/uL 222   NEUTROS PCT % 77*   LYMPHS PCT % 13*   MONOS PCT % 7   EOS PCT % 2     Results from last 7 days   Lab Units 12/07/23  1204   SODIUM mmol/L 137   POTASSIUM mmol/L 4.1   CHLORIDE mmol/L 104   CO2 mmol/L 21   BUN mg/dL 36*   CREATININE mg/dL 1.25   ANION GAP mmol/L 12   CALCIUM mg/dL 9.2   ALBUMIN g/dL 4.0   TOTAL BILIRUBIN mg/dL 0.34   ALK PHOS U/L 57   ALT U/L 16   AST U/L 18   GLUCOSE RANDOM mg/dL 123     Results from last 7 days   Lab Units 12/07/23  1204   INR  0.97     Results from last 7 days   Lab Units 12/07/23  1138   POC GLUCOSE mg/dl 145*               Lines/Drains:  Invasive Devices       Peripheral Intravenous Line  Duration             Peripheral IV 09/10/23 Left;Ventral (anterior) Forearm 87 days    Peripheral IV 09/10/23 Right Antecubital 87 days    Peripheral IV 12/07/23 Right Forearm <1 day                        Imaging: Reviewed radiology reports from this admission including: CT head  CT head without contrast   Final Result by Macie Desai MD (12/07 1431)      No acute intracranial abnormality. Workstation performed: [id number]         XR chest 1 view portable    (Results Pending)   MRI inpatient order    (Results Pending)   CTA head and neck with and without contrast    (Results Pending)       EKG and Other Studies Reviewed on Admission:   EKG: NSR. HR 75.    ** Please Note: This note has been constructed using a voice recognition system.  **

## 2023-12-07 NOTE — ASSESSMENT & PLAN NOTE
Wt Readings from Last 3 Encounters:   09/12/23 63.5 kg (139 lb 15.9 oz)   09/08/23 63.5 kg (139 lb 14.4 oz)   08/24/23 63.5 kg (140 lb)     Reported LifeVest per chart review and wife's report noncompliant  Last TTE 6/13: EF 55%, mod to severe aortic stenosis, moderate insufficiency, repeat pending  BNP-356  CXR-pending  No home meds taken today  Does not look like an exacerbation  Continue home [facility]rolol, [facility] [place]  Telemetry  Cardio consulted

## 2023-12-07 NOTE — ASSESSMENT & PLAN NOTE
Recent streptococcus bacteremia and endocarditis in May. S/p 6-12 week antibiotic treatment. Source likely odontogenic.    Has since had dental extractions in anticipation of bioprosthetic AVR and CABG sequentially  Followed by outpatient cardiology  Echo-pending

## 2023-12-07 NOTE — ASSESSMENT & PLAN NOTE
Minimally responsive PTA, witnessed seizure in ED with enuresis. Multiple seizure-like episodes, increased frequency per wife report now after reflection and visualizing an actual seizure    ED: Loading dose Keppra 1000 mg, Ativan 1 mg  CT head in the ED: No acute intracranial abnormality. MRI of the brain w/o- pending  CTA head and neck w/contrast -pending  EEG-pending  Telemetry-monitor for arrhythmias.   Prolactin-pending  UDS- pending  HbA1c- 6.2%, TSH -pending  Etoh-wnl  Ativan as needed  neuro-check q4h  PT/OT consult  Bedside swallow evaluation  Seizure/fall precautions  Neuro consult

## 2023-12-08 ENCOUNTER — APPOINTMENT (INPATIENT)
Dept: RADIOLOGY | Facility: HOSPITAL | Age: 78
DRG: 101 | End: 2023-12-08
Payer: COMMERCIAL

## 2023-12-08 ENCOUNTER — APPOINTMENT (INPATIENT)
Dept: NEUROLOGY | Facility: HOSPITAL | Age: 78
DRG: 101 | End: 2023-12-08
Attending: PSYCHIATRY & NEUROLOGY
Payer: COMMERCIAL

## 2023-12-08 ENCOUNTER — APPOINTMENT (INPATIENT)
Dept: NON INVASIVE DIAGNOSTICS | Facility: HOSPITAL | Age: 78
DRG: 101 | End: 2023-12-08
Payer: COMMERCIAL

## 2023-12-08 PROBLEM — Z86.73 HISTORY OF STROKE: Status: ACTIVE | Noted: 2023-12-08

## 2023-12-08 PROBLEM — D36.9: Status: ACTIVE | Noted: 2023-12-08

## 2023-12-08 PROBLEM — R93.89 ABNORMAL FINDING ON IMAGING: Status: ACTIVE | Noted: 2023-12-08

## 2023-12-08 LAB
ALBUMIN SERPL BCP-MCNC: 4 G/DL (ref 3.5–5)
ALP SERPL-CCNC: 56 U/L (ref 34–104)
ALT SERPL W P-5'-P-CCNC: 13 U/L (ref 7–52)
ANION GAP SERPL CALCULATED.3IONS-SCNC: 7 MMOL/L
AST SERPL W P-5'-P-CCNC: 25 U/L (ref 13–39)
ATRIAL RATE: 75 BPM
BASOPHILS # BLD AUTO: 0.06 THOUSANDS/ÂΜL (ref 0–0.1)
BASOPHILS NFR BLD AUTO: 1 % (ref 0–1)
BETA-HYDROXYBUTYRATE: 0.3 MMOL/L
BILIRUB SERPL-MCNC: 0.51 MG/DL (ref 0.2–1)
BUN SERPL-MCNC: 23 MG/DL (ref 5–25)
CALCIUM SERPL-MCNC: 9.2 MG/DL (ref 8.4–10.2)
CHLORIDE SERPL-SCNC: 107 MMOL/L (ref 96–108)
CO2 SERPL-SCNC: 22 MMOL/L (ref 21–32)
CREAT SERPL-MCNC: 1.04 MG/DL (ref 0.6–1.3)
EOSINOPHIL # BLD AUTO: 0.1 THOUSAND/ÂΜL (ref 0–0.61)
EOSINOPHIL NFR BLD AUTO: 1 % (ref 0–6)
ERYTHROCYTE [DISTWIDTH] IN BLOOD BY AUTOMATED COUNT: 15.2 % (ref 11.6–15.1)
GFR SERPL CREATININE-BSD FRML MDRD: 68 ML/MIN/1.73SQ M
GLUCOSE SERPL-MCNC: 98 MG/DL (ref 65–140)
HCT VFR BLD AUTO: 37 % (ref 36.5–49.3)
HGB BLD-MCNC: 11.9 G/DL (ref 12–17)
IMM GRANULOCYTES # BLD AUTO: 0.03 THOUSAND/UL (ref 0–0.2)
IMM GRANULOCYTES NFR BLD AUTO: 0 % (ref 0–2)
LYMPHOCYTES # BLD AUTO: 1.56 THOUSANDS/ÂΜL (ref 0.6–4.47)
LYMPHOCYTES NFR BLD AUTO: 15 % (ref 14–44)
MCH RBC QN AUTO: 30.7 PG (ref 26.8–34.3)
MCHC RBC AUTO-ENTMCNC: 32.2 G/DL (ref 31.4–37.4)
MCV RBC AUTO: 96 FL (ref 82–98)
MONOCYTES # BLD AUTO: 1.15 THOUSAND/ÂΜL (ref 0.17–1.22)
MONOCYTES NFR BLD AUTO: 11 % (ref 4–12)
NEUTROPHILS # BLD AUTO: 7.52 THOUSANDS/ÂΜL (ref 1.85–7.62)
NEUTS SEG NFR BLD AUTO: 72 % (ref 43–75)
NRBC BLD AUTO-RTO: 0 /100 WBCS
P AXIS: 83 DEGREES
PLATELET # BLD AUTO: 203 THOUSANDS/UL (ref 149–390)
PMV BLD AUTO: 9.3 FL (ref 8.9–12.7)
POTASSIUM SERPL-SCNC: 3.5 MMOL/L (ref 3.5–5.3)
PR INTERVAL: 182 MS
PROLACTIN SERPL-MCNC: 6.62 NG/ML
PROT SERPL-MCNC: 6.8 G/DL (ref 6.4–8.4)
QRS AXIS: 72 DEGREES
QRSD INTERVAL: 96 MS
QT INTERVAL: 412 MS
QTC INTERVAL: 460 MS
RBC # BLD AUTO: 3.87 MILLION/UL (ref 3.88–5.62)
SODIUM SERPL-SCNC: 136 MMOL/L (ref 135–147)
T WAVE AXIS: 106 DEGREES
VENTRICULAR RATE: 75 BPM
WBC # BLD AUTO: 10.42 THOUSAND/UL (ref 4.31–10.16)

## 2023-12-08 PROCEDURE — 95819 EEG AWAKE AND ASLEEP: CPT

## 2023-12-08 PROCEDURE — 97530 THERAPEUTIC ACTIVITIES: CPT

## 2023-12-08 PROCEDURE — 95812 EEG 41-60 MINUTES: CPT | Performed by: PSYCHIATRY & NEUROLOGY

## 2023-12-08 PROCEDURE — 70498 CT ANGIOGRAPHY NECK: CPT

## 2023-12-08 PROCEDURE — 97162 PT EVAL MOD COMPLEX 30 MIN: CPT

## 2023-12-08 PROCEDURE — 84146 ASSAY OF PROLACTIN: CPT | Performed by: STUDENT IN AN ORGANIZED HEALTH CARE EDUCATION/TRAINING PROGRAM

## 2023-12-08 PROCEDURE — 85025 COMPLETE CBC W/AUTO DIFF WBC: CPT | Performed by: STUDENT IN AN ORGANIZED HEALTH CARE EDUCATION/TRAINING PROGRAM

## 2023-12-08 PROCEDURE — 70496 CT ANGIOGRAPHY HEAD: CPT

## 2023-12-08 PROCEDURE — 70553 MRI BRAIN STEM W/O & W/DYE: CPT

## 2023-12-08 PROCEDURE — A9585 GADOBUTROL INJECTION: HCPCS | Performed by: STUDENT IN AN ORGANIZED HEALTH CARE EDUCATION/TRAINING PROGRAM

## 2023-12-08 PROCEDURE — 80053 COMPREHEN METABOLIC PANEL: CPT | Performed by: STUDENT IN AN ORGANIZED HEALTH CARE EDUCATION/TRAINING PROGRAM

## 2023-12-08 PROCEDURE — 92610 EVALUATE SWALLOWING FUNCTION: CPT

## 2023-12-08 PROCEDURE — 99223 1ST HOSP IP/OBS HIGH 75: CPT | Performed by: INTERNAL MEDICINE

## 2023-12-08 PROCEDURE — 99232 SBSQ HOSP IP/OBS MODERATE 35: CPT | Performed by: STUDENT IN AN ORGANIZED HEALTH CARE EDUCATION/TRAINING PROGRAM

## 2023-12-08 PROCEDURE — G1004 CDSM NDSC: HCPCS

## 2023-12-08 PROCEDURE — 99223 1ST HOSP IP/OBS HIGH 75: CPT | Performed by: PSYCHIATRY & NEUROLOGY

## 2023-12-08 PROCEDURE — 82010 KETONE BODYS QUAN: CPT | Performed by: STUDENT IN AN ORGANIZED HEALTH CARE EDUCATION/TRAINING PROGRAM

## 2023-12-08 RX ORDER — LEVETIRACETAM 500 MG/1
500 TABLET ORAL EVERY 12 HOURS SCHEDULED
Status: DISCONTINUED | OUTPATIENT
Start: 2023-12-08 | End: 2023-12-09 | Stop reason: HOSPADM

## 2023-12-08 RX ORDER — ATORVASTATIN CALCIUM 80 MG/1
80 TABLET, FILM COATED ORAL
Status: DISCONTINUED | OUTPATIENT
Start: 2023-12-08 | End: 2023-12-09 | Stop reason: HOSPADM

## 2023-12-08 RX ORDER — CLOPIDOGREL BISULFATE 75 MG/1
75 TABLET ORAL DAILY
Status: DISCONTINUED | OUTPATIENT
Start: 2023-12-09 | End: 2023-12-09 | Stop reason: HOSPADM

## 2023-12-08 RX ORDER — TORSEMIDE 20 MG/1
20 TABLET ORAL ONCE
Status: COMPLETED | OUTPATIENT
Start: 2023-12-08 | End: 2023-12-08

## 2023-12-08 RX ORDER — ASPIRIN 81 MG/1
81 TABLET, CHEWABLE ORAL DAILY
Status: DISCONTINUED | OUTPATIENT
Start: 2023-12-08 | End: 2023-12-09 | Stop reason: HOSPADM

## 2023-12-08 RX ORDER — GADOBUTROL 604.72 MG/ML
7 INJECTION INTRAVENOUS
Status: COMPLETED | OUTPATIENT
Start: 2023-12-08 | End: 2023-12-08

## 2023-12-08 RX ORDER — CLOPIDOGREL BISULFATE 75 MG/1
300 TABLET ORAL ONCE
Status: COMPLETED | OUTPATIENT
Start: 2023-12-08 | End: 2023-12-08

## 2023-12-08 RX ORDER — POTASSIUM CHLORIDE 20 MEQ/1
20 TABLET, EXTENDED RELEASE ORAL ONCE
Status: COMPLETED | OUTPATIENT
Start: 2023-12-08 | End: 2023-12-08

## 2023-12-08 RX ORDER — METOPROLOL TARTRATE 1 MG/ML
2.5 INJECTION, SOLUTION INTRAVENOUS EVERY 6 HOURS PRN
Status: DISCONTINUED | OUTPATIENT
Start: 2023-12-08 | End: 2023-12-09

## 2023-12-08 RX ORDER — ACETAMINOPHEN 325 MG/1
650 TABLET ORAL EVERY 6 HOURS PRN
Status: DISCONTINUED | OUTPATIENT
Start: 2023-12-08 | End: 2023-12-09 | Stop reason: HOSPADM

## 2023-12-08 RX ADMIN — GADOBUTROL 7 ML: 604.72 INJECTION INTRAVENOUS at 15:10

## 2023-12-08 RX ADMIN — TORSEMIDE 20 MG: 20 TABLET ORAL at 11:53

## 2023-12-08 RX ADMIN — LORAZEPAM 1 MG: 2 INJECTION INTRAMUSCULAR; INTRAVENOUS at 14:18

## 2023-12-08 RX ADMIN — ENOXAPARIN SODIUM 30 MG: 30 INJECTION SUBCUTANEOUS at 09:37

## 2023-12-08 RX ADMIN — LEVETIRACETAM 500 MG: 500 TABLET, FILM COATED ORAL at 13:30

## 2023-12-08 RX ADMIN — CLOPIDOGREL BISULFATE 300 MG: 75 TABLET ORAL at 16:55

## 2023-12-08 RX ADMIN — POTASSIUM CHLORIDE 20 MEQ: 1500 TABLET, EXTENDED RELEASE ORAL at 11:53

## 2023-12-08 RX ADMIN — LEVETIRACETAM 500 MG: 500 TABLET, FILM COATED ORAL at 20:03

## 2023-12-08 RX ADMIN — ATORVASTATIN CALCIUM 80 MG: 80 TABLET, FILM COATED ORAL at 16:55

## 2023-12-08 RX ADMIN — IOHEXOL 85 ML: 350 INJECTION, SOLUTION INTRAVENOUS at 10:42

## 2023-12-08 RX ADMIN — ASPIRIN 81 MG CHEWABLE TABLET 81 MG: 81 TABLET CHEWABLE at 13:30

## 2023-12-08 RX ADMIN — METOPROLOL SUCCINATE 25 MG: 25 TABLET, EXTENDED RELEASE ORAL at 09:36

## 2023-12-08 NOTE — PHYSICAL THERAPY NOTE
PHYSICAL THERAPY EVALUATION/TREATMENT     12/08/23 1345   PT Last Visit   PT Visit Date 12/08/23   Note Type   Note type Evaluation   Pain Assessment   Pain Assessment Tool 0-10   Restrictions/Precautions   Other Precautions Fall Risk   Home Living   Type of [address] Two level;1/2 bath on main level;Bed/bath upstairs  (few LAUREN)   Prior Function   Level of [place] Independent with ADLs; Independent with functional mobility   Lives With Spouse   Vocational Retired   [employer]   Additional Pertinent History Pt admitted with new onset seizure. Pt and wife report patient seems back to baseline. MRI brain pending. Family/Caregiver Present Yes  (wife)   Cognition   Overall Cognitive Status WFL   Arousal/Participation Alert   Orientation Level Oriented to situation;Oriented to place;Oriented to person;Disoriented to time   Subjective   Subjective "I was just sitting there and the next thing you know I am in the hospital"   RLE Assessment   RLE Assessment   (ROM WFL, MMT 4+/5)   LLE Assessment   LLE Assessment   (ROM WFL, MMT 4+/5)   Bed Mobility   Supine to Sit 5  Supervision   Sit to Supine 5  Supervision   Transfers   Sit to Stand 5  Supervision   Stand to Sit 5  Supervision   Stand pivot 5  Supervision   Ambulation/Elevation   Gait pattern WNL   Gait Assistance 5  Supervision   Assistive Device   (none)   Distance 50 feet   Balance   Static Sitting Good   Dynamic Sitting Fair +   Static Standing Good   Dynamic Standing Fair +   Ambulatory Fair +   Activity Tolerance   Activity Tolerance Patient tolerated treatment well   Assessment   Prognosis Good   Problem List Impaired balance;Decreased mobility   Assessment Patient is an 69y.o. year old male seen for Physical Therapy evaluation. Patient admitted with New onset seizure ([address]). Comorbidities affecting patient's physical performance include: CVA, chronic pain, anxiety, afib, htn.   Personal factors affecting patient at time of initial evaluation include: lives in 2 story house and inability to perform dynamic tasks in community. Prior to admission, patient was independent with functional mobility without assistive device and independent with ADLS. Please find objective findings from Physical Therapy assessment regarding body systems outlined above with impairments and limitations including decreased activity tolerance. The Barthel Index was used as a functional outcome tool presenting with a score of Barthel Index Score: 80 today indicating moderate limitations of functional mobility and ADLS. Patient's clinical presentation is currently evolving as seen in patient's presentation of increased fall risk and new onset of impairment of functional mobility. Pt would benefit from continued Physical Therapy treatment to address deficits as defined above and maximize level of functional mobility. As demonstrated by objective findings, the assigned level of complexity for this evaluation is moderate. The patient's [facility] Basic Mobility Inpatient Short Form Raw Score is 23. A Raw score of greater than 16 suggests the patient may benefit from discharge to home. Goals   Patient Goals "find out what happened to me"   STG Expiration Date 12/15/23   Short Term Goal #1 independent ambulation indoor level surfaces 300 feet with a steady gait and normal gait speed   LTG Expiration Date 12/22/23   Long Term Goal #1 independent up and down 10 steps with a step over step pattern, independent ambulation outdoor level surfaces 500 feet   Plan   Treatment/Interventions Elevations;Gait training; Therapeutic exercise   PT Frequency 3-5x/wk   Discharge Recommendation   Rehab Resource Intensity Level, PT No post-acute rehabilitation needs   [facility] Basic Mobility Inpatient   Turning in Flat Bed Without Bedrails 4   Lying on Back to Sitting on Edge of Flat Bed Without Bedrails 4   Moving Bed to Chair 4   Standing Up From Chair Using Arms 4   Walk in Room 4   Climb 3-5 Stairs With Railing 3   Basic Mobility Inpatient Raw Score 23   Basic Mobility Standardized Score 50.88   Highest Level Of Mobility   [facility] Goal 7: Walk 25 feet or more   Barthel Index   Feeding 10   Bathing 0   Grooming Score 5   Dressing Score 5   Bladder Score 10   Bowels Score 10   Toilet Use Score 10   Transfers (Bed/Chair) Score 15   Mobility (Level Surface) Score 10   Stairs Score 5   Barthel Index Score 80   Dynamic Gait Index   Gait level surface  3   Change in gait speed 3   Gait with horizontal head turns  3   Gait with vertical head turns  3   Gait and pivot turn 3   Step over obstacle 2   Step around obstacle 3   Steps 2   Total score  22   Additional Treatment Session   Start Time 1335   End Time 1345   Treatment Assessment S:  "I am feeling much better now than this morning" O:  Pt ambulated in the hallway with changes in gait speed, head turns, stop/starts, 180 degree turns with a steady gait x 100 feet with supervision assist. A:   Pt appears to be near his baseline level of function. P: Recommend pt ambulate with nursing or family supervision. End of Consult   Patient Position at End of Consult All needs within reach; Supine   Licensure   [place] License Number  [place] PT [id number]

## 2023-12-08 NOTE — PLAN OF CARE
Problem: PAIN - ADULT  Goal: Verbalizes/displays adequate comfort level or baseline comfort level  Description: Interventions:  - Encourage patient to monitor pain and request assistance  - Assess pain using appropriate pain scale  - Administer analgesics based on type and severity of pain and evaluate response  - Implement non-pharmacological measures as appropriate and evaluate response  - Consider cultural and social influences on pain and pain management  - Notify physician/advanced practitioner if interventions unsuccessful or patient reports new pain  Outcome: Progressing     Problem: INFECTION - ADULT  Goal: Absence or prevention of progression during hospitalization  Description: INTERVENTIONS:  - Assess and monitor for signs and symptoms of infection  - Monitor lab/diagnostic results  - Monitor all insertion sites, i.e. indwelling lines, tubes, and drains  - Monitor endotracheal if appropriate and nasal secretions for changes in amount and color  - [place] appropriate cooling/warming therapies per order  - Administer medications as ordered  - Instruct and encourage patient and family to use good hand hygiene technique  - Identify and instruct in appropriate isolation precautions for identified infection/condition  Outcome: Progressing  Goal: Absence of fever/infection during neutropenic period  Description: INTERVENTIONS:  - Monitor WBC    Outcome: Progressing     Problem: SAFETY ADULT  Goal: Patient will remain free of falls  Description: INTERVENTIONS:  - Educate patient/family on patient safety including physical limitations  - Instruct patient to call for assistance with activity   - Consult OT/PT to assist with strengthening/mobility   - Keep Call bell within reach  - Keep bed low and locked with side rails adjusted as appropriate  - Keep care items and personal belongings within reach  - Initiate and maintain comfort rounds  - Make Fall Risk Sign visible to staff  - Offer Toileting every 2 Hours, in advance of need  - Initiate/Maintain bed alarm  - Obtain necessary fall risk management equipment: yellow socks   - Apply yellow socks and bracelet for high fall risk patients  - Consider moving patient to room near nurses station  Outcome: Progressing  Goal: Maintain or return to baseline ADL function  Description: INTERVENTIONS:  -  Assess patient's ability to carry out ADLs; assess patient's baseline for ADL function and identify physical deficits which impact ability to perform ADLs (bathing, care of mouth/teeth, toileting, grooming, dressing, etc.)  - Assess/evaluate cause of self-care deficits   - Assess range of motion  - Assess patient's mobility; develop plan if impaired  - Assess patient's need for assistive devices and provide as appropriate  - Encourage maximum independence but intervene and supervise when necessary  - Involve family in performance of ADLs  - Assess for home care needs following discharge   - Consider OT consult to assist with ADL evaluation and planning for discharge  - Provide patient education as appropriate  Outcome: Progressing  Goal: Maintains/Returns to pre admission functional level  Description: INTERVENTIONS:  - Perform AM-PAC 6 Click Basic Mobility/ Daily Activity assessment daily.  - Set and communicate daily mobility goal to care team and patient/family/caregiver. - Collaborate with rehabilitation services on mobility goals if consulted  - Perform Range of Motion 3 times a day. - Reposition patient every 2 hours.   - Dangle patient 3 times a day  - Stand patient 3 times a day  - Ambulate patient 3 times a day  - Out of bed to chair 3 times a day   - Out of bed for meals 3 times a day  - Out of bed for toileting  - Record patient progress and toleration of activity level   Outcome: Progressing

## 2023-12-08 NOTE — ASSESSMENT & PLAN NOTE
Wt Readings from Last 3 Encounters:   12/07/23 65.8 kg (145 lb)   09/12/23 63.5 kg (139 lb 15.9 oz)   09/08/23 63.5 kg (139 lb 14.4 oz)     Reported LifeVest per chart review and wife's report noncompliant  Last TTE 6/13: EF 55%, mod to severe aortic stenosis, moderate insufficiency, repeat pending  BNP-356  CXR-pending  No home meds taken today  Does not look like an exacerbation  [facility]  Telemetry  Cardio following

## 2023-12-08 NOTE — CONSULTS
[facility],2[address] Floor   Neurology Initial Consult    Lelo Henriquez is a 69 y.o. male  [address] [facility]-*          Information obtained from:   Chief Complaint   Patient presents with    Altered Mental Status     Reportedly patient was sleeping late, wife woke up at 10 am, patient said he was still tired and wanted more sleep. Wife came later to wake him up, patient lethargic, irritable. Responsive to voice. 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 Placed vascular surgery consult  Adding plavix to his regimen for now. After intervention, he can remain on one AP therapy. His EEG shows rare spikes but with history of 3 seizures so far from July, it would be beneficial for patient to stay on seizure medication for prevention. No driving for 6 months as this is recurrent event. Keppra 500mg bid upon discharge long term. F/u with us in 10-12 weeks. Total time of encounter: 70 min  More than 50% of time was spent in counseling and coordination of care of patient. HPI:  Lelo Henriquez is a 70 yo M with PMH of CABG, aortic endocarditis s/p replacement presents with concern for seizure. Patient was found to be sleeping in late yesterday afternoon by his wife. When aroused, he was very lethargic and agitated. At 5:45pm, patient was witnessed in ED to have generalized tonic clonic seizure. Today wife states that based on what she saw yesterday, he had very similar event in July of this year but didn't think it was seizure then. Patient had 5 vessel CABG and aortic valve replacement for endocarditis in sept of this year. He is recovering but refuses to go for cardiac rehab. He is independent and functional at home.   We had asked ED staff to give iv keppra 1000mg once yesterday and start maintenance dose later. Past Medical History:   Diagnosis Date    Aortic stenosis     CAD (coronary artery disease), native coronary artery     CHF (congestive heart failure) (HCC)     COPD (chronic obstructive pulmonary disease) ([facility])     Discitis     Endocarditis        Past Surgical History:   Procedure Laterality Date    CARDIAC CATHETERIZATION N/A 8/24/2023    Procedure: Cardiac RHC/LHC; Surgeon: Sherley Menjivar MD;  Location: [facility];   Service: Cardiology    SKIN BIOPSY         No Known Allergies      Current Facility-Administered Medications:     acetaminophen (TYLENOL) tablet 650 mg, 650 mg, Oral, Q6H PRN, Phyllis Scherer MD    aspirin chewable tablet 81 mg, 81 mg, Oral, Daily, Gen Guadarrama PA-C, 81 mg at 12/08/23 1330    atorvastatin (LIPITOR) tablet 80 mg, 80 mg, Oral, Daily With William Sarabia PA-C    enoxaparin (LOVENOX) subcutaneous injection 30 mg, 30 mg, Subcutaneous, Q24H [address], Phyllis Scherer MD, 30 mg at 12/08/23 3284    levETIRAcetam (KEPPRA) tablet 500 mg, 500 mg, Oral, Q12H [address], Charmaine Higginbotham MD, 500 mg at 12/08/23 1330    LORazepam (ATIVAN) injection 2 mg, 2 mg, Intravenous, Q8H PRN, Phyllis Scherer MD    metoprolol (LOPRESSOR) injection 2.5 mg, 2.5 mg, Intravenous, Q6H PRN, Phyllis Scherer MD    metoprolol succinate (TOPROL-XL) 24 hr tablet 25 mg, 25 mg, Oral, Daily, Phyllis Scherer MD, 25 mg at 12/08/23 0936    ondansetron (ZOFRAN) injection 4 mg, 4 mg, Intravenous, Q6H PRN, Phyllis Scherer MD    Social History     Socioeconomic History    Marital status: /Civil Union     Spouse name: Not on file    Number of children: Not on file    Years of education: Not on file    Highest education level: Not on file   Occupational History    Not on file   Tobacco Use    Smoking status: Former     Packs/day: 0.50     Years: 20.00     Total pack years: 10.00     Types: Cigarettes     Quit date: 3/11/2005 Years since quittin.7    Smokeless tobacco: Never   Vaping Use    Vaping Use: Never used   Substance and Sexual Activity    Alcohol use: Not Currently    Drug use: Not Currently    Sexual activity: Not on file   Other Topics Concern    Not on file   Social History Narrative    Not on file     Social Determinants of Health     Financial Resource Strain: Not on file   Food Insecurity: No Food Insecurity (2023)    Hunger Vital Sign     Worried About Running Out of Food in the Last Year: Never true     Ran Out of Food in the Last Year: Never true   Transportation Needs: No Transportation Needs (2023)    PRAPARE - Transportation     Lack of Transportation (Medical): No     Lack of Transportation (Non-Medical): No   Physical Activity: Not on file   Stress: Not on file   Social Connections: Not on file   Intimate Partner Violence: Not on file   Housing Stability: Low Risk  (2023)    Housing Stability Vital Sign     Unable to Pay for Housing in the Last Year: No     Number of Places Lived in the Last Year: 1     Unstable Housing in the Last Year: No       Family History   Problem Relation Age of Onset    Colon cancer Mother     Lung cancer Father     Mental illness Neg Hx          Review of systems:  Please see HPI for positive symptoms. No fever, no chills, no weight change. Ocular: No drainage, no blurred vision. HEENT:  No sore throat, earache, or congestion. No neck pain. COR:  No chest pain. No palpitations. Lungs:  no sob, wheezing,  GI:  no  nausea, no vomiting, no diarrhea, no constipation, no anorexia. :  No dysuria, frequency, or urgency. No hematuria. Musculoskeletal:  No joint pain or swelling or edema. Skin:  No rash or itching. Psychiatric:  no anxiety, no depression. Endocrine:  No polyuria or polydipsia.     Physical examination:  Vitals:    23 0924   BP: (!) 111/49   Pulse: 69   Resp: 18   Temp: 97.7 °F (36.5 °C)   SpO2: 96%       GENERAL APPEARANCE:  The patient is alert, oriented. He is in no acute distress. HEENT:  Head is normocephalic. The sinuses are otherwise nontender. Pupils are equal and reactive. NECK:  Supple without lymphadenopathy. HEART:  Regular rate and rhythm. LUNGS:  clear to auscultation. No crackles or wheezes are heard. ABDOMEN:  Soft, nontender, nondistended with good bowel sounds heard. EXTREMITIES:  Without cyanosis, clubbing or edema. Mental status: The patient is alert, attentive, and oriented. Speech is clear and fluent, good repetition, comprehension, and naming. he recalls 2/3 objects at 5 minutes. Cranial nerves:  CN II: Visual fields are full to confrontation. Fundoscopic exam is normal with sharp discs and no vascular changes. . Pupils are 3 mm and reactive to light. CN III, IV, VI: At primary gaze, there is no eye deviation. CN V: Facial sensation is intact to pinprick in all 3 divisions bilaterally. Corneal responses are intact. CN VII: Face is symmetric with normal eye closure and smile. CN VIII: Hearing is normal to rubbing fingers  CN IX, X: Palate elevates symmetrically. Phonation is normal.  CN XI: Head turning and shoulder shrug are intact  CN XII: Tongue is midline with normal movements and no atrophy. Motor: There is no pronator drift of out-stretched arms. Muscle bulk and tone are normal.     Muscle exam  Arm Right Left Leg Right Left   Deltoid 5/5 5/5 Iliopsoas 5/5 5/5   Biceps 5/5 5/5 Quads 5/5 5/5   Triceps 5/5 5/5 Hamstrings 5/5 5/5   Wrist Extension 5/5 5/5 Ankle Dorsi Flexion 5/5 5/5   Wrist Flexion 5/5 5/5 Ankle Plantar Flexion 5/5 5/5   Interossei 5/5 5/5 Ankle Eversion 5/5 5/5   APB 5/5 5/5 Ankle Inversion 5/5 5/5       Reflexes   RJ BJ TJ KJ AJ Plantars Sparks's   Right 2+ 2+ 2+ 2+  Downgoing Not present   Left 2+ 2+ 2+ 2+  Downgoing Not present     Sensory:  Light touch, pinprick, position sense, and vibration sense are intact in fingers and toes.   Coordination:  Rapid alternating movements and fine finger movements are intact. There is no dysmetria on finger-to-nose and heel-knee-shin. There are no abnormal or extraneous movements. Romberg negative. Gait/Stance:  Normal     Lab Results   Component Value Date    WBC 10.42 (H) 12/08/2023    HGB 11.9 (L) 12/08/2023    HCT 37.0 12/08/2023    MCV 96 12/08/2023     12/08/2023     Lab Results   Component Value Date    HGBA1C 5.8 (H) 12/07/2023     Lab Results   Component Value Date    ALT 13 12/08/2023    AST 25 12/08/2023    ALKPHOS 56 12/08/2023    BILITOT 0.2 05/04/2017     Lab Results   Component Value Date    GLUCOSE 104 (H) 05/04/2017    CALCIUM 9.2 12/08/2023     05/04/2017    K 3.5 12/08/2023    CO2 22 12/08/2023     12/08/2023    BUN 23 12/08/2023    CREATININE 1.04 12/08/2023         Radiology          Review of reports and notes reveal:  XR chest 1 view portable    Result Date: 12/8/2023  No acute cardiopulmonary disease. Workstation performed: [id number]     Independent Interpretation of images or specimens:  CTA head and neck with and without contrast    Result Date: 12/8/2023  CT HEAD - No acute intracranial abnormality. 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 Consider follow-up with ENT. CTA HEAD AND NECK - Negative CTA head and neck for large vessel occlusion, dissection, or aneurysm. - Severe stenosis (approximately 80% narrowing) in right ICA proximal cervical segment due to calcified atherosclerotic disease. - Severe stenosis in origin of left vertebral artery due to calcified atherosclerotic disease. Small micronodular opacities in visualized right upper lobe, likely infectious/inflammatory. Consider follow up CT chest in 3 months.  Mildly prominent mediastinal and right hilar lymph nodes, which may be reactive, similar to CT chest abdomen pelvis 5/16/2023. Attention on follow-up. The study was marked in [facility] for immediate notification. Workstation performed: [id number]     EEG: rare right frontotemporal region sharps. CT head without contrast    Result Date: 12/7/2023  No acute intracranial abnormality. Workstation performed: [id number]                Thank you for this consult. Delia Puente M.D.   Ellard Romberg [facility]  [address]

## 2023-12-08 NOTE — SPEECH THERAPY NOTE
Speech-Language Pathology Bedside Swallow Evaluation      Patient Name: Bella Rodriguez    [id number] Date: 12/8/2023     Problem List  Principal Problem:    Seizure-like activity ([address])  Active Problems:    Mixed hyperlipidemia    Primary hypertension    Paroxysmal atrial fibrillation (HCC)    Non-ischemic myocardial injury (non-traumatic)    History of Acute bacterial endocarditis    Chronic heart failure ([address])    Coronary artery disease involving native coronary artery    Prediabetes      Past Medical History  Past Medical History:   Diagnosis Date    Aortic stenosis     CAD (coronary artery disease), native coronary artery     CHF (congestive heart failure) (HCC)     COPD (chronic obstructive pulmonary disease) ([address])     Discitis     Endocarditis        Past Surgical History  Past Surgical History:   Procedure Laterality Date    CARDIAC CATHETERIZATION N/A 8/24/2023    Procedure: Cardiac RHC/LHC; Surgeon: Bibiana Sykes MD;  [facility]; Service: Cardiology    SKIN BIOPSY         Summary   Pt presented as edentulous but with functional appearing oral and pharyngeal stage swallowing skills with materials administered today (thin liquid, puree, and soft/solid food). Recommended Diet: soft/level 3 diet and thin liquids   Recommended Form of Meds: whole with liquid (prefers to take pills with milk/jono milk)  Aspiration precautions: monitor meals given recent seizure        Current Medical Status  Bella Rodriguez is a 69 y.o. male with a PMH of AV strep mitis IE, 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, CAD s/p 3 vessel CABG, and hyperlipidemia, prediabetes risk who presented 12/7 with seizure-like activity. Wife reported prior seizure like activity in July and prior  Av valve replacement in September, pt recent valve replacement and the CABG x3 in September through [facility] and was on antibiotics for 6 weeks prior and 6 weeks post-op; completion of antibiotics 3 weeks ago.  Patient wife stated that as she reflects he may have had couple episodes over last week. DX:  Seizure-like activity ([address])  ED: Loading dose Keppra 1000 mg, Ativan 1 mg  CT head in the ED: No acute intracranial abnormality. MRI of the brain w/o- pending  CTA head and neck w/contrast -pending  EEG-pending  Telemetry-monitor for arrhythmias. Prolactin-pending  UDS- pending  HbA1c- 6.2%, TSH -pending  Etoh-wnl  Ativan as needed  neuro-check q4h  PT/OT consult  Bedside swallow evaluation  Seizure/fall precautions  Neuro consult  Non-ischemic myocardial injury (non-traumatic)  Coronary artery disease involving native coronary artery  Chronic heart failure ([address])  History of Acute bacterial endocarditis  Assessment & Plan  Recent streptococcus bacteremia and endocarditis in May. S/p 6-12 week antibiotic treatment. Source likely odontogenic. Has since had dental extractions in anticipation of bioprosthetic AVR and CABG sequentially  Followed by outpatient cardiology  Echo-pending     Paroxysmal atrial fibrillation ([address])   Primary hypertension  Mixed hyperlipidemia    Pt is NPO and SLP Swallowing Evaluation ordered at this time. Current Precautions:  Fall, Seizure and NPO    Allergies:  No known food allergies    Past medical history:  Please see H&P for details    Special Studies:  MRI pending  12/7 CT of head: No acute intracranial abnormality. 12/7 CXR: No acute cardiopulmonary disease. Social/Education/Vocational Hx:  Pt lives with family/spouse. SPouse and son present for evaluation today.      Swallow Information   Current Risks for Dysphagia: ?post ictal MS changes, pt edentulous  Current Diet: NPO   Baseline Diet: soft/level 3 diet, regular diet, and thin liquids      Baseline Assessment   Behavior/Cognition: alert  Speech/Language Status: able to follow commands, able to participate in basic conversation, no s/s dysarthria  Patient Positioning: upright in bed  Pain Status/Interventions/Response to Interventions:  No report of or nonverbal indications of pain. Swallow Mechanism Exam  Facial: symmetrical  Labial: WFL  Lingual: WFL  Velum: symmetrical  Mandible: adequate ROM  Dentition: edentulous  Vocal quality:clear/adequate   Respiratory Status: on RA      Consistencies Assessed and Performance   Materials administered included thin liquid, appledauce, kenzie crackers and small bites of Aditi Doone cookie in applesauce    Oral Stage:   Mastication was adequate with the materials administered today. Bolus formation and transfer were functional with no significant oral residue noted. No overt s/s reduced oral control. Pharyngeal Stage:   Swallow Mechanics:  Swallowing initiation appeared prompt. Laryngeal rise was palpated and judged to be within functional limits. No coughing, change in vocal quality or respiratory status noted today. Throat clearing x1 noted with moist cookie. Pt admitted to occasions of pills sticking in throat (anurag if larger) and stated preference to take pills with milk or ensure like drink (but took 1 small pill with water with no overt s/s)    Esophageal Concerns: none reported    Summary and Recommendations (see above)    Results Reviewed with: patient, RN, and family     Treatment Recommended: f/u briefly to ensure tolerance of po materials over time given recent seizure    Dysphagia LTG  -Patient will demonstrate safe and effective oral intake (without overt s/s significant oral/pharyngeal dysphagia including s/s penetration or aspiration) for the highest appropriate diet level. Short Term Goals:    -Pt will tolerate Dysphagia 3/advanced (dental soft) diet, thin liquid  and pills with no significant s/s oral or pharyngeal dysphagia across 1-3 diagnostic session/s    Thank you for this referral.  Please do not hesitate to contact me with any questions or concerns.     Yamini [facility]   Speech Pathologist  [place] License [id number]  [place] License [id number]  Available via [facility] Text

## 2023-12-08 NOTE — ASSESSMENT & PLAN NOTE
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   Continue aspirin and high-dose statin

## 2023-12-08 NOTE — PROGRESS NOTES
Patient came up to floor agitated, pulled telemetry off multiple times, attempted blood work but patient refused

## 2023-12-08 NOTE — PLAN OF CARE
Problem: Nutrition  Goal: Nutrition/Hydration status is improving  Description: Monitor and assess patient's nutrition/hydration status for malnutrition (ex- brittle hair, bruises, dry skin, pale skin and conjunctiva, muscle wasting, smooth red tongue, and disorientation). Collaborate with interdisciplinary team and initiate plan and interventions as ordered. Monitor patient's weight and dietary intake as ordered or per policy. Utilize nutrition screening tool and intervene per policy. Determine patient's food preferences and provide high-protein, high-caloric foods as appropriate. - Assist patient with eating.  - Allow adequate time for meals.  - Encourage patient to take dietary supplement as ordered. - Collaborate with clinical nutritionist.  - Include patient/family/caregiver in decisions related to nutrition. Outcome: Progressing     Problem: Potential for Aspiration  Goal: Non-ventilated patient's risk of aspiration is minimized  Description: Assess and monitor vital signs, respiratory status, and labs (WBC). Monitor for signs of aspiration (tachypnea, cough, rales, wheezing, cyanosis, fever). - Assess and monitor patient's ability to swallow. - Place patient up in chair to eat if possible. - HOB up at 90 degrees to eat if unable to get patient up into chair.  - Supervise patient during oral intake. - Instruct patient/ family to take small bites. - Instruct patient/ family to take small single sips when taking liquids. - Follow patient-specific strategies generated by speech pathologist.  Outcome: Progressing     Problem: Communication Impairment  Goal: Ability to express needs and understand communication  Description: Assess patient's communication skills and ability to understand information. Patient will demonstrate use of effective communication techniques, alternative methods of communication and understanding even if not able to speak.      - Encourage communication and provide alternate methods of communication as needed. - Collaborate with case management/ for discharge needs. - Include patient/family/caregiver in decisions related to communication. Outcome: Progressing     Problem: Activity Intolerance/Impaired Mobility  Goal: Mobility/activity is maintained at optimum level for patient  Description: Interventions:  - Assess and monitor patient  barriers to mobility and need for assistive/adaptive devices. - Assess patient's emotional response to limitations. - Collaborate with interdisciplinary team and initiate plans and interventions as ordered. - Encourage independent activity per ability.  - Maintain proper body alignment. - Perform active/passive rom as tolerated/ordered. - Plan activities to conserve energy.  - Turn patient as appropriate  Outcome: Progressing     Problem: Neurological Deficit  Goal: Neurological status is stable or improving  Description: Interventions:  - Monitor and assess patient's level of consciousness, motor function, sensory function, and level of assistance needed for ADLs. - Monitor and report changes from baseline. Collaborate with interdisciplinary team to initiate plan and implement interventions as ordered. - Provide and maintain a safe environment. - Consider seizure precautions. - Consider fall precautions. - Consider aspiration precautions. - Consider bleeding precautions.   Outcome: Progressing

## 2023-12-08 NOTE — UTILIZATION REVIEW
Initial Clinical Review    Admission: Date/Time/Statement:   Admission Orders (From admission, onward)       Ordered        12/07/23 1613  INPATIENT ADMISSION  Once                          Orders Placed This Encounter   Procedures    INPATIENT ADMISSION     Standing Status:   Standing     Number of Occurrences:   1     Order Specific Question:   Level of Care     Answer:   Med Surg [16]     Order Specific Question:   Estimated length of stay     Answer:   More than 2 Midnights     Order Specific Question:   Certification     Answer:   I certify that inpatient services are medically necessary for this patient for a duration of greater than two midnights. See H&P and MD Progress Notes for additional information about the patient's course of treatment. ED Arrival Information       Expected   -    Arrival   12/7/2023 11:35    Acuity   Urgent              Means of arrival   Ambulance    Escorted by   [facility], [facility]    Admission type   Emergency              Arrival complaint   AMS             Chief Complaint   Patient presents with    Altered Mental Status     Reportedly patient was sleeping late, wife woke up at 10 am, patient said he was still tired and wanted more sleep. Wife came later to wake him up, patient lethargic, irritable. Responsive to voice. Initial Presentation:   69 yom to ER from home via EMS with diffculty to arouse, then jerking, urination, foaming. Hx recent aortic valve replacement in September, with history of coronary artery disease CHF endocarditis and COPD. Presents lethargic after Lorazepam by EMS. While in ER, patient had slight postictal presentation became more alert, able to walk around and use the restroom and communicate with staff, later nurse reported patient had a witnessed seizure with enuresis around 4 PM and given antiepileptics. Admission work-up showing elevated BNP, +UDS.   Admitted to inpatient status for seizure-like activity    Date: 12/8/23   Day 2:   Oriented to person & time, impulsive, confused. No further seizures noted. Cardio & neuro consulted. MRI ordered. Per cardio: combined CHF, PAF  [id number] stroke risk score: 4 points, moderate-high risk. TTE ordered, weights, I&O.        ED Triage Vitals   Temperature Pulse Respirations Blood Pressure SpO2   12/07/23 1145 12/07/23 1145 12/07/23 1145 12/07/23 1145 12/07/23 1145   (!) 97.3 °F (36.3 °C) 83 20 127/61 94 %      Temp Source Heart Rate Source Patient Position - Orthostatic VS BP Location FiO2 (%)   12/07/23 1145 12/07/23 1145 12/07/23 1145 12/07/23 1145 --   Oral Monitor Lying Right arm       Pain Score       12/07/23 1841       No Pain          Wt Readings from Last 1 Encounters:   12/07/23 65.8 kg (145 lb)     Additional Vital Signs:   Date/Time Temp Pulse Resp BP MAP (mmHg) SpO2 O2 Device Patient Position - Orthostatic VS   12/08/23 09:24:20 97.7 °F (36.5 °C) 69 18 111/49 Abnormal  70 96 % -- --   12/08/23 09:22:53 -- 77 18 111/49 Abnormal  70 96 % -- --   12/08/23 03:11:51 98.1 °F (36.7 °C) 65 18 141/50 80 96 % -- --   12/07/23 21:54:15 -- 78 18 113/56 75 99 % -- --   12/07/23 18:13:42 97.2 °F (36.2 °C) Abnormal  99 16 137/99 112 96 % -- --   12/07/23 1755 99.1 °F (37.3 °C) -- 18 -- -- -- -- --   12/07/23 1741 -- 93 -- 177/81 Abnormal  116 97 % -- --   12/07/23 1726 -- 96 -- -- -- 96 % -- --   12/07/23 1711 -- 99 22 157/66 95 96 % -- --   12/07/23 1656 -- 97 20 -- -- 96 % -- --   12/07/23 1645 -- 100 -- 142/73 102 97 % -- --   12/07/23 1600 -- 106 Abnormal  20 214/97 Abnormal  139 97 % -- --   12/07/23 1530 -- 76 -- 152/70 101 -- -- --   12/07/23 1500 -- 75 20 173/75 Abnormal  108 95 % None (Room air) --   12/07/23 1430 -- 78 18 169/75 108 97 % -- --   12/07/23 1330 -- 73 18 180/77 Abnormal  110 99 % None (Room air) --   12/07/23 1145 97.3 °F (36.3 °C) Abnormal  83 20 127/61 -- 94 % None (Room air) Lying     Pertinent Labs/Diagnostic Test Results:   12/8 Echo=  Results pending    CTA head and neck with and without contrast   Final Result (12/08 1145)      CT HEAD   - No acute intracranial abnormality.   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 Consider follow-up with ENT. CTA HEAD AND NECK   - Negative CTA head and neck for large vessel occlusion, dissection, or aneurysm. - Severe stenosis (approximately 80% narrowing) in right ICA proximal cervical segment due to calcified atherosclerotic disease.   - Severe stenosis in origin of left vertebral artery due to calcified atherosclerotic disease. Small micronodular opacities in visualized right upper lobe, likely infectious/inflammatory. Consider follow up CT chest in 3 months. Mildly prominent mediastinal and right hilar lymph nodes, which may be reactive, similar to CT chest abdomen pelvis 5/16/2023. Attention on follow-up. CT head without contrast   Final Result (12/07 1431)      No acute intracranial abnormality. XR chest 1 view portable   Final Result  (12/08 1310)      No acute cardiopulmonary disease.          MRI brain seizure wo and w contrast    (Results Pending)     12/7 EKG=  Rhythm: sinus rhythm    Ectopy:     Ectopy: none    QRS:     QRS axis:  Normal     QRS intervals:  Normal   Conduction:     Conduction: normal    ST segments:     ST segments:  Normal   T waves:     T waves: normal    Q waves:     Q waves:  II, III and aVF   Other findings:     Other findings: LAE       Results from last 7 days   Lab Units 12/08/23  0425 12/07/23  1204   WBC Thousand/uL 10.42* 7.69   HEMOGLOBIN g/dL 11.9* 13.0   HEMATOCRIT % 37.0 39.9   PLATELETS Thousands/uL 203 222   NEUTROS ABS Thousands/µL 7.52 5.94     Results from last 7 days   Lab Units 12/08/23  0425 12/07/23  1204   SODIUM mmol/L 136 137   POTASSIUM mmol/L 3.5 4.1   CHLORIDE mmol/L 107 104   CO2 mmol/L 22 21   ANION GAP mmol/L 7 12   BUN mg/dL 23 36*   CREATININE mg/dL 1.04 1.25   EGFR ml/min/1.73sq m 68 55   CALCIUM mg/dL 9.2 9.2     Results from last 7 days   Lab Units 12/08/23  0425 12/07/23  1204   AST U/L 25 18   ALT U/L 13 16   ALK PHOS U/L 56 57   TOTAL PROTEIN g/dL 6.8 6.6   ALBUMIN g/dL 4.0 4.0   TOTAL BILIRUBIN mg/dL 0.51 0.34     Results from last 7 days   Lab Units 12/07/23  1138   POC GLUCOSE mg/dl 145*     Results from last 7 days   Lab Units 12/08/23  0425 12/07/23  1204   GLUCOSE RANDOM mg/dL 98 123     Results from last 7 days   Lab Units 12/07/23  1754   HEMOGLOBIN A1C % 5.8*   EAG mg/dl 120     BETA-HYDROXYBUTYRATE   Date Value Ref Range Status   12/08/2023 0.3 <0.6 mmol/L Final      Results from last 7 days   Lab Units 12/07/23  1559 12/07/23  1419 12/07/23  1204   HS TNI 0HR ng/L  --   --  24   HS TNI 2HR ng/L  --  40  --    HSTNI D2 ng/L  --  16  --    HS TNI 4HR ng/L 68*  --   --    HSTNI D4 ng/L 44*  --   --      Results from last 7 days   Lab Units 12/07/23  1204   PROTIME seconds 13.1   INR  0.97   PTT seconds 32     Results from last 7 days   Lab Units 12/07/23  1204   TSH 3RD GENERATON uIU/mL 2.020     Results from last 7 days   Lab Units 12/08/23  0428   PROLACTIN ng/mL 6.62     Results from last 7 days   Lab Units 12/07/23  1204   BNP pg/mL 356*     Results from last 7 days   Lab Units 12/07/23  1355   CLARITY UA  Clear   COLOR UA  Light Yellow   SPEC GRAV UA  >=1.030   PH UA  5.0   GLUCOSE UA mg/dl >=1000 (1%)*   KETONES UA mg/dl Negative   BLOOD UA  Negative   PROTEIN UA mg/dl Trace*   NITRITE UA  Negative   BILIRUBIN UA  Negative   UROBILINOGEN UA E.U./dl 0.2   LEUKOCYTES UA  Elevated glucose may cause decreased leukocyte values.  See urine microscopic for UWBC result*   WBC UA /hpf 0-1   RBC UA /hpf 0-1   BACTERIA UA /hpf Occasional   EPITHELIAL CELLS WET PREP /hpf None Seen Results from last 7 days   Lab Units 12/07/23  1355   AMPH/METH  Negative   BARBITURATE UR  Negative   BENZODIAZEPINE UR  Negative   COCAINE UR  Negative   METHADONE URINE  Negative   OPIATE UR  Negative   PCP UR  Negative   THC UR  Positive*     Results from last 7 days   Lab Units 12/07/23  1204   ETHANOL LVL mg/dL <10   ACETAMINOPHEN LVL ug/mL <2*   SALICYLATE LVL mg/dL <5     ED Treatment:   Medication Administration from 12/07/2023 1135 to 12/07/2023 1809         Date/Time Order Dose Route Action     12/07/2023 1251 EST sodium chloride 0.9 % bolus 1,000 mL 1,000 mL Intravenous New Bag     12/07/2023 1547 EST LORazepam (ATIVAN) 2 mg/mL injection **ADS Override Pull** 1 mg  Given     12/07/2023 1647 EST levETIRAcetam (KEPPRA) 1,000 mg in sodium chloride 0.9 % 100 mL IVPB 1,000 mg Intravenous New Bag     12/07/2023 1645 EST LORazepam (ATIVAN) injection 1 mg 1 mg Intravenous Given          Past Medical History:   Diagnosis Date    Aortic stenosis     CAD (coronary artery disease), native coronary artery     CHF (congestive heart failure) ([facility])     COPD (chronic obstructive pulmonary disease) ([address])     Discitis     Endocarditis      Present on Admission:   Mixed hyperlipidemia   Primary hypertension   Paroxysmal atrial fibrillation ([facility])   Non-ischemic myocardial injury (non-traumatic)   Coronary artery disease involving native coronary artery   History of Acute bacterial endocarditis   Chronic heart failure ([facility])      Admitting Diagnosis: Altered mental status [R41.82]  New onset seizure ([address]) [R56.9]  AMS (altered mental status) [R41.82]  Age/Sex: 69 y.o. male  Admission Orders:  Seizure precautions  Consult cardio  Telemetry  Neuro checks/VS q4h x24hrs  Speech bedside swallow eval & tx  Consult neuro    Scheduled Medications:  enoxaparin, 30 mg, Subcutaneous, Q24H [facility] & half-way  metoprolol succinate, 25 mg, Oral, Daily    PRN Meds:  LORazepam, 1 mg, Intravenous, Q6H PRN  LORazepam, 2 mg, Intravenous, Q8H PRN  metoprolol, 2.5 mg, Intravenous, Q6H PRN  ondansetron, 4 mg, Intravenous, Q6H PRN    Network Utilization Review Department  ATTENTION: Please call with any questions or concerns to [phone] and carefully listen to the prompts so that you are directed to the right person. All voicemails are confidential.   For Discharge needs, contact Care Management DC Support Team at [phone] opt. [phone]  Send all requests for admission clinical reviews, approved or denied determinations and any other requests to dedicated fax number below belonging to the campus where the patient is receiving treatment.  List of dedicated fax numbers for the Facilities:  [place] DENIALS (Administrative/Medical Necessity) [phone]   DISCHARGE SUPPORT TEAM (NETWORK) [id number] [address] (Maternity/NICU/Pediatrics) [phone]   [address] [facility] 1000 [facility] [phone]   1505 [facility] 207 [facility] [address] [address] [facility] [address] 1300 [facility] [id number] [place] [address] [phone]

## 2023-12-08 NOTE — ASSESSMENT & PLAN NOTE
Minimally responsive PTA, witnessed seizure in ED with enuresis. Multiple seizure-like episodes, increased frequency per wife report now after reflection and visualizing an actual seizure    ED: Loading dose Keppra 1000 mg, Ativan 1 mg  CT head in the ED: No acute intracranial abnormality. 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 CTA H/N w/contrast: Negative CTA head and neck for large vessel occlusion, dissection, or aneurysm. Severe stenosis (approximately 80% narrowing) in right ICA proximal cervical segment due to calcified atherosclerotic disease. Severe stenosis in origin of left vertebral artery due to calcified atherosclerotic disease. MRI of the brain:  EEG-pending  Telemetry-monitor for arrhythmias.   Prolactin-WNL  UDS-THC  HbA1c- 6.2%, TSH -WNL  Etoh-wnl  Ativan as needed  neuro-check q4h  PT/OT consult  Bedside swallow evaluation  Seizure/fall precautions  Neuro consulted

## 2023-12-08 NOTE — ASSESSMENT & PLAN NOTE
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  Consider follow-up with ENT

## 2023-12-08 NOTE — PLAN OF CARE
Problem: PHYSICAL THERAPY ADULT  Goal: Performs mobility at highest level of function for planned discharge setting. See evaluation for individualized goals. Description: Treatment/Interventions: Elevations, Gait training, Therapeutic exercise          See flowsheet documentation for full assessment, interventions and recommendations. Note: Prognosis: Good  Problem List: Impaired balance, Decreased mobility  Assessment: Patient is an 69y.o. year old male seen for Physical Therapy evaluation. Patient admitted with New onset seizure ([address] Comorbidities affecting patient's physical performance include: CVA, chronic pain, anxiety, afib, htn. Personal factors affecting patient at time of initial evaluation include: lives in 2 story house and inability to perform dynamic tasks in community. Prior to admission, patient was independent with functional mobility without assistive device and independent with ADLS. Please find objective findings from Physical Therapy assessment regarding body systems outlined above with impairments and limitations including decreased activity tolerance. The Barthel Index was used as a functional outcome tool presenting with a score of Barthel Index Score: 80 today indicating moderate limitations of functional mobility and ADLS. Patient's clinical presentation is currently evolving as seen in patient's presentation of increased fall risk and new onset of impairment of functional mobility. Pt would benefit from continued Physical Therapy treatment to address deficits as defined above and maximize level of functional mobility. As demonstrated by objective findings, the assigned level of complexity for this evaluation is moderate. The patient's -[facility] Basic Mobility Inpatient Short Form Raw Score is 23. A Raw score of greater than 16 suggests the patient may benefit from discharge to home.         Rehab Resource Intensity Level, PT: No post-acute rehabilitation needs    See flowsheet documentation for full assessment.

## 2023-12-08 NOTE — ASSESSMENT & PLAN NOTE
CTA upper lung field:Small micronodular opacities in visualized right upper lobe, likely infectious/inflammatory. Consider follow up CT chest in 3 months.

## 2023-12-08 NOTE — PLAN OF CARE
Problem: NEUROSENSORY - ADULT  Goal: Achieves stable or improved neurological status  Description: INTERVENTIONS  - Monitor and report changes in neurological status  - Monitor vital signs such as temperature, blood pressure, glucose, and any other labs ordered   - Initiate measures to prevent increased intracranial pressure  - Monitor for seizure activity and implement precautions if appropriate      Outcome: Progressing     Problem: NEUROSENSORY - ADULT  Goal: Achieves maximal functionality and self care  Description: INTERVENTIONS  - Monitor swallowing and airway patency with patient fatigue and changes in neurological status  - Encourage and assist patient to increase activity and self care.    - Encourage visually impaired, hearing impaired and aphasic patients to use assistive/communication devices  Outcome: Progressing

## 2023-12-08 NOTE — ASSESSMENT & PLAN NOTE
Slight elevation in troponins     Hx of PAF, CAD, s/p CABGx3, AV valve replacement per chart review  Delta after 4h=44  EKG: NSR, LAE, T wave changes resolved  Likely 2/2 possible seizure like activity  Tele  Will monitor  Cardiology following

## 2023-12-08 NOTE — ASSESSMENT & PLAN NOTE
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

## 2023-12-08 NOTE — PROGRESS NOTES
[address]  Progress Note  Name: Manuel Montes  MRN: [id number]  Unit/Bed#: 913 [facility] 404-01 I Date of Admission: 12/7/2023   Date of Service: 12/8/2023 I Hospital Day: 1    Assessment/Plan   * New onset seizure [facility])  Assessment & Plan  Minimally responsive PTA, witnessed seizure in ED with enuresis. Multiple seizure-like episodes, increased frequency per wife report now after reflection and visualizing an actual seizure    ED: Loading dose Keppra 1000 mg, Ativan 1 mg  CT head in the ED: No acute intracranial abnormality. 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 CTA H/N w/contrast: Negative CTA head and neck for large vessel occlusion, dissection, or aneurysm. Severe stenosis (approximately 80% narrowing) in right ICA proximal cervical segment due to calcified atherosclerotic disease. Severe stenosis in origin of left vertebral artery due to calcified atherosclerotic disease. MRI of the brain:  EEG-pending  Telemetry-monitor for arrhythmias. Prolactin-WNL  UDS-THC  HbA1c- 6.2%, TSH -WNL  Etoh-wnl  Ativan as needed  neuro-check q4h  PT/OT consult  Bedside swallow evaluation  Seizure/fall precautions  Neuro consulted          Non-ischemic myocardial injury (non-traumatic)  Assessment & Plan  Slight elevation in troponins     Hx of PAF, CAD, s/p CABGx3, AV valve replacement per chart review  Delta after 4h=44  EKG: NSR, LAE, T wave changes resolved  Likely 2/2 possible seizure like activity  Tele  Will monitor  Cardiology following      Abnormal finding on imaging  Assessment & Plan  CTA upper lung field:Small micronodular opacities in visualized right upper lobe, likely infectious/inflammatory. Consider follow up CT chest in 3 months.     History of stroke  Assessment & Plan  Noted on imaging during his IP admission    CTA Chronic small infarcts in right frontal lobe and bilateral caudates with mild chronic microangiopathy. 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 Consider follow-up with ENT     Prediabetes  Assessment & Plan  Improved  Last A1c 5.8%  UA: Glucosuria greater than thousand  Diet controlled    Coronary artery disease involving native coronary artery  Assessment & Plan  Continue aspirin, statin  Followed by cardiology [facility] and [place]    Chronic heart failure [facility])  Assessment & Plan  Wt Readings from Last 3 Encounters:   12/07/23 65.8 kg (145 lb)   09/12/23 63.5 kg (139 lb 15.9 oz)   09/08/23 63.5 kg (139 lb 14.4 oz)     Reported LifeVest per chart review and wife's report noncompliant  Last TTE 6/13: EF 55%, mod to severe aortic stenosis, moderate insufficiency, repeat pending  BNP-356  CXR-pending  No home meds taken today  Does not look like an exacerbation  Continue home metoprolol, refused [facility]  Telemetry  Cardio following        History of Acute bacterial endocarditis  Assessment & Plan  Recent streptococcus bacteremia and endocarditis in May. S/p 6-12 week antibiotic treatment. Source likely odontogenic.    Has since had dental extractions in anticipation of bioprosthetic AVR and CABG sequentially  Followed by outpatient cardiology  Echo-pending      Paroxysmal atrial fibrillation 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 Mobility:   Basic Mobility Inpatient Raw Score: 20  -HLM Goal: 6: Walk 10 steps or more  Not assessed at the time of note    Patient Centered Rounds: I evaluated the patient without nursing staff present due to rounds    Discussions with Specialists or Other Care Team Provider: Neurology    Education and Discussions with Family / Patient: Updated  (wife) via phone. Total Time Spent on Date of Encounter in care of patient: 35 mins. This time was spent on one or more of the following: performing physical exam; counseling and coordination of care; obtaining or reviewing history; documenting in the medical record; reviewing/ordering tests, medications or procedures; communicating with other healthcare professionals and discussing with patient's family/caregivers. Current Length of Stay: 1 day(s)  Current Patient Status: Inpatient   Certification Statement: The patient will continue to require additional inpatient hospital stay due to clinical course specialist recs  Discharge Plan: To be determined    Code Status: Level 1 - Full Code    Subjective:   Patient seen and examined at bedside, alert and oriented x 3, stated no headache or blurry vision, reported pain behind right ear, uncertain about fall. Patient denied any chest pain or palpitations or shortness of breath. Patient stated he did not have a seizure, inform patient of events that tried prior to arrival.     Objective:     Vitals:   Temp (24hrs), Av °F (36.7 °C), Min:97.2 °F (36.2 °C), Max:99.1 °F (37.3 °C)    Temp:  [97.2 °F (36.2 °C)-99.1 °F (37.3 °C)] 97.7 °F (36.5 °C)  HR:  [] 69  Resp:  [16-22] 18  BP: (111-214)/(49-99) 111/49  SpO2:  [95 %-99 %] 96 %  Body mass index is 22.05 kg/m².      Input and Output Summary (last 24 hours):   No intake or output data in the 24 hours ending 12/08/23 1425      Physical Exam  Vitals and nursing note reviewed. Constitutional:       General: He is not in acute distress. Appearance: He is well-developed. HENT:      Head: Normocephalic and atraumatic. Eyes:      Conjunctiva/sclera: Conjunctivae normal.   Cardiovascular:      Rate and Rhythm: Normal rate and regular rhythm. Heart sounds: No murmur heard. Pulmonary:      Effort: Pulmonary effort is normal. No respiratory distress. Breath sounds: Normal breath sounds. No wheezing or rhonchi. Abdominal:      Palpations: Abdomen is soft. Tenderness: There is no abdominal tenderness. There is no guarding or rebound. Musculoskeletal:         General: No swelling. Cervical back: Neck supple. Right lower leg: No edema. Left lower leg: No edema. Skin:     General: Skin is warm and dry. Capillary Refill: Capillary refill takes less than 2 seconds. Neurological:      Mental Status: He is alert and oriented to person, place, and time. Sensory: No sensory deficit. Motor: No weakness.    Psychiatric:         Mood and Affect: Mood normal.         Behavior: Behavior normal.          Additional Data:     Labs:  Results from last 7 days   Lab Units 12/08/23  0425   WBC Thousand/uL 10.42*   HEMOGLOBIN g/dL 11.9*   HEMATOCRIT % 37.0   PLATELETS Thousands/uL 203   NEUTROS PCT % 72   LYMPHS PCT % 15   MONOS PCT % 11   EOS PCT % 1     Results from last 7 days   Lab Units 12/08/23  0425   SODIUM mmol/L 136   POTASSIUM mmol/L 3.5   CHLORIDE mmol/L 107   CO2 mmol/L 22   BUN mg/dL 23   CREATININE mg/dL 1.04   ANION GAP mmol/L 7   CALCIUM mg/dL 9.2   ALBUMIN g/dL 4.0   TOTAL BILIRUBIN mg/dL 0.51   ALK PHOS U/L 56   ALT U/L 13   AST U/L 25   GLUCOSE RANDOM mg/dL 98     Results from last 7 days   Lab Units 12/07/23  1204   INR  0.97     Results from last 7 days   Lab Units 12/07/23  1138 POC GLUCOSE mg/dl 145*     Results from last 7 days   Lab Units 12/07/23  1754   HEMOGLOBIN A1C % 5.8*           Lines/Drains:  Invasive Devices       Peripheral Intravenous Line  Duration             Peripheral IV 09/10/23 Left;Ventral (anterior) Forearm 88 days    Peripheral IV 09/10/23 Right Antecubital 88 days    Peripheral IV 12/07/23 Right Forearm 1 day                      Telemetry:  Telemetry Orders (From admission, onward)               24 Hour Telemetry Monitoring  Continuous x 24 Hours (Telem)        Question:  Reason for 24 Hour Telemetry  Answer:  Decompensated CHF- and any one of the following: continuous diuretic infusion or total diuretic dose >200 mg daily, associated electrolyte derangement (I.e. K < 3.0), ionotropic drip (continuous infusion), hx of ventricular arrhythmia, or new EF < 35%                     Telemetry Reviewed: Will de-escalate when appropriate  Indication for Continued Telemetry Use:              Imaging: Reviewed radiology reports from this admission including: CT head and CTA head CXR    Recent Cultures (last 7 days):         Last 24 Hours Medication List:   Current Facility-Administered Medications   Medication Dose Route Frequency Provider Last Rate    acetaminophen  650 mg Oral Q6H PRN Murphy Ramirez MD      aspirin  81 mg Oral Daily Atilio Santos PA-C      atorvastatin  80 mg Oral Daily With Albin Conley PA-C      enoxaparin  30 mg Subcutaneous Q24H MD Ashli      levETIRAcetam  500 mg Oral Q12H [address] Carlos Dupont MD      LORazepam  2 mg Intravenous Q8H PRN Murphy Ramirez MD      metoprolol  2.5 mg Intravenous Q6H PRN Murphy Ramirez MD      metoprolol succinate  25 mg Oral Daily Murphy Ramirez MD      ondansetron  4 mg Intravenous Q6H PRN Murphy Ramirez MD          Today, Patient Was Seen By: Murphy Ramirez MD    **Please Note: This note may have been constructed using a voice recognition system. **

## 2023-12-08 NOTE — CONSULTS
Consultation - Cardiology   [facility]     Palmira Oppenheim 69 y.o. male MRN: [id number]  : 1945  Unit/Bed#: [address] [place] [address] Encounter: [id number]      Assessment & Plan   Seizure-like activity. - Presented on 23 for evaluation for altered mental status. - Had witnessed seizure in ED with enuresis. Wife is at bedside and provides history as the patient states he is too tired to interact with provider. Patient's wife states that she had noticed the patient was very lethargic on morning of 23 when she was unable to arouse him she called 911. ED she reports noting patient had an episode of gurgling, drinking and lost bladder function. She states upon reflecting patient had a similar episode in 2023 and prior to AVR/CABG in 2023. She states after undergoing AVR/CABG on 23 patient had been doing well but in recent weeks she has noted patient has become more lethargic.  - 23 CTA head and neck with and without contrast:    CT HEAD  - No acute intracranial abnormality.  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 Consider follow-up with ENT. CTA HEAD AND NECK  - Negative CTA head and neck for large vessel occlusion, dissection, or aneurysm. - Severe stenosis (approximately 80% narrowing) in right ICA proximal cervical segment due to calcified atherosclerotic disease.  - Severe stenosis in origin of left vertebral artery due to calcified atherosclerotic disease.    - 23 CT head without contrast: No acute intracranial abnormality.  - Neurology consult pending.   - Ordered orthostatic vital signs. Chronic combined systolic and diastolic heart failure. - Does not appear in acute exacerbation.   Patient is euvolemic on examination.  - 9/15/23 TTE: LVEF 25%. - 9/13/23 JESSICA: LVEF 40%. LV additional notes: Moderately dilated LV, global dysfunction. - 12/07/23 CXR: No acute cardiopulmonary disease.   - Currently on Toprol-XL 25 mg daily.  - Review of most recent cardiology note from [facility] indicates patient is on Lopressor 50 mg twice daily, Aldactone 25 mg daily, torsemide 20 mg every other day, and valsartan 40 mg daily. - Will need to discuss with patient's wife med list as there are discrepancies. - Repeat TTE ordered. - His wife reports that he was scheduled for TTE on the week of 12/10/23 at [facility] to assess LVEF and determine whether or not the patient requires AICD. - Monitor weight. - Monitor I/Os. - CHF education. Strep mitis AV endocarditis s/p AVR.     - Diagnosed with aortic valve endocarditis in May 2023 secondary to streptococcal bacteremia with likely dental source. - s/p bioprosthetic aortic valve replacement (INSPIRIS RISILIA SIZE 23 MM) on 9/13/23 at [facility].   - 9/15/23 TTE: LVEF 25%; well-seated bioprosthetic aortic valve, mean gradient 15 mmHg, EOA 1.3 cm². Severe AS s/p AVR.     - s/p bioprosthetic aortic valve replacement (INSPIRIS RISILIA SIZE 23 MM) on 9/13/23 at [facility].   - 9/15/23 TTE: LVEF 25%; well-seated bioprosthetic aortic valve, mean gradient 15 mmHg, EOA 1.3 cm². - Repeat TTE. Paroxysmal atrial fibrillation.    - Patient with a known history of paroxysmal atrial fibrillation. Noted to have episode of atrial fibrillation during May 2023 hospitalization. - 12/07/23 EKG: Sinus rhythm, 75 bpm.  Inferior infarct. - 12/06-12/07 telemetry reviewed: Sinus rhythm, 57 bpm.  No evidence of tachycardic or bradycardicarrhythmia.  - Not currently on anticoagulation. On review of chart it appears patient did not want to be on anticoagulation due to bleeding risk.   Will revisit discussion regarding benefits of anticoagulation given history of paroxysmal atrial fibrillation.  - Currently on Toprol-XL 25 mg daily.  - Review of most recent cardiology note from health indicates patient is on Lopressor 50 mg twice daily, Aldactone 25 mg daily, torsemide 20 mg every other day, and valsartan 40 mg daily. - Will need to discuss with patient's wife med list as there are discrepancies. - [id number] stroke risk score: 4 points, moderate-high risk. Hypertension.    - SBP since admission 111-214. - Currently on Toprol-XL 25 mg daily.  - Review of most recent cardiology note from health indicates patient is on Lopressor 50 mg twice daily, Aldactone 25 mg daily, torsemide 20 mg every other day, and valsartan 40 mg daily. - Will need to discuss with patient's wife med list as there are discrepancies. - Continue to monitor BP. Hyperlipidemia.    - 9/12/23 lipid panel: cholesterol 118, triglycerides 54, HDL 79, LDL 28.   - Continue Lipitor 80 mg daily. Pre-diabetes. - 12/07/23 HgbA1c: 5.8.   - Care per primary team.     Summary of Recommendations: Thank you for your consultation. Physician Requesting Consult: Candy Moseley MD    Reason for Consult / Principal Problem: Seizure-like activity. Inpatient consult to Cardiology  Consult performed by: hSari Amanda PA-C  Consult ordered by: Candy Moseley MD          HPI: Palmira Oppenheim is a 69 y.o. male with PMHx of strep mitis AV endocarditis, severe AS s/p bioprosthetic aortic valve replacement (INSPIRIS RISILIA SIZE 23 MM) on 9/13/23, CAD s/p CABGx3 on 9/13/23, chronic combined systolic and diastolic heart failure, paroxysmal atrial fibrillation (not on AC), HTN, HLD, and pre-diabetes who presented on 12/07/23 for evaluation for altered mental status. Had witnessed seizure in ED with enuresis. Wife is at bedside and provides history as the patient states he is too tired to interact with provider.  Patient's wife states that she had noticed the patient was very lethargic on morning of 12/07/23 when she was unable to arouse him she called 911. ED she reports noting patient had an episode of gurgling, drinking and lost bladder function. She states upon reflecting patient had a similar episode in July 2023 and prior to AVR/CABG in September 2023. She states after undergoing AVR/CABG on 9/13/23 patient had been doing well but in recent weeks she has noted patient has become more lethargic. 9/15/23 TTE performed at [facility] after 9/13/23 AVR and CABGx3 noted LVEF 25%; well-seated bioprosthetic aortic valve, mean gradient 15 mmHg, EOA 1.3 cm². Patient was discharged from [facility] with a LifeVest, wife reports that he has not been compliant with the vest.    Review of Systems   Constitutional:  Positive for activity change and fatigue. Negative for appetite change, chills, diaphoresis, fever and unexpected weight change. Respiratory:  Negative for cough, chest tightness, shortness of breath and wheezing. Cardiovascular:  Negative for chest pain, palpitations and leg swelling. Gastrointestinal:  Negative for abdominal distention, abdominal pain, constipation, diarrhea and rectal pain. Musculoskeletal: Negative. Skin: Negative. Neurological:  Positive for seizures. Negative for dizziness, weakness, light-headedness and headaches. Historical Information   Past Medical History:   Diagnosis Date    Aortic stenosis     CAD (coronary artery disease), native coronary artery     CHF (congestive heart failure) (HCC)     COPD (chronic obstructive pulmonary disease) ([address])     Discitis     Endocarditis      Past Surgical History:   Procedure Laterality Date    CARDIAC CATHETERIZATION N/A 8/24/2023    Procedure: Cardiac RHC/LHC; Surgeon: Isrrael Walls MD;  Location: [facility];   Service: Cardiology    SKIN BIOPSY       Social History     Substance and Sexual Activity   Alcohol Use Not Currently     Social History     Substance and Sexual Activity   Drug Use Not Currently     Social History     Tobacco Use   Smoking Status Former    Packs/day: 0.50    Years: 20.00    Total pack years: 10.00    Types: Cigarettes    Quit date: 3/11/2005    Years since quittin.7   Smokeless Tobacco Never     Family History:   Family History   Problem Relation Age of Onset    Colon cancer Mother     Lung cancer Father     Mental illness Neg Hx        Meds/Allergies    PTA meds:    Medications Prior to Admission   Medication    aspirin 81 mg chewable tablet    metoprolol succinate (TOPROL-XL) 25 mg 24 hr tablet    potassium chloride (K-DUR,KLOR-CON) 20 mEq tablet    rosuvastatin (CRESTOR) 40 MG tablet    torsemide (DEMADEX) 20 mg tablet    acetaminophen (TYLENOL) 325 mg tablet    Multiple Vitamins-Minerals (PRESERVISION AREDS PO)      No Known Allergies    Current Facility-Administered Medications:     acetaminophen (TYLENOL) tablet 650 mg, 650 mg, Oral, Q6H PRN, Lidya Torres MD    enoxaparin (LOVENOX) subcutaneous injection 30 mg, 30 mg, Subcutaneous, Q24H [address], Lidya Torres MD, 30 mg at 23 0937    LORazepam (ATIVAN) injection 1 mg, 1 mg, Intravenous, Q6H PRN, Lidya Torres MD, 1 mg at 23 1903    LORazepam (ATIVAN) injection 2 mg, 2 mg, Intravenous, Q8H PRN, Lidya Torres MD    metoprolol (LOPRESSOR) injection 2.5 mg, 2.5 mg, Intravenous, Q6H PRN, Lidya Torres MD    metoprolol succinate (TOPROL-XL) 24 hr tablet 25 mg, 25 mg, Oral, Daily, Lidya Torres MD, 25 mg at 23 0936    ondansetron (ZOFRAN) injection 4 mg, 4 mg, Intravenous, Q6H PRN, Lidya Torres MD    potassium chloride (K-DUR,KLOR-CON) CR tablet 20 mEq, 20 mEq, Oral, Once, Lidya Torres MD    torsemide [facility]) tablet 20 mg, 20 mg, Oral, Once, Lidya Torres MD    VTE Pharmacologic Prophylaxis:   Enoxaparin (Lovenox)    Objective:   Vitals: Blood pressure (!) 111/49, pulse 69, temperature 97.7 °F (36.5 °C), resp.  rate 18, height 5' 8" (1.727 m), weight 65.8 kg (145 lb), SpO2 96 %. Body mass index is 22.05 kg/m². Wt Readings from Last 3 Encounters:   12/07/23 65.8 kg (145 lb)   09/12/23 63.5 kg (139 lb 15.9 oz)   09/08/23 63.5 kg (139 lb 14.4 oz)     BP Readings from Last 3 Encounters:   12/08/23 (!) 111/49   09/12/23 170/86   09/08/23 (!) 115/40     Orthostatic Blood Pressures      Flowsheet Row Most Recent Value   Blood Pressure 111/49 filed at 12/08/2023 6222   Patient Position - Orthostatic VS Lying filed at 12/07/2023 1145          No intake or output data in the 24 hours ending 12/08/23 1143    Invasive Devices       Peripheral Intravenous Line  Duration             Peripheral IV 09/10/23 Left;Ventral (anterior) Forearm 88 days    Peripheral IV 09/10/23 Right Antecubital 88 days    Peripheral IV 12/07/23 Right Forearm <1 day                      Physical Exam:   Physical Exam  Vitals reviewed. Constitutional:       General: He is not in acute distress. Cardiovascular:      Rate and Rhythm: Normal rate and regular rhythm. Pulses: Normal pulses. Heart sounds: Murmur heard. Pulmonary:      Effort: Pulmonary effort is normal. No respiratory distress. Breath sounds: Normal breath sounds. Chest:      Comments: Midline chest incision well-healed. Abdominal:      General: Abdomen is flat. There is no distension. Palpations: Abdomen is soft. Tenderness: There is no abdominal tenderness. Musculoskeletal:      Right lower leg: No edema. Left lower leg: No edema. Skin:     General: Skin is warm and dry. Neurological:      Mental Status: He is alert.          Labs:   Troponins:  Results from last 7 days   Lab Units 12/07/23  1559 12/07/23  1419   HSTNI D2 ng/L  --  16   HSTNI D4 ng/L 44*  --        CBC with diff:   Results from last 7 days   Lab Units 12/08/23  0425 12/07/23  1204   WBC Thousand/uL 10.42* 7.69   HEMOGLOBIN g/dL 11.9* 13.0   HEMATOCRIT % 37.0 39.9   MCV fL 96 95   PLATELETS Thousands/uL 203 222 RBC Million/uL 3.87* 4.19   MCH pg 30.7 31.0   MCHC g/dL 32.2 32.6   RDW % 15.2* 15.2*   MPV fL 9.3 9.5   NRBC AUTO /100 WBCs 0 0       CMP:   Results from last 7 days   Lab Units 12/08/23  0425 12/07/23  1204   SODIUM mmol/L 136 137   POTASSIUM mmol/L 3.5 4.1   CHLORIDE mmol/L 107 104   CO2 mmol/L 22 21   ANION GAP mmol/L 7 12   BUN mg/dL 23 36*   CREATININE mg/dL 1.04 1.25   CALCIUM mg/dL 9.2 9.2   AST U/L 25 18   ALT U/L 13 16   ALK PHOS U/L 56 57   TOTAL PROTEIN g/dL 6.8 6.6   ALBUMIN g/dL 4.0 4.0   TOTAL BILIRUBIN mg/dL 0.51 0.34   EGFR ml/min/1.73sq m 68 55   GLUCOSE RANDOM mg/dL 98 123       Magnesium:     Coags:   Results from last 7 days   Lab Units 12/07/23  1204   PTT seconds 32   INR  0.97     TSH:    Results from last 7 days   Lab Units 12/07/23  1204   TSH 3RD GENERATON uIU/mL 2.020     No components found for: "TSH3"  Lipid Profile:     Lipid Profile:   Lab Results   Component Value Date    CHOLESTEROL 149 05/11/2023    HDL 72 05/11/2023    LDLCALC 63 05/11/2023    TRIG 68 05/11/2023     Hgb A1c:   Results from last 7 days   Lab Units 12/07/23  1754   HEMOGLOBIN A1C % 5.8*     NT-proBNP: No results for input(s): "NTBNP" in the last 72 hours. Imaging & Testing     Cardiac testing:   Echo complete with contrast if indicated  Result date: 9/11/23     Left Ventricle Left ventricular cavity size is normal. Wall thickness is mildly increased. There is mild concentric hypertrophy. The left ventricular ejection fraction is 50% by visual estimation. . Systolic function is low normal.  Wall motion is  low normal. Diastolic function is moderately abnormal, consistent with grade II (pseudonormal) relaxation. Right Ventricle Right ventricular cavity size is normal. Systolic function is normal. Wall thickness is normal.   Left Atrium The atrium is mildly dilated (35-41 mL/m2). Right Atrium The atrium is normal in upper size. Aortic Valve The aortic valve is trileaflet.  The leaflets are moderately thickened. The leaflets are moderately calcified. There is moderately reduced mobility. There is at least mild to moderate regurgitation. Jet is eccentric and not measured accurately. There is moderate to severe stenosis. Peak gradient 67 mean gradient 32 and calculated valve area 0.9 to 1.0 cm 2. Mitral Valve There is mild annular calcification. There is mild to moderate regurgitation. There is no evidence of stenosis. Tricuspid Valve There is mild to moderate regurgitation. Pulmonary artery pressure around 55 to 60 mmHg. There is no evidence of stenosis. Pulmonic Valve There is no evidence of regurgitation. There is no evidence of stenosis. Ascending Aorta The aortic root is normal in size. IVC/SVC The inferior vena cava is normal in size. Respirophasic changes were normal.   Pericardium There is a small pericardial effusion along the right atrial free wall. There is a left pleural effusion. Cardiac catheterization  Result date: 8/24/23    Left Main   The vessel is large. There is moderate diffuse disease throughout the vessel. The vessel is moderately calcified. Dist LM lesion is 70% stenosed. Left Anterior Descending   The vessel is large. There is moderate diffuse disease throughout the vessel. The vessel is moderately calcified in the proximal segment. Mid LAD lesion is 70% stenosed. First Diagonal Branch   1st Diag lesion is 90% stenosed. Left Circumflex   The vessel is large. codominant There is mild diffuse disease throughout the vessel. Ost Cx lesion is 50% stenosed. Right Coronary Artery   Mid RCA lesion is 80% stenosed.           Cardiac catheterization  Result date: 6/14/23    Left Ventricle Left ventricular cavity size is normal. Wall thickness is normal. Systolic function is normal.  Wall motion is normal.   Right Ventricle Right ventricular cavity size is normal. Systolic function is normal. Wall thickness is normal.   Left Atrium The atrium is normal in size.   Right Atrium The atrium is normal in size. Atrial Septum No patent foramen ovale detected using color flow Doppler at rest.   Left Atrial Appendage Left atrial appendage is normal in size. There is normal function. There is no thrombus. Aortic Valve The aortic valve is trileaflet. The leaflets are moderately thickened. The leaflets are severely calcified. The leaflets exhibit normal mobility. There is moderate regurgitation. There is moderate to severe stenosis visually (doppler was not repeated on this study due to prior study only 1 month ago). There is a small mobile vegetation at the commissure of the LCC and nonCC. A very small mobile vegetation is noted at the tip of the RCC. Mitral Valve Mitral valve structure is normal. There is mild regurgitation. There is no evidence of stenosis. Tricuspid Valve Tricuspid valve structure is normal. There is mild regurgitation. There is no evidence of stenosis. Pulmonic Valve Pulmonic valve structure is normal. There is no evidence of regurgitation. There is no evidence of stenosis. Ascending Aorta The aortic root is normal in size. There is no evidence of aortic dissection. There is no aneurysm in the aorta. Pericardium There is no pericardial effusion. There is a left pleural effusion. Results for orders placed during the hospital encounter of 10/08/21    Echo complete with contrast if indicated    Narrative  [address] [place] [address], [address] [place] [address]    Transthoracic Echocardiogram  2D, M-mode, Doppler, and Color Doppler    Study date:  08-Oct-2021    Patient: Marcelina Meckel  MR number: [id number]  Account number: [de-identified]  : 1945  Age: 77 years  Gender: Male  Status: Outpatient  Location: [facility]  Height: 68 in  Weight: 144 lb  BP: 112/ 58 mmHg    Indications: Murmur    Diagnoses: R01.1 - Cardiac murmur, unspecified    Sonographer:  AUBREE Winston  Primary Physician:  Merissa Bear.  Farheen Perez   Referring Physician:  Merissa Bear. Fahreen Perez DO  Group:  [facility]  Interpreting Physician:  Arvin Vega MD    IMPRESSIONS:  The presence of any assoicated symptoms should prompt further w/u and/or referral to cardiology. SUMMARY    LEFT VENTRICLE:  Systolic function was normal. Ejection fraction was estimated to be 60 %. There were no regional wall motion abnormalities. Doppler parameters were consistent with abnormal left ventricular relaxation (grade 1 diastolic dysfunction). LEFT ATRIUM:  The atrium was mildly dilated. MITRAL VALVE:  There was trace regurgitation. AORTIC VALVE:  The valve was trileaflet. Leaflets exhibited markedly increased thickness and marked calcification. Transaortic velocity was increased due to valvular stenosis. There was moderate stenosis. Valve mean gradient was 30 mmHg. Aortic valve area was 1.2 cmï¾² by the continuity equation. HISTORY: PRIOR HISTORY: HLD, Former smoker    PROCEDURE: The study was performed in the [facility]. This was a routine study. The transthoracic approach was used. The study included complete 2D imaging, M-mode, complete spectral Doppler, and color Doppler. The heart rate was 66  bpm, at the start of the study. Images were obtained from the parasternal, apical, subcostal, and suprasternal notch acoustic windows. Image quality was adequate. LEFT VENTRICLE: Size was normal. Systolic function was normal. Ejection fraction was estimated to be 60 %. There were no regional wall motion abnormalities. Wall thickness was normal. DOPPLER: Doppler parameters were consistent with  abnormal left ventricular relaxation (grade 1 diastolic dysfunction). RIGHT VENTRICLE: The size was normal. Systolic function was normal. Wall thickness was normal.    LEFT ATRIUM: The atrium was mildly dilated. RIGHT ATRIUM: Size was normal.    MITRAL VALVE: Valve structure was normal. There was normal leaflet separation.  DOPPLER: The transmitral velocity was within the normal range. There was no evidence for stenosis. There was trace regurgitation. AORTIC VALVE: The valve was trileaflet. Leaflets exhibited markedly increased thickness and marked calcification. DOPPLER: Transaortic velocity was increased due to valvular stenosis. There was moderate stenosis. There was no significant  regurgitation. TRICUSPID VALVE: The valve structure was normal. There was normal leaflet separation. DOPPLER: The transtricuspid velocity was within the normal range. There was no evidence for stenosis. There was no significant regurgitation. PULMONIC VALVE: Leaflets exhibited normal thickness, no calcification, and normal cuspal separation. DOPPLER: The transpulmonic velocity was within the normal range. There was no significant regurgitation. PERICARDIUM: There was no pericardial effusion. The pericardium was normal in appearance. AORTA: The root exhibited normal size. SYSTEMIC VEINS: IVC: The inferior vena cava was normal in size. MEASUREMENT TABLES    DOPPLER MEASUREMENTS  Aortic valve   (Reference normals)  Peak gradient   52 mmHg   (--)  Mean gradient   30 mmHg   (--)  Valve area, cont   1.2 cmï¾²   (--)    SYSTEM MEASUREMENT TABLES    2D  %FS: 44.35 %  Ao Diam: 3.45 cm  EDV(Teich): 109.24 ml  EF(Teich): 75.51 %  ESV(Teich): 26.75 ml  IVSd: 0.95 cm  LA Diam: 3.29 cm  LAAs A4C: 24.62 cm2  LAESV A-L A4C: 87.65 ml  LAESV MOD A4C: 82.2 ml  LALs A4C: 5.87 cm  LVEDV MOD A4C: 90.82 ml  LVEF MOD A4C: 61.3 %  LVESV MOD A4C: 35.15 ml  LVIDd: 4.83 cm  LVIDs: 2.69 cm  LVLd A4C: 8.05 cm  LVLs A4C: 7.15 cm  LVOT Diam: 2.24 cm  LVPWd: 0.98 cm  RAAs A4C: 19.81 cm2  RAESV A-L: 54.93 ml  RAESV MOD: 55.35 ml  RALs: 6.07 cm  RVIDd: 3.96 cm  RWT: 0.41  SV MOD A4C: 55.67 ml  SV(Teich): 82.48 ml    CW  AV Env. Ti: 351.41 ms  AV VTI: 89.64 cm  AV Vmax: 3.46 m/s  AV Vmean: 2.49 m/s  AV maxP mmHg  AV meanP.9 mmHg    MM  TAPSE: 2.14 cm    PW  MARVIN (VTI): 1.1 cm2  MARVIN Vmax: 1.15 cm2  E': 0.06 m/s  E/E': 10.15  LVOT Env. Ti: 367.06 ms  LVOT VTI: 25.04 cm  LVOT Vmax: 1.01 m/s  LVOT Vmean: 0.69 m/s  LVOT maxP.15 mmHg  LVOT meanP.21 mmHg  LVSV Dopp: 99 ml  MV A Denis: 0.91 m/s  MV Dec Maverick: 1.93 m/s2  MV DecT: 343.04 ms  MV E Denis: 0.65 m/s  MV E/A Ratio: 0.72    IntersLists of hospitals in the [place] Commission Accredited Echocardiography Laboratory    Prepared and electronically signed by    Agustín Maciel MD  Signed 08-Oct-2021 09:24:29      Imaging: I have personally reviewed pertinent reports. XR chest 1 view portable    Result Date: 2023    Impression: No acute cardiopulmonary disease. CT head without contrast    Result Date: 2023    Impression: No acute intracranial abnormality. EKG/ Monitor: Personally reviewed. Telemetry reviewed: Sinus rhythm, 57 bpm.  No evidence of tachycardic or bradycardicarrhythmia. 23 EKG: Sinus rhythm, 75 bpm.  Inferior infarct.        Code Status: Level 1 - Full Code  Advance Directive and Living Will:      POLST:        Annette Prince PA-C

## 2023-12-09 ENCOUNTER — APPOINTMENT (INPATIENT)
Dept: NON INVASIVE DIAGNOSTICS | Facility: HOSPITAL | Age: 78
DRG: 101 | End: 2023-12-09
Payer: COMMERCIAL

## 2023-12-09 VITALS
OXYGEN SATURATION: 96 % | SYSTOLIC BLOOD PRESSURE: 118 MMHG | TEMPERATURE: 97.8 F | BODY MASS INDEX: 21.98 KG/M2 | HEART RATE: 63 BPM | DIASTOLIC BLOOD PRESSURE: 62 MMHG | WEIGHT: 145 LBS | RESPIRATION RATE: 18 BRPM | HEIGHT: 68 IN

## 2023-12-09 PROBLEM — I65.21 OCCLUSION OF RIGHT INTERNAL CAROTID ARTERY: Status: ACTIVE | Noted: 2023-12-09

## 2023-12-09 LAB
ALBUMIN SERPL BCP-MCNC: 3.9 G/DL (ref 3.5–5)
ALP SERPL-CCNC: 51 U/L (ref 34–104)
ALT SERPL W P-5'-P-CCNC: 15 U/L (ref 7–52)
ANION GAP SERPL CALCULATED.3IONS-SCNC: 7 MMOL/L
AORTIC ROOT: 2.8 CM
AORTIC VALVE MEAN VELOCITY: 16.8 M/S
APICAL FOUR CHAMBER EJECTION FRACTION: 71 %
AST SERPL W P-5'-P-CCNC: 21 U/L (ref 13–39)
ATRIAL RATE: 59 BPM
AV AREA BY CONTINUOUS VTI: 1.5 CM2
AV AREA PEAK VELOCITY: 1.2 CM2
AV LVOT MEAN GRADIENT: 4 MMHG
AV LVOT PEAK GRADIENT: 7 MMHG
AV MEAN GRADIENT: 14 MMHG
AV PEAK GRADIENT: 32 MMHG
AV VALVE AREA: 1.47 CM2
AV VELOCITY RATIO: 0.48
BASOPHILS # BLD AUTO: 0.06 THOUSANDS/ÂΜL (ref 0–0.1)
BASOPHILS NFR BLD AUTO: 1 % (ref 0–1)
BILIRUB SERPL-MCNC: 0.47 MG/DL (ref 0.2–1)
BUN SERPL-MCNC: 22 MG/DL (ref 5–25)
CALCIUM SERPL-MCNC: 9.3 MG/DL (ref 8.4–10.2)
CHLORIDE SERPL-SCNC: 103 MMOL/L (ref 96–108)
CHOLEST SERPL-MCNC: 118 MG/DL
CO2 SERPL-SCNC: 26 MMOL/L (ref 21–32)
CREAT SERPL-MCNC: 1.16 MG/DL (ref 0.6–1.3)
DOP CALC AO PEAK VEL: 2.81 M/S
DOP CALC AO VTI: 46.56 CM
DOP CALC LVOT AREA: 2.54 CM2
DOP CALC LVOT DIAMETER: 1.8 CM
DOP CALC LVOT PEAK VEL VTI: 26.99 CM
DOP CALC LVOT PEAK VEL: 1.34 M/S
DOP CALC LVOT STROKE INDEX: 38.2 ML/M2
DOP CALC LVOT STROKE VOLUME: 68.65
E WAVE DECELERATION TIME: 275 MS
E/A RATIO: 0.86
EOSINOPHIL # BLD AUTO: 0.33 THOUSAND/ÂΜL (ref 0–0.61)
EOSINOPHIL NFR BLD AUTO: 6 % (ref 0–6)
ERYTHROCYTE [DISTWIDTH] IN BLOOD BY AUTOMATED COUNT: 14.6 % (ref 11.6–15.1)
FRACTIONAL SHORTENING: 31 (ref 28–44)
GFR SERPL CREATININE-BSD FRML MDRD: 60 ML/MIN/1.73SQ M
GLUCOSE SERPL-MCNC: 98 MG/DL (ref 65–140)
HCT VFR BLD AUTO: 36.6 % (ref 36.5–49.3)
HDLC SERPL-MCNC: 62 MG/DL
HGB BLD-MCNC: 11.8 G/DL (ref 12–17)
IMM GRANULOCYTES # BLD AUTO: 0.02 THOUSAND/UL (ref 0–0.2)
IMM GRANULOCYTES NFR BLD AUTO: 0 % (ref 0–2)
INTERVENTRICULAR SEPTUM IN DIASTOLE (PARASTERNAL SHORT AXIS VIEW): 1.3 CM
INTERVENTRICULAR SEPTUM: 1.3 CM (ref 0.6–1.1)
LAAS-AP2: 27.7 CM2
LAAS-AP4: 19.1 CM2
LDLC SERPL CALC-MCNC: 42 MG/DL (ref 0–100)
LEFT ATRIUM SIZE: 4.2 CM
LEFT ATRIUM VOLUME (MOD BIPLANE): 76 ML
LEFT ATRIUM VOLUME INDEX (MOD BIPLANE): 42.7 ML/M2
LEFT INTERNAL DIMENSION IN SYSTOLE: 2.5 CM (ref 2.1–4)
LEFT VENTRICULAR INTERNAL DIMENSION IN DIASTOLE: 3.6 CM (ref 3.5–6)
LEFT VENTRICULAR POSTERIOR WALL IN END DIASTOLE: 1.3 CM
LEFT VENTRICULAR STROKE VOLUME: 33 ML
LVSV (TEICH): 33 ML
LYMPHOCYTES # BLD AUTO: 1.22 THOUSANDS/ÂΜL (ref 0.6–4.47)
LYMPHOCYTES NFR BLD AUTO: 20 % (ref 14–44)
MCH RBC QN AUTO: 30 PG (ref 26.8–34.3)
MCHC RBC AUTO-ENTMCNC: 32.2 G/DL (ref 31.4–37.4)
MCV RBC AUTO: 93 FL (ref 82–98)
MONOCYTES # BLD AUTO: 0.88 THOUSAND/ÂΜL (ref 0.17–1.22)
MONOCYTES NFR BLD AUTO: 15 % (ref 4–12)
MV E'TISSUE VEL-LAT: 10 CM/S
MV E'TISSUE VEL-SEP: 6 CM/S
MV PEAK A VEL: 0.88 M/S
MV PEAK E VEL: 76 CM/S
MV STENOSIS PRESSURE HALF TIME: 80 MS
MV VALVE AREA P 1/2 METHOD: 2.75
NEUTROPHILS # BLD AUTO: 3.48 THOUSANDS/ÂΜL (ref 1.85–7.62)
NEUTS SEG NFR BLD AUTO: 58 % (ref 43–75)
NRBC BLD AUTO-RTO: 0 /100 WBCS
P AXIS: 56 DEGREES
PLATELET # BLD AUTO: 205 THOUSANDS/UL (ref 149–390)
PMV BLD AUTO: 9.6 FL (ref 8.9–12.7)
POTASSIUM SERPL-SCNC: 3.3 MMOL/L (ref 3.5–5.3)
PR INTERVAL: 164 MS
PROT SERPL-MCNC: 6.5 G/DL (ref 6.4–8.4)
QRS AXIS: 25 DEGREES
QRSD INTERVAL: 94 MS
QT INTERVAL: 452 MS
QTC INTERVAL: 447 MS
RBC # BLD AUTO: 3.93 MILLION/UL (ref 3.88–5.62)
RIGHT ATRIUM AREA SYSTOLE A4C: 19.8 CM2
RIGHT VENTRICLE ID DIMENSION: 3 CM
SL CV LEFT ATRIUM LENGTH A2C: 5.7 CM
SL CV LV EF: 55
SL CV PED ECHO LEFT VENTRICLE DIASTOLIC VOLUME (MOD BIPLANE) 2D: 55 ML
SL CV PED ECHO LEFT VENTRICLE SYSTOLIC VOLUME (MOD BIPLANE) 2D: 22 ML
SODIUM SERPL-SCNC: 136 MMOL/L (ref 135–147)
T WAVE AXIS: 97 DEGREES
TR MAX PG: 29 MMHG
TR PEAK VELOCITY: 2.7 M/S
TRICUSPID ANNULAR PLANE SYSTOLIC EXCURSION: 1.4 CM
TRICUSPID VALVE PEAK REGURGITATION VELOCITY: 2.7 M/S
TRIGL SERPL-MCNC: 71 MG/DL
VENTRICULAR RATE: 59 BPM
WBC # BLD AUTO: 5.99 THOUSAND/UL (ref 4.31–10.16)

## 2023-12-09 PROCEDURE — 99239 HOSP IP/OBS DSCHRG MGMT >30: CPT | Performed by: STUDENT IN AN ORGANIZED HEALTH CARE EDUCATION/TRAINING PROGRAM

## 2023-12-09 PROCEDURE — NC001 PR NO CHARGE: Performed by: SURGERY

## 2023-12-09 PROCEDURE — 93306 TTE W/DOPPLER COMPLETE: CPT

## 2023-12-09 PROCEDURE — 93005 ELECTROCARDIOGRAM TRACING: CPT

## 2023-12-09 PROCEDURE — 85025 COMPLETE CBC W/AUTO DIFF WBC: CPT | Performed by: STUDENT IN AN ORGANIZED HEALTH CARE EDUCATION/TRAINING PROGRAM

## 2023-12-09 PROCEDURE — 97166 OT EVAL MOD COMPLEX 45 MIN: CPT

## 2023-12-09 PROCEDURE — 97535 SELF CARE MNGMENT TRAINING: CPT

## 2023-12-09 PROCEDURE — 99233 SBSQ HOSP IP/OBS HIGH 50: CPT | Performed by: INTERNAL MEDICINE

## 2023-12-09 PROCEDURE — 80061 LIPID PANEL: CPT | Performed by: PSYCHIATRY & NEUROLOGY

## 2023-12-09 PROCEDURE — 80053 COMPREHEN METABOLIC PANEL: CPT | Performed by: STUDENT IN AN ORGANIZED HEALTH CARE EDUCATION/TRAINING PROGRAM

## 2023-12-09 PROCEDURE — 93306 TTE W/DOPPLER COMPLETE: CPT | Performed by: INTERNAL MEDICINE

## 2023-12-09 PROCEDURE — NC001 PR NO CHARGE: Performed by: NURSE PRACTITIONER

## 2023-12-09 RX ORDER — POTASSIUM CHLORIDE 20 MEQ/1
20 TABLET, EXTENDED RELEASE ORAL DAILY
Status: DISCONTINUED | OUTPATIENT
Start: 2023-12-09 | End: 2023-12-09 | Stop reason: HOSPADM

## 2023-12-09 RX ORDER — LEVETIRACETAM 500 MG/1
500 TABLET ORAL EVERY 12 HOURS SCHEDULED
Qty: 60 TABLET | Refills: 0 | Status: SHIPPED | OUTPATIENT
Start: 2023-12-09

## 2023-12-09 RX ORDER — METOPROLOL SUCCINATE 25 MG/1
25 TABLET, EXTENDED RELEASE ORAL DAILY
Qty: 30 TABLET | Refills: 0 | Status: SHIPPED | OUTPATIENT
Start: 2023-12-10

## 2023-12-09 RX ORDER — ATORVASTATIN CALCIUM 80 MG/1
80 TABLET, FILM COATED ORAL
Qty: 30 TABLET | Refills: 0 | Status: SHIPPED | OUTPATIENT
Start: 2023-12-09

## 2023-12-09 RX ORDER — SPIRONOLACTONE 25 MG/1
25 TABLET ORAL DAILY
COMMUNITY
End: 2023-12-09

## 2023-12-09 RX ORDER — POTASSIUM CHLORIDE 20 MEQ/1
20 TABLET, EXTENDED RELEASE ORAL DAILY
Qty: 30 TABLET | Refills: 0 | Status: SHIPPED | OUTPATIENT
Start: 2023-12-10

## 2023-12-09 RX ORDER — CLOPIDOGREL BISULFATE 75 MG/1
75 TABLET ORAL DAILY
Qty: 21 TABLET | Refills: 0 | Status: SHIPPED | OUTPATIENT
Start: 2023-12-10

## 2023-12-09 RX ADMIN — ASPIRIN 81 MG CHEWABLE TABLET 81 MG: 81 TABLET CHEWABLE at 09:47

## 2023-12-09 RX ADMIN — METOPROLOL SUCCINATE 25 MG: 25 TABLET, EXTENDED RELEASE ORAL at 09:46

## 2023-12-09 RX ADMIN — LEVETIRACETAM 500 MG: 500 TABLET, FILM COATED ORAL at 09:46

## 2023-12-09 RX ADMIN — POTASSIUM CHLORIDE 20 MEQ: 1500 TABLET, EXTENDED RELEASE ORAL at 09:47

## 2023-12-09 RX ADMIN — ENOXAPARIN SODIUM 30 MG: 30 INJECTION SUBCUTANEOUS at 09:47

## 2023-12-09 RX ADMIN — CLOPIDOGREL BISULFATE 75 MG: 75 TABLET ORAL at 09:46

## 2023-12-09 NOTE — PROGRESS NOTES
Progress Note - Cardiology   [facility]     Stephany Cabello 69 y.o. male MRN: [id number]  : 1945  Unit/Bed#: [address] [place] [address] Encounter: [id number]    Assessment and Plan:   Seizure-like activity. - Presented on 23 for evaluation for altered mental status. - Had witnessed seizure in ED with enuresis. Wife is at bedside and provides history as the patient states he is too tired to interact with provider. Patient's wife states that she had noticed the patient was very lethargic on morning of 23 when she was unable to arouse him she called 911. ED she reports noting patient had an episode of gurgling, drinking and lost bladder function. She states upon reflecting patient had a similar episode in 2023 and prior to AVR/CABG in 2023. She states after undergoing AVR/CABG on 23 patient had been doing well but in recent weeks she has noted patient has become more lethargic.  - 23 MRI brain seizure w/o and w contrast: Motion degraded examination. No acute infarction, edema, or pathologic intra-axial enhancement. Chronic anterior right frontal lobe infarct with encephalomalacia and surrounding gliosis. Chronic infarct and encephalomalacia involving the anterior body of the corpus callosum. Moderate chronic microangiopathic ischemic changes. - 23 CTA head and neck with and without contrast:     CT HEAD  - No acute intracranial abnormality.  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 Consider follow-up with ENT. CTA HEAD AND NECK  - Negative CTA head and neck for large vessel occlusion, dissection, or aneurysm.   - Severe stenosis (approximately 80% narrowing) in right ICA proximal cervical segment due to calcified atherosclerotic disease.  - Severe stenosis in origin of left vertebral artery due to calcified atherosclerotic disease.     - 12/07/23 CT head without contrast: No acute intracranial abnormality. - 12/07/23 EEG: This 41-60 minute EEG  recorded during wakefulness, drowsiness, and sleep is abnormal due to presence of rare two left frontotemporal region sharply contoured activity that in appropriate clinical setting can be concerning for epileptogenic potentials. Clinical correlation is recommended. - Neurology consult recommendations noted. - Orthostatic vital signs negative. Coronary artery disease.     - s/p CABGx3 on 9/13/23.   - Stable. Patient denies chest pain. - Continue aspirin 81 mg daily and plavix 75 mg daily. - Continue Lipitor 80 mg daily. - Continue Toprol-XL 25 mg daily. 3. Chronic combined systolic and diastolic heart failure. - Does not appear in acute exacerbation. Patient is euvolemic on examination.  - 9/15/23 TTE: LVEF 25%. - 9/13/23 JESSICA: LVEF 40%. LV additional notes: Moderately dilated LV, global dysfunction. - 12/07/23 CXR: No acute cardiopulmonary disease.   - Currently on Toprol-XL 25 mg daily.  - Reviewed home medications with wife: Patient is currently on Farxiga 10 mg daily, Toprol- mg daily, Aldactone 25 mg daily, torsemide 20 mg daily, and aspirin 81 mg daily.  - Repeat TTE ordered. - His wife reports that he was scheduled for TTE on the week of 12/10/23 at [facility] to assess LVEF and determine whether or not the patient requires AICD. - Monitor weight. - Monitor I/Os. - CHF education. 4. Strep mitis AV endocarditis s/p AVR.      - Diagnosed with aortic valve endocarditis in May 2023 secondary to streptococcal bacteremia with likely dental source.    - s/p bioprosthetic aortic valve replacement (INSPIRIS RISILIA SIZE 23 MM) on 9/13/23 at [facility].   - 9/15/23 TTE: LVEF 25%; well-seated bioprosthetic aortic valve, mean gradient 15 mmHg, EOA 1.3 cm². 5.  Severe AS s/p AVR.      - s/p bioprosthetic aortic valve replacement (INSPIRIS RISILIA SIZE 23 MM) on 9/13/23 at [facility].   - 9/15/23 TTE: LVEF 25%; well-seated bioprosthetic aortic valve, mean gradient 15 mmHg, EOA 1.3 cm². - Repeat TTE. 6. Paroxysmal atrial fibrillation.    - Patient with a known history of paroxysmal atrial fibrillation. Noted to have episode of atrial fibrillation during May 2023 hospitalization. - 12/07/23 EKG: Sinus rhythm, 75 bpm.  Inferior infarct. - 12/06-12/07 telemetry reviewed: Sinus rhythm, 57 bpm.  No evidence of tachycardic or bradycardicarrhythmia.  - Currently on Toprol-XL 25 mg daily.  - Reviewed home medications with wife: Patient is currently on Farxiga 10 mg daily, Toprol- mg daily, Aldactone 25 mg daily, torsemide 20 mg daily, and aspirin 81 mg daily. - [id number] stroke risk score: 4 points, moderate-high risk.   - Not currently on anticoagulation. On review of chart it appears patient did not want to be on anticoagulation due to bleeding risk. Visited the discussion regarding benefits of anticoagulation given history of paroxysmal atrial fibrillation. Patient states he is not interested in anticoagulation. 7. Right ICA stenosis. - 12/08/23 CTA head and neck with and without contrast: Severe stenosis (approximately 80% narrowing) in right ICA proximal cervical segment due to calcified atherosclerotic disease.   - Recommend vascular surgery consultation. 8. Hypertension.     - SBP over 24 hours 111-138. - Currently on Toprol-XL 25 mg daily.  - Reviewed home medications with wife: Patient is currently on Farxiga 10 mg daily, Toprol- mg daily, Aldactone 25 mg daily, torsemide 20 mg daily, and aspirin 81 mg daily. - Continue to monitor BP.       9. Hyperlipidemia.     - 9/12/23 lipid panel: cholesterol 118, triglycerides 54, HDL 79, LDL 28.   - Continue Lipitor 80 mg daily. 10. Pre-diabetes. - 12/07/23 HgbA1c: 5.8.   - Care per primary team.     Subjective / Objective:          No acute events. Wife is at bedside reviewed patient's home medications, made necessary adjustments to patient's chart. Patient offers no complaints this morning. He denies experiencing chest pain, palpitations, shortness of breath, lightheadedness, dizziness, headache, nausea, or vomiting. Vitals: Blood pressure 118/62, pulse 63, temperature 97.8 °F (36.6 °C), resp. rate 18, height 5' 8" (1.727 m), weight 65.8 kg (145 lb), SpO2 96 %. Vitals:    12/07/23 1841 12/09/23 0740   Weight: 65.8 kg (145 lb) 65.8 kg (145 lb)     Body mass index is 22.05 kg/m². BP Readings from Last 3 Encounters:   12/09/23 118/62   09/12/23 170/86   09/08/23 (!) 115/40     Orthostatic Blood Pressures      Flowsheet Row Most Recent Value   Blood Pressure 118/62 filed at 12/09/2023 1138   Patient Position - Orthostatic VS Standing for 3 minutes - Orthostatic VS filed at 12/08/2023 1858          I/O       None          Invasive Devices       Peripheral Intravenous Line  Duration             Peripheral IV 12/07/23 Right Forearm 1 day                    No intake or output data in the 24 hours ending 12/09/23 1004      Physical Exam:   Physical Exam  Vitals reviewed. Constitutional:       General: He is not in acute distress. Cardiovascular:      Rate and Rhythm: Normal rate and regular rhythm. Pulses: Normal pulses. Heart sounds: Murmur heard. Pulmonary:      Effort: Pulmonary effort is normal. No respiratory distress. Abdominal:      General: Abdomen is flat. There is no distension. Palpations: Abdomen is soft. Tenderness: There is no abdominal tenderness. Musculoskeletal:      Right lower leg: No edema. Left lower leg: No edema. Skin:     General: Skin is warm and dry. Neurological:      Mental Status: He is alert and oriented to person, place, and time.        Medications/ Allergies:     Current Facility-Administered Medications   Medication Dose Route Frequency Provider Last Rate    acetaminophen  650 mg Oral Q6H PRN Christina Arellano MD      aspirin  81 mg Oral Daily Adan Aviles PA-C      atorvastatin  80 mg Oral Daily With Luan Cade PA-C      clopidogrel  75 mg Oral Daily Markell Chase MD      enoxaparin  30 mg Subcutaneous Q24H [address] Christina Arellano MD      levETIRAcetam  500 mg Oral Q12H [address] Markell Chase MD      LORazepam  2 mg Intravenous Q8H PRN Christina Arellano MD      metoprolol  2.5 mg Intravenous Q6H PRN Christina Arellano MD      metoprolol succinate  25 mg Oral Daily Christina Arellano MD      ondansetron  4 mg Intravenous Q6H PRN Christina Arellano MD      potassium chloride  20 mEq Oral Daily Christina Arellano MD       acetaminophen, 650 mg, Q6H PRN  LORazepam, 2 mg, Q8H PRN  metoprolol, 2.5 mg, Q6H PRN  ondansetron, 4 mg, Q6H PRN      No Known Allergies    VTE Pharmacologic Prophylaxis:   Enoxaparin (Lovenox)    Labs:   Troponins:  Results from last 7 days   Lab Units 12/07/23  1559 12/07/23  1419   HSTNI D2 ng/L  --  16   HSTNI D4 ng/L 44*  --          CBC with diff:  Results from last 7 days   Lab Units 12/09/23  0447 12/08/23  0425 12/07/23  1204   WBC Thousand/uL 5.99 10.42* 7.69   HEMOGLOBIN g/dL 11.8* 11.9* 13.0   HEMATOCRIT % 36.6 37.0 39.9   MCV fL 93 96 95   PLATELETS Thousands/uL 205 203 222   RBC Million/uL 3.93 3.87* 4.19   MCH pg 30.0 30.7 31.0   MCHC g/dL 32.2 32.2 32.6   RDW % 14.6 15.2* 15.2*   MPV fL 9.6 9.3 9.5   NRBC AUTO /100 WBCs 0 0 0       CMP:  Results from last 7 days   Lab Units 12/09/23  0447 12/08/23  0425 12/07/23  1204   SODIUM mmol/L 136 136 137   POTASSIUM mmol/L 3.3* 3.5 4.1   CHLORIDE mmol/L 103 107 104   CO2 mmol/L 26 22 21   ANION GAP mmol/L 7 7 12   BUN mg/dL 22 23 36*   CREATININE mg/dL 1.16 1.04 1.25   CALCIUM mg/dL 9.3 9.2 9.2   AST U/L 21 25 18   ALT U/L 15 13 16   ALK PHOS U/L 51 56 57   TOTAL PROTEIN g/dL 6.5 6.8 6.6   ALBUMIN g/dL 3.9 4.0 4.0   TOTAL BILIRUBIN mg/dL 0.47 0.51 0.34   EGFR ml/min/1.73sq m 60 68 55       Magnesium:    Coags:  Results from last 7 days   Lab Units 23  1204   PTT seconds 32   INR  0.97     TSH:  Results from last 7 days   Lab Units 23  1204   TSH 3RD GENERATON uIU/mL 2.020     No components found for: "TSH3"  Lipid Profile:  Results from last 7 days   Lab Units 23  0447   CHOLESTEROL mg/dL 118   TRIGLYCERIDES mg/dL 71   HDL mg/dL 62   LDL CALC mg/dL 42     Hgb A1c:  Results from last 7 days   Lab Units 23  1754   HEMOGLOBIN A1C % 5.8*     NT-proBNP: No results for input(s): "NTBNP" in the last 72 hours. Imaging & Testing   I have personally reviewed pertinent reports. MRI brain seizure wo and w contrast    Result Date: 2023    Impression: Motion degraded examination. No acute infarction, edema, or pathologic intra-axial enhancement. Chronic anterior right frontal lobe infarct with encephalomalacia and surrounding gliosis. Chronic infarct and encephalomalacia involving the anterior body of the corpus callosum. Moderate chronic microangiopathic ischemic changes. EEG Awake and asleep    Result Date: 2023  Narrative: Table formatting from the original result was not included. ELECTROENCEPHALOGRAM (EEG) Patient Name:  Deangelo Lee  MRN: [id number] :  1945 File #: unavailable  Age: 69 y.o. Encounter #: [id number] Date performed: 23        Report date: 2023      Study type: 41-60 minute EEG ICD 10 diagnosis: Seizure R56.9 Start time: 11:24 End time: 12:23 --------------------------------------------------------------------------- ---------------------------------------- Patient History: This recording was observed in a 69 y.o. male  with PMH of  recent aortic valve replacement, 5 vessel bypass in September presents with seizure. Yesterday, patient slept late in morning. His wife had difficulty arousing him and called 911.  Medics at the scene noted that the patient was at first lethargic and nonverbal and then became quite agitated. Pt was administered a milligram of lorazepam. After arrival to ED, he did have witnessed tonic clonic seizure. Wife states that in July 2023, he did have similar event that at the time they didn't think much of. EEG is obtained to r/o interictal abnormality. Medications include: Facility-Administered Medications Ordered in Other Visits Medication Dose Route Frequency Provider Last Rate  acetaminophen  650 mg Oral Q6H PRN Aditya Best MD    aspirin  81 mg Oral Daily Parth Fontana PA-C    atorvastatin  80 mg Oral Daily With Kaylee Landry PA-C    enoxaparin  30 mg Subcutaneous Q24H [address]  Aditya Best MD    levETIRAcetam  500 mg Oral Q12H [address] [facility] MD    LORazepam  1 mg Intravenous Q6H PRN Aditya Best MD    LORazepam  2 mg Intravenous Q8H PRN Aditya Best MD    metoprolol  2.5 mg Intravenous Q6H PRN Aditya Best MD    metoprolol succinate  25 mg Oral Daily Aditya Best MD    ondansetron  4 mg Intravenous Q6H PRN Aditya Best MD   --------------------------------------------------------------------------- ---------------------------------------- Description of Procedure: 32 channel digital recording with electrodes placed according to the International 10-20 system with additional T1/T2 electrodes, EOG, EKG, and simultaneous video. The recording was technically satisfactory. --------------------------------------------------------------------------- ---------------------------------------- EEG Description: The recording was performed with the patient awake, drowsy, and asleep. He was oriented to x3. During wakefulness, there were long runs of well regulated, low amplitude, posteriorly dominant, symmetric 9 cps alpha rhythm that did attenuate with eye opening. There were symmetric low amplitude, frontally dominant beta activities. There were symmetric low amplitude, centrally dominant theta activities.  With drowsiness, alpha activity attenuated and was replaced by diffusely distributed theta activities. With sleep, symmetric vertex sharp waves, K complexes and sleep spindles were present. --------------------------------------------------------------------------- ---------------------------------------- Activation Procedures: Hyperventilation was not performed. Stepped photic stimulation between 1-20 cps was performed and induced symmetric photic driving. did not induce any changes. Other findings: The single lead ECG demonstrated normal sinus rhythm --------------------------------------------------------------------------- ---------------------------------------- EEG Interpretation: This 41-60 minute EEG  recorded during wakefulness, drowsiness, and sleep is abnormal due to presence of rare two left frontotemporal region sharply contoured activity that in appropriate clinical setting can be concerning for epileptogenic potentials. Clinical correlation is recommended. Tammy Agustin MD [facility]     CTA head and neck with and without contrast    Result Date: 12/8/2023    Impression: CT HEAD - No acute intracranial abnormality. 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 Consider follow-up with ENT. CTA HEAD AND NECK - Negative CTA head and neck for large vessel occlusion, dissection, or aneurysm. - Severe stenosis (approximately 80% narrowing) in right ICA proximal cervical segment due to calcified atherosclerotic disease. - Severe stenosis in origin of left vertebral artery due to calcified atherosclerotic disease. Small micronodular opacities in visualized right upper lobe, likely infectious/inflammatory. Consider follow up CT chest in 3 months.  Mildly prominent mediastinal and right hilar lymph nodes, which may be reactive, similar to CT chest abdomen pelvis 5/16/2023. Attention on follow-up. XR chest 1 view portable    Result Date: 12/8/2023    Impression: No acute cardiopulmonary disease. XR chest 1 view portable     Result Date: 12/8/2023     Impression: No acute cardiopulmonary disease. CT head without contrast     Result Date: 12/7/2023     Impression: No acute intracranial abnormality. EKG / Monitor: Personally reviewed. Not currently on telemetry. Cardiac testing:   Echo complete with contrast if indicated  Result date: 9/11/23      Left Ventricle Left ventricular cavity size is normal. Wall thickness is mildly increased. There is mild concentric hypertrophy. The left ventricular ejection fraction is 50% by visual estimation. . Systolic function is low normal.  Wall motion is  low normal. Diastolic function is moderately abnormal, consistent with grade II (pseudonormal) relaxation. Right Ventricle Right ventricular cavity size is normal. Systolic function is normal. Wall thickness is normal.   Left Atrium The atrium is mildly dilated (35-41 mL/m2). Right Atrium The atrium is normal in upper size. Aortic Valve The aortic valve is trileaflet. The leaflets are moderately thickened. The leaflets are moderately calcified. There is moderately reduced mobility. There is at least mild to moderate regurgitation. Jet is eccentric and not measured accurately. There is moderate to severe stenosis. Peak gradient 67 mean gradient 32 and calculated valve area 0.9 to 1.0 cm 2. Mitral Valve There is mild annular calcification. There is mild to moderate regurgitation. There is no evidence of stenosis. Tricuspid Valve There is mild to moderate regurgitation. Pulmonary artery pressure around 55 to 60 mmHg. There is no evidence of stenosis. Pulmonic Valve There is no evidence of regurgitation. There is no evidence of stenosis.    Ascending Aorta The aortic root is normal in size. IVC/SVC The inferior vena cava is normal in size. Respirophasic changes were normal.   Pericardium There is a small pericardial effusion along the right atrial free wall. There is a left pleural effusion. Cardiac catheterization  Result date: 8/24/23     Left Main   The vessel is large. There is moderate diffuse disease throughout the vessel. The vessel is moderately calcified. Dist LM lesion is 70% stenosed. Left Anterior Descending   The vessel is large. There is moderate diffuse disease throughout the vessel. The vessel is moderately calcified in the proximal segment. Mid LAD lesion is 70% stenosed. First Diagonal Branch   1st Diag lesion is 90% stenosed. Left Circumflex   The vessel is large. codominant There is mild diffuse disease throughout the vessel. Ost Cx lesion is 50% stenosed. Right Coronary Artery   Mid RCA lesion is 80% stenosed. Cardiac catheterization  Result date: 6/14/23          Left Ventricle Left ventricular cavity size is normal. Wall thickness is normal. Systolic function is normal.  Wall motion is normal.   Right Ventricle Right ventricular cavity size is normal. Systolic function is normal. Wall thickness is normal.   Left Atrium The atrium is normal in size. Right Atrium The atrium is normal in size. Atrial Septum No patent foramen ovale detected using color flow Doppler at rest.   Left Atrial Appendage Left atrial appendage is normal in size. There is normal function. There is no thrombus. Aortic Valve The aortic valve is trileaflet. The leaflets are moderately thickened. The leaflets are severely calcified. The leaflets exhibit normal mobility. There is moderate regurgitation. There is moderate to severe stenosis visually (doppler was not repeated on this study due to prior study only 1 month ago). There is a small mobile vegetation at the commissure of the LCC and nonCC.  A very small mobile vegetation is noted at the tip of the RCC. Mitral Valve Mitral valve structure is normal. There is mild regurgitation. There is no evidence of stenosis. Tricuspid Valve Tricuspid valve structure is normal. There is mild regurgitation. There is no evidence of stenosis. Pulmonic Valve Pulmonic valve structure is normal. There is no evidence of regurgitation. There is no evidence of stenosis. Ascending Aorta The aortic root is normal in size. There is no evidence of aortic dissection. There is no aneurysm in the aorta. Pericardium There is no pericardial effusion. There is a left pleural effusion. Results for orders placed during the hospital encounter of 10/08/21     Echo complete with contrast if indicated     Narrative  [facility] [address],  [place] [facility]     Transthoracic Echocardiogram  2D, M-mode, Doppler, and Color Doppler     Study date:  08-Oct-2021     Patient: Loi Cassidy  MR number: [id number]  Account number: [de-identified]  : 1945  Age: 77 years  Gender: Male  Status: Outpatient  Location: [facility]  Height: 68 in  Weight: 144 lb  BP: 112/ 58 mmHg     Indications: Murmur     Diagnoses: R01.1 - Cardiac murmur, unspecified     Sonographer:  AUBREE Chau  Primary Physician:  Micheline Pearson. Shawn Segura  Referring Physician:  Micheline Pearson. Ryland Albert DO  Group:  [facility]  Interpreting Physician:  Agustín Maciel MD     IMPRESSIONS:  The presence of any assoicated symptoms should prompt further w/u and/or referral to cardiology. SUMMARY     LEFT VENTRICLE:  Systolic function was normal. Ejection fraction was estimated to be 60 %. There were no regional wall motion abnormalities. Doppler parameters were consistent with abnormal left ventricular relaxation (grade 1 diastolic dysfunction). LEFT ATRIUM:  The atrium was mildly dilated. MITRAL VALVE:  There was trace regurgitation. AORTIC VALVE:  The valve was trileaflet. Leaflets exhibited markedly increased thickness and marked calcification. Transaortic velocity was increased due to valvular stenosis. There was moderate stenosis. Valve mean gradient was 30 mmHg. Aortic valve area was 1.2 cmï¾² by the continuity equation. HISTORY: PRIOR HISTORY: HLD, Former smoker     PROCEDURE: The study was performed in the [facility]. This was a routine study. The transthoracic approach was used. The study included complete 2D imaging, M-mode, complete spectral Doppler, and color Doppler. The heart rate was 66  bpm, at the start of the study. Images were obtained from the parasternal, apical, subcostal, and suprasternal notch acoustic windows. Image quality was adequate. LEFT VENTRICLE: Size was normal. Systolic function was normal. Ejection fraction was estimated to be 60 %. There were no regional wall motion abnormalities. Wall thickness was normal. DOPPLER: Doppler parameters were consistent with  abnormal left ventricular relaxation (grade 1 diastolic dysfunction). RIGHT VENTRICLE: The size was normal. Systolic function was normal. Wall thickness was normal.     LEFT ATRIUM: The atrium was mildly dilated. RIGHT ATRIUM: Size was normal.     MITRAL VALVE: Valve structure was normal. There was normal leaflet separation. DOPPLER: The transmitral velocity was within the normal range. There was no evidence for stenosis. There was trace regurgitation. AORTIC VALVE: The valve was trileaflet. Leaflets exhibited markedly increased thickness and marked calcification. DOPPLER: Transaortic velocity was increased due to valvular stenosis. There was moderate stenosis. There was no significant  regurgitation. TRICUSPID VALVE: The valve structure was normal. There was normal leaflet separation. DOPPLER: The transtricuspid velocity was within the normal range. There was no evidence for stenosis. There was no significant regurgitation.      PULMONIC VALVE: Leaflets exhibited normal thickness, no calcification, and normal cuspal separation. DOPPLER: The transpulmonic velocity was within the normal range. There was no significant regurgitation. PERICARDIUM: There was no pericardial effusion. The pericardium was normal in appearance. AORTA: The root exhibited normal size. SYSTEMIC VEINS: IVC: The inferior vena cava was normal in size. MEASUREMENT TABLES     DOPPLER MEASUREMENTS  Aortic valve   (Reference normals)  Peak gradient   52 mmHg   (--)  Mean gradient   30 mmHg   (--)  Valve area, cont   1.2 cmï¾²   (--)     SYSTEM MEASUREMENT TABLES     2D  %FS: 44.35 %  Ao Diam: 3.45 cm  EDV(Teich): 109.24 ml  EF(Teich): 75.51 %  ESV(Teich): 26.75 ml  IVSd: 0.95 cm  LA Diam: 3.29 cm  LAAs A4C: 24.62 cm2  LAESV A-L A4C: 87.65 ml  LAESV MOD A4C: 82.2 ml  LALs A4C: 5.87 cm  LVEDV MOD A4C: 90.82 ml  LVEF MOD A4C: 61.3 %  LVESV MOD A4C: 35.15 ml  LVIDd: 4.83 cm  LVIDs: 2.69 cm  LVLd A4C: 8.05 cm  LVLs A4C: 7.15 cm  LVOT Diam: 2.24 cm  LVPWd: 0.98 cm  RAAs A4C: 19.81 cm2  RAESV A-L: 54.93 ml  RAESV MOD: 55.35 ml  RALs: 6.07 cm  RVIDd: 3.96 cm  RWT: 0.41  SV MOD A4C: 55.67 ml  SV(Teich): 82.48 ml     CW  AV Env. Ti: 351.41 ms  AV VTI: 89.64 cm  AV Vmax: 3.46 m/s  AV Vmean: 2.49 m/s  AV maxP mmHg  AV meanP.9 mmHg     MM  TAPSE: 2.14 cm     PW  MARVIN (VTI): 1.1 cm2  MARVIN Vmax: 1.15 cm2  E': 0.06 m/s  E/E': 10.15  LVOT Env. Ti: 367.06 ms  LVOT VTI: 25.04 cm  LVOT Vmax: 1.01 m/s  LVOT Vmean: 0.69 m/s  LVOT maxP.15 mmHg  LVOT meanP.21 mmHg  LVSV Dopp: 99 ml  MV A Denis: 0.91 m/s  MV Dec [place]: 1.93 m/s2  MV DecT: 343.04 ms  MV E Denis: 0.65 m/s  MV E/A Ratio: 0.72     [facility]     Prepared and electronically signed by     Maggie Ugalde MD  Signed 08-Oct-2021 09:24:29               Agueda Valencia PA-C

## 2023-12-09 NOTE — ASSESSMENT & PLAN NOTE
Slight elevation in troponins     Hx of PAF, CAD, s/p CABGx3, AV valve replacement per chart review  Delta after 4h=44  EKG: NSR, LAE, T wave changes resolved  Likely 2/2 possible seizure like activity  Tele  Will monitor  Cardiology following  S/p primary cardiology

## 2023-12-09 NOTE — PROGRESS NOTES
Vascular Surgery    60-year-old male admitted with seizures multiple comorbidities he had MRI as part of his workup which showed chronic infarcts only. He does have a right frontal infarcts as well as an 80% right internal carotid artery stenosis. Given that these are old infarcts I would not consider his right severe carotid stenosis to be symptomatic. I would consider this to be an asymptomatic would continue antiplatelet and statin therapy he is cleared for discharge from vascular surgery standpoint he will need to follow-up with me in the office given his significant comorbidities have to carefully consider intervention for an asymptomatic lesion. I will discuss this with him when he returns for follow up.

## 2023-12-09 NOTE — PROGRESS NOTES
12/08/23 1853 12/08/23 1854 12/08/23 1858   Vitals   Pulse 62 67 75   Blood Pressure 127/57 123/56 138/69   MAP (mmHg) 80 78 92   BP Location Left arm Left arm Left arm   BP Method Automatic Automatic Automatic   Patient Position - Orthostatic VS Lying - Orthostatic VS Sitting - Orthostatic VS Standing for 3 minutes - Orthostatic VS   Oxygen Therapy   SpO2 97 % 97 % 97 %     Ortho b/p negative

## 2023-12-09 NOTE — INCIDENTAL FINDINGS
The following findings require follow up:  Radiographic finding   Finding:  CTA head and neck with and without contrast: CT HEAD, - No acute intracranial abnormality. , - Chronic small infarcts in right frontal lobe and bilateral caudates with mild chronic microangiopathy. 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 Consider follow-up with ENT., CTA HEAD AND NECK, - Negative CTA head and neck for large vessel occlusion, dissection, or aneurysm. , - Severe stenosis (approximately 80% narrowing) in right ICA proximal cervical segment due to calcified atherosclerotic disease. , - Severe stenosis in origin of left vertebral artery due to calcified atherosclerotic disease., Small micronodular opacities in visualized right upper lobe, likely infectious/inflammatory. Consider follow up CT chest in 3 months., Mildly prominent mediastinal and right hilar lymph nodes, which may be reactive, similar to CT chest abdomen pelvis 5/16/2023. Attention on follow-up.     Follow up required: YES   Follow up should be done within 2 week(s)    Please notify the following clinician to assist with the follow up:   Dr. RIGGS/ Belkys Hernandez

## 2023-12-09 NOTE — ASSESSMENT & PLAN NOTE
Continue aspirin, statin  Followed by cardiology Shawn Colon in [place], will follow-up for further cardiology management

## 2023-12-09 NOTE — ASSESSMENT & PLAN NOTE
CTA H/N w/contrast: Negative CTA head and neck for large vessel occlusion, dissection, or aneurysm. Severe stenosis (approximately 80% narrowing) in right ICA proximal cervical segment due to calcified atherosclerotic disease.  Severe stenosis in origin of left vertebral artery due to calcified atherosclerotic disease  D/W vascular recs:  Asymptomatic  DAPT  Follow-up outpatient

## 2023-12-09 NOTE — PLAN OF CARE
Problem: PAIN - ADULT  Goal: Verbalizes/displays adequate comfort level or baseline comfort level  Description: Interventions:  - Encourage patient to monitor pain and request assistance  - Assess pain using appropriate pain scale  - Administer analgesics based on type and severity of pain and evaluate response  - Implement non-pharmacological measures as appropriate and evaluate response  - Consider cultural and social influences on pain and pain management  - Notify physician/advanced practitioner if interventions unsuccessful or patient reports new pain  Outcome: Progressing     Problem: INFECTION - ADULT  Goal: Absence or prevention of progression during hospitalization  Description: INTERVENTIONS:  - Assess and monitor for signs and symptoms of infection  - Monitor lab/diagnostic results  - Monitor all insertion sites, i.e. indwelling lines, tubes, and drains  - Monitor endotracheal if appropriate and nasal secretions for changes in amount and color  - [place] appropriate cooling/warming therapies per order  - Administer medications as ordered  - Instruct and encourage patient and family to use good hand hygiene technique  - Identify and instruct in appropriate isolation precautions for identified infection/condition  Outcome: Progressing  Goal: Absence of fever/infection during neutropenic period  Description: INTERVENTIONS:  - Monitor WBC    Outcome: Progressing     Problem: SAFETY ADULT  Goal: Patient will remain free of falls  Description: INTERVENTIONS:  - Educate patient/family on patient safety including physical limitations  - Instruct patient to call for assistance with activity   - Consult OT/PT to assist with strengthening/mobility   - Keep Call bell within reach  - Keep bed low and locked with side rails adjusted as appropriate  - Keep care items and personal belongings within reach  - Initiate and maintain comfort rounds  - Make Fall Risk Sign visible to staff  - Offer Toileting every 2 Hours, in advance of need  - Initiate/Maintain bed alarm  - Obtain necessary fall risk management equipment: socks/bracelet/alarm  - Apply yellow socks and bracelet for high fall risk patients  - Consider moving patient to room near nurses station  Outcome: Progressing  Goal: Maintain or return to baseline ADL function  Description: INTERVENTIONS:  -  Assess patient's ability to carry out ADLs; assess patient's baseline for ADL function and identify physical deficits which impact ability to perform ADLs (bathing, care of mouth/teeth, toileting, grooming, dressing, etc.)  - Assess/evaluate cause of self-care deficits   - Assess range of motion  - Assess patient's mobility; develop plan if impaired  - Assess patient's need for assistive devices and provide as appropriate  - Encourage maximum independence but intervene and supervise when necessary  - Involve family in performance of ADLs  - Assess for home care needs following discharge   - Consider OT consult to assist with ADL evaluation and planning for discharge  - Provide patient education as appropriate  Outcome: Progressing  Goal: Maintains/Returns to pre admission functional level  Description: INTERVENTIONS:  - Perform AM-PAC 6 Click Basic Mobility/ Daily Activity assessment daily.  - Set and communicate daily mobility goal to care team and patient/family/caregiver. - Collaborate with rehabilitation services on mobility goals if consulted  - Perform Range of Motion 12 times a day. - Reposition patient every 2 hours.   - Dangle patient 3 times a day  - Stand patient 3 times a day  - Ambulate patient 3 times a day  - Out of bed to chair 3 times a day   - Out of bed for meals 3 times a day  - Out of bed for toileting  - Record patient progress and toleration of activity level   Outcome: Progressing     Problem: DISCHARGE PLANNING  Goal: Discharge to home or other facility with appropriate resources  Description: INTERVENTIONS:  - Identify barriers to discharge w/patient and caregiver  - Arrange for needed discharge resources and transportation as appropriate  - Identify discharge learning needs (meds, wound care, etc.)  - Arrange for interpretive services to assist at discharge as needed  - Refer to Case Management Department for coordinating discharge planning if the patient needs post-hospital services based on physician/advanced practitioner order or complex needs related to functional status, cognitive ability, or social support system  Outcome: Progressing     Problem: Knowledge Deficit  Goal: Patient/family/caregiver demonstrates understanding of disease process, treatment plan, medications, and discharge instructions  Description: Complete learning assessment and assess knowledge base. Interventions:  - Provide teaching at level of understanding  - Provide teaching via preferred learning methods  Outcome: Progressing     Problem: Prexisting or High Potential for Compromised Skin Integrity  Goal: Skin integrity is maintained or improved  Description: INTERVENTIONS:  - Identify patients at risk for skin breakdown  - Assess and monitor skin integrity  - Assess and monitor nutrition and hydration status  - Monitor labs   - Assess for incontinence   - Turn and reposition patient  - Assist with mobility/ambulation  - Relieve pressure over bony prominences  - Avoid friction and shearing  - Provide appropriate hygiene as needed including keeping skin clean and dry  - Evaluate need for skin moisturizer/barrier cream  - Collaborate with interdisciplinary team   - Patient/family teaching  - Consider wound care consult   Outcome: Progressing     Problem: Neurological Deficit  Goal: Neurological status is stable or improving  Description: Interventions:  - Monitor and assess patient's level of consciousness, motor function, sensory function, and level of assistance needed for ADLs. - Monitor and report changes from baseline.  Collaborate with interdisciplinary team to initiate plan and implement interventions as ordered. - Provide and maintain a safe environment. - Consider seizure precautions. - Consider fall precautions. - Consider aspiration precautions. - Consider bleeding precautions. Outcome: Progressing     Problem: Activity Intolerance/Impaired Mobility  Goal: Mobility/activity is maintained at optimum level for patient  Description: Interventions:  - Assess and monitor patient  barriers to mobility and need for assistive/adaptive devices. - Assess patient's emotional response to limitations. - Collaborate with interdisciplinary team and initiate plans and interventions as ordered. - Encourage independent activity per ability.  - Maintain proper body alignment. - Perform active/passive rom as tolerated/ordered. - Plan activities to conserve energy.  - Turn patient as appropriate  Outcome: Progressing     Problem: Communication Impairment  Goal: Ability to express needs and understand communication  Description: Assess patient's communication skills and ability to understand information. Patient will demonstrate use of effective communication techniques, alternative methods of communication and understanding even if not able to speak. - Encourage communication and provide alternate methods of communication as needed. - Collaborate with case management/ for discharge needs. - Include patient/family/caregiver in decisions related to communication. Outcome: Progressing     Problem: Potential for Aspiration  Goal: Non-ventilated patient's risk of aspiration is minimized  Description: Assess and monitor vital signs, respiratory status, and labs (WBC). Monitor for signs of aspiration (tachypnea, cough, rales, wheezing, cyanosis, fever). - Assess and monitor patient's ability to swallow. - Place patient up in chair to eat if possible.   - HOB up at 90 degrees to eat if unable to get patient up into chair.  - Supervise patient during oral intake. - Instruct patient/ family to take small bites. - Instruct patient/ family to take small single sips when taking liquids. - Follow patient-specific strategies generated by speech pathologist.  Outcome: Progressing     Problem: Nutrition  Goal: Nutrition/Hydration status is improving  Description: Monitor and assess patient's nutrition/hydration status for malnutrition (ex- brittle hair, bruises, dry skin, pale skin and conjunctiva, muscle wasting, smooth red tongue, and disorientation). Collaborate with interdisciplinary team and initiate plan and interventions as ordered. Monitor patient's weight and dietary intake as ordered or per policy. Utilize nutrition screening tool and intervene per policy. Determine patient's food preferences and provide high-protein, high-caloric foods as appropriate. - Assist patient with eating.  - Allow adequate time for meals.  - Encourage patient to take dietary supplement as ordered. - Collaborate with clinical nutritionist.  - Include patient/family/caregiver in decisions related to nutrition. Outcome: Progressing     Problem: NEUROSENSORY - ADULT  Goal: Achieves stable or improved neurological status  Description: INTERVENTIONS  - Monitor and report changes in neurological status  - Monitor vital signs such as temperature, blood pressure, glucose, and any other labs ordered   - Initiate measures to prevent increased intracranial pressure  - Monitor for seizure activity and implement precautions if appropriate      Outcome: Progressing  Goal: Achieves maximal functionality and self care  Description: INTERVENTIONS  - Monitor swallowing and airway patency with patient fatigue and changes in neurological status  - Encourage and assist patient to increase activity and self care.    - Encourage visually impaired, hearing impaired and aphasic patients to use assistive/communication devices  Outcome: Progressing     Problem: Nutrition/Hydration-ADULT  Goal: Nutrient/Hydration intake appropriate for improving, restoring or maintaining nutritional needs  Description: Monitor and assess patient's nutrition/hydration status for malnutrition. Collaborate with interdisciplinary team and initiate plan and interventions as ordered. Monitor patient's weight and dietary intake as ordered or per policy. Utilize nutrition screening tool and intervene as necessary. Determine patient's food preferences and provide high-protein, high-caloric foods as appropriate.      INTERVENTIONS:  - Monitor oral intake, urinary output, labs, and treatment plans  - Assess nutrition and hydration status and recommend course of action  - Evaluate amount of meals eaten  - Assist patient with eating if necessary   - Allow adequate time for meals  - Recommend/ encourage appropriate diets, oral nutritional supplements, and vitamin/mineral supplements  - Order, calculate, and assess calorie counts as needed  - Recommend, monitor, and adjust tube feedings and TPN/PPN based on assessed needs  - Assess need for intravenous fluids  - Provide specific nutrition/hydration education as appropriate  - Include patient/family/caregiver in decisions related to nutrition  Outcome: Progressing

## 2023-12-09 NOTE — ASSESSMENT & PLAN NOTE
Minimally responsive PTA, witnessed seizure in ED with enuresis. Multiple seizure-like episodes, increased frequency per wife report now after reflection and visualizing an actual seizure    ED: Loading dose Keppra 1000 mg, Ativan 1 mg  CT head in the ED: No acute intracranial abnormality. 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 CTA H/N w/contrast: Negative CTA head and neck for large vessel occlusion, dissection, or aneurysm. Severe stenosis (approximately 80% narrowing) in right ICA proximal cervical segment due to calcified atherosclerotic disease. Severe stenosis in origin of left vertebral artery due to calcified atherosclerotic disease. MRI of the brain:  EEG-pending  Telemetry-monitor for arrhythmias. Prolactin-WNL  UDS-THC  HbA1c- 6.2%, TSH -WNL  Etoh-wnl  Ativan as needed  neuro-check q4h  PT/OT consult  Bedside swallow evaluation  Seizure/fall precautions  Neuro recs:  Placed vascular surgery consult  Adding plavix for now, After intervention, remain on one AP therapy. EEG shows rare spikes, would be beneficial for ppx seizure medication for prevention. No driving for 6 months as this is recurrent event. Keppra 500mg bid upon discharge long term. F/u with us in 10-12 weeks.

## 2023-12-09 NOTE — CONSULTS
[address]  Consult  Name: Stephany Cabello 69 y.o. male I MRN: [id number]  Unit/Bed#: [address] [place] [address] Date of Admission: 12/7/2023   Date of Service: 12/9/2023 I Hospital Day: 2    Inpatient consult to Vascular Surgery  Consult performed by: JESSICA Peck  Consult ordered by: Pedro Nelson MD          Please see Vascular note placed by Dr. Carly Murphy 12/9/2023 12:04pm

## 2023-12-09 NOTE — PLAN OF CARE
Problem: PAIN - ADULT  Goal: Verbalizes/displays adequate comfort level or baseline comfort level  Description: Interventions:  - Encourage patient to monitor pain and request assistance  - Assess pain using appropriate pain scale  - Administer analgesics based on type and severity of pain and evaluate response  - Implement non-pharmacological measures as appropriate and evaluate response  - Consider cultural and social influences on pain and pain management  - Notify physician/advanced practitioner if interventions unsuccessful or patient reports new pain  Outcome: Progressing     Problem: INFECTION - ADULT  Goal: Absence or prevention of progression during hospitalization  Description: INTERVENTIONS:  - Assess and monitor for signs and symptoms of infection  - Monitor lab/diagnostic results  - Monitor all insertion sites, i.e. indwelling lines, tubes, and drains  - Monitor endotracheal if appropriate and nasal secretions for changes in amount and color  - [place] appropriate cooling/warming therapies per order  - Administer medications as ordered  - Instruct and encourage patient and family to use good hand hygiene technique  - Identify and instruct in appropriate isolation precautions for identified infection/condition  Outcome: Progressing  Goal: Absence of fever/infection during neutropenic period  Description: INTERVENTIONS:  - Monitor WBC    Outcome: Progressing     Problem: SAFETY ADULT  Goal: Patient will remain free of falls  Description: INTERVENTIONS:  - Educate patient/family on patient safety including physical limitations  - Instruct patient to call for assistance with activity   - Consult OT/PT to assist with strengthening/mobility   - Keep Call bell within reach  - Keep bed low and locked with side rails adjusted as appropriate  - Keep care items and personal belongings within reach  - Initiate and maintain comfort rounds  - Make Fall Risk Sign visible to staff  - Offer Toileting every 2 Hours, in advance of need  - Initiate/Maintain bed alarm  - Obtain necessary fall risk management equipment: fall risk sign on door  - Apply yellow socks and bracelet for high fall risk patients  - Consider moving patient to room near nurses station  Outcome: Progressing  Goal: Maintain or return to baseline ADL function  Description: INTERVENTIONS:  -  Assess patient's ability to carry out ADLs; assess patient's baseline for ADL function and identify physical deficits which impact ability to perform ADLs (bathing, care of mouth/teeth, toileting, grooming, dressing, etc.)  - Assess/evaluate cause of self-care deficits   - Assess range of motion  - Assess patient's mobility; develop plan if impaired  - Assess patient's need for assistive devices and provide as appropriate  - Encourage maximum independence but intervene and supervise when necessary  - Involve family in performance of ADLs  - Assess for home care needs following discharge   - Consider OT consult to assist with ADL evaluation and planning for discharge  - Provide patient education as appropriate  Outcome: Progressing  Goal: Maintains/Returns to pre admission functional level  Description: INTERVENTIONS:  - Perform AM-PAC 6 Click Basic Mobility/ Daily Activity assessment daily.  - Set and communicate daily mobility goal to care team and patient/family/caregiver. - Collaborate with rehabilitation services on mobility goals if consulted  - Perform Range of Motion 2 times a day. - Reposition patient every 2 hours.   - Dangle patient 2 times a day  - Stand patient 2 times a day  - Ambulate patient 2 times a day  - Out of bed to chair 3 times a day   - Out of bed for meals 3 times a day  - Out of bed for toileting  - Record patient progress and toleration of activity level   Outcome: Progressing     Problem: DISCHARGE PLANNING  Goal: Discharge to home or other facility with appropriate resources  Description: INTERVENTIONS:  - Identify barriers to discharge w/patient and caregiver  - Arrange for needed discharge resources and transportation as appropriate  - Identify discharge learning needs (meds, wound care, etc.)  - Arrange for interpretive services to assist at discharge as needed  - Refer to Case Management Department for coordinating discharge planning if the patient needs post-hospital services based on physician/advanced practitioner order or complex needs related to functional status, cognitive ability, or social support system  Outcome: Progressing     Problem: Knowledge Deficit  Goal: Patient/family/caregiver demonstrates understanding of disease process, treatment plan, medications, and discharge instructions  Description: Complete learning assessment and assess knowledge base. Interventions:  - Provide teaching at level of understanding  - Provide teaching via preferred learning methods  Outcome: Progressing     Problem: Prexisting or High Potential for Compromised Skin Integrity  Goal: Skin integrity is maintained or improved  Description: INTERVENTIONS:  - Identify patients at risk for skin breakdown  - Assess and monitor skin integrity  - Assess and monitor nutrition and hydration status  - Monitor labs   - Assess for incontinence   - Turn and reposition patient  - Assist with mobility/ambulation  - Relieve pressure over bony prominences  - Avoid friction and shearing  - Provide appropriate hygiene as needed including keeping skin clean and dry  - Evaluate need for skin moisturizer/barrier cream  - Collaborate with interdisciplinary team   - Patient/family teaching  - Consider wound care consult   Outcome: Progressing     Problem: Neurological Deficit  Goal: Neurological status is stable or improving  Description: Interventions:  - Monitor and assess patient's level of consciousness, motor function, sensory function, and level of assistance needed for ADLs. - Monitor and report changes from baseline.  Collaborate with interdisciplinary team to initiate plan and implement interventions as ordered. - Provide and maintain a safe environment. - Consider seizure precautions. - Consider fall precautions. - Consider aspiration precautions. - Consider bleeding precautions. Outcome: Progressing     Problem: Activity Intolerance/Impaired Mobility  Goal: Mobility/activity is maintained at optimum level for patient  Description: Interventions:  - Assess and monitor patient  barriers to mobility and need for assistive/adaptive devices. - Assess patient's emotional response to limitations. - Collaborate with interdisciplinary team and initiate plans and interventions as ordered. - Encourage independent activity per ability.  - Maintain proper body alignment. - Perform active/passive rom as tolerated/ordered. - Plan activities to conserve energy.  - Turn patient as appropriate  Outcome: Progressing     Problem: Communication Impairment  Goal: Ability to express needs and understand communication  Description: Assess patient's communication skills and ability to understand information. Patient will demonstrate use of effective communication techniques, alternative methods of communication and understanding even if not able to speak. - Encourage communication and provide alternate methods of communication as needed. - Collaborate with case management/ for discharge needs. - Include patient/family/caregiver in decisions related to communication. Outcome: Progressing     Problem: Potential for Aspiration  Goal: Non-ventilated patient's risk of aspiration is minimized  Description: Assess and monitor vital signs, respiratory status, and labs (WBC). Monitor for signs of aspiration (tachypnea, cough, rales, wheezing, cyanosis, fever). - Assess and monitor patient's ability to swallow. - Place patient up in chair to eat if possible.   - HOB up at 90 degrees to eat if unable to get patient up into chair.  - Supervise patient during oral intake. - Instruct patient/ family to take small bites. - Instruct patient/ family to take small single sips when taking liquids. - Follow patient-specific strategies generated by speech pathologist.  Outcome: Progressing     Problem: Nutrition  Goal: Nutrition/Hydration status is improving  Description: Monitor and assess patient's nutrition/hydration status for malnutrition (ex- brittle hair, bruises, dry skin, pale skin and conjunctiva, muscle wasting, smooth red tongue, and disorientation). Collaborate with interdisciplinary team and initiate plan and interventions as ordered. Monitor patient's weight and dietary intake as ordered or per policy. Utilize nutrition screening tool and intervene per policy. Determine patient's food preferences and provide high-protein, high-caloric foods as appropriate. - Assist patient with eating.  - Allow adequate time for meals.  - Encourage patient to take dietary supplement as ordered. - Collaborate with clinical nutritionist.  - Include patient/family/caregiver in decisions related to nutrition. Outcome: Progressing     Problem: NEUROSENSORY - ADULT  Goal: Achieves stable or improved neurological status  Description: INTERVENTIONS  - Monitor and report changes in neurological status  - Monitor vital signs such as temperature, blood pressure, glucose, and any other labs ordered   - Initiate measures to prevent increased intracranial pressure  - Monitor for seizure activity and implement precautions if appropriate      Outcome: Progressing  Goal: Achieves maximal functionality and self care  Description: INTERVENTIONS  - Monitor swallowing and airway patency with patient fatigue and changes in neurological status  - Encourage and assist patient to increase activity and self care.    - Encourage visually impaired, hearing impaired and aphasic patients to use assistive/communication devices  Outcome: Progressing     Problem: Nutrition/Hydration-ADULT  Goal: Nutrient/Hydration intake appropriate for improving, restoring or maintaining nutritional needs  Description: Monitor and assess patient's nutrition/hydration status for malnutrition. Collaborate with interdisciplinary team and initiate plan and interventions as ordered. Monitor patient's weight and dietary intake as ordered or per policy. Utilize nutrition screening tool and intervene as necessary. Determine patient's food preferences and provide high-protein, high-caloric foods as appropriate.      INTERVENTIONS:  - Monitor oral intake, urinary output, labs, and treatment plans  - Assess nutrition and hydration status and recommend course of action  - Evaluate amount of meals eaten  - Assist patient with eating if necessary   - Allow adequate time for meals  - Recommend/ encourage appropriate diets, oral nutritional supplements, and vitamin/mineral supplements  - Order, calculate, and assess calorie counts as needed  - Recommend, monitor, and adjust tube feedings and TPN/PPN based on assessed needs  - Assess need for intravenous fluids  - Provide specific nutrition/hydration education as appropriate  - Include patient/family/caregiver in decisions related to nutrition  Outcome: Progressing

## 2023-12-09 NOTE — ASSESSMENT & PLAN NOTE
Wt Readings from Last 3 Encounters:   12/09/23 65.8 kg (145 lb)   09/12/23 63.5 kg (139 lb 15.9 oz)   09/08/23 63.5 kg (139 lb 14.4 oz)     Reported LifeVest per chart review and wife's report noncompliant  Last TTE 6/13: EF 55%, mod to severe aortic stenosis, moderate insufficiency  9/15/23 TTE: LVEF 25%    F/U echo with primary cardiologist next week 12/10  BNP-356  CXR-negative for acute cardiopulmonary disease  No home meds taken today  Does not look like an exacerbation  Continue home metoprolol, refused AC  Telemetry  Cardio recs:  Continue metoprolol 25 mg twice daily, initiate Farxiga 5 mg  Stop furosemide

## 2023-12-09 NOTE — ASSESSMENT & PLAN NOTE
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

## 2023-12-09 NOTE — DISCHARGE SUMMARY
[address]  Discharge- Lorelei Betancorut 1945, 69 y.o. male MRN: [id number]  Unit/Bed#: [facility] 404-01 Encounter: [id number]  Primary Care Provider: Justice James MD   Date and time admitted to hospital: 12/7/2023 11:42 AM    * New onset seizure [facility])  Assessment & Plan  Minimally responsive PTA, witnessed seizure in ED with enuresis. Multiple seizure-like episodes, increased frequency per wife report now after reflection and visualizing an actual seizure    ED: Loading dose Keppra 1000 mg, Ativan 1 mg  CT head in the ED: No acute intracranial abnormality. 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 CTA H/N w/contrast: Negative CTA head and neck for large vessel occlusion, dissection, or aneurysm. Severe stenosis (approximately 80% narrowing) in right ICA proximal cervical segment due to calcified atherosclerotic disease. Severe stenosis in origin of left vertebral artery due to calcified atherosclerotic disease. MRI of the brain:No acute infarction, edema, or pathologic intra-axial enhancement. Chronic anterior right frontal lobe infarct with encephalomalacia and surrounding gliosis. Chronic infarct and encephalomalacia involving the anterior body of the corpus callosum. Moderate chronic microangiopathic ischemic changes. EEG-several spikes  Telemetry-monitor for arrhythmias. Prolactin-WNL  UDS-THC  UuX1i-8.8 %, TSH -WNL  Etoh-wnl  Ativan as needed  neuro-check q4h  PT/OT consult  Bedside swallow evaluation  Seizure/fall precautions  Neuro recs:  Placed vascular surgery consult  Adding plavix for now, After intervention, remain on one AP therapy. EEG shows rare spikes, would be beneficial for ppx seizure medication for prevention. No driving for 6 months as this is recurrent event. Keppra 500mg bid upon discharge long term. F/u with us in 10-12 weeks.        Non-ischemic myocardial injury (non-traumatic)  Assessment & Plan  Slight elevation in troponins     Hx of PAF, CAD, s/p CABGx3, AV valve replacement per chart review  Delta after 4h=44  EKG: NSR, LAE, T wave changes resolved  Likely 2/2 possible seizure like activity  Tele  Will monitor  Cardiology following  S/p primary cardiology      Occlusion of right internal carotid artery  Assessment & Plan  CTA H/N w/contrast: Negative CTA head and neck for large vessel occlusion, dissection, or aneurysm. Severe stenosis (approximately 80% narrowing) in right ICA proximal cervical segment due to calcified atherosclerotic disease. Severe stenosis in origin of left vertebral artery due to calcified atherosclerotic disease  D/W vascular recs:  Asymptomatic  DAPT  Follow-up outpatient    Abnormal finding on imaging  Assessment & Plan  CTA upper lung field:Small micronodular opacities in visualized right upper lobe, likely infectious/inflammatory. Consider follow up CT chest in 3 months. History of stroke  Assessment & Plan  Noted on imaging during his IP admission    CTA Chronic small infarcts in right frontal lobe and bilateral caudates with mild chronic microangiopathy. 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  Consider follow-up with ENT     Prediabetes  Assessment & Plan  Improved  Last A1c 5.8%  UA: Glucosuria greater than thousand  Diet controlled    Coronary artery disease involving native coronary artery  Assessment & Plan  Continue aspirin, statin  Followed by cardiology Shawn Colon in [place], will follow-up for further cardiology management    Chronic heart failure [facility])  Assessment & Plan  Wt Readings from Last 3 Encounters:   12/09/23 65.8 kg (145 lb)   09/12/23 63.5 kg (139 lb 15.9 oz)   09/08/23 63.5 kg (139 lb 14.4 oz) Reported LifeVest per chart review and wife's report noncompliant  Last TTE 6/13: EF 55%, mod to severe aortic stenosis, moderate insufficiency  9/15/23 TTE: LVEF 25%    F/U echo with primary cardiologist next week 12/10  BNP-356  CXR-negative for acute cardiopulmonary disease  No home meds taken today  Does not look like an exacerbation  Continue home metoprolol, refused AC  Telemetry  Cardio recs:  Continue metoprolol 25 mg twice daily, initiate Farxiga 5 mg  Stop furosemide        Paroxysmal atrial fibrillation (HCC)  Assessment & Plan  EKG: NSR  Follow-up echo this week with primary cardiologist  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  Continue home Lopressor, as needed IV if not able to tolerate oral  Telemetry  [id number]: Per chart review: Not on [facility] D/T per patient - he is concerned about bruising or bleeding   Cardiology D/W recs:  Currently controlled  Continue metoprolol 25 mg twice daily, initiate Farxiga 5 mg  F/U primary cardiologist to initiate low-dose Eliquis      Primary hypertension  Assessment & Plan  Continue home Lopressor with parameters  As needed IV    Mixed hyperlipidemia  Assessment & Plan  High-dose Lipitor 80 mg  Recent lipid panel 5 months ago WNL      Medical Problems       Resolved Problems  Date Reviewed: 12/7/2023   None       Discharging Physician / Practitioner: Hero Nieves MD  PCP: Stone Stewart MD  Admission Date:   Admission Orders (From admission, onward)       Ordered        12/07/23 1613  INPATIENT ADMISSION  Once                          Discharge Date: 12/09/23      Consultations During Hospital Stay:  Neurology, vascular, cardiology    Procedures Performed:   N/A    Significant Findings / Test Results:   N/A    Incidental Findings:   CTA head and neck with and without contrast: CT HEAD, - No acute intracranial abnormality. , - Chronic small infarcts in right frontal lobe and bilateral caudates with mild chronic microangiopathy. 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 Consider follow-up with ENT.,   CTA HEAD AND NECK, -Small micronodular opacities in visualized right upper lobe, likely infectious/inflammatory. Consider follow up CT chest in 3 months., Mildly prominent mediastinal and right hilar lymph nodes, which may be reactive, similar to CT chest abdomen pelvis 5/16/2023. Attention on follow-up. Test Results Pending at Discharge (will require follow up):   N/A     Outpatient Tests Requested:  N/A    Complications:  N/A        Hospital Course:   Nayan Manning is a 69 y.o. male patient who originally presented to the hospital on 12/7/2023 due to seizure-like activity. Patient noted to have new diagnosis of seizures, CTh negative for acute intracranial abnormalities/MRI: Chronic anterior right frontal lobe infarct with encephalomalacia CTA head-negative for acute intracranial abnormality. EEG minimal spikes. patient seen by neurology with recommendations for discharge on prophylactic Keppra, no driving for 6 months, DAPT therapy, statin and follow-up outpatient neurology. Patient noted to have severe right ICA stenosis, vascular recs follow-up outpatient due to chronic infarcts. Patient noted to have slight elevation in troponin and has history of CAD/CABG/infective endocarditis/PAF/AVR/CHF and was seen by cardiology, patient to follow-up with primary cardiology at [address] [place] [address] with appointment next week for repeat echo to determine AICD placement; reevaluation for initiation of Eliquis low-dose 2.5 mg; patient stopped Lasix, initiate [place], continue home metoprolol, aspirin statin and Plavix. All other chronic conditions controlled Home medication inpatient:. Patient to follow-up with primary PCP, primary cardiologist for further management and monitoring. The patient, initially admitted to the hospital as inpatient, was discharged earlier than expected given the following: Clinical course and specialist recommendations. Please see above list of diagnoses and related plan for additional information. Condition at Discharge: fair    Discharge Day Visit / Exam:   Subjective: Patient seen and examined at bedside, reported no headache no blurry vision no chest pain no palpitations no shortness of breath. Patient discussed benefits of risk of anticoagulation with wife at bedside, patient encouraged to follow-up with primary cardiologist for further management. Patient encouraged to follow-up with primary specialist.  Patient reports neurology Marcus for no driving for 6 months, prophylactic seizure medicine. Vitals: Blood Pressure: 118/62 (12/09/23 0939)  Pulse: 63 (12/09/23 0740)  Temperature: 97.8 °F (36.6 °C) (12/08/23 2311)  Temp Source: Temporal (12/07/23 1755)  Respirations: 18 (12/09/23 0939)  Height: 5' 8" (172.7 cm) (12/09/23 0740)  Weight - Scale: 65.8 kg (145 lb) (12/09/23 0740)  SpO2: 96 % (12/08/23 2311)  Exam:   Physical Exam  Vitals and nursing note reviewed. Constitutional:       General: He is not in acute distress. Appearance: He is well-developed. HENT:      Head: Normocephalic and atraumatic. Eyes:      Conjunctiva/sclera: Conjunctivae normal.   Cardiovascular:      Rate and Rhythm: Normal rate and regular rhythm. Heart sounds: No murmur heard. Pulmonary:      Effort: Pulmonary effort is normal. No respiratory distress. Breath sounds: Normal breath sounds. Abdominal:      Palpations: Abdomen is soft. Tenderness: There is no abdominal tenderness. There is no guarding or rebound. Musculoskeletal:         General: No swelling. Cervical back: Neck supple. Skin:     General: Skin is warm and dry. Capillary Refill: Capillary refill takes less than 2 seconds.    Neurological:      Mental Status: He is alert and oriented to person, place, and time. Psychiatric:         Mood and Affect: Mood normal.         Behavior: Behavior normal.          Discussion with Family: Updated  (wife) at bedside. Discharge instructions/Information to patient and family:   See after visit summary for information provided to patient and family. Provisions for Follow-Up Care:  See after visit summary for information related to follow-up care and any pertinent home health orders. Mobility at time of Discharge:   Basic Mobility Inpatient Raw Score: 23  JH-HLM Goal: 7: Walk 25 feet or more  JH-HLM Achieved: 7: Walk 25 feet or more  HLM Goal achieved. Continue to encourage appropriate mobility. Disposition:   Home    Planned Readmission: No     Discharge Statement:  I spent 45 minutes discharging the patient. This time was spent on the day of discharge. I had direct contact with the patient on the day of discharge. Greater than 50% of the total time was spent examining patient, answering all patient questions, arranging and discussing plan of care with patient as well as directly providing post-discharge instructions. Additional time then spent on discharge activities. Discharge Medications:  See after visit summary for reconciled discharge medications provided to patient and/or family.       **Please Note: This note may have been constructed using a voice recognition system**

## 2023-12-09 NOTE — OCCUPATIONAL THERAPY NOTE
Occupational Therapy Evaluation/Treatment       12/09/23 0841   OT Last Visit   OT Visit Date 12/09/23   Note Type   Note type Evaluation   Pain Assessment   Pain Assessment Tool 0-10   Pain Score No Pain   Restrictions/Precautions   Other Precautions Fall Risk   Home Living   Type of [facility]  Two level;Bed/bath upstairs   Bathroom Shower/Tub Tub/shower unit   Bathroom Toilet Standard   Bathroom Equipment Grab bars in shower   Prior Function   Level of [place] Independent with ADLs; Independent with functional mobility; Independent with IADLS   Lives With Spouse   Receives Help From Family   IADLs Independent with driving; Independent with meal prep; Independent with medication management   Vocational Retired   Lifestyle   Intrinsic Gratification pt enjoying spending time with his grandchildren and "playing with his toys" (sports collectibles)   General   Additional Pertinent History Pt admitted with new onset seizure. Pt now reports feeling back to baseline.    Family/Caregiver Present No   Subjective   Subjective "I hope they send me home today."   ADL   Eating Assistance 7  Independent   Grooming Assistance 5  Supervision/Setup   UB Bathing Assistance 5  Supervision/Setup   LB Bathing Assistance 5  Supervision/Setup   UB Dressing Assistance 5  Supervision/Setup   LB Dressing Assistance 5  Supervision/Setup   Toileting Assistance  5  Supervision/Setup   Bed Mobility   Rolling R 7  Independent   Supine to Sit 5  Supervision   Sit to Supine 5  Supervision   Transfers   Sit to Stand 5  Supervision   Stand to Sit 5  Supervision   Toilet transfer 5  Supervision   Balance   Static Sitting Good   Dynamic Sitting Fair +   Static Standing Good   Dynamic Standing Fair +   Activity Tolerance   Activity Tolerance Patient tolerated treatment well   RUE Assessment   RUE Assessment   (ROM WFL, MMT grossly 4/5)   LUE Assessment   LUE Assessment   (ROM WFL, MMT grossly 4/5)   Hand Function   Gross Motor Coordination Functional   Fine Motor Coordination Functional   Vision-Basic Assessment   Current Vision Other (Comment)  (pt states he wears glasses for both reading and distance)   Patient Visual Report   (pt reports no visual changes or complaints)   Vision - Complex Assessment   Acuity Able to read clock/calendar on wall without difficulty   Cognition   Overall Cognitive Status [facility]   Arousal/Participation Alert; Cooperative   Attention Within functional limits   Orientation Level Oriented X4   Memory Within functional limits   Following Commands Follows multistep commands without difficulty   Assessment   Limitation Decreased ADL status; Decreased UE strength;Decreased endurance;Decreased self-care trans;Decreased high-level ADLs   Prognosis Good   Assessment Patient evaluated by Occupational Therapy. Patient admitted with New onset seizure ([address]). The patients occupational profile, medical and therapy history includes a extensive additional review of physical, cognitive, or psychosocial history related to current functional performance. Comorbidities affecting functional mobility and ADLS include: Afib, anxiety, chronic pain, CVA, and hypertension. Prior to admission, patient was independent with functional mobility without assistive device, independent with ADLS, and independent with IADLS. The evaluation identifies the following performance deficits: decreased endurance and decreased activity tolerance, that result in activity limitations and/or participation restrictions. This evaluation requires clinical decision making of moderate complexity, because the patient may present with comorbidities that affect occupational performance and required minimal or moderate modification of tasks or assistance with the consideration of several treatment options.   The Barthel Index was used as a functional outcome tool presenting with a score of Barthel Index Score: 65, indicating moderate limitations of functional mobility and ADLS. The patient's raw score on the -PAC Daily Activity Inpatient Short Form is 19. A raw score of greater than or equal to 19 suggests the patient may benefit from discharge to home. Please refer to the recommendation of the Occupational Therapist for safe discharge planning. Patient will benefit from skilled Occupational Therapy services to address above deficits and facilitate a safe return to prior level of function. Goals   Patient Goals to go home   STG Time Frame   (1-7 days)   Short Term Goal  Goals established to promote Patient Goals: to go home: Grooming: independent standing at sink; Bathing: independent; Upper Body Dressing independent; Lower Body Dressing: independent; Toileting: independent; Patient will increase ambulatory standard toilet transfer to independent with no assistive device to increase performance and safety with ADLS and functional mobility; Patient will increase standing tolerance to 10 minutes during ADL task to decrease assistance level and decrease fall risk; Patient will increase bed mobility to independent in preparation for ADLS and transfers; Patient will increase functional mobility to and from bathroom with no assistive device independently to increase performance with ADLS and to use a toilet; Patient will tolerate 10 minutes of UE ROM/strengthening to increase general activity tolerance and performance in ADLS/IADLS; Patient will improve functional activity tolerance to 10 minutes of sustained functional tasks to increase participation in basic self-care and decrease assistance level; Patient will increase dynamic sitting balance to good to improve the ability to sit at edge of bed or on a chair for ADLS;  Patient will increase dynamic standing balance to good to improve postural stability and decrease fall risk during standing ADLS and transfers.    LTG Time Frame   (8-14 days)   Long Term Goal Patient will increase standing tolerance to 20 minutes during ADL task to decrease assistance level and decrease fall risk; Patient will tolerate 20 minutes of UE ROM/strengthening to increase general activity tolerance and performance in ADLS/IADLS; Patient will improve functional activity tolerance to 20 minutes of sustained functional tasks to increase participation in basic self-care and decrease assistance level; Pt will score >/= 23/24 on AM-PAC Daily Activity Inpatient scale to promote safe independence with ADLs and functional mobility; Pt will score >/= 95/100 on Barthel Index in order to decrease caregiver assistance needed and increase ability to perform ADLs and functional mobility. Plan   Treatment Interventions ADL retraining;Functional transfer training;UE strengthening/ROM; Endurance training;Patient/family training;Equipment evaluation/education   Goal Expiration Date 12/23/23   OT Frequency 2-3x/wk   Discharge Recommendation   Rehab Resource Intensity Level, OT No post-acute rehabilitation needs   AM-PAC Daily Activity Inpatient   Lower Body Dressing 3   Bathing 3   Toileting 3   Upper Body Dressing 3   Grooming 3   Eating 4   Daily Activity Raw Score 19   Daily Activity Standardized Score (Calc for Raw Score >=11) 40.22   AM-PAC Applied Cognition Inpatient   Following a Speech/Presentation 4   Understanding Ordinary Conversation 4   Taking Medications 4   Remembering Where Things Are Placed or Put Away 4   Remembering List of 4-5 Errands 4   Taking Care of Complicated Tasks 3   Applied Cognition Raw Score 23   Applied Cognition Standardized Score 53.08   Barthel Index   Feeding 10   Bathing 0   Grooming Score 0   Dressing Score 5   Bladder Score 10   Bowels Score 10   Toilet Use Score 5   Transfers (Bed/Chair) Score 10   Mobility (Level Surface) Score 10   Stairs Score 5   Barthel Index Score 65   Additional Treatment Session   Start Time 0831   End Time 0841   Treatment Assessment S: denies pain.  O: Functional mobility no device with supervision to bathroom, standard toilet transfer with supervision. Voided bladder, clothing management and hygiene completed with supervision. Hand washing at sink supervision. Functional mobility back to bed supervision. Stand to sit supervision. A: Pt tolerated treatment session well. P: Pt would benefit from cont OT services to maximize safety and functional independence prior to d/c home. Anticipate pt with no post-acute rehab needs. End of Consult   Patient Position at End of Consult Seated edge of bed;Bed/Chair alarm activated; All needs within reach  (set-up with breakfast tray)   Licensure   [place] License Number  Adan [place] OTR/L [id number]

## 2023-12-11 ENCOUNTER — TELEPHONE (OUTPATIENT)
Dept: NEUROLOGY | Facility: CLINIC | Age: 78
End: 2023-12-11

## 2023-12-11 ENCOUNTER — TRANSITIONAL CARE MANAGEMENT (OUTPATIENT)
Dept: FAMILY MEDICINE CLINIC | Facility: CLINIC | Age: 78
End: 2023-12-11

## 2023-12-11 NOTE — TELEPHONE ENCOUNTER
Dr Adolfo Enamorado could I get clarification on instructions on how and with whom I should schedule patient please? AP/Attending General or Epilepsy  Thank you!

## 2023-12-11 NOTE — TELEPHONE ENCOUNTER
----- Message from Yazmin Arroyo MD sent at 12/8/2023  3:31 PM EST -----  Jb Roberts,  Can you start [place] DMV form for this patient. He shouldn't drive for 6 months.

## 2023-12-12 ENCOUNTER — RA CDI HCC (OUTPATIENT)
Dept: OTHER | Facility: HOSPITAL | Age: 78
End: 2023-12-12

## 2023-12-12 NOTE — UTILIZATION REVIEW
NOTIFICATION OF ADMISSION DISCHARGE   This is a Notification of Discharge from [facility]. Please be advised that this patient has been discharge from our facility. Below you will find the admission and discharge date and time including the patient’s disposition. UTILIZATION REVIEW CONTACT:  Cedrick Holman  Utilization   Network Utilization Review Department  Phone: [phone] x carefully listen to the prompts. All voicemails are confidential.  Email: [email or web address]     ADMISSION INFORMATION  PRESENTATION DATE: 12/7/2023 11:42 AM  OBERVATION ADMISSION DATE:   INPATIENT ADMISSION DATE: 12/7/23  4:13 PM   DISCHARGE DATE: 12/9/2023  2:40 PM   DISPOSITION:Home/Self Care    Network Utilization Review Department  ATTENTION: Please call with any questions or concerns to [phone] and carefully listen to the prompts so that you are directed to the right person. All voicemails are confidential.   For Discharge needs, contact Care Management DC Support Team at [phone] opt. 2  Send all requests for admission clinical reviews, approved or denied determinations and any other requests to dedicated fax number below belonging to the campus where the patient is receiving treatment.  List of dedicated fax numbers for the Facilities:  [place] DENIALS (Administrative/Medical Necessity) [phone]   DISCHARGE SUPPORT TEAM (Network) [phone] [address] [facility] (Maternity/NICU/Pediatrics) [phone]   [address] [facility] [address] [phone] [address] [facility] [phone]   [address] [place] [address]  [address]  [phone]

## 2023-12-12 NOTE — PROGRESS NOTES
[address] [facility] coding opportunities          Chart Reviewed number of suggestions sent to Provider: 1     Patients Insurance   I11.0  Medicare Insurance: [employer] Advantage

## 2023-12-13 ENCOUNTER — OFFICE VISIT (OUTPATIENT)
Dept: FAMILY MEDICINE CLINIC | Facility: CLINIC | Age: 78
End: 2023-12-13
Payer: COMMERCIAL

## 2023-12-13 VITALS
TEMPERATURE: 97.3 F | WEIGHT: 140 LBS | SYSTOLIC BLOOD PRESSURE: 120 MMHG | HEART RATE: 66 BPM | OXYGEN SATURATION: 96 % | BODY MASS INDEX: 21.22 KG/M2 | DIASTOLIC BLOOD PRESSURE: 62 MMHG | HEIGHT: 68 IN | RESPIRATION RATE: 12 BRPM

## 2023-12-13 DIAGNOSIS — Z76.89 ENCOUNTER FOR SUPPORT AND COORDINATION OF TRANSITION OF CARE: Primary | ICD-10-CM

## 2023-12-13 DIAGNOSIS — R56.9 NEW ONSET SEIZURE (HCC): ICD-10-CM

## 2023-12-13 DIAGNOSIS — I10 ESSENTIAL HYPERTENSION: ICD-10-CM

## 2023-12-13 DIAGNOSIS — E78.2 MIXED HYPERLIPIDEMIA: Chronic | ICD-10-CM

## 2023-12-13 DIAGNOSIS — E87.6 HYPOKALEMIA: ICD-10-CM

## 2023-12-13 DIAGNOSIS — I48.0 PAROXYSMAL ATRIAL FIBRILLATION (HCC): ICD-10-CM

## 2023-12-13 DIAGNOSIS — J41.0 SIMPLE CHRONIC BRONCHITIS (HCC): ICD-10-CM

## 2023-12-13 DIAGNOSIS — I63.9 CEREBROVASCULAR ACCIDENT (CVA), UNSPECIFIED MECHANISM (HCC): ICD-10-CM

## 2023-12-13 DIAGNOSIS — I50.23 ACUTE ON CHRONIC SYSTOLIC HEART FAILURE (HCC): ICD-10-CM

## 2023-12-13 PROBLEM — R06.02 SHORTNESS OF BREATH: Status: RESOLVED | Noted: 2023-05-16 | Resolved: 2023-12-13

## 2023-12-13 PROBLEM — N18.2 CKD (CHRONIC KIDNEY DISEASE) STAGE 2, GFR 60-89 ML/MIN: Status: ACTIVE | Noted: 2023-09-12

## 2023-12-13 PROBLEM — I38 PRE-OPERATIVE CARDIOVASCULAR EXAMINATION, VALVULAR HEART DISEASE: Status: RESOLVED | Noted: 2023-08-24 | Resolved: 2023-12-13

## 2023-12-13 PROBLEM — D72.829 LEUKOCYTOSIS: Status: RESOLVED | Noted: 2023-05-03 | Resolved: 2023-12-13

## 2023-12-13 PROBLEM — E87.1 HYPONATREMIA: Status: RESOLVED | Noted: 2023-05-02 | Resolved: 2023-12-13

## 2023-12-13 PROBLEM — K31.89 GASTRIC DISTENTION: Status: RESOLVED | Noted: 2023-05-20 | Resolved: 2023-12-13

## 2023-12-13 PROBLEM — R79.89 POSITIVE D DIMER: Status: RESOLVED | Noted: 2023-05-03 | Resolved: 2023-12-13

## 2023-12-13 PROBLEM — Z01.810 PRE-OPERATIVE CARDIOVASCULAR EXAMINATION, VALVULAR HEART DISEASE: Status: RESOLVED | Noted: 2023-08-24 | Resolved: 2023-12-13

## 2023-12-13 PROBLEM — R55 SYNCOPE: Status: RESOLVED | Noted: 2023-07-07 | Resolved: 2023-12-13

## 2023-12-13 PROBLEM — I35.2 NONRHEUMATIC AORTIC INSUFFICIENCY WITH AORTIC STENOSIS: Status: ACTIVE | Noted: 2023-05-03

## 2023-12-13 PROBLEM — R07.89 LEFT-SIDED CHEST WALL PAIN: Status: RESOLVED | Noted: 2023-07-07 | Resolved: 2023-12-13

## 2023-12-13 PROBLEM — E78.5 DYSLIPIDEMIA: Status: RESOLVED | Noted: 2023-09-12 | Resolved: 2023-12-13

## 2023-12-13 PROBLEM — I35.2 NONRHEUMATIC AORTIC INSUFFICIENCY WITH AORTIC STENOSIS: Status: RESOLVED | Noted: 2023-05-03 | Resolved: 2023-12-13

## 2023-12-13 PROBLEM — E78.5 DYSLIPIDEMIA: Status: ACTIVE | Noted: 2023-09-12

## 2023-12-13 LAB
ALBUMIN SERPL BCP-MCNC: 4.3 G/DL (ref 3.5–5)
ALP SERPL-CCNC: 62 U/L (ref 34–104)
ALT SERPL W P-5'-P-CCNC: 15 U/L (ref 7–52)
ANION GAP SERPL CALCULATED.3IONS-SCNC: 6 MMOL/L
AST SERPL W P-5'-P-CCNC: 18 U/L (ref 13–39)
BASOPHILS # BLD AUTO: 0.06 THOUSANDS/ÂΜL (ref 0–0.1)
BASOPHILS NFR BLD AUTO: 1 % (ref 0–1)
BILIRUB SERPL-MCNC: 0.45 MG/DL (ref 0.2–1)
BUN SERPL-MCNC: 24 MG/DL (ref 5–25)
CALCIUM SERPL-MCNC: 9.7 MG/DL (ref 8.4–10.2)
CHLORIDE SERPL-SCNC: 105 MMOL/L (ref 96–108)
CO2 SERPL-SCNC: 31 MMOL/L (ref 21–32)
CREAT SERPL-MCNC: 1.07 MG/DL (ref 0.6–1.3)
EOSINOPHIL # BLD AUTO: 0.34 THOUSAND/ÂΜL (ref 0–0.61)
EOSINOPHIL NFR BLD AUTO: 6 % (ref 0–6)
ERYTHROCYTE [DISTWIDTH] IN BLOOD BY AUTOMATED COUNT: 14.6 % (ref 11.6–15.1)
GFR SERPL CREATININE-BSD FRML MDRD: 66 ML/MIN/1.73SQ M
GLUCOSE SERPL-MCNC: 98 MG/DL (ref 65–140)
HCT VFR BLD AUTO: 38.6 % (ref 36.5–49.3)
HGB BLD-MCNC: 12.6 G/DL (ref 12–17)
IMM GRANULOCYTES # BLD AUTO: 0.02 THOUSAND/UL (ref 0–0.2)
IMM GRANULOCYTES NFR BLD AUTO: 0 % (ref 0–2)
LYMPHOCYTES # BLD AUTO: 1.29 THOUSANDS/ÂΜL (ref 0.6–4.47)
LYMPHOCYTES NFR BLD AUTO: 22 % (ref 14–44)
MCH RBC QN AUTO: 31 PG (ref 26.8–34.3)
MCHC RBC AUTO-ENTMCNC: 32.6 G/DL (ref 31.4–37.4)
MCV RBC AUTO: 95 FL (ref 82–98)
MONOCYTES # BLD AUTO: 0.8 THOUSAND/ÂΜL (ref 0.17–1.22)
MONOCYTES NFR BLD AUTO: 14 % (ref 4–12)
NEUTROPHILS # BLD AUTO: 3.36 THOUSANDS/ÂΜL (ref 1.85–7.62)
NEUTS SEG NFR BLD AUTO: 57 % (ref 43–75)
NRBC BLD AUTO-RTO: 0 /100 WBCS
PLATELET # BLD AUTO: 239 THOUSANDS/UL (ref 149–390)
PMV BLD AUTO: 10 FL (ref 8.9–12.7)
POTASSIUM SERPL-SCNC: 4.5 MMOL/L (ref 3.5–5.3)
PROT SERPL-MCNC: 6.6 G/DL (ref 6.4–8.4)
RBC # BLD AUTO: 4.06 MILLION/UL (ref 3.88–5.62)
SODIUM SERPL-SCNC: 142 MMOL/L (ref 135–147)
WBC # BLD AUTO: 5.87 THOUSAND/UL (ref 4.31–10.16)

## 2023-12-13 PROCEDURE — 85025 COMPLETE CBC W/AUTO DIFF WBC: CPT | Performed by: FAMILY MEDICINE

## 2023-12-13 PROCEDURE — 80053 COMPREHEN METABOLIC PANEL: CPT | Performed by: FAMILY MEDICINE

## 2023-12-13 PROCEDURE — 36415 COLL VENOUS BLD VENIPUNCTURE: CPT | Performed by: FAMILY MEDICINE

## 2023-12-13 PROCEDURE — 99496 TRANSJ CARE MGMT HIGH F2F 7D: CPT | Performed by: FAMILY MEDICINE

## 2023-12-13 RX ORDER — VANCOMYCIN HYDROCHLORIDE 1 G/20ML
INJECTION, POWDER, LYOPHILIZED, FOR SOLUTION INTRAVENOUS
COMMUNITY
Start: 2023-10-20 | End: 2023-12-13 | Stop reason: ALTCHOICE

## 2023-12-13 RX ORDER — VALSARTAN 40 MG/1
40 TABLET ORAL DAILY
COMMUNITY
Start: 2023-10-17 | End: 2023-12-13

## 2023-12-13 RX ORDER — METOPROLOL TARTRATE 50 MG/1
50 TABLET, FILM COATED ORAL EVERY 12 HOURS
COMMUNITY
Start: 2023-10-17 | End: 2023-12-13

## 2023-12-13 NOTE — PATIENT INSTRUCTIONS
PLENTY OF FLUIDS - HYDRATION  MONITOR DIETARY SODIUM INTAKE  MONITOR SYMPTOMS    CARDIOLOGY AND VASCULAR FOLLOW UP  BW TODAY    RV 1 M.  CALL SOONER - PRN

## 2023-12-13 NOTE — PROGRESS NOTES
Name: Lorelei Betancourt      : 1945      MRN: [id number]  Encounter Provider: Justice James MD  Encounter Date: 2023   Encounter department: [facility]     1. Encounter for support and coordination of transition of care    2. New onset seizure (HCC)  -     CBC and differential    3. Cerebrovascular accident (CVA), unspecified mechanism ([address])    4. Paroxysmal atrial fibrillation (HCC)  -     CBC and differential    5. Essential hypertension    6. Acute on chronic systolic heart failure ([address])    7. Mixed hyperlipidemia    8. Simple chronic bronchitis ([address])    9. Hypokalemia  -     Comprehensive metabolic panel           Subjective      TRANSITION OF CARE    PATIENT IS S/P [facility] ADMISSION FOR NEW ONSET SEIZURE AND CVA    DATE OF DISCHARGE 2023    PATIENT'S PAST MEDICAL HISTORY, HOSPITAL COURSE, DISCHARGE INSTRUCTIONS AND MEDICATIONS WERE REVIEWED    PATIENT FEELS BETTER  NO FURTHER SEIZURE ACTIVITY  DENIES ANY CP, SOB, PALPITATIONS  NOTES NO NUMBNESS, TINGLING OR WEAKNESS    DISCUSSED THERAPEUTIC PLAN, FOLLOW UP CONSULT VISITS      Review of Systems   Constitutional:  Positive for fatigue. Negative for chills and fever. HENT:  Negative for congestion, ear discharge, ear pain, mouth sores, postnasal drip, sore throat and trouble swallowing. Eyes:  Negative for pain, discharge and visual disturbance. Respiratory:  Negative for cough, shortness of breath and wheezing. Cardiovascular:  Negative for chest pain, palpitations and leg swelling. Gastrointestinal:  Negative for abdominal distention, abdominal pain, blood in stool, diarrhea and nausea. Endocrine: Negative for polydipsia, polyphagia and polyuria. Genitourinary:  Negative for dysuria, frequency, hematuria and urgency. Musculoskeletal:  Positive for arthralgias. Negative for gait problem and joint swelling. Skin:  Negative for pallor and rash.    Neurological:  Positive for seizures. Negative for dizziness, syncope, speech difficulty, weakness, light-headedness, numbness and headaches. Hematological:  Negative for adenopathy. Psychiatric/Behavioral:  Positive for confusion. Negative for behavioral problems, dysphoric mood and sleep disturbance. The patient is not nervous/anxious. Current Outpatient Medications on File Prior to Visit   Medication Sig   • aspirin 81 mg chewable tablet Chew 1 tablet (81 mg total) daily   • atorvastatin (LIPITOR) 80 mg tablet Take 1 tablet (80 mg total) by mouth daily with dinner   • clopidogrel (PLAVIX) 75 mg tablet Take 1 tablet (75 mg total) by mouth daily Do not start before December 10, 2023. • dapagliflozin (Farxiga) 5 MG TABS Take 1 tablet (5 mg total) by mouth daily   • levETIRAcetam (KEPPRA) 500 mg tablet Take 1 tablet (500 mg total) by mouth every 12 (twelve) hours   • metoprolol succinate (TOPROL-XL) 25 mg 24 hr tablet Take 1 tablet (25 mg total) by mouth daily Do not start before December 10, 2023. • potassium chloride (K-DUR,KLOR-CON) 20 mEq tablet Take 1 tablet (20 mEq total) by mouth daily Do not start before December 10, 2023. • [DISCONTINUED] metoprolol tartrate (LOPRESSOR) 50 mg tablet Take 50 mg by mouth every 12 (twelve) hours   • [DISCONTINUED] vancomycin (VANCOCIN) 1 g    • [DISCONTINUED] Multiple Vitamins-Minerals (PRESERVISION AREDS PO) Take 5 mg by mouth 2 (two) times a day Two tablets daily (Patient not taking: Reported on 12/11/2023)   • [DISCONTINUED] valsartan (DIOVAN) 40 mg tablet Take 40 mg by mouth daily (Patient not taking: Reported on 12/13/2023)       Objective     /62 (BP Location: Left arm, Patient Position: Sitting, Cuff Size: Standard)   Pulse 66   Temp (!) 97.3 °F (36.3 °C) (Temporal)   Resp 12   Ht 5' 8" (1.727 m)   Wt 63.5 kg (140 lb)   SpO2 96%   BMI 21.29 kg/m²     Physical Exam  Vitals reviewed. Constitutional:       General: He is not in acute distress.      Appearance: Normal appearance. He is well-developed and normal weight. He is not ill-appearing. HENT:      Head: Normocephalic and atraumatic. Nose: Nose normal.      Mouth/Throat:      Mouth: Mucous membranes are moist.   Eyes:      General:         Right eye: No discharge. Left eye: No discharge. Conjunctiva/sclera: Conjunctivae normal.      Pupils: Pupils are equal, round, and reactive to light. Neck:      Thyroid: No thyromegaly. Vascular: No JVD. Cardiovascular:      Rate and Rhythm: Normal rate and regular rhythm. Heart sounds: Normal heart sounds. No murmur heard. Pulmonary:      Effort: Pulmonary effort is normal.      Breath sounds: Normal breath sounds. No wheezing or rales. Abdominal:      General: Bowel sounds are normal.      Palpations: Abdomen is soft. There is no mass. Tenderness: There is no abdominal tenderness. There is no guarding or rebound. Musculoskeletal:         General: No tenderness or deformity. Cervical back: Neck supple. Comments: MODERATE DJD CHANGES   Lymphadenopathy:      Cervical: No cervical adenopathy. Skin:     General: Skin is warm and dry. Findings: No erythema or rash. Neurological:      General: No focal deficit present. Mental Status: He is alert and oriented to person, place, and time. Cranial Nerves: No cranial nerve deficit. Sensory: No sensory deficit. Motor: No weakness. Coordination: Coordination normal.      Gait: Gait normal.      Deep Tendon Reflexes: Reflexes normal.   Psychiatric:         Mood and Affect: Mood normal.         Behavior: Behavior normal.         Thought Content:  Thought content normal.         Judgment: Judgment normal.       [facility] Call     Date and time call was made  12/11/2023 11:05 AM    Hospital care reviewed  Records reviewed    Patient was hospitialized at  [address] [place] [address]    Date of Admission  12/07/23    Date of discharge  12/09/23    Diagnosis  New Onset Seizure Disposition  Home    Were the patients medications reviewed and updated  Yes    Current Symptoms  None      TCM Call     Post hospital issues  None    Should patient be enrolled in anticoag monitoring? No    Scheduled for follow up?   Yes    Patients specialists  Cardiologist    Did you obtain your prescribed medications  Yes    Do you need help managing your prescriptions or medications  No    Is transportation to your appointment needed  No    I have advised the patient to call PCP with any new or worsening symptoms  Germain Duran, [facility]  or [employer] other    Support System  Partner    The type of support provided  Physical; Emotional    Do you have social support  Yes, as much as I need    Are you recieving any outpatient services  No    Are you recieving home care services  No    Are you using any community resources  No    Current waiver services  No    Have you fallen in the last 12 months  No    How many times  2    Interperter language line needed  No    Counseling  Patient    Counseling topics  Activities of daily living    Comments  Pt was admitted within 30 days for [facility] same Dx. sp/cma            Ivana Flor MD

## 2023-12-13 NOTE — TELEPHONE ENCOUNTER
Called patient to schedule HFU LVM      HFU/SLWA 12/7-12/9/23 New onset seizure/Altered mental status/Coronary artery disease      Per Notes:    F/u with us in 10-12 weeks.     With either Dr Kayla Rios or JACK

## 2024-01-09 DIAGNOSIS — I25.10 CORONARY ARTERY DISEASE INVOLVING NATIVE CORONARY ARTERY: ICD-10-CM

## 2024-01-09 DIAGNOSIS — R56.9 NEW ONSET SEIZURE (HCC): ICD-10-CM

## 2024-01-09 RX ORDER — LEVETIRACETAM 500 MG/1
500 TABLET ORAL EVERY 12 HOURS SCHEDULED
Qty: 60 TABLET | Refills: 1 | Status: SHIPPED | OUTPATIENT
Start: 2024-01-09 | End: 2024-01-11 | Stop reason: SDUPTHER

## 2024-01-09 RX ORDER — ASPIRIN 81 MG/1
81 TABLET, CHEWABLE ORAL DAILY
Qty: 90 TABLET | Refills: 1 | Status: SHIPPED | OUTPATIENT
Start: 2024-01-09

## 2024-01-09 NOTE — TELEPHONE ENCOUNTER
Requested medication(s) are due for refill today: Yes - Unknown  Patient has already received a courtesy refill: No  Other reason request has been forwarded to provider: Please review to see if the refill is appropriate. Order on hospital discharge

## 2024-01-11 ENCOUNTER — TELEPHONE (OUTPATIENT)
Dept: NEUROLOGY | Facility: CLINIC | Age: 79
End: 2024-01-11

## 2024-01-11 DIAGNOSIS — R56.9 NEW ONSET SEIZURE (HCC): ICD-10-CM

## 2024-01-11 RX ORDER — LEVETIRACETAM 500 MG/1
500 TABLET ORAL EVERY 12 HOURS SCHEDULED
Qty: 60 TABLET | Refills: 5 | Status: SHIPPED | OUTPATIENT
Start: 2024-01-11

## 2024-01-11 NOTE — TELEPHONE ENCOUNTER
received vm from 1/9 at 1:45pm-hi, I need a refill. The patient is Andrés yi, Birth date [birth year]. He is a patient of Dr. Lees. Oh, I forgot her name. Well anyway. Uh, he needs a refill [facility]. Thank you.  [phone]  -----------------------------------------  Pt scheduled to see you for HFU in May.  He has not been seen out pt by our office.    Pcp sent refill for LEVE 500mg 1 tab q 12 hrs on 1/9 with 1 refill.    Would you be agreeable to sending refill to get pt to HFU in may or would you like him to continue to get it from PCP.  If agreeable to sending script, please send to pharm

## 2024-01-11 NOTE — TELEPHONE ENCOUNTER
1/9 2:05    Pt's wife left a VM requesting a refill of Keppra.    Transcribed VM:   Hi, my  is a patient of Dr. Barrios and he only has 2 Keppra left and I need to get that medication refilled as soon as possible. So can you please? His name is Andrés Stephens and his birth date is [birth year]. Thank you.    Already addressed by BENITA Chaidez.

## 2024-03-05 ENCOUNTER — TELEPHONE (OUTPATIENT)
Dept: FAMILY MEDICINE CLINIC | Facility: CLINIC | Age: 79
End: 2024-03-05

## 2024-03-05 NOTE — TELEPHONE ENCOUNTER
Iesha dropped off a form for  to complete.  Andrés had a seizure and needs a DMV forms completed before he can drive again.    The neurologist has their section to complete    Spoke to  and he will not fill this form out until the neurologist completes his part.      Spoke to Iesha and informed her of this.  She will have Dr. Barrios fax over the form.  However Dr. Barrios is [place].  I will keep and eye out for it in epic.    Requesting by 3/8    Fax when completed and call Iesha to get payment    Fax # [phone]

## 2024-03-05 NOTE — TELEPHONE ENCOUNTER
Neurology's form was in the chart already.  Printed and gave to [employer].  Charge for form completion was submitted.

## 2024-03-07 ENCOUNTER — TELEPHONE (OUTPATIENT)
Dept: NEUROLOGY | Facility: CLINIC | Age: 79
End: 2024-03-07

## 2024-03-07 NOTE — TELEPHONE ENCOUNTER
Received and faxed to the number indicated in previous messages.  Received confirmation    LM on Iesha's cell number and home # that the form was done and faxed.  Informed her that she could  the original.  If she wants us to mail it, she can call.

## 2024-03-07 NOTE — TELEPHONE ENCOUNTER
3/5/24, 3:58    My name is Iesha Stephens. I was in there yesterday. I stopped in with a form for my  for Juice for his 's, something to send to [employer]. I have to get a copy of that to give to his general practitioner. Can you please call me back as soon as possible? [phone]. Iesha Stephens. Thank you.    Chart reviewed  [facility] form in media. This form was faxed on 3/4/24 to [phone]    Call returned to pt's wife Iesha. Acknowledge . Advised of the above. She verbalized understanding. Also requesting a copy be sent via [employer]     Sent

## 2024-03-07 NOTE — TELEPHONE ENCOUNTER
Spoke to Iesha     She stated that she just needs that form filled out   Even if you just say you are unable to complete the information at this time.  The form has to be submitted so they do not suspend his license for NOT returning the form.  He needs it back by tomorrow.    Fax to the number below.

## 2024-03-07 NOTE — TELEPHONE ENCOUNTER
This was in another message that was a duplicate    3/5/24, 3:58     My name is Iesha Stephens. I was in there yesterday. I stopped in with a form for my  for Juice for his 's, something to send to [employer]. I have to get a copy of that to give to his general practitioner. Can you please call me back as soon as possible? [phone]. Iesha Stephens. Thank you.     Chart reviewed  [facility] form in media. This form was faxed on 3/4/24 to [phone]     Call returned to pt's wife Iesha. Acknowledge . Advised of the above. She verbalized understanding. Also requesting a copy be sent via [employer]      Sent

## 2024-04-17 ENCOUNTER — TELEPHONE (OUTPATIENT)
Dept: NEUROLOGY | Facility: CLINIC | Age: 79
End: 2024-04-17

## 2024-05-01 ENCOUNTER — OFFICE VISIT (OUTPATIENT)
Dept: NEUROLOGY | Facility: CLINIC | Age: 79
End: 2024-05-01
Payer: COMMERCIAL

## 2024-05-01 VITALS
DIASTOLIC BLOOD PRESSURE: 52 MMHG | TEMPERATURE: 97.3 F | OXYGEN SATURATION: 97 % | WEIGHT: 139 LBS | HEIGHT: 68 IN | HEART RATE: 64 BPM | BODY MASS INDEX: 21.07 KG/M2 | SYSTOLIC BLOOD PRESSURE: 108 MMHG

## 2024-05-01 DIAGNOSIS — G40.909 SEIZURE DISORDER (HCC): Primary | ICD-10-CM

## 2024-05-01 DIAGNOSIS — Z12.11 SCREENING FOR COLORECTAL CANCER: ICD-10-CM

## 2024-05-01 DIAGNOSIS — Z86.73 CHRONIC ARTERIAL ISCHEMIC STROKE, MULTIFOCAL, MULTIPLE VASCULAR TERRITORIES: ICD-10-CM

## 2024-05-01 DIAGNOSIS — Z12.12 SCREENING FOR COLORECTAL CANCER: ICD-10-CM

## 2024-05-01 DIAGNOSIS — I65.21 CAROTID STENOSIS, RIGHT: ICD-10-CM

## 2024-05-01 DIAGNOSIS — R56.9 NEW ONSET SEIZURE (HCC): ICD-10-CM

## 2024-05-01 PROCEDURE — 3074F SYST BP LT 130 MM HG: CPT | Performed by: PSYCHIATRY & NEUROLOGY

## 2024-05-01 PROCEDURE — 1159F MED LIST DOCD IN RCRD: CPT | Performed by: PSYCHIATRY & NEUROLOGY

## 2024-05-01 PROCEDURE — 1160F RVW MEDS BY RX/DR IN RCRD: CPT | Performed by: PSYCHIATRY & NEUROLOGY

## 2024-05-01 PROCEDURE — 99214 OFFICE O/P EST MOD 30 MIN: CPT | Performed by: PSYCHIATRY & NEUROLOGY

## 2024-05-01 PROCEDURE — 3078F DIAST BP <80 MM HG: CPT | Performed by: PSYCHIATRY & NEUROLOGY

## 2024-05-01 RX ORDER — ROSUVASTATIN CALCIUM 40 MG/1
40 TABLET, COATED ORAL DAILY
COMMUNITY
Start: 2024-03-29

## 2024-05-01 RX ORDER — EPLERENONE 25 MG/1
25 TABLET, FILM COATED ORAL DAILY
COMMUNITY
Start: 2024-04-30 | End: 2025-04-30

## 2024-05-01 RX ORDER — LEVETIRACETAM 500 MG/1
500 TABLET ORAL EVERY 12 HOURS SCHEDULED
Qty: 180 TABLET | Refills: 2 | Status: SHIPPED | OUTPATIENT
Start: 2024-05-01

## 2024-05-01 RX ORDER — SACUBITRIL AND VALSARTAN 49; 51 MG/1; MG/1
1 TABLET, FILM COATED ORAL 2 TIMES DAILY
COMMUNITY
Start: 2024-03-21

## 2024-05-01 NOTE — PROGRESS NOTES
Return NeuroOutpatient Note        Andrés Stephens  [id number]  79 y.o.  1945       Chronic right frontal and b/l BG strokes ; Recent aortic endocarditis, CABG ; Right ICA 80% stenosis; and New onset seizures         History obtained from:  Patient and wife     HPI/Subjective:    Andrés Stephens is a 78 yo M with PMH of strokes, right ICA stenosis presents as f/u. He also has PMH of CABG, aortic endocarditis s/p replacement in sept of 2023. Patient had presented to [place] on 12/8/23 with new onset seizure. Patient was found to be sleeping in late yesterday afternoon by his wife. When aroused, he was very lethargic and agitated. At 5:45pm, patient was witnessed in ED to have generalized tonic clonic seizure. Wife later mentioned that based on what she saw yesterday, he had very similar event in July of that year but didn't think it was seizure then. He was loaded and kept on keppra 500mg bid. He's tolerating that well. Denies any additional seizure activity.     He had MRI brain that showed multiple chronic infarcts but otherwise was negative for new process.   His EEG had revealed rare 2 left frontotemporal region sharps that would be epileptogenic in appropriate clinical setting.     He does report tingling and numbness in toes.     He has known right ica 80% stenosis. He was seen by vascular surgery but they felt it was asymptomatic and would recommend AP and statin. He had carotid doppler which showed low % of stenosis. He's on aspirin, plavix and lipitor 80mg daily.       Past Medical History:   Diagnosis Date   • Aortic stenosis    • CAD (coronary artery disease), native coronary artery    • CHF (congestive heart failure) ([facility])    • COPD (chronic obstructive pulmonary disease) ([facility])    • Discitis    • Endocarditis      Social History     Socioeconomic History   • Marital status: /Civil Union     Spouse name: Not on file   • Number of children: Not on file   • Years of education: Not on file   • Highest  education level: Not on file   Occupational History   • Not on file   Tobacco Use   • Smoking status: Former     Current packs/day: 0.00     Average packs/day: 0.5 packs/day for 20.0 years (10.0 ttl pk-yrs)     Types: Cigarettes     Start date: 3/11/1985     Quit date: 3/11/2005     Years since quittin.1   • Smokeless tobacco: Never   Vaping Use   • Vaping status: Never Used   Substance and Sexual Activity   • Alcohol use: Not Currently   • Drug use: Not Currently   • Sexual activity: Not on file   Other Topics Concern   • Not on file   Social History Narrative   • Not on file     Social Determinants of Health     Financial Resource Strain: Not on file   Food Insecurity: No Food Insecurity (2023)    Hunger Vital Sign    • Worried About Running Out of Food in the Last Year: Never true    • Ran Out of Food in the Last Year: Never true   Transportation Needs: No Transportation Needs (10/17/2023)    Received from [employer] : Transportation    • Lack of Transportation (Medical): No    • Lack of Transportation (Non-Medical): No    • Patient Unable or Declines to Respond: No   Physical Activity: Not on file   Stress: Not on file   Social Connections: Feeling Socially Integrated (10/17/2023)    Received from [employer]    OASIS : Social Isolation    • Frequency of experiencing loneliness or isolation: Never   Intimate Partner Violence: Not on file   Housing Stability: Low Risk  (2023)    Housing Stability Vital Sign    • Unable to Pay for Housing in the Last Year: No    • Number of Places Lived in the Last Year: 1    • Unstable Housing in the Last Year: No     Family History   Problem Relation Age of Onset   • Colon cancer Mother    • Lung cancer Father    • Mental illness Neg Hx      No Known Allergies  Current Outpatient Medications on File Prior to Visit   Medication Sig Dispense Refill   • aspirin 81 mg chewable tablet Chew 1 tablet (81 mg total) daily 90 tablet 1   •  "atorvastatin (LIPITOR) 80 mg tablet Take 1 tablet (80 mg total) by mouth daily with dinner 30 tablet 0   • clopidogrel (PLAVIX) 75 mg tablet Take 1 tablet (75 mg total) by mouth daily Do not start before December 10, 2023. 21 tablet 0   • dapagliflozin (Farxiga) 5 MG TABS Take 1 tablet (5 mg total) by mouth daily (Patient taking differently: Take 10 mg by mouth daily) 30 tablet 0   • Entresto 49-51 MG TABS Take 1 tablet by mouth 2 (two) times a day     • eplerenone (INSPRA) 25 mg tablet Take 25 mg by mouth daily     • metoprolol succinate (TOPROL-XL) 25 mg 24 hr tablet Take 1 tablet (25 mg total) by mouth daily Do not start before December 10, 2023. (Patient taking differently: Take 100 mg by mouth daily Patient takes 1/2 tablet) 30 tablet 0   • rosuvastatin (CRESTOR) 40 MG tablet Take 40 mg by mouth daily     • [DISCONTINUED] levETIRAcetam (KEPPRA) 500 mg tablet Take 1 tablet (500 mg total) by mouth every 12 (twelve) hours 60 tablet 5   • potassium chloride (K-DUR,KLOR-CON) 20 mEq tablet Take 1 tablet (20 mEq total) by mouth daily Do not start before December 10, 2023. (Patient not taking: Reported on 5/1/2024) 30 tablet 0     No current facility-administered medications on file prior to visit.         Review of Systems   Refer to positive review of systems in HPI.   Review of Systems    Constitutional- No fever  Eyes- No visual change  ENT- Hearing normal  CV- No chest pain  Resp- No Shortness of breath  GI- No diarrhea  - Bladder normal  MS- No Arthritis   Skin- No rash  Psych- No depression  Endo- No DM  Heme- No nodes    Vitals:    05/01/24 1415   BP: 108/52   BP Location: Left arm   Patient Position: Sitting   Cuff Size: Standard   Pulse: 64   Temp: (!) 97.3 °F (36.3 °C)   TempSrc: Tympanic   SpO2: 97%   Weight: 63 kg (139 lb)   Height: 5' 8\" (1.727 m)       PHYSICAL EXAM:  Appearance: No Acute Distress  Ophthalmoscopic: Disc Flat, Normal fundus  Mental status:  Orientation: Awake, Alert, and " Orientedx3  Memory: Registation 3/3 Recall 3/3  Attention: normal  Knowledge: good  Language: No aphasia  Speech: No dysarthria  Cranial Nerves:  2 No Visual Defect on Confrontation, Pupils round, equal, reactive to light  3,4,6 Extraocular Movements Intact, no nystagmus  5 Facial Sensation Intact  7 No facial asymmetry  8 Intact hearing  9,10 Palate symmetric, normal gag  11 Good shoulder shrug  12 Tongue Midline  Gait: Stable  Coordination: No ataxia with finger to nose testing, and heel to shin  Sensory: Intact, Symmetric to pinprick, light touch, vibration, and joint position  Muscle Tone: Normal              Muscle exam:  Arm Right Left Leg Right Left   Deltoid 5/5 5/5 Iliopsoas 5/5 5/5   Biceps 5/5 5/5 Quads 5/5 5/5   Triceps 5/5 5/5 Hamstrings 5/5 5/5   Wrist Extension 5/5 5/5 Ankle Dorsi Flexion 5/5 5/5   Wrist Flexion 5/5 5/5 Ankle Plantar Flexion 5/5 5/5   Interossei 5/5 5/5 Ankle Eversion 5/5 5/5   APB 5/5 5/5 Ankle Inversion 5/5 5/5       Reflexes   RJ BJ TJ KJ AJ Plantars Sparks's   Right 2+ 2+ 2+ 2+  Downgoing Not present   Left 2+ 2+ 2+ 2+  Downgoing Not present     Personal review of  Labs:                  Diagnoses and all orders for this visit:        1. Seizure disorder (HCC)  levETIRAcetam (KEPPRA) 500 mg tablet      2. Screening for colorectal cancer  Ambulatory referral to Gastroenterology      3. New onset seizure (HCC)  levETIRAcetam (KEPPRA) 500 mg tablet      4. Carotid stenosis, right        5. Chronic arterial ischemic stroke, multifocal, multiple vascular territories            Patient is doing well currently.  Due to his EEG being abnormal, he is to resume keppra 500mg bid indefinitely.  He is to cont f/u with vascular surgery.   Patient can be released to driving from mid June onwards.             Total time of encounter:  30 min  More than 50% of the time was used in counseling and/or coordination of care  Extent of counseling and/or coordination of care        Adenike Barrios  MD  [facility]  [address], [place] [id number] [phone]

## 2024-06-28 ENCOUNTER — TELEPHONE (OUTPATIENT)
Age: 79
End: 2024-06-28

## 2024-06-28 NOTE — TELEPHONE ENCOUNTER
Dr. Hoskins    See Iesha's message - Iesha wanted to know if Ayde can fill this paper out.  Stated that [facility] is ok to reinstate his license off of Dr. Barrios, but they need to follow procedure and need your form too.

## 2024-06-28 NOTE — TELEPHONE ENCOUNTER
ANDREE - Dr. Hoskins      Left a message on Iesha's cell number informing her that Dr. Hoskins is on vacation.  We will contact her when they are completed. Informed her we cannot email them but we will call her.

## 2024-06-28 NOTE — TELEPHONE ENCOUNTER
I have reviewed notes from Dr. Barrios and I will complete his paperwork as soon as I can. [facility]     Thanks!     Ayde

## 2024-06-28 NOTE — TELEPHONE ENCOUNTER
Patient's wife is calling to get an update on the status of the [place]  forms please have Dr. Hoskins complete it and email [email or web address]

## 2024-07-01 NOTE — TELEPHONE ENCOUNTER
Ayde sent a note stating that Andrés needs an eye exam.  Spoke to Iesha and she stated that he just had one done.  She sent the results through my chart.  Printed and gave Ayde.  She signed them.  I spoke to Iesha and she asked to fax them to the number on the form.  Forms for faxed and received confirmation.  Mailed originals  to Andrés. No further action required

## 2024-11-04 ENCOUNTER — OFFICE VISIT (OUTPATIENT)
Dept: NEUROLOGY | Facility: CLINIC | Age: 79
End: 2024-11-04
Payer: COMMERCIAL

## 2024-11-04 VITALS
HEART RATE: 61 BPM | BODY MASS INDEX: 21.37 KG/M2 | DIASTOLIC BLOOD PRESSURE: 58 MMHG | WEIGHT: 141 LBS | SYSTOLIC BLOOD PRESSURE: 114 MMHG | HEIGHT: 68 IN

## 2024-11-04 DIAGNOSIS — Z87.898 HISTORY OF SEIZURES: Primary | ICD-10-CM

## 2024-11-04 DIAGNOSIS — Z86.73 CHRONIC ARTERIAL ISCHEMIC STROKE, MULTIFOCAL, MULTIPLE VASCULAR TERRITORIES: ICD-10-CM

## 2024-11-04 DIAGNOSIS — I65.21 CAROTID STENOSIS, ASYMPTOMATIC, RIGHT: ICD-10-CM

## 2024-11-04 PROCEDURE — 99214 OFFICE O/P EST MOD 30 MIN: CPT | Performed by: PSYCHIATRY & NEUROLOGY

## 2024-11-04 RX ORDER — DAPAGLIFLOZIN 10 MG/1
TABLET, FILM COATED ORAL
COMMUNITY
Start: 2024-10-11

## 2024-11-04 RX ORDER — METOPROLOL SUCCINATE 100 MG/1
TABLET, EXTENDED RELEASE ORAL
COMMUNITY
Start: 2024-09-09

## 2024-11-04 NOTE — PROGRESS NOTES
Return NeuroOutpatient Note        Andrés Stephens  [id number]  79 y.o.  1945       Seizures        History obtained from:  Patient and wife     HPI/Subjective:    Andrés Stephens is a 80 yo M with PMH of seizure, stroke, right ica stenosis  presents as f/u. He has significant h/o CABG, aortic endocarditis s/p replacement in sept of 2023. Patient had presented to [place] on 12/8/23 with new onset seizure. Patient was found to be sleeping in late yesterday afternoon by his wife. When aroused, he was very lethargic and agitated. At 5:45pm, patient was witnessed in ED to have generalized tonic clonic seizure. Wife later mentioned that based on what she saw yesterday, he had very similar event in July of that year but didn't think it was seizure then. He was loaded and kept on keppra 500mg bid. He's tolerating that well. Denies any additional seizure activity. He denies any side effects. Denies any additional seizure activity.      Denies any stroke like events.    He had MRI brain that showed multiple chronic infarcts but otherwise was negative for new process.   His EEG had revealed rare 2 left frontotemporal region sharps that would be epileptogenic in appropriate clinical setting.      He does report tingling and numbness in toes.      He has known right ica 80% stenosis. He was seen by vascular surgery but they felt it was asymptomatic and would recommend AP and statin. He had carotid doppler which showed low % of stenosis. He's on aspirin, plavix and lipitor 80mg daily.      He quit smoking in over years.       Past Medical History:   Diagnosis Date    Aortic stenosis     CAD (coronary artery disease), native coronary artery     CHF (congestive heart failure) ([facility])     COPD (chronic obstructive pulmonary disease) ([facility])     Discitis     Endocarditis      Social History     Socioeconomic History    Marital status: /Civil Union     Spouse name: Not on file    Number of children: Not on file    Years of education:  Not on file    Highest education level: Not on file   Occupational History    Not on file   Tobacco Use    Smoking status: Former     Current packs/day: 0.00     Average packs/day: 0.5 packs/day for 20.0 years (10.0 ttl pk-yrs)     Types: Cigarettes     Start date: 3/11/1985     Quit date: 3/11/2005     Years since quittin.6    Smokeless tobacco: Never   Vaping Use    Vaping status: Never Used   Substance and Sexual Activity    Alcohol use: Not Currently    Drug use: Not Currently    Sexual activity: Not on file   Other Topics Concern    Not on file   Social History Narrative    Not on file     Social Determinants of Health     Financial Resource Strain: Not on file   Food Insecurity: No Food Insecurity (2023)    Hunger Vital Sign     Worried About Running Out of Food in the Last Year: Never true     Ran Out of Food in the Last Year: Never true   Transportation Needs: No Transportation Needs (10/17/2023)    Received from [facility]    OASIS : Transportation     Lack of Transportation (Medical): No     Lack of Transportation (Non-Medical): No     Patient Unable or Declines to Respond: No   Physical Activity: Not on file   Stress: Not on file   Social Connections: Feeling Socially Integrated (10/17/2023)    Received from [facility]    OASIS : Social Isolation     Frequency of experiencing loneliness or isolation: Never   Intimate Partner Violence: Not on file   Housing Stability: Low Risk  (2023)    Housing Stability Vital Sign     Unable to Pay for Housing in the Last Year: No     Number of Places Lived in the Last Year: 1     Unstable Housing in the Last Year: No     Family History   Problem Relation Age of Onset    Colon cancer Mother     Lung cancer Father     Mental illness Neg Hx      No Known Allergies  Current Outpatient Medications on File Prior to Visit   Medication Sig Dispense Refill    aspirin 81 mg chewable tablet Chew 1 tablet (81 mg  "total) daily 90 tablet 1    clopidogrel (PLAVIX) 75 mg tablet Take 1 tablet (75 mg total) by mouth daily Do not start before December 10, 2023. 21 tablet 0    dapagliflozin (Farxiga) 5 MG TABS Take 1 tablet (5 mg total) by mouth daily (Patient taking differently: Take 10 mg by mouth daily) 30 tablet 0    eplerenone (INSPRA) 25 mg tablet Take 25 mg by mouth daily      Farxiga 10 MG tablet       levETIRAcetam (KEPPRA) 500 mg tablet Take 1 tablet (500 mg total) by mouth every 12 (twelve) hours 180 tablet 2    metoprolol succinate (TOPROL-XL) 25 mg 24 hr tablet Take 1 tablet (25 mg total) by mouth daily Do not start before December 10, 2023. (Patient taking differently: Take 100 mg by mouth daily Patient takes 1/2 tablet) 30 tablet 0    rosuvastatin (CRESTOR) 40 MG tablet Take 40 mg by mouth daily      Entresto 49-51 MG TABS Take 1 tablet by mouth 2 (two) times a day      metoprolol succinate (TOPROL-XL) 100 mg 24 hr tablet  (Patient not taking: Reported on 11/4/2024)      [DISCONTINUED] atorvastatin (LIPITOR) 80 mg tablet Take 1 tablet (80 mg total) by mouth daily with dinner 30 tablet 0    [DISCONTINUED] potassium chloride (K-DUR,KLOR-CON) 20 mEq tablet Take 1 tablet (20 mEq total) by mouth daily Do not start before December 10, 2023. (Patient not taking: Reported on 5/1/2024) 30 tablet 0     No current facility-administered medications on file prior to visit.         Review of Systems   Refer to positive review of systems in HPI.   Review of Systems    Constitutional- No fever  Eyes- No visual change  ENT- Hearing normal  CV- No chest pain  Resp- No Shortness of breath  GI- No diarrhea  - Bladder normal  MS- No Arthritis   Skin- No rash  Psych- No depression  Endo- No DM  Heme- No nodes    Vitals:    11/04/24 1339   BP: 114/58   BP Location: Left arm   Patient Position: Sitting   Cuff Size: Standard   Pulse: 61   Weight: 64 kg (141 lb)   Height: 5' 8\" (1.727 m)       PHYSICAL EXAM:  Appearance: No Acute " Distress  Ophthalmoscopic: Disc Flat, Normal fundus  Mental status:  Orientation: Awake, Alert, and Orientedx3  Memory: Registation 3/3 Recall 3/3  Attention: normal  Knowledge: good  Language: No aphasia  Speech: No dysarthria  Cranial Nerves:  2 No Visual Defect on Confrontation, Pupils round, equal, reactive to light  3,4,6 Extraocular Movements Intact, no nystagmus  5 Facial Sensation Intact  7 No facial asymmetry  8 Intact hearing  9,10 Palate symmetric, normal gag  11 Good shoulder shrug  12 Tongue Midline  Gait: Stable  Coordination: No ataxia with finger to nose testing, and heel to shin  Sensory: Intact, Symmetric to pinprick, light touch, vibration, and joint position  Muscle Tone: Normal              Muscle exam:  Arm Right Left Leg Right Left   Deltoid 5/5 5/5 Iliopsoas 5/5 5/5   Biceps 5/5 5/5 Quads 5/5 5/5   Triceps 5/5 5/5 Hamstrings 5/5 5/5   Wrist Extension 5/5 5/5 Ankle Dorsi Flexion 5/5 5/5   Wrist Flexion 5/5 5/5 Ankle Plantar Flexion 5/5 5/5   Interossei 5/5 5/5 Ankle Eversion 5/5 5/5   APB 5/5 5/5 Ankle Inversion 5/5 5/5       Reflexes   RJ BJ TJ KJ AJ Plantars Sparks's   Right 2+ 2+ 2+ 2+  Downgoing Not present   Left 2+ 2+ 2+ 2+  Downgoing Not present     Personal review of  Labs:                  Diagnoses and all orders for this visit:      1. History of seizures        2. Carotid stenosis, asymptomatic, right        3. Chronic arterial ischemic stroke, multifocal, multiple vascular territories              Patient is doing well without any seizures.   He is to resume keppra 500mg bid.   Patient is to resume aspirin and plavix for stroke prevention.               Total time of encounter:  30 min  More than 50% of the time was used in counseling and/or coordination of care  Extent of counseling and/or coordination of care        Adenike Barrios MD  [facility]  [address] [place] [id number] [phone]

## 2025-03-11 DIAGNOSIS — G40.909 SEIZURE DISORDER (HCC): ICD-10-CM

## 2025-03-11 DIAGNOSIS — I10 ESSENTIAL HYPERTENSION: Primary | ICD-10-CM

## 2025-03-11 DIAGNOSIS — R56.9 NEW ONSET SEIZURE (HCC): ICD-10-CM

## 2025-03-11 DIAGNOSIS — I48.0 PAROXYSMAL ATRIAL FIBRILLATION (HCC): ICD-10-CM

## 2025-03-12 RX ORDER — LEVETIRACETAM 500 MG/1
500 TABLET ORAL EVERY 12 HOURS
Qty: 180 TABLET | Refills: 1 | Status: SHIPPED | OUTPATIENT
Start: 2025-03-12

## 2025-03-13 RX ORDER — METOPROLOL SUCCINATE 100 MG/1
100 TABLET, EXTENDED RELEASE ORAL DAILY
Qty: 90 TABLET | Refills: 1 | Status: SHIPPED | OUTPATIENT
Start: 2025-03-13

## 2025-05-08 ENCOUNTER — TELEPHONE (OUTPATIENT)
Age: 80
End: 2025-05-08

## 2025-05-08 NOTE — TELEPHONE ENCOUNTER
05/08/25    Patient Wife Miss. Julian called office today requesting to reschedule 05/12/25 Appt for something June, but Unfortunately nothing available until December with Dr. Barrios.    Informed Miss. Julian and she decided to Keep 05/12/25 Appt.     05/12/25 APPT CONFIRMED.      Any questions, please contact Patient or Wife.  Thank You.

## 2025-06-20 ENCOUNTER — TELEPHONE (OUTPATIENT)
Age: 80
End: 2025-06-20

## 2025-06-20 NOTE — TELEPHONE ENCOUNTER
Patients wife Iesha called in to reschedule appointment that Patient missed on 5/12/2025.    Appointment schedule to first available 2/10/2026 at 9:30 am with Dr. Barrios in [place] office.    Patient added to wait list.

## 2025-06-24 ENCOUNTER — OFFICE VISIT (OUTPATIENT)
Age: 80
End: 2025-06-24
Payer: COMMERCIAL

## 2025-06-24 VITALS
BODY MASS INDEX: 20.46 KG/M2 | DIASTOLIC BLOOD PRESSURE: 50 MMHG | HEART RATE: 58 BPM | OXYGEN SATURATION: 96 % | WEIGHT: 135 LBS | HEIGHT: 68 IN | SYSTOLIC BLOOD PRESSURE: 96 MMHG

## 2025-06-24 DIAGNOSIS — I73.9 SMALL VESSEL DISEASE (HCC): ICD-10-CM

## 2025-06-24 DIAGNOSIS — I65.21 CAROTID STENOSIS, NON-SYMPTOMATIC, RIGHT: ICD-10-CM

## 2025-06-24 DIAGNOSIS — G40.909 SEIZURE DISORDER (HCC): Primary | ICD-10-CM

## 2025-06-24 DIAGNOSIS — I95.9 HYPOTENSION: ICD-10-CM

## 2025-06-24 PROCEDURE — G2211 COMPLEX E/M VISIT ADD ON: HCPCS | Performed by: PSYCHIATRY & NEUROLOGY

## 2025-06-24 PROCEDURE — 99214 OFFICE O/P EST MOD 30 MIN: CPT | Performed by: PSYCHIATRY & NEUROLOGY

## 2025-06-24 NOTE — PROGRESS NOTES
Return NeuroOutpatient Note        Andrés Stephens  [id number]  80 y.o.  1945       History of seizures ; Chronic arterial ischemic stroke, multifocal, multiple vasc; and Carotid stenosis, asymptomatic, right         History obtained from:  Patient and wife    HPI/Subjective:    Andrés Stephens is a 79 yo M with PMH of seizures, strokes, right carotid stenosis presents as f/u. He has significant h/o CABG, aortic endocarditis s/p replacement in sept of 2023. Patient had presented to [facility] on 12/8/23 with new onset seizure. Per my previous report, patient was sleeping in late noted by his wife. When aroused, he was very lethargic and agitated. At 5:45pm, patient was witnessed in ED to have generalized tonic clonic seizure. Wife later mentioned that based on what she saw yesterday, he had very similar event in July of that year but didn't think it was seizure then. He was loaded and kept on keppra 500mg bid. He's tolerating that well. Wife denies any seizure activity since dec 2023.     Denies any stroke like events.     He had MRI brain that showed multiple chronic infarcts but otherwise was negative for new process.   His EEG had revealed rare 2 left frontotemporal region sharps that would be epileptogenic in appropriate clinical setting.      He does report tingling and numbness in toes.      He has known right ica 80% stenosis. He was seen by vascular surgery in [place] but they felt it was asymptomatic and would recommend AP and statin. He had carotid doppler which showed low % of stenosis. He's on aspirin, plavix and crestor 40mg daily. They have f/u appt in Aug of this year. He hasn't had any stroke like sxs.      He quit smoking in over years.   He eats healthy now.       Past Medical History[1]  Social History     Socioeconomic History   • Marital status: /Civil Union     Spouse name: Not on file   • Number of children: Not on file   • Years of education: Not on file   • Highest education level: Not on  file   Occupational History   • Not on file   Tobacco Use   • Smoking status: Former     Current packs/day: 0.00     Average packs/day: 0.5 packs/day for 20.0 years (10.0 ttl pk-yrs)     Types: Cigarettes     Start date: 3/11/1985     Quit date: 3/11/2005     Years since quittin.3   • Smokeless tobacco: Never   Vaping Use   • Vaping status: Never Used   Substance and Sexual Activity   • Alcohol use: Not Currently   • Drug use: Not Currently   • Sexual activity: Not on file   Other Topics Concern   • Not on file   Social History Narrative   • Not on file     Social Drivers of Health     Financial Resource Strain: Not on file   Food Insecurity: No Food Insecurity (2023)    Hunger Vital Sign    • Worried About Running Out of Food in the Last Year: Never true    • Ran Out of Food in the Last Year: Never true   Transportation Needs: No Transportation Needs (10/17/2023)    [facility] from [facility] : Transportation    • Lack of Transportation (Medical): No    • Lack of Transportation (Non-Medical): No    • Patient Unable or Declines to Respond: No   Physical Activity: Not on file   Stress: Not on file   Social Connections: Feeling Socially Integrated (10/17/2023)    Received from [facility] : Social Isolation    • Frequency of experiencing loneliness or isolation: Never   Intimate Partner Violence: Not on file   Housing Stability: Low Risk  (2023)    Housing Stability Vital Sign    • Unable to Pay for Housing in the Last Year: No    • Number of Places Lived in the Last Year: 1    • Unstable Housing in the Last Year: No     Family History[2]  Allergies[3]  Medications Ordered Prior to Encounter[4]      Review of Systems   Refer to positive review of systems in HPI.   Review of Systems    Constitutional- No fever  Eyes- No visual change  ENT- Hearing normal  CV- No chest pain  Resp- No Shortness of breath  GI- No diarrhea  - Bladder normal  MS- No Arthritis   Skin- No  "rash  Psych- No depression  Endo- No DM  Heme- No nodes    Vitals:    06/24/25 1100   BP: 96/50   BP Location: Left arm   Patient Position: Sitting   Cuff Size: Standard   Pulse: 58   SpO2: 96%   Weight: 61.2 kg (135 lb)   Height: 5' 8\" (1.727 m)     Repeat bp: 96/50  HR: 55    PHYSICAL EXAM:  Appearance: No Acute Distress  Ophthalmoscopic: Disc Flat, Normal fundus  Mental status:  Orientation: Awake, Alert, and Orientedx3  Memory: Registation 3/3 Recall 3/3  Attention: normal  Knowledge: good  Language: No aphasia  Speech: No dysarthria  Cranial Nerves:  2 No Visual Defect on Confrontation, Pupils round, equal, reactive to light  3,4,6 Extraocular Movements Intact, no nystagmus  5 Facial Sensation Intact  7 No facial asymmetry  8 Intact hearing  9,10 Palate symmetric, normal gag  11 Good shoulder shrug  12 Tongue Midline  Gait: Stable  Coordination: No ataxia with finger to nose testing, and heel to shin  Sensory: Intact, Symmetric to pinprick, light touch, vibration, and joint position  Muscle Tone: Normal              Muscle exam:  Arm Right Left Leg Right Left   Deltoid 5/5 5/5 Iliopsoas 5/5 5/5   Biceps 5/5 5/5 Quads 5/5 5/5   Triceps 5/5 5/5 Hamstrings 5/5 5/5   Wrist Extension 5/5 5/5 Ankle Dorsi Flexion 5/5 5/5   Wrist Flexion 5/5 5/5 Ankle Plantar Flexion 5/5 5/5   Interossei 5/5 5/5 Ankle Eversion 5/5 5/5   APB 5/5 5/5 Ankle Inversion 5/5 5/5       Reflexes   RJ BJ TJ KJ AJ Plantars Sparks's   Right 2+ 2+ 2+ 2+  Downgoing Not present   Left 2+ 2+ 2+ 2+  Downgoing Not present     Personal review of  Labs:                  Diagnoses and all orders for this visit:    Seizure disorder (HCC)    Carotid stenosis, non-symptomatic, right    Small vessel disease (HCC)    Hypotension        Assessment & Plan  Seizure disorder (HCC)  Patient has remained stable without seizure.   He is to resume 500mg bid.        Carotid stenosis, non-symptomatic, right  Discussed that he needs to have normal to slightly elevated " blood pressure. If systolic <120, he should skip antihypertensive agent. They will message his cardiologist.   Cont aspirin, plavix and crestor.   Asked him to hydrate well.        Small vessel disease (HCC)  Cont with ap and statin.        Hypotension  Discussed that he needs to have his antihypertensives lowered as his BP is always low. This will compromise blood flow and contribute to ischemia in future.                          Total time of encounter:  30 min  More than 50% of the time was used in counseling and/or coordination of care  Extent of counseling and/or coordination of care        Adenike Barrios MD  [facility]  [address] [place] [address] [id number] [phone]           [1]  Past Medical History:  Diagnosis Date   • Aortic stenosis    • CAD (coronary artery disease), native coronary artery    • CHF (congestive heart failure) (HCC)    • COPD (chronic obstructive pulmonary disease) (HCC)    • Discitis    • Endocarditis    [2]  Family History  Problem Relation Name Age of Onset   • Colon cancer Mother     • Lung cancer Father     • Mental illness Neg Hx     [3]  No Known Allergies[4]  Current Outpatient Medications on File Prior to Visit   Medication Sig Dispense Refill   • aspirin 81 mg chewable tablet Chew 1 tablet (81 mg total) daily 90 tablet 1   • clopidogrel (PLAVIX) 75 mg tablet Take 1 tablet (75 mg total) by mouth daily Do not start before December 10, 2023. 21 tablet 0   • dapagliflozin (Farxiga) 5 MG TABS Take 1 tablet (5 mg total) by mouth daily 30 tablet 0   • Entresto 49-51 MG TABS Take 1 tablet by mouth in the morning and 1 tablet in the evening.     • eplerenone (INSPRA) 25 mg tablet Take 25 mg by mouth in the morning.     • levETIRAcetam (KEPPRA) 500 mg tablet TAKE ONE TABLET BY MOUTH EVERY 12 HOURS 180 tablet 1   • metoprolol succinate (TOPROL-XL) 100 mg 24 hr tablet Take 1 tablet (100 mg total) by mouth daily (Patient taking differently: Take 50 mg by  mouth in the morning.) 90 tablet 1   • rosuvastatin (CRESTOR) 40 MG tablet Take 40 mg by mouth in the morning.     • Farxiga 10 MG tablet  (Patient not taking: Reported on 6/24/2025)     • metoprolol succinate (TOPROL-XL) 25 mg 24 hr tablet Take 1 tablet (25 mg total) by mouth daily Do not start before December 10, 2023. (Patient not taking: Reported on 6/24/2025) 30 tablet 0     No current facility-administered medications on file prior to visit.

## (undated) DEVICE — PINNACLE INTRODUCER SHEATH: Brand: PINNACLE

## (undated) DEVICE — GUIDEWIRE .035 260CM 3MM J TIP

## (undated) DEVICE — CATH DIAG 5FR IMPULSE 100CM FR4

## (undated) DEVICE — GLIDESHEATH BASIC HYDROPHILIC COATED INTRODUCER SHEATH: Brand: GLIDESHEATH

## (undated) DEVICE — DGW .035 FC J3MM 150CM TEF: Brand: EMERALD

## (undated) DEVICE — RADIFOCUS OPTITORQUE ANGIOGRAPHIC CATHETER: Brand: OPTITORQUE

## (undated) DEVICE — RADIFOCUS GLIDEWIRE: Brand: GLIDEWIRE

## (undated) DEVICE — RUNTHROUGH NS EXTRA FLOPPY PTCA GUIDEWIRE: Brand: RUNTHROUGH

## (undated) DEVICE — Device: Brand: ASAHI SILVERWAY

## (undated) DEVICE — CATH DIAG 5FR IMPULSE 100CM FL4